# Patient Record
Sex: FEMALE | Race: WHITE | Employment: OTHER | URBAN - METROPOLITAN AREA
[De-identification: names, ages, dates, MRNs, and addresses within clinical notes are randomized per-mention and may not be internally consistent; named-entity substitution may affect disease eponyms.]

---

## 2017-03-07 ENCOUNTER — OFFICE VISIT (OUTPATIENT)
Dept: ENDOCRINOLOGY | Age: 57
End: 2017-03-07

## 2017-03-07 VITALS
HEART RATE: 81 BPM | WEIGHT: 204.3 LBS | DIASTOLIC BLOOD PRESSURE: 77 MMHG | BODY MASS INDEX: 30.26 KG/M2 | SYSTOLIC BLOOD PRESSURE: 143 MMHG | HEIGHT: 69 IN

## 2017-03-07 DIAGNOSIS — I10 ESSENTIAL HYPERTENSION WITH GOAL BLOOD PRESSURE LESS THAN 130/80: ICD-10-CM

## 2017-03-07 DIAGNOSIS — E78.5 HYPERLIPIDEMIA, UNSPECIFIED HYPERLIPIDEMIA TYPE: ICD-10-CM

## 2017-03-07 DIAGNOSIS — E11.49 TYPE 2 DIABETES MELLITUS WITH OTHER DIABETIC NEUROLOGICAL COMPLICATION (HCC): Primary | ICD-10-CM

## 2017-03-07 RX ORDER — ALBUTEROL SULFATE 0.83 MG/ML
SOLUTION RESPIRATORY (INHALATION)
COMMUNITY
Start: 2017-01-27 | End: 2017-08-30

## 2017-03-07 RX ORDER — METHYLPREDNISOLONE 4 MG/1
TABLET ORAL
COMMUNITY
Start: 2017-02-08 | End: 2017-08-30

## 2017-03-07 RX ORDER — FLUCONAZOLE 100 MG/1
TABLET ORAL
COMMUNITY
Start: 2016-12-03 | End: 2017-08-30

## 2017-03-07 RX ORDER — EPINEPHRINE 0.3 MG/.3ML
0.3 INJECTION SUBCUTANEOUS
COMMUNITY

## 2017-03-07 RX ORDER — PREGABALIN 50 MG/1
CAPSULE ORAL
COMMUNITY
End: 2017-09-14 | Stop reason: ALTCHOICE

## 2017-03-07 RX ORDER — LANOLIN ALCOHOL/MO/W.PET/CERES
500 CREAM (GRAM) TOPICAL DAILY
COMMUNITY
End: 2017-09-14 | Stop reason: ALTCHOICE

## 2017-03-07 RX ORDER — DEXTROAMPHETAMINE SACCHARATE, AMPHETAMINE ASPARTATE, DEXTROAMPHETAMINE SULFATE AND AMPHETAMINE SULFATE 5; 5; 5; 5 MG/1; MG/1; MG/1; MG/1
TABLET ORAL
COMMUNITY
Start: 2016-12-03 | End: 2017-09-14 | Stop reason: ALTCHOICE

## 2017-03-07 RX ORDER — INSULIN GLARGINE 300 U/ML
INJECTION, SOLUTION SUBCUTANEOUS
COMMUNITY
Start: 2017-02-21 | End: 2017-09-14 | Stop reason: SDUPTHER

## 2017-03-07 RX ORDER — PREGABALIN 75 MG/1
CAPSULE ORAL
COMMUNITY
End: 2017-09-14 | Stop reason: ALTCHOICE

## 2017-03-07 RX ORDER — HUMAN INSULIN 100 [IU]/ML
INJECTION, SOLUTION SUBCUTANEOUS
COMMUNITY
Start: 2017-02-08 | End: 2017-07-06 | Stop reason: SDUPTHER

## 2017-03-07 NOTE — PROGRESS NOTES
CONSULTATION REQUESTED BY: Adriano Rincon NP     REASON FOR CONSULT:  Uncontrolled type 2 diabetes    CHIEF COMPLAINT: Blood glucose is high    HISTORY OF PRESENT ILLNESS:   Mesha Velez is a 55 y.o. female with a PMHx as noted below who was referred to our endocrinology clinic for evaluation of uncontrolled type 2 diabetes. Diabetes History:  Diabetes was diagnosed in       History of gastric sleeve procedure in   Family History of diabetes is positive in her father. Last A1c prior to initial visit was 8.6% approx 2 weeks ago per pateint  Regimen at time of establishing care includes  - Toujeo 120 units AM       - Novolin R sliding scale startin units + 4 :50 >150, averaging 12 units TID       - Metformin gave her diarrhea, did not try extended release     Review of home glucose:  AM      Lunch  130-200  Dinner   160-380  Bedtime 200-500    Patient notes that she would like to have some help with her blood sugars.      PAST MEDICAL/SURGICAL HISTORY:   Past Medical History:   Diagnosis Date    Asthma     Asthma     COPD     Depression     Diabetes ([facility] [address]     Diabetes mellitus     Essential hypertension     GERD (gastroesophageal reflux disease)     Headache(784.0)     HX OTHER MEDICAL     acoustic neuroma    Hypercholesterolemia     Hypertension     Ill-defined condition     R ACOUSTIC NEUROMA    Psychiatric problem     anxiety depression    Psychotic disorder     Unspecified sleep apnea     NO CPAP-O2 @2L WHEN SLEEPING     Past Surgical History:   Procedure Laterality Date     DELIVERY ONLY      HX GASTRECTOMY  15    lap sleeve gastrectomy by Dr Derick Vasuqez HX GYN      2 c-sections    HX HEENT      sinus    HX HEENT      tracheal resection    HX HEENT      tracheal alleratation x 2    HX HEENT      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    HX HEENT      LASER SX-PENG       ALLERGIES:   Allergies   Allergen Reactions    Latex Hives    Other Food Anaphylaxis     PEPPERONI    Demerol [Meperidine] Anaphylaxis     Difficulty breathing    Iodine Anaphylaxis    Morphine Other (comments)     voilent outbreaks (restraints needed)       MEDICATIONS ON ADMISSION:     Current Outpatient Prescriptions:     TOUJEO SOLOSTAR 300 unit/mL (1.5 mL) inpn, , Disp: , Rfl:     albuterol (PROVENTIL VENTOLIN) 2.5 mg /3 mL (0.083 %) nebulizer solution, , Disp: , Rfl:     Levocetirizine (XYZAL) 5 mg tablet, Take  by mouth nightly., Disp: , Rfl:     Desvenlafaxine SR (PRISTIQ) 50 mg tablet, Take 50 mg by mouth., Disp: , Rfl:     vilazodone (VIIBRYD) 40 mg tab tablet, Take 40 mg by mouth daily. , Disp: , Rfl:     QUEtiapine (SEROQUEL) 25 mg tablet, Take 50 mg by mouth nightly., Disp: , Rfl:     rosuvastatin (CRESTOR) 20 mg tablet, Take 20 mg by mouth nightly.  , Disp: , Rfl:     tiotropium (SPIRIVA WITH HANDIHALER) 18 mcg inhalation capsule, Take 1 Cap by inhalation daily. , Disp: , Rfl:     budesonide-formoterol (SYMBICORT) 160-4.5 mcg/Actuation HFA inhaler, Take 2 Puffs by inhalation two (2) times a day. Indications: COPD ASSOCIATED WITH CHRONIC BRONCHITIS, EXTRINSIC ASTHMA, Disp: , Rfl:     albuterol (PROVENTIL HFA) 90 mcg/Actuation inhaler, Take 1 Puff by inhalation every four (4) hours as needed. , Disp: , Rfl:     Nebivolol (BYSTOLIC) 5 mg tablet, Take 20 mg by mouth daily. , Disp: , Rfl:     montelukast (SINGULAIR) 10 mg tablet, Take 10 mg by mouth daily. , Disp: , Rfl:     omeprazole (PRILOSEC) 20 mg capsule, Take 20 mg by mouth daily. , Disp: , Rfl:     NOVOLIN R 100 unit/mL injection, , Disp: , Rfl:     zinc 15 mg tab, Take  by mouth., Disp: , Rfl:     INSULIN GLARGINE,HUM. REC. ANLOG (LANTUS SC), 20 Units by SubCUTAneous route two (2) times a day., Disp: , Rfl:     insulin lispro protamine/insulin lispro (HUMALOG MIX 50-50) 100 unit/mL (50-50) injection, 50 Units by SubCUTAneous route Before breakfast, lunch, and dinner., Disp: , Rfl:     hyoscyamine SL (LEVSIN/SL) 0.125 mg SL tablet, 1 tablet by SubLINGual route every six (6) hours as needed for Cramping for up to 30 doses. , Disp: 30 tablet, Rfl: 0    hydrocodone-acetaminophen (NORCO) 5-325 mg per tablet, Take 1-2 tablets by mouth every four (4) hours as needed for Pain., Disp: 40 tablet, Rfl: 0    ergocalciferol (ERGOCALCIFEROL) 50,000 unit capsule, Take 1 capsule by mouth every seven (7) days. , Disp: 12 capsule, Rfl: 3    magnesium 250 mg tab, Take  by mouth nightly. 2 TABS AT BEDTIME, Disp: , Rfl:     fexofenadine (ALLEGRA) 180 mg tablet, Take  by mouth daily. , Disp: , Rfl:     loratadine 10 mg cap, Take  by mouth., Disp: , Rfl:     diphenhydrAMINE (BENADRYL) 25 mg capsule, Take 25 mg by mouth every six (6) hours as needed. , Disp: , Rfl:     cholecalciferol, vitamin d3, (VITAMIN D3) 1,000 unit tablet, Take 400 Units by mouth daily. 2 CAPS DAILY, Disp: , Rfl:     SOCIAL HISTORY:   Social History     Social History    Marital status:      Spouse name: N/A    Number of children: N/A    Years of education: N/A     Occupational History    Not on file. Social History Main Topics    Smoking status: Former Smoker     Packs/day: 1.50     Years: 12.00     Quit date: 12/18/2003    Smokeless tobacco: Never Used    Alcohol use Yes      Comment: ocassional    Drug use: No    Sexual activity: Yes     Other Topics Concern    Not on file     Social History Narrative       FAMILY HISTORY:  Family History   Problem Relation Age of Onset    Diabetes Father     Heart Failure Father     Heart Disease Father     Hypertension Father     High Cholesterol Mother     Suicide Brother     Stroke Maternal Grandmother     Cancer Paternal Grandfather     Anesth Problems Neg Hx        REVIEW OF SYSTEMS: Complete ROS assessed and noted for that which is described above, all else are negative.   Eyes: normal  ENT: normal  CVS: normal  Resp: normal  GI: normal  : normal  GYN: normal  Endocrine: normal  Integument: normal  Musculoskeletal: normal  Neuro: normal  Psych: normal      PHYSICAL EXAMINATION:    VITAL SIGNS:  Visit Vitals    /77 (BP 1 Location: Left arm, BP Patient Position: Sitting)    Pulse 81    Ht 5' 8.5\" (1.74 m)    Wt 204 lb 4.8 oz (92.7 kg)    LMP 01/17/2010    BMI 30.61 kg/m2       GENERAL: NCAT, Sitting comfortably, NAD  EYES: EOMI, non-icteric, no proptosis  Ear/Nose/Throat: NCAT, no inflammation, no masses  LYMPH NODES: No LAD  CARDIOVASCULAR: S1 S2, RRR, No murmur, 2+ radial pulses  RESPIRATORY: CTA b/l, no wheeze/rales  GASTROINTESTINAL:  ND  MUSCULOSKELETAL: Normal ROM, no atrophy  SKIN: warm, no edema/rash/ or other skin changes  NEUROLOGIC: 5/5 power all extremities, no tremor, AAOx3  PSYCHIATRIC: Normal affect, Normal insight and judgement         Diabetic foot exam performed by Latrice Dawson MD     Measurement  Response Nurse Comment Physician Comment   Monofilament  R - reduced sensation with micro filament  L - reduced sensation with micro filament     Pulse DP R - 2+ (normal)  L - 2+ (normal)     Vibration R - Normal    L - Normal     Structural deformity R - None  L - None     Skin Integrity / Deformity R - None  L - None        Reviewed by:             REVIEW OF LABORATORY AND RADIOLOGY DATA:   Labs and documentation have been reviewed as described above. ASSESSMENT AND PLAN:   Lynne Cordon is a 55 y.o. female with a PMHx as noted above who was referred to our endocrinology clinic for evaluation of uncontrolled type 2 diabetes. Problems:  Type 2 diabetes with neuropathy, Uncontrolled  Hyperlipidemia  Hypertension    We had the pleasure of reviewing together the basics of diabetes including basic pathophysiology and diabetes care. We further discussed the importance of checking home glucose regularly and taking all of their scheduled medications in order to have the best possible outcome.  I was able to answer any questions they had in clinic today and they are invited to reach me if they have any further questions. Based upon our discussion together today we have decided to make the following changes:    Patients diabetes basal-bolus regimen is currently skewed with significantly more basal insulin than prandial/bolus insulin. This tends to result in uncontrolled post-prandial hyperglycemia while at the same time increasing the risk of fasting hypoglycemia. Today besides establishing care, we will initiate some adjustment / balance in their regimen by shifting some insulin from their basal dose to their bolus dose which will be the first step to better diabetes control. From their we will continue to further tighten the regimen as appropriate to obtain more balanced glycemic control. Discussed the A2e-Ubot curve and risk for sudden cardiovascular events. Discussed her neuropathy, she has good days and bad days, using lyrica for control. Overall not as bad as before. PLAN  Type 2 Diabetes  Medications:  Reduce Toujeo to 80 units   Increase Novolin R to 25 units with each meal  Start correction scale 1:15>150  Advised to check glucose ACHS  Provided with glucose log sheets for later review. Labs: will retreive outside records. Feet: Examination performed today. Encouraged regular \"self-checks\" at home. Eyes: Advised updated eye exam report faxed to our office for review. Kidney: GFR/Urine microalbumin as discussed. HTN: BP stable on current medications, no changes, will monitor  HLD: Fasting lipids to be obtained/reviewed. crestor 20    RTC: I would like to see them back in 3 months  Requested she send me a glucose log in 1 week that I will review and call her back to discuss. >60 minutes spent together with patient today of which >50% of this time was spent in counseling and coordination of care. Kalyn Romero.  [address] [facility]

## 2017-03-07 NOTE — MR AVS SNAPSHOT
Visit Information Date & Time Provider Department Dept. Phone Encounter #  
 3/7/2017  2:50 PM Nilesh Pereira, [address] [facility] [phone] Follow-up Instructions Return in about 3 months (around 6/7/2017). Upcoming Health Maintenance Date Due Hepatitis C Screening 1960 FOOT EXAM Q1 3/23/1971 MICROALBUMIN Q1 3/23/1971 EYE EXAM RETINAL OR DILATED Q1 3/23/1971 Pneumococcal 19-64 Medium Risk (1 of 1 - PPSV23) 3/23/1980 DTaP/Tdap/Td series (1 - Tdap) 3/23/1982 PAP AKA CERVICAL CYTOLOGY 3/23/1982 BREAST CANCER SCRN MAMMOGRAM 3/23/2011 FOBT Q 1 YEAR AGE 50-75 3/23/2011 HEMOGLOBIN A1C Q6M 6/18/2015 LIPID PANEL Q1 9/5/2015 INFLUENZA AGE 9 TO ADULT 8/1/2016 Allergies as of 3/7/2017  Review Complete On: 3/7/2017 By: Nilesh Pereira MD  
  
 Severity Noted Reaction Type Reactions Latex  09/05/2014    Hives Other Food High 09/05/2014    Anaphylaxis PEPPERONI Demerol [Meperidine] High 10/17/2010   Systemic Anaphylaxis Difficulty breathing Iodine High 10/17/2010   Systemic Anaphylaxis Morphine High 10/17/2010   Systemic Other (comments)  
 voilent outbreaks (restraints needed) Current Immunizations  Reviewed on 9/25/2014 No immunizations on file. Not reviewed this visit You Were Diagnosed With   
  
 Codes Comments Type 2 diabetes mellitus with other diabetic neurological complication (HCC)    -  Primary ICD-10-CM: E11.49 
ICD-9-CM: 250.60 Essential hypertension with goal blood pressure less than 130/80     ICD-10-CM: I10 
ICD-9-CM: 401.9 Hyperlipidemia, unspecified hyperlipidemia type     ICD-10-CM: E78.5 ICD-9-CM: 272.4 Vitals BP Pulse Height(growth percentile) Weight(growth percentile) LMP BMI  
 143/77 (BP 1 Location: Left arm, BP Patient Position: Sitting) 81 5' 8.5\" (1.74 m) 204 lb 4.8 oz (92.7 kg) 01/17/2010 30.61 kg/m2 OB Status Smoking Status Postmenopausal Former Smoker BMI and BSA Data Body Mass Index Body Surface Area  
 30.61 kg/m 2 2.12 m 2 Preferred Pharmacy Pharmacy Name Phone [address] [address] [phone] Your Updated Medication List  
  
   
This list is accurate as of: 3/7/17  3:47 PM.  Always use your most recent med list. ALLEGRA 180 mg tablet Generic drug:  fexofenadine Take  by mouth daily. BENADRYL 25 mg capsule Generic drug:  diphenhydrAMINE Take 25 mg by mouth every six (6) hours as needed. BYSTOLIC 5 mg tablet Generic drug:  nebivolol Take 20 mg by mouth daily. cholecalciferol 1,000 unit tablet Commonly known as:  VITAMIN D3 Take 400 Units by mouth daily. 2 CAPS DAILY CRESTOR 20 mg tablet Generic drug:  rosuvastatin Take 20 mg by mouth nightly. Desvenlafaxine SR 50 mg tablet Commonly known as:  PRISTIQ Take 50 mg by mouth. dextroamphetamine-amphetamine 20 mg tablet Commonly known as:  ADDERALL  
  
 EPINEPHrine 0.3 mg/0.3 mL injection Commonly known as:  EPIPEN  
0.3 mg by IntraMUSCular route once as needed. ergocalciferol 50,000 unit capsule Commonly known as:  ERGOCALCIFEROL Take 1 capsule by mouth every seven (7) days. fluconazole 100 mg tablet Commonly known as:  DIFLUCAN HumaLOG Mix 50-50 100 unit/mL (50-50) injection Generic drug:  insulin lispro protamine/insulin lispro 50 Units by SubCUTAneous route Before breakfast, lunch, and dinner. HYDROcodone-acetaminophen 5-325 mg per tablet Commonly known as:  Renetta Grant Take 1-2 tablets by mouth every four (4) hours as needed for Pain.  
  
 hyoscyamine SL 0.125 mg SL tablet Commonly known as:  LEVSIN/SL  
1 tablet by SubLINGual route every six (6) hours as needed for Cramping for up to 30 doses. * LANTUS SC  
20 Units by SubCUTAneous route two (2) times a day. * TOUJEO SOLOSTAR 300 unit/mL (1.5 mL) Inpn Generic drug:  insulin glargine  
  
 levocetirizine 5 mg tablet Commonly known as:  Yamel Denzel Take  by mouth nightly. loratadine 10 mg Cap Take  by mouth. * LYRICA 50 mg capsule Generic drug:  pregabalin Take  by mouth. * LYRICA 75 mg capsule Generic drug:  pregabalin Take  by mouth.  
  
 magnesium 250 mg Tab Take  by mouth nightly. 2 TABS AT BEDTIME  
  
 methylPREDNISolone 4 mg tablet Commonly known as:  Drew Glee NovoLIN R 100 unit/mL injection Generic drug:  insulin regular  
  
 omeprazole 20 mg capsule Commonly known as:  PRILOSEC Take 20 mg by mouth daily. * PROVENTIL HFA 90 mcg/actuation inhaler Generic drug:  albuterol Take 1 Puff by inhalation every four (4) hours as needed. * albuterol 2.5 mg /3 mL (0.083 %) nebulizer solution Commonly known as:  PROVENTIL VENTOLIN  
  
 QUEtiapine 25 mg tablet Commonly known as:  SEROquel Take 50 mg by mouth nightly. SINGULAIR 10 mg tablet Generic drug:  montelukast  
Take 10 mg by mouth daily. SPIRIVA WITH HANDIHALER 18 mcg inhalation capsule Generic drug:  tiotropium Take 1 Cap by inhalation daily. SYMBICORT 160-4.5 mcg/actuation HFA inhaler Generic drug:  budesonide-formoterol Take 2 Puffs by inhalation two (2) times a day. Indications: COPD ASSOCIATED WITH CHRONIC BRONCHITIS, EXTRINSIC ASTHMA VIIBRYD 40 mg Tab tablet Generic drug:  vilazodone Take 40 mg by mouth daily. VITAMIN B-12 500 mcg tablet Generic drug:  cyanocobalamin Take 500 mcg by mouth daily. zinc 15 mg Tab Take  by mouth. * Notice: This list has 6 medication(s) that are the same as other medications prescribed for you. Read the directions carefully, and ask your doctor or other care provider to review them with you. Follow-up Instructions Return in about 3 months (around 6/7/2017). Patient Instructions Cody 80 units Novolin R   25 units with each meal. 
 
Correction Scale  1:15>150 Currently you are taking insulin with your meals. As we have discussed it is important to always check your glucose level just before taking your mealtime insulin dose. Sometimes before eating your meal, your glucose level is already much higher than the goal and so you may require a few units of extra insulin. This is in addition to the scheduled dose that you plan to take for the meal. Using the scale below, add the amount of extra insulin you need to the dose you already plan to take and inject together as one injection. As always continue to monitor your glucose afterward to assure recovery of your glucose levels. IF GLUCOSE IS:                 THEN TAKE: 
    0   Extra Unit 
150-165   1   Extra Unit 
165-180   2   Extra Units 180-195   3   Extra Units 195-210   4   Extra Units 210-225   5   Extra Units 225-240   6   Extra Units 240-255   7   Extra Units 255-270   8   Extra Units 721-630   9   Extra Units 285-300   10 Extra Units Example: My planned insulin dose:    ____ Units    +    ____ Extra Correction Units  =  ____ Units The result is the total dose to take for the meal. 
 
 
---------------------------------------------------------------------------------------------------------------------- Below you will find a glucose log sheet which you can use to record your blood sugars. Without checking and recording what your home glucose levels are, it will be difficult to make any changes to your medication dose, even when significant changes may be needed. Please feel free to use the log below to record your home glucose levels. At the very least, I would like for you to login the entire 2-3 weeks just before your visit so we can make your visit much more productive and beneficial to you. GLUCOSE LOG SHEET: 
 
Date Breakfast Lunch Dinner Bedtime Comments ? GLUCOSE LOG SHEET: 
 
Date Breakfast Lunch Dinner Bedtime Comments ? GLUCOSE LOG SHEET: 
 
Date Breakfast Lunch Dinner Bedtime Comments ? Dr. Rashid Catching Back to basics: 
 
When you have diabetes or pre-diabetes, as you know, your body has difficulty dealing with the sugar it absorbs from your meals. An unrelated but very relevant term you may have heard before, quantitative easing, has a new meaning: By gradually reducing the load of sugars entering the body, you ease the stress on the body and allow it to catch up with the work it must do. This in turn will allow your body to work better. On top of this, remember one point, the more sugar stress your body has, the more you enter a state of glucose toxicity and this is a state where you become even more resistant. Thats right higher sugar = more resistance, not only to your own bodies attempt to fix the problem but also resistance to your medications. This is why medications work best when a proper diet is followed. Tip 1. Dont forget the protein. When you add a portion of meat or other low carb protein food in your meal, it provides healthy calories which contribute to reducing that feeling of hunger that drives you to eat what you dont want. Tip 2.  Portions are a real thing. Before you eat, stop and look at your plate/table. Count how many items have sugars/carbs in them. A sandwich (bread) ? Armando  ? Sweet drink ? Potatoe ? Pasta ? Rice ? You would be surprised when you become aware. Aim for a reasonable portion of carbs, and if you feel you absolutely cannot do this, at least start working toward this. 45-60 grams is usually more than enough in one meal. And YES, than includes the desert! Tip 3. Three meals per day, snack-free in between! Your body needs a break. Eating an adequate meal keeps you from getting hungry and reaching for a snack in between meals. Recall that most of the time, diabetic patients are not treating these snacks. Dont eat dinner late at night, but rather allow more overnight fasting time for your body to recover. If you eat dinner at 8 PM, try 6 PM.  Sporadic eating is the opposite of what you need, and adjusting to a regular eating schedule such as this will not only be a great benefit to your body, but your medications will also tend to work better at keeping your diabetes under control. Tip 4. There is no best diet when it comes to weight loss. When comparing diets and outcomes, the main ingredient when searching for weight loss was calories ! Lower calories = better weight loss. Pick a healthy diet thats right for you, i.e. diabetes friendly, and evaluate your daily calorie intake. And of course this would be of no use without exercise. Calories in need calories out. Introducing [facility]! Vesna Chang introduces [employer] patient portal. Now you can access parts of your medical record, email your doctor's office, and request medication refills online. 1. In your internet browser, go to [email or web address]"t 2. Click on the First Time User? Click Here link in the Sign In box. You will see the New Member Sign Up page. 3. Enter your [employer] Access Code exactly as it appears below. You will not need to use this code after youve completed the sign-up process. If you do not sign up before the expiration date, you must request a new code. · [employer] Access Code: [email or web address] Expires: 6/5/2017  3:12 PM 
 
4. Enter the last four digits of your Social Security Number (xxxx) and Date of Birth (mm/dd/yyyy) as indicated and click Submit. You will be taken to the next sign-up page. 5. Create a [employer] ID. This will be your [employer] login ID and cannot be changed, so think of one that is secure and easy to remember. 6. Create a [employer] password. You can change your password at any time. 7. Enter your Password Reset Question and Answer. This can be used at a later time if you forget your password. 8. Enter your e-mail address. You will receive e-mail notification when new information is available in [address] 9. Click Sign Up. You can now view and download portions of your medical record. 10. Click the Download Summary menu link to download a portable copy of your medical information. If you have questions, please visit the Frequently Asked Questions section of the [employer] website. Remember, [employer] is NOT to be used for urgent needs. For medical emergencies, dial 911. Now available from your iPhone and Android! Please provide this summary of care documentation to your next provider. Your primary care clinician is listed as Tung Odell. If you have any questions after today's visit, please call [phone].

## 2017-03-07 NOTE — PATIENT INSTRUCTIONS
Toujeo 80 units  Novolin R   25 units with each meal.    Correction Scale  1:15>150  Currently you are taking insulin with your meals. As we have discussed it is important to always check your glucose level just before taking your mealtime insulin dose. Sometimes before eating your meal, your glucose level is already much higher than the goal and so you may require a few units of extra insulin. This is in addition to the scheduled dose that you plan to take for the meal. Using the scale below, add the amount of extra insulin you need to the dose you already plan to take and inject together as one injection. As always continue to monitor your glucose afterward to assure recovery of your glucose levels. IF GLUCOSE IS:                 THEN TAKE:      0   Extra Unit  150-165   1   Extra Unit  165-180   2   Extra Units  180-195   3   Extra Units  195-210   4   Extra Units  210-225   5   Extra Units  225-240   6   Extra Units  240-255   7   Extra Units  255-270   8   Extra Units  270-285   9   Extra Units  285-300   10 Extra Units    Example: My planned insulin dose:    ____ Units    +    ____ Extra Correction Units  =  ____ Units  The result is the total dose to take for the meal.      ----------------------------------------------------------------------------------------------------------------------    Below you will find a glucose log sheet which you can use to record your blood sugars. Without checking and recording what your home glucose levels are, it will be difficult to make any changes to your medication dose, even when significant changes may be needed. Please feel free to use the log below to record your home glucose levels. At the very least, I would like for you to login the entire 2-3 weeks just before your visit so we can make your visit much more productive and beneficial to you. GLUCOSE LOG SHEET:    Date Breakfast Lunch Dinner Bedtime Comments ? GLUCOSE LOG SHEET:    Date Breakfast Lunch Dinner Bedtime Comments ? GLUCOSE LOG SHEET:    Date Breakfast Lunch Dinner Bedtime Comments ? Dr. Cj Piedra to basics:    When you have diabetes or pre-diabetes, as you know, your body has difficulty dealing with the sugar it absorbs from your meals. An unrelated but very relevant term you may have heard before, quantitative easing, has a new meaning: By gradually reducing the load of sugars entering the body, you ease the stress on the body and allow it to catch up with the work it must do. This in turn will allow your body to work better. On top of this, remember one point, the more sugar stress your body has, the more you enter a state of glucose toxicity and this is a state where you become even more resistant. Thats right higher sugar = more resistance, not only to your own bodies attempt to fix the problem but also resistance to your medications. This is why medications work best when a proper diet is followed. Tip 1. Dont forget the protein. When you add a portion of meat or other low carb protein food in your meal, it provides healthy calories which contribute to reducing that feeling of hunger that drives you to eat what you dont want. Tip 2.   Portions are a real thing. Before you eat, stop and look at your plate/table. Count how many items have sugars/carbs in them. A sandwich (bread) ? Theone Ream ? Sweet drink ? Potatoe ? Pasta ? Rice ? You would be surprised when you become aware. Aim for a reasonable portion of carbs, and if you feel you absolutely cannot do this, at least start working toward this. 45-60 grams is usually more than enough in one meal. And YES, than includes the desert! Tip 3. Three meals per day, snack-free in between! Your body needs a break. Eating an adequate meal keeps you from getting hungry and reaching for a snack in between meals. Recall that most of the time, diabetic patients are not treating these snacks. Dont eat dinner late at night, but rather allow more overnight fasting time for your body to recover. If you eat dinner at 8 PM, try 6 PM.  Sporadic eating is the opposite of what you need, and adjusting to a regular eating schedule such as this will not only be a great benefit to your body, but your medications will also tend to work better at keeping your diabetes under control. Tip 4. There is no best diet when it comes to weight loss. When comparing diets and outcomes, the main ingredient when searching for weight loss was calories ! Lower calories = better weight loss. Pick a healthy diet thats right for you, i.e. diabetes friendly, and evaluate your daily calorie intake. And of course this would be of no use without exercise. Calories in need calories out.

## 2017-04-26 LAB
CREATININE, EXTERNAL: NORMAL
HBA1C MFR BLD HPLC: NORMAL %
LDL-C, EXTERNAL: 152

## 2017-06-14 ENCOUNTER — OFFICE VISIT (OUTPATIENT)
Dept: ENDOCRINOLOGY | Age: 57
End: 2017-06-14

## 2017-06-14 VITALS
SYSTOLIC BLOOD PRESSURE: 119 MMHG | DIASTOLIC BLOOD PRESSURE: 75 MMHG | WEIGHT: 211.4 LBS | BODY MASS INDEX: 31.31 KG/M2 | HEIGHT: 69 IN | HEART RATE: 80 BPM

## 2017-06-14 DIAGNOSIS — I10 ESSENTIAL HYPERTENSION WITH GOAL BLOOD PRESSURE LESS THAN 130/80: ICD-10-CM

## 2017-06-14 DIAGNOSIS — E78.5 HYPERLIPIDEMIA, UNSPECIFIED HYPERLIPIDEMIA TYPE: ICD-10-CM

## 2017-06-14 DIAGNOSIS — E11.49 TYPE 2 DIABETES MELLITUS WITH OTHER DIABETIC NEUROLOGICAL COMPLICATION (HCC): Primary | ICD-10-CM

## 2017-06-14 RX ORDER — PANTOPRAZOLE SODIUM 40 MG/1
40 TABLET, DELAYED RELEASE ORAL DAILY
COMMUNITY
Start: 2017-05-26 | End: 2018-02-02 | Stop reason: SDUPTHER

## 2017-06-14 RX ORDER — ROSUVASTATIN CALCIUM 40 MG/1
40 TABLET, COATED ORAL
Qty: 90 TAB | Refills: 3 | Status: SHIPPED | OUTPATIENT
Start: 2017-06-14 | End: 2018-07-11 | Stop reason: SDUPTHER

## 2017-06-14 NOTE — MR AVS SNAPSHOT
Visit Information Date & Time Provider Department Dept. Phone Encounter #  
 6/14/2017  2:50 PM Gilson Neville, [address] [facility] [phone] Follow-up Instructions Return in about 3 months (around 9/14/2017). Your Appointments 7/3/2017  9:30 AM  
New Patient with Jasmine Aleman MD  
[facility]) Appt Note: NP, est PCP  
 [address] [facility] [address] [address]  
[address] [facility] [address] [place] [address] Upcoming Health Maintenance Date Due Hepatitis C Screening 1960 FOOT EXAM Q1 3/23/1971 MICROALBUMIN Q1 3/23/1971 Pneumococcal 19-64 Medium Risk (1 of 1 - PPSV23) 3/23/1980 DTaP/Tdap/Td series (1 - Tdap) 3/23/1982 PAP AKA CERVICAL CYTOLOGY 3/23/1982 BREAST CANCER SCRN MAMMOGRAM 3/23/2011 FOBT Q 1 YEAR AGE 50-75 3/23/2011 HEMOGLOBIN A1C Q6M 6/18/2015 LIPID PANEL Q1 9/5/2015 INFLUENZA AGE 9 TO ADULT 8/1/2017 EYE EXAM RETINAL OR DILATED Q1 3/27/2018 Allergies as of 6/14/2017  Review Complete On: 6/14/2017 By: Gilson Neville MD  
  
 Severity Noted Reaction Type Reactions Latex  09/05/2014    Hives Other Food High 09/05/2014    Anaphylaxis PEPPERONI Demerol [Meperidine] High 10/17/2010   Systemic Anaphylaxis Difficulty breathing Iodine High 10/17/2010   Systemic Anaphylaxis Morphine High 10/17/2010   Systemic Other (comments)  
 voilent outbreaks (restraints needed) Current Immunizations  Reviewed on 9/25/2014 No immunizations on file. Not reviewed this visit You Were Diagnosed With   
  
 Codes Comments Type 2 diabetes mellitus with other diabetic neurological complication (HCC)    -  Primary ICD-10-CM: E11.49 
ICD-9-CM: 250.60 Essential hypertension with goal blood pressure less than 130/80     ICD-10-CM: I10 
ICD-9-CM: 401.9 Hyperlipidemia, unspecified hyperlipidemia type     ICD-10-CM: E78.5 ICD-9-CM: 272.4 Vitals BP Pulse Height(growth percentile) Weight(growth percentile) LMP BMI  
 119/75 (BP 1 Location: Left arm, BP Patient Position: Sitting) 80 5' 8.5\" (1.74 m) 211 lb 6.4 oz (95.9 kg) 01/17/2010 31.68 kg/m2 OB Status Smoking Status Postmenopausal Former Smoker BMI and BSA Data Body Mass Index Body Surface Area  
 31.68 kg/m 2 2.15 m 2 Preferred Pharmacy Pharmacy Name Phone [address] [address] [address] [phone] Your Updated Medication List  
  
   
This list is accurate as of: 6/14/17  3:36 PM.  Always use your most recent med list. ALLEGRA 180 mg tablet Generic drug:  fexofenadine Take  by mouth daily. BENADRYL 25 mg capsule Generic drug:  diphenhydrAMINE Take 25 mg by mouth every six (6) hours as needed. BYSTOLIC 5 mg tablet Generic drug:  nebivolol Take 20 mg by mouth daily. cholecalciferol 1,000 unit tablet Commonly known as:  VITAMIN D3 Take 400 Units by mouth daily. 2 CAPS DAILY Desvenlafaxine SR 50 mg tablet Commonly known as:  PRISTIQ Take 50 mg by mouth. dextroamphetamine-amphetamine 20 mg tablet Commonly known as:  ADDERALL  
  
 EPINEPHrine 0.3 mg/0.3 mL injection Commonly known as:  EPIPEN  
0.3 mg by IntraMUSCular route once as needed. ergocalciferol 50,000 unit capsule Commonly known as:  ERGOCALCIFEROL Take 1 capsule by mouth every seven (7) days. fluconazole 100 mg tablet Commonly known as:  DIFLUCAN  
  
 levocetirizine 5 mg tablet Commonly known as:  Larey Mehta Take  by mouth nightly. loratadine 10 mg Cap Take  by mouth. * LYRICA 50 mg capsule Generic drug:  pregabalin Take  by mouth. * LYRICA 75 mg capsule Generic drug:  pregabalin Take  by mouth.  
  
 methylPREDNISolone 4 mg tablet Commonly known as:  Brendan Baptiste NovoLIN R 100 unit/mL injection Generic drug:  insulin regular  
  
 omeprazole 20 mg capsule Commonly known as:  PRILOSEC Take 20 mg by mouth daily. pantoprazole 40 mg tablet Commonly known as:  PROTONIX  
  
 * PROVENTIL HFA 90 mcg/actuation inhaler Generic drug:  albuterol Take 1 Puff by inhalation every four (4) hours as needed. * albuterol 2.5 mg /3 mL (0.083 %) nebulizer solution Commonly known as:  PROVENTIL VENTOLIN  
  
 QUEtiapine 25 mg tablet Commonly known as:  SEROquel Take 50 mg by mouth nightly. rosuvastatin 40 mg tablet Commonly known as:  CRESTOR Take 1 Tab by mouth nightly. SINGULAIR 10 mg tablet Generic drug:  montelukast  
Take 10 mg by mouth daily. SPIRIVA WITH HANDIHALER 18 mcg inhalation capsule Generic drug:  tiotropium Take 1 Cap by inhalation daily. SYMBICORT 160-4.5 mcg/actuation HFA inhaler Generic drug:  budesonide-formoterol Take 2 Puffs by inhalation two (2) times a day. Indications: COPD ASSOCIATED WITH CHRONIC BRONCHITIS, EXTRINSIC ASTHMA TOUJEO SOLOSTAR 300 unit/mL (1.5 mL) Inpn Generic drug:  insulin glargine VIIBRYD 40 mg Tab tablet Generic drug:  vilazodone Take 40 mg by mouth daily. VITAMIN B-12 500 mcg tablet Generic drug:  cyanocobalamin Take 500 mcg by mouth daily. * Notice: This list has 4 medication(s) that are the same as other medications prescribed for you. Read the directions carefully, and ask your doctor or other care provider to review them with you. Prescriptions Sent to Pharmacy Refills  
 rosuvastatin (CRESTOR) 40 mg tablet 3 Sig: Take 1 Tab by mouth nightly. Class: Normal  
 Pharmacy: [address] [place] [address] #: [phone] Route: Oral  
  
Follow-up Instructions Return in about 3 months (around 9/14/2017). Patient Instructions Decrease Toujeo to 60 units Increase Novlin R to 35 units with each meal 
 
Correction Scale  1:15>150 Currently you are taking insulin with your meals. As we have discussed it is important to always check your glucose level just before taking your mealtime insulin dose. Sometimes before eating your meal, your glucose level is already much higher than the goal and so you may require a few units of extra insulin. This is in addition to the scheduled dose that you plan to take for the meal. Using the scale below, add the amount of extra insulin you need to the dose you already plan to take and inject together as one injection. As always continue to monitor your glucose afterward to assure recovery of your glucose levels. IF GLUCOSE IS:                 THEN TAKE: 
    0   Extra Unit 
150-165   1   Extra Unit 
165-180   2   Extra Units 180-195   3   Extra Units 195-210   4   Extra Units 210-225   5   Extra Units 225-240   6   Extra Units 240-255   7   Extra Units 255-270   8   Extra Units 720-010   9   Extra Units 285-300   10 Extra Units 
 
 
---------------------------------------------------------------------------------------------------------------------- Below you will find a glucose log sheet which you can use to record your blood sugars. Without checking and recording what your home glucose levels are, it will be difficult to make any changes to your medication dose, even when significant changes may be needed. Please feel free to use the log below to record your home glucose levels. At the very least, I would like for you to login the entire 2-3 weeks just before your visit so we can make your visit much more productive and beneficial to you. GLUCOSE LOG SHEET: 
 
Date Breakfast Lunch Dinner Bedtime Comments ? GLUCOSE LOG SHEET: 
 
Date Breakfast Lunch Dinner Bedtime Comments ? GLUCOSE LOG SHEET: 
 
Date Breakfast Lunch Dinner Bedtime Comments ? Introducing [facility]! Dear Jose Philippe: 
Thank you for requesting a [employer] account. Our records indicate that you already have an active [employer] account. You can access your account anytime at [email or web address] Did you know that you can access your hospital and ER discharge instructions at any time in [employer]? You can also review all of your test results from your hospital stay or ER visit. Additional Information If you have questions, please visit the Frequently Asked Questions section of the [employer] website at [email or web address] Remember, [employer] is NOT to be used for urgent needs. For medical emergencies, dial 911. Now available from your iPhone and Android! Please provide this summary of care documentation to your next provider. Your primary care clinician is listed as Cyndi Zamudio. If you have any questions after today's visit, please call [phone].

## 2017-06-14 NOTE — PROGRESS NOTES
CHIEF COMPLAINT: f/u evaluation for uncontrolled type 2 diabetes    HISTORY OF PRESENT ILLNESS:   Cheo Rosa is a 65 y.o. female with a PMHx as noted below who presents to the endocrinology clinic for f/u evaluation of uncontrolled type 2 diabetes. Outside labs riewed: 17   on crestor  GFR >60    Currently taking the following meds:   Toujeo 70 once daily  Novolin R 30 TID   Correction 1:15 >150    Review of home blood glucose:  AM , had a 60, and a 56  Lunch 180 - 300, occasional 115  Dinner 140-200  Bedtime 100-200      Review of most recent diabetes-related labs:  Lab Results   Component Value Date    HBA1C 8.3 (H) 2014    HBA1C 10.1 (H) 10/18/2010    HBA1C 8.6 (H) 2009    CHOL 195 2014    GFRAA 97 2015    GFRNA 84 2015    TSH 2.23 2014    VITD3 41.5 2015         PAST MEDICAL/SURGICAL HISTORY:   Past Medical History:   Diagnosis Date    Asthma     Asthma     COPD     Depression     Diabetes ([address]     Diabetes mellitus     Essential hypertension     GERD (gastroesophageal reflux disease)     Headache     HX OTHER MEDICAL     acoustic neuroma    Hypercholesterolemia     Hypertension     Ill-defined condition     R ACOUSTIC NEUROMA    Psychiatric problem     anxiety depression    Psychotic disorder     Unspecified sleep apnea     NO CPAP-O2 @2L WHEN SLEEPING     Past Surgical History:   Procedure Laterality Date     DELIVERY ONLY      HX GASTRECTOMY  14    lap sleeve gastrectomy by Dr Lester Oshea HX GYN      2 c-sections    HX HEENT      sinus    HX HEENT      tracheal resection    HX HEENT      tracheal alleratation x 2    HX HEENT      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    HX HEENT      LASER SX-PENG       ALLERGIES:   Allergies   Allergen Reactions    Latex Hives    Other Food Anaphylaxis     PEPPERONI    Demerol [Meperidine] Anaphylaxis     Difficulty breathing    Iodine Anaphylaxis  Morphine Other (comments)     voilent outbreaks (restraints needed)       MEDICATIONS ON ADMISSION:     Current Outpatient Prescriptions:     pantoprazole (PROTONIX) 40 mg tablet, , Disp: , Rfl:     TOUJEO SOLOSTAR 300 unit/mL (1.5 mL) inpn, , Disp: , Rfl:     NOVOLIN R 100 unit/mL injection, , Disp: , Rfl:     albuterol (PROVENTIL VENTOLIN) 2.5 mg /3 mL (0.083 %) nebulizer solution, , Disp: , Rfl:     pregabalin (LYRICA) 50 mg capsule, Take  by mouth., Disp: , Rfl:     EPINEPHrine (EPIPEN) 0.3 mg/0.3 mL injection, 0.3 mg by IntraMUSCular route once as needed. , Disp: , Rfl:     ergocalciferol (ERGOCALCIFEROL) 50,000 unit capsule, Take 1 capsule by mouth every seven (7) days. , Disp: 12 capsule, Rfl: 3    Levocetirizine (XYZAL) 5 mg tablet, Take  by mouth nightly., Disp: , Rfl:     Desvenlafaxine SR (PRISTIQ) 50 mg tablet, Take 50 mg by mouth., Disp: , Rfl:     vilazodone (VIIBRYD) 40 mg tab tablet, Take 40 mg by mouth daily. , Disp: , Rfl:     QUEtiapine (SEROQUEL) 25 mg tablet, Take 50 mg by mouth nightly., Disp: , Rfl:     rosuvastatin (CRESTOR) 20 mg tablet, Take 20 mg by mouth nightly.  , Disp: , Rfl:     tiotropium (SPIRIVA WITH HANDIHALER) 18 mcg inhalation capsule, Take 1 Cap by inhalation daily. , Disp: , Rfl:     budesonide-formoterol (SYMBICORT) 160-4.5 mcg/Actuation HFA inhaler, Take 2 Puffs by inhalation two (2) times a day. Indications: COPD ASSOCIATED WITH CHRONIC BRONCHITIS, EXTRINSIC ASTHMA, Disp: , Rfl:     albuterol (PROVENTIL HFA) 90 mcg/Actuation inhaler, Take 1 Puff by inhalation every four (4) hours as needed. , Disp: , Rfl:     Nebivolol (BYSTOLIC) 5 mg tablet, Take 20 mg by mouth daily. , Disp: , Rfl:     montelukast (SINGULAIR) 10 mg tablet, Take 10 mg by mouth daily. , Disp: , Rfl:     cholecalciferol, vitamin d3, (VITAMIN D3) 1,000 unit tablet, Take 400 Units by mouth daily.  2 CAPS DAILY, Disp: , Rfl:     cyanocobalamin (VITAMIN B-12) 500 mcg tablet, Take 500 mcg by mouth daily., Disp: , Rfl:     pregabalin (LYRICA) 75 mg capsule, Take  by mouth., Disp: , Rfl:     dextroamphetamine-amphetamine (ADDERALL) 20 mg tablet, , Disp: , Rfl:     fluconazole (DIFLUCAN) 100 mg tablet, , Disp: , Rfl:     methylPREDNISolone (MEDROL DOSEPACK) 4 mg tablet, , Disp: , Rfl:     fexofenadine (ALLEGRA) 180 mg tablet, Take  by mouth daily. , Disp: , Rfl:     loratadine 10 mg cap, Take  by mouth., Disp: , Rfl:     diphenhydrAMINE (BENADRYL) 25 mg capsule, Take 25 mg by mouth every six (6) hours as needed. , Disp: , Rfl:     omeprazole (PRILOSEC) 20 mg capsule, Take 20 mg by mouth daily. , Disp: , Rfl:     SOCIAL HISTORY:   Social History     Social History    Marital status:      Spouse name: N/A    Number of children: N/A    Years of education: N/A     Occupational History    Not on file. Social History Main Topics    Smoking status: Former Smoker     Packs/day: 1.50     Years: 12.00     Quit date: 12/18/2003    Smokeless tobacco: Never Used    Alcohol use Yes      Comment: ocassional    Drug use: No    Sexual activity: Yes     Other Topics Concern    Not on file     Social History Narrative       FAMILY HISTORY:  Family History   Problem Relation Age of Onset    Diabetes Father     Heart Failure Father     Heart Disease Father     Hypertension Father     High Cholesterol Mother     Suicide Brother     Stroke Maternal Grandmother     Cancer Paternal Grandfather     Anesth Problems Neg Hx        REVIEW OF SYSTEMS: Complete ROS assessed and noted for that which is described above, all else are negative.   Eyes: normal  ENT: normal  CVS: normal  Resp: normal  GI: normal  : normal  GYN: normal  Endocrine: normal  Integument: normal  Musculoskeletal: normal  Neuro: normal  Psych: normal      PHYSICAL EXAMINATION:    VITAL SIGNS:  Visit Vitals    /75 (BP 1 Location: Left arm, BP Patient Position: Sitting)    Pulse 80    Ht 5' 8.5\" (1.74 m)    Wt 211 lb 6.4 oz (95.9 kg)    LMP 01/17/2010    BMI 31.68 kg/m2       GENERAL: NCAT, Sitting comfortably, NAD  EYES: EOMI, non-icteric, no proptosis  Ear/Nose/Throat: NCAT, no inflammation, no masses  LYMPH NODES: No LAD  CARDIOVASCULAR: S1 S2, RRR, No murmur, 2+ radial pulses  RESPIRATORY: CTA b/l, no wheeze/rales  GASTROINTESTINAL: ND  MUSCULOSKELETAL: Normal ROM, no atrophy  SKIN: warm, no edema/rash/ or other skin changes  NEUROLOGIC: 5/5 power all extremities, no tremors, AAOx3  PSYCHIATRIC: Normal affect, Normal insight and judgement         REVIEW OF LABORATORY AND RADIOLOGY DATA:   Labs and documentation have been reviewed as described above. ASSESSMENT AND PLAN:   Raza Dang is a 65 y.o. female with a PMHx as noted above who presents to the endocrinology clinic for evaluation of uncontrolled type 2 diabetes. DM2 uncontrolled  HTN  HLD    Notably based on her blood sugars she needs slightly less insulin with her basal insulin with a shift over to her bolus since she is still having post prandial hyperglycemia. Some lows are occurring and this is due to less carbs. We discussed when and how to reduce insulin dose during those times. DM2:  Decrease Toujeo to 60 units  Increase Novlin R to 35 units with each meal  Continue correction of 1:15>150  Continue to check glucose 4x/day  Provided with new log sheets    Neuropathy: On lyrica 100mg AM, 50mg PM, notes it gave her suicidal thoughts and depression previously. Trial of cymbalta 60mg but on pristique and needs evaluation of current antidepressants first.  She is advised to f/u with psych first.    BP: Bystolic 82FO daily  HLD: increase crestor to 40mg each evening, continue with fish oil,     Labs: urine microalblumin today    3 month f/u visit. Jah Burnett.  [address] [facility]

## 2017-06-14 NOTE — PATIENT INSTRUCTIONS
Decrease Toujeo to 60 units  Increase Novlin R to 35 units with each meal    Correction Scale  1:15>150    Currently you are taking insulin with your meals. As we have discussed it is important to always check your glucose level just before taking your mealtime insulin dose. Sometimes before eating your meal, your glucose level is already much higher than the goal and so you may require a few units of extra insulin. This is in addition to the scheduled dose that you plan to take for the meal. Using the scale below, add the amount of extra insulin you need to the dose you already plan to take and inject together as one injection. As always continue to monitor your glucose afterward to assure recovery of your glucose levels. IF GLUCOSE IS:                 THEN TAKE:      0   Extra Unit  150-165   1   Extra Unit  165-180   2   Extra Units  180-195   3   Extra Units  195-210   4   Extra Units  210-225   5   Extra Units  225-240   6   Extra Units  240-255   7   Extra Units  255-270   8   Extra Units  270-285   9   Extra Units  285-300   10 Extra Units      ----------------------------------------------------------------------------------------------------------------------    Below you will find a glucose log sheet which you can use to record your blood sugars. Without checking and recording what your home glucose levels are, it will be difficult to make any changes to your medication dose, even when significant changes may be needed. Please feel free to use the log below to record your home glucose levels. At the very least, I would like for you to login the entire 2-3 weeks just before your visit so we can make your visit much more productive and beneficial to you. GLUCOSE LOG SHEET:    Date Breakfast Lunch Dinner Bedtime Comments ? GLUCOSE LOG SHEET:    Date Breakfast Lunch Dinner Bedtime Comments ? GLUCOSE LOG SHEET:    Date Breakfast Lunch Dinner Bedtime Comments ?

## 2017-07-06 RX ORDER — HUMAN INSULIN 100 [IU]/ML
INJECTION, SOLUTION SUBCUTANEOUS
Qty: 2 VIAL | Refills: 3 | Status: SHIPPED | OUTPATIENT
Start: 2017-07-06 | End: 2017-09-14 | Stop reason: SDUPTHER

## 2017-07-31 ENCOUNTER — TELEPHONE (OUTPATIENT)
Dept: FAMILY MEDICINE CLINIC | Age: 57
End: 2017-07-31

## 2017-07-31 NOTE — TELEPHONE ENCOUNTER
Patient phoned per Dr. Aguilar Backbone office no answer voicemail message left upcoming appointment cancelled due to provider schedule change message left to call office.

## 2017-08-30 ENCOUNTER — HOSPITAL ENCOUNTER (EMERGENCY)
Age: 57
Discharge: HOME | End: 2017-08-30
Attending: EMERGENCY MEDICINE
Payer: MEDICARE

## 2017-08-30 VITALS
OXYGEN SATURATION: 98 % | WEIGHT: 217.81 LBS | HEIGHT: 68 IN | RESPIRATION RATE: 18 BRPM | SYSTOLIC BLOOD PRESSURE: 170 MMHG | TEMPERATURE: 98.3 F | DIASTOLIC BLOOD PRESSURE: 78 MMHG | BODY MASS INDEX: 33.01 KG/M2 | HEART RATE: 94 BPM

## 2017-08-30 DIAGNOSIS — Z76.0 MEDICATION REFILL: Primary | ICD-10-CM

## 2017-08-30 PROCEDURE — 99282 EMERGENCY DEPT VISIT SF MDM: CPT

## 2017-08-30 RX ORDER — ALBUTEROL SULFATE 90 UG/1
1 AEROSOL, METERED RESPIRATORY (INHALATION)
Qty: 1 INHALER | Refills: 0 | Status: SHIPPED | OUTPATIENT
Start: 2017-08-30 | End: 2018-01-15 | Stop reason: SDUPTHER

## 2017-08-30 RX ORDER — MONTELUKAST SODIUM 10 MG/1
10 TABLET ORAL DAILY
Qty: 10 TAB | Refills: 0 | Status: SHIPPED | OUTPATIENT
Start: 2017-08-30 | End: 2018-01-25 | Stop reason: SDUPTHER

## 2017-08-30 RX ORDER — LEVOCETIRIZINE DIHYDROCHLORIDE 5 MG/1
5 TABLET, FILM COATED ORAL
Qty: 10 TAB | Refills: 0 | Status: SHIPPED | OUTPATIENT
Start: 2017-08-30 | End: 2018-02-07 | Stop reason: SDUPTHER

## 2017-08-30 RX ORDER — VILAZODONE HYDROCHLORIDE 40 MG/1
40 TABLET ORAL DAILY
Qty: 10 TAB | Refills: 0 | Status: SHIPPED | OUTPATIENT
Start: 2017-08-30 | End: 2017-12-04 | Stop reason: SDUPTHER

## 2017-08-30 RX ORDER — BUDESONIDE AND FORMOTEROL FUMARATE DIHYDRATE 160; 4.5 UG/1; UG/1
2 AEROSOL RESPIRATORY (INHALATION) 2 TIMES DAILY
Qty: 1 INHALER | Refills: 0 | Status: SHIPPED | OUTPATIENT
Start: 2017-08-30 | End: 2018-01-25 | Stop reason: SDUPTHER

## 2017-08-30 RX ORDER — NEBIVOLOL 5 MG/1
20 TABLET ORAL DAILY
Qty: 30 TAB | Refills: 0 | Status: SHIPPED | OUTPATIENT
Start: 2017-08-30 | End: 2017-12-14 | Stop reason: SDUPTHER

## 2017-08-30 NOTE — DISCHARGE INSTRUCTIONS
COPD and Asthma: Care Instructions  Your Care Instructions  Some people who have chronic obstructive pulmonary disease (COPD) also have asthma. Both of these problems can damage your lungs. This makes it very important to control them. Asthma causes the airways that lead to the lungs to swell and become narrow. This makes it hard to breathe. You may wheeze or cough. If you have a bad attack, you may need emergency care. There are two parts to treating asthma. · Controlling asthma over the long term. · Treating attacks when they occur. You and your doctor can make an asthma treatment plan that will help. This plan tells you the medicines you take every day to reduce the swelling in your airways and prevent attacks. It also tells you what to do if you have an asthma attack. Follow-up care is a key part of your treatment and safety. Be sure to make and go to all appointments, and call your doctor if you are having problems. It's also a good idea to know your test results and keep a list of the medicines you take. How can you care for yourself at home? To control asthma over the long term  Medicines  Controller medicines reduce swelling in your lungs. They also prevent asthma attacks. Take your controller medicine exactly as prescribed. Talk to your doctor if you have any problems with your medicine. · Inhaled corticosteroid is a common and effective controller medicine. Using it the right way can prevent or reduce most side effects. · Take your controller medicine every day, not just when you have symptoms. This helps prevent problems before they occur. · Always bring your asthma medicine with you when you travel. · Your doctor may prescribe long-acting medicine that combines a corticosteroid with a beta2-agonist. Follow your doctor's instructions exactly about how to take a long-acting medicine. Examples include:  ¨ Fluticasone and salmeterol (Advair). ¨ Budesonide and formoterol (Symbicort).   · Do not depend on your controller medicines to stop an asthma attack that has already started. They do not work fast enough to help. · Your doctor may also prescribe anticholinergic inhalers. These include ipratropium (Atrovent) and tiotropium (Spiriva). Education  · Learn what sets off an asthma attack. Avoid these triggers when you can. Common triggers include smoke, air pollution, pollen, animal dander, colds, stress, and cold air. · Do not smoke. Smoking can make COPD and asthma worse. If you need help quitting, talk to your doctor about stop-smoking programs and medicines. These can increase your chances of quitting for good. · Check yourself for symptoms to know which step to follow in your action plan. Watch for things like being short of breath, having chest tightness, coughing, and wheezing. Also notice if symptoms wake you up at night or if you get tired quickly when you exercise. · You may want to learn how to use a peak flow meter. This measures how open your airways are. It may help you know when you will have an asthma attack. To treat attacks when they occur  Use your asthma action plan when you have an attack. Your quick-relief medicine, such as albuterol, will stop an asthma attack. It relaxes the muscles that get tight around the airways. · Take your quick-relief medicine exactly as prescribed. Talk with your doctor if you have any problems with your medicine. · Keep this medicine with you at all times. · You may need to use this medicine before you exercise. If your doctor prescribed corticosteroid pills to use during an attack, take them as directed. They may take hours to work, but they may shorten the attack and help you breathe better. When should you call for help? Call 911 anytime you think you may need emergency care. For example, call if:  · You have severe trouble breathing.   Call your doctor now or seek immediate medical care if:  · You have new or worse shortness of breath. · You are coughing more deeply or more often, especially if you notice more mucus or a change in the color of your mucus. · You cough up blood. · You have new or increased swelling in your legs or belly. · You have a fever. · You have used your quick-relief medicine but you are still short of breath. Watch closely for changes in your health, and be sure to contact your doctor if you have any problems. Where can you learn more? Go to http://nolan-alondra.info/. Enter A350 in the search box to learn more about \"COPD and Asthma: Care Instructions. \"  Current as of: March 25, 2017  Content Version: 11.3  © 7248-2431 [employer]. Care instructions adapted under license by [employer] (which disclaims liability or warranty for this information). If you have questions about a medical condition or this instruction, always ask your healthcare professional. [place] [address] any warranty or liability for your use of this information. Depression and Chronic Disease: Care Instructions  Your Care Instructions  A chronic disease is one that you have for a long time. Some chronic diseases can be controlled, but they usually cannot be cured. Depression is common in people with chronic diseases, but it often goes unnoticed. Many people have concerns about seeking treatment for a mental health problem. You may think it's a sign of weakness, or you don't want people to know about it. It's important to overcome these reasons for not seeking treatment. Treating depression or anxiety is good for your health. Follow-up care is a key part of your treatment and safety. Be sure to make and go to all appointments, and call your doctor if you are having problems. It's also a good idea to know your test results and keep a list of the medicines you take. How can you care for yourself at home?   Watch for symptoms of depression  The symptoms of depression are often subtle at first. You may think they are caused by your disease rather than depression. Or you may think it is normal to be depressed when you have a chronic disease. If you are depressed you may:  · Feel sad or hopeless. · Feel guilty or worthless. · Not enjoy the things you used to enjoy. · Feel hopeless, as though life is not worth living. · Have trouble thinking or remembering. · Have low energy, and you may not eat or sleep well. · Pull away from others. · Think often about death or killing yourself. (Keep the numbers for these national suicide hotlines: 8-058-744-TALK [1-557.728.4745] and 0-436-AEPRWDL [1-230.905.6936]. )  Get treatment  By treating your depression, you can feel more hopeful and have more energy. If you feel better, you may take better care of yourself, so your health may improve. · Talk to your doctor if you have any changes in mood during treatment for your disease. · Ask your doctor for help. Counseling, antidepressant medicine, or a combination of the two can help most people with depression. Often a combination works best. Counseling can also help you cope with having a chronic disease. When should you call for help? Call 911 anytime you think you may need emergency care. For example, call if:  · You feel like hurting yourself or someone else. · Someone you know has depression and is about to attempt or is attempting suicide. Call your doctor now or seek immediate medical care if:  · You hear voices. · Someone you know has depression and:  ¨ Starts to give away his or her possessions. ¨ Uses illegal drugs or drinks alcohol heavily. ¨ Talks or writes about death, including writing suicide notes or talking about guns, knives, or pills. ¨ Starts to spend a lot of time alone. ¨ Acts very aggressively or suddenly appears calm. Watch closely for changes in your health, and be sure to contact your doctor if:  · You do not get better as expected. Where can you learn more?   Go to http://nolan-alondra.info/. Enter V667 in the search box to learn more about \"Depression and Chronic Disease: Care Instructions. \"  Current as of: July 26, 2016  Content Version: 11.3  © 0186-1927 [employer]. Care instructions adapted under license by [employer] (which disclaims liability or warranty for this information). If you have questions about a medical condition or this instruction, always ask your healthcare professional. [address] any warranty or liability for your use of this information.

## 2017-08-30 NOTE — ED NOTES
Patient discharge instructions reviewed with patient by MISA Anna. Patient verbalized understanding. Patient ambulated off unit without assistance.

## 2017-08-30 NOTE — ED NOTES
Patient presents to the ED with complaint that her \"primary care provider dumped me. \" Patient stated that she just needed her medication refilled. Patient denied SOB, and chest pain. Patient stated that she hasn't had her medications since the 25th of this month. Patient stated that her only complaint of pain was diabetic nerve pain but that she \"didnt want anything for that\" Patient became tearful when speaking with MISA Anna.

## 2017-08-30 NOTE — ED NOTES
Patient stated that she is depressed and has suicidal thoughts at times. Patient denied being actively suicidal. Lexie Harms having a plan to harm herself or future intent. Per patient she has had suicidal ideation \"off and on all my life. \" Patient on q30 min checks while in ED for SADD score.

## 2017-08-30 NOTE — ED PROVIDER NOTES
HPI Comments: Pebbles Huntley, 65 y.o. female with significant PMHx for HTN, asthma , DM, COPD, GERD, anxiety, depression, presents ambualtory to [facility] ED for medication refill. Pt states her PCP will not refill her prescriptions. Pt has since left that practice and has an appointment on 17 with a new PCP. Pt has severe COPD and depression and needed Symbicort, ventolin HFA, sprivia, Montelukast, levocetizine, bystolic, and viibryd. Pt notes she has been very overwhelmed the past week after her cat had to be put down and ran out of medication on 2017. Patient specifically denies chest pain, nausea, vomiting, diarrhea, abdominal pain, SOB, fever, chills, or headache. PCP: Yarelis Mack NP    Social history significant for: - Tobacco, + EtOH, - Illicit Drug Use    There are no other complaints, changes, or physical findings at this time. The history is provided by the patient. No  was used.         Past Medical History:   Diagnosis Date    Asthma     Asthma     COPD     Depression     Diabetes ([facility] [address]     Diabetes mellitus     Essential hypertension     GERD (gastroesophageal reflux disease)     Headache     HX OTHER MEDICAL     acoustic neuroma    Hypercholesterolemia     Hypertension     Ill-defined condition     R ACOUSTIC NEUROMA    Psychiatric problem     anxiety depression    Psychotic disorder     Unspecified sleep apnea     NO CPAP-O2 @2L WHEN SLEEPING       Past Surgical History:   Procedure Laterality Date     DELIVERY ONLY      HX GASTRECTOMY  15    lap sleeve gastrectomy by Dr Portillo Pay      2 c-sections    HX HEENT      sinus    HX HEENT      tracheal resection    HX HEENT      tracheal alleratation x 2    HX HEENT      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    HX HEENT      LASER SX-PENG         Family History:   Problem Relation Age of Onset    Diabetes Father     Heart Failure Father     Heart Disease Father     Hypertension Father     High Cholesterol Mother     Suicide Brother     Stroke Maternal Grandmother     Cancer Paternal Grandfather     Anesth Problems Neg Hx        Social History     Social History    Marital status:      Spouse name: N/A    Number of children: N/A    Years of education: N/A     Occupational History    Not on file. Social History Main Topics    Smoking status: Former Smoker     Packs/day: 1.50     Years: 12.00     Quit date: 12/18/2003    Smokeless tobacco: Never Used    Alcohol use Yes      Comment: ocassional    Drug use: No    Sexual activity: Yes     Other Topics Concern    Not on file     Social History Narrative         ALLERGIES: Latex; Other food; Demerol [meperidine]; Iodine; and Morphine    Review of Systems   Constitutional: Negative. Negative for chills and fever. HENT: Negative. Negative for rhinorrhea and sore throat. Eyes: Negative. Negative for pain and visual disturbance. Respiratory: Negative. Negative for chest tightness, shortness of breath and wheezing. Cardiovascular: Negative. Negative for chest pain and palpitations. Gastrointestinal: Negative. Negative for abdominal pain, constipation, diarrhea, nausea and vomiting. Genitourinary: Negative. Negative for dysuria and hematuria. Musculoskeletal: Negative. Negative for arthralgias and myalgias. Skin: Negative. Negative for rash. Allergic/Immunologic: Positive for food allergies. Neurological: Negative. Negative for dizziness, light-headedness, numbness and headaches. Psychiatric/Behavioral: Negative. Negative for behavioral problems and suicidal ideas.        Vitals:    08/30/17 1121 08/30/17 1124 08/30/17 1126 08/30/17 1137   BP:  170/78 170/78    Pulse:   94    Resp:   18    Temp:   98.3 °F (36.8 °C)    SpO2:   98% 98%   Weight: 98.8 kg (217 lb 13 oz)      Height: 5' 8\" (1.727 m)               Physical Exam   Constitutional: She is oriented to person, place, and time. She appears well-developed and well-nourished. No distress. Patient is a  F, awake and alert in NAD. Elevated BMI   HENT:   Head: Normocephalic and atraumatic. Right Ear: External ear normal.   Left Ear: External ear normal.   Nose: Nose normal.   Mouth/Throat: No oropharyngeal exudate. Harsh voice sounds  Dry mucous membranes    Eyes: Conjunctivae and EOM are normal. Pupils are equal, round, and reactive to light. Right eye exhibits no discharge. Left eye exhibits no discharge. Neck: Normal range of motion. Neck supple. No tracheal deviation present. Cardiovascular: Normal rate, regular rhythm and normal heart sounds. No murmur heard. Pulmonary/Chest: Effort normal and breath sounds normal. No respiratory distress. She has no decreased breath sounds. She has no wheezes. She has no rales. Mildly tight lung fields  No wheezing   Abdominal: Soft. Bowel sounds are normal. She exhibits no distension. There is no tenderness. There is no guarding. No CVA TTP b/l. Musculoskeletal: Normal range of motion. She exhibits no edema or tenderness. Neurological: She is alert and oriented to person, place, and time. Skin: Skin is warm and dry. No rash noted. She is not diaphoretic. Psychiatric: Her behavior is normal. Judgment normal.   Emotional when speaking about previous PCP and cat   Nursing note and vitals reviewed. MDM  Number of Diagnoses or Management Options  Medication refill:   Diagnosis management comments:   Medication refill. History of asthma. History of depression        Amount and/or Complexity of Data Reviewed  Review and summarize past medical records: yes    Patient Progress  Patient progress: stable    ED Course       Procedures    IMPRESSION:  1. Medication refill        PLAN:  1. Current Discharge Medication List      CONTINUE these medications which have CHANGED    Details   vilazodone (VIIBRYD) 40 mg tab tablet Take 1 Tab by mouth daily.   Qty: 10 Tab, Refills: 0      albuterol (PROVENTIL HFA) 90 mcg/actuation inhaler Take 1 Puff by inhalation every four (4) hours as needed. Qty: 1 Inhaler, Refills: 0      nebivolol (BYSTOLIC) 5 mg tablet Take 4 Tabs by mouth daily. Qty: 30 Tab, Refills: 0      budesonide-formoterol (SYMBICORT) 160-4.5 mcg/actuation HFA inhaler Take 2 Puffs by inhalation two (2) times a day. Indications: COPD ASSOCIATED WITH CHRONIC BRONCHITIS, EXTRINSIC ASTHMA  Qty: 1 Inhaler, Refills: 0      montelukast (SINGULAIR) 10 mg tablet Take 1 Tab by mouth daily. Qty: 10 Tab, Refills: 0      levocetirizine (XYZAL) 5 mg tablet Take 1 Tab by mouth nightly. Qty: 10 Tab, Refills: 0      tiotropium (SPIRIVA WITH HANDIHALER) 18 mcg inhalation capsule Take 1 Cap by inhalation daily. Qty: 10 Cap, Refills: 0         STOP taking these medications       albuterol (PROVENTIL VENTOLIN) 2.5 mg /3 mL (0.083 %) nebulizer solution Comments:   Reason for Stopping:         fluconazole (DIFLUCAN) 100 mg tablet Comments:   Reason for Stopping:         methylPREDNISolone (MEDROL DOSEPACK) 4 mg tablet Comments:   Reason for Stopping:         fexofenadine (ALLEGRA) 180 mg tablet Comments:   Reason for Stopping:         loratadine 10 mg cap Comments:   Reason for Stoppin.   Follow-up Information     Follow up With Details Comments Contact Info    Estuardo Melvin NP Go to scheduled appt [address] [place] [address]  [phone]          Return to ED if worse     Discharge Note:  11:42 AM   The pt is ready for discharge. The pt's signs, symptoms, diagnosis, and discharge instructions have been discussed and pt has conveyed their understanding. The pt is to follow up as recommended or return to ER should their symptoms worsen. Plan has been discussed and pt is in agreement. This note is prepared by Seferino Harris, acting as a Scribe for Oscar Buchanan PA-C.     Oscar Buchanan PA-C: The scribe's documentation has been prepared under my direction and personally reviewed by me in its entirety. I confirm that the notes above accurately reflects all work, treatment, procedures, and medical decision making performed by me. This note will not be viewable in [address]

## 2017-09-14 ENCOUNTER — TELEPHONE (OUTPATIENT)
Dept: ENDOCRINOLOGY | Age: 57
End: 2017-09-14

## 2017-09-14 ENCOUNTER — OFFICE VISIT (OUTPATIENT)
Dept: ENDOCRINOLOGY | Age: 57
End: 2017-09-14

## 2017-09-14 VITALS
HEIGHT: 68 IN | SYSTOLIC BLOOD PRESSURE: 121 MMHG | HEART RATE: 77 BPM | DIASTOLIC BLOOD PRESSURE: 76 MMHG | WEIGHT: 218.8 LBS | BODY MASS INDEX: 33.16 KG/M2

## 2017-09-14 DIAGNOSIS — E78.5 HYPERLIPIDEMIA, UNSPECIFIED HYPERLIPIDEMIA TYPE: ICD-10-CM

## 2017-09-14 DIAGNOSIS — E11.49 TYPE 2 DIABETES MELLITUS WITH OTHER DIABETIC NEUROLOGICAL COMPLICATION (HCC): Primary | ICD-10-CM

## 2017-09-14 DIAGNOSIS — I10 ESSENTIAL HYPERTENSION WITH GOAL BLOOD PRESSURE LESS THAN 130/80: ICD-10-CM

## 2017-09-14 LAB — HBA1C MFR BLD HPLC: 8.2 %

## 2017-09-14 RX ORDER — HUMAN INSULIN 100 [IU]/ML
INJECTION, SOLUTION SUBCUTANEOUS
Qty: 14 VIAL | Refills: 3 | Status: SHIPPED | OUTPATIENT
Start: 2017-09-14 | End: 2018-01-23

## 2017-09-14 RX ORDER — INSULIN GLARGINE 300 U/ML
65 INJECTION, SOLUTION SUBCUTANEOUS DAILY
Qty: 15 PEN | Refills: 3 | Status: SHIPPED | OUTPATIENT
Start: 2017-09-14 | End: 2018-10-10 | Stop reason: ALTCHOICE

## 2017-09-14 NOTE — MR AVS SNAPSHOT
Visit Information Date & Time Provider Department Dept. Phone Encounter #  
 9/14/2017 12:10 PM Damion Toro, [address] [facility] [phone] [id number] Your Appointments 10/19/2017  9:00 AM  
PHYSICAL PRE OP with Lamberto Walters MD  
[facility] ([address]) Appt Note: np est pcp [id number]  tmdc 08/09/17; r/s np est pcp lw 8/15/17  
 [address] [facility] [place] [id number] [phone] [place] [phone] Upcoming Health Maintenance Date Due Hepatitis C Screening 1960 FOOT EXAM Q1 3/23/1971 MICROALBUMIN Q1 3/23/1971 Pneumococcal 19-64 Medium Risk (1 of 1 - PPSV23) 3/23/1980 DTaP/Tdap/Td series (1 - Tdap) 3/23/1982 PAP AKA CERVICAL CYTOLOGY 3/23/1982 BREAST CANCER SCRN MAMMOGRAM 3/23/2011 FOBT Q 1 YEAR AGE 50-75 3/23/2011 INFLUENZA AGE 9 TO ADULT 8/1/2017 HEMOGLOBIN A1C Q6M 10/27/2017 EYE EXAM RETINAL OR DILATED Q1 3/27/2018 LIPID PANEL Q1 4/27/2018 Allergies as of 9/14/2017  Review Complete On: 9/14/2017 By: Damion Toro MD  
  
 Severity Noted Reaction Type Reactions Latex  09/05/2014    Hives Other Food High 09/05/2014    Anaphylaxis PEPPERONI, sloppy joes Demerol [Meperidine] High 10/17/2010   Systemic Anaphylaxis Difficulty breathing Iodine High 10/17/2010   Systemic Anaphylaxis Morphine High 10/17/2010   Systemic Other (comments)  
 voilent outbreaks (restraints needed) Current Immunizations  Reviewed on 9/25/2014 No immunizations on file. Not reviewed this visit You Were Diagnosed With   
  
 Codes Comments Type 2 diabetes mellitus with other diabetic neurological complication (HCC)    -  Primary ICD-10-CM: E11.49 
ICD-9-CM: 250.60 Essential hypertension with goal blood pressure less than 130/80     ICD-10-CM: I10 
ICD-9-CM: 401.9 Hyperlipidemia, unspecified hyperlipidemia type     ICD-10-CM: E78.5 ICD-9-CM: 272.4 Vitals BP Pulse Height(growth percentile) Weight(growth percentile) LMP BMI  
 121/76 (BP 1 Location: Left arm, BP Patient Position: Sitting) 77 5' 8\" (1.727 m) 218 lb 12.8 oz (99.2 kg) 01/17/2010 33.27 kg/m2 OB Status Smoking Status Postmenopausal Current Every Day Smoker BMI and BSA Data Body Mass Index Body Surface Area  
 33.27 kg/m 2 2.18 m 2 Preferred Pharmacy Pharmacy Name Phone [address] [address] [phone] Your Updated Medication List  
  
   
This list is accurate as of: 9/14/17 12:36 PM.  Always use your most recent med list.  
  
  
  
  
 albuterol 90 mcg/actuation inhaler Commonly known as:  PROVENTIL HFA Take 1 Puff by inhalation every four (4) hours as needed. BENADRYL 25 mg capsule Generic drug:  diphenhydrAMINE Take 25 mg by mouth every six (6) hours as needed. budesonide-formoterol 160-4.5 mcg/actuation HFA inhaler Commonly known as:  SYMBICORT Take 2 Puffs by inhalation two (2) times a day. Indications: COPD ASSOCIATED WITH CHRONIC BRONCHITIS, EXTRINSIC ASTHMA  
  
 cholecalciferol 1,000 unit tablet Commonly known as:  VITAMIN D3 Take 400 Units by mouth daily. 2 CAPS DAILY EPINEPHrine 0.3 mg/0.3 mL injection Commonly known as:  EPIPEN  
0.3 mg by IntraMUSCular route once as needed. ergocalciferol 50,000 unit capsule Commonly known as:  ERGOCALCIFEROL Take 1 capsule by mouth every seven (7) days. glucose blood VI test strips strip Commonly known as:  EasyMax N  
Use to test blood sugar 4 times a day  
  
 levocetirizine 5 mg tablet Commonly known as:  Nick Akira Take 1 Tab by mouth nightly. montelukast 10 mg tablet Commonly known as:  SINGULAIR Take 1 Tab by mouth daily. nebivolol 5 mg tablet Commonly known as:  BYSTOLIC  
 Take 4 Tabs by mouth daily. NovoLIN R 100 unit/mL injection Generic drug:  insulin regular Inject 35 units 3 times daily with meals  
  
 pantoprazole 40 mg tablet Commonly known as:  PROTONIX QUEtiapine 25 mg tablet Commonly known as:  SEROquel  
nightly. Takes one 50mg and one 25mg at bedtime  
  
 rosuvastatin 40 mg tablet Commonly known as:  CRESTOR Take 1 Tab by mouth nightly. tiotropium 18 mcg inhalation capsule Commonly known as:  [address] [place] [address] [address] Take 1 Cap by inhalation daily. TOUJEO SOLOSTAR 300 unit/mL (1.5 mL) Inpn Generic drug:  insulin glargine  
  
 vilazodone 40 mg Tab tablet Commonly known as:  VIIBRYD Take 1 Tab by mouth daily. Patient Instructions Toujeo 65 each morning Novlin R: 
Breakfast: 42 units Lunch: If glucose >180 take 38 units If glucose <180 take 35 units Dinner:  If glucose >180 take 40 units If glucose < 180 take 35 units Correction Scale  1:25>150 Currently you are taking insulin with your meals. As we have discussed it is important to always check your glucose level just before taking your mealtime insulin dose. Sometimes before eating your meal, your glucose level is already much higher than the goal and so you may require a few units of extra insulin. This is in addition to the scheduled dose that you plan to take for the meal. Using the scale below, add the amount of extra insulin you need to the dose you already plan to take and inject together as one injection. As always continue to monitor your glucose afterward to assure recovery of your glucose levels. IF GLUCOSE IS:                 THEN TAKE: 
    0   Extra Unit 151-175   1   Extra Unit 
176-200   2   Extra Units 094-477   3   Extra Units 226-250   4   Extra Units 251-275   5   Extra Units 276-300   6   Extra Units 474-173   7   Extra Units 
 
--------------------------------------------------------------------------- ------------------------------------------- Below you will find a glucose log sheet which you can use to record your blood sugars. Without checking and recording what your home glucose levels are, it will be difficult to make any changes to your medication dose, even when significant changes may be needed. Please feel free to use the log below to record your home glucose levels. At the very least, I would like for you to login the entire 2-3 weeks just before your visit so we can make your visit much more productive and beneficial to you. GLUCOSE LOG SHEET: 
 
Date Breakfast Lunch Dinner Bedtime Comments ? GLUCOSE LOG SHEET: 
 
Date Breakfast Lunch Dinner Bedtime Comments ? GLUCOSE LOG SHEET: 
 
Date Breakfast Lunch Dinner Bedtime Comments ? Introducing [facility] SERVICES! Dear Tiff Humphreys: 
Thank you for requesting a [employer] account. Our records indicate that you already have an active [employer] account. You can access your account anytime at [email or web address]. [email or web address] Did you know that you can access your hospital and ER discharge instructions at any time in [employer]? You can also review all of your test results from your hospital stay or ER visit. Additional Information If you have questions, please visit the Frequently Asked Questions section of the [employer] website at [email or web address] Remember, [employer] is NOT to be used for urgent needs. For medical emergencies, dial 911. Now available from your iPhone and Android! Please provide this summary of care documentation to your next provider. Your primary care clinician is listed as Angel Luis Philip. If you have any questions after today's visit, please call [phone].

## 2017-09-14 NOTE — TELEPHONE ENCOUNTER
Manisha Locke, with [facility], called to verify directions on electronic prescriptions received for Toujeo and Novolin. Manisha Males can be reached at:   [phone].

## 2017-09-14 NOTE — PATIENT INSTRUCTIONS
Toujeo 65 each morning    [facility] R:  Breakfast: 42 units  Lunch: If glucose >180 take 38 units     If glucose <180 take 35 units  Dinner:  If glucose >180 take 40 units    If glucose < 180 take 35 units    Correction Scale  1:25>150    Currently you are taking insulin with your meals. As we have discussed it is important to always check your glucose level just before taking your mealtime insulin dose. Sometimes before eating your meal, your glucose level is already much higher than the goal and so you may require a few units of extra insulin. This is in addition to the scheduled dose that you plan to take for the meal. Using the scale below, add the amount of extra insulin you need to the dose you already plan to take and inject together as one injection. As always continue to monitor your glucose afterward to assure recovery of your glucose levels. IF GLUCOSE IS:                 THEN TAKE:      0   Extra Unit  151-175   1   Extra Unit  176-200   2   Extra Units  201-225   3   Extra Units  226-250   4   Extra Units  251-275   5   Extra Units  276-300   6   Extra Units  301-325   7   Extra Units    ----------------------------------------------------------------------------------------------------------------------    Below you will find a glucose log sheet which you can use to record your blood sugars. Without checking and recording what your home glucose levels are, it will be difficult to make any changes to your medication dose, even when significant changes may be needed. Please feel free to use the log below to record your home glucose levels. At the very least, I would like for you to login the entire 2-3 weeks just before your visit so we can make your visit much more productive and beneficial to you. GLUCOSE LOG SHEET:    Date Breakfast Lunch Dinner Bedtime Comments ? GLUCOSE LOG SHEET:    Date Breakfast Lunch Dinner Bedtime Comments ? GLUCOSE LOG SHEET:    Date Breakfast Lunch Dinner Bedtime Comments ?

## 2017-09-14 NOTE — PROGRESS NOTES
CHIEF COMPLAINT: f/u evaluation for uncontrolled type 2 diabetes    HISTORY OF PRESENT ILLNESS:   Pat Mcdaniels is a 65 y.o. female with a PMHx as noted below who presents to the endocrinology clinic for f/u evaluation of uncontrolled type 2 diabetes. A1c did rise from 7.5 to 8.2%  Has gained 12 pounds since March of this year    Currently taking the following meds:   Toujeo to 60 units, she increased it to 65 on her own,  Novlin R 35 units with each meal,  Correction of 1:15>150    Review of home blood glucose:  AM  highly variable  Lunch 150-300 mostly in the 200's  Dinner , highly variable, appears to drop when glucose 160's before the injection  Bedtime     Review of most recent diabetes-related labs:  Lab Results   Component Value Date    HBA1C 8.3 (H) 2014    HBA1C 10.1 (H) 10/18/2010    HBA1C 8.6 (H) 2009    CHOL 195 2014    GFRAA 97 2015    GFRNA 84 2015    TSH 2.23 2014    VITD3 41.5 2015    [id number] 7.5% 2017         PAST MEDICAL/SURGICAL HISTORY:   Past Medical History:   Diagnosis Date    Asthma     Asthma     COPD     Depression     Diabetes ([facility] [address]     Diabetes mellitus     Essential hypertension     GERD (gastroesophageal reflux disease)     Headache     HX OTHER MEDICAL     acoustic neuroma    Hypercholesterolemia     Hypertension     Ill-defined condition     R ACOUSTIC NEUROMA    Psychiatric problem     anxiety depression    Psychotic disorder     Unspecified sleep apnea     NO CPAP-O2 @2L WHEN SLEEPING     Past Surgical History:   Procedure Laterality Date     DELIVERY ONLY      HX GASTRECTOMY  14    lap sleeve gastrectomy by Dr Nathan Luevano HX GYN      2 c-sections    HX HEENT      sinus    HX HEENT      tracheal resection    HX HEENT      tracheal alleratation x 2    HX HEENT      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 , Disp: 10 Tab, Rfl: 0    albuterol (PROVENTIL HFA) 90 mcg/actuation inhaler, Take 1 Puff by inhalation every four (4) hours as needed. , Disp: 1 Inhaler, Rfl: 0    nebivolol (BYSTOLIC) 5 mg tablet, Take 4 Tabs by mouth daily. , Disp: 30 Tab, Rfl: 0    budesonide-formoterol (SYMBICORT) 160-4.5 mcg/actuation HFA inhaler, Take 2 Puffs by inhalation two (2) times a day. Indications: COPD ASSOCIATED WITH CHRONIC BRONCHITIS, EXTRINSIC ASTHMA, Disp: 1 Inhaler, Rfl: 0    montelukast (SINGULAIR) 10 mg tablet, Take 1 Tab by mouth daily. , Disp: 10 Tab, Rfl: 0    levocetirizine (XYZAL) 5 mg tablet, Take 1 Tab by mouth nightly., Disp: 10 Tab, Rfl: 0    tiotropium (SPIRIVA WITH HANDIHALER) 18 mcg inhalation capsule, Take 1 Cap by inhalation daily. , Disp: 10 Cap, Rfl: 0    NOVOLIN R 100 unit/mL injection, Inject 35 units 3 times daily with meals, Disp: 2 Vial, Rfl: 3    glucose blood VI test strips (EASYMAX N) strip, Use to test blood sugar 4 times a day, Disp: 200 Strip, Rfl: 3    pantoprazole (PROTONIX) 40 mg tablet, , Disp: , Rfl:     rosuvastatin (CRESTOR) 40 mg tablet, Take 1 Tab by mouth nightly., Disp: 90 Tab, Rfl: 3    TOUJEO SOLOSTAR 300 unit/mL (1.5 mL) inpn, , Disp: , Rfl:     ergocalciferol (ERGOCALCIFEROL) 50,000 unit capsule, Take 1 capsule by mouth every seven (7) days. , Disp: 12 capsule, Rfl: 3    QUEtiapine (SEROQUEL) 25 mg tablet, nightly. Takes one 50mg and one 25mg at bedtime, Disp: , Rfl:     cholecalciferol, vitamin d3, (VITAMIN D3) 1,000 unit tablet, Take 400 Units by mouth daily.  2 CAPS DAILY, Disp: , Rfl:    cyanocobalamin (VITAMIN B-12) 500 mcg tablet, Take 500 mcg by mouth daily. , Disp: , Rfl:     pregabalin (LYRICA) 50 mg capsule, Take  by mouth., Disp: , Rfl:     pregabalin (LYRICA) 75 mg capsule, Take  by mouth., Disp: , Rfl:     dextroamphetamine-amphetamine (ADDERALL) 20 mg tablet, , Disp: , Rfl:     EPINEPHrine (EPIPEN) 0.3 mg/0.3 mL injection, 0.3 mg by IntraMUSCular route once as needed. , Disp: , Rfl:     diphenhydrAMINE (BENADRYL) 25 mg capsule, Take 25 mg by mouth every six (6) hours as needed. , Disp: , Rfl:     Desvenlafaxine SR (PRISTIQ) 50 mg tablet, Take 50 mg by mouth., Disp: , Rfl:     omeprazole (PRILOSEC) 20 mg capsule, Take 20 mg by mouth daily. , Disp: , Rfl:     SOCIAL HISTORY:   Social History     Social History    Marital status:      Spouse name: N/A    Number of children: N/A    Years of education: N/A     Occupational History    Not on file. Social History Main Topics    Smoking status: Current Every Day Smoker     Packs/day: 1.50     Years: 12.00     Last attempt to quit: 12/18/2003    Smokeless tobacco: Never Used    Alcohol use Yes      Comment: ocassional    Drug use: No    Sexual activity: Yes     Other Topics Concern    Not on file     Social History Narrative       FAMILY HISTORY:  Family History   Problem Relation Age of Onset    Diabetes Father     Heart Failure Father     Heart Disease Father     Hypertension Father     High Cholesterol Mother     Suicide Brother     Stroke Maternal Grandmother     Cancer Paternal Grandfather     Anesth Problems Neg Hx        REVIEW OF SYSTEMS: Complete ROS assessed and noted for that which is described above, all else are negative.   Eyes: normal  ENT: normal  CVS: normal  Resp: normal  GI: normal  : normal  GYN: normal  Endocrine: normal  Integument: normal  Musculoskeletal: normal  Neuro: normal  Psych: normal      PHYSICAL EXAMINATION:    VITAL SIGNS:  Visit Vitals    /76 (BP 1 Location: Left arm, BP Patient Position: Sitting)    Pulse 77    Ht 5' 8\" (1.727 m)    Wt 218 lb 12.8 oz (99.2 kg)    LMP 01/17/2010    BMI 33.27 kg/m2       GENERAL: NCAT, Sitting comfortably, NAD  EYES: EOMI, non-icteric, no proptosis  Ear/Nose/Throat: NCAT, no inflammation, no masses  LYMPH NODES: No LAD  CARDIOVASCULAR: S1 S2, RRR, No murmur, 2+ radial pulses  RESPIRATORY: CTA b/l, no wheeze/rales  GASTROINTESTINAL: ND  MUSCULOSKELETAL: Normal ROM, no atrophy  SKIN: warm, no edema/rash/ or other skin changes  NEUROLOGIC: 5/5 power all extremities, no tremors, AAOx3  PSYCHIATRIC: Normal affect, Normal insight and judgement         REVIEW OF LABORATORY AND RADIOLOGY DATA:   Labs and documentation have been reviewed as described above. ASSESSMENT AND PLAN:   Brandt Arias is a 65 y.o. female with a PMHx as noted above who presents to the endocrinology clinic for evaluation of uncontrolled type 2 diabetes. DM2 uncontrolled  HTN  HLD    Patient has a lot of variability and reviewing her log she certainly response differently when glucose is above or below the 160-180 range. This is not unusual as glucotoxicity can result in increased insulin resistance. Because of her wide fluctuations we will need to make an effort to modify the regimen to reduce the lows that she has been getting. DM2:  Toujeo 65 each morning  Novlin R:  Breakfast: 42 units  Lunch: If glucose >180 take 38 units     If glucose <180 take 35 units  Dinner:  If glucose >180 take 40 units    If glucose < 180 take 35 units  Correction Scale  1:25>150  Continue to check glucose 4x/day  Provided with new log sheets    Neuropathy: Off lyrica, not on cymbalta, symptoms are painful,   BP: Bystolic 16PS daily  HLD: crestor prev increased to 40mg each evening, continue with fish oil    Labs: urine microalblumin on follow up,    3 month f/u visit. Mattie Matute.  [address] [facility]

## 2017-10-03 ENCOUNTER — APPOINTMENT (OUTPATIENT)
Dept: GENERAL RADIOLOGY | Age: 57
End: 2017-10-03
Attending: EMERGENCY MEDICINE
Payer: MEDICARE

## 2017-10-03 ENCOUNTER — HOSPITAL ENCOUNTER (EMERGENCY)
Age: 57
Discharge: HOME | End: 2017-10-03
Attending: EMERGENCY MEDICINE
Payer: MEDICARE

## 2017-10-03 VITALS
WEIGHT: 222.44 LBS | OXYGEN SATURATION: 95 % | RESPIRATION RATE: 17 BRPM | SYSTOLIC BLOOD PRESSURE: 129 MMHG | HEIGHT: 68 IN | HEART RATE: 65 BPM | BODY MASS INDEX: 33.71 KG/M2 | DIASTOLIC BLOOD PRESSURE: 59 MMHG | TEMPERATURE: 98.3 F

## 2017-10-03 DIAGNOSIS — R07.89 ATYPICAL CHEST PAIN: Primary | ICD-10-CM

## 2017-10-03 LAB
ALBUMIN SERPL-MCNC: 3.3 G/DL (ref 3.5–5)
ALBUMIN/GLOB SERPL: 0.9 {RATIO} (ref 1.1–2.2)
ALP SERPL-CCNC: 82 U/L (ref 45–117)
ALT SERPL-CCNC: 31 U/L (ref 12–78)
ANION GAP SERPL CALC-SCNC: 7 MMOL/L (ref 5–15)
AST SERPL-CCNC: 14 U/L (ref 15–37)
BASOPHILS # BLD: 0 K/UL (ref 0–0.1)
BASOPHILS NFR BLD: 0 % (ref 0–1)
BILIRUB SERPL-MCNC: 0.3 MG/DL (ref 0.2–1)
BUN SERPL-MCNC: 16 MG/DL (ref 6–20)
BUN/CREAT SERPL: 22 (ref 12–20)
CALCIUM SERPL-MCNC: 8.7 MG/DL (ref 8.5–10.1)
CHLORIDE SERPL-SCNC: 107 MMOL/L (ref 97–108)
CK SERPL-CCNC: 78 U/L (ref 26–192)
CO2 SERPL-SCNC: 29 MMOL/L (ref 21–32)
CREAT SERPL-MCNC: 0.74 MG/DL (ref 0.55–1.02)
EOSINOPHIL # BLD: 0.2 K/UL (ref 0–0.4)
EOSINOPHIL NFR BLD: 2 % (ref 0–7)
ERYTHROCYTE [DISTWIDTH] IN BLOOD BY AUTOMATED COUNT: 12.8 % (ref 11.5–14.5)
GLOBULIN SER CALC-MCNC: 3.5 G/DL (ref 2–4)
GLUCOSE BLD STRIP.AUTO-MCNC: 102 MG/DL (ref 65–100)
GLUCOSE BLD STRIP.AUTO-MCNC: 58 MG/DL (ref 65–100)
GLUCOSE SERPL-MCNC: 78 MG/DL (ref 65–100)
HCT VFR BLD AUTO: 39.4 % (ref 35–47)
HGB BLD-MCNC: 13.6 G/DL (ref 11.5–16)
LYMPHOCYTES # BLD: 2.3 K/UL (ref 0.8–3.5)
LYMPHOCYTES NFR BLD: 28 % (ref 12–49)
MCH RBC QN AUTO: 30.6 PG (ref 26–34)
MCHC RBC AUTO-ENTMCNC: 34.5 G/DL (ref 30–36.5)
MCV RBC AUTO: 88.7 FL (ref 80–99)
MONOCYTES # BLD: 0.5 K/UL (ref 0–1)
MONOCYTES NFR BLD: 7 % (ref 5–13)
NEUTS SEG # BLD: 5.1 K/UL (ref 1.8–8)
NEUTS SEG NFR BLD: 63 % (ref 32–75)
PLATELET # BLD AUTO: 190 K/UL (ref 150–400)
POTASSIUM SERPL-SCNC: 4 MMOL/L (ref 3.5–5.1)
PROT SERPL-MCNC: 6.8 G/DL (ref 6.4–8.2)
RBC # BLD AUTO: 4.44 M/UL (ref 3.8–5.2)
SERVICE CMNT-IMP: ABNORMAL
SERVICE CMNT-IMP: ABNORMAL
SODIUM SERPL-SCNC: 143 MMOL/L (ref 136–145)
TROPONIN I BLD-MCNC: <0.04 NG/ML (ref 0–0.08)
TROPONIN I SERPL-MCNC: <0.04 NG/ML
WBC # BLD AUTO: 8.1 K/UL (ref 3.6–11)

## 2017-10-03 PROCEDURE — 74011250637 HC RX REV CODE- 250/637: Performed by: EMERGENCY MEDICINE

## 2017-10-03 PROCEDURE — 36415 COLL VENOUS BLD VENIPUNCTURE: CPT | Performed by: EMERGENCY MEDICINE

## 2017-10-03 PROCEDURE — 84484 ASSAY OF TROPONIN QUANT: CPT | Performed by: EMERGENCY MEDICINE

## 2017-10-03 PROCEDURE — 82962 GLUCOSE BLOOD TEST: CPT

## 2017-10-03 PROCEDURE — 85025 COMPLETE CBC W/AUTO DIFF WBC: CPT | Performed by: EMERGENCY MEDICINE

## 2017-10-03 PROCEDURE — 93005 ELECTROCARDIOGRAM TRACING: CPT

## 2017-10-03 PROCEDURE — 74011000250 HC RX REV CODE- 250: Performed by: EMERGENCY MEDICINE

## 2017-10-03 PROCEDURE — 71020 XR CHEST PA LAT: CPT

## 2017-10-03 PROCEDURE — 80053 COMPREHEN METABOLIC PANEL: CPT | Performed by: EMERGENCY MEDICINE

## 2017-10-03 PROCEDURE — 82550 ASSAY OF CK (CPK): CPT | Performed by: EMERGENCY MEDICINE

## 2017-10-03 PROCEDURE — 99285 EMERGENCY DEPT VISIT HI MDM: CPT

## 2017-10-03 RX ORDER — GUAIFENESIN 100 MG/5ML
325 LIQUID (ML) ORAL
Status: COMPLETED | OUTPATIENT
Start: 2017-10-03 | End: 2017-10-03

## 2017-10-03 RX ADMIN — LIDOCAINE HYDROCHLORIDE 40 ML: 20 SOLUTION ORAL; TOPICAL at 20:21

## 2017-10-03 RX ADMIN — ASPIRIN 81 MG 324 MG: 81 TABLET ORAL at 18:30

## 2017-10-03 NOTE — ED PROVIDER NOTES
[facility].  EMERGENCY DEPARTMENT HISTORY AND PHYSICAL EXAM       Date of Service: 10/3/2017   Patient Name: Pierre Mccracken   YOB: 1960  Medical Record Number: [id number]    History of Presenting Illness     Chief Complaint   Patient presents with    Chest Pain     pt arrives thru triage; pt complains of intermittent left sided (under breast) chest pain; onset last night while watching TV; pt denies SOB; hx acid reflux \"but this feels different like a stab\"; pt denies n/v, diaphoretic episode        History Provided By:  patient    Additional History:   Pierre Mccracken is a 65 y.o. female who presents ambulatory to the ED with cc of intermittent episode of left sternal \"ice pick\" like stabbing CP that radiates to under her left breast that onset last night noting that her symptoms last 2-3 minutes at a time and then spontaneously resolve. She reports having 6-7 episode of CP today. Pt reports associated left ear pain and left jaw pain. She states that she was sitting on the couch watching TV when her symptoms onset. Pt reports taking 40 mg of Protonix today and yesterday as well as Zantac 150 mg with no relief of her symptoms. Pt denies taking any ASA to modify her symptoms. Pt notes that her symptoms woke her out of her sleep twice last night. She states that her last cardiac stress test was a long time ago. She denies hx of cardiac catheterization. Pt notes that she has had increased productive cough with yellow to white sputum over the last few weeks. Pt denies any SOB, nausea, vomiting, diaphoresis, fevers, or chills. Social history significant for: + Tobacco (7.5DHK), + EtOH, - Illicit drug use  Past medical history significant for: HTN, Asthma, DM, COPD, GERD, depression, HA      There are no other complaints, changes or physical findings at this time.       Primary Care Provider: Red Plaza MD     Past History     Past Medical History:   Past Medical History:   Diagnosis Date    Asthma     Asthma     COPD     Depression     Diabetes ([facility] [address]     Diabetes mellitus     Essential hypertension     GERD (gastroesophageal reflux disease)     Headache(784.0)     HX OTHER MEDICAL     acoustic neuroma    Hypercholesterolemia     Hypertension     Ill-defined condition     R ACOUSTIC NEUROMA    Psychiatric problem     anxiety depression    Psychotic disorder     Unspecified sleep apnea     NO CPAP-O2 @2L WHEN SLEEPING        Past Surgical History:   Past Surgical History:   Procedure Laterality Date     DELIVERY ONLY      HX GASTRECTOMY  14    lap sleeve gastrectomy by Dr J Luis Lamas      2 c-sections    HX HEENT      sinus    HX HEENT      tracheal resection    HX HEENT      tracheal alleratation x 2    HX HEENT      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    HX HEENT      LASER SX-PENG        Family History:   Family History   Problem Relation Age of Onset    Diabetes Father     Heart Failure Father     Heart Disease Father     Hypertension Father     High Cholesterol Mother     Suicide Brother     Stroke Maternal Grandmother     Cancer Paternal Grandfather     Anesth Problems Neg Hx         Social History:   Social History   Substance Use Topics    Smoking status: Current Every Day Smoker     Packs/day: 1.50     Years: 12.00    Smokeless tobacco: Never Used    Alcohol use Yes      Comment: ocassional        Allergies: Allergies   Allergen Reactions    Latex Hives    Other Food Anaphylaxis     PEPPERONI, sloppy joes    Demerol [Meperidine] Anaphylaxis     Difficulty breathing    Iodine Anaphylaxis    Morphine Other (comments)     voilent outbreaks (restraints needed)         Review of Systems   Review of Systems   Constitutional: Negative for chills, diaphoresis, fever and unexpected weight change. HENT: Positive for ear pain (left).  Negative for rhinorrhea and sore throat.         + left jaw pain   Eyes: Negative for pain. Respiratory: Negative for shortness of breath, wheezing and stridor. Cardiovascular: Positive for chest pain (left sternal). Negative for leg swelling. Gastrointestinal: Negative for abdominal pain, blood in stool, diarrhea, nausea and vomiting. Genitourinary: Negative for difficulty urinating, dysuria and flank pain. Musculoskeletal: Negative for back pain and neck stiffness. Skin: Negative for rash. Neurological: Negative for seizures, syncope, weakness, light-headedness and headaches. Psychiatric/Behavioral: Negative for confusion. Physical Exam    Physical Exam   Constitutional: She is oriented to person, place, and time. She appears well-developed. No distress. Elevated BMI   HENT:   Nose: Nose normal.   Mouth/Throat: Oropharynx is clear and moist. No oropharyngeal exudate. Eyes: Conjunctivae and EOM are normal. Pupils are equal, round, and reactive to light. Right eye exhibits no discharge. Left eye exhibits no discharge. No scleral icterus. Neck: Normal range of motion. Neck supple. No JVD present. Cardiovascular: Normal rate, regular rhythm, normal heart sounds and intact distal pulses. No murmur heard. No bilateral lower extremity edema   Pulmonary/Chest: Effort normal and breath sounds normal. No stridor. No respiratory distress. She has no wheezes. She has no rales. Abdominal: Soft. Bowel sounds are normal. She exhibits no distension. There is no tenderness. There is no rebound and no guarding. Musculoskeletal: She exhibits no edema or tenderness. Neurological: She is alert and oriented to person, place, and time. Skin: Skin is warm and dry. No rash noted. She is not diaphoretic. Psychiatric: She has a normal mood and affect. Nursing note and vitals reviewed. Medical Decision Making   I am the first provider for this patient.      I reviewed the vital signs, available nursing notes, past medical history, past surgical history, family history and social history. Old Medical Records: Old medical records. Previous electrocardiograms. Nursing notes. MDM: Atypical CP. Resolved in the ED, troponin negative x 2. EKG NSR. Pt has no Wells criteria. CXR clear. Pt advised to increased Protonix to BID x 1 week. Follow up with cardiology as outpatient stable for discharge. Provider Notes:     ED Course:  5:15 PM   Initial assessment performed. The patients presenting problems have been discussed, and they are in agreement with the care plan formulated and outlined with them. I have encouraged them to ask questions as they arise throughout their visit. Progress Notes:     PROGRESS NOTE:  8:50 PM  Pt has been re-evaluated. Pt reports that she is feeling better. No further episodes of chest pain while in the ED. Written by Daniel Rodriguez ED scribe, as dictated by Keiko Santos MD    Diagnostic Study Results     Labs -      Recent Results (from the past 12 hour(s))   EKG, 12 LEAD, INITIAL    Collection Time: 10/03/17  5:06 PM   Result Value Ref Range    Ventricular Rate 76 BPM    Atrial Rate 76 BPM    P-R Interval 154 ms    QRS Duration 80 ms    Q-T Interval 398 ms    QTC Calculation (Bezet) 447 ms    Calculated P Axis 17 degrees    Calculated R Axis 20 degrees    Calculated T Axis 41 degrees    Diagnosis       Normal sinus rhythm  Normal ECG  When compared with ECG of 05-SEP-2014 09:59,  No significant change was found     CBC WITH AUTOMATED DIFF    Collection Time: 10/03/17  5:31 PM   Result Value Ref Range    WBC 8.1 3.6 - 11.0 K/uL    RBC 4.44 3.80 - 5.20 M/uL    HGB 13.6 11.5 - 16.0 g/dL    HCT 39.4 35.0 - 47.0 %    MCV 88.7 80.0 - 99.0 FL    MCH 30.6 26.0 - 34.0 PG    MCHC 34.5 30.0 - 36.5 g/dL    RDW 12.8 11.5 - 14.5 %    PLATELET 216 184 - 572 K/uL    NEUTROPHILS 63 32 - 75 %    LYMPHOCYTES 28 12 - 49 %    MONOCYTES 7 5 - 13 %    EOSINOPHILS 2 0 - 7 %    BASOPHILS 0 0 - 1 %    ABS.  NEUTROPHILS 5.1 1.8 - 8.0 K/UL ABS. LYMPHOCYTES 2.3 0.8 - 3.5 K/UL    ABS. MONOCYTES 0.5 0.0 - 1.0 K/UL    ABS. EOSINOPHILS 0.2 0.0 - 0.4 K/UL    ABS. BASOPHILS 0.0 0.0 - 0.1 K/UL   METABOLIC PANEL, COMPREHENSIVE    Collection Time: 10/03/17  5:31 PM   Result Value Ref Range    Sodium 143 136 - 145 mmol/L    Potassium 4.0 3.5 - 5.1 mmol/L    Chloride 107 97 - 108 mmol/L    CO2 29 21 - 32 mmol/L    Anion gap 7 5 - 15 mmol/L    Glucose 78 65 - 100 mg/dL    BUN 16 6 - 20 MG/DL    Creatinine 0.74 0.55 - 1.02 MG/DL    BUN/Creatinine ratio 22 (H) 12 - 20      GFR est AA >60 >60 ml/min/1.73m2    GFR est non-AA >60 >60 ml/min/1.73m2    Calcium 8.7 8.5 - 10.1 MG/DL    Bilirubin, total 0.3 0.2 - 1.0 MG/DL    ALT (SGPT) 31 12 - 78 U/L    AST (SGOT) 14 (L) 15 - 37 U/L    Alk.  phosphatase 82 45 - 117 U/L    Protein, total 6.8 6.4 - 8.2 g/dL    Albumin 3.3 (L) 3.5 - 5.0 g/dL    Globulin 3.5 2.0 - 4.0 g/dL    A-G Ratio 0.9 (L) 1.1 - 2.2     TROPONIN I    Collection Time: 10/03/17  5:31 PM   Result Value Ref Range    Troponin-I, Qt. <0.04 <0.05 ng/mL   CK W/ REFLX CKMB    Collection Time: 10/03/17  5:31 PM   Result Value Ref Range    CK 78 26 - 192 U/L   GLUCOSE, POC    Collection Time: 10/03/17  7:27 PM   Result Value Ref Range    Glucose (POC) 58 (L) 65 - 100 mg/dL    Performed by Abiodun Han (PCT)    GLUCOSE, POC    Collection Time: 10/03/17  7:49 PM   Result Value Ref Range    Glucose (POC) 102 (H) 65 - 100 mg/dL    Performed by Elias Wild (PCT)    POC TROPONIN-I    Collection Time: 10/03/17  8:54 PM   Result Value Ref Range    Troponin-I (POC) <0.04 0.00 - 0.08 ng/mL       Radiologic Studies -  The following have been ordered and reviewed:    CXR Results  (Last 48 hours)               10/03/17 1737  XR CHEST PA LAT Final result    Impression:  IMPRESSION: No acute process           Narrative:  INDICATION:  chest pain        COMPARISON: 7/14/2014       FINDINGS: PA and lateral views of the chest demonstrate a stable   cardiomediastinal silhouette and clear lungs bilaterally. The visualized osseous   structures are unremarkable. Vital Signs-Reviewed the patient's vital signs. Patient Vitals for the past 12 hrs:   Temp Pulse Resp BP SpO2   10/03/17 2100 - 65 12 132/65 98 %   10/03/17 2045 - 66 13 - 99 %   10/03/17 2030 - 67 12 132/62 100 %   10/03/17 2015 - 71 17 - 98 %   10/03/17 2000 - 65 17 121/60 99 %   10/03/17 1945 - 68 16 - 99 %   10/03/17 1930 - 66 14 142/66 96 %   10/03/17 1915 - 66 13 - 96 %   10/03/17 1900 - 74 19 132/63 97 %   10/03/17 1845 - 70 14 - 97 %   10/03/17 1815 - 70 12 - 96 %   10/03/17 1800 - 70 18 - 95 %   10/03/17 1745 - 71 13 - 95 %   10/03/17 1730 - 71 12 135/74 96 %   10/03/17 1715 - 78 16 171/83 97 %   10/03/17 1713 98.3 °F (36.8 °C) 77 14 160/87 97 %   10/03/17 1712 - - - 160/87 -       Medications Given in the ED:  Medications   aspirin chewable tablet 324 mg (324 mg Oral Given 10/3/17 1830)   mylanta/viscous lidocaine (TONO)(GI COCKTAIL) (40 mL Oral Given 10/3/17 2021)       Pulse Oximetry Analysis - Normal 97 % on Room air     Cardiac Monitor:   Rate: 78  Rhythm: Normal Sinus Rhythm      EKG    EKG interpretation: (Preliminary) 1706  Rhythm: normal sinus rhythm; and regular . Rate (approx.): 76; Axis: normal; P wave: normal; QRS interval: normal ; ST/T wave: normal;   Written by Joselin London ED scribe, as dictated by Jovanny Barth MD  Diagnosis   Clinical Impression:   1.  Atypical chest pain         Disposition Note:    1:   Follow-up Information     Follow up With Details Comments Contact Vani Dutton MD Call in 2 days  [facility]      Akira Bowie MD Call in 1 day Cardiology [address] [place] [address]  [address] [address]  [phone]      [facility] EMERGENCY DEPT  As needed, If symptoms worsen [address] [place] [address]  [phone]        2:   Current Discharge Medication List        Return to ED if Worse    DISCHARGE NOTE  9:31 PM  The patient has been re-evaluated and is ready for discharge. Reviewed available results with patient. Counseled patient on diagnosis and care plan. Patient has expressed understanding, and all questions have been answered. Patient agrees with plan and agrees to follow up as recommended, or return to the ED if their symptoms worsen. Discharge instructions have been provided and explained to the patient, along with reasons to return to the ED. This note is prepared by Terri Fernandez, acting as Scribe for Wayne Boyer MD.    Wayne Boyer MD: The scribe's documentation has been prepared under my direction and personally reviewed by me in its entirety. I confirm that the note above accurately reflects all work, treatment, procedures, and medical decision making performed by me.         f

## 2017-10-04 LAB
ATRIAL RATE: 76 BPM
CALCULATED P AXIS, ECG09: 17 DEGREES
CALCULATED R AXIS, ECG10: 20 DEGREES
CALCULATED T AXIS, ECG11: 41 DEGREES
DIAGNOSIS, 93000: NORMAL
P-R INTERVAL, ECG05: 154 MS
Q-T INTERVAL, ECG07: 398 MS
QRS DURATION, ECG06: 80 MS
QTC CALCULATION (BEZET), ECG08: 447 MS
VENTRICULAR RATE, ECG03: 76 BPM

## 2017-10-04 NOTE — ED NOTES
Discharge instructions reviewed with pt. Discharge instructions given to pt per Dr. De Luna. Pt able to return/verbalize discharge instructions. Copy of discharge instructions given. Pt condition stable, respiratory status within normal limits, neuro status intact. Pt ambulatory out of ER, accompanied by daughter.

## 2017-10-04 NOTE — DISCHARGE INSTRUCTIONS
Chest Pain: Care Instructions  Your Care Instructions  There are many things that can cause chest pain. Some are not serious and will get better on their own in a few days. But some kinds of chest pain need more testing and treatment. Your doctor may have recommended a follow-up visit in the next 8 to 12 hours. If you are not getting better, you may need more tests or treatment. Even though your doctor has released you, you still need to watch for any problems. The doctor carefully checked you, but sometimes problems can develop later. If you have new symptoms or if your symptoms do not get better, get medical care right away. If you have worse or different chest pain or pressure that lasts more than 5 minutes or you passed out (lost consciousness), call 911 or seek other emergency help right away. A medical visit is only one step in your treatment. Even if you feel better, you still need to do what your doctor recommends, such as going to all suggested follow-up appointments and taking medicines exactly as directed. This will help you recover and help prevent future problems. How can you care for yourself at home? · Rest until you feel better. · Take your medicine exactly as prescribed. Call your doctor if you think you are having a problem with your medicine. · Do not drive after taking a prescription pain medicine. When should you call for help? Call 911 if:  · You passed out (lost consciousness). · You have severe difficulty breathing. · You have symptoms of a heart attack. These may include:  ¨ Chest pain or pressure, or a strange feeling in your chest.  ¨ Sweating. ¨ Shortness of breath. ¨ Nausea or vomiting. ¨ Pain, pressure, or a strange feeling in your back, neck, jaw, or upper belly or in one or both shoulders or arms. ¨ Lightheadedness or sudden weakness. ¨ A fast or irregular heartbeat.   After you call 911, the  may tell you to chew 1 adult-strength or 2 to 4 low-dose aspirin. Wait for an ambulance. Do not try to drive yourself. Call your doctor today if:  · You have any trouble breathing. · Your chest pain gets worse. · You are dizzy or lightheaded, or you feel like you may faint. · You are not getting better as expected. · You are having new or different chest pain. Where can you learn more? Go to http://nolan-alondra.info/. Enter A120 in the search box to learn more about \"Chest Pain: Care Instructions. \"  Current as of: March 20, 2017  Content Version: 11.3  © 3314-8866 [employer]. Care instructions adapted under license by Company (which disclaims liability or warranty for this information). If you have questions about a medical condition or this instruction, always ask your healthcare professional. [address] any warranty or liability for your use of this information.

## 2017-10-19 ENCOUNTER — OFFICE VISIT (OUTPATIENT)
Dept: FAMILY MEDICINE CLINIC | Age: 57
End: 2017-10-19

## 2017-10-19 VITALS
TEMPERATURE: 97.7 F | DIASTOLIC BLOOD PRESSURE: 70 MMHG | BODY MASS INDEX: 34.28 KG/M2 | WEIGHT: 226.2 LBS | OXYGEN SATURATION: 94 % | HEART RATE: 80 BPM | HEIGHT: 68 IN | SYSTOLIC BLOOD PRESSURE: 134 MMHG | RESPIRATION RATE: 14 BRPM

## 2017-10-19 DIAGNOSIS — E78.00 HYPERCHOLESTEREMIA: ICD-10-CM

## 2017-10-19 DIAGNOSIS — I10 ESSENTIAL HYPERTENSION: Primary | ICD-10-CM

## 2017-10-19 DIAGNOSIS — Z23 ENCOUNTER FOR IMMUNIZATION: ICD-10-CM

## 2017-10-19 DIAGNOSIS — E11.9 DIABETES MELLITUS WITHOUT COMPLICATION (HCC): ICD-10-CM

## 2017-10-19 PROBLEM — F17.200 TOBACCO USE DISORDER: Status: ACTIVE | Noted: 2017-10-19

## 2017-10-19 RX ORDER — QUETIAPINE FUMARATE 50 MG/1
75 TABLET, FILM COATED ORAL
COMMUNITY
Start: 2017-10-16 | End: 2018-01-25 | Stop reason: ALTCHOICE

## 2017-10-19 RX ORDER — SYRINGE AND NEEDLE,INSULIN,1ML 30 GX5/16"
SYRINGE, EMPTY DISPOSABLE MISCELLANEOUS
COMMUNITY
Start: 2017-09-23 | End: 2017-11-02 | Stop reason: SDUPTHER

## 2017-10-19 RX ORDER — GLUCOSAM/CHONDRO/HERB 149/HYAL 750-100 MG
TABLET ORAL
COMMUNITY
End: 2018-10-10 | Stop reason: ALTCHOICE

## 2017-10-19 RX ORDER — ALBUTEROL SULFATE 0.83 MG/ML
SOLUTION RESPIRATORY (INHALATION)
COMMUNITY
End: 2018-01-25 | Stop reason: SDUPTHER

## 2017-10-19 RX ORDER — BISMUTH SUBSALICYLATE 262 MG
1 TABLET,CHEWABLE ORAL DAILY
COMMUNITY

## 2017-10-19 RX ORDER — NEBIVOLOL HYDROCHLORIDE 10 MG/1
TABLET ORAL
COMMUNITY
Start: 2017-10-16 | End: 2018-02-02 | Stop reason: SDUPTHER

## 2017-10-19 NOTE — PROGRESS NOTES
HISTORY OF PRESENT ILLNESS  Karson Pugh is a 65 y.o. female. HPI   DMtype II    Compliant w/ meds, having +diabetic diet, and not doing much of daily exercise, obtains home glucose monitoring averaging  140's, ++ Rf needed for today. Denies any tingling sensation, polyurea and polydipsia, last a1c was not at target 7.5%%    . Last podiatry visit 2015   And last eye exam was 2015  Last urine microalbumin 2015 and was normal  . Feeling better since the last visit. States she has been givne Seroquel for insomnia for many yrs ago, was given trazodone did not help, also diabetic for many yrs, also on Viibryd 40mg daily, not working on disability, for paralyzed cord, secondary to the flu infection in 2003 was intubated was complicated since then, also has schwannoma hx of justice knife radiation, to the brain having f/u with the oncologist,   No hx bipolar disorder, no hx of antipsychotic behavior, no hx of schizophrenia,         Current Outpatient Prescriptions   Medication Sig Dispense Refill    BYSTOLIC 10 mg tablet Take 2 tablets PO every AM      QUEtiapine (SEROQUEL) 50 mg tablet Take 50 mg by mouth nightly.  SURE COMFORT INSULIN SYRINGE 1 mL 30 gauge x 5/16 syrg       albuterol (PROVENTIL VENTOLIN) 2.5 mg /3 mL (0.083 %) nebulizer solution by Nebulization route every four (4) hours as needed for Wheezing.  Oxygen O2 at 2 Liters NC while asleep      multivitamin (ONE A DAY) tablet Take 1 Tab by mouth daily.  Omega-3-DHA-EPA-Fish Oil 1,000 mg (120 mg-180 mg) cap Take  by mouth. Take 2 po daily      TOUJEO SOLOSTAR 300 unit/mL (1.5 mL) inpn 65 Units by SubCUTAneous route daily. 15 Pen 3    NOVOLIN R 100 unit/mL injection 35-40 units as instructed (Patient taking differently: Sliding scale with meals units as instructed) 14 Vial 3    vilazodone (VIIBRYD) 40 mg tab tablet Take 1 Tab by mouth daily.  10 Tab 0    albuterol (PROVENTIL HFA) 90 mcg/actuation inhaler Take 1 Puff by inhalation every four (4) hours as needed. 1 Inhaler 0    budesonide-formoterol (SYMBICORT) 160-4.5 mcg/actuation HFA inhaler Take 2 Puffs by inhalation two (2) times a day. Indications: COPD ASSOCIATED WITH CHRONIC BRONCHITIS, EXTRINSIC ASTHMA 1 Inhaler 0    montelukast (SINGULAIR) 10 mg tablet Take 1 Tab by mouth daily. 10 Tab 0    levocetirizine (XYZAL) 5 mg tablet Take 1 Tab by mouth nightly. 10 Tab 0    tiotropium (SPIRIVA WITH HANDIHALER) 18 mcg inhalation capsule Take 1 Cap by inhalation daily. 10 Cap 0    pantoprazole (PROTONIX) 40 mg tablet Take 40 mg by mouth daily.  rosuvastatin (CRESTOR) 40 mg tablet Take 1 Tab by mouth nightly. 90 Tab 3    ergocalciferol (ERGOCALCIFEROL) 50,000 unit capsule Take 1 capsule by mouth every seven (7) days. 12 capsule 3    cholecalciferol, vitamin d3, (VITAMIN D3) 1,000 unit tablet Take 400 Units by mouth daily. 2 CAPS DAILY      nebivolol (BYSTOLIC) 5 mg tablet Take 4 Tabs by mouth daily. 30 Tab 0    glucose blood VI test strips (EASYMAX N) strip Use to test blood sugar 4 times a day 200 Strip 3    EPINEPHrine (EPIPEN) 0.3 mg/0.3 mL injection 0.3 mg by IntraMUSCular route once as needed.  diphenhydrAMINE (BENADRYL) 25 mg capsule Take 25 mg by mouth every six (6) hours as needed.  QUEtiapine (SEROQUEL) 25 mg tablet nightly.  Takes one 50mg and one 25mg at bedtime       Allergies   Allergen Reactions    Latex Hives    Other Food Anaphylaxis     PEPPERONI, sloppy joes    Demerol [Meperidine] Anaphylaxis     Difficulty breathing    Iodine Anaphylaxis    Morphine Other (comments)     voilent outbreaks (restraints needed)    Nsaids (Non-Steroidal Anti-Inflammatory Drug) Hives     Past Medical History:   Diagnosis Date    Asthma     Asthma     COPD     Depression     Diabetes ([facility] [address]     Diabetes mellitus     Essential hypertension     GERD (gastroesophageal reflux disease)     Headache(784.0)     HX OTHER MEDICAL     acoustic neuroma    Hypercholesterolemia  Hypertension     Ill-defined condition     R ACOUSTIC NEUROMA    Psychiatric problem     anxiety depression    Psychotic disorder     Unspecified sleep apnea     NO CPAP-O2 @2L WHEN SLEEPING     Past Surgical History:   Procedure Laterality Date     DELIVERY ONLY      HX GASTRECTOMY  14    lap sleeve gastrectomy by Dr Malik Gonzalez HX GYN      2 c-sections    HX HEENT      sinus    HX HEENT      tracheal resection    HX HEENT      tracheal alleratation x 2    HX HEENT      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    HX HEENT      LASER SX-PENG     Family History   Problem Relation Age of Onset    Diabetes Father     Heart Failure Father     Heart Disease Father     Hypertension Father     High Cholesterol Mother     Suicide Brother     Stroke Maternal Grandmother     Cancer Paternal Grandfather     Anesth Problems Neg Hx       Lab Results  Component Value Date/Time   WBC 8.1 10/03/2017 05:31 PM   HGB 13.6 10/03/2017 05:31 PM   HCT 39.4 10/03/2017 05:31 PM   PLATELET 635  05:31 PM   MCV 88.7 10/03/2017 05:31 PM     Lab Results  Component Value Date/Time   Hemoglobin A1c 8.3 2014 12:29 PM   Hemoglobin A1c 10.1 10/18/2010 04:15 AM   Hemoglobin A1c 8.6 2009 10:33 AM   Hemoglobin A1c, External 7.5% 2017   Glucose 78 10/03/2017 05:31 PM   Glucose (POC) 102 10/03/2017 07:49 PM   Creatinine 0.74 10/03/2017 05:31 PM      Lab Results  Component Value Date/Time   Cholesterol, total 195 2014 09:23 AM   LDL-C, External 152 2017   Lab Results  Component Value Date/Time   ALT (SGPT) 31 10/03/2017 05:31 PM   AST (SGOT) 14 10/03/2017 05:31 PM   Alk.  phosphatase 82 10/03/2017 05:31 PM   Bilirubin, total 0.3 10/03/2017 05:31 PM   Albumin 3.3 10/03/2017 05:31 PM   Protein, total 6.8 10/03/2017 05:31 PM   PLATELET 927  05:31 PM       Lab Results  Component Value Date/Time   GFR est non-AA >60 10/03/2017 05:31 PM   GFR est AA >60 10/03/2017 05:31 PM   Creatinine 0.74 10/03/2017 05:31 PM   BUN 16 10/03/2017 05:31 PM   Sodium 143 10/03/2017 05:31 PM   Potassium 4.0 10/03/2017 05:31 PM   Chloride 107 10/03/2017 05:31 PM   CO2 29 10/03/2017 05:31 PM   Magnesium 1.6 09/05/2014 09:23 AM   Phosphorus 4.0 10/19/2010 04:30 AM          Review of Systems   Constitutional: Negative for chills and fever. HENT: Negative for ear pain and nosebleeds. Eyes: Negative for blurred vision, pain and discharge. Respiratory: Negative for shortness of breath. Cardiovascular: Negative for chest pain and leg swelling. Gastrointestinal: Negative for constipation, diarrhea, nausea and vomiting. Genitourinary: Negative for frequency. Musculoskeletal: Negative for joint pain. Skin: Negative for itching and rash. Neurological: Negative for headaches. Psychiatric/Behavioral: Negative for depression. The patient is not nervous/anxious. Physical Exam   Constitutional: She is oriented to person, place, and time. She appears well-developed and well-nourished. HENT:   Head: Normocephalic and atraumatic. Eyes: Conjunctivae and EOM are normal.   Neck: Normal range of motion. Neck supple. Cardiovascular: Normal rate, regular rhythm and normal heart sounds. No murmur heard. Pulmonary/Chest: Effort normal and breath sounds normal.   Abdominal: Soft. Bowel sounds are normal. She exhibits no distension. Musculoskeletal: Normal range of motion. She exhibits no edema. Neurological: She is alert and oriented to person, place, and time. Skin: No erythema. Psychiatric: Her behavior is normal.   Nursing note and vitals reviewed. ASSESSMENT and PLAN  Diagnoses and all orders for this visit:    1.  Essential hypertension  -     CBC W/O DIFF  -     METABOLIC PANEL, COMPREHENSIVE  -     URINALYSIS W/ RFLX MICROSCOPIC  -     HEMOGLOBIN A1C WITH EAG  -     Influenza virus vaccine (QUADRIVALENT PRES FREE SYRINGE) IM ([id number])  -     TN IMMUNIZ ADMIN,1 SINGLE/COMB VAC/TOXOID  -     TSH 3RD GENERATION  -     REFERRAL TO SLEEP STUDIES  -     CHOLESTEROL, TOTAL  -     HDL CHOLESTEROL  -     MICROALBUMIN, UR, RAND W/ MICROALBUMIN/CREA RATIO  -     CA MAMMO BI SCREENING INCL CAD; Future    2. Diabetes mellitus without complication (HCC)  -     CBC W/O DIFF  -     METABOLIC PANEL, COMPREHENSIVE  -     URINALYSIS W/ RFLX MICROSCOPIC  -     HEMOGLOBIN A1C WITH EAG  -     Influenza virus vaccine (QUADRIVALENT PRES FREE SYRINGE) IM ([id number])  -     MA IMMUNIZ ADMIN,1 SINGLE/COMB VAC/TOXOID  -     TSH 3RD GENERATION  -     REFERRAL TO SLEEP STUDIES  -     CHOLESTEROL, TOTAL  -     HDL CHOLESTEROL  -     MICROALBUMIN, UR, RAND W/ MICROALBUMIN/CREA RATIO  -     CA MAMMO BI SCREENING INCL CAD; Future    3. Encounter for immunization  -     CBC W/O DIFF  -     METABOLIC PANEL, COMPREHENSIVE  -     URINALYSIS W/ RFLX MICROSCOPIC  -     HEMOGLOBIN A1C WITH EAG  -     Influenza virus vaccine (QUADRIVALENT PRES FREE SYRINGE) IM ([id number])  -     MA IMMUNIZ ADMIN,1 SINGLE/COMB VAC/TOXOID  -     TSH 3RD GENERATION  -     REFERRAL TO SLEEP STUDIES  -     CHOLESTEROL, TOTAL  -     HDL CHOLESTEROL  -     MICROALBUMIN, UR, RAND W/ MICROALBUMIN/CREA RATIO  -     CA MAMMO BI SCREENING INCL CAD; Future    4. Hypercholesteremia  -     CBC W/O DIFF  -     METABOLIC PANEL, COMPREHENSIVE  -     URINALYSIS W/ RFLX MICROSCOPIC  -     HEMOGLOBIN A1C WITH EAG  -     Influenza virus vaccine (QUADRIVALENT PRES FREE SYRINGE) IM ([id number])  -     MA IMMUNIZ ADMIN,1 SINGLE/COMB VAC/TOXOID  -     TSH 3RD GENERATION  -     REFERRAL TO SLEEP STUDIES  -     CHOLESTEROL, TOTAL  -     HDL CHOLESTEROL  -     MICROALBUMIN, UR, RAND W/ MICROALBUMIN/CREA RATIO  -     CA MAMMO BI SCREENING INCL CAD;  Future      At this time patient was told to lose weight, so that her body mass index would get into a normal level between 20-25,  increase physical activity, limit alcohol consumption, stop secondhand tobacco exposure    In addition the patient was told to start an active life style modifications, for which includes creating a an interesting delightful to do list,  such as start of a light physical activity with a brisk daily walking 30 minutes most days of the week, most likely to total of 150 minutes per week, then the patient was told to try to avoid fatty fast foods, have a low-fat low-cholesterol diet, include seafood such as adding fatty fish such as Stephen Mellow, Mac[place], [place] to the diet, increase vegetables and fruits, nuts 3-4 times per week and finally have a low-salt and K rich food intake for a good 4-6 months possibly for ever for the best outcome,   All mentioned recommendations, have to be done at least most days of the weeks for the best result,  Routine labs ordered, and the needed abnormal labs will be discussed soon and they can be repeated in 3-6 months. In addition relevant handouts were given to the patient for a better understanding,    patient was told to call if any problems.   Patient acknowledged understanding and     Patient agreed with today's recommendation

## 2017-10-19 NOTE — MR AVS SNAPSHOT
Visit Information Date & Time Provider Department Dept. Phone Encounter #  
 10/19/2017  9:00 AM Tomas Blanton MD Jaime Strauss OFF[facility] [phone] [id number] Follow-up Instructions Return in about 6 weeks (around 11/30/2017), or if symptoms worsen or fail to improve. Your Appointments 11/9/2017  1:30 PM  
New Patient with [facility]) Appt Note: Dr. Zoila Blair was in ER was suggested to see a cardiologist ,[facility]  
[phone] [address] [place] [address] [facility]  
  
    
 12/14/2017 12:10 PM  
Follow Up with Celio Grant MD  
[facility]) Appt Note: 3 month f/u  
 [address] [facility] [address] [id number] [address] [place] [address] Upcoming Health Maintenance Date Due Hepatitis C Screening 1960 FOOT EXAM Q1 3/23/1971 MICROALBUMIN Q1 3/23/1971 Pneumococcal 19-64 Medium Risk (1 of 1 - PPSV23) 3/23/1980 DTaP/Tdap/Td series (1 - Tdap) 3/23/1982 PAP AKA CERVICAL CYTOLOGY 3/23/1982 BREAST CANCER SCRN MAMMOGRAM 3/23/2011 FOBT Q 1 YEAR AGE 50-75 3/23/2011 INFLUENZA AGE 9 TO ADULT 8/1/2017 HEMOGLOBIN A1C Q6M 3/14/2018 EYE EXAM RETINAL OR DILATED Q1 3/27/2018 LIPID PANEL Q1 4/27/2018 Allergies as of 10/19/2017  Review Complete On: 10/3/2017 By: Fransisco Barlow RN Severity Noted Reaction Type Reactions Latex  09/05/2014    Hives Other Food High 09/05/2014    Anaphylaxis PEPPERONI, sloppy joes Demerol [Meperidine] High 10/17/2010   Systemic Anaphylaxis Difficulty breathing Iodine High 10/17/2010   Systemic Anaphylaxis Morphine High 10/17/2010   Systemic Other (comments)  
 voilent outbreaks (restraints needed) Nsaids (Non-steroidal Anti-inflammatory Drug)  10/19/2017    Hives Current Immunizations  Reviewed on 10/19/2017 Name Date Influenza Vaccine (Quad) PF  Incomplete Pneumococcal Polysaccharide (PPSV-23) 10/19/2016 Reviewed by Wilber Tracy MD on 10/19/2017 at 10:06 AM  
 Reviewed by Wilber Tracy MD on 10/19/2017 at 10:07 AM  
You Were Diagnosed With   
  
 Codes Comments Essential hypertension    -  Primary ICD-10-CM: I10 
ICD-9-CM: 401.9 Diabetes mellitus without complication ([facility]     ZTL-19-TQ: E11.9 ICD-9-CM: 250.00 Encounter for immunization     ICD-10-CM: Z50 ICD-9-CM: V03.89 Hypercholesteremia     ICD-10-CM: E78.00 ICD-9-CM: 272.0 Vitals BP Pulse Temp Resp Height(growth percentile) Weight(growth percentile) 134/70 (BP 1 Location: Left arm, BP Patient Position: At rest) 80 97.7 °F (36.5 °C) (Oral) 14 5' 8\" (1.727 m) 226 lb 3.2 oz (102.6 kg) LMP SpO2 BMI OB Status Smoking Status 01/17/2010 94% 34.39 kg/m2 Postmenopausal Current Every Day Smoker Vitals History BMI and BSA Data Body Mass Index Body Surface Area  
 34.39 kg/m 2 2.22 m 2 Preferred Pharmacy Pharmacy Name Phone [address] [phone] Your Updated Medication List  
  
   
This list is accurate as of: 10/19/17 10:18 AM.  Always use your most recent med list.  
  
  
  
  
 * albuterol 2.5 mg /3 mL (0.083 %) nebulizer solution Commonly known as:  PROVENTIL VENTOLIN  
by Nebulization route every four (4) hours as needed for Wheezing. * albuterol 90 mcg/actuation inhaler Commonly known as:  PROVENTIL HFA Take 1 Puff by inhalation every four (4) hours as needed. BENADRYL 25 mg capsule Generic drug:  diphenhydrAMINE Take 25 mg by mouth every six (6) hours as needed. budesonide-formoterol 160-4.5 mcg/actuation Hfaa Commonly known as:  SYMBICORT Take 2 Puffs by inhalation two (2) times a day. Indications: COPD ASSOCIATED WITH CHRONIC BRONCHITIS, EXTRINSIC ASTHMA  
  
 cholecalciferol 1,000 unit tablet Commonly known as:  VITAMIN D3 Take 400 Units by mouth daily. 2 CAPS DAILY EPINEPHrine 0.3 mg/0.3 mL injection Commonly known as:  EPIPEN  
0.3 mg by IntraMUSCular route once as needed. ergocalciferol 50,000 unit capsule Commonly known as:  ERGOCALCIFEROL Take 1 capsule by mouth every seven (7) days. glucose blood VI test strips strip Commonly known as:  EasyMax N  
Use to test blood sugar 4 times a day  
  
 levocetirizine 5 mg tablet Commonly known as:  Hernandez Bolivar Take 1 Tab by mouth nightly. montelukast 10 mg tablet Commonly known as:  SINGULAIR Take 1 Tab by mouth daily. multivitamin tablet Commonly known as:  ONE A DAY Take 1 Tab by mouth daily. * nebivolol 5 mg tablet Commonly known as:  BYSTOLIC Take 4 Tabs by mouth daily. * BYSTOLIC 10 mg tablet Generic drug:  nebivolol Take 2 tablets PO every AM  
  
 NovoLIN R 100 unit/mL injection Generic drug:  insulin regular 35-40 units as instructed Omega-3-DHA-EPA-Fish Oil 1,000 mg (120 mg-180 mg) Cap Take  by mouth. Take 2 po daily Oxygen O2 at 2 Liters NC while asleep  
  
 pantoprazole 40 mg tablet Commonly known as:  PROTONIX Take 40 mg by mouth daily. * QUEtiapine 25 mg tablet Commonly known as:  SEROquel  
nightly. Takes one 50mg and one 25mg at bedtime * QUEtiapine 50 mg tablet Commonly known as:  SEROquel Take 50 mg by mouth nightly. rosuvastatin 40 mg tablet Commonly known as:  CRESTOR Take 1 Tab by mouth nightly. SURE COMFORT INSULIN SYRINGE 1 mL 30 gauge x 5/16 Syrg Generic drug:  Insulin Syringe-Needle U-100  
  
 tiotropium 18 mcg inhalation capsule Commonly known as:  [facility] Connie Dorman Take 1 Cap by inhalation daily. TOUJEO SOLOSTAR 300 unit/mL (1.5 mL) Inpn Generic drug:  insulin glargine 65 Units by SubCUTAneous route daily. vilazodone 40 mg Tab tablet Commonly known as:  VIIBRYD Take 1 Tab by mouth daily. * Notice: This list has 6 medication(s) that are the same as other medications prescribed for you. Read the directions carefully, and ask your doctor or other care provider to review them with you. We Performed the Following CBC W/O DIFF [59649 CPT(R)] CHOLESTEROL, TOTAL [48684 CPT(R)] HDL CHOLESTEROL [77797 CPT(R)] HEMOGLOBIN A1C WITH EAG [94813 CPT(R)] INFLUENZA VIRUS VAC QUAD,SPLIT,PRESV FREE SYRINGE IM [id number] CPT(R)] METABOLIC PANEL, COMPREHENSIVE [02842 CPT(R)] MICROALBUMIN, UR, RAND W/ MICROALBUMIN/CREA RATIO [id number] CPT(R)] MA IMMUNIZ ADMIN,1 SINGLE/COMB VAC/TOXOID [id number] CPT(R)] REFERRAL TO SLEEP STUDIES [REF99 Custom] TSH 3RD GENERATION [71715 CPT(R)] URINALYSIS W/ RFLX MICROSCOPIC [09667 CPT(R)] Follow-up Instructions Return in about 6 weeks (around 11/30/2017), or if symptoms worsen or fail to improve. To-Do List   
 10/19/2017 Imaging:  [facility]MO BI SCREENING INCL CAD Referral Information Referral ID Referred By Referred To  
  
 [id number] Shannan Armando Not Available Visits Status Start Date End Date 1 New Request 10/19/17 10/19/18 If your referral has a status of pending review or denied, additional information will be sent to support the outcome of this decision. Patient Instructions Vaccine Information Statement Influenza (Flu) Vaccine (Inactivated or Recombinant): What you need to know Many Vaccine Information Statements are available in Estonian and other languages. See www.immunize.org/vis Hojas de Información Sobre Vacunas están disponibles en Español y en verenice bernstein. Visite www.immunize.org/vis 1. Why get vaccinated? Influenza (flu) is a contagious disease that spreads around the [place] every year, usually between October and May. Flu is caused by influenza viruses, and is spread mainly by coughing, sneezing, and close contact. Anyone can get flu. Flu strikes suddenly and can last several days. Symptoms vary by age, but can include: 
 fever/chills  sore throat  muscle aches  fatigue  cough  headache  runny or stuffy nose Flu can also lead to pneumonia and blood infections, and cause diarrhea and seizures in children. If you have a medical condition, such as heart or lung disease, flu can make it worse. Flu is more dangerous for some people. Infants and young children, people 72years of age and older, pregnant women, and people with certain health conditions or a weakened immune system are at greatest risk. Each year thousands of people in the [facility] die from flu, and many more are hospitalized. Flu vaccine can: 
 keep you from getting flu, 
 make flu less severe if you do get it, and 
 keep you from spreading flu to your family and other people. 2. Inactivated and recombinant flu vaccines A dose of flu vaccine is recommended every flu season. Children 6 months through 6years of age may need two doses during the same flu season. Everyone else needs only one dose each flu season. Some inactivated flu vaccines contain a very small amount of a mercury-based preservative called thimerosal. Studies have not shown thimerosal in vaccines to be harmful, but flu vaccines that do not contain thimerosal are available. There is no live flu virus in flu shots. They cannot cause the flu. There are many flu viruses, and they are always changing.  Each year a new flu vaccine is made to protect against three or four viruses that are likely to cause disease in the upcoming flu season. But even when the vaccine doesnt exactly match these viruses, it may still provide some protection Flu vaccine cannot prevent: 
 flu that is caused by a virus not covered by the vaccine, or 
 illnesses that look like flu but are not. It takes about 2 weeks for protection to develop after vaccination, and protection lasts through the flu season. 3. Some people should not get this vaccine Tell the person who is giving you the vaccine:  If you have any severe, life-threatening allergies. If you ever had a life-threatening allergic reaction after a dose of flu vaccine, or have a severe allergy to any part of this vaccine, you may be advised not to get vaccinated. Most, but not all, types of flu vaccine contain a small amount of egg protein.  If you ever had Guillain-Barré Syndrome (also called GBS). Some people with a history of GBS should not get this vaccine. This should be discussed with your doctor.  If you are not feeling well. It is usually okay to get flu vaccine when you have a mild illness, but you might be asked to come back when you feel better. 4. Risks of a vaccine reaction With any medicine, including vaccines, there is a chance of reactions. These are usually mild and go away on their own, but serious reactions are also possible. Most people who get a flu shot do not have any problems with it. Minor problems following a flu shot include:  
 soreness, redness, or swelling where the shot was given  hoarseness  sore, red or itchy eyes  cough  fever  aches  headache  itching  fatigue If these problems occur, they usually begin soon after the shot and last 1 or 2 days. More serious problems following a flu shot can include the following:  There may be a small increased risk of Guillain-Barré Syndrome (GBS) after inactivated flu vaccine.   This risk has been estimated at 1 or 2 additional cases per million people vaccinated. This is much lower than the risk of severe complications from flu, which can be prevented by flu vaccine.  Young children who get the flu shot along with pneumococcal vaccine (PCV13) and/or DTaP vaccine at the same time might be slightly more likely to have a seizure caused by fever. Ask your doctor for more information. Tell your doctor if a child who is getting flu vaccine has ever had a seizure. Problems that could happen after any injected vaccine:  People sometimes faint after a medical procedure, including vaccination. Sitting or lying down for about 15 minutes can help prevent fainting, and injuries caused by a fall. Tell your doctor if you feel dizzy, or have vision changes or ringing in the ears.  Some people get severe pain in the shoulder and have difficulty moving the arm where a shot was given. This happens very rarely.  Any medication can cause a severe allergic reaction. Such reactions from a vaccine are very rare, estimated at about 1 in a million doses, and would happen within a few minutes to a few hours after the vaccination. As with any medicine, there is a very remote chance of a vaccine causing a serious injury or death. The safety of vaccines is always being monitored. For more information, visit: www.cdc.gov/vaccinesafety/ 
 
5. What if there is a serious reaction? What should I look for?  Look for anything that concerns you, such as signs of a severe allergic reaction, very high fever, or unusual behavior. Signs of a severe allergic reaction can include hives, swelling of the face and throat, difficulty breathing, a fast heartbeat, dizziness, and weakness  usually within a few minutes to a few hours after the vaccination. What should I do?  
 
 If you think it is a severe allergic reaction or other emergency that cant wait, call 9-1-1 and get the person to the nearest hospital. Otherwise, call your doctor.  Reactions should be reported to the Vaccine Adverse Event Reporting System (VAERS). Your doctor should file this report, or you can do it yourself through  the VAERS web site at www.vaers. [facility].gov, or by calling 8-199.500.6405. VAERS does not give medical advice. 6. The National Vaccine Injury Compensation Program 
 
The [facility] Vaccine Injury Compensation Program (VICP) is a federal program that was created to compensate people who may have been injured by certain vaccines. Persons who believe they may have been injured by a vaccine can learn about the program and about filing a claim by calling 2-899.210.5853 or visiting the [employer] website at www.[facility].gov/vaccinecompensation. There is a time limit to file a claim for compensation. 7. How can I learn more?  Ask your healthcare provider. He or she can give you the vaccine package insert or suggest other sources of information.  Call your local or state health department.  Contact the Centers for Disease Control and Prevention (CDC): 
- Call 7-967.438.8710 (1-800-CDC-INFO) or 
- Visit CDCs website at www.cdc.gov/flu Vaccine Information Statement Inactivated Influenza Vaccine 8/7/2015 
42 ROSA Dominguez [id number]-93 [facility]! Centers for Disease Control and Prevention Office Use Only [facility] HE[facility] SERVICES! Dear Cornelius Aaron: 
Thank you for requesting a [employer] account. Our records indicate that you already have an active [employer] account. You can access your account anytime at [email or web address] Did you know that you can access your hospital and ER discharge instructions at any time in [employer]? You can also review all of your test results from your hospital stay or ER visit. Additional Information If you have questions, please visit the Frequently Asked Questions section of the [employer] website at [email or web address] Remember, MyChart is NOT to be used for urgent needs. For medical emergencies, dial 911. Now available from your iPhone and Android! Please provide this summary of care documentation to your next provider. Your primary care clinician is listed as Gage Martinez. If you have any questions after today's visit, please call [phone].

## 2017-10-19 NOTE — PATIENT INSTRUCTIONS
Vaccine Information Statement    Influenza (Flu) Vaccine (Inactivated or Recombinant): What you need to know    Many Vaccine Information Statements are available in Thai and other languages. See www.immunize.org/vis  Hojas de Información Sobre Vacunas están disponibles en Español y en muchos otros idiomas. Visite www.immunize.org/vis    1. Why get vaccinated? Influenza (flu) is a contagious disease that spreads around the [place] every year, usually between October and May. Flu is caused by influenza viruses, and is spread mainly by coughing, sneezing, and close contact. Anyone can get flu. Flu strikes suddenly and can last several days. Symptoms vary by age, but can include:   fever/chills   sore throat   muscle aches   fatigue   cough   headache    runny or stuffy nose    Flu can also lead to pneumonia and blood infections, and cause diarrhea and seizures in children. If you have a medical condition, such as heart or lung disease, flu can make it worse. Flu is more dangerous for some people. Infants and young children, people 72years of age and older, pregnant women, and people with certain health conditions or a weakened immune system are at greatest risk. Each year thousands of people in the [facility] die from flu, and many more are hospitalized. Flu vaccine can:   keep you from getting flu,   make flu less severe if you do get it, and   keep you from spreading flu to your family and other people. 2. Inactivated and recombinant flu vaccines    A dose of flu vaccine is recommended every flu season. Children 6 months through 6years of age may need two doses during the same flu season. Everyone else needs only one dose each flu season.        Some inactivated flu vaccines contain a very small amount of a mercury-based preservative called thimerosal. Studies have not shown thimerosal in vaccines to be harmful, but flu vaccines that do not contain thimerosal are available. There is no live flu virus in flu shots. They cannot cause the flu. There are many flu viruses, and they are always changing. Each year a new flu vaccine is made to protect against three or four viruses that are likely to cause disease in the upcoming flu season. But even when the vaccine doesnt exactly match these viruses, it may still provide some protection    Flu vaccine cannot prevent:   flu that is caused by a virus not covered by the vaccine, or   illnesses that look like flu but are not. It takes about 2 weeks for protection to develop after vaccination, and protection lasts through the flu season. 3. Some people should not get this vaccine    Tell the person who is giving you the vaccine:     If you have any severe, life-threatening allergies. If you ever had a life-threatening allergic reaction after a dose of flu vaccine, or have a severe allergy to any part of this vaccine, you may be advised not to get vaccinated. Most, but not all, types of flu vaccine contain a small amount of egg protein.  If you ever had Guillain-Barré Syndrome (also called GBS). Some people with a history of GBS should not get this vaccine. This should be discussed with your doctor.  If you are not feeling well. It is usually okay to get flu vaccine when you have a mild illness, but you might be asked to come back when you feel better. 4. Risks of a vaccine reaction    With any medicine, including vaccines, there is a chance of reactions. These are usually mild and go away on their own, but serious reactions are also possible. Most people who get a flu shot do not have any problems with it.      Minor problems following a flu shot include:    soreness, redness, or swelling where the shot was given     hoarseness   sore, red or itchy eyes   cough   fever   aches   headache   itching   fatigue  If these problems occur, they usually begin soon after the shot and last 1 or 2 days. More serious problems following a flu shot can include the following:     There may be a small increased risk of Guillain-Barré Syndrome (GBS) after inactivated flu vaccine. This risk has been estimated at 1 or 2 additional cases per million people vaccinated. This is much lower than the risk of severe complications from flu, which can be prevented by flu vaccine.  Young children who get the flu shot along with pneumococcal vaccine (PCV13) and/or DTaP vaccine at the same time might be slightly more likely to have a seizure caused by fever. Ask your doctor for more information. Tell your doctor if a child who is getting flu vaccine has ever had a seizure. Problems that could happen after any injected vaccine:      People sometimes faint after a medical procedure, including vaccination. Sitting or lying down for about 15 minutes can help prevent fainting, and injuries caused by a fall. Tell your doctor if you feel dizzy, or have vision changes or ringing in the ears.  Some people get severe pain in the shoulder and have difficulty moving the arm where a shot was given. This happens very rarely.  Any medication can cause a severe allergic reaction. Such reactions from a vaccine are very rare, estimated at about 1 in a million doses, and would happen within a few minutes to a few hours after the vaccination. As with any medicine, there is a very remote chance of a vaccine causing a serious injury or death. The safety of vaccines is always being monitored. For more information, visit: www.cdc.gov/vaccinesafety/    5. What if there is a serious reaction? What should I look for?  Look for anything that concerns you, such as signs of a severe allergic reaction, very high fever, or unusual behavior.     Signs of a severe allergic reaction can include hives, swelling of the face and throat, difficulty breathing, a fast heartbeat, dizziness, and weakness  usually within a few minutes to a few hours after the vaccination. What should I do?  If you think it is a severe allergic reaction or other emergency that cant wait, call 9-1-1 and get the person to the nearest hospital. Otherwise, call your doctor.  Reactions should be reported to the Vaccine Adverse Event Reporting System (VAERS). Your doctor should file this report, or you can do it yourself through  the VAERS web site at www.vaers. [facility].gov, or by calling 2-425.435.7790. VAERS does not give medical advice. 6. The National Vaccine Injury Compensation Program    The [facility] Vaccine Injury Compensation Program (VICP) is a federal program that was created to compensate people who may have been injured by certain vaccines. Persons who believe they may have been injured by a vaccine can learn about the program and about filing a claim by calling 8-956.190.6033 or visiting the [employer] website at www.[facility] [[facility]com].gov/vaccinecompensation. There is a time limit to file a claim for compensation. 7. How can I learn more?  Ask your healthcare provider. He or she can give you the vaccine package insert or suggest other sources of information.  Call your local or state health department.  Contact the Centers for Disease Control and Prevention (CDC):  - Call 7-179.978.2181 (1-800-CDC-INFO) or  - Visit CDCs website at www.cdc.gov/flu    Vaccine Information Statement   Inactivated Influenza Vaccine   8/7/2015  42 U. Kassi Sample [id number]    Department of Health and Human Services  Centers for Disease Control and Prevention    Office Use Only

## 2017-10-19 NOTE — PROGRESS NOTES
Colette Franciskodano        Name and  verified      Chief Complaint   Patient presents with   [facility] Patient

## 2017-10-20 LAB
ALBUMIN SERPL-MCNC: 4.3 G/DL (ref 3.5–5.5)
ALBUMIN/CREAT UR: 30.9 MG/G CREAT (ref 0–30)
ALBUMIN/GLOB SERPL: 1.9 {RATIO} (ref 1.2–2.2)
ALP SERPL-CCNC: 78 IU/L (ref 39–117)
ALT SERPL-CCNC: 30 IU/L (ref 0–32)
APPEARANCE UR: CLEAR
AST SERPL-CCNC: 21 IU/L (ref 0–40)
BILIRUB SERPL-MCNC: 0.4 MG/DL (ref 0–1.2)
BILIRUB UR QL STRIP: NEGATIVE
BUN SERPL-MCNC: 18 MG/DL (ref 6–24)
BUN/CREAT SERPL: 20 (ref 9–23)
CALCIUM SERPL-MCNC: 10 MG/DL (ref 8.7–10.2)
CHLORIDE SERPL-SCNC: 102 MMOL/L (ref 96–106)
CHOLEST SERPL-MCNC: 138 MG/DL (ref 100–199)
CO2 SERPL-SCNC: 30 MMOL/L (ref 18–29)
COLOR UR: YELLOW
CREAT SERPL-MCNC: 0.9 MG/DL (ref 0.57–1)
CREAT UR-MCNC: 72.4 MG/DL
ERYTHROCYTE [DISTWIDTH] IN BLOOD BY AUTOMATED COUNT: 13.4 % (ref 12.3–15.4)
EST. AVERAGE GLUCOSE BLD GHB EST-MCNC: 200 MG/DL
GFR SERPLBLD CREATININE-BSD FMLA CKD-EPI: 72 ML/MIN/1.73
GFR SERPLBLD CREATININE-BSD FMLA CKD-EPI: 83 ML/MIN/1.73
GLOBULIN SER CALC-MCNC: 2.3 G/DL (ref 1.5–4.5)
GLUCOSE SERPL-MCNC: 133 MG/DL (ref 65–99)
GLUCOSE UR QL: NEGATIVE
HBA1C MFR BLD: 8.6 % (ref 4.8–5.6)
HCT VFR BLD AUTO: 39.8 % (ref 34–46.6)
HDLC SERPL-MCNC: 51 MG/DL
HGB BLD-MCNC: 13.1 G/DL (ref 11.1–15.9)
HGB UR QL STRIP: NEGATIVE
KETONES UR QL STRIP: NEGATIVE
LEUKOCYTE ESTERASE UR QL STRIP: NEGATIVE
MCH RBC QN AUTO: 29.7 PG (ref 26.6–33)
MCHC RBC AUTO-ENTMCNC: 32.9 G/DL (ref 31.5–35.7)
MCV RBC AUTO: 90 FL (ref 79–97)
MICRO URNS: NORMAL
MICROALBUMIN UR-MCNC: 22.4 UG/ML
NITRITE UR QL STRIP: NEGATIVE
PH UR STRIP: 6 [PH] (ref 5–7.5)
PLATELET # BLD AUTO: 210 X10E3/UL (ref 150–379)
POTASSIUM SERPL-SCNC: 4.3 MMOL/L (ref 3.5–5.2)
PROT SERPL-MCNC: 6.6 G/DL (ref 6–8.5)
PROT UR QL STRIP: NEGATIVE
RBC # BLD AUTO: 4.41 X10E6/UL (ref 3.77–5.28)
SODIUM SERPL-SCNC: 145 MMOL/L (ref 134–144)
SP GR UR: 1.03 (ref 1–1.03)
TSH SERPL DL<=0.005 MIU/L-ACNC: 1.32 UIU/ML (ref 0.45–4.5)
UROBILINOGEN UR STRIP-MCNC: 0.2 MG/DL (ref 0.2–1)
WBC # BLD AUTO: 8.5 X10E3/UL (ref 3.4–10.8)

## 2017-11-02 DIAGNOSIS — Z23 ENCOUNTER FOR IMMUNIZATION: ICD-10-CM

## 2017-11-02 DIAGNOSIS — E78.00 HYPERCHOLESTEREMIA: ICD-10-CM

## 2017-11-02 DIAGNOSIS — E11.9 DIABETES MELLITUS WITHOUT COMPLICATION (HCC): ICD-10-CM

## 2017-11-02 DIAGNOSIS — I10 ESSENTIAL HYPERTENSION: ICD-10-CM

## 2017-11-02 RX ORDER — SYRINGE AND NEEDLE,INSULIN,1ML 30 GX5/16"
SYRINGE, EMPTY DISPOSABLE MISCELLANEOUS
Qty: 120 SYRINGE | Refills: 11 | Status: SHIPPED | OUTPATIENT
Start: 2017-11-02 | End: 2018-01-23

## 2017-11-09 ENCOUNTER — OFFICE VISIT (OUTPATIENT)
Dept: CARDIOLOGY CLINIC | Age: 57
End: 2017-11-09

## 2017-11-09 VITALS
OXYGEN SATURATION: 96 % | WEIGHT: 224.7 LBS | HEIGHT: 68 IN | SYSTOLIC BLOOD PRESSURE: 128 MMHG | BODY MASS INDEX: 34.05 KG/M2 | DIASTOLIC BLOOD PRESSURE: 80 MMHG | HEART RATE: 80 BPM

## 2017-11-09 DIAGNOSIS — I20.9 ANGINA, CLASS III (HCC): ICD-10-CM

## 2017-11-09 DIAGNOSIS — I34.1 MITRAL VALVE PROLAPSE: ICD-10-CM

## 2017-11-09 DIAGNOSIS — I10 ESSENTIAL HYPERTENSION: ICD-10-CM

## 2017-11-09 DIAGNOSIS — E78.00 HYPERCHOLESTEREMIA: Primary | ICD-10-CM

## 2017-11-09 NOTE — PROGRESS NOTES
Subjective/HPI:     Lablandry Pulse is a 65 y.o. female is here for new patient consultation. The patient reports left sided chest pain for the past 2 weeks. No radiation. diffarrent from her GERD. Was seen in the ER last week. Has significant risk factors for CAD. No edema, PND, orthopnea, palpitations. Unable to walk due to previous viral infection. Was on ventilator and had vcal chord damage. No TIA, CVA. Smokes and planning on quitting.     Patient Active Problem List    Diagnosis Date Noted    Angina, class III ([address] 2017    Mitral valve prolapse 2017    Tobacco use disorder 10/19/2017    Morbid obesity ([address] 2012    Chronic hoarseness 2012    Asthma 2012    GERD (gastroesophageal reflux disease) 2012    Hypercholesteremia 2012    Gallstone 10/04/2011    Rib pain 10/04/2011    Unspecified asthma, with exacerbation 10/18/2010    Type II or unspecified type diabetes mellitus without mention of complication, uncontrolled 10/18/2010    HTN (hypertension) 10/18/2010    Upper respiratory infection 10/18/2010      Tomas Blanton MD  Past Medical History:   Diagnosis Date    Asthma     Asthma     COPD     Depression     Diabetes ([address]     Diabetes mellitus     Essential hypertension     GERD (gastroesophageal reflux disease)     Headache(784.0)     HX OTHER MEDICAL     acoustic neuroma    Hypercholesterolemia     Hypertension     Ill-defined condition     R ACOUSTIC NEUROMA    Psychiatric problem     anxiety depression    Psychotic disorder     Tobacco use disorder 10/19/2017    Unspecified sleep apnea     NO CPAP-O2 @2L WHEN SLEEPING      Past Surgical History:   Procedure Laterality Date     DELIVERY ONLY      HX GASTRECTOMY  14    lap sleeve gastrectomy by Dr Jael Montenegro HX GYN      2 c-sections    HX HEENT      sinus    HX HEENT      tracheal resection    HX HEENT      tracheal alleratation x 2    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    HX HEENT      LASER SX-PENG     Allergies   Allergen Reactions    Latex Hives    Other Food Anaphylaxis     PEPPERONI, sloppy joes    Demerol [Meperidine] Anaphylaxis     Difficulty breathing    Iodine Anaphylaxis    Morphine Other (comments)     voilent outbreaks (restraints needed)    Nsaids (Non-Steroidal Anti-Inflammatory Drug) Hives      Family History   Problem Relation Age of Onset    Diabetes Father     Heart Failure Father     Heart Disease Father     Hypertension Father     High Cholesterol Mother     Suicide Brother     Stroke Maternal Grandmother     Cancer Paternal Grandfather     Anesth Problems Neg Hx       Current Outpatient Prescriptions   Medication Sig    SURE COMFORT INSULIN SYRINGE 1 mL 30 gauge x 5/16 syrg Use as directed 4 times daily    BYSTOLIC 10 mg tablet Take 2 tablets PO every AM    QUEtiapine (SEROQUEL) 50 mg tablet Take 25 mg by mouth nightly.  albuterol (PROVENTIL VENTOLIN) 2.5 mg /3 mL (0.083 %) nebulizer solution by Nebulization route every four (4) hours as needed for Wheezing.  Oxygen O2 at 2 Liters NC while asleep    multivitamin (ONE A DAY) tablet Take 1 Tab by mouth daily.  Omega-3-DHA-EPA-Fish Oil 1,000 mg (120 mg-180 mg) cap Take  by mouth. Take 2 po daily    TOUJEO SOLOSTAR 300 unit/mL (1.5 mL) inpn 65 Units by SubCUTAneous route daily.  NOVOLIN R 100 unit/mL injection 35-40 units as instructed (Patient taking differently: Sliding scale with meals units as instructed)    vilazodone (VIIBRYD) 40 mg tab tablet Take 1 Tab by mouth daily.  albuterol (PROVENTIL HFA) 90 mcg/actuation inhaler Take 1 Puff by inhalation every four (4) hours as needed.  nebivolol (BYSTOLIC) 5 mg tablet Take 4 Tabs by mouth daily.  budesonide-formoterol (SYMBICORT) 160-4.5 mcg/actuation HFA inhaler Take 2 Puffs by inhalation two (2) times a day.  Indications: COPD ASSOCIATED WITH CHRONIC BRONCHITIS, EXTRINSIC ASTHMA    montelukast (SINGULAIR) 10 mg tablet Take 1 Tab by mouth daily.  levocetirizine (XYZAL) 5 mg tablet Take 1 Tab by mouth nightly.  tiotropium (SPIRIVA WITH HANDIHALER) 18 mcg inhalation capsule Take 1 Cap by inhalation daily.  glucose blood VI test strips (EASYMAX N) strip Use to test blood sugar 4 times a day    pantoprazole (PROTONIX) 40 mg tablet Take 40 mg by mouth daily.  rosuvastatin (CRESTOR) 40 mg tablet Take 1 Tab by mouth nightly.  EPINEPHrine (EPIPEN) 0.3 mg/0.3 mL injection 0.3 mg by IntraMUSCular route once as needed.  diphenhydrAMINE (BENADRYL) 25 mg capsule Take 25 mg by mouth every six (6) hours as needed.  cholecalciferol, vitamin d3, (VITAMIN D3) 1,000 unit tablet Take 400 Units by mouth daily. 2 CAPS DAILY    ergocalciferol (ERGOCALCIFEROL) 50,000 unit capsule Take 1 capsule by mouth every seven (7) days. No current facility-administered medications for this visit. Vitals:    11/09/17 1321 11/09/17 1335   BP: 122/78 128/80   Pulse: 80    SpO2: 96%    Weight: 224 lb 11.2 oz (101.9 kg)    Height: 5' 8\" (1.727 m)        I have reviewed the nurses notes, vitals, problem list, allergy list, medical history, family, social history and medications. Review of Symptoms:    General: Pt denies excessive weight gain or loss. Pt is able to conduct ADL's  HEENT: Denies blurred vision, headaches, epistaxis and difficulty swallowing. Respiratory: Denies shortness of breath, GAUTAM, wheezing or stridor. Cardiovascular: Denies precordial pain, palpitations, edema or PND  Gastrointestinal: Denies poor appetite, indigestion, abdominal pain or blood in stool  Musculoskeletal: Denies pain or swelling from muscles or joints  Neurologic: Denies tremor, paresthesias, or sensory motor disturbance  Skin: Denies rash, itching or texture change. Physical Exam:      General: Well developed, cooperative, alert in no acute distress, appears states age.   HEENT: Supple, No carotid bruits, no JVD, trach is midline. PERRL, EOM intact  Heart:  Normal S1/S2 negative S3 or S4. Regular, no murmur, gallop or rub.   Respiratory: Clear bilaterally x 4, no wheezing or rales  Abdomen:   Soft, non-tender, no masses, bowel sounds are active.   Extremities:  No edema, normal cap refill, no cyanosis, atraumatic. Neuro: A&Ox3, speech clear, gait stable. Skin: Skin color is normal. No rashes or lesions. Non diaphoretic  Vascular: 2+ pulses symmetric in all extremities    Cardiographics    ECG: sinus ryythm       Assessment:     Assessment:        ICD-10-CM ICD-9-CM    1. Hypercholesteremia E78.00 272.0 AMB POC EKG ROUTINE W/ 12 LEADS, INTER & REP   2. Essential hypertension I10 401.9    3. Uncontrolled type 2 diabetes mellitus with diabetic nephropathy, with long-term current use of insulin ([facility]) E11.21 250.42     E11.65 583.81     Z79.4 V58.67    4. Angina, class III ([facility]) I20.9 413.9 STRESS TEST LEXISCAN/CARDIOLITE   5. Mitral valve prolapse I34.1 424.0 2D ECHO COMPLETE ADULT (TTE) W OR WO CONTR     Orders Placed This Encounter    AMB POC EKG ROUTINE W/ 12 LEADS, INTER & REP     Order Specific Question:   Reason for Exam:     Answer:   routine    LEXISCAN/CARDIOLITE, Clinic Performed     Standing Status:   Future     Standing Expiration Date:   5/9/2018     Order Specific Question:   Reason for Exam:     Answer:   chest pain    2D ECHO COMPLETE ADULT (TTE) W OR WO CONTR     Standing Status:   Future     Standing Expiration Date:   5/9/2018     Order Specific Question:   Reason for Exam:     Answer:   chest pain     Order Specific Question:   Contrast Enhancement (Bubble Study, Definity, Optison) may be used if criteria listed in established evidence-based protocol has been identified. Answer:   Yes       PLAN:    Patient presents with atypical chest pain unlike her GERD symptoms. Has DM, HTN, hyperlipidemia. Father MI in 52's. Will evaluate with pharmacologic stress test as she is unable to walk. Echo to evaluate mitral valve. F/u after all tests.     Jojo Irizarry MD

## 2017-11-09 NOTE — MR AVS SNAPSHOT
Visit Information Date & Time Provider Department Dept. Phone Encounter #  
 11/9/2017  1:30 PM [address], [address] [facility] [phone] [id number] Your Appointments 12/4/2017  4:15 PM  
ROUTINE CARE with Claudis Phalen, MD  
[facility] OFFICE-ANNEX ([facility]) Appt Note: 6 week f/u  
 [address] [facility] [address] [id number] [phone] [address] [address]  
  
    
 12/14/2017 12:10 PM  
Follow Up with Tavo Neumann MD  
[facility]) Appt Note: 3 month f/u  
 [address] [facility] [phone] [address] [facility] Upcoming Health Maintenance Date Due Hepatitis C Screening 1960 FOOT EXAM Q1 3/23/1971 DTaP/Tdap/Td series (1 - Tdap) 3/23/1982 PAP AKA CERVICAL CYTOLOGY 3/23/1982 BREAST CANCER SCRN MAMMOGRAM 3/23/2011 FOBT Q 1 YEAR AGE 50-75 3/23/2011 EYE EXAM RETINAL OR DILATED Q1 3/27/2018 HEMOGLOBIN A1C Q6M 4/19/2018 MICROALBUMIN Q1 10/19/2018 LIPID PANEL Q1 10/19/2018 Allergies as of 11/9/2017  Review Complete On: 10/3/2017 By: Luana Harris RN Severity Noted Reaction Type Reactions Latex  09/05/2014    Hives Other Food High 09/05/2014    Anaphylaxis PEPPERONI, sloppy joes Demerol [Meperidine] High 10/17/2010   Systemic Anaphylaxis Difficulty breathing Iodine High 10/17/2010   Systemic Anaphylaxis Morphine High 10/17/2010   Systemic Other (comments)  
 voilent outbreaks (restraints needed) Nsaids (Non-steroidal Anti-inflammatory Drug)  10/19/2017    Hives Current Immunizations  Reviewed on 10/19/2017 Name Date Influenza Vaccine (Quad) PF 10/19/2017 Pneumococcal Polysaccharide (PPSV-23) 10/19/2016 Not reviewed this visit You Were Diagnosed With   
  
 Codes Comments Hypercholesteremia    -  Primary ICD-10-CM: E78.00 ICD-9-CM: 272.0 Essential hypertension     ICD-10-CM: I10 
ICD-9-CM: 401.9 Uncontrolled type 2 diabetes mellitus with diabetic nephropathy, with long-term current use of insulin (HCC)     ICD-10-CM: E11.21, E11.65, Z79.4 ICD-9-CM: 250.42, 583.81, V58.67 Angina, class III ([facility]     ICD-10-CM: I20.9 ICD-9-CM: 413.9 Vitals BP Pulse Height(growth percentile) Weight(growth percentile) LMP SpO2  
 128/80 80 5' 8\" (1.727 m) 224 lb 11.2 oz (101.9 kg) 01/17/2010 96% BMI OB Status Smoking Status 34.17 kg/m2 Postmenopausal Current Every Day Smoker Vitals History BMI and BSA Data Body Mass Index Body Surface Area  
 34.17 kg/m 2 2.21 m 2 Preferred Pharmacy Pharmacy Name Phone [phone] Your Updated Medication List  
  
   
This list is accurate as of: 11/9/17  2:07 PM.  Always use your most recent med list.  
  
  
  
  
 * albuterol 2.5 mg /3 mL (0.083 %) nebulizer solution Commonly known as:  PROVENTIL VENTOLIN  
by Nebulization route every four (4) hours as needed for Wheezing. * albuterol 90 mcg/actuation inhaler Commonly known as:  PROVENTIL HFA Take 1 Puff by inhalation every four (4) hours as needed. BENADRYL 25 mg capsule Generic drug:  diphenhydrAMINE Take 25 mg by mouth every six (6) hours as needed. budesonide-formoterol 160-4.5 mcg/actuation Hfaa Commonly known as:  SYMBICORT Take 2 Puffs by inhalation two (2) times a day. Indications: COPD ASSOCIATED WITH CHRONIC BRONCHITIS, EXTRINSIC ASTHMA  
  
 cholecalciferol 1,000 unit tablet Commonly known as:  VITAMIN D3 Take 400 Units by mouth daily. 2 CAPS DAILY EPINEPHrine 0.3 mg/0.3 mL injection Commonly known as:  EPIPEN  
0.3 mg by IntraMUSCular route once as needed. ergocalciferol 50,000 unit capsule Commonly known as:  ERGOCALCIFEROL Take 1 capsule by mouth every seven (7) days. glucose blood VI test strips strip Commonly known as:  EasyMax N  
Use to test blood sugar 4 times a day  
  
 levocetirizine 5 mg tablet Commonly known as:  Gatha Levers Take 1 Tab by mouth nightly. montelukast 10 mg tablet Commonly known as:  SINGULAIR Take 1 Tab by mouth daily. multivitamin tablet Commonly known as:  ONE A DAY Take 1 Tab by mouth daily. * nebivolol 5 mg tablet Commonly known as:  BYSTOLIC Take 4 Tabs by mouth daily. * BYSTOLIC 10 mg tablet Generic drug:  nebivolol Take 2 tablets PO every AM  
  
 NovoLIN R 100 unit/mL injection Generic drug:  insulin regular 35-40 units as instructed Omega-3-DHA-EPA-Fish Oil 1,000 mg (120 mg-180 mg) Cap Take  by mouth. Take 2 po daily Oxygen O2 at 2 Liters NC while asleep  
  
 pantoprazole 40 mg tablet Commonly known as:  PROTONIX Take 40 mg by mouth daily. QUEtiapine 50 mg tablet Commonly known as:  SEROquel Take 25 mg by mouth nightly. rosuvastatin 40 mg tablet Commonly known as:  CRESTOR Take 1 Tab by mouth nightly. SURE COMFORT INSULIN SYRINGE 1 mL 30 gauge x 5/16 Syrg Generic drug:  Insulin Syringe-Needle U-100 Use as directed 4 times daily  
  
 tiotropium 18 mcg inhalation capsule Commonly known as:  [address] Take 1 Cap by inhalation daily. TOUJEO SOLOSTAR 300 unit/mL (1.5 mL) Inpn Generic drug:  insulin glargine 65 Units by SubCUTAneous route daily. vilazodone 40 mg Tab tablet Commonly known as:  VIIBRYD Take 1 Tab by mouth daily. * Notice: This list has 4 medication(s) that are the same as other medications prescribed for you. Read the directions carefully, and ask your doctor or other care provider to review them with you. We Performed the Following AMB POC EKG ROUTINE W/ 12 LEADS, INTER & REP [86976 CPT(R)] To-Do List   
 12/08/2017 10:10 AM  
  Appointment with [address] CA 1 at [facility] ([phone]) Shower or bathe using soap and water. Do not use deodorant, powder, perfumes, or lotion the day of your exam.  If your prior mammograms were not performed at [facility] 6 please bring films with you or forward prior images 2 days before your procedure. Check in at registration 15min before your appointment time unless you were instructed to do otherwise. A script is not necessary, but if you have one, please bring it on the day of the mammogram or have it faxed to the department. [facility] [phone] [facility]  [phone] [facility] [address] [place]  [phone] [phone] [address] [phone] [facility]! Dear Gerald Huerfano: 
Thank you for requesting a [facility] account. Our records indicate that you already have an active [facility] account. You can access your account anytime at [email or web address] Did you know that you can access your hospital and ER discharge instructions at any time in [facility]? You can also review all of your test results from your hospital stay or ER visit. Additional Information If you have questions, please visit the Frequently Asked Questions section of the [facility] website at [email or web address] Remember, [facility] is NOT to be used for urgent needs. For medical emergencies, dial 911. Now available from your iPhone and Android! Please provide this summary of care documentation to your next provider. Your primary care clinician is listed as Aidan Room. If you have any questions after today's visit, please call [phone].

## 2017-11-09 NOTE — PROGRESS NOTES
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

## 2017-12-04 ENCOUNTER — OFFICE VISIT (OUTPATIENT)
Dept: FAMILY MEDICINE CLINIC | Age: 57
End: 2017-12-04

## 2017-12-04 VITALS
SYSTOLIC BLOOD PRESSURE: 112 MMHG | RESPIRATION RATE: 16 BRPM | HEIGHT: 68 IN | WEIGHT: 220.8 LBS | OXYGEN SATURATION: 94 % | TEMPERATURE: 97.7 F | DIASTOLIC BLOOD PRESSURE: 54 MMHG | HEART RATE: 84 BPM | BODY MASS INDEX: 33.46 KG/M2

## 2017-12-04 DIAGNOSIS — F17.200 TOBACCO USE DISORDER: Primary | ICD-10-CM

## 2017-12-04 DIAGNOSIS — Z02.89 ENCOUNTER FOR REVIEW OF FORM WITH PATIENT: ICD-10-CM

## 2017-12-04 DIAGNOSIS — F51.01 PRIMARY INSOMNIA: ICD-10-CM

## 2017-12-04 DIAGNOSIS — F32.9 MAJOR DEPRESSION, CHRONIC: ICD-10-CM

## 2017-12-04 PROBLEM — G47.00 INSOMNIA DISORDER: Status: ACTIVE | Noted: 2017-12-04

## 2017-12-04 RX ORDER — VILAZODONE HYDROCHLORIDE 40 MG/1
20 TABLET ORAL DAILY
Qty: 30 TAB | Refills: 3 | Status: SHIPPED | OUTPATIENT
Start: 2017-12-04 | End: 2017-12-28 | Stop reason: SDUPTHER

## 2017-12-04 RX ORDER — PAROXETINE HYDROCHLORIDE 40 MG/1
40 TABLET, FILM COATED ORAL DAILY
Qty: 30 TAB | Refills: 2 | Status: SHIPPED | OUTPATIENT
Start: 2017-12-04 | End: 2017-12-28 | Stop reason: SDUPTHER

## 2017-12-04 RX ORDER — QUETIAPINE FUMARATE 25 MG/1
25 TABLET, FILM COATED ORAL
COMMUNITY
End: 2017-12-28 | Stop reason: SDUPTHER

## 2017-12-04 NOTE — MR AVS SNAPSHOT
Visit Information Date & Time Provider Department Dept. Phone Encounter #  
 12/4/2017  4:15 PM Alexander Pitts MD [address] [place] [address] OFFICE-ANNEX [phone] [id number] Your Appointments 12/13/2017 12:30 PM  
ECHO CARDIOGRAMS 2D with ECHO, Fam  [address] [facility]) Appt Note: Dr. Kasey Lima [[id number]] (Order [id number])55'7\" 244pds,2D ECHO COMPLETE ADULT (TTE) W OR WO CONTR [[id number]] (Order [id number]) [address] [place] [address]  
[phone] [address] [facility]  
  
    
 12/13/2017  1:30 PM  
STRESS TEST with NUCLEAR, [facility] [address]) Appt Note: Dr. Kasey Lima [[id number]] (Order [id number])21'4\" 244pds,2D ECHO COMPLETE ADULT (TTE) W OR WO CONTR [[id number]] (Order [id number]) [address] [place] [address]  
[phone] [address] [facility]  
  
    
 12/14/2017 12:10 PM  
Follow Up with Pinky Lucio MD  
[facility] [address] [place] [address]) Appt Note: 3 month f/u  
 [address] [phone] [address] [facility] Upcoming Health Maintenance Date Due Hepatitis C Screening 1960 FOOT EXAM Q1 3/23/1971 DTaP/Tdap/Td series (1 - Tdap) 3/23/1982 PAP AKA CERVICAL CYTOLOGY 3/23/1982 BREAST CANCER SCRN MAMMOGRAM 3/23/2011 FOBT Q 1 YEAR AGE 50-75 3/23/2011 EYE EXAM RETINAL OR DILATED Q1 3/27/2018 HEMOGLOBIN A1C Q6M 4/19/2018 MICROALBUMIN Q1 10/19/2018 LIPID PANEL Q1 10/19/2018 Allergies as of 12/4/2017  Review Complete On: 12/4/2017 By: Harpreet Raymundo LPN Severity Noted Reaction Type Reactions Latex  09/05/2014    Hives Other Food High 09/05/2014    Anaphylaxis mihai BENNETT Demerol [Meperidine] High 10/17/2010   Systemic Anaphylaxis Difficulty breathing Iodine High 10/17/2010   Systemic Anaphylaxis Morphine High 10/17/2010   Systemic Other (comments)  
 voilent outbreaks (restraints needed) Nsaids (Non-steroidal Anti-inflammatory Drug)  10/19/2017    Hives Current Immunizations  Reviewed on 10/19/2017 Name Date Influenza Vaccine (Quad) PF 10/19/2017 Pneumococcal Polysaccharide (PPSV-23) 10/19/2016 Not reviewed this visit You Were Diagnosed With   
  
 Codes Comments Tobacco use disorder    -  Primary ICD-10-CM: Y60.412 ICD-9-CM: 305.1 Primary insomnia     ICD-10-CM: F51.01 
ICD-9-CM: 307.42 Major depression, chronic     ICD-10-CM: F32.9 ICD-9-CM: 296.20 Encounter for review of form with patient     ICD-10-CM: Z02.89 ICD-9-CM: V68.89 Vitals BP Pulse Temp Resp Height(growth percentile) Weight(growth percentile) 112/54 (BP 1 Location: Left arm, BP Patient Position: Sitting) 84 97.7 °F (36.5 °C) (Oral) 16 5' 8\" (1.727 m) 220 lb 12.8 oz (100.2 kg) LMP SpO2 BMI OB Status Smoking Status 01/17/2010 94% 33.57 kg/m2 Postmenopausal Current Every Day Smoker Vitals History BMI and BSA Data Body Mass Index Body Surface Area  
 33.57 kg/m 2 2.19 m 2 Preferred Pharmacy Pharmacy Name Phone [phone] Your Updated Medication List  
  
   
This list is accurate as of: 12/4/17  5:15 PM.  Always use your most recent med list.  
  
  
  
  
 * albuterol 2.5 mg /3 mL (0.083 %) nebulizer solution Commonly known as:  PROVENTIL VENTOLIN  
by Nebulization route every four (4) hours as needed for Wheezing. * albuterol 90 mcg/actuation inhaler Commonly known as:  PROVENTIL HFA Take 1 Puff by inhalation every four (4) hours as needed. BENADRYL 25 mg capsule Generic drug:  diphenhydrAMINE Take 25 mg by mouth every six (6) hours as needed. budesonide-formoterol 160-4.5 mcg/actuation Hfaa Commonly known as:  SYMBICORT Take 2 Puffs by inhalation two (2) times a day. Indications: COPD ASSOCIATED WITH CHRONIC BRONCHITIS, EXTRINSIC ASTHMA  
  
 cholecalciferol 1,000 unit tablet Commonly known as:  VITAMIN D3 Take 400 Units by mouth daily. 2 CAPS DAILY EPINEPHrine 0.3 mg/0.3 mL injection Commonly known as:  EPIPEN  
0.3 mg by IntraMUSCular route once as needed. ergocalciferol 50,000 unit capsule Commonly known as:  ERGOCALCIFEROL Take 1 capsule by mouth every seven (7) days. glucose blood VI test strips strip Commonly known as:  EasyMax N  
Use to test blood sugar 4 times a day  
  
 levocetirizine 5 mg tablet Commonly known as:  Mabel Gull Take 1 Tab by mouth nightly. montelukast 10 mg tablet Commonly known as:  SINGULAIR Take 1 Tab by mouth daily. multivitamin tablet Commonly known as:  ONE A DAY Take 1 Tab by mouth daily. * nebivolol 5 mg tablet Commonly known as:  BYSTOLIC Take 4 Tabs by mouth daily. * BYSTOLIC 10 mg tablet Generic drug:  nebivolol Take 2 tablets PO every AM  
  
 NovoLIN R 100 unit/mL injection Generic drug:  insulin regular 35-40 units as instructed Omega-3-DHA-EPA-Fish Oil 1,000 mg (120 mg-180 mg) Cap Take  by mouth. Take 2 po daily Oxygen O2 at 2 Liters NC while asleep  
  
 pantoprazole 40 mg tablet Commonly known as:  PROTONIX Take 40 mg by mouth daily. PARoxetine 40 mg tablet Commonly known as:  PAXIL Take 1 Tab by mouth daily. * SEROquel 25 mg tablet Generic drug:  QUEtiapine Take 25 mg by mouth every three (3) days. * QUEtiapine 50 mg tablet Commonly known as:  SEROquel Take 25 mg by mouth nightly. rosuvastatin 40 mg tablet Commonly known as:  CRESTOR Take 1 Tab by mouth nightly. SURE COMFORT INSULIN SYRINGE 1 mL 30 gauge x 5/16 Syrg Generic drug:  Insulin Syringe-Needle U-100 Use as directed 4 times daily  
  
 tiotropium 18 mcg inhalation capsule Commonly known as:  [address] [facility] Connie Dorman Take 1 Cap by inhalation daily. TOUJEO SOLOSTAR 300 unit/mL (1.5 mL) Inpn Generic drug:  insulin glargine 65 Units by SubCUTAneous route daily. vilazodone 40 mg Tab tablet Commonly known as:  VIIBRYD Take 1 Tab by mouth daily. * Notice: This list has 6 medication(s) that are the same as other medications prescribed for you. Read the directions carefully, and ask your doctor or other care provider to review them with you. Prescriptions Sent to Pharmacy Refills PARoxetine (PAXIL) 40 mg tablet 2 Sig: Take 1 Tab by mouth daily. Class: Normal  
 Pharmacy: [address] [place] [address] Ph #: [phone] Route: Oral  
 vilazodone (VIIBRYD) 40 mg tab tablet 3 Sig: Take 1 Tab by mouth daily. Class: Normal  
 Pharmacy: [address] [place] [address] Ph #: [phone] Route: Oral  
  
We Performed the Following REFERRAL TO PSYCHIATRY [REF91 Custom] To-Do List   
 12/08/2017 10:10 AM  
  Appointment with [facility] CA 1 at [facility] ([phone]) Shower or bathe using soap and water. Do not use deodorant, powder, perfumes, or lotion the day of your exam.  If your prior mammograms were not performed at [place] [address] please bring films with you or forward prior images 2 days before your procedure. Check in at registration 15min before your appointment time unless you were instructed to do otherwise. A script is not necessary, but if you have one, please bring it on the day of the mammogram or have it faxed to the department.   [facility] [phone] [facility]  [phone] [facility] [phone] [place]  [phone] [facility] [phone] [facility] [phone] [facility] Referral Information Referral ID Referred By Referred To  
  
 [id number] Horace Gitelman Not Available Visits Status Start Date End Date 1 New Request 12/4/17 12/4/18 If your referral has a status of pending review or denied, additional information will be sent to support the outcome of this decision. Introducing [facility]! Dear Leda Mohamud: 
Thank you for requesting a [employer] account. Our records indicate that you already have an active [employer] account. You can access your account anytime at [email or web address] Did you know that you can access your hospital and ER discharge instructions at any time in [employer]? You can also review all of your test results from your hospital stay or ER visit. Additional Information If you have questions, please visit the Frequently Asked Questions section of the [employer] website at [email or web address] Remember, [employer] is NOT to be used for urgent needs. For medical emergencies, dial 911. Now available from your iPhone and Android! Please provide this summary of care documentation to your next provider. Your primary care clinician is listed as Wilber Tracy. If you have any questions after today's visit, please call [phone].

## 2017-12-04 NOTE — PROGRESS NOTES
Chief Complaint   Patient presents with    Depression     6 week follow up     Not any better ,  Still cries everyday  Requesting DMV form completed    1. Have you been to the ER, urgent care clinic since your last visit? Hospitalized since your last visit? No    2. Have you seen or consulted any other health care providers outside of the [address] [place] [address] since your last visit? Include any pap smears or colon screening.  No

## 2017-12-04 NOTE — PROGRESS NOTES
HISTORY OF PRESENT ILLNESS  Brenda Welsh is a 65 y.o. female. HPI   meds adjustement   Was put on Seroquel for insomnia and viibrid, as well, currently on lower dose so that she wants to comes off, has been on these for many yrs, tried Ambien did not help, trazodone did not help, she takes viibrid daily, no fhx of Bipolar, no schizophrenia, gets irritated, has had 2 psychiatrist told her not be bipolar state, never tested for sleep apnea, not was taken secondary to her meds,       DMV form   Dyspnea on exertion, and Neuropathy cannot walk >1block secondary to the pain      Current Outpatient Prescriptions   Medication Sig Dispense Refill    QUEtiapine (SEROQUEL) 25 mg tablet Take 25 mg by mouth every three (3) days.  SURE COMFORT INSULIN SYRINGE 1 mL 30 gauge x 5/16 syrg Use as directed 4 times daily 120 Syringe 11    BYSTOLIC 10 mg tablet Take 2 tablets PO every AM      albuterol (PROVENTIL VENTOLIN) 2.5 mg /3 mL (0.083 %) nebulizer solution by Nebulization route every four (4) hours as needed for Wheezing.  Oxygen O2 at 2 Liters NC while asleep      multivitamin (ONE A DAY) tablet Take 1 Tab by mouth daily.  Omega-3-DHA-EPA-Fish Oil 1,000 mg (120 mg-180 mg) cap Take  by mouth. Take 2 po daily      TOUJEO SOLOSTAR 300 unit/mL (1.5 mL) inpn 65 Units by SubCUTAneous route daily. 15 Pen 3    NOVOLIN R 100 unit/mL injection 35-40 units as instructed (Patient taking differently: Sliding scale with meals units as instructed) 14 Vial 3    vilazodone (VIIBRYD) 40 mg tab tablet Take 1 Tab by mouth daily. 10 Tab 0    albuterol (PROVENTIL HFA) 90 mcg/actuation inhaler Take 1 Puff by inhalation every four (4) hours as needed. 1 Inhaler 0    budesonide-formoterol (SYMBICORT) 160-4.5 mcg/actuation HFA inhaler Take 2 Puffs by inhalation two (2) times a day.  Indications: COPD ASSOCIATED WITH CHRONIC BRONCHITIS, EXTRINSIC ASTHMA 1 Inhaler 0    montelukast (SINGULAIR) 10 mg tablet Take 1 Tab by mouth daily. 10 Tab 0    levocetirizine (XYZAL) 5 mg tablet Take 1 Tab by mouth nightly. 10 Tab 0    tiotropium (SPIRIVA WITH HANDIHALER) 18 mcg inhalation capsule Take 1 Cap by inhalation daily. 10 Cap 0    glucose blood VI test strips (EASYMAX N) strip Use to test blood sugar 4 times a day 200 Strip 3    pantoprazole (PROTONIX) 40 mg tablet Take 40 mg by mouth daily.  rosuvastatin (CRESTOR) 40 mg tablet Take 1 Tab by mouth nightly. 90 Tab 3    ergocalciferol (ERGOCALCIFEROL) 50,000 unit capsule Take 1 capsule by mouth every seven (7) days. 12 capsule 3    diphenhydrAMINE (BENADRYL) 25 mg capsule Take 25 mg by mouth every six (6) hours as needed.  QUEtiapine (SEROQUEL) 50 mg tablet Take 25 mg by mouth nightly.  nebivolol (BYSTOLIC) 5 mg tablet Take 4 Tabs by mouth daily. (Patient not taking: Reported on 12/4/2017) 30 Tab 0    EPINEPHrine (EPIPEN) 0.3 mg/0.3 mL injection 0.3 mg by IntraMUSCular route once as needed.  cholecalciferol, vitamin d3, (VITAMIN D3) 1,000 unit tablet Take 400 Units by mouth daily.  2 CAPS DAILY       Allergies   Allergen Reactions    Latex Hives    Other Food Anaphylaxis     PEPPERONI, sloppy joes    Demerol [Meperidine] Anaphylaxis     Difficulty breathing    Iodine Anaphylaxis    Morphine Other (comments)     voilent outbreaks (restraints needed)    Nsaids (Non-Steroidal Anti-Inflammatory Drug) Hives     Past Medical History:   Diagnosis Date    Asthma     Asthma     COPD     Depression     Diabetes ([facility] [address]     Diabetes mellitus     Essential hypertension     GERD (gastroesophageal reflux disease)     Headache(784.0)     HX OTHER MEDICAL     acoustic neuroma    Hypercholesterolemia     Hypertension     Ill-defined condition     R ACOUSTIC NEUROMA    Psychiatric problem     anxiety depression    Psychotic disorder     Tobacco use disorder 10/19/2017    Unspecified sleep apnea     NO CPAP-O2 @2L WHEN SLEEPING     Past Surgical History:   Procedure Laterality Date     DELIVERY ONLY      HX GASTRECTOMY  14    lap sleeve gastrectomy by Dr Noe Yusuf HX GYN      2 c-sections    HX HEENT      sinus    HX HEENT      tracheal resection    HX HEENT      tracheal alleratation x 2    HX HEENT      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    HX HEENT      LASER SX-PENG     Family History   Problem Relation Age of Onset    Diabetes Father     Heart Failure Father     Heart Disease Father     Hypertension Father     High Cholesterol Mother     Suicide Brother     Stroke Maternal Grandmother     Cancer Paternal Grandfather     Anesth Problems Neg Hx      Social History   Substance Use Topics    Smoking status: Current Every Day Smoker     Packs/day: 1.50     Years: 12.00    Smokeless tobacco: Never Used    Alcohol use Yes      Comment: ocassional      Lab Results  Component Value Date/Time   WBC 8.5 10/19/2017 10:29 AM   HGB 13.1 10/19/2017 10:29 AM   HCT 39.8 10/19/2017 10:29 AM   PLATELET 322  10:29 AM   MCV 90 10/19/2017 10:29 AM     Lab Results  Component Value Date/Time   TSH 1.320 10/19/2017 10:29 AM   T4, Free 0.9 2014 09:23 AM         Review of Systems   Constitutional: Negative for chills and fever. HENT: Negative for ear pain and nosebleeds. Eyes: Negative for blurred vision, pain and discharge. Respiratory: Negative for shortness of breath. Cardiovascular: Negative for chest pain and leg swelling. Gastrointestinal: Negative for constipation, diarrhea, nausea and vomiting. Genitourinary: Negative for frequency. Musculoskeletal: Negative for joint pain. Skin: Negative for itching and rash. Neurological: Negative for headaches. Psychiatric/Behavioral: Negative for depression. The patient is not nervous/anxious. Physical Exam   Constitutional: She is oriented to person, place, and time. She appears well-developed and well-nourished.    HENT:   Head: Normocephalic and atraumatic. Eyes: Conjunctivae and EOM are normal.   Neck: Normal range of motion. Neck supple. Cardiovascular: Normal rate, regular rhythm and normal heart sounds. No murmur heard. Pulmonary/Chest: Effort normal and breath sounds normal.   Abdominal: Soft. Bowel sounds are normal. She exhibits no distension. Musculoskeletal: Normal range of motion. She exhibits no edema. Neurological: She is alert and oriented to person, place, and time. Skin: No erythema. Psychiatric: Her behavior is normal.   Nursing note and vitals reviewed. ASSESSMENT and PLAN  Diagnoses and all orders for this visit:    1.  Tobacco use disorder  -     REFERRAL TO PSYCHIATRY    2. Primary insomnia  -     REFERRAL TO PSYCHIATRY    3. Major depression, chronic  -     REFERRAL TO PSYCHIATRY    4. Encounter for review of form with patient  -     REFERRAL TO PSYCHIATRY    Depression with anxiety in a stable condition, not suicidal,  in addition Counseling , social support, spiritual belonging, inc physical activity, all offered,  compliance with meds advised, was told to call for any concern

## 2017-12-08 ENCOUNTER — HOSPITAL ENCOUNTER (OUTPATIENT)
Dept: MAMMOGRAPHY | Age: 57
Discharge: HOME | End: 2017-12-08
Attending: FAMILY MEDICINE
Payer: MEDICARE

## 2017-12-08 DIAGNOSIS — Z12.39 SCREENING FOR BREAST CANCER: ICD-10-CM

## 2017-12-08 PROCEDURE — 77067 SCR MAMMO BI INCL CAD: CPT

## 2017-12-13 ENCOUNTER — CLINICAL SUPPORT (OUTPATIENT)
Dept: CARDIOLOGY CLINIC | Age: 57
End: 2017-12-13

## 2017-12-13 DIAGNOSIS — I20.9 ANGINA, CLASS III (HCC): Primary | ICD-10-CM

## 2017-12-13 DIAGNOSIS — I34.1 MITRAL VALVE PROLAPSE: ICD-10-CM

## 2017-12-14 ENCOUNTER — OFFICE VISIT (OUTPATIENT)
Dept: ENDOCRINOLOGY | Age: 57
End: 2017-12-14

## 2017-12-14 VITALS
SYSTOLIC BLOOD PRESSURE: 131 MMHG | HEART RATE: 79 BPM | DIASTOLIC BLOOD PRESSURE: 81 MMHG | WEIGHT: 220.9 LBS | BODY MASS INDEX: 33.48 KG/M2 | HEIGHT: 68 IN

## 2017-12-14 DIAGNOSIS — Z79.4 TYPE 2 DIABETES MELLITUS WITH OTHER NEUROLOGIC COMPLICATION, WITH LONG-TERM CURRENT USE OF INSULIN (HCC): Primary | ICD-10-CM

## 2017-12-14 DIAGNOSIS — E11.49 TYPE 2 DIABETES MELLITUS WITH OTHER NEUROLOGIC COMPLICATION, WITH LONG-TERM CURRENT USE OF INSULIN (HCC): Primary | ICD-10-CM

## 2017-12-14 DIAGNOSIS — I10 ESSENTIAL HYPERTENSION: ICD-10-CM

## 2017-12-14 DIAGNOSIS — E78.5 HYPERLIPIDEMIA, UNSPECIFIED HYPERLIPIDEMIA TYPE: ICD-10-CM

## 2017-12-14 RX ORDER — METFORMIN HYDROCHLORIDE 500 MG/1
500 TABLET, EXTENDED RELEASE ORAL
Qty: 180 TAB | Refills: 3 | Status: SHIPPED | OUTPATIENT
Start: 2017-12-14 | End: 2018-08-29 | Stop reason: SDUPTHER

## 2017-12-14 NOTE — PATIENT INSTRUCTIONS
Start Metformin  BID    Start Victoza  Start by taking 0.6mg once daily for 1 week,   Then 1.2mg daily for 1 week,   Then 1.8mg daily thereafter. Toujeo 65 each morning    Novolin R: 35 units with each meal,    You may need to reduce by 3-5 units every few days after you start metformin and victoza, to make sure your blood sugars do not drop significantly. How quickly your blood sugar starts to come down may determine how quickly you need to reduce your Novolin R.     Plan for a 4 week follow up visit,    Mihaela Cortez  [address] [place] [address] Endocrinology  [address] [place] [address]

## 2017-12-14 NOTE — PROGRESS NOTES
CHIEF COMPLAINT: f/u evaluation for uncontrolled type 2 diabetes    HISTORY OF PRESENT ILLNESS:   Laila Yap is a 65 y.o. female with a PMHx as noted below who presents to the endocrinology clinic for f/u evaluation of uncontrolled type 2 diabetes. Patient notes her sugars are still all over the place. Currently taking the following meds: Toujeo 65 each morning  Novlin R:  Breakfast: 42 units  Lunch: If glucose >180 take 38 units     If glucose <180 take 35 units  Dinner:  If glucose >180 take 40 units    If glucose < 180 take 35 units  Correction Scale  1:25>150    Review of home blood glucose: All blood sugars are highly variable between , occasional 51.      Review of most recent diabetes-related labs:  Lab Results   Component Value Date    HBA1C 8.6 (H) 10/19/2017    HBA1C 8.3 (H) 2014    HBA1C 10.1 (H) 10/18/2010    CHOL 138 10/19/2017    GFRAA 83 10/19/2017    GFRNA 72 10/19/2017    MCACR 30.9 (H) 10/19/2017    TSH 1.320 10/19/2017    VITD3 41.5 2015    [id number] 7.5% 2017         PAST MEDICAL/SURGICAL HISTORY:   Past Medical History:   Diagnosis Date    Asthma     Asthma     COPD     Depression     Diabetes ([address]     Diabetes mellitus     Encounter for review of form with patient 2017    Essential hypertension     GERD (gastroesophageal reflux disease)     Headache(784.0)     HX OTHER MEDICAL     acoustic neuroma    Hypercholesterolemia     Hypertension     Ill-defined condition     R ACOUSTIC NEUROMA    Insomnia disorder 2017    Major depression, chronic 2017    Psychiatric problem     anxiety depression    Psychotic disorder     Tobacco use disorder 10/19/2017    Unspecified sleep apnea     NO CPAP-O2 @2L WHEN SLEEPING     Past Surgical History:   Procedure Laterality Date     DELIVERY ONLY      HX GASTRECTOMY  14    lap sleeve gastrectomy by Dr Ortega Bones HX GYN      2 c-sections    HX HEENT      sinus    HX HEENT      tracheal resection    HX HEENT      tracheal alleratation x 2    HX HEENT      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 , Disp: 30 Tab, Rfl: 2    vilazodone (VIIBRYD) 40 mg tab tablet, Take 1 Tab by mouth daily. , Disp: 30 Tab, Rfl: 3    SURE COMFORT INSULIN SYRINGE 1 mL 30 gauge x 5/16 syrg, Use as directed 4 times daily, Disp: 120 Syringe, Rfl: 11    BYSTOLIC 10 mg tablet, Take 2 tablets PO every AM, Disp: , Rfl:     albuterol (PROVENTIL VENTOLIN) 2.5 mg /3 mL (0.083 %) nebulizer solution, by Nebulization route every four (4) hours as needed for Wheezing., Disp: , Rfl:     Oxygen, O2 at 2 Liters NC while asleep, Disp: , Rfl:     multivitamin (ONE A DAY) tablet, Take 1 Tab by mouth daily. , Disp: , Rfl:     Omega-3-DHA-EPA-Fish Oil 1,000 mg (120 mg-180 mg) cap, Take  by mouth. Take 2 po daily, Disp: , Rfl:     TOUJEO SOLOSTAR 300 unit/mL (1.5 mL) inpn, 65 Units by SubCUTAneous route daily. , Disp: 15 Pen, Rfl: 3    NOVOLIN R 100 unit/mL injection, 35-40 units as instructed (Patient taking differently: Sliding scale with meals units as instructed), Disp: 14 Vial, Rfl: 3    albuterol (PROVENTIL HFA) 90 mcg/actuation inhaler, Take 1 Puff by inhalation every four (4) hours as needed. , Disp: 1 Inhaler, Rfl: 0    budesonide-formoterol (SYMBICORT) 160-4.5 mcg/actuation HFA inhaler, Take 2 Puffs by inhalation two (2) times a day.  Indications: COPD ASSOCIATED WITH CHRONIC BRONCHITIS, EXTRINSIC ASTHMA, Disp: 1 Inhaler, Rfl: 0    montelukast (SINGULAIR) 10 mg tablet, Take 1 Tab by mouth daily. , Disp: 10 Tab, Rfl: 0    levocetirizine (XYZAL) 5 mg tablet, Take 1 Tab by mouth nightly., Disp: 10 Tab, Rfl: 0    tiotropium (SPIRIVA WITH HANDIHALER) 18 mcg inhalation capsule, Take 1 Cap by inhalation daily. , Disp: 10 Cap, Rfl: 0    glucose blood VI test strips (EASYMAX N) strip, Use to test blood sugar 4 times a day, Disp: 200 Strip, Rfl: 3    pantoprazole (PROTONIX) 40 mg tablet, Take 40 mg by mouth daily. , Disp: , Rfl:     rosuvastatin (CRESTOR) 40 mg tablet, Take 1 Tab by mouth nightly., Disp: 90 Tab, Rfl: 3    EPINEPHrine (EPIPEN) 0.3 mg/0.3 mL injection, 0.3 mg by IntraMUSCular route once as needed. , Disp: , Rfl:     ergocalciferol (ERGOCALCIFEROL) 50,000 unit capsule, Take 1 capsule by mouth every seven (7) days. , Disp: 12 capsule, Rfl: 3    diphenhydrAMINE (BENADRYL) 25 mg capsule, Take 25 mg by mouth every six (6) hours as needed. , Disp: , Rfl:     cholecalciferol, vitamin d3, (VITAMIN D3) 1,000 unit tablet, Take 400 Units by mouth daily. 2 CAPS DAILY, Disp: , Rfl:     QUEtiapine (SEROQUEL) 25 mg tablet, Take 25 mg by mouth every three (3) days. , Disp: , Rfl:     QUEtiapine (SEROQUEL) 50 mg tablet, Take 25 mg by mouth nightly., Disp: , Rfl:     SOCIAL HISTORY:   Social History     Social History    Marital status:      Spouse name: N/A    Number of children: N/A    Years of education: N/A     Occupational History    Not on file.      Social History Main Topics    Smoking status: Current Every Day Smoker     Packs/day: 1.50     Years: 12.00    Smokeless tobacco: Never Used    Alcohol use Yes      Comment: ocassional    Drug use: No    Sexual activity: No     Other Topics Concern    Not on file     Social History Narrative       FAMILY HISTORY:  Family History   Problem Relation Age of Onset    Diabetes Father     Heart Failure Father     Heart Disease Father     Hypertension Father    [facility] High Cholesterol Mother     Suicide Brother     Stroke Maternal Grandmother     Cancer Paternal Grandfather     Anesth Problems Neg Hx        REVIEW OF SYSTEMS: Complete ROS assessed and noted for that which is described above, all else are negative. Eyes: normal  ENT: normal  CVS: normal  Resp: normal  GI: normal  : normal  GYN: normal  Endocrine: normal  Integument: normal  Musculoskeletal: normal  Neuro: normal  Psych: normal      PHYSICAL EXAMINATION:    VITAL SIGNS:  Visit Vitals    /81 (BP 1 Location: Left arm, BP Patient Position: Sitting)    Pulse 79    Ht 5' 8\" (1.727 m)    Wt 220 lb 14.4 oz (100.2 kg)    LMP 01/17/2010    BMI 33.59 kg/m2       GENERAL: NCAT, Sitting comfortably, NAD  EYES: EOMI, non-icteric, no proptosis  Ear/Nose/Throat: NCAT, no inflammation, no masses  LYMPH NODES: No LAD  CARDIOVASCULAR: S1 S2, RRR, No murmur, 2+ radial pulses  RESPIRATORY: CTA b/l, no wheeze/rales  GASTROINTESTINAL: ND  MUSCULOSKELETAL: Normal ROM, no atrophy  SKIN: warm, no edema/rash/ or other skin changes  NEUROLOGIC: 5/5 power all extremities, no tremors, AAOx3  PSYCHIATRIC: Normal affect, Normal insight and judgement         REVIEW OF LABORATORY AND RADIOLOGY DATA:   Labs and documentation have been reviewed as described above. ASSESSMENT AND PLAN:   Stephany Shah is a 65 y.o. female with a PMHx as noted above who presents to the endocrinology clinic for evaluation of uncontrolled type 2 diabetes. DM2 uncontrolled  HTN  HLD    Patients blood sugars are all over the place, without any reassuring or recognizable patterns, which include lows in the 50's, and numbers as high as 400-500's. Partly pain and lack of sleep are contributing to insulin resistance which is aggravating her diabetes. At this point, I think its time to start considering a different regimen for her. The variability is mostly due to the Novolin R.  She did admit to using victoza n the past and not tolerating metformin, but I think a retrial on victoza and trial of metformin half dose extended release may be warranted before excluding in her management. If at all possible to get her off prandial insulin, then we can aim at weight loss and further reduction in insulin resistance, as a bigger picture of diabetes management. We can also consider adding on other oral agents that dont result in hypoglycemia as needed, if it may help us rid ourselves of dependency on prandial insulin. She is advised to get better sleep. DM2:  Start Metformin  BID  Start Victoza, titrate to 1.8mg daily  Toujeo 65 each morning  Novolin R: 35 units with each meal,    Reduce by 3-5 units every few days after you start metformin and victoza, to make sure your blood sugars do not drop significantly. Discussed that she should just stop it completely if having regular lows. Continue to check glucose 4x/day  Provided with new log sheets    Neuropathy: pain in feet are not as bad recently  BP: Bystolic 31RW daily  HLD: crestor prev increased to 40mg each evening, continue with fish oil    Labs: urine microalblumin on follow up,    3 month f/u visit. Flori Dubois.  [id number] [facility]

## 2017-12-14 NOTE — MR AVS SNAPSHOT
Visit Information Date & Time Provider Department Dept. Phone Encounter #  
 12/14/2017 12:10 PM Victoria López, [address] [place] [address] Diabetes and Endocrinology [phone] [id number] Follow-up Instructions Return in about 4 weeks (around 1/11/2018). Your Appointments 12/15/2017  2:00 PM  
PROCEDURE with NUCLEAR, [facility]) Appt Note: resting [address]  
[phone] [address]  
  
    
 1/15/2018 12:00 PM  
ROUTINE CARE with Shannon Teixeira MD  
[facility] OFFICE-[facility] ([facility]) Appt Note: [phone]  
[phone] [phone] Upcoming Health Maintenance Date Due Hepatitis C Screening 1960 FOOT EXAM Q1 3/23/1971 DTaP/Tdap/Td series (1 - Tdap) 3/23/1982 PAP AKA CERVICAL CYTOLOGY 3/23/1982 FOBT Q 1 YEAR AGE 50-75 3/23/2011 EYE EXAM RETINAL OR DILATED Q1 3/27/2018 HEMOGLOBIN A1C Q6M 4/19/2018 MICROALBUMIN Q1 10/19/2018 LIPID PANEL Q1 10/19/2018 Allergies as of 12/14/2017  Review Complete On: 12/14/2017 By: Victoria López MD  
  
 Severity Noted Reaction Type Reactions Latex  09/05/2014    Hives Other Food High 09/05/2014    Anaphylaxis PEPPERONI, sloppy joes Demerol [Meperidine] High 10/17/2010   Systemic Anaphylaxis Difficulty breathing Iodine High 10/17/2010   Systemic Anaphylaxis Morphine High 10/17/2010   Systemic Other (comments)  
 voilent outbreaks (restraints needed) Nsaids (Non-steroidal Anti-inflammatory Drug)  10/19/2017    Hives Current Immunizations  Reviewed on 10/19/2017 Name Date Influenza Vaccine (Quad) PF 10/19/2017 Pneumococcal Polysaccharide (PPSV-23) 10/19/2016 Not reviewed this visit You Were Diagnosed With   
  
 Codes Comments Type 2 diabetes mellitus with other neurologic complication, with long-term current use of insulin (HCC)    -  Primary ICD-10-CM: E11.49, Z79.4 ICD-9-CM: 250.60, V58.67 Essential hypertension     ICD-10-CM: I10 
ICD-9-CM: 401.9 Hyperlipidemia, unspecified hyperlipidemia type     ICD-10-CM: E78.5 ICD-9-CM: 272.4 Vitals BP Pulse Height(growth percentile) Weight(growth percentile) LMP BMI  
 131/81 (BP 1 Location: Left arm, BP Patient Position: Sitting) 79 5' 8\" (1.727 m) 220 lb 14.4 oz (100.2 kg) 01/17/2010 33.59 kg/m2 OB Status Smoking Status Postmenopausal Current Every Day Smoker BMI and BSA Data Body Mass Index Body Surface Area  
 33.59 kg/m 2 2.19 m 2 Preferred Pharmacy Pharmacy Name Phone [address] [address] [address] [phone] Your Updated Medication List  
  
   
This list is accurate as of: 12/14/17 12:50 PM.  Always use your most recent med list.  
  
  
  
  
 * albuterol 2.5 mg /3 mL (0.083 %) nebulizer solution Commonly known as:  PROVENTIL VENTOLIN  
by Nebulization route every four (4) hours as needed for Wheezing. * albuterol 90 mcg/actuation inhaler Commonly known as:  PROVENTIL HFA Take 1 Puff by inhalation every four (4) hours as needed. BENADRYL 25 mg capsule Generic drug:  diphenhydrAMINE Take 25 mg by mouth every six (6) hours as needed. budesonide-formoterol 160-4.5 mcg/actuation Hfaa Commonly known as:  SYMBICORT Take 2 Puffs by inhalation two (2) times a day. Indications: COPD ASSOCIATED WITH CHRONIC BRONCHITIS, EXTRINSIC ASTHMA BYSTOLIC 10 mg tablet Generic drug:  nebivolol Take 2 tablets PO every AM  
  
 cholecalciferol 1,000 unit tablet Commonly known as:  VITAMIN D3 Take 400 Units by mouth daily. 2 CAPS DAILY EPINEPHrine 0.3 mg/0.3 mL injection Commonly known as:  Valeriy Che  
 0.3 mg by IntraMUSCular route once as needed. ergocalciferol 50,000 unit capsule Commonly known as:  ERGOCALCIFEROL Take 1 capsule by mouth every seven (7) days. glucose blood VI test strips strip Commonly known as:  EasyMax N  
Use to test blood sugar 4 times a day  
  
 levocetirizine 5 mg tablet Commonly known as:  Magaly Karla Take 1 Tab by mouth nightly. Liraglutide 0.6 mg/0.1 mL (18 mg/3 mL) Pnij Commonly known as:  VICTOZA 3-ESTEPHANIE  
1.8 mg by SubCUTAneous route daily. Titrate up to 1.8mg daily as direcected  
  
 metFORMIN  mg tablet Commonly known as:  GLUCOPHAGE XR Take 1 Tab by mouth Before breakfast and dinner. montelukast 10 mg tablet Commonly known as:  SINGULAIR Take 1 Tab by mouth daily. multivitamin tablet Commonly known as:  ONE A DAY Take 1 Tab by mouth daily. NovoLIN R 100 unit/mL injection Generic drug:  insulin regular 35-40 units as instructed Omega-3-DHA-EPA-Fish Oil 1,000 mg (120 mg-180 mg) Cap Take  by mouth. Take 2 po daily Oxygen O2 at 2 Liters NC while asleep  
  
 pantoprazole 40 mg tablet Commonly known as:  PROTONIX Take 40 mg by mouth daily. PARoxetine 40 mg tablet Commonly known as:  PAXIL Take 1 Tab by mouth daily. * SEROquel 25 mg tablet Generic drug:  QUEtiapine Take 25 mg by mouth every three (3) days. * QUEtiapine 50 mg tablet Commonly known as:  SEROquel Take 25 mg by mouth nightly. rosuvastatin 40 mg tablet Commonly known as:  CRESTOR Take 1 Tab by mouth nightly. SURE COMFORT INSULIN SYRINGE 1 mL 30 gauge x 5/16 Syrg Generic drug:  Insulin Syringe-Needle U-100 Use as directed 4 times daily  
  
 tiotropium 18 mcg inhalation capsule Commonly known as:  [address] Take 1 Cap by inhalation daily. TOUJEO SOLOSTAR 300 unit/mL (1.5 mL) Inpn Generic drug:  insulin glargine 65 Units by SubCUTAneous route daily. vilazodone 40 mg Tab tablet Commonly known as:  VIIBRYD Take 1 Tab by mouth daily. * Notice: This list has 4 medication(s) that are the same as other medications prescribed for you. Read the directions carefully, and ask your doctor or other care provider to review them with you. Prescriptions Sent to Pharmacy Refills Liraglutide (VICTOZA 3-ESTEPHANIE) 0.6 mg/0.1 mL (18 mg/3 mL) pnij 7 Si.8 mg by SubCUTAneous route daily. Titrate up to 1.8mg daily as direcected Class: Normal  
 Pharmacy:  [facility] [place] [address] Ph #: [phone] Route: SubCUTAneous  
 metFORMIN ER (GLUCOPHAGE XR) 500 mg tablet 3 Sig: Take 1 Tab by mouth Before breakfast and dinner. Class: Normal  
 Pharmacy:  [facility] [place] [address] Ph #: [phone] Route: Oral  
  
Follow-up Instructions Return in about 4 weeks (around 2018). Patient Instructions Start Metformin  BID Start Victoza Start by taking 0.6mg once daily for 1 week, Then 1.2mg daily for 1 week, Then 1.8mg daily thereafter. [address] 65 each morning Novolin R: 35 units with each meal, You may need to reduce by 3-5 units every few days after you start metformin and victoza, to make sure your blood sugars do not drop significantly. How quickly your blood sugar starts to come down may determine how quickly you need to reduce your Novolin R.  
 
Plan for a 4 week follow up visit, Bruce Cormier. [address] [place] [address] Diabetes & Endocrinology [address] [place] [address] & HEALTH SERVICES! Dear Jovon Tavares: 
Thank you for requesting a [employer] account. Our records indicate that you already have an active [employer] account. You can access your account anytime at [email or web address] Did you know that you can access your hospital and ER discharge instructions at any time in Klout? You can also review all of your test results from your hospital stay or ER visit. Additional Information If you have questions, please visit the Frequently Asked Questions section of the Klout website at [email or web address] Remember, Klout is NOT to be used for urgent needs. For medical emergencies, dial 911. Now available from your iPhone and Android! Please provide this summary of care documentation to your next provider. Your primary care clinician is listed as Tera Chavis. If you have any questions after today's visit, please call [phone].

## 2017-12-15 ENCOUNTER — CLINICAL SUPPORT (OUTPATIENT)
Dept: CARDIOLOGY CLINIC | Age: 57
End: 2017-12-15

## 2017-12-15 DIAGNOSIS — I20.9 ANGINA, CLASS III (HCC): ICD-10-CM

## 2017-12-28 ENCOUNTER — OFFICE VISIT (OUTPATIENT)
Dept: BEHAVIORAL/MENTAL HEALTH CLINIC | Age: 57
End: 2017-12-28

## 2017-12-28 VITALS
HEART RATE: 92 BPM | WEIGHT: 217 LBS | BODY MASS INDEX: 32.89 KG/M2 | HEIGHT: 68 IN | DIASTOLIC BLOOD PRESSURE: 73 MMHG | SYSTOLIC BLOOD PRESSURE: 142 MMHG

## 2017-12-28 DIAGNOSIS — F31.81 BIPOLAR 2 DISORDER, MAJOR DEPRESSIVE EPISODE (HCC): Primary | ICD-10-CM

## 2017-12-28 DIAGNOSIS — N94.3 PMS (PREMENSTRUAL SYNDROME): ICD-10-CM

## 2017-12-28 DIAGNOSIS — F41.1 ANXIETY, GENERALIZED: ICD-10-CM

## 2017-12-28 DIAGNOSIS — F40.10 SOCIAL ANXIETY DISORDER: ICD-10-CM

## 2017-12-28 PROBLEM — E11.21 TYPE 2 DIABETES MELLITUS WITH NEPHROPATHY (HCC): Status: ACTIVE | Noted: 2017-12-28

## 2017-12-28 PROBLEM — F33.9 RECURRENT DEPRESSION (HCC): Status: ACTIVE | Noted: 2017-12-28

## 2017-12-28 RX ORDER — VILAZODONE HYDROCHLORIDE 20 MG/1
20 TABLET ORAL DAILY
Qty: 25 TAB | Refills: 0 | Status: SHIPPED | OUTPATIENT
Start: 2017-12-28 | End: 2018-02-01

## 2017-12-28 RX ORDER — ARIPIPRAZOLE 2 MG/1
2 TABLET ORAL DAILY
Qty: 30 TAB | Refills: 2 | Status: SHIPPED | OUTPATIENT
Start: 2017-12-28 | End: 2018-03-05 | Stop reason: SDUPTHER

## 2017-12-28 RX ORDER — PAROXETINE HYDROCHLORIDE 20 MG/1
20 TABLET, FILM COATED ORAL DAILY
Qty: 15 TAB | Refills: 0 | Status: SHIPPED | OUTPATIENT
Start: 2017-12-28 | End: 2018-01-23

## 2017-12-28 NOTE — MR AVS SNAPSHOT
Visit Information Date & Time Provider Department Dept. Phone Encounter #  
 12/28/2017  9:00 AM En Ross MD [address] [facility] [phone] [id number] Your Appointments 1/10/2018  3:10 PM  
Follow Up with Hugo Bradley MD  
[facility] [address]) [phone] [address] [place] [address] Road 1/15/2018 12:00 PM  
ROUTINE CARE with Hiral Moreland MD  
[facility] OFFICE-[facility] ([address]) Appt Note: [phone] [phone] Upcoming Health Maintenance Date Due Hepatitis C Screening 1960 FOOT EXAM Q1 3/23/1971 DTaP/Tdap/Td series (1 - Tdap) 3/23/1982 PAP AKA CERVICAL CYTOLOGY 3/23/1982 FOBT Q 1 YEAR AGE 50-75 3/23/2011 EYE EXAM RETINAL OR DILATED Q1 3/27/2018 HEMOGLOBIN A1C Q6M 4/19/2018 MICROALBUMIN Q1 10/19/2018 LIPID PANEL Q1 10/19/2018 Allergies as of 12/28/2017  Review Complete On: 12/28/2017 By: Angela Christensen Severity Noted Reaction Type Reactions Latex  09/05/2014    Hives Other Food High 09/05/2014    Anaphylaxis PEPPERONI, sloppy joes Demerol [Meperidine] High 10/17/2010   Systemic Anaphylaxis Difficulty breathing Iodine High 10/17/2010   Systemic Anaphylaxis Morphine High 10/17/2010   Systemic Other (comments)  
 voilent outbreaks (restraints needed) Nsaids (Non-steroidal Anti-inflammatory Drug)  10/19/2017    Hives Current Immunizations  Reviewed on 10/19/2017 Name Date Influenza Vaccine (Quad) PF 10/19/2017 Pneumococcal Polysaccharide (PPSV-23) 10/19/2016 Not reviewed this visit Vitals BP Pulse Height(growth percentile) Weight(growth percentile) LMP BMI 142/73 92 5' 8\" (1.727 m) 217 lb (98.4 kg) 01/17/2010 32.99 kg/m2 OB Status Smoking Status Postmenopausal Current Every Day Smoker BMI and BSA Data Body Mass Index Body Surface Area  
 32.99 kg/m 2 2.17 m 2 Preferred Pharmacy Pharmacy Name Phone [address] [address] [address] [phone] Your Updated Medication List  
  
   
This list is accurate as of: 12/28/17 10:29 AM.  Always use your most recent med list.  
  
  
  
  
 * albuterol 2.5 mg /3 mL (0.083 %) nebulizer solution Commonly known as:  PROVENTIL VENTOLIN  
by Nebulization route every four (4) hours as needed for Wheezing. * albuterol 90 mcg/actuation inhaler Commonly known as:  PROVENTIL HFA Take 1 Puff by inhalation every four (4) hours as needed. ARIPiprazole 2 mg tablet Commonly known as:  ABILIFY Take 1 Tab by mouth daily. Indications: DEPRESSION TREATMENT ADJUNCT  
  
 BENADRYL 25 mg capsule Generic drug:  diphenhydrAMINE Take 25 mg by mouth every six (6) hours as needed. budesonide-formoterol 160-4.5 mcg/actuation Hfaa Commonly known as:  SYMBICORT Take 2 Puffs by inhalation two (2) times a day. Indications: COPD ASSOCIATED WITH CHRONIC BRONCHITIS, EXTRINSIC ASTHMA BYSTOLIC 10 mg tablet Generic drug:  nebivolol Take 2 tablets PO every AM  
  
 cholecalciferol 1,000 unit tablet Commonly known as:  VITAMIN D3 Take 400 Units by mouth daily. 2 CAPS DAILY EPINEPHrine 0.3 mg/0.3 mL injection Commonly known as:  EPIPEN  
0.3 mg by IntraMUSCular route once as needed. ergocalciferol 50,000 unit capsule Commonly known as:  ERGOCALCIFEROL Take 1 capsule by mouth every seven (7) days. glucose blood VI test strips strip Commonly known as:  EasyMax N  
Use to test blood sugar 4 times a day  
  
 levocetirizine 5 mg tablet Commonly known as:  Christophe Vernon Take 1 Tab by mouth nightly. Liraglutide 0.6 mg/0.1 mL (18 mg/3 mL) Pnij Commonly known as:  VICTOZA 3-ESTEPHANIE  
1.8 mg by SubCUTAneous route daily. Titrate up to 1.8mg daily as direcected  
  
 metFORMIN  mg tablet Commonly known as:  GLUCOPHAGE XR Take 1 Tab by mouth Before breakfast and dinner. montelukast 10 mg tablet Commonly known as:  SINGULAIR Take 1 Tab by mouth daily. multivitamin tablet Commonly known as:  ONE A DAY Take 1 Tab by mouth daily. NovoLIN R 100 unit/mL injection Generic drug:  insulin regular 35-40 units as instructed Omega-3-DHA-EPA-Fish Oil 1,000 mg (120 mg-180 mg) Cap Take  by mouth. Take 2 po daily Oxygen O2 at 2 Liters NC while asleep  
  
 pantoprazole 40 mg tablet Commonly known as:  PROTONIX Take 40 mg by mouth daily. PARoxetine 20 mg tablet Commonly known as:  PAXIL Take 1 Tab by mouth daily. Indications: DEPRESSION ASSOCIATED WITH BIPOLAR DISORDER, Generalized Anxiety Disorder QUEtiapine 50 mg tablet Commonly known as:  SEROquel Take 25 mg by mouth nightly. rosuvastatin 40 mg tablet Commonly known as:  CRESTOR Take 1 Tab by mouth nightly. SURE COMFORT INSULIN SYRINGE 1 mL 30 gauge x 5/16 Syrg Generic drug:  Insulin Syringe-Needle U-100 Use as directed 4 times daily  
  
 tiotropium 18 mcg inhalation capsule Commonly known as:  [address] Take 1 Cap by inhalation daily. TOUJEO SOLOSTAR 300 unit/mL (1.5 mL) Inpn Generic drug:  insulin glargine 65 Units by SubCUTAneous route daily. vilazodone 20 mg Tab tablet Commonly known as:  VIIBRYD Take 1 Tab by mouth daily. Indications: major depressive disorder * Notice: This list has 2 medication(s) that are the same as other medications prescribed for you. Read the directions carefully, and ask your doctor or other care provider to review them with you. Prescriptions Sent to Pharmacy Refills PARoxetine (PAXIL) 20 mg tablet 0 Sig: Take 1 Tab by mouth daily. Indications: DEPRESSION ASSOCIATED WITH BIPOLAR DISORDER, Generalized Anxiety Disorder Class: Normal  
 Pharmacy: [facility] Ph #: [phone] Route: Oral  
 vilazodone (VIIBRYD) 20 mg tab tablet 0 Sig: Take 1 Tab by mouth daily. Indications: major depressive disorder Class: Normal  
 Pharmacy: [facility] Ph #: [phone] Route: Oral  
 ARIPiprazole (ABILIFY) 2 mg tablet 2 Sig: Take 1 Tab by mouth daily. Indications: DEPRESSION TREATMENT ADJUNCT Class: Normal  
 Pharmacy: [facility] Ph #: [phone] Route: Oral  
  
Introducing [facility]! Dear Sade Miranda: 
Thank you for requesting a [employer] account. Our records indicate that you already have an active [employer] account. You can access your account anytime at [email or web address] Did you know that you can access your hospital and ER discharge instructions at any time in Recommendo? You can also review all of your test results from your hospital stay or ER visit. Additional Information If you have questions, please visit the Frequently Asked Questions section of the [employer] website at [email or web address] Remember, Recommendo is NOT to be used for urgent needs. For medical emergencies, dial 911. Now available from your iPhone and Android! Please provide this summary of care documentation to your next provider. Your primary care clinician is listed as Gabriela Goodman. If you have any questions after today's visit, please call [phone].
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
## 2018-01-09 DIAGNOSIS — I10 ESSENTIAL HYPERTENSION: ICD-10-CM

## 2018-01-09 DIAGNOSIS — E78.00 HYPERCHOLESTEREMIA: ICD-10-CM

## 2018-01-09 DIAGNOSIS — Z23 ENCOUNTER FOR IMMUNIZATION: ICD-10-CM

## 2018-01-09 DIAGNOSIS — E11.9 DIABETES MELLITUS WITHOUT COMPLICATION (HCC): ICD-10-CM

## 2018-01-10 ENCOUNTER — OFFICE VISIT (OUTPATIENT)
Dept: ENDOCRINOLOGY | Age: 58
End: 2018-01-10

## 2018-01-10 VITALS
HEART RATE: 92 BPM | WEIGHT: 211.7 LBS | DIASTOLIC BLOOD PRESSURE: 109 MMHG | BODY MASS INDEX: 32.09 KG/M2 | SYSTOLIC BLOOD PRESSURE: 156 MMHG | HEIGHT: 68 IN

## 2018-01-10 DIAGNOSIS — Z79.4 TYPE 2 DIABETES MELLITUS WITH OTHER NEUROLOGIC COMPLICATION, WITH LONG-TERM CURRENT USE OF INSULIN (HCC): Primary | ICD-10-CM

## 2018-01-10 DIAGNOSIS — I10 ESSENTIAL HYPERTENSION: ICD-10-CM

## 2018-01-10 DIAGNOSIS — E11.49 TYPE 2 DIABETES MELLITUS WITH OTHER NEUROLOGIC COMPLICATION, WITH LONG-TERM CURRENT USE OF INSULIN (HCC): Primary | ICD-10-CM

## 2018-01-10 DIAGNOSIS — E78.5 HYPERLIPIDEMIA, UNSPECIFIED HYPERLIPIDEMIA TYPE: ICD-10-CM

## 2018-01-10 LAB — HBA1C MFR BLD HPLC: 7.5 %

## 2018-01-10 NOTE — PATIENT INSTRUCTIONS
Restart Metformin ER 250mg BID  Continue Victoza, titrate to 1.8mg daily  Toujeo 65 each morning  STOP Novolin R    ----------------------------------------------------------------------------------------------------------------------    Below you will find a glucose log sheet which you can use to record your blood sugars. Without checking and recording what your home glucose levels are, it will be difficult to make any changes to your medication dose, even when significant changes may be needed. Please feel free to use the log below to record your home glucose levels. At the very least, I would like for you to login the entire 2-3 weeks just before your visit so we can make your visit much more productive and beneficial to you. GLUCOSE LOG SHEET:    Date Breakfast Lunch Dinner Bedtime Comments ? GLUCOSE LOG SHEET:    Date Breakfast Lunch Dinner Bedtime Comments ? GLUCOSE LOG SHEET:    Date Breakfast Lunch Dinner Bedtime Comments ? Dr. Pk Noonan to basics:    When you have diabetes or pre-diabetes, as you know, your body has difficulty dealing with the sugar it absorbs from your meals. An unrelated but very relevant term you may have heard before, quantitative easing, has a new meaning:  By gradually reducing the load of sugars entering the body, you ease the stress on the body and allow it to catch up with the work it must do. This in turn will allow your body to work better. On top of this, remember one point, the more sugar stress your body has, the more you enter a state of glucose toxicity and this is a state where you become even more resistant. Thats right higher sugar = more resistance, not only to your own bodies attempt to fix the problem but also resistance to your medications. This is why medications work best when a proper diet is followed. Tip 1. Dont forget the protein. When you add a portion of meat or other low carb protein food in your meal, it provides healthy calories which contribute to reducing that feeling of hunger that drives you to eat what you dont want. Tip 2. Portions are a real thing. Before you eat, stop and look at your plate/table. Count how many items have sugars/carbs in them. A sandwich (bread) ? Sissy Sybil ? Sweet drink ? Potatoe ? Pasta ? Rice ? You would be surprised when you become aware. Aim for a reasonable portion of carbs, and if you feel you absolutely cannot do this, at least start working toward this. 45-60 grams is usually more than enough in one meal. And YES, than includes the desert! Tip 3. Three meals per day, snack-free in between! Your body needs a break. Eating an adequate meal keeps you from getting hungry and reaching for a snack in between meals. Recall that most of the time, diabetic patients are not treating these snacks. Dont eat dinner late at night, but rather allow more overnight fasting time for your body to recover. If you eat dinner at 8 PM, try 6 PM.  Sporadic eating is the opposite of what you need, and adjusting to a regular eating schedule such as this will not only be a great benefit to your body, but your medications will also tend to work better at keeping your diabetes under control. Tip 4.  There is no best diet when it comes to weight loss. When comparing diets and outcomes, the main ingredient when searching for weight loss was calories ! Lower calories = better weight loss. Pick a healthy diet thats right for you, i.e. diabetes friendly, and evaluate your daily calorie intake. And of course this would be of no use without exercise. Calories in need calories out.

## 2018-01-10 NOTE — PROGRESS NOTES
CHIEF COMPLAINT: f/u evaluation for uncontrolled type 2 diabetes    HISTORY OF PRESENT ILLNESS:   Jonn Rodríguez is a 65 y.o. female with a PMHx as noted below who presents to the endocrinology clinic for f/u evaluation of uncontrolled type 2 diabetes. A1c today is 7.5%, down from 8.6%,    Currently taking the following meds:  Metformin  BID, has some gi discomfort with this  Victoza 1.8mg daily  Toujeo 65 each morning  Novolin R:  Mostly has not been taking after starting victoza    Review of home blood glucose:   AM   Lunch 130-150  Dinner , 63 x 1  Bedtime     Review of most recent diabetes-related labs:  Lab Results   Component Value Date    HBA1C 8.6 (H) 10/19/2017    HBA1C 8.3 (H) 2014    HBA1C 10.1 (H) 10/18/2010    CHOL 138 10/19/2017    GFRAA 83 10/19/2017    GFRNA 72 10/19/2017    MCACR 30.9 (H) 10/19/2017    TSH 1.320 10/19/2017    VITD3 41.5 2015    HCZ8OTIT 7.5% 2017       PAST MEDICAL/SURGICAL HISTORY:   Past Medical History:   Diagnosis Date    Asthma     Asthma     COPD     Depression     Diabetes ([facility] [address]     Diabetes mellitus     Encounter for review of form with patient 2017    Essential hypertension     GERD (gastroesophageal reflux disease)     Headache(784.0)     HX OTHER MEDICAL     acoustic neuroma    Hypercholesterolemia     Hypertension     Ill-defined condition     R ACOUSTIC NEUROMA    Insomnia disorder 2017    Major depression, chronic 2017    Psychiatric problem     anxiety depression    Psychotic disorder     Tobacco use disorder 10/19/2017    Unspecified sleep apnea     NO CPAP-O2 @2L WHEN SLEEPING     Past Surgical History:   Procedure Laterality Date     DELIVERY ONLY      HX GASTRECTOMY  14    lap sleeve gastrectomy by Dr Lopez More HX GYN      2 c-sections    HX HEENT      sinus    HX HEENT      tracheal resection    HX HEENT      tracheal alleratation x 2    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 Indications: DEPRESSION TREATMENT ADJUNCT, Disp: 30 Tab, Rfl: 2    Liraglutide (VICTOZA 3-ESTEPHANIE) 0.6 mg/0.1 mL (18 mg/3 mL) pnij, 1.8 mg by SubCUTAneous route daily. Titrate up to 1.8mg daily as direcected, Disp: 3 Pen, Rfl: 7    SURE COMFORT INSULIN SYRINGE 1 mL 30 gauge x 5/16 syrg, Use as directed 4 times daily, Disp: 120 Syringe, Rfl: 11    BYSTOLIC 10 mg tablet, Take 2 tablets PO every AM, Disp: , Rfl:     QUEtiapine (SEROQUEL) 50 mg tablet, Take 50 mg by mouth nightly., Disp: , Rfl:     albuterol (PROVENTIL VENTOLIN) 2.5 mg /3 mL (0.083 %) nebulizer solution, by Nebulization route every four (4) hours as needed for Wheezing., Disp: , Rfl:     Oxygen, O2 at 2 Liters NC while asleep, Disp: , Rfl:     multivitamin (ONE A DAY) tablet, Take 1 Tab by mouth daily. , Disp: , Rfl:     Omega-3-DHA-EPA-Fish Oil 1,000 mg (120 mg-180 mg) cap, Take  by mouth. Take 2 po daily, Disp: , Rfl:     TOUJEO SOLOSTAR 300 unit/mL (1.5 mL) inpn, 65 Units by SubCUTAneous route daily. , Disp: 15 Pen, Rfl: 3    albuterol (PROVENTIL HFA) 90 mcg/actuation inhaler, Take 1 Puff by inhalation every four (4) hours as needed. , Disp: 1 Inhaler, Rfl: 0    budesonide-formoterol (SYMBICORT) 160-4.5 mcg/actuation HFA inhaler, Take 2 Puffs by inhalation two (2) times a day.  Indications: COPD ASSOCIATED WITH CHRONIC BRONCHITIS, EXTRINSIC ASTHMA, Disp: 1 Inhaler, Rfl: 0    montelukast (SINGULAIR) 10 mg tablet, Take 1 Tab by mouth daily. , Disp: 10 Tab, Rfl: 0    levocetirizine (XYZAL) 5 mg tablet, Take 1 Tab by mouth nightly., Disp: 10 Tab, Rfl: 0    tiotropium (SPIRIVA WITH HANDIHALER) 18 mcg inhalation capsule, Take 1 Cap by inhalation daily. , Disp: 10 Cap, Rfl: 0    glucose blood VI test strips (EASYMAX N) strip, Use to test blood sugar 4 times a day, Disp: 200 Strip, Rfl: 3    pantoprazole (PROTONIX) 40 mg tablet, Take 40 mg by mouth daily. , Disp: , Rfl:     rosuvastatin (CRESTOR) 40 mg tablet, Take 1 Tab by mouth nightly., Disp: 90 Tab, Rfl: 3    ergocalciferol (ERGOCALCIFEROL) 50,000 unit capsule, Take 1 capsule by mouth every seven (7) days. , Disp: 12 capsule, Rfl: 3    cholecalciferol, vitamin d3, (VITAMIN D3) 1,000 unit tablet, Take 400 Units by mouth daily. 2 CAPS DAILY, Disp: , Rfl:     PARoxetine (PAXIL) 20 mg tablet, Take 1 Tab by mouth daily. Indications: DEPRESSION ASSOCIATED WITH BIPOLAR DISORDER, Generalized Anxiety Disorder, Disp: 15 Tab, Rfl: 0    vilazodone (VIIBRYD) 20 mg tab tablet, Take 1 Tab by mouth daily. Indications: major depressive disorder, Disp: 25 Tab, Rfl: 0    metFORMIN ER (GLUCOPHAGE XR) 500 mg tablet, Take 1 Tab by mouth Before breakfast and dinner., Disp: 180 Tab, Rfl: 3    NOVOLIN R 100 unit/mL injection, 35-40 units as instructed (Patient taking differently: Sliding scale with meals units as instructed), Disp: 14 Vial, Rfl: 3    EPINEPHrine (EPIPEN) 0.3 mg/0.3 mL injection, 0.3 mg by IntraMUSCular route once as needed. , Disp: , Rfl:     diphenhydrAMINE (BENADRYL) 25 mg capsule, Take 25 mg by mouth every six (6) hours as needed. , Disp: , Rfl:     SOCIAL HISTORY:   Social History     Social History    Marital status:      Spouse name: N/A    Number of children: N/A    Years of education: N/A     Occupational History    Not on file.      Social History Main Topics    Smoking status: Current Every Day Smoker     Packs/day: 1.50     Years: 12.00    Smokeless tobacco: Never Used    Alcohol use Yes      Comment: ocassional    Drug use: No    Sexual activity: No     Other Topics Concern    Not on file     Social History Narrative       FAMILY HISTORY:  Family History   Problem Relation Age of Onset    Diabetes Father     Heart Failure Father     Heart Disease Father     Hypertension Father     High Cholesterol Mother     Suicide Brother     Stroke Maternal Grandmother     Cancer Paternal Grandfather     Anesth Problems Neg Hx        REVIEW OF SYSTEMS: Complete ROS assessed and noted for that which is described above, all else are negative.   Eyes: normal  ENT: normal  CVS: normal  Resp: normal  GI: normal  : normal  GYN: normal  Endocrine: normal  Integument: normal  Musculoskeletal: normal  Neuro: normal  Psych: normal      PHYSICAL EXAMINATION:    VITAL SIGNS:  Visit Vitals    BP (!) 156/109 (BP 1 Location: Right arm, BP Patient Position: Sitting)    Pulse 92    Ht 5' 8\" (1.727 m)    Wt 211 lb 11.2 oz (96 kg)    LMP 01/17/2010    BMI 32.19 kg/m2       GENERAL: NCAT, Sitting comfortably, NAD  EYES: EOMI, non-icteric, no proptosis  Ear/Nose/Throat: NCAT, no inflammation, no masses  LYMPH NODES: No LAD  CARDIOVASCULAR: S1 S2, RRR, No murmur, 2+ radial pulses  RESPIRATORY: CTA b/l, no wheeze/rales  GASTROINTESTINAL: ND  MUSCULOSKELETAL: Normal ROM, no atrophy  SKIN: warm, no edema/rash/ or other skin changes  NEUROLOGIC: 5/5 power all extremities, no tremors, AAOx3  PSYCHIATRIC: Normal affect, Normal insight and judgement            Diabetic foot exam performed by Noah Brennan MD     Measurement  Response Nurse Comment Physician Comment   Monofilament  R - normal sensation with micro filament  L - normal sensation with micro filament     Pulse DP R - 2+ (normal)  L - 2+ (normal)     Vibration R - Normal    L - Normal     Structural deformity R - None  L - None     Skin Integrity / Deformity R - Mild - callus  L - Mild - callus Reviewed by:          REVIEW OF LABORATORY AND RADIOLOGY DATA:   Labs and documentation have been reviewed as described above. ASSESSMENT AND PLAN:   Fior Almonte is a 65 y.o. female with a PMHx as noted above who presents to the endocrinology clinic for evaluation of uncontrolled type 2 diabetes. DM2 uncontrolled  HTN  HLD    Patient has been able to come down on her insulin by just over 100 units, after having started victoza. This is very promising, and also having lost 15 pounds since then. DM2:  Restart Metformin ER 250mg BID  Continue Victoza, titrate to 1.8mg daily  Toujeo 65 each morning  STOP Novolin R  Continue to check glucose 4x/day  Provided with new log sheets    Neuropathy: stable  BP: Bystolic 68NA daily  HLD: crestor  40mg each evening, fish oil    Labs: urine microalblumin on follow up,    3 month f/u visit. Deepa Pompa.  [address] [facility]

## 2018-01-10 NOTE — MR AVS SNAPSHOT
Visit Information Date & Time Provider Department Dept. Phone Encounter #  
 1/10/2018  3:10 PM Coretta Henriquez, [address] [facility] [phone] [id number] Follow-up Instructions Return in about 3 months (around 4/10/2018). Your Appointments 1/15/2018 12:00 PM  
ROUTINE CARE with Sosa Whatley MD  
[facility] Appt Note: [phone]  
[phone] [phone]  
  
    
 2/1/2018 10:30 AM  
ESTABLISHED PATIENT with Nuno Larson MD  
[address] [facility]) Appt Note: 5 week f/u  
 [address] [id number]  
[address] [place] [address] [address] [facility] Upcoming Health Maintenance Date Due Hepatitis C Screening 1960 FOOT EXAM Q1 3/23/1971 DTaP/Tdap/Td series (1 - Tdap) 3/23/1982 PAP AKA CERVICAL CYTOLOGY 3/23/1982 FOBT Q 1 YEAR AGE 50-75 3/23/2011 EYE EXAM RETINAL OR DILATED Q1 3/27/2018 HEMOGLOBIN A1C Q6M 4/19/2018 MICROALBUMIN Q1 10/19/2018 LIPID PANEL Q1 10/19/2018 BREAST CANCER SCRN MAMMOGRAM 12/8/2019 Allergies as of 1/10/2018  Review Complete On: 1/10/2018 By: Coretta Henriquez MD  
  
 Severity Noted Reaction Type Reactions Latex  09/05/2014    Hives Other Food High 09/05/2014    Anaphylaxis PEPPERONI, sloppy joes Demerol [Meperidine] High 10/17/2010   Systemic Anaphylaxis Difficulty breathing Iodine High 10/17/2010   Systemic Anaphylaxis Morphine High 10/17/2010   Systemic Other (comments)  
 voilent outbreaks (restraints needed) Nsaids (Non-steroidal Anti-inflammatory Drug)  10/19/2017    Hives Current Immunizations  Reviewed on 10/19/2017 Name Date Influenza Vaccine (Quad) PF 10/19/2017 Pneumococcal Polysaccharide (PPSV-23) 10/19/2016 Not reviewed this visit You Were Diagnosed With   
  
 Codes Comments Type 2 diabetes mellitus with other neurologic complication, with long-term current use of insulin (HCC)    -  Primary ICD-10-CM: E11.49, Z79.4 ICD-9-CM: 250.60, V58.67 Essential hypertension     ICD-10-CM: I10 
ICD-9-CM: 401.9 Hyperlipidemia, unspecified hyperlipidemia type     ICD-10-CM: E78.5 ICD-9-CM: 272.4 Vitals BP Pulse Height(growth percentile) Weight(growth percentile) LMP BMI  
 (!) 156/109 (BP 1 Location: Right arm, BP Patient Position: Sitting) 92 5' 8\" (1.727 m) 211 lb 11.2 oz (96 kg) 01/17/2010 32.19 kg/m2 OB Status Smoking Status Postmenopausal Current Every Day Smoker Vitals History BMI and BSA Data Body Mass Index Body Surface Area  
 32.19 kg/m 2 2.15 m 2 Preferred Pharmacy Pharmacy Name Phone [phone] Your Updated Medication List  
  
   
This list is accurate as of: 1/10/18  3:57 PM.  Always use your most recent med list.  
  
  
  
  
 * albuterol 2.5 mg /3 mL (0.083 %) nebulizer solution Commonly known as:  PROVENTIL VENTOLIN  
by Nebulization route every four (4) hours as needed for Wheezing. * albuterol 90 mcg/actuation inhaler Commonly known as:  PROVENTIL HFA Take 1 Puff by inhalation every four (4) hours as needed. ARIPiprazole 2 mg tablet Commonly known as:  ABILIFY Take 1 Tab by mouth daily. Indications: DEPRESSION TREATMENT ADJUNCT  
  
 BENADRYL 25 mg capsule Generic drug:  diphenhydrAMINE Take 25 mg by mouth every six (6) hours as needed. budesonide-formoterol 160-4.5 mcg/actuation Hfaa Commonly known as:  SYMBICORT Take 2 Puffs by inhalation two (2) times a day. Indications: COPD ASSOCIATED WITH CHRONIC BRONCHITIS, EXTRINSIC ASTHMA BYSTOLIC 10 mg tablet Generic drug:  nebivolol Take 2 tablets PO every AM  
  
 cholecalciferol 1,000 unit tablet Commonly known as:  VITAMIN D3 Take 400 Units by mouth daily. 2 CAPS DAILY EPINEPHrine 0.3 mg/0.3 mL injection Commonly known as:  EPIPEN  
0.3 mg by IntraMUSCular route once as needed. ergocalciferol 50,000 unit capsule Commonly known as:  ERGOCALCIFEROL Take 1 capsule by mouth every seven (7) days. glucose blood VI test strips strip Commonly known as:  EasyMax N  
Use to test blood sugar 4 times a day  
  
 levocetirizine 5 mg tablet Commonly known as:  Yenni Umesh Take 1 Tab by mouth nightly. Liraglutide 0.6 mg/0.1 mL (18 mg/3 mL) Pnij Commonly known as:  VICTOZA 3-ESTEPHANIE  
1.8 mg by SubCUTAneous route daily. Titrate up to 1.8mg daily as direcected  
  
 metFORMIN  mg tablet Commonly known as:  GLUCOPHAGE XR Take 1 Tab by mouth Before breakfast and dinner. montelukast 10 mg tablet Commonly known as:  SINGULAIR Take 1 Tab by mouth daily. multivitamin tablet Commonly known as:  ONE A DAY Take 1 Tab by mouth daily. NovoLIN R 100 unit/mL injection Generic drug:  insulin regular 35-40 units as instructed Omega-3-DHA-EPA-Fish Oil 1,000 mg (120 mg-180 mg) Cap Take  by mouth. Take 2 po daily Oxygen O2 at 2 Liters NC while asleep  
  
 pantoprazole 40 mg tablet Commonly known as:  PROTONIX Take 40 mg by mouth daily. PARoxetine 20 mg tablet Commonly known as:  PAXIL Take 1 Tab by mouth daily. Indications: DEPRESSION ASSOCIATED WITH BIPOLAR DISORDER, Generalized Anxiety Disorder QUEtiapine 50 mg tablet Commonly known as:  SEROquel Take 50 mg by mouth nightly. rosuvastatin 40 mg tablet Commonly known as:  CRESTOR Take 1 Tab by mouth nightly. SURE COMFORT INSULIN SYRINGE 1 mL 30 gauge x 5/16 Syrg Generic drug:  Insulin Syringe-Needle U-100 Use as directed 4 times daily tiotropium 18 mcg inhalation capsule Commonly known as:  [address] Take 1 Cap by inhalation daily. TOUJEO SOLOSTAR 300 unit/mL (1.5 mL) Inpn Generic drug:  insulin glargine 65 Units by SubCUTAneous route daily. vilazodone 20 mg Tab tablet Commonly known as:  VIIBRYD Take 1 Tab by mouth daily. Indications: major depressive disorder * Notice: This list has 2 medication(s) that are the same as other medications prescribed for you. Read the directions carefully, and ask your doctor or other care provider to review them with you. We Performed the Following AMB POC HEMOGLOBIN A1C [31595 CPT(R)]  DIABETES FOOT EXAM [7 Custom] Follow-up Instructions Return in about 3 months (around 4/10/2018). Patient Instructions Restart Metformin ER 250mg BID Continue Victoza, titrate to 1.8mg daily Toujeo 65 each morning STOP Novolin R 
 
---------------------------------------------------------------------------------------------------------------------- Below you will find a glucose log sheet which you can use to record your blood sugars. Without checking and recording what your home glucose levels are, it will be difficult to make any changes to your medication dose, even when significant changes may be needed. Please feel free to use the log below to record your home glucose levels. At the very least, I would like for you to login the entire 2-3 weeks just before your visit so we can make your visit much more productive and beneficial to you. GLUCOSE LOG SHEET: 
 
Date Breakfast Lunch Dinner Bedtime Comments ? GLUCOSE LOG SHEET: 
 
Date Breakfast Lunch Dinner Bedtime Comments ? GLUCOSE LOG SHEET: 
 
Date Breakfast Lunch Dinner Bedtime Comments ? Dr. Erwin London Back to basics: 
 
When you have diabetes or pre-diabetes, as you know, your body has difficulty dealing with the sugar it absorbs from your meals. An unrelated but very relevant term you may have heard before, quantitative easing, has a new meaning: By gradually reducing the load of sugars entering the body, you ease the stress on the body and allow it to catch up with the work it must do. This in turn will allow your body to work better. On top of this, remember one point, the more sugar stress your body has, the more you enter a state of glucose toxicity and this is a state where you become even more resistant. Thats right higher sugar = more resistance, not only to your own bodies attempt to fix the problem but also resistance to your medications. This is why medications work best when a proper diet is followed. Tip 1. Dont forget the protein. When you add a portion of meat or other low carb protein food in your meal, it provides healthy calories which contribute to reducing that feeling of hunger that drives you to eat what you dont want. Tip 2. Portions are a real thing. Before you eat, stop and look at your plate/table. Count how many items have sugars/carbs in them. A sandwich (bread) ? Lamond Sanchez ? Sweet drink ? Potatoe ? Pasta ? Rice ? You would be surprised when you become aware.  Aim for a reasonable portion of carbs, and if you feel you absolutely cannot do this, at least start working toward this. 45-60 grams is usually more than enough in one meal. And YES, than includes the desert! Tip 3. Three meals per day, snack-free in between! Your body needs a break. Eating an adequate meal keeps you from getting hungry and reaching for a snack in between meals. Recall that most of the time, diabetic patients are not treating these snacks. Dont eat dinner late at night, but rather allow more overnight fasting time for your body to recover. If you eat dinner at 8 PM, try 6 PM.  Sporadic eating is the opposite of what you need, and adjusting to a regular eating schedule such as this will not only be a great benefit to your body, but your medications will also tend to work better at keeping your diabetes under control. Tip 4. There is no best diet when it comes to weight loss. When comparing diets and outcomes, the main ingredient when searching for weight loss was calories ! Lower calories = better weight loss. Pick a healthy diet thats right for you, i.e. diabetes friendly, and evaluate your daily calorie intake. And of course this would be of no use without exercise. Calories in need calories out. Introducing [facility]! Dear Shantal Stock: 
Thank you for requesting a [employer] account. Our records indicate that you already have an active [employer] account. You can access your account anytime at [email or web address] Did you know that you can access your hospital and ER discharge instructions at any time in [employer]? You can also review all of your test results from your hospital stay or ER visit. Additional Information If you have questions, please visit the Frequently Asked Questions section of the [employer] website at [email or web address] Remember, [employer] is NOT to be used for urgent needs. For medical emergencies, dial 911. Now available from your iPhone and Android! Please provide this summary of care documentation to your next provider. Your primary care clinician is listed as Arsenio Heller. If you have any questions after today's visit, please call [phone].

## 2018-01-15 ENCOUNTER — OFFICE VISIT (OUTPATIENT)
Dept: FAMILY MEDICINE CLINIC | Age: 58
End: 2018-01-15

## 2018-01-15 VITALS
TEMPERATURE: 96.7 F | OXYGEN SATURATION: 95 % | RESPIRATION RATE: 14 BRPM | SYSTOLIC BLOOD PRESSURE: 131 MMHG | WEIGHT: 211.2 LBS | HEART RATE: 90 BPM | DIASTOLIC BLOOD PRESSURE: 73 MMHG | BODY MASS INDEX: 32.01 KG/M2 | HEIGHT: 68 IN

## 2018-01-15 DIAGNOSIS — I10 ESSENTIAL HYPERTENSION: Primary | ICD-10-CM

## 2018-01-15 DIAGNOSIS — F33.9 RECURRENT DEPRESSION (HCC): ICD-10-CM

## 2018-01-15 DIAGNOSIS — Z12.11 SCREEN FOR COLON CANCER: ICD-10-CM

## 2018-01-15 DIAGNOSIS — F17.200 TOBACCO USE DISORDER: ICD-10-CM

## 2018-01-15 DIAGNOSIS — Z11.59 NEED FOR HEPATITIS C SCREENING TEST: ICD-10-CM

## 2018-01-15 RX ORDER — ALBUTEROL SULFATE 90 UG/1
1 AEROSOL, METERED RESPIRATORY (INHALATION)
Qty: 1 INHALER | Refills: 11 | Status: SHIPPED | OUTPATIENT
Start: 2018-01-15 | End: 2018-01-23

## 2018-01-15 NOTE — MR AVS SNAPSHOT
[address] [facility] [address] [address] 
[phone] Patient: Lisa Vela MRN:  [id number] Visit Information Date & Time Provider Department Dept. Phone Encounter #  
 1/15/2018 12:00 PM Nikki Munoz MD [facility] OFFICE-ANNEX [phone] [id number] Follow-up Instructions Return in about 6 months (around 7/15/2018), or if symptoms worsen or fail to improve. Your Appointments 2/1/2018 10:30 AM  
ESTABLISHED PATIENT with Delano Mcneil MD  
[facility] Appt Note: 5 week f/u  
 [place] [address] [address]  
[address] [facility] [address] [facility] [address]  
  
    
 4/11/2018  2:50 PM  
Follow Up with Margaree Hatchet, MD  
[facility]) Appt Note: 3 month follow up  
 [address] [facility] [id number] [address] [place] [address] Upcoming Health Maintenance Date Due Hepatitis C Screening 1960 DTaP/Tdap/Td series (1 - Tdap) 3/23/1982 PAP AKA CERVICAL CYTOLOGY 3/23/1982 FOBT Q 1 YEAR AGE 50-75 3/23/2011 EYE EXAM RETINAL OR DILATED Q1 3/27/2018 HEMOGLOBIN A1C Q6M 7/10/2018 MICROALBUMIN Q1 10/19/2018 LIPID PANEL Q1 10/19/2018 FOOT EXAM Q1 1/10/2019 BREAST CANCER SCRN MAMMOGRAM 12/8/2019 Allergies as of 1/15/2018  Review Complete On: 1/15/2018 By: Aileen Patten LPN Severity Noted Reaction Type Reactions Latex  09/05/2014    Hives Other Food High 09/05/2014    Anaphylaxis PEPPERONI, sloppy joes Demerol [Meperidine] High 10/17/2010   Systemic Anaphylaxis Difficulty breathing Iodine High 10/17/2010   Systemic Anaphylaxis Morphine High 10/17/2010   Systemic Other (comments) voilent outbreaks (restraints needed) Nsaids (Non-steroidal Anti-inflammatory Drug)  10/19/2017    Hives Current Immunizations  Reviewed on 1/15/2018 Name Date Influenza Vaccine (Quad) PF 10/19/2017 Pneumococcal Polysaccharide (PPSV-23) 10/19/2016 Tdap 3/31/2016 Reviewed by Sosa Whatley MD on 1/15/2018 at  1:16 PM  
 Reviewed by Sosa Whatley MD on 1/15/2018 at  1:16 PM  
 Reviewed by Sosa Whatley MD on 1/15/2018 at  1:17 PM  
 Reviewed by Sosa Whatley MD on 1/15/2018 at  1:18 PM  
You Were Diagnosed With   
  
 Codes Comments Essential hypertension    -  Primary ICD-10-CM: I10 
ICD-9-CM: 401.9 Need for hepatitis C screening test     ICD-10-CM: Z11.59 
ICD-9-CM: V73.89 Screen for colon cancer     ICD-10-CM: Z12.11 ICD-9-CM: V76.51 Tobacco use disorder     ICD-10-CM: F17.200 ICD-9-CM: 305.1 Vitals BP Pulse Temp Resp Height(growth percentile) Weight(growth percentile) 131/73 (BP 1 Location: Left arm, BP Patient Position: At rest) 90 96.7 °F (35.9 °C) (Oral) 14 5' 8\" (1.727 m) 211 lb 3.2 oz (95.8 kg) LMP SpO2 BMI OB Status Smoking Status 01/17/2010 95% 32.11 kg/m2 Postmenopausal Current Every Day Smoker Vitals History BMI and BSA Data Body Mass Index Body Surface Area  
 32.11 kg/m 2 2.14 m 2 Preferred Pharmacy Pharmacy Name Phone [phone] Your Updated Medication List  
  
   
This list is accurate as of: 1/15/18  1:19 PM.  Always use your most recent med list.  
  
  
  
  
 * albuterol 2.5 mg /3 mL (0.083 %) nebulizer solution Commonly known as:  PROVENTIL VENTOLIN  
by Nebulization route every four (4) hours as needed for Wheezing. * albuterol 90 mcg/actuation inhaler Commonly known as:  PROVENTIL HFA Take 1 Puff by inhalation every four (4) hours as needed. ARIPiprazole 2 mg tablet Commonly known as:  ABILIFY Take 1 Tab by mouth daily. Indications: DEPRESSION TREATMENT ADJUNCT  
  
 BENADRYL 25 mg capsule Generic drug:  diphenhydrAMINE Take 25 mg by mouth every six (6) hours as needed. budesonide-formoterol 160-4.5 mcg/actuation Hfaa Commonly known as:  SYMBICORT Take 2 Puffs by inhalation two (2) times a day. Indications: COPD ASSOCIATED WITH CHRONIC BRONCHITIS, EXTRINSIC ASTHMA BYSTOLIC 10 mg tablet Generic drug:  nebivolol Take 2 tablets PO every AM  
  
 cholecalciferol 1,000 unit tablet Commonly known as:  VITAMIN D3 Take 400 Units by mouth daily. 2 CAPS DAILY EPINEPHrine 0.3 mg/0.3 mL injection Commonly known as:  EPIPEN  
0.3 mg by IntraMUSCular route once as needed. ergocalciferol 50,000 unit capsule Commonly known as:  ERGOCALCIFEROL Take 1 capsule by mouth every seven (7) days. glucose blood VI test strips strip Commonly known as:  EasyMax N  
Use to test blood sugar 4 times a day  
  
 levocetirizine 5 mg tablet Commonly known as:  Cindia Proud Take 1 Tab by mouth nightly. Liraglutide 0.6 mg/0.1 mL (18 mg/3 mL) Pnij Commonly known as:  VICTOZA 3-ESTEPHANIE  
1.8 mg by SubCUTAneous route daily. Titrate up to 1.8mg daily as direcected  
  
 metFORMIN  mg tablet Commonly known as:  GLUCOPHAGE XR Take 1 Tab by mouth Before breakfast and dinner. montelukast 10 mg tablet Commonly known as:  SINGULAIR Take 1 Tab by mouth daily. multivitamin tablet Commonly known as:  ONE A DAY Take 1 Tab by mouth daily. NovoLIN R 100 unit/mL injection Generic drug:  insulin regular 35-40 units as instructed Omega-3-DHA-EPA-Fish Oil 1,000 mg (120 mg-180 mg) Cap Take  by mouth. Take 2 po daily Oxygen O2 at 2 Liters NC while asleep  
  
 pantoprazole 40 mg tablet Commonly known as:  PROTONIX Take 40 mg by mouth daily. PARoxetine 20 mg tablet Commonly known as:  PAXIL Take 1 Tab by mouth daily. Indications: DEPRESSION ASSOCIATED WITH BIPOLAR DISORDER, Generalized Anxiety Disorder QUEtiapine 50 mg tablet Commonly known as:  SEROquel Take 50 mg by mouth nightly. rosuvastatin 40 mg tablet Commonly known as:  CRESTOR Take 1 Tab by mouth nightly. SURE COMFORT INSULIN SYRINGE 1 mL 30 gauge x 5/16 Syrg Generic drug:  Insulin Syringe-Needle U-100 Use as directed 4 times daily  
  
 tiotropium 18 mcg inhalation capsule Commonly known as:  [address] [place] [address] Dandy Take 1 Cap by inhalation daily. TOUJEO SOLOSTAR 300 unit/mL (1.5 mL) Inpn Generic drug:  insulin glargine 65 Units by SubCUTAneous route daily. vilazodone 20 mg Tab tablet Commonly known as:  VIIBRYD Take 1 Tab by mouth daily. Indications: major depressive disorder * Notice: This list has 2 medication(s) that are the same as other medications prescribed for you. Read the directions carefully, and ask your doctor or other care provider to review them with you. Prescriptions Sent to Pharmacy Refills  
 albuterol (PROVENTIL HFA) 90 mcg/actuation inhaler 11 Sig: Take 1 Puff by inhalation every four (4) hours as needed. Class: Normal  
 Pharmacy: [address] [place] [address] #: [phone] Route: Inhalation We Performed the Following HEPATITIS C AB [88067 CPT(R)] OCCULT BLOOD, IMMUNOASSAY (FIT) [id number] CPT(R)] Follow-up Instructions Return in about 6 months (around 7/15/2018), or if symptoms worsen or fail to improve. Patient Instructions Learning About Benefits From Quitting Smoking How does quitting smoking make you healthier? If you're thinking about quitting smoking, you may have a few reasons to be smoke-free. Your health may be one of them.  
· When you quit smoking, you lower your risks for cancer, lung disease, heart attack, stroke, blood vessel disease, and blindness from macular degeneration. · When you're smoke-free, you get sick less often, and you heal faster. You are less likely to get colds, flu, bronchitis, and pneumonia. · As a nonsmoker, you may find that your mood is better and you are less stressed. When and how will you feel healthier? Quitting has real health benefits that start from day 1 of being smoke-free. And the longer you stay smoke-free, the healthier you get and the better you feel. The first hours · After just 20 minutes, your blood pressure and heart rate go down. That means there's less stress on your heart and blood vessels. · Within 12 hours, the level of carbon monoxide in your blood drops back to normal. That makes room for more oxygen. With more oxygen in your body, you may notice that you have more energy than when you smoked. After 2 weeks · Your lungs start to work better. · Your risk of heart attack starts to drop. After 1 month · When your lungs are clear, you cough less and breathe deeper, so it's easier to be active. · Your sense of taste and smell return. That means you can enjoy food more than you have since you started smoking. Over the years · After 1 year, your risk of heart disease is half what it would be if you kept smoking. · After 5 years, your risk of stroke starts to shrink. Within a few years after that, it's about the same as if you'd never smoked. · After 10 years, your risk of dying from lung cancer is cut by about half. And your risk for many other types of cancer is lower too. How would quitting help others in your life? When you quit smoking, you improve the health of everyone who now breathes in your smoke. · Their heart, lung, and cancer risks drop, much like yours. · They are sick less. For babies and small children, living smoke-free means they're less likely to have ear infections, pneumonia, and bronchitis. · If you're a woman who is or will be pregnant someday, quitting smoking means a healthier . · Children who are close to you are less likely to become adult smokers. Where can you learn more? Go to http://nolan-alondra.info/. Enter [id number] [id number] 11 in the search box to learn more about \"Learning About Benefits From Quitting Smoking. \" Current as of: 2017 Content Version: 11.4 © 3169-9582 [employer]. Care instructions adapted under license by [employer] (which disclaims liability or warranty for this information). If you have questions about a medical condition or this instruction, always ask your healthcare professional. [place] [address] any warranty or liability for your use of this information. Introducing [facility]! Dear Tani Landaverde: 
Thank you for requesting a [employer] account. Our records indicate that you already have an active [employer] account. You can access your account anytime at [email or web address]. [employer] Did you know that you can access your hospital and ER discharge instructions at any time in [employer]? You can also review all of your test results from your hospital stay or ER visit. Additional Information If you have questions, please visit the Frequently Asked Questions section of the [employer] website at [email or web address] Remember, [employer] is NOT to be used for urgent needs. For medical emergencies, dial 911. Now available from your iPhone and Android! Please provide this summary of care documentation to your next provider. Your primary care clinician is listed as Ole Chow. If you have any questions after today's visit, please call [phone].

## 2018-01-15 NOTE — PROGRESS NOTES
HISTORY OF PRESENT ILLNESS  Tawny Najjar is a 65 y.o. female. HPI       HTN, w/ chronic diabetic state, last a1c 7.5  Today pt present for Bp check and the patient stating that so far there has been a Compliancy w/ the bp meds,  She is having had the low salt diet  the patient has not been active,    today the pt denies Chest Pain, has no legs swelling no lightheadedness, patient denies any suicidal ideation not homicidal no illusion no hallucination normal concentration normal sleep stating that she currently does not need any medication  the pat has not been feeling anxious, and  Has not been feeling stressed out,   otherwise feeling better since the last visit    Current Outpatient Prescriptions   Medication Sig Dispense Refill    ARIPiprazole (ABILIFY) 2 mg tablet Take 1 Tab by mouth daily. Indications: DEPRESSION TREATMENT ADJUNCT 30 Tab 2    Liraglutide (VICTOZA 3-ESTEPHANIE) 0.6 mg/0.1 mL (18 mg/3 mL) pnij 1.8 mg by SubCUTAneous route daily. Titrate up to 1.8mg daily as direcected 3 Pen 7    metFORMIN ER (GLUCOPHAGE XR) 500 mg tablet Take 1 Tab by mouth Before breakfast and dinner. 180 Tab 3    SURE COMFORT INSULIN SYRINGE 1 mL 30 gauge x 5/16 syrg Use as directed 4 times daily 120 Syringe 11    BYSTOLIC 10 mg tablet Take 2 tablets PO every AM      QUEtiapine (SEROQUEL) 50 mg tablet Take 50 mg by mouth nightly.  albuterol (PROVENTIL VENTOLIN) 2.5 mg /3 mL (0.083 %) nebulizer solution by Nebulization route every four (4) hours as needed for Wheezing.  Oxygen O2 at 2 Liters NC while asleep      multivitamin (ONE A DAY) tablet Take 1 Tab by mouth daily.  Omega-3-DHA-EPA-Fish Oil 1,000 mg (120 mg-180 mg) cap Take  by mouth. Take 2 po daily      TOUJEO SOLOSTAR 300 unit/mL (1.5 mL) inpn 65 Units by SubCUTAneous route daily. 15 Pen 3    albuterol (PROVENTIL HFA) 90 mcg/actuation inhaler Take 1 Puff by inhalation every four (4) hours as needed.  1 Inhaler 0    budesonide-formoterol (SYMBICORT) 160-4.5 mcg/actuation HFA inhaler Take 2 Puffs by inhalation two (2) times a day. Indications: COPD ASSOCIATED WITH CHRONIC BRONCHITIS, EXTRINSIC ASTHMA 1 Inhaler 0    montelukast (SINGULAIR) 10 mg tablet Take 1 Tab by mouth daily. 10 Tab 0    levocetirizine (XYZAL) 5 mg tablet Take 1 Tab by mouth nightly. 10 Tab 0    tiotropium (SPIRIVA WITH HANDIHALER) 18 mcg inhalation capsule Take 1 Cap by inhalation daily. 10 Cap 0    glucose blood VI test strips (EASYMAX N) strip Use to test blood sugar 4 times a day 200 Strip 3    pantoprazole (PROTONIX) 40 mg tablet Take 40 mg by mouth daily.  rosuvastatin (CRESTOR) 40 mg tablet Take 1 Tab by mouth nightly. 90 Tab 3    EPINEPHrine (EPIPEN) 0.3 mg/0.3 mL injection 0.3 mg by IntraMUSCular route once as needed.  ergocalciferol (ERGOCALCIFEROL) 50,000 unit capsule Take 1 capsule by mouth every seven (7) days. 12 capsule 3    diphenhydrAMINE (BENADRYL) 25 mg capsule Take 25 mg by mouth every six (6) hours as needed.  cholecalciferol, vitamin d3, (VITAMIN D3) 1,000 unit tablet Take 400 Units by mouth daily. 2 CAPS DAILY      PARoxetine (PAXIL) 20 mg tablet Take 1 Tab by mouth daily. Indications: DEPRESSION ASSOCIATED WITH BIPOLAR DISORDER, Generalized Anxiety Disorder (Patient not taking: Reported on 1/15/2018) 15 Tab 0    vilazodone (VIIBRYD) 20 mg tab tablet Take 1 Tab by mouth daily.  Indications: major depressive disorder (Patient not taking: Reported on 1/15/2018) 25 Tab 0    NOVOLIN R 100 unit/mL injection 35-40 units as instructed (Patient not taking: Reported on 1/15/2018) 14 Vial 3     Allergies   Allergen Reactions    Latex Hives    Other Food Anaphylaxis     PEPPERONI, sloppy joes    Demerol [Meperidine] Anaphylaxis     Difficulty breathing    Iodine Anaphylaxis    Morphine Other (comments)     voilent outbreaks (restraints needed)    Nsaids (Non-Steroidal Anti-Inflammatory Drug) Hives     Past Medical History:   Diagnosis Date    Asthma  Asthma     COPD     Depression     Diabetes ([facility] [address]     Diabetes mellitus     Encounter for review of form with patient 2017    Essential hypertension     GERD (gastroesophageal reflux disease)     Headache(784.0)     HX OTHER MEDICAL     acoustic neuroma    Hypercholesterolemia     Hypertension     Ill-defined condition     R ACOUSTIC NEUROMA    Insomnia disorder 2017    Major depression, chronic 2017    Psychiatric problem     anxiety depression    Psychotic disorder     Tobacco use disorder 10/19/2017    Unspecified sleep apnea     NO CPAP-O2 @2L WHEN SLEEPING     Past Surgical History:   Procedure Laterality Date     DELIVERY ONLY      HX GASTRECTOMY  14    lap sleeve gastrectomy by Dr Hernandez Dy HX GYN      2 c-sections    HX HEENT      sinus    HX HEENT      tracheal resection    HX HEENT      tracheal alleratation x 2    HX HEENT      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    HX HEENT      LASER SX-PENG     Family History   Problem Relation Age of Onset    Diabetes Father     Heart Failure Father     Heart Disease Father     Hypertension Father     High Cholesterol Mother     Suicide Brother     Stroke Maternal Grandmother     Cancer Paternal Grandfather     Anesth Problems Neg Hx      Social History   Substance Use Topics    Smoking status: Current Every Day Smoker     Packs/day: 1.50     Years: 12.00    Smokeless tobacco: Never Used    Alcohol use Yes      Comment: ocassional      Lab Results  Component Value Date/Time   WBC 8.5 10/19/2017 10:29 AM   HGB 13.1 10/19/2017 10:29 AM   HCT 39.8 10/19/2017 10:29 AM   PLATELET 194  10:29 AM   MCV 90 10/19/2017 10:29 AM     Lab Results  Component Value Date/Time   TSH 1.320 10/19/2017 10:29 AM   T4, Free 0.9 2014 09:23 AM         Review of Systems   Constitutional: Negative for chills and fever. HENT: Negative for ear pain and nosebleeds.     Eyes: Negative for blurred vision, pain and discharge. Respiratory: Negative for shortness of breath. Cardiovascular: Negative for chest pain and leg swelling. Gastrointestinal: Negative for constipation, diarrhea, nausea and vomiting. Genitourinary: Negative for frequency. Musculoskeletal: Negative for joint pain. Skin: Negative for itching and rash. Neurological: Negative for headaches. Psychiatric/Behavioral: Negative for depression. The patient is not nervous/anxious. Physical Exam   Constitutional: She is oriented to person, place, and time. She appears well-developed and well-nourished. HENT:   Head: Normocephalic and atraumatic. Eyes: Conjunctivae and EOM are normal.   Neck: Normal range of motion. Neck supple. Cardiovascular: Normal rate, regular rhythm and normal heart sounds. No murmur heard. Pulmonary/Chest: Effort normal and breath sounds normal.   Abdominal: Soft. Bowel sounds are normal. She exhibits no distension. Musculoskeletal: Normal range of motion. She exhibits no edema. Neurological: She is alert and oriented to person, place, and time. Skin: No erythema. Psychiatric: Her behavior is normal.   Nursing note and vitals reviewed. ASSESSMENT and PLAN  Diagnoses and all orders for this visit:    1. Essential hypertension    2. Need for hepatitis C screening test  -     HEPATITIS C AB    3. Screen for colon cancer  -     OCCULT BLOOD, IMMUNOASSAY (FIT)    4. Tobacco use disorder    5.  Recurrent depression (HCC)      HTN better controlled, no change of bp meds at this time,  Discussed sodium restriction, high k rich diet,  maintaining ideal body weight and regular exercise program such as daily walking 30 min perday 4-5 times per week,  medication compliance advised, was told to call back for rfs or of any concern

## 2018-01-15 NOTE — PROGRESS NOTES
Name and  verified        Chief Complaint   Patient presents with    Medication Refill       Health Maintenance reviewed-discussed with patient. Patient stated last PAP exam over 5 years. .1. Have you been to the ER, urgent care clinic since your last visit? Hospitalized since your last visit? No    2. Have you seen or consulted any other health care providers outside of the [address] [place] [address] since your last visit? Include any pap smears or colon screening.  No

## 2018-01-15 NOTE — LETTER
1/30/2018 2:42 PM 
 
Ms. Delmer Infante [address] [address] Dear Delmer Infante: 
 
Please find your most recent results below. Recent Results (from the past 1344 hour(s)) AMB POC HEMOGLOBIN A1C Collection Time: 01/10/18  3:15 PM  
Result Value Ref Range Hemoglobin A1c (POC) 7.5 % HEPATITIS C AB Collection Time: 01/15/18  1:23 PM  
Result Value Ref Range Hep C Virus Ab <0.1 0.0 - 0.9 s/co ratio INFLUENZA A & B AG (RAPID TEST) Collection Time: 01/23/18 12:50 AM  
Result Value Ref Range Influenza A Antigen NEGATIVE  NEG Influenza B Antigen NEGATIVE  NEG    
METABOLIC PANEL, COMPREHENSIVE Collection Time: 01/23/18 12:50 AM  
Result Value Ref Range Sodium 139 136 - 145 mmol/L Potassium 3.7 3.5 - 5.1 mmol/L Chloride 104 97 - 108 mmol/L  
 CO2 28 21 - 32 mmol/L Anion gap 7 5 - 15 mmol/L Glucose 248 (H) 65 - 100 mg/dL BUN 14 6 - 20 MG/DL Creatinine 0.82 0.55 - 1.02 MG/DL  
 BUN/Creatinine ratio 17 12 - 20 GFR est AA >60 >60 ml/min/1.73m2 GFR est non-AA >60 >60 ml/min/1.73m2 Calcium 9.3 8.5 - 10.1 MG/DL Bilirubin, total 0.5 0.2 - 1.0 MG/DL  
 ALT (SGPT) 25 12 - 78 U/L  
 AST (SGOT) 12 (L) 15 - 37 U/L Alk. phosphatase 73 45 - 117 U/L Protein, total 6.7 6.4 - 8.2 g/dL Albumin 3.6 3.5 - 5.0 g/dL Globulin 3.1 2.0 - 4.0 g/dL A-G Ratio 1.2 1.1 - 2.2    
CBC WITH AUTOMATED DIFF Collection Time: 01/23/18 12:50 AM  
Result Value Ref Range WBC 7.0 3.6 - 11.0 K/uL  
 RBC 4.32 3.80 - 5.20 M/uL  
 HGB 13.2 11.5 - 16.0 g/dL HCT 37.7 35.0 - 47.0 % MCV 87.3 80.0 - 99.0 FL  
 MCH 30.6 26.0 - 34.0 PG  
 MCHC 35.0 30.0 - 36.5 g/dL  
 RDW 12.9 11.5 - 14.5 % PLATELET 261 108 - 152 K/uL NEUTROPHILS 80 (H) 32 - 75 % LYMPHOCYTES 11 (L) 12 - 49 % MONOCYTES 7 5 - 13 % EOSINOPHILS 2 0 - 7 % BASOPHILS 0 0 - 1 %  
 ABS. NEUTROPHILS 5.6 1.8 - 8.0 K/UL  
 ABS. LYMPHOCYTES 0.8 0.8 - 3.5 K/UL ABS. MONOCYTES 0.5 0.0 - 1.0 K/UL  
 ABS. EOSINOPHILS 0.1 0.0 - 0.4 K/UL  
 ABS. BASOPHILS 0.0 0.0 - 0.1 K/UL  
 DF SMEAR SCANNED    
 RBC COMMENTS NORMOCYTIC, NORMOCHROMIC    
LACTIC ACID Collection Time: 01/23/18 12:51 AM  
Result Value Ref Range Lactic acid 1.6 0.4 - 2.0 MMOL/L  
 
 
 
RECOMMENDATIONS: 
 
1. Normal test results except for sugar level into the UNcontrolled diabetic state, you may benefit from small amount of diabetic medication please call make an appointment so the lab results could be discussed in more detail which you and if willing a diabetic medication can be started on you,  
 in addition please be compliant with the following steps for improving diabetic care and outcome for further care to prevent further devastating complications of a uncontrolled diabetic state: increase Diabetic Education by more readings, have a great diabetic diet , low cholesterol diet, weight control and daily exercise 20- 30 min most days of the week, home glucose monitoring if possible, and daily foot care and yearly foot  Doctor  and  annual eye examinations at Ophthalmologist,  will cont with your average sugar reading check every 3months. Keep up the good work! Work on diet and exercise. Continue with current  diet and medications. Please call me if you have any questions: [phone] Sincerely, Doron Smith MD

## 2018-01-15 NOTE — PATIENT INSTRUCTIONS

## 2018-01-16 LAB — HCV AB S/CO SERPL IA: <0.1 S/CO RATIO (ref 0–0.9)

## 2018-01-16 RX ORDER — ALBUTEROL SULFATE 90 UG/1
1 AEROSOL, METERED RESPIRATORY (INHALATION)
Qty: 1 INHALER | Refills: 1 | Status: SHIPPED | OUTPATIENT
Start: 2018-01-16 | End: 2018-02-22 | Stop reason: ALTCHOICE

## 2018-01-22 PROCEDURE — 94640 AIRWAY INHALATION TREATMENT: CPT

## 2018-01-22 PROCEDURE — 99284 EMERGENCY DEPT VISIT MOD MDM: CPT

## 2018-01-23 ENCOUNTER — HOSPITAL ENCOUNTER (EMERGENCY)
Age: 58
Discharge: HOME | End: 2018-01-23
Attending: EMERGENCY MEDICINE
Payer: MEDICARE

## 2018-01-23 ENCOUNTER — APPOINTMENT (OUTPATIENT)
Dept: GENERAL RADIOLOGY | Age: 58
End: 2018-01-23
Attending: EMERGENCY MEDICINE
Payer: MEDICARE

## 2018-01-23 VITALS
HEART RATE: 113 BPM | TEMPERATURE: 98.9 F | BODY MASS INDEX: 30.86 KG/M2 | RESPIRATION RATE: 20 BRPM | HEIGHT: 69 IN | OXYGEN SATURATION: 93 % | WEIGHT: 208.34 LBS | DIASTOLIC BLOOD PRESSURE: 66 MMHG | SYSTOLIC BLOOD PRESSURE: 138 MMHG

## 2018-01-23 DIAGNOSIS — R68.89 FLU-LIKE SYMPTOMS: Primary | ICD-10-CM

## 2018-01-23 LAB
ALBUMIN SERPL-MCNC: 3.6 G/DL (ref 3.5–5)
ALBUMIN/GLOB SERPL: 1.2 {RATIO} (ref 1.1–2.2)
ALP SERPL-CCNC: 73 U/L (ref 45–117)
ALT SERPL-CCNC: 25 U/L (ref 12–78)
ANION GAP SERPL CALC-SCNC: 7 MMOL/L (ref 5–15)
AST SERPL-CCNC: 12 U/L (ref 15–37)
BASOPHILS # BLD: 0 K/UL (ref 0–0.1)
BASOPHILS NFR BLD: 0 % (ref 0–1)
BILIRUB SERPL-MCNC: 0.5 MG/DL (ref 0.2–1)
BUN SERPL-MCNC: 14 MG/DL (ref 6–20)
BUN/CREAT SERPL: 17 (ref 12–20)
CALCIUM SERPL-MCNC: 9.3 MG/DL (ref 8.5–10.1)
CHLORIDE SERPL-SCNC: 104 MMOL/L (ref 97–108)
CO2 SERPL-SCNC: 28 MMOL/L (ref 21–32)
CREAT SERPL-MCNC: 0.82 MG/DL (ref 0.55–1.02)
DIFFERENTIAL METHOD BLD: ABNORMAL
EOSINOPHIL # BLD: 0.1 K/UL (ref 0–0.4)
EOSINOPHIL NFR BLD: 2 % (ref 0–7)
ERYTHROCYTE [DISTWIDTH] IN BLOOD BY AUTOMATED COUNT: 12.9 % (ref 11.5–14.5)
FLUAV AG NPH QL IA: NEGATIVE
FLUBV AG NOSE QL IA: NEGATIVE
GLOBULIN SER CALC-MCNC: 3.1 G/DL (ref 2–4)
GLUCOSE SERPL-MCNC: 248 MG/DL (ref 65–100)
HCT VFR BLD AUTO: 37.7 % (ref 35–47)
HGB BLD-MCNC: 13.2 G/DL (ref 11.5–16)
LACTATE SERPL-SCNC: 1.6 MMOL/L (ref 0.4–2)
LYMPHOCYTES # BLD: 0.8 K/UL (ref 0.8–3.5)
LYMPHOCYTES NFR BLD: 11 % (ref 12–49)
MCH RBC QN AUTO: 30.6 PG (ref 26–34)
MCHC RBC AUTO-ENTMCNC: 35 G/DL (ref 30–36.5)
MCV RBC AUTO: 87.3 FL (ref 80–99)
MONOCYTES # BLD: 0.5 K/UL (ref 0–1)
MONOCYTES NFR BLD: 7 % (ref 5–13)
NEUTS SEG # BLD: 5.6 K/UL (ref 1.8–8)
NEUTS SEG NFR BLD: 80 % (ref 32–75)
PLATELET # BLD AUTO: 182 K/UL (ref 150–400)
POTASSIUM SERPL-SCNC: 3.7 MMOL/L (ref 3.5–5.1)
PROT SERPL-MCNC: 6.7 G/DL (ref 6.4–8.2)
RBC # BLD AUTO: 4.32 M/UL (ref 3.8–5.2)
RBC MORPH BLD: ABNORMAL
SODIUM SERPL-SCNC: 139 MMOL/L (ref 136–145)
WBC # BLD AUTO: 7 K/UL (ref 3.6–11)

## 2018-01-23 PROCEDURE — 85025 COMPLETE CBC W/AUTO DIFF WBC: CPT | Performed by: EMERGENCY MEDICINE

## 2018-01-23 PROCEDURE — 83605 ASSAY OF LACTIC ACID: CPT | Performed by: EMERGENCY MEDICINE

## 2018-01-23 PROCEDURE — 71046 X-RAY EXAM CHEST 2 VIEWS: CPT

## 2018-01-23 PROCEDURE — 36415 COLL VENOUS BLD VENIPUNCTURE: CPT | Performed by: EMERGENCY MEDICINE

## 2018-01-23 PROCEDURE — 94640 AIRWAY INHALATION TREATMENT: CPT

## 2018-01-23 PROCEDURE — 87804 INFLUENZA ASSAY W/OPTIC: CPT | Performed by: EMERGENCY MEDICINE

## 2018-01-23 PROCEDURE — 80053 COMPREHEN METABOLIC PANEL: CPT | Performed by: EMERGENCY MEDICINE

## 2018-01-23 PROCEDURE — 74011000250 HC RX REV CODE- 250: Performed by: EMERGENCY MEDICINE

## 2018-01-23 PROCEDURE — 77030029684 HC NEB SM VOL KT MONA -A

## 2018-01-23 RX ORDER — IPRATROPIUM BROMIDE AND ALBUTEROL SULFATE 2.5; .5 MG/3ML; MG/3ML
3 SOLUTION RESPIRATORY (INHALATION) ONCE
Status: COMPLETED | OUTPATIENT
Start: 2018-01-23 | End: 2018-01-23

## 2018-01-23 RX ORDER — ONDANSETRON 4 MG/1
4 TABLET, ORALLY DISINTEGRATING ORAL
Qty: 10 TAB | Refills: 0 | Status: SHIPPED | OUTPATIENT
Start: 2018-01-23 | End: 2018-10-10 | Stop reason: ALTCHOICE

## 2018-01-23 RX ORDER — PREDNISONE 10 MG/1
TABLET ORAL
Qty: 48 TAB | Refills: 0 | Status: SHIPPED | OUTPATIENT
Start: 2018-01-23 | End: 2018-02-22 | Stop reason: ALTCHOICE

## 2018-01-23 RX ORDER — OSELTAMIVIR PHOSPHATE 75 MG/1
75 CAPSULE ORAL 2 TIMES DAILY
Qty: 10 CAP | Refills: 0 | Status: SHIPPED | OUTPATIENT
Start: 2018-01-23 | End: 2018-01-28

## 2018-01-23 RX ADMIN — IPRATROPIUM BROMIDE AND ALBUTEROL SULFATE 3 ML: .5; 3 SOLUTION RESPIRATORY (INHALATION) at 02:04

## 2018-01-23 NOTE — ED NOTES
Assumed care of patient via triage. Patient reports recent flu contact (son) in home and feels like she may have contracted it as well. Patient reports cough, n/v/d since Saturday. Symptoms have reportedly worsened today. Denies shortness of breath or chest pain. Smokes 1PPD cigarettes.

## 2018-01-23 NOTE — ED PROVIDER NOTES
EMERGENCY DEPARTMENT HISTORY AND PHYSICAL EXAM      Date: 2018  Patient Name: Leon Dennis    History of Presenting Illness     Chief Complaint   Patient presents with    Nausea     reports son with flu, began feeling bad Friday. now has nausea and dry heaves. History Provided By: Patient    HPI: Leon Dennis, 65 y.o. female with PMHx significant for HTN, asthma , DM, COPD, GERD, anxiety, depression, presents ambulatory to the ED with cc of persistent nausea,vomiting and diarrhea x 3 days. Pt reports an associated cough with secondary bilateral rib pain. Pt notes her son was diagnosed with the Flu about 3 days ago and she is concerned stating she has a hx of a tracheal resection from the flu of . Pt reports taking a breathing treatment at 2200 last night with minimal relief. Pt specifically denies any fever, chills, congestion, shortness of breath, chest pain, abdominal pain, dysuria, or urinary frequency. PMHx: Significant for HTN, asthma , DM, COPD, GERD, anxiety, depression  PSHx: Significant for  Section, tracheal resection  Social Hx: +tobacco (1PPD), -EtOH, -Illicit Drugs      PCP: Dontrell Blount MD    There are no other complaints, changes, or physical findings at this time. Current Outpatient Prescriptions   Medication Sig Dispense Refill    oseltamivir (TAMIFLU) 75 mg capsule Take 1 Cap by mouth two (2) times a day for 5 days. 10 Cap 0    ondansetron (ZOFRAN ODT) 4 mg disintegrating tablet Take 1 Tab by mouth every eight (8) hours as needed for Nausea. 10 Tab 0    predniSONE (STERAPRED DS) 10 mg dose pack As directed 48 Tab 0    albuterol (PROVENTIL HFA) 90 mcg/actuation inhaler Take 1 Puff by inhalation every four (4) hours as needed. 1 Inhaler 1    vilazodone (VIIBRYD) 20 mg tab tablet Take 1 Tab by mouth daily. Indications: major depressive disorder 25 Tab 0    ARIPiprazole (ABILIFY) 2 mg tablet Take 1 Tab by mouth daily.  Indications: DEPRESSION TREATMENT ADJUNCT 30 Tab 2    Liraglutide (VICTOZA 3-ESTEPHANIE) 0.6 mg/0.1 mL (18 mg/3 mL) pnij 1.8 mg by SubCUTAneous route daily. Titrate up to 1.8mg daily as direcected 3 Pen 7    metFORMIN ER (GLUCOPHAGE XR) 500 mg tablet Take 1 Tab by mouth Before breakfast and dinner. 533 Tab 3    BYSTOLIC 10 mg tablet Take 2 tablets PO every AM      QUEtiapine (SEROQUEL) 50 mg tablet Take 75 mg by mouth nightly.  albuterol (PROVENTIL VENTOLIN) 2.5 mg /3 mL (0.083 %) nebulizer solution by Nebulization route every four (4) hours as needed for Wheezing.  Oxygen O2 at 2 Liters NC while asleep      multivitamin (ONE A DAY) tablet Take 1 Tab by mouth daily.  Omega-3-DHA-EPA-Fish Oil 1,000 mg (120 mg-180 mg) cap Take  by mouth. Take 2 po daily      TOUJEO SOLOSTAR 300 unit/mL (1.5 mL) inpn 65 Units by SubCUTAneous route daily. 15 Pen 3    budesonide-formoterol (SYMBICORT) 160-4.5 mcg/actuation HFA inhaler Take 2 Puffs by inhalation two (2) times a day. Indications: COPD ASSOCIATED WITH CHRONIC BRONCHITIS, EXTRINSIC ASTHMA 1 Inhaler 0    montelukast (SINGULAIR) 10 mg tablet Take 1 Tab by mouth daily. 10 Tab 0    levocetirizine (XYZAL) 5 mg tablet Take 1 Tab by mouth nightly. 10 Tab 0    tiotropium (SPIRIVA WITH HANDIHALER) 18 mcg inhalation capsule Take 1 Cap by inhalation daily. 10 Cap 0    glucose blood VI test strips (EASYMAX N) strip Use to test blood sugar 4 times a day 200 Strip 3    pantoprazole (PROTONIX) 40 mg tablet Take 40 mg by mouth daily.  rosuvastatin (CRESTOR) 40 mg tablet Take 1 Tab by mouth nightly. 90 Tab 3    ergocalciferol (ERGOCALCIFEROL) 50,000 unit capsule Take 1 capsule by mouth every seven (7) days. 12 capsule 3    diphenhydrAMINE (BENADRYL) 25 mg capsule Take 25 mg by mouth every six (6) hours as needed.  cholecalciferol, vitamin d3, (VITAMIN D3) 1,000 unit tablet Take 400 Units by mouth daily.  2 CAPS DAILY      EPINEPHrine (EPIPEN) 0.3 mg/0.3 mL injection 0.3 mg by IntraMUSCular route once as needed. Past History     Past Medical History:  Past Medical History:   Diagnosis Date    Asthma     Asthma     COPD     Depression     Diabetes ([facility] [address]     Diabetes mellitus     Encounter for review of form with patient 2017    Essential hypertension     GERD (gastroesophageal reflux disease)     Headache(784.0)     HX OTHER MEDICAL     acoustic neuroma    Hypercholesterolemia     Hypertension     Ill-defined condition     R ACOUSTIC NEUROMA    Insomnia disorder 2017    Major depression, chronic 2017    Psychiatric problem     anxiety depression    Psychotic disorder     Tobacco use disorder 10/19/2017    Unspecified sleep apnea     NO CPAP-O2 @2L WHEN SLEEPING       Past Surgical History:  Past Surgical History:   Procedure Laterality Date     DELIVERY ONLY      HX GASTRECTOMY  14    lap sleeve gastrectomy by Dr Dolores Sheriff      2 c-sections    HX HEENT      sinus    HX HEENT      tracheal resection    HX HEENT      tracheal alleratation x 2    HX HEENT      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    HX HEENT      LASER SX-PENG       Family History:  Family History   Problem Relation Age of Onset    Diabetes Father     Heart Failure Father     Heart Disease Father     Hypertension Father     High Cholesterol Mother     Suicide Brother     Stroke Maternal Grandmother     Cancer Paternal Grandfather     Anesth Problems Neg Hx        Social History:  Social History   Substance Use Topics    Smoking status: Current Every Day Smoker     Packs/day: 1.00     Years: 12.00    Smokeless tobacco: Never Used    Alcohol use Yes      Comment: ocassional       Allergies:   Allergies   Allergen Reactions    Latex Hives    Other Food Anaphylaxis     PEPPERONI, sloppy joes    Demerol [Meperidine] Anaphylaxis     Difficulty breathing    Iodine Anaphylaxis    Morphine Other (comments)     voilent outbreaks (restraints needed)    Nsaids (Non-Steroidal Anti-Inflammatory Drug) Hives         Review of Systems   Review of Systems   Constitutional: Negative. Negative for chills, fever and unexpected weight change. HENT: Negative. Negative for congestion and trouble swallowing. Eyes: Negative for discharge. Respiratory: Positive for cough. Negative for chest tightness and shortness of breath. Cardiovascular: Negative. Negative for chest pain. Gastrointestinal: Positive for diarrhea, nausea and vomiting. Negative for abdominal distention, abdominal pain and constipation. Endocrine: Negative. Genitourinary: Negative. Negative for difficulty urinating, dysuria, frequency and urgency. Musculoskeletal: Positive for myalgias. Negative for arthralgias. Skin: Negative. Negative for color change. Allergic/Immunologic: Negative. Neurological: Negative. Negative for dizziness, speech difficulty and headaches. Hematological: Negative. Psychiatric/Behavioral: Negative. Negative for agitation and confusion. All other systems reviewed and are negative. Physical Exam   Physical Exam   Constitutional: She is oriented to person, place, and time. She appears well-developed and well-nourished. HENT:   Head: Normocephalic and atraumatic. Eyes: Conjunctivae and EOM are normal.   Neck: Neck supple. Cardiovascular: Normal rate, regular rhythm and intact distal pulses. Pulmonary/Chest: Effort normal. No respiratory distress. She has wheezes (Trace BL). Abdominal: Soft. There is no tenderness. Musculoskeletal: Normal range of motion. She exhibits no deformity. Neurological: She is alert and oriented to person, place, and time. Skin: Skin is warm and dry. Psychiatric: She has a normal mood and affect. Her behavior is normal. Thought content normal.   Vitals reviewed.         Diagnostic Study Results     Labs -     Recent Results (from the past 12 hour(s))   INFLUENZA A & B AG (RAPID TEST) Collection Time: 01/23/18 12:50 AM   Result Value Ref Range    Influenza A Antigen NEGATIVE  NEG      Influenza B Antigen NEGATIVE  NEG     METABOLIC PANEL, COMPREHENSIVE    Collection Time: 01/23/18 12:50 AM   Result Value Ref Range    Sodium 139 136 - 145 mmol/L    Potassium 3.7 3.5 - 5.1 mmol/L    Chloride 104 97 - 108 mmol/L    CO2 28 21 - 32 mmol/L    Anion gap 7 5 - 15 mmol/L    Glucose 248 (H) 65 - 100 mg/dL    BUN 14 6 - 20 MG/DL    Creatinine 0.82 0.55 - 1.02 MG/DL    BUN/Creatinine ratio 17 12 - 20      GFR est AA >60 >60 ml/min/1.73m2    GFR est non-AA >60 >60 ml/min/1.73m2    Calcium 9.3 8.5 - 10.1 MG/DL    Bilirubin, total 0.5 0.2 - 1.0 MG/DL    ALT (SGPT) 25 12 - 78 U/L    AST (SGOT) 12 (L) 15 - 37 U/L    Alk. phosphatase 73 45 - 117 U/L    Protein, total 6.7 6.4 - 8.2 g/dL    Albumin 3.6 3.5 - 5.0 g/dL    Globulin 3.1 2.0 - 4.0 g/dL    A-G Ratio 1.2 1.1 - 2.2     CBC WITH AUTOMATED DIFF    Collection Time: 01/23/18 12:50 AM   Result Value Ref Range    WBC 7.0 3.6 - 11.0 K/uL    RBC 4.32 3.80 - 5.20 M/uL    HGB 13.2 11.5 - 16.0 g/dL    HCT 37.7 35.0 - 47.0 %    MCV 87.3 80.0 - 99.0 FL    MCH 30.6 26.0 - 34.0 PG    MCHC 35.0 30.0 - 36.5 g/dL    RDW 12.9 11.5 - 14.5 %    PLATELET 656 804 - 979 K/uL    NEUTROPHILS 80 (H) 32 - 75 %    LYMPHOCYTES 11 (L) 12 - 49 %    MONOCYTES 7 5 - 13 %    EOSINOPHILS 2 0 - 7 %    BASOPHILS 0 0 - 1 %    ABS. NEUTROPHILS 5.6 1.8 - 8.0 K/UL    ABS. LYMPHOCYTES 0.8 0.8 - 3.5 K/UL    ABS. MONOCYTES 0.5 0.0 - 1.0 K/UL    ABS. EOSINOPHILS 0.1 0.0 - 0.4 K/UL    ABS. BASOPHILS 0.0 0.0 - 0.1 K/UL    DF SMEAR SCANNED      RBC COMMENTS NORMOCYTIC, NORMOCHROMIC     LACTIC ACID    Collection Time: 01/23/18 12:51 AM   Result Value Ref Range    Lactic acid 1.6 0.4 - 2.0 MMOL/L       Radiologic Studies -     CXR Results  (Last 48 hours)               01/23/18 0220  XR CHEST PA LAT Final result    Impression:  Impression:   No acute cardiopulmonary process.        Narrative:  Chest PA and lateral       History: Cough       Comparison: 10/3/2017       Findings: The lungs are well expanded. No focal consolidation, pleural   effusion, or pneumothorax. The cardiomediastinal silhouette is unremarkable. The visualized osseous structures are unremarkable. Medical Decision Making   I am the first provider for this patient. I reviewed the vital signs, available nursing notes, past medical history, past surgical history, family history and social history. Vital Signs-Reviewed the patient's vital signs. Patient Vitals for the past 12 hrs:   Temp Pulse Resp BP SpO2   01/23/18 0302 - - - 138/66 93 %   01/23/18 0200 - - - (!) 124/97 94 %   01/23/18 0130 - - - 139/57 93 %   01/23/18 0115 - - - 144/68 92 %   01/23/18 0100 - - - 130/61 94 %   01/23/18 0045 - - - 150/76 95 %   01/23/18 0030 - - - 142/89 -   01/23/18 0001 98.9 °F (37.2 °C) (!) 113 20 184/84 96 %       Pulse Oximetry Analysis - 96% on RA    Cardiac Monitor:   Rate: 113 bpm  Rhythm: Normal Sinus Rhythm        Records Reviewed: Nursing Notes and Old Medical Records    Provider Notes (Medical Decision Making):   DDx: Flu, PNA, Asthma exacerbation, Viral syndrome, URI, Bronchitis      ED Course:   Initial assessment performed. The patients presenting problems have been discussed, and they are in agreement with the care plan formulated and outlined with them. I have encouraged them to ask questions as they arise throughout their visit. TOBACCO COUNSELING:  Upon evaluation, pt expressed that they are a current tobacco user. Pt has been counseled on the dangers of smoking and was encouraged to quit as soon as possible in order to decrease further risks to their health. Pt has conveyed their understanding of the risks involved should they continue to use tobacco products. Progress note:  3:09 AM  Pt noted to be feeling better, ready for discharge. Updated pt and/or family on all final lab and imaging findings.   Will follow up as instructed. All questions have been answered, pt voiced understanding and agreement with plan. Specific return precautions provided as well as instructions to return to the ED should sx worsen at any time. Vital signs stable for discharge. Written by Beatris Dodson ED Scribe, as dictated by Dmitriy Tyler MD      Disposition:  Discharge Note:  3:09 AM  The pt is ready for discharge. The pt's signs, symptoms, diagnosis, and discharge instructions have been discussed and pt has conveyed their understanding. The pt is to follow up as recommended or return to ER should their symptoms worsen. Plan has been discussed and pt is in agreement. PLAN:  1. Discharge Medication List as of 1/23/2018  3:06 AM      START taking these medications    Details   oseltamivir (TAMIFLU) 75 mg capsule Take 1 Cap by mouth two (2) times a day for 5 days. , Print, Disp-10 Cap, R-0      ondansetron (ZOFRAN ODT) 4 mg disintegrating tablet Take 1 Tab by mouth every eight (8) hours as needed for Nausea. , Print, Disp-10 Tab, R-0         CONTINUE these medications which have NOT CHANGED    Details   albuterol (PROVENTIL HFA) 90 mcg/actuation inhaler Take 1 Puff by inhalation every four (4) hours as needed., Normal, Disp-1 Inhaler, R-1      vilazodone (VIIBRYD) 20 mg tab tablet Take 1 Tab by mouth daily. Indications: major depressive disorder, Normal, Disp-25 Tab, R-0      ARIPiprazole (ABILIFY) 2 mg tablet Take 1 Tab by mouth daily. Indications: DEPRESSION TREATMENT ADJUNCT, Normal, Disp-30 Tab, R-2      Liraglutide (VICTOZA 3-ESTEPHANIE) 0.6 mg/0.1 mL (18 mg/3 mL) pnij 1.8 mg by SubCUTAneous route daily. Titrate up to 1.8mg daily as direcected, Normal, Disp-3 Pen, R-7      metFORMIN ER (GLUCOPHAGE XR) 500 mg tablet Take 1 Tab by mouth Before breakfast and dinner., Normal, Disp-180 Tab, R-3      BYSTOLIC 10 mg tablet Take 2 tablets PO every AM, Historical Med, STEPHAN      QUEtiapine (SEROQUEL) 50 mg tablet Take 75 mg by mouth nightly. , Historical Med      albuterol (PROVENTIL VENTOLIN) 2.5 mg /3 mL (0.083 %) nebulizer solution by Nebulization route every four (4) hours as needed for Wheezing., Historical Med      Oxygen O2 at 2 Liters NC while asleep, Historical Med      multivitamin (ONE A DAY) tablet Take 1 Tab by mouth daily. , Historical Med      Omega-3-DHA-EPA-Fish Oil 1,000 mg (120 mg-180 mg) cap Take  by mouth. Take 2 po daily, Historical Med      TOUJEO SOLOSTAR 300 unit/mL (1.5 mL) inpn 65 Units by SubCUTAneous route daily. , Normal, Disp-15 Pen, R-3, STEPHAN      budesonide-formoterol (SYMBICORT) 160-4.5 mcg/actuation HFA inhaler Take 2 Puffs by inhalation two (2) times a day. Indications: COPD ASSOCIATED WITH CHRONIC BRONCHITIS, EXTRINSIC ASTHMA, Normal, Disp-1 Inhaler, R-0      montelukast (SINGULAIR) 10 mg tablet Take 1 Tab by mouth daily. , Normal, Disp-10 Tab, R-0      levocetirizine (XYZAL) 5 mg tablet Take 1 Tab by mouth nightly., Normal, Disp-10 Tab, R-0      tiotropium (SPIRIVA WITH HANDIHALER) 18 mcg inhalation capsule Take 1 Cap by inhalation daily. , Normal, Disp-10 Cap, R-0      glucose blood VI test strips (EASYMAX N) strip Use to test blood sugar 4 times a day, Normal, Disp-200 Strip, R-3      pantoprazole (PROTONIX) 40 mg tablet Take 40 mg by mouth daily. , Historical Med      rosuvastatin (CRESTOR) 40 mg tablet Take 1 Tab by mouth nightly., Normal, Disp-90 Tab, R-3      ergocalciferol (ERGOCALCIFEROL) 50,000 unit capsule Take 1 capsule by mouth every seven (7) days. , Normal, Disp-12 capsule, R-3      diphenhydrAMINE (BENADRYL) 25 mg capsule Take 25 mg by mouth every six (6) hours as needed., Historical Med      cholecalciferol, vitamin d3, (VITAMIN D3) 1,000 unit tablet Take 400 Units by mouth daily. 2 CAPS DAILY, Historical Med      EPINEPHrine (EPIPEN) 0.3 mg/0.3 mL injection 0.3 mg by IntraMUSCular route once as needed., Historical Med           2.    Follow-up Information     Follow up With Details Comments Contact Info Benigno Moise MD Schedule an appointment as soon as possible for a visit  [address] [address]  [phone]          Return to ED if worse     Diagnosis     Clinical Impression:   1. Flu-like symptoms        Attestations: This note is prepared by Sofia Pham, acting as Scribe for Rena Ga MD      The scribe's documentation has been prepared under my direction and personally reviewed by me in its entirety. I confirm that the note above accurately reflects all work, treatment, procedures, and medical decision making performed by me. Rena Ga MD        This note will not be viewable in [address]

## 2018-01-23 NOTE — ED NOTES
Dr. Lizbeth Truong reviewed discharge instructions with the patient. The patient verbalized understanding. Patient ambulatory out of ED with paperwork in hand.

## 2018-01-25 ENCOUNTER — OFFICE VISIT (OUTPATIENT)
Dept: FAMILY MEDICINE CLINIC | Age: 58
End: 2018-01-25

## 2018-01-25 VITALS
OXYGEN SATURATION: 93 % | BODY MASS INDEX: 30.39 KG/M2 | WEIGHT: 205.2 LBS | HEIGHT: 69 IN | HEART RATE: 89 BPM | SYSTOLIC BLOOD PRESSURE: 140 MMHG | DIASTOLIC BLOOD PRESSURE: 74 MMHG | RESPIRATION RATE: 14 BRPM | TEMPERATURE: 96.9 F

## 2018-01-25 DIAGNOSIS — J45.41 MODERATE PERSISTENT ASTHMA WITH ACUTE EXACERBATION: Primary | ICD-10-CM

## 2018-01-25 DIAGNOSIS — I10 ESSENTIAL HYPERTENSION: ICD-10-CM

## 2018-01-25 DIAGNOSIS — F17.200 TOBACCO USE DISORDER: ICD-10-CM

## 2018-01-25 DIAGNOSIS — E78.00 HYPERCHOLESTEREMIA: ICD-10-CM

## 2018-01-25 DIAGNOSIS — Z23 ENCOUNTER FOR IMMUNIZATION: ICD-10-CM

## 2018-01-25 DIAGNOSIS — E11.9 DIABETES MELLITUS WITHOUT COMPLICATION (HCC): ICD-10-CM

## 2018-01-25 RX ORDER — LEVOFLOXACIN 500 MG/1
500 TABLET, FILM COATED ORAL DAILY
Qty: 10 TAB | Refills: 0 | Status: SHIPPED | OUTPATIENT
Start: 2018-01-25 | End: 2018-02-04

## 2018-01-25 RX ORDER — MONTELUKAST SODIUM 10 MG/1
10 TABLET ORAL DAILY
Qty: 30 TAB | Refills: 6
Start: 2018-01-25 | End: 2018-09-06 | Stop reason: SDUPTHER

## 2018-01-25 RX ORDER — SYRINGE AND NEEDLE,INSULIN,1ML 30 GX5/16"
SYRINGE, EMPTY DISPOSABLE MISCELLANEOUS
COMMUNITY
Start: 2017-12-14 | End: 2018-10-10 | Stop reason: ALTCHOICE

## 2018-01-25 RX ORDER — QUETIAPINE FUMARATE 25 MG/1
TABLET, FILM COATED ORAL DAILY
COMMUNITY
Start: 2017-11-17 | End: 2018-01-25 | Stop reason: ALTCHOICE

## 2018-01-25 RX ORDER — BUDESONIDE AND FORMOTEROL FUMARATE DIHYDRATE 160; 4.5 UG/1; UG/1
2 AEROSOL RESPIRATORY (INHALATION) 2 TIMES DAILY
Qty: 1 INHALER | Refills: 6 | Status: SHIPPED | OUTPATIENT
Start: 2018-01-25 | End: 2018-02-22 | Stop reason: SDUPTHER

## 2018-01-25 RX ORDER — FLUTICASONE PROPIONATE 50 MCG
SPRAY, SUSPENSION (ML) NASAL
Qty: 1 BOTTLE | Refills: 6 | Status: SHIPPED | OUTPATIENT
Start: 2018-01-25 | End: 2018-10-10 | Stop reason: ALTCHOICE

## 2018-01-25 RX ORDER — GUAIFENESIN 600 MG/1
1200 TABLET, EXTENDED RELEASE ORAL 2 TIMES DAILY
Qty: 60 TAB | Refills: 0 | Status: SHIPPED | OUTPATIENT
Start: 2018-01-25 | End: 2018-02-22 | Stop reason: ALTCHOICE

## 2018-01-25 RX ORDER — IPRATROPIUM BROMIDE 0.5 MG/2.5ML
0.5 SOLUTION RESPIRATORY (INHALATION)
Qty: 70 VIAL | Refills: 7 | Status: SHIPPED | OUTPATIENT
Start: 2018-01-25 | End: 2018-02-22 | Stop reason: SDUPTHER

## 2018-01-25 RX ORDER — PAROXETINE HYDROCHLORIDE 40 MG/1
TABLET, FILM COATED ORAL
COMMUNITY
Start: 2017-12-27 | End: 2018-02-01

## 2018-01-25 RX ORDER — ALBUTEROL SULFATE 0.83 MG/ML
2.5 SOLUTION RESPIRATORY (INHALATION)
Qty: 24 EACH | Refills: 3
Start: 2018-01-25 | End: 2018-03-14 | Stop reason: SDUPTHER

## 2018-01-25 RX ORDER — VILAZODONE HYDROCHLORIDE 40 MG/1
TABLET ORAL
COMMUNITY
Start: 2017-12-26 | End: 2018-02-01

## 2018-01-25 RX ORDER — PREDNISOLONE ACETATE 10 MG/ML
SUSPENSION/ DROPS OPHTHALMIC
COMMUNITY
Start: 2017-11-01 | End: 2018-10-10 | Stop reason: ALTCHOICE

## 2018-01-25 RX ORDER — PAROXETINE HYDROCHLORIDE 20 MG/1
TABLET, FILM COATED ORAL
COMMUNITY
Start: 2017-12-28 | End: 2018-02-01

## 2018-01-25 RX ORDER — CHLORPHENIRAMINE MALEATE 4 MG
8 TABLET ORAL
Qty: 30 TAB | Refills: 1 | Status: SHIPPED | OUTPATIENT
Start: 2018-01-25 | End: 2018-02-22 | Stop reason: ALTCHOICE

## 2018-01-25 NOTE — PROGRESS NOTES
HISTORY OF PRESENT ILLNESS  Shantell Radford is a 65 y.o. female. HPI     Patient presents with current medical history of asthmatic state currently on Symbicort short acting bronchodilator recently had family member son had a questionable influenza infection she developed shortness of breath she went to emergency room patient was tested for influenza type a and influenza type Michael Sharp was positive regardless she was given prednisone pack and Tamiflu to be taken as prophylactic medication patient still having problems coughing is not getting better unfortunately patient has had significant comorbid medical history at this age such as diabetic state history of tobacco abuse for which she is in the in action phase at this time she is not pre-contemplating and she is not in a complaint, templating stage she really quit 5 days ago today she was readvised regarding its adverse significance on her health and was encouraged to continue without cigarettes unfortunately she is still coughing in the office they are dry nonproductive and pulse ox is on the low side 93 percentile shortness of breath on exertion as well   fortunatelyHer diabetic state and consequently her hemoglobin A1c was at 8.6 percentile and today is at 7.4 percentile patient has been responding nicely to the change of medication and as per the patient can she also change some lifestyle modification, overall she is happy with the progress so far unfortunately she got sick and she is afraid getting panic attack since she has been hearing bad news on the medial regarding influenza and the death rate    Current Outpatient Prescriptions   Medication Sig Dispense Refill    SURE COMFORT INSULIN SYRINGE 1 mL 30 gauge x 5/16 syrg       oseltamivir (TAMIFLU) 75 mg capsule Take 1 Cap by mouth two (2) times a day for 5 days. 10 Cap 0    ondansetron (ZOFRAN ODT) 4 mg disintegrating tablet Take 1 Tab by mouth every eight (8) hours as needed for Nausea.  10 Tab 0    predniSONE (STERAPRED DS) 10 mg dose pack As directed 48 Tab 0    albuterol (PROVENTIL HFA) 90 mcg/actuation inhaler Take 1 Puff by inhalation every four (4) hours as needed. 1 Inhaler 1    ARIPiprazole (ABILIFY) 2 mg tablet Take 1 Tab by mouth daily. Indications: DEPRESSION TREATMENT ADJUNCT 30 Tab 2    Liraglutide (VICTOZA 3-ESTEPHANIE) 0.6 mg/0.1 mL (18 mg/3 mL) pnij 1.8 mg by SubCUTAneous route daily. Titrate up to 1.8mg daily as direcected 3 Pen 7    metFORMIN ER (GLUCOPHAGE XR) 500 mg tablet Take 1 Tab by mouth Before breakfast and dinner. 762 Tab 3    BYSTOLIC 10 mg tablet Take 2 tablets PO every AM      QUEtiapine (SEROQUEL) 50 mg tablet Take 75 mg by mouth nightly.  albuterol (PROVENTIL VENTOLIN) 2.5 mg /3 mL (0.083 %) nebulizer solution by Nebulization route every four (4) hours as needed for Wheezing.  Oxygen O2 at 2 Liters NC while asleep      multivitamin (ONE A DAY) tablet Take 1 Tab by mouth daily.  Omega-3-DHA-EPA-Fish Oil 1,000 mg (120 mg-180 mg) cap Take  by mouth. Take 2 po daily      TOUJEO SOLOSTAR 300 unit/mL (1.5 mL) inpn 65 Units by SubCUTAneous route daily. 15 Pen 3    budesonide-formoterol (SYMBICORT) 160-4.5 mcg/actuation HFA inhaler Take 2 Puffs by inhalation two (2) times a day. Indications: COPD ASSOCIATED WITH CHRONIC BRONCHITIS, EXTRINSIC ASTHMA 1 Inhaler 0    montelukast (SINGULAIR) 10 mg tablet Take 1 Tab by mouth daily. 10 Tab 0    levocetirizine (XYZAL) 5 mg tablet Take 1 Tab by mouth nightly. 10 Tab 0    tiotropium (SPIRIVA WITH HANDIHALER) 18 mcg inhalation capsule Take 1 Cap by inhalation daily. 10 Cap 0    glucose blood VI test strips (EASYMAX N) strip Use to test blood sugar 4 times a day 200 Strip 3    pantoprazole (PROTONIX) 40 mg tablet Take 40 mg by mouth daily.  rosuvastatin (CRESTOR) 40 mg tablet Take 1 Tab by mouth nightly. 90 Tab 3    EPINEPHrine (EPIPEN) 0.3 mg/0.3 mL injection 0.3 mg by IntraMUSCular route once as needed.       ergocalciferol (ERGOCALCIFEROL) 50,000 unit capsule Take 1 capsule by mouth every seven (7) days. 12 capsule 3    diphenhydrAMINE (BENADRYL) 25 mg capsule Take 25 mg by mouth every six (6) hours as needed.  PARoxetine (PAXIL) 20 mg tablet       PARoxetine (PAXIL) 40 mg tablet       prednisoLONE acetate (PRED FORTE) 1 % ophthalmic suspension       QUEtiapine (SEROQUEL) 25 mg tablet Take  by mouth daily.  VIIBRYD 40 mg tab tablet       vilazodone (VIIBRYD) 20 mg tab tablet Take 1 Tab by mouth daily. Indications: major depressive disorder (Patient not taking: Reported on 2018) 25 Tab 0    cholecalciferol, vitamin d3, (VITAMIN D3) 1,000 unit tablet Take 400 Units by mouth daily.  2 CAPS DAILY       Allergies   Allergen Reactions    Latex Hives    Other Food Anaphylaxis     PEPPERONI, sloppy joes    Demerol [Meperidine] Anaphylaxis     Difficulty breathing    Iodine Anaphylaxis    Morphine Other (comments)     voilent outbreaks (restraints needed)    Nsaids (Non-Steroidal Anti-Inflammatory Drug) Hives     Past Medical History:   Diagnosis Date    Asthma     Asthma     COPD     Depression     Diabetes ([facility] [address]     Diabetes mellitus     Encounter for review of form with patient 2017    Essential hypertension     GERD (gastroesophageal reflux disease)     Headache(784.0)     HX OTHER MEDICAL     acoustic neuroma    Hypercholesterolemia     Hypertension     Ill-defined condition     R ACOUSTIC NEUROMA    Insomnia disorder 2017    Major depression, chronic 2017    Psychiatric problem     anxiety depression    Psychotic disorder     Tobacco use disorder 10/19/2017    Unspecified sleep apnea     NO CPAP-O2 @2L WHEN SLEEPING     Past Surgical History:   Procedure Laterality Date     DELIVERY ONLY      HX GASTRECTOMY  14    lap sleeve gastrectomy by Dr Karl Pond HX GYN      2 c-sections    HX HEENT      sinus    HX HEENT      tracheal resection  HX HEENT      tracheal alleratation x 2    HX HEENT      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    HX HEENT      LASER SX-PENG     Family History   Problem Relation Age of Onset    Diabetes Father     Heart Failure Father     Heart Disease Father     Hypertension Father     High Cholesterol Mother     Suicide Brother     Stroke Maternal Grandmother     Cancer Paternal Grandfather     Anesth Problems Neg Hx      Social History   Substance Use Topics    Smoking status: Current Every Day Smoker     Packs/day: 1.00     Years: 12.00    Smokeless tobacco: Never Used    Alcohol use Yes      Comment: ocassional      Lab Results  Component Value Date/Time   WBC 7.0 01/23/2018 12:50 AM   HGB 13.2 01/23/2018 12:50 AM   HCT 37.7 01/23/2018 12:50 AM   PLATELET 144 96/17/0005 12:50 AM   MCV 87.3 01/23/2018 12:50 AM     Lab Results  Component Value Date/Time   TSH 1.320 10/19/2017 10:29 AM   T4, Free 0.9 09/05/2014 09:23 AM         Review of Systems   Constitutional: Negative for chills and fever. HENT: Negative for ear pain and nosebleeds. Eyes: Negative for blurred vision, pain and discharge. Respiratory: Negative for shortness of breath. Cardiovascular: Negative for chest pain and leg swelling. Gastrointestinal: Negative for constipation, diarrhea, nausea and vomiting. Genitourinary: Negative for frequency. Musculoskeletal: Negative for joint pain. Skin: Negative for itching and rash. Neurological: Negative for headaches. Psychiatric/Behavioral: Negative for depression. The patient is not nervous/anxious. Physical Exam   Constitutional: She is oriented to person, place, and time. She appears well-developed and well-nourished. HENT:   Head: Normocephalic and atraumatic. Eyes: Conjunctivae and EOM are normal.   Neck: Normal range of motion. Neck supple. Cardiovascular: Normal rate, regular rhythm and normal heart sounds. No murmur heard.   Pulmonary/Chest: Effort normal and breath sounds normal.   Abdominal: Soft. Bowel sounds are normal. She exhibits no distension. Musculoskeletal: Normal range of motion. She exhibits no edema. Neurological: She is alert and oriented to person, place, and time. Skin: No erythema. Psychiatric: Her behavior is normal.   Nursing note and vitals reviewed. ASSESSMENT and PLAN  Diagnoses and all orders for this visit:    1. Moderate persistent asthma with acute exacerbation    2. Tobacco use disorder    3. Essential hypertension  -     albuterol (PROVENTIL VENTOLIN) 2.5 mg /3 mL (0.083 %) nebulizer solution; 3 mL by Nebulization route every four (4) hours as needed for Wheezing or Shortness of Breath. 4. Diabetes mellitus without complication (HCC)  -     albuterol (PROVENTIL VENTOLIN) 2.5 mg /3 mL (0.083 %) nebulizer solution; 3 mL by Nebulization route every four (4) hours as needed for Wheezing or Shortness of Breath. 5. Encounter for immunization  -     albuterol (PROVENTIL VENTOLIN) 2.5 mg /3 mL (0.083 %) nebulizer solution; 3 mL by Nebulization route every four (4) hours as needed for Wheezing or Shortness of Breath. 6. Hypercholesteremia  -     albuterol (PROVENTIL VENTOLIN) 2.5 mg /3 mL (0.083 %) nebulizer solution; 3 mL by Nebulization route every four (4) hours as needed for Wheezing or Shortness of Breath. Other orders  -     budesonide-formoterol (SYMBICORT) 160-4.5 mcg/actuation HFAA; Take 2 Puffs by inhalation two (2) times a day. Indications: COPD ASSOCIATED WITH CHRONIC BRONCHITIS, EXTRINSIC ASTHMA  -     montelukast (SINGULAIR) 10 mg tablet; Take 1 Tab by mouth daily. -     chlorpheniramine (CHLOR-TRIMETRON) 4 mg tablet; Take 2 Tabs by mouth nightly as needed for Allergies or Rhinitis. Indications: Cough  -     guaiFENesin ER (MUCINEX) 600 mg ER tablet; Take 2 Tabs by mouth two (2) times a day.  Indications: COLD SYMPTOMS, Cough  -     fluticasone (FLONASE) 50 mcg/actuation nasal spray; 2 sprays daily on each nostril for the next 8 weeks then 2 sprays on each nostril every other day for the next 4 weeks and thereafter 2 sprays on each nostril as needed  -     levoFLOXacin (LEVAQUIN) 500 mg tablet; Take 1 Tab by mouth daily for 10 days. -     ipratropium (ATROVENT) 0.02 % soln; 2.5 mL by Nebulization route every four (4) hours as needed.     She was told to continue with the current diabetic medication she was emphasized that during this sickness she needs to get her sugar nicely under control with the current medication sugar elevation not help him out patient agreed with today's recommendation at this time no change of medication or diabetic state  Salt gurgle, incr po fluid intake, avoid cigs and 2nd hand exposure, rts if worsens, tylenol otc for pain, advised on meds side effects and compliance, hand washing and hygiene advised

## 2018-01-25 NOTE — PROGRESS NOTES
Name and  verified        Chief Complaint   Patient presents with    Follow-up     ED Visit on 2018 for Flu like symptoms           Health Maintenance reviewed-discussed with patient. 1. Have you been to the ER, urgent care clinic since your last visit? Hospitalized since your last visit? YES, SEE ABOVE NOTES. 2. Have you seen or consulted any other health care providers outside of the [address] [place] [address] since your last visit? Include any pap smears or colon screening.  No

## 2018-01-25 NOTE — MR AVS SNAPSHOT
[address] [facility] [address] [id number] 
[phone] Patient: Hawa Sands MRN:  [id number] Visit Information Date & Time Provider Department Dept. Phone Encounter #  
 1/25/2018  8:15 AM Doron Smith MD [facility] OFFICE-ANNEX [phone] [id number] Your Appointments 2/1/2018 10:30 AM  
ESTABLISHED PATIENT with Popeye Rice MD  
[address] [facility] [address]) Appt Note: 5 week f/u  
 [facility] [address] [address]  
[address] [facility] [address] [address]  
  
    
 4/11/2018  2:50 PM  
Follow Up with Chen Thacker MD  
[facility] [address]) Appt Note: 3 month follow up  
 [address] [facility] [address] [address] [address] 7/16/2018  9:45 AM  
ROUTINE CARE with Doron Smith MD  
[facility] OFFICE-ANNEX ([address]) Appt Note: 6 mo f/u  
 [address] [facility] [id number] [phone] [facility] [id number] Upcoming Health Maintenance Date Due  
 PAP AKA CERVICAL CYTOLOGY 3/23/1982 FOBT Q 1 YEAR AGE 50-75 3/23/2011 EYE EXAM RETINAL OR DILATED Q1 3/27/2018 HEMOGLOBIN A1C Q6M 7/10/2018 MICROALBUMIN Q1 10/19/2018 LIPID PANEL Q1 10/19/2018 FOOT EXAM Q1 1/10/2019 BREAST CANCER SCRN MAMMOGRAM 12/8/2019 DTaP/Tdap/Td series (2 - Td) 3/31/2026 Allergies as of 1/25/2018  Review Complete On: 1/25/2018 By: Doron Smith MD  
  
 Severity Noted Reaction Type Reactions Latex  09/05/2014    Hives Other Food High 09/05/2014    Anaphylaxis PEPPERONI, sloppy joes Demerol [Meperidine] High 10/17/2010   Systemic Anaphylaxis Difficulty breathing Iodine High 10/17/2010   Systemic Anaphylaxis Morphine High 10/17/2010   Systemic Other (comments)  
 voilent outbreaks (restraints needed) Nsaids (Non-steroidal Anti-inflammatory Drug)  10/19/2017    Hives Current Immunizations  Reviewed on 1/25/2018 Name Date Influenza Vaccine (Quad) PF 10/19/2017 Pneumococcal Polysaccharide (PPSV-23) 10/19/2016 Tdap 3/31/2016 Reviewed by Garry Faria MD on 1/25/2018 at  8:41 AM  
 Reviewed by Garry Faria MD on 1/25/2018 at  8:41 AM  
You Were Diagnosed With   
  
 Codes Comments Moderate persistent asthma with acute exacerbation    -  Primary ICD-10-CM: J45.41 
ICD-9-CM: 493.92 Tobacco use disorder     ICD-10-CM: F17.200 ICD-9-CM: 305.1 Essential hypertension     ICD-10-CM: I10 
ICD-9-CM: 401.9 Diabetes mellitus without complication ([facility]     XEQ-94-SZ: E11.9 ICD-9-CM: 250.00 Encounter for immunization     ICD-10-CM: E81 ICD-9-CM: V03.89 Hypercholesteremia     ICD-10-CM: E78.00 ICD-9-CM: 272.0 Vitals BP Pulse Temp Resp Height(growth percentile) Weight(growth percentile) 140/74 (BP 1 Location: Left arm, BP Patient Position: At rest) 89 96.9 °F (36.1 °C) (Oral) 14 5' 8.5\" (1.74 m) 205 lb 3.2 oz (93.1 kg) LMP SpO2 BMI OB Status Smoking Status 01/17/2010 93% 30.75 kg/m2 Postmenopausal Current Every Day Smoker BMI and BSA Data Body Mass Index Body Surface Area 30.75 kg/m 2 2.12 m 2 Preferred Pharmacy Pharmacy Name Phone [address] [address] [phone] Your Updated Medication List  
  
   
This list is accurate as of: 1/25/18  8:51 AM.  Always use your most recent med list.  
  
  
  
  
 * albuterol 90 mcg/actuation inhaler Commonly known as:  PROVENTIL HFA Take 1 Puff by inhalation every four (4) hours as needed. * albuterol 2.5 mg /3 mL (0.083 %) nebulizer solution Commonly known as:  PROVENTIL VENTOLIN  
3 mL by Nebulization route every four (4) hours as needed for Wheezing or Shortness of Breath. ARIPiprazole 2 mg tablet Commonly known as:  ABILIFY Take 1 Tab by mouth daily. Indications: DEPRESSION TREATMENT ADJUNCT  
  
 BENADRYL 25 mg capsule Generic drug:  diphenhydrAMINE Take 25 mg by mouth every six (6) hours as needed. budesonide-formoterol 160-4.5 mcg/actuation Hfaa Commonly known as:  SYMBICORT Take 2 Puffs by inhalation two (2) times a day. Indications: COPD ASSOCIATED WITH CHRONIC BRONCHITIS, EXTRINSIC ASTHMA BYSTOLIC 10 mg tablet Generic drug:  nebivolol Take 2 tablets PO every AM  
  
 chlorpheniramine 4 mg tablet Commonly known as:  CHLOR-TRIMETRON Take 2 Tabs by mouth nightly as needed for Allergies or Rhinitis. Indications: Cough  
  
 cholecalciferol 1,000 unit tablet Commonly known as:  VITAMIN D3 Take 400 Units by mouth daily. 2 CAPS DAILY EPINEPHrine 0.3 mg/0.3 mL injection Commonly known as:  EPIPEN  
0.3 mg by IntraMUSCular route once as needed. ergocalciferol 50,000 unit capsule Commonly known as:  ERGOCALCIFEROL Take 1 capsule by mouth every seven (7) days. fluticasone 50 mcg/actuation nasal spray Commonly known as:  FLONASE  
2 sprays daily on each nostril for the next 8 weeks then 2 sprays on each nostril every other day for the next 4 weeks and thereafter 2 sprays on each nostril as needed  
  
 glucose blood VI test strips strip Commonly known as:  EasyMax N  
Use to test blood sugar 4 times a day  
  
 guaiFENesin  mg ER tablet Commonly known as:  Michael & Michael Take 2 Tabs by mouth two (2) times a day. Indications: COLD SYMPTOMS, Cough  
  
 ipratropium 0.02 % Soln Commonly known as:  ATROVENT  
2.5 mL by Nebulization route every four (4) hours as needed. levocetirizine 5 mg tablet Commonly known as:  Cindia Proud Take 1 Tab by mouth nightly. levoFLOXacin 500 mg tablet Commonly known as:  Naaman Delude Take 1 Tab by mouth daily for 10 days. Liraglutide 0.6 mg/0.1 mL (18 mg/3 mL) Pnij Commonly known as:  VICTOZA 3-ESTEPHANIE  
1.8 mg by SubCUTAneous route daily. Titrate up to 1.8mg daily as direcected  
  
 metFORMIN  mg tablet Commonly known as:  GLUCOPHAGE XR Take 1 Tab by mouth Before breakfast and dinner. montelukast 10 mg tablet Commonly known as:  SINGULAIR Take 1 Tab by mouth daily. multivitamin tablet Commonly known as:  ONE A DAY Take 1 Tab by mouth daily. Omega-3-DHA-EPA-Fish Oil 1,000 mg (120 mg-180 mg) Cap Take  by mouth. Take 2 po daily  
  
 ondansetron 4 mg disintegrating tablet Commonly known as:  ZOFRAN ODT Take 1 Tab by mouth every eight (8) hours as needed for Nausea. oseltamivir 75 mg capsule Commonly known as:  TAMIFLU Take 1 Cap by mouth two (2) times a day for 5 days. Oxygen O2 at 2 Liters NC while asleep  
  
 pantoprazole 40 mg tablet Commonly known as:  PROTONIX Take 40 mg by mouth daily. * PARoxetine 40 mg tablet Commonly known as:  PAXIL  
  
 * PARoxetine 20 mg tablet Commonly known as:  PAXIL  
  
 prednisoLONE acetate 1 % ophthalmic suspension Commonly known as:  PRED FORTE  
  
 predniSONE 10 mg dose pack Commonly known as:  STERAPRED DS As directed  
  
 rosuvastatin 40 mg tablet Commonly known as:  CRESTOR Take 1 Tab by mouth nightly. SURE COMFORT INSULIN SYRINGE 1 mL 30 gauge x 5/16 Syrg Generic drug:  Insulin Syringe-Needle U-100  
  
 tiotropium 18 mcg inhalation capsule Commonly known as:  [address] [facility] [address] Take 1 Cap by inhalation daily. TOUJEO SOLOSTAR 300 unit/mL (1.5 mL) Inpn Generic drug:  insulin glargine 65 Units by SubCUTAneous route daily. * VIIBRYD 40 mg Tab tablet Generic drug:  vilazodone * vilazodone 20 mg Tab tablet Commonly known as:  VIIBRYD Take 1 Tab by mouth daily. Indications: major depressive disorder * Notice: This list has 6 medication(s) that are the same as other medications prescribed for you. Read the directions carefully, and ask your doctor or other care provider to review them with you. Prescriptions Printed Refills  
 fluticasone (FLONASE) 50 mcg/actuation nasal spray 6 Si sprays daily on each nostril for the next 8 weeks then 2 sprays on each nostril every other day for the next 4 weeks and thereafter 2 sprays on each nostril as needed Class: Print Prescriptions Sent to Pharmacy Refills  
 budesonide-formoterol (SYMBICORT) 160-4.5 mcg/actuation HFAA 6 Sig: Take 2 Puffs by inhalation two (2) times a day. Indications: COPD ASSOCIATED WITH CHRONIC BRONCHITIS, EXTRINSIC ASTHMA Class: Normal  
 Pharmacy: [address] [place] [address] Ph #: [phone] Route: Inhalation  
 chlorpheniramine (CHLOR-TRIMETRON) 4 mg tablet 1 Sig: Take 2 Tabs by mouth nightly as needed for Allergies or Rhinitis. Indications: Cough Class: Normal  
 Pharmacy: [address] [place] [address] Ph #: [phone] Route: Oral  
 guaiFENesin ER (MUCINEX) 600 mg ER tablet 0 Sig: Take 2 Tabs by mouth two (2) times a day. Indications: COLD SYMPTOMS, Cough Class: Normal  
 Pharmacy: [address] [place] [address] Ph #: [phone] Route: Oral  
 levoFLOXacin (LEVAQUIN) 500 mg tablet 0 Sig: Take 1 Tab by mouth daily for 10 days. Class: Normal  
 Pharmacy: [address] [place] [address] Ph #: [phone] Route: Oral  
 ipratropium (ATROVENT) 0.02 % soln 7 Si.5 mL by Nebulization route every four (4) hours as needed. Class: Normal  
 Pharmacy: [address] [place] [address] Ph #: [phone] Route: Nebulization Introducing [facility]! Dear Freya Dutta: 
Thank you for requesting a [employer] account. Our records indicate that you already have an active [employer] account. You can access your account anytime at [email or web address] Did you know that you can access your hospital and ER discharge instructions at any time in [employer]? You can also review all of your test results from your hospital stay or ER visit. Additional Information If you have questions, please visit the Frequently Asked Questions section of the [employer] website at [email or web address] Remember, [employer] is NOT to be used for urgent needs. For medical emergencies, dial 911. Now available from your iPhone and Android! Please provide this summary of care documentation to your next provider. Your primary care clinician is listed as Clarita Hogue. If you have any questions after today's visit, please call [phone].

## 2018-01-29 ENCOUNTER — TELEPHONE (OUTPATIENT)
Dept: FAMILY MEDICINE CLINIC | Age: 58
End: 2018-01-29

## 2018-01-29 NOTE — TELEPHONE ENCOUNTER
Refill phoned In to [facility]) for Singulair 10 mg take 1 tablet by mouth once a day re back.

## 2018-02-01 ENCOUNTER — OFFICE VISIT (OUTPATIENT)
Dept: BEHAVIORAL/MENTAL HEALTH CLINIC | Age: 58
End: 2018-02-01

## 2018-02-01 VITALS
HEIGHT: 68 IN | HEART RATE: 99 BPM | DIASTOLIC BLOOD PRESSURE: 74 MMHG | WEIGHT: 202.6 LBS | BODY MASS INDEX: 30.71 KG/M2 | SYSTOLIC BLOOD PRESSURE: 116 MMHG

## 2018-02-01 DIAGNOSIS — F41.9 ANXIETY: ICD-10-CM

## 2018-02-01 DIAGNOSIS — G47.00 INSOMNIA, UNSPECIFIED TYPE: ICD-10-CM

## 2018-02-01 DIAGNOSIS — F33.1 MODERATE EPISODE OF RECURRENT MAJOR DEPRESSIVE DISORDER (HCC): Primary | ICD-10-CM

## 2018-02-01 NOTE — PROGRESS NOTES
Mor Landa  1960  65 y.o.  female  [facility]    Chief Complaint   Patient presents with    Insomnia    Depression    Anxiety       [place]:   This is a 65years old divorce  female with past psychiatric history and treatment of manic depression who was seen for the first time on December 28, 2017 referred by her PCP to manage her complicated psychiatric medications. She decided to gradually taper and discontinue Viibryd and high dose Paxil and try Abilify 1-2 mg daily and take Seroquel  mg at nighttime to help with sleep. She presented on time, was alert awake oriented to time person and place and was anxious but cooperative, polite, and pleasant during the interview.  She showed me a piece of her bed which she broke off during a night terror where she was confined in a box and was trying desperately to come out and thought that she was opening it by handle. Patient informed that her sleep is a still not good though little bit better but she does not sleep through the night and informed me that she is taking both Abilify and Seroquel at nighttime and apologized for not taking as advise Abilify in the daytime. She informed that she have more energy in daytime and actually she clean out her closet and report 35% benefit with her mood but is still she cried a lot and is not able to understand why? She was happy to come off of both medication and is satisfied with her current treatment plan. Patient denies any manic or hypomanic episode ever, denies any hallucination ever, denies PTSD symptoms and OCD symptoms and report clear history of PMDD with severe depression and anxiety starting a week prior to menses lasting into half of the period.   She was provided with support and psychoeducation, and after discussing risk/benefits/expectations with treatment alternatives available, patient decided to continue current treatment plan and will take Abilify low-dose and daytime and Seroquel  mg at bedtime as this is the only medication which helped her with her complicated sleep disorder.  Further she will ask her pulmonologist to have a full sleep study done to tease out any possible medical reasons of her chronic insomnia. Morenita Maravilla was provided with a lot of support and psychoeducation and she will benefit from therapy.         SUBSTANCE USE HISTORY:   TOBACCO: Smoked 1 pack per day for past 35 years and expressed desire to get help to quit his smoking but first would like to stabilize on her medications. ALCOHOL: Patient denies abuse. CANNABIS: Tried few times but never abused it. COCAINE, OPIOIDS, and OTHERS: Denies abuse. MEDICAL HISTORY:    has a past medical history of Asthma; Asthma; COPD; Depression; Diabetes ([facility] [address] Diabetes mellitus; Encounter for review of form with patient (12/4/2017); Essential hypertension; GERD (gastroesophageal reflux disease); Headache(784.0); OTHER MEDICAL; Hypercholesterolemia; Hypertension; Ill-defined condition; Insomnia disorder (12/4/2017); Major depression, chronic (12/4/2017); Psychiatric problem; Psychotic disorder; Tobacco use disorder (10/19/2017); and Unspecified sleep apnea.     ALLERGIES:   Allergies   Allergen Reactions    Latex Hives    Other Food Anaphylaxis     PEPPERONI, sloppy joes    Demerol [Meperidine] Anaphylaxis     Difficulty breathing    Iodine Anaphylaxis    Morphine Other (comments)     voilent outbreaks (restraints needed)    Nsaids (Non-Steroidal Anti-Inflammatory Drug) Hives       VITAL SIGNS:  /74  Pulse 99  Ht 5' 8\" (1.727 m)  Wt 91.9 kg (202 lb 9.6 oz)  LMP 01/17/2010  BMI 30.81 kg/m2    Current Outpatient Prescriptions   Medication Sig Dispense Refill    prednisoLONE acetate (PRED FORTE) 1 % ophthalmic suspension       SURE COMFORT INSULIN SYRINGE 1 mL 30 gauge x 5/16 syrg       albuterol (PROVENTIL VENTOLIN) 2.5 mg /3 mL (0.083 %) nebulizer solution 3 mL by Nebulization route every four (4) hours as needed for Wheezing or Shortness of Breath. 24 Each 3    budesonide-formoterol (SYMBICORT) 160-4.5 mcg/actuation HFAA Take 2 Puffs by inhalation two (2) times a day. Indications: COPD ASSOCIATED WITH CHRONIC BRONCHITIS, EXTRINSIC ASTHMA 1 Inhaler 6    chlorpheniramine (CHLOR-TRIMETRON) 4 mg tablet Take 2 Tabs by mouth nightly as needed for Allergies or Rhinitis. Indications: Cough 30 Tab 1    guaiFENesin ER (MUCINEX) 600 mg ER tablet Take 2 Tabs by mouth two (2) times a day. Indications: COLD SYMPTOMS, Cough 60 Tab 0    fluticasone (FLONASE) 50 mcg/actuation nasal spray 2 sprays daily on each nostril for the next 8 weeks then 2 sprays on each nostril every other day for the next 4 weeks and thereafter 2 sprays on each nostril as needed 1 Bottle 6    levoFLOXacin (LEVAQUIN) 500 mg tablet Take 1 Tab by mouth daily for 10 days. 10 Tab 0    ipratropium (ATROVENT) 0.02 % soln 2.5 mL by Nebulization route every four (4) hours as needed. 70 Vial 7    ondansetron (ZOFRAN ODT) 4 mg disintegrating tablet Take 1 Tab by mouth every eight (8) hours as needed for Nausea. 10 Tab 0    predniSONE (STERAPRED DS) 10 mg dose pack As directed 48 Tab 0    albuterol (PROVENTIL HFA) 90 mcg/actuation inhaler Take 1 Puff by inhalation every four (4) hours as needed. 1 Inhaler 1    ARIPiprazole (ABILIFY) 2 mg tablet Take 1 Tab by mouth daily. Indications: DEPRESSION TREATMENT ADJUNCT 30 Tab 2    Liraglutide (VICTOZA 3-ESTEPHANIE) 0.6 mg/0.1 mL (18 mg/3 mL) pnij 1.8 mg by SubCUTAneous route daily. Titrate up to 1.8mg daily as direcected 3 Pen 7    BYSTOLIC 10 mg tablet Take 2 tablets PO every AM      Oxygen O2 at 2 Liters NC while asleep      multivitamin (ONE A DAY) tablet Take 1 Tab by mouth daily.  Omega-3-DHA-EPA-Fish Oil 1,000 mg (120 mg-180 mg) cap Take  by mouth. Take 2 po daily      TOUJEO SOLOSTAR 300 unit/mL (1.5 mL) inpn 65 Units by SubCUTAneous route daily.  15 Pen 3    levocetirizine (XYZAL) 5 mg tablet Take 1 Tab by mouth nightly. 10 Tab 0    tiotropium (SPIRIVA WITH HANDIHALER) 18 mcg inhalation capsule Take 1 Cap by inhalation daily. 10 Cap 0    glucose blood VI test strips (EASYMAX N) strip Use to test blood sugar 4 times a day 200 Strip 3    pantoprazole (PROTONIX) 40 mg tablet Take 40 mg by mouth daily.  rosuvastatin (CRESTOR) 40 mg tablet Take 1 Tab by mouth nightly. 90 Tab 3    EPINEPHrine (EPIPEN) 0.3 mg/0.3 mL injection 0.3 mg by IntraMUSCular route once as needed.  ergocalciferol (ERGOCALCIFEROL) 50,000 unit capsule Take 1 capsule by mouth every seven (7) days. 12 capsule 3    diphenhydrAMINE (BENADRYL) 25 mg capsule Take 25 mg by mouth every six (6) hours as needed.  cholecalciferol, vitamin d3, (VITAMIN D3) 1,000 unit tablet Take 400 Units by mouth daily. 2 CAPS DAILY      montelukast (SINGULAIR) 10 mg tablet Take 1 Tab by mouth daily. 30 Tab 6    metFORMIN ER (GLUCOPHAGE XR) 500 mg tablet Take 1 Tab by mouth Before breakfast and dinner. 180 Tab 3         MENTAL STATUS EXAMINATION:   Patient is a 65 y.o. female [facility] who looks older than her stated age. She had a hoarse voice and talks with excitement. Patient appearance is appropriately dressed with good hygiene. Speech is regular rate and rhythm, fluent language, and thought process is linear, logical, and goal directed. Reported mood is better with mood congruent brighter affect. Patient denies any suicidal ideation, homicidal ideation, and auditory visual hallucination. Not observed to be delusional or paranoid. Insight, judgement, reliability and impulse control is intact. Her cognition is within normal limit and she is observed to be reliable. DIAGNOSIS:  Encounter Diagnoses   Name Primary?  Moderate episode of recurrent major depressive disorder (HCC) Yes    Anxiety     Insomnia, unspecified type        PLAN:  1. MEDICATION: Abilify 2 mg in morning and Seroquel  mg at bedtime.  Patient was provided with psycho education, discussed risk/benefits, and expectations from medication changes. Patient agrees with plan. 2. PSYCHOTHERAPY: Patient was provided with supportive therapy, strongly encourage to seek psychotherapy. 3. MEDICAL CARE: Patient was strongly encourage to take their medical medications and follow up with their PCP on regular basis. 4. SUBSTANCE ABUSE CARE: Patient denies any active substance abuse at this time. 5. FOLLOW UP:   Follow-up Disposition:  Return in about 4 weeks (around 3/1/2018) for Medication management.

## 2018-02-01 NOTE — MR AVS SNAPSHOT
[address] [facility] 
[phone] Patient: Robert Cuadra MRN:  [id number] Visit Information Date & Time Provider Department Dept. Phone Encounter #  
 2/1/2018 10:30 AM Renu Jaquez MD [address] [facility] [phone] [id number] Follow-up Instructions Return in about 4 weeks (around 3/1/2018) for Medication management. Follow-up and Disposition History Your Appointments 2/22/2018 12:30 PM  
ACUTE CARE with Curtis Manuel MD  
[facility] OFFICE-ANNEX ([address]) Appt Note: 4 week f/u  
 [address] [phone]  
[phone] [phone]  
  
    
 3/5/2018  9:00 AM  
ESTABLISHED PATIENT with Renu Jaquez MD  
[address] [place] [address]) Appt Note: 4 WEEK F/U  
 [address] [phone]  
[facility] [address] [phone]  
  
    
 4/11/2018  2:50 PM  
Follow Up with Miriam Nichols MD  
[facility] [address] [place] [address]) Appt Note: 3 month follow up  
 [phone] [address] 7/16/2018  9:45 AM  
ROUTINE CARE with Curtis Manuel MD  
[address] [place] [address] OFFICE-ANNEX ([address]) Appt Note: 6 mo f/u  
 [address] [phone]  
[phone] [phone] Upcoming Health Maintenance Date Due  
 PAP AKA CERVICAL CYTOLOGY 3/23/1982 FOBT Q 1 YEAR AGE 50-75 3/23/2011 EYE EXAM RETINAL OR DILATED Q1 3/27/2018 HEMOGLOBIN A1C Q6M 7/10/2018 MICROALBUMIN Q1 10/19/2018 LIPID PANEL Q1 10/19/2018 FOOT EXAM Q1 1/10/2019 BREAST CANCER SCRN MAMMOGRAM 12/8/2019 DTaP/Tdap/Td series (2 - Td) 3/31/2026 Allergies as of 2/1/2018  Review Complete On: 2/1/2018 By: Jenny Moore MD  
  
 Severity Noted Reaction Type Reactions Latex  09/05/2014    Hives Other Food High 09/05/2014    Anaphylaxis PEPPERONI, sloppy joes Demerol [Meperidine] High 10/17/2010   Systemic Anaphylaxis Difficulty breathing Iodine High 10/17/2010   Systemic Anaphylaxis Morphine High 10/17/2010   Systemic Other (comments)  
 voilent outbreaks (restraints needed) Nsaids (Non-steroidal Anti-inflammatory Drug)  10/19/2017    Hives Current Immunizations  Reviewed on 1/25/2018 Name Date Influenza Vaccine (Quad) PF 10/19/2017 Pneumococcal Polysaccharide (PPSV-23) 10/19/2016 Tdap 3/31/2016 Not reviewed this visit You Were Diagnosed With   
  
 Codes Comments Moderate episode of recurrent major depressive disorder ([facility]    -  Primary ICD-10-CM: F33.1 ICD-9-CM: 296.32 Anxiety     ICD-10-CM: F41.9 ICD-9-CM: 300.00 Insomnia, unspecified type     ICD-10-CM: G47.00 ICD-9-CM: 780.52 Vitals BP Pulse Height(growth percentile) Weight(growth percentile) LMP BMI  
 116/74 99 5' 8\" (1.727 m) 202 lb 9.6 oz (91.9 kg) 01/17/2010 30.81 kg/m2 OB Status Smoking Status Postmenopausal Current Every Day Smoker BMI and BSA Data Body Mass Index Body Surface Area  
 30.81 kg/m 2 2.1 m 2 Preferred Pharmacy Pharmacy Name Phone [address] [address] [address] [phone] Your Updated Medication List  
  
   
This list is accurate as of: 2/1/18 11:09 AM.  Always use your most recent med list.  
  
  
  
  
 * albuterol 90 mcg/actuation inhaler Commonly known as:  PROVENTIL HFA Take 1 Puff by inhalation every four (4) hours as needed. * albuterol 2.5 mg /3 mL (0.083 %) nebulizer solution Commonly known as:  PROVENTIL VENTOLIN  
3 mL by Nebulization route every four (4) hours as needed for Wheezing or Shortness of Breath. ARIPiprazole 2 mg tablet Commonly known as:  ABILIFY Take 1 Tab by mouth daily. Indications: DEPRESSION TREATMENT ADJUNCT  
  
 BENADRYL 25 mg capsule Generic drug:  diphenhydrAMINE Take 25 mg by mouth every six (6) hours as needed. budesonide-formoterol 160-4.5 mcg/actuation Hfaa Commonly known as:  SYMBICORT Take 2 Puffs by inhalation two (2) times a day. Indications: COPD ASSOCIATED WITH CHRONIC BRONCHITIS, EXTRINSIC ASTHMA BYSTOLIC 10 mg tablet Generic drug:  nebivolol Take 2 tablets PO every AM  
  
 chlorpheniramine 4 mg tablet Commonly known as:  CHLOR-TRIMETRON Take 2 Tabs by mouth nightly as needed for Allergies or Rhinitis. Indications: Cough  
  
 cholecalciferol 1,000 unit tablet Commonly known as:  VITAMIN D3 Take 400 Units by mouth daily. 2 CAPS DAILY EPINEPHrine 0.3 mg/0.3 mL injection Commonly known as:  EPIPEN  
0.3 mg by IntraMUSCular route once as needed. ergocalciferol 50,000 unit capsule Commonly known as:  ERGOCALCIFEROL Take 1 capsule by mouth every seven (7) days. fluticasone 50 mcg/actuation nasal spray Commonly known as:  FLONASE  
2 sprays daily on each nostril for the next 8 weeks then 2 sprays on each nostril every other day for the next 4 weeks and thereafter 2 sprays on each nostril as needed  
  
 glucose blood VI test strips strip Commonly known as:  EasyMax N  
Use to test blood sugar 4 times a day  
  
 guaiFENesin  mg ER tablet Commonly known as:  Michael & Michael Take 2 Tabs by mouth two (2) times a day. Indications: COLD SYMPTOMS, Cough  
  
 ipratropium 0.02 % Soln Commonly known as:  ATROVENT  
2.5 mL by Nebulization route every four (4) hours as needed. levocetirizine 5 mg tablet Commonly known as:  Ashley Estrada Take 1 Tab by mouth nightly. levoFLOXacin 500 mg tablet Commonly known as:  Jimmy Ricks Take 1 Tab by mouth daily for 10 days. Liraglutide 0.6 mg/0.1 mL (18 mg/3 mL) Pnij Commonly known as:  VICTOZA 3-ESTEPHANIE  
1.8 mg by SubCUTAneous route daily. Titrate up to 1.8mg daily as direcected  
  
 metFORMIN  mg tablet Commonly known as:  GLUCOPHAGE XR Take 1 Tab by mouth Before breakfast and dinner. montelukast 10 mg tablet Commonly known as:  SINGULAIR Take 1 Tab by mouth daily. multivitamin tablet Commonly known as:  ONE A DAY Take 1 Tab by mouth daily. Omega-3-DHA-EPA-Fish Oil 1,000 mg (120 mg-180 mg) Cap Take  by mouth. Take 2 po daily  
  
 ondansetron 4 mg disintegrating tablet Commonly known as:  ZOFRAN ODT Take 1 Tab by mouth every eight (8) hours as needed for Nausea. Oxygen O2 at 2 Liters NC while asleep  
  
 pantoprazole 40 mg tablet Commonly known as:  PROTONIX Take 40 mg by mouth daily. prednisoLONE acetate 1 % ophthalmic suspension Commonly known as:  PRED FORTE  
  
 predniSONE 10 mg dose pack Commonly known as:  STERAPRED DS As directed  
  
 rosuvastatin 40 mg tablet Commonly known as:  CRESTOR Take 1 Tab by mouth nightly. SURE COMFORT INSULIN SYRINGE 1 mL 30 gauge x 5/16 Syrg Generic drug:  Insulin Syringe-Needle U-100  
  
 tiotropium 18 mcg inhalation capsule Commonly known as:  [address] [facility] [address] Take 1 Cap by inhalation daily. TOUJEO SOLOSTAR 300 unit/mL (1.5 mL) Inpn Generic drug:  insulin glargine 65 Units by SubCUTAneous route daily. * Notice: This list has 2 medication(s) that are the same as other medications prescribed for you. Read the directions carefully, and ask your doctor or other care provider to review them with you. Follow-up Instructions Return in about 4 weeks (around 3/1/2018) for Medication management. Introducing [facility]! Dear Dutch Chavez: Thank you for requesting a [employer] account. Our records indicate that you already have an active [employer] account. You can access your account anytime at [email or web address] Did you know that you can access your hospital and ER discharge instructions at any time in [employer]? You can also review all of your test results from your hospital stay or ER visit. Additional Information If you have questions, please visit the Frequently Asked Questions section of the [employer] website at [email or web address]/. Remember, [employer] is NOT to be used for urgent needs. For medical emergencies, dial 911. Now available from your iPhone and Android! Please provide this summary of care documentation to your next provider. Your primary care clinician is listed as Ledora Brittle. If you have any questions after today's visit, please call [phone].

## 2018-02-02 ENCOUNTER — HOSPITAL ENCOUNTER (OUTPATIENT)
Dept: LAB | Age: 58
Discharge: HOME | End: 2018-02-02

## 2018-02-02 DIAGNOSIS — I10 ESSENTIAL HYPERTENSION: ICD-10-CM

## 2018-02-02 DIAGNOSIS — E11.9 DIABETES MELLITUS WITHOUT COMPLICATION (HCC): ICD-10-CM

## 2018-02-02 DIAGNOSIS — Z23 ENCOUNTER FOR IMMUNIZATION: ICD-10-CM

## 2018-02-02 DIAGNOSIS — E78.00 HYPERCHOLESTEREMIA: ICD-10-CM

## 2018-02-05 RX ORDER — NEBIVOLOL HYDROCHLORIDE 10 MG/1
TABLET ORAL
Qty: 60 TAB | Refills: 3 | Status: SHIPPED | OUTPATIENT
Start: 2018-02-05 | End: 2018-02-07 | Stop reason: SDUPTHER

## 2018-02-05 RX ORDER — PANTOPRAZOLE SODIUM 40 MG/1
TABLET, DELAYED RELEASE ORAL
Qty: 30 TAB | Refills: 1 | Status: SHIPPED | OUTPATIENT
Start: 2018-02-05 | End: 2018-02-22 | Stop reason: SDUPTHER

## 2018-02-07 DIAGNOSIS — I10 ESSENTIAL HYPERTENSION: ICD-10-CM

## 2018-02-07 DIAGNOSIS — E78.00 HYPERCHOLESTEREMIA: ICD-10-CM

## 2018-02-07 DIAGNOSIS — E11.9 DIABETES MELLITUS WITHOUT COMPLICATION (HCC): ICD-10-CM

## 2018-02-07 DIAGNOSIS — Z23 ENCOUNTER FOR IMMUNIZATION: ICD-10-CM

## 2018-02-08 RX ORDER — NEBIVOLOL HYDROCHLORIDE 10 MG/1
TABLET ORAL
Qty: 60 TAB | Refills: 3 | Status: SHIPPED | OUTPATIENT
Start: 2018-02-08 | End: 2018-02-22 | Stop reason: SDUPTHER

## 2018-02-08 RX ORDER — LEVOCETIRIZINE DIHYDROCHLORIDE 5 MG/1
5 TABLET, FILM COATED ORAL
Qty: 30 TAB | Refills: 1 | Status: SHIPPED | OUTPATIENT
Start: 2018-02-08 | End: 2018-02-22 | Stop reason: ALTCHOICE

## 2018-02-22 ENCOUNTER — OFFICE VISIT (OUTPATIENT)
Dept: FAMILY MEDICINE CLINIC | Age: 58
End: 2018-02-22

## 2018-02-22 VITALS
HEART RATE: 79 BPM | SYSTOLIC BLOOD PRESSURE: 109 MMHG | OXYGEN SATURATION: 94 % | RESPIRATION RATE: 14 BRPM | HEIGHT: 68 IN | TEMPERATURE: 97.8 F | DIASTOLIC BLOOD PRESSURE: 65 MMHG | WEIGHT: 201.2 LBS | BODY MASS INDEX: 30.49 KG/M2

## 2018-02-22 DIAGNOSIS — Z23 ENCOUNTER FOR IMMUNIZATION: ICD-10-CM

## 2018-02-22 DIAGNOSIS — F51.01 PRIMARY INSOMNIA: Primary | ICD-10-CM

## 2018-02-22 DIAGNOSIS — E78.00 HYPERCHOLESTEREMIA: ICD-10-CM

## 2018-02-22 DIAGNOSIS — I10 ESSENTIAL HYPERTENSION: ICD-10-CM

## 2018-02-22 DIAGNOSIS — E11.9 DIABETES MELLITUS WITHOUT COMPLICATION (HCC): ICD-10-CM

## 2018-02-22 RX ORDER — IPRATROPIUM BROMIDE 0.5 MG/2.5ML
0.5 SOLUTION RESPIRATORY (INHALATION)
Qty: 70 VIAL | Refills: 7
Start: 2018-02-22 | End: 2019-01-23

## 2018-02-22 RX ORDER — PANTOPRAZOLE SODIUM 40 MG/1
TABLET, DELAYED RELEASE ORAL
Qty: 30 TAB | Refills: 1 | Status: SHIPPED | OUTPATIENT
Start: 2018-02-22 | End: 2018-04-06 | Stop reason: SDUPTHER

## 2018-02-22 RX ORDER — BUDESONIDE AND FORMOTEROL FUMARATE DIHYDRATE 160; 4.5 UG/1; UG/1
2 AEROSOL RESPIRATORY (INHALATION) 2 TIMES DAILY
Qty: 1 INHALER | Refills: 6
Start: 2018-02-22 | End: 2018-10-16 | Stop reason: SDUPTHER

## 2018-02-22 RX ORDER — HUMAN INSULIN 100 [IU]/ML
INJECTION, SOLUTION SUBCUTANEOUS
COMMUNITY
Start: 2018-01-26 | End: 2018-10-10 | Stop reason: ALTCHOICE

## 2018-02-22 RX ORDER — CEPHALEXIN 500 MG/1
CAPSULE ORAL
COMMUNITY
Start: 2018-02-09 | End: 2018-02-22 | Stop reason: ALTCHOICE

## 2018-02-22 RX ORDER — QUETIAPINE FUMARATE 50 MG/1
TABLET, FILM COATED ORAL DAILY
COMMUNITY
Start: 2018-01-26 | End: 2018-03-05 | Stop reason: SDUPTHER

## 2018-02-22 RX ORDER — NEBIVOLOL HYDROCHLORIDE 10 MG/1
TABLET ORAL
Qty: 60 TAB | Refills: 3 | Status: SHIPPED | OUTPATIENT
Start: 2018-02-22 | End: 2019-03-12 | Stop reason: SDUPTHER

## 2018-02-22 RX ORDER — QUETIAPINE FUMARATE 25 MG/1
TABLET, FILM COATED ORAL
COMMUNITY
Start: 2018-01-26 | End: 2018-03-05 | Stop reason: ALTCHOICE

## 2018-02-22 RX ORDER — VILAZODONE HYDROCHLORIDE 20 MG/1
TABLET ORAL DAILY
COMMUNITY
Start: 2017-12-28 | End: 2018-02-22 | Stop reason: ALTCHOICE

## 2018-02-22 NOTE — PROGRESS NOTES
José Fritz    Chief Complaint   Patient presents with    Medication Evaluation     Cough Cough       Health Maintenance reviewed-discussed with patient. 1. Have you been to the ER, urgent care clinic since your last visit? Hospitalized since your last visit? No    2. Have you seen or consulted any other health care providers outside of the [address] [place] [address] since your last visit? Include any pap smears or colon screening. No      Patient stated last PAP exam 2009.

## 2018-02-22 NOTE — MR AVS SNAPSHOT
[facility] [phone] 
[phone] Patient: Lino Bravo MRN:  [id number] Visit Information Date & Time Provider Department Dept. Phone Encounter #  
 2018 12:30 PM MD Jaime Byrd OFFICE-ANNEX [phone] [id number] Follow-up Instructions Return in about 3 months (around 2018), or if symptoms worsen or fail to improve. Your Appointments 3/5/2018  9:00 AM  
ESTABLISHED PATIENT with Mely Carballo MD  
[facility] Appt Note: 4 WEEK F/U  
 [address] [phone]  
[facility] [id number] [place] [address] Drive [phone]  
  
    
 2018  2:50 PM  
Follow Up with Massiel Arias MD  
[facility]) Appt Note: 3 month follow up  
 [address] [phone] [address] 2018  9:45 AM  
ROUTINE CARE with Peter Fernandez MD  
[address] OFFICE-[facility] ([facility]) Appt Note: 6 mo f/u  
 [address] [facility] 7 [phone]  
[phone] [facility] [phone] Upcoming Health Maintenance Date Due  
 PAP AKA CERVICAL CYTOLOGY 3/23/1982 FOBT Q 1 YEAR AGE 50-75 3/23/2011 EYE EXAM RETINAL OR DILATED Q1 3/27/2018 HEMOGLOBIN A1C Q6M 7/10/2018 MICROALBUMIN Q1 10/19/2018 LIPID PANEL Q1 10/19/2018 FOOT EXAM Q1 1/10/2019 BREAST CANCER SCRN MAMMOGRAM 2019 DTaP/Tdap/Td series (2 - Td) 3/31/2026 Allergies as of 2018  Review Complete On: 2018 By: Peter Fernandez MD  
  
 Severity Noted Reaction Type Reactions Latex  2014    Hives Other Food High 09/05/2014    Anaphylaxis PEPPERONI, sloppy joes Demerol [Meperidine] High 10/17/2010   Systemic Anaphylaxis Difficulty breathing Iodine High 10/17/2010   Systemic Anaphylaxis Morphine High 10/17/2010   Systemic Other (comments)  
 voilent outbreaks (restraints needed) Nsaids (Non-steroidal Anti-inflammatory Drug)  10/19/2017    Hives Current Immunizations  Reviewed on 1/25/2018 Name Date Influenza Vaccine (Quad) PF 10/19/2017 Pneumococcal Polysaccharide (PPSV-23) 10/19/2016 Tdap 3/31/2016 Not reviewed this visit You Were Diagnosed With   
  
 Codes Comments Primary insomnia    -  Primary ICD-10-CM: F51.01 
ICD-9-CM: 307.42 Essential hypertension     ICD-10-CM: I10 
ICD-9-CM: 401.9 Diabetes mellitus without complication ([facility]     BQF-56-BR: E11.9 ICD-9-CM: 250.00 Encounter for immunization     ICD-10-CM: P50 ICD-9-CM: V03.89 Hypercholesteremia     ICD-10-CM: E78.00 ICD-9-CM: 272.0 Vitals BP Pulse Temp Resp Height(growth percentile) Weight(growth percentile) 109/65 (BP 1 Location: Left arm, BP Patient Position: At rest) 79 97.8 °F (36.6 °C) (Oral) 14 5' 8\" (1.727 m) 201 lb 3.2 oz (91.3 kg) LMP SpO2 BMI OB Status Smoking Status 01/17/2010 94% 30.59 kg/m2 Postmenopausal Current Every Day Smoker Vitals History BMI and BSA Data Body Mass Index Body Surface Area 30.59 kg/m 2 2.09 m 2 Preferred Pharmacy Pharmacy Name Phone [address] [address] [phone] Your Updated Medication List  
  
   
This list is accurate as of 2/22/18  1:31 PM.  Always use your most recent med list.  
  
  
  
  
 albuterol 2.5 mg /3 mL (0.083 %) nebulizer solution Commonly known as:  PROVENTIL VENTOLIN  
3 mL by Nebulization route every four (4) hours as needed for Wheezing or Shortness of Breath. ARIPiprazole 2 mg tablet Commonly known as:  ABILIFY Take 1 Tab by mouth daily. Indications: DEPRESSION TREATMENT ADJUNCT  
  
 BENADRYL 25 mg capsule Generic drug:  diphenhydrAMINE Take 25 mg by mouth every six (6) hours as needed. budesonide-formoterol 160-4.5 mcg/actuation Hfaa Commonly known as:  SYMBICORT Take 2 Puffs by inhalation two (2) times a day. Indications: COPD ASSOCIATED WITH CHRONIC BRONCHITIS, EXTRINSIC ASTHMA BYSTOLIC 10 mg tablet Generic drug:  nebivolol Take 2 tablets PO every AM  
  
 cholecalciferol 1,000 unit tablet Commonly known as:  VITAMIN D3 Take 400 Units by mouth daily. 2 CAPS DAILY EPINEPHrine 0.3 mg/0.3 mL injection Commonly known as:  EPIPEN  
0.3 mg by IntraMUSCular route once as needed. ergocalciferol 50,000 unit capsule Commonly known as:  ERGOCALCIFEROL Take 1 capsule by mouth every seven (7) days. fluticasone 50 mcg/actuation nasal spray Commonly known as:  FLONASE  
2 sprays daily on each nostril for the next 8 weeks then 2 sprays on each nostril every other day for the next 4 weeks and thereafter 2 sprays on each nostril as needed  
  
 glucose blood VI test strips strip Commonly known as:  EasyMax N  
Use to test blood sugar 4 times a day  
  
 ipratropium 0.02 % Soln Commonly known as:  ATROVENT  
2.5 mL by Nebulization route every four (4) hours as needed. Liraglutide 0.6 mg/0.1 mL (18 mg/3 mL) Pnij Commonly known as:  VICTOZA 3-ESTEPHANIE  
1.8 mg by SubCUTAneous route daily. Titrate up to 1.8mg daily as direcected  
  
 metFORMIN  mg tablet Commonly known as:  GLUCOPHAGE XR Take 1 Tab by mouth Before breakfast and dinner. montelukast 10 mg tablet Commonly known as:  SINGULAIR Take 1 Tab by mouth daily. multivitamin tablet Commonly known as:  ONE A DAY Take 1 Tab by mouth daily. NovoLIN R Regular U-100 Insuln 100 unit/mL injection Generic drug:  insulin regular Omega-3-DHA-EPA-Fish Oil 1,000 mg (120 mg-180 mg) Cap Take  by mouth. Take 2 po daily  
  
 ondansetron 4 mg disintegrating tablet Commonly known as:  ZOFRAN ODT Take 1 Tab by mouth every eight (8) hours as needed for Nausea. Oxygen O2 at 2 Liters NC while asleep  
  
 pantoprazole 40 mg tablet Commonly known as:  PROTONIX Take (1) tablet by mouth once a day. prednisoLONE acetate 1 % ophthalmic suspension Commonly known as:  PRED FORTE  
  
 * QUEtiapine 25 mg tablet Commonly known as:  SEROquel * QUEtiapine 50 mg tablet Commonly known as:  SEROquel Take  by mouth daily. rosuvastatin 40 mg tablet Commonly known as:  CRESTOR Take 1 Tab by mouth nightly. SURE COMFORT INSULIN SYRINGE 1 mL 30 gauge x 5/16 Syrg Generic drug:  Insulin Syringe-Needle U-100  
  
 tiotropium 18 mcg inhalation capsule Commonly known as:  [address] Take 1 Cap by inhalation daily. TOUJEO SOLOSTAR U-300 INSULIN 300 unit/mL (1.5 mL) Inpn Generic drug:  insulin glargine 65 Units by SubCUTAneous route daily. * Notice: This list has 2 medication(s) that are the same as other medications prescribed for you. Read the directions carefully, and ask your doctor or other care provider to review them with you. Prescriptions Sent to Pharmacy Refills BYSTOLIC 10 mg tablet 3 Sig: Take 2 tablets PO every AM  
 Class: Normal  
 Pharmacy: [address] [place] [address] Ph #: [phone]  
 pantoprazole (PROTONIX) 40 mg tablet 1 Sig: Take (1) tablet by mouth once a day. Class: Normal  
 Pharmacy: [address] [place] [address] Ph #: [phone] We Performed the Following REFERRAL TO SLEEP STUDIES [REF99 Custom] Comments:  
 Please evaluate patient for r/o SANJUANA Follow-up Instructions Return in about 3 months (around 5/22/2018), or if symptoms worsen or fail to improve. Referral Information Referral ID Referred By Referred To  
  
 [id number] Leroy Deidre Not Available Visits Status Start Date End Date 1 New Request 2/22/18 2/22/19 If your referral has a status of pending review or denied, additional information will be sent to support the outcome of this decision. Introducing [facility]! Dear Tia Cardoza: 
Thank you for requesting a Kitchon account. Our records indicate that you already have an active Kitchon account. You can access your account anytime at [email or web address] Did you know that you can access your hospital and ER discharge instructions at any time in Kitchon? You can also review all of your test results from your hospital stay or ER visit. Additional Information If you have questions, please visit the Frequently Asked Questions section of the Kitchon website at [email or web address] Remember, Kitchon is NOT to be used for urgent needs. For medical emergencies, dial 911. Now available from your iPhone and Android! Please provide this summary of care documentation to your next provider. Your primary care clinician is listed as Ana Parekh. If you have any questions after today's visit, please call [phone].

## 2018-02-22 NOTE — PROGRESS NOTES
HISTORY OF PRESENT ILLNESS  Erich Smith is a 65 y.o. female. HPI    had uri and nausea wit body ache went to ER was told to have no flu at that time, today stating that went back on smoking but also stating that she stopped 2 weeks ago, no etoh, but also stating that she is feeling much better, but c/o lack os sleep and congestions, has seen the psych but also stating that she does snore nightly and unable to get a good night of sleep, last month pt has received couple dosages of ABX, steroids,         Current Outpatient Prescriptions   Medication Sig Dispense Refill    QUEtiapine (SEROQUEL) 50 mg tablet Take  by mouth daily.  VIIBRYD 20 mg tab tablet Take  by mouth daily.  BYSTOLIC 10 mg tablet Take 2 tablets PO every AM 60 Tab 3    levocetirizine (XYZAL) 5 mg tablet Take 1 Tab by mouth nightly. 30 Tab 1    pantoprazole (PROTONIX) 40 mg tablet Take (1) tablet by mouth once a day. 30 Tab 1    SURE COMFORT INSULIN SYRINGE 1 mL 30 gauge x 5/16 syrg       albuterol (PROVENTIL VENTOLIN) 2.5 mg /3 mL (0.083 %) nebulizer solution 3 mL by Nebulization route every four (4) hours as needed for Wheezing or Shortness of Breath. 24 Each 3    budesonide-formoterol (SYMBICORT) 160-4.5 mcg/actuation HFAA Take 2 Puffs by inhalation two (2) times a day. Indications: COPD ASSOCIATED WITH CHRONIC BRONCHITIS, EXTRINSIC ASTHMA 1 Inhaler 6    montelukast (SINGULAIR) 10 mg tablet Take 1 Tab by mouth daily. 30 Tab 6    fluticasone (FLONASE) 50 mcg/actuation nasal spray 2 sprays daily on each nostril for the next 8 weeks then 2 sprays on each nostril every other day for the next 4 weeks and thereafter 2 sprays on each nostril as needed 1 Bottle 6    ipratropium (ATROVENT) 0.02 % soln 2.5 mL by Nebulization route every four (4) hours as needed. 70 Vial 7    albuterol (PROVENTIL HFA) 90 mcg/actuation inhaler Take 1 Puff by inhalation every four (4) hours as needed.  1 Inhaler 1    ARIPiprazole (ABILIFY) 2 mg tablet Take 1 Tab by mouth daily. Indications: DEPRESSION TREATMENT ADJUNCT 30 Tab 2    Liraglutide (VICTOZA 3-ESTEPHANIE) 0.6 mg/0.1 mL (18 mg/3 mL) pnij 1.8 mg by SubCUTAneous route daily. Titrate up to 1.8mg daily as direcected 3 Pen 7    metFORMIN ER (GLUCOPHAGE XR) 500 mg tablet Take 1 Tab by mouth Before breakfast and dinner. 180 Tab 3    Oxygen O2 at 2 Liters NC while asleep      multivitamin (ONE A DAY) tablet Take 1 Tab by mouth daily.  Omega-3-DHA-EPA-Fish Oil 1,000 mg (120 mg-180 mg) cap Take  by mouth. Take 2 po daily      TOUJEO SOLOSTAR 300 unit/mL (1.5 mL) inpn 65 Units by SubCUTAneous route daily. 15 Pen 3    tiotropium (SPIRIVA WITH HANDIHALER) 18 mcg inhalation capsule Take 1 Cap by inhalation daily. 10 Cap 0    glucose blood VI test strips (EASYMAX N) strip Use to test blood sugar 4 times a day 200 Strip 3    rosuvastatin (CRESTOR) 40 mg tablet Take 1 Tab by mouth nightly. 90 Tab 3    EPINEPHrine (EPIPEN) 0.3 mg/0.3 mL injection 0.3 mg by IntraMUSCular route once as needed.  ergocalciferol (ERGOCALCIFEROL) 50,000 unit capsule Take 1 capsule by mouth every seven (7) days. 12 capsule 3    diphenhydrAMINE (BENADRYL) 25 mg capsule Take 25 mg by mouth every six (6) hours as needed.  cholecalciferol, vitamin d3, (VITAMIN D3) 1,000 unit tablet Take 400 Units by mouth daily. 2 CAPS DAILY      NOVOLIN R REGULAR U-100 INSULN 100 unit/mL injection       QUEtiapine (SEROQUEL) 25 mg tablet       prednisoLONE acetate (PRED FORTE) 1 % ophthalmic suspension       chlorpheniramine (CHLOR-TRIMETRON) 4 mg tablet Take 2 Tabs by mouth nightly as needed for Allergies or Rhinitis. Indications: Cough (Patient not taking: Reported on 2/22/2018) 30 Tab 1    guaiFENesin ER (MUCINEX) 600 mg ER tablet Take 2 Tabs by mouth two (2) times a day.  Indications: COLD SYMPTOMS, Cough (Patient not taking: Reported on 2/22/2018) 60 Tab 0    ondansetron (ZOFRAN ODT) 4 mg disintegrating tablet Take 1 Tab by mouth every eight (8) hours as needed for Nausea.  (Patient not taking: Reported on 2018) 10 Tab 0     Allergies   Allergen Reactions    Latex Hives    Other Food Anaphylaxis     PEPPERFLORI mihai beckeres    Demerol [Meperidine] Anaphylaxis     Difficulty breathing    Iodine Anaphylaxis    Morphine Other (comments)     voilent outbreaks (restraints needed)    Nsaids (Non-Steroidal Anti-Inflammatory Drug) Hives     Past Medical History:   Diagnosis Date    Asthma     Asthma     COPD     Depression     Diabetes ([facility] [address] [facility]     Diabetes mellitus     Encounter for review of form with patient 2017    Essential hypertension     GERD (gastroesophageal reflux disease)     Headache(784.0)     HX OTHER MEDICAL     acoustic neuroma    Hypercholesterolemia     Hypertension     Ill-defined condition     R ACOUSTIC NEUROMA    Insomnia disorder 2017    Major depression, chronic 2017    Psychiatric problem     anxiety depression    Psychotic disorder     Tobacco use disorder 10/19/2017    Unspecified sleep apnea     NO CPAP-O2 @2L WHEN SLEEPING     Past Surgical History:   Procedure Laterality Date     DELIVERY ONLY      HX GASTRECTOMY  14    lap sleeve gastrectomy by Dr Patrick Ivy      2 c-sections    HX HEENT      sinus    HX HEENT      tracheal resection    HX HEENT      tracheal alleratation x 2    HX HEENT      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    HX HEENT      LASER SX-PENG     Family History   Problem Relation Age of Onset    Diabetes Father     Heart Failure Father     Heart Disease Father     Hypertension Father     High Cholesterol Mother     Suicide Brother     Stroke Maternal Grandmother     Cancer Paternal Grandfather     Anesth Problems Neg Hx      Social History   Substance Use Topics    Smoking status: Current Every Day Smoker     Packs/day: 1.00     Years: 12.00    Smokeless tobacco: Never Used    Alcohol use Yes      Comment: [facility]      Lab Results  Component Value Date/Time   WBC 7.0 01/23/2018 12:50 AM   HGB 13.2 01/23/2018 12:50 AM   HCT 37.7 01/23/2018 12:50 AM   PLATELET 751 51/44/0972 12:50 AM   MCV 87.3 01/23/2018 12:50 AM     Lab Results  Component Value Date/Time   Hemoglobin A1c 8.6 (H) 10/19/2017 10:29 AM   Hemoglobin A1c 8.3 (H) 12/18/2014 12:29 PM   Hemoglobin A1c 10.1 (H) 10/18/2010 04:15 AM   Hemoglobin A1c, External 7.5% 04/26/2017   Glucose 248 (H) 01/23/2018 12:50 AM   Glucose (POC) 102 (H) 10/03/2017 07:49 PM   Microalb/Creat ratio (ug/mg creat.) 30.9 (H) 10/19/2017 10:29 AM   Creatinine 0.82 01/23/2018 12:50 AM      Lab Results  Component Value Date/Time   Cholesterol, total 138 10/19/2017 10:29 AM   HDL Cholesterol 51 10/19/2017 10:29 AM   LDL-C, External 152 04/26/2017     Lab Results  Component Value Date/Time   TSH 1.320 10/19/2017 10:29 AM   T4, Free 0.9 09/05/2014 09:23 AM         Review of Systems   Constitutional: Negative for chills, fever and malaise/fatigue. HENT: Negative for congestion and nosebleeds. Eyes: Negative for blurred vision and pain. Respiratory: Negative for shortness of breath and wheezing. Cardiovascular: Negative for chest pain, palpitations and leg swelling. Gastrointestinal: Negative for constipation, diarrhea, nausea and vomiting. Genitourinary: Negative for dysuria and frequency. Musculoskeletal: Negative for joint pain and myalgias. Skin: Negative for itching and rash. Neurological: Negative for dizziness, loss of consciousness and headaches. Endo/Heme/Allergies: Does not bruise/bleed easily. Psychiatric/Behavioral: Negative for depression. The patient is not nervous/anxious and does not have insomnia. All other systems reviewed and are negative. Physical Exam   Constitutional: She is oriented to person, place, and time. She appears well-developed and well-nourished. HENT:   Head: Normocephalic and atraumatic.    Eyes: Conjunctivae and EOM are normal. Pupils are equal, round, and reactive to light. Neck: No JVD present. No thyromegaly present. Cardiovascular: Normal rate, regular rhythm, normal heart sounds and intact distal pulses. Exam reveals no gallop and no friction rub. No murmur heard. Pulmonary/Chest: No stridor. No respiratory distress. She has no wheezes. She has no rales. Abdominal: Soft. Bowel sounds are normal. She exhibits no distension and no mass. There is no tenderness. Musculoskeletal: Normal range of motion. She exhibits no edema or tenderness. Lymphadenopathy:     She has no cervical adenopathy. Neurological: She is alert and oriented to person, place, and time. She has normal reflexes. No cranial nerve deficit. Skin: No rash noted. No erythema. Psychiatric: She has a normal mood and affect. Her behavior is normal.   Nursing note and vitals reviewed. ASSESSMENT and PLAN  Diagnoses and all orders for this visit:    1. Primary insomnia  -     REFERRAL TO SLEEP STUDIES    2. Essential hypertension  -     BYSTOLIC 10 mg tablet; Take 2 tablets PO every AM  -     REFERRAL TO SLEEP STUDIES    3. Diabetes mellitus without complication (HCC)  -     BYSTOLIC 10 mg tablet; Take 2 tablets PO every AM  -     REFERRAL TO SLEEP STUDIES    4. Encounter for immunization  -     BYSTOLIC 10 mg tablet; Take 2 tablets PO every AM  -     REFERRAL TO SLEEP STUDIES    5. Hypercholesteremia  -     BYSTOLIC 10 mg tablet; Take 2 tablets PO every AM  -     REFERRAL TO SLEEP STUDIES    Other orders  -     budesonide-formoterol (SYMBICORT) 160-4.5 mcg/actuation HFAA; Take 2 Puffs by inhalation two (2) times a day. Indications: COPD ASSOCIATED WITH CHRONIC BRONCHITIS, EXTRINSIC ASTHMA  -     ipratropium (ATROVENT) 0.02 % soln; 2.5 mL by Nebulization route every four (4) hours as needed. -     pantoprazole (PROTONIX) 40 mg tablet; Take (1) tablet by mouth once a day.       At this time patient was told to lose weight, so that the current body mass index would get into a close to 25 or between 20-25, patient was told that the patient can achieve this by starting an active life style modifications, working on the diet, increase physical activity, limit alcohol consumption, stop secondhand tobacco exposure,    for which includes creating a an interesting delightful to do list,  such as start of a light physical activity with a brisk daily walking 30 minutes most days of the week, most likely to total of 150 minutes per week, then the patient was told to try to avoid fatty fast foods, have a low-fat low-cholesterol diet, include seafood such as adding fatty fish such as G[place]a Huddle, Mackerel, [place] to the diet, increase vegetables and fruits, nuts 3-4 times per week and finally have a low-salt and K rich food intake for a good 4-6 months possibly for ever for the best outcome, patient was told that either a DASH diet or Mediterranean diet but satisfies the need     Routine labs ordered, and the needed abnormal labs will be discussed soon and they can be repeated in 3-6 months. In addition relevant handouts were given to the patient for a better understanding,    patient was told to call if any problems. Patient acknowledged understanding and  agreed with today's recommendations.

## 2018-03-05 ENCOUNTER — OFFICE VISIT (OUTPATIENT)
Dept: BEHAVIORAL/MENTAL HEALTH CLINIC | Age: 58
End: 2018-03-05

## 2018-03-05 VITALS
DIASTOLIC BLOOD PRESSURE: 85 MMHG | BODY MASS INDEX: 30.01 KG/M2 | HEIGHT: 68 IN | SYSTOLIC BLOOD PRESSURE: 133 MMHG | WEIGHT: 198 LBS | HEART RATE: 97 BPM

## 2018-03-05 DIAGNOSIS — G47.00 INSOMNIA, UNSPECIFIED TYPE: ICD-10-CM

## 2018-03-05 DIAGNOSIS — F33.1 MODERATE EPISODE OF RECURRENT MAJOR DEPRESSIVE DISORDER (HCC): Primary | ICD-10-CM

## 2018-03-05 DIAGNOSIS — F41.9 ANXIETY: ICD-10-CM

## 2018-03-05 RX ORDER — QUETIAPINE FUMARATE 25 MG/1
25 TABLET, FILM COATED ORAL
Qty: 30 TAB | Refills: 2 | Status: SHIPPED | OUTPATIENT
Start: 2018-03-05 | End: 2018-06-11 | Stop reason: SDUPTHER

## 2018-03-05 RX ORDER — QUETIAPINE FUMARATE 50 MG/1
50 TABLET, FILM COATED ORAL DAILY
Qty: 30 TAB | Refills: 2 | Status: SHIPPED | OUTPATIENT
Start: 2018-03-05 | End: 2018-06-11 | Stop reason: SDUPTHER

## 2018-03-05 RX ORDER — ARIPIPRAZOLE 5 MG/1
5 TABLET ORAL DAILY
Qty: 30 TAB | Refills: 2 | Status: SHIPPED | OUTPATIENT
Start: 2018-03-05 | End: 2018-04-04 | Stop reason: SDUPTHER

## 2018-03-05 NOTE — MR AVS SNAPSHOT
[address] [facility]. 
[phone] Patient: Bisi Wise MRN:  [id number] Visit Information Date & Time Provider Department Dept. Phone Encounter #  
 3/5/2018  9:00 AM Sharla Scott MD [facility] [facility] [phone] [id number] Your Appointments 4/4/2018  8:30 AM  
ESTABLISHED PATIENT with Sharla Scott MD  
[address]) Appt Note: 4 week f/u  
 [address] [facility] [address] [phone]  
[address] [place] [address] [address] [phone]  
  
    
 4/11/2018  2:50 PM  
Follow Up with Rozella Harada, MD  
[facility] [address] [place] [address]) Appt Note: 3 month follow up  
 [address] [facility] [address] [phone] [address] [place] [address]  
  
    
 5/22/2018 10:45 AM  
ROUTINE CARE with Chanel Jimenez MD  
[address] [facility] ([address]) Appt Note: 3 mnth fu  
 [address] "[employer] 7 [phone] [phone] [address] [phone]  
  
    
 5/30/2018  2:40 PM  
New Patient with Ada Shaw MD  
[address] [place] [address] ([address] [place], [place] [address]) Appt Note: NP, refd by Dr. Pretty Santos hypertension, Primary insomnia [address] [facility] [address] [phone] [address] [facility] [address] [address] 7/16/2018  9:45 AM  
ROUTINE CARE with Chanel Jimenez MD  
[address] [facility] ([address]) Appt Note: 6 mo f/u  
 [address] [phone] [phone] Upcoming Health Maintenance Date Due  
 PAP AKA CERVICAL CYTOLOGY 3/23/1982 FOBT Q 1 YEAR AGE 50-75 3/23/2011 EYE EXAM RETINAL OR DILATED Q1 3/27/2018 HEMOGLOBIN A1C Q6M 7/10/2018 MICROALBUMIN Q1 10/19/2018 LIPID PANEL Q1 10/19/2018 FOOT EXAM Q1 1/10/2019 BREAST CANCER SCRN MAMMOGRAM 12/8/2019 DTaP/Tdap/Td series (2 - Td) 3/31/2026 Allergies as of 3/5/2018  Review Complete On: 3/5/2018 By: [place] MD  
  
 Severity Noted Reaction Type Reactions Latex  09/05/2014    Hives Other Food High 09/05/2014    Anaphylaxis PEPPERONI, sloppy joes Demerol [Meperidine] High 10/17/2010   Systemic Anaphylaxis Difficulty breathing Iodine High 10/17/2010   Systemic Anaphylaxis Morphine High 10/17/2010   Systemic Other (comments)  
 voilent outbreaks (restraints needed) Nsaids (Non-steroidal Anti-inflammatory Drug)  10/19/2017    Hives Current Immunizations  Reviewed on 1/25/2018 Name Date Influenza Vaccine (Quad) PF 10/19/2017 Pneumococcal Polysaccharide (PPSV-23) 10/19/2016 Tdap 3/31/2016 Not reviewed this visit Vitals BP Pulse Height(growth percentile) Weight(growth percentile) LMP BMI  
 133/85 97 5' 8\" (1.727 m) 198 lb (89.8 kg) 01/17/2010 30.11 kg/m2 OB Status Smoking Status Postmenopausal Current Every Day Smoker BMI and BSA Data Body Mass Index Body Surface Area  
 30.11 kg/m 2 2.08 m 2 Preferred Pharmacy Pharmacy Name Phone [address] [address] [phone] Your Updated Medication List  
  
   
This list is accurate as of 3/5/18  9:16 AM.  Always use your most recent med list.  
  
  
  
  
 albuterol 2.5 mg /3 mL (0.083 %) nebulizer solution Commonly known as:  PROVENTIL VENTOLIN  
3 mL by Nebulization route every four (4) hours as needed for Wheezing or Shortness of Breath. ARIPiprazole 5 mg tablet Commonly known as:  ABILIFY Take 1 Tab by mouth daily.  Indications: DEPRESSION TREATMENT ADJUNCT  
  
 BENADRYL 25 mg capsule Generic drug:  diphenhydrAMINE Take 25 mg by mouth every six (6) hours as needed. budesonide-formoterol 160-4.5 mcg/actuation Hfaa Commonly known as:  SYMBICORT Take 2 Puffs by inhalation two (2) times a day. Indications: COPD ASSOCIATED WITH CHRONIC BRONCHITIS, EXTRINSIC ASTHMA BYSTOLIC 10 mg tablet Generic drug:  nebivolol Take 2 tablets PO every AM  
  
 cholecalciferol 1,000 unit tablet Commonly known as:  VITAMIN D3 Take 400 Units by mouth daily. 2 CAPS DAILY EPINEPHrine 0.3 mg/0.3 mL injection Commonly known as:  EPIPEN  
0.3 mg by IntraMUSCular route once as needed. ergocalciferol 50,000 unit capsule Commonly known as:  ERGOCALCIFEROL Take 1 capsule by mouth every seven (7) days. fluticasone 50 mcg/actuation nasal spray Commonly known as:  FLONASE  
2 sprays daily on each nostril for the next 8 weeks then 2 sprays on each nostril every other day for the next 4 weeks and thereafter 2 sprays on each nostril as needed  
  
 glucose blood VI test strips strip Commonly known as:  EasyMax N  
Use to test blood sugar 4 times a day  
  
 ipratropium 0.02 % Soln Commonly known as:  ATROVENT  
2.5 mL by Nebulization route every four (4) hours as needed. Liraglutide 0.6 mg/0.1 mL (18 mg/3 mL) Pnij Commonly known as:  VICTOZA 3-ESTEPHANIE  
1.8 mg by SubCUTAneous route daily. Titrate up to 1.8mg daily as direcected  
  
 metFORMIN  mg tablet Commonly known as:  GLUCOPHAGE XR Take 1 Tab by mouth Before breakfast and dinner. montelukast 10 mg tablet Commonly known as:  SINGULAIR Take 1 Tab by mouth daily. multivitamin tablet Commonly known as:  ONE A DAY Take 1 Tab by mouth daily. NovoLIN R Regular U-100 Insuln 100 unit/mL injection Generic drug:  insulin regular Omega-3-DHA-EPA-Fish Oil 1,000 mg (120 mg-180 mg) Cap Take  by mouth. Take 2 po daily  
  
 ondansetron 4 mg disintegrating tablet Commonly known as:  ZOFRAN ODT Take 1 Tab by mouth every eight (8) hours as needed for Nausea. Oxygen O2 at 2 Liters NC while asleep  
  
 pantoprazole 40 mg tablet Commonly known as:  PROTONIX Take (1) tablet by mouth once a day. prednisoLONE acetate 1 % ophthalmic suspension Commonly known as:  PRED FORTE  
  
 * QUEtiapine 50 mg tablet Commonly known as:  SEROquel Take 1 Tab by mouth daily. Indications: DEPRESSION TREATMENT ADJUNCT  
  
 * QUEtiapine 25 mg tablet Commonly known as:  SEROquel Take 1 Tab by mouth nightly. Indications: DEPRESSION TREATMENT ADJUNCT  
  
 rosuvastatin 40 mg tablet Commonly known as:  CRESTOR Take 1 Tab by mouth nightly. SURE COMFORT INSULIN SYRINGE 1 mL 30 gauge x 5/16 Syrg Generic drug:  Insulin Syringe-Needle U-100  
  
 tiotropium 18 mcg inhalation capsule Commonly known as:  [address] [place] [address] Drive Take 1 Cap by inhalation daily. TOUJEO SOLOSTAR U-300 INSULIN 300 unit/mL (1.5 mL) Inpn Generic drug:  insulin glargine 65 Units by SubCUTAneous route daily. * Notice: This list has 2 medication(s) that are the same as other medications prescribed for you. Read the directions carefully, and ask your doctor or other care provider to review them with you. Prescriptions Sent to Pharmacy Refills ARIPiprazole (ABILIFY) 5 mg tablet 2 Sig: Take 1 Tab by mouth daily. Indications: DEPRESSION TREATMENT ADJUNCT Class: Normal  
 Pharmacy: [address] [place] [address] Ph #: [phone] Route: Oral  
 QUEtiapine (SEROQUEL) 50 mg tablet 2 Sig: Take 1 Tab by mouth daily. Indications: DEPRESSION TREATMENT ADJUNCT Class: Normal  
 Pharmacy: [address] [place] [address] Ph #: [phone] Route: Oral  
 QUEtiapine (SEROQUEL) 25 mg tablet 2 Sig: Take 1 Tab by mouth nightly. Indications: DEPRESSION TREATMENT ADJUNCT  Class: Normal  
 Pharmacy: [address]  #: [phone] Route: Oral  
  
Introducing [facility]! Dear Larissa Garcia: 
Thank you for requesting a [employer] account. Our records indicate that you already have an active [employer] account. You can access your account anytime at [email or web address] Did you know that you can access your hospital and ER discharge instructions at any time in [employer]? You can also review all of your test results from your hospital stay or ER visit. Additional Information If you have questions, please visit the Frequently Asked Questions section of the [employer] website at [email or web address] Remember, [employer] is NOT to be used for urgent needs. For medical emergencies, dial 911. Now available from your iPhone and Android! Please provide this summary of care documentation to your next provider. Your primary care clinician is listed as Oswaldo Hodges. If you have any questions after today's visit, please call [phone].

## 2018-03-05 NOTE — PROGRESS NOTES
Delmer Arelis  1960  65 y.o.  female  [facility]    Chief Complaint   Patient presents with    Insomnia    Depression    Anxiety    Nicotine Dependence       [place]:   This is a 65years old divorce  female with past psychiatric history and treatment of manic depression who was seen for the first time on December 28, 2017 referred by her PCP to manage her complicated psychiatric medications. She decided to gradually taper and discontinue Viibryd and high dose Paxil and try Abilify 1-2 mg daily and take Seroquel  mg at nighttime to help with sleep. She was seen for follow-up on February 1, 2018 when she decided to gradually increase Abilify to 2 mg and Seroquel to 100 mg. She presented on time and was observed to be tired and exhausted and informed me that she is feeling no different than when she come to see me on first time. Patient report compliance with Abilify 2 mg in morning and she is taking Seroquel 50 mg at bedtime as when she took 100 mg she was sleeping 14 hours a day but on 50 mg she sleeps more but not getting enough resting sleep so she has to take 2 naps each 2 hours for the total of 4 hours in daytime. She said that she cannot resist sleeping and have to sleep 4 hours in daytime to make up for the loss of sleep at nighttime. She has an appointment for sleep study on May 30 and agreed to check with them on regular basis to get an earlier appointment as she complained about cannot resist daytime sleep. She also agreed to try Seroquel 75 mg to see that whether that is this is the right dose.     Patient was alert awake oriented to time person and place and was anxious and tired but cooperative, polite, and pleasant during the interview.  She informed last time that she have more energy in daytime and actually she clean out her closet and report 35% benefit with her mood but today she denies any improvement indicating that she would need higher dose of Abilify and agreed to try 2.5 mg for few days and increasing to 5 mg until return to see me in 4 weeks. She was provided with support and psychoeducation. Mary Kohli denies any substance abuse at this time.        SUBSTANCE USE HISTORY:   TOBACCO: Smoked 1 pack per day for past 35 years and expressed desire to get help to quit his smoking but first would like to stabilize on her medications. ALCOHOL: Patient denies abuse. CANNABIS: Tried few times but never abused it. COCAINE, OPIOIDS, and OTHERS: Denies abuse. MEDICAL HISTORY:    has a past medical history of Asthma; Asthma; COPD; Depression; Diabetes ([facility] [address] Diabetes mellitus; Encounter for review of form with patient (12/4/2017); Essential hypertension; GERD (gastroesophageal reflux disease); Headache(784.0); OTHER MEDICAL; Hypercholesterolemia; Hypertension; Ill-defined condition; Insomnia disorder (12/4/2017); Major depression, chronic (12/4/2017); Psychiatric problem; Psychotic disorder; Tobacco use disorder (10/19/2017); and Unspecified sleep apnea. ALLERGIES:   Allergies   Allergen Reactions    Latex Hives    Other Food Anaphylaxis     PEPPERmihai RUBY joes    Demerol [Meperidine] Anaphylaxis     Difficulty breathing    Iodine Anaphylaxis    Morphine Other (comments)     voilent outbreaks (restraints needed)    Nsaids (Non-Steroidal Anti-Inflammatory Drug) Hives       VITAL SIGNS:  /85  Pulse 97  Ht 5' 8\" (1.727 m)  Wt 89.8 kg (198 lb)  LMP 01/17/2010  BMI 30.11 kg/m2    Current Outpatient Prescriptions   Medication Sig Dispense Refill    ARIPiprazole (ABILIFY) 5 mg tablet Take 1 Tab by mouth daily. Indications: DEPRESSION TREATMENT ADJUNCT 30 Tab 2    QUEtiapine (SEROQUEL) 50 mg tablet Take 1 Tab by mouth daily. Indications: DEPRESSION TREATMENT ADJUNCT 30 Tab 2    QUEtiapine (SEROQUEL) 25 mg tablet Take 1 Tab by mouth nightly.  Indications: DEPRESSION TREATMENT ADJUNCT 30 Tab 2    NOVOLIN R REGULAR U-100 INSULN 100 unit/mL injection       BYSTOLIC 10 mg tablet Take 2 tablets PO every AM 60 Tab 3    budesonide-formoterol (SYMBICORT) 160-4.5 mcg/actuation HFAA Take 2 Puffs by inhalation two (2) times a day. Indications: COPD ASSOCIATED WITH CHRONIC BRONCHITIS, EXTRINSIC ASTHMA 1 Inhaler 6    ipratropium (ATROVENT) 0.02 % soln 2.5 mL by Nebulization route every four (4) hours as needed. 70 Vial 7    pantoprazole (PROTONIX) 40 mg tablet Take (1) tablet by mouth once a day. 30 Tab 1    prednisoLONE acetate (PRED FORTE) 1 % ophthalmic suspension       SURE COMFORT INSULIN SYRINGE 1 mL 30 gauge x 5/16 syrg       albuterol (PROVENTIL VENTOLIN) 2.5 mg /3 mL (0.083 %) nebulizer solution 3 mL by Nebulization route every four (4) hours as needed for Wheezing or Shortness of Breath. 24 Each 3    montelukast (SINGULAIR) 10 mg tablet Take 1 Tab by mouth daily. 30 Tab 6    fluticasone (FLONASE) 50 mcg/actuation nasal spray 2 sprays daily on each nostril for the next 8 weeks then 2 sprays on each nostril every other day for the next 4 weeks and thereafter 2 sprays on each nostril as needed 1 Bottle 6    ondansetron (ZOFRAN ODT) 4 mg disintegrating tablet Take 1 Tab by mouth every eight (8) hours as needed for Nausea. 10 Tab 0    Liraglutide (VICTOZA 3-ESTEPHANIE) 0.6 mg/0.1 mL (18 mg/3 mL) pnij 1.8 mg by SubCUTAneous route daily. Titrate up to 1.8mg daily as direcected 3 Pen 7    metFORMIN ER (GLUCOPHAGE XR) 500 mg tablet Take 1 Tab by mouth Before breakfast and dinner. 180 Tab 3    Oxygen O2 at 2 Liters NC while asleep      multivitamin (ONE A DAY) tablet Take 1 Tab by mouth daily.  Omega-3-DHA-EPA-Fish Oil 1,000 mg (120 mg-180 mg) cap Take  by mouth. Take 2 po daily      TOUJEO SOLOSTAR 300 unit/mL (1.5 mL) inpn 65 Units by SubCUTAneous route daily. 15 Pen 3    tiotropium (SPIRIVA WITH HANDIHALER) 18 mcg inhalation capsule Take 1 Cap by inhalation daily.  10 Cap 0    glucose blood VI test strips (EASYMAX N) strip Use to test blood sugar 4 times a day 200 Strip 3    rosuvastatin (CRESTOR) 40 mg tablet Take 1 Tab by mouth nightly. 90 Tab 3    ergocalciferol (ERGOCALCIFEROL) 50,000 unit capsule Take 1 capsule by mouth every seven (7) days. 12 capsule 3    diphenhydrAMINE (BENADRYL) 25 mg capsule Take 25 mg by mouth every six (6) hours as needed.  cholecalciferol, vitamin d3, (VITAMIN D3) 1,000 unit tablet Take 400 Units by mouth daily. 2 CAPS DAILY      EPINEPHrine (EPIPEN) 0.3 mg/0.3 mL injection 0.3 mg by IntraMUSCular route once as needed. ROS:  Constitutional: Positive for fatigue  Eyes: Negative for contacts/glasses  ENT: Positive for hoarse voice. Respiratory: Negative for cough or wheezing  Cardiovascular: Negative for chest pain, palpitations  Gastrointestinal: Negative for reflux symptoms and constipation  Genitourinary: Negative for frequency and urinary incontinence  Musculoskeletal: Negative for myalgias and arthralgias  Neurological: Positive for cognitive problems  Behavioral/psych: Positive for insomnia, anxiety, depression, negative for SI/HI  Endocrine: She have diabetes. MENTAL STATUS EXAMINATION:   Patient is a 65 y.o. female [facility] who looks older than her stated age. She is overweight, looked very tired, and had a hoarse voice. Patient appearance is appropriately dressed with fair hygiene. Speech is regular rate and rhythm, fluent language, and thought process is linear, logical, and goal directed. Reported mood is depressed with mood congruent depressed affect. Patient denies any suicidal ideation, homicidal ideation, and auditory visual hallucination. Not observed to be delusional or paranoid. Insight, judgement, reliability and impulse control is intact. Her cognition was within normal limit and she was observed to be reliable. DIAGNOSIS:  Encounter Diagnoses   Name Primary?  Moderate episode of recurrent major depressive disorder (HCC) Yes    Anxiety     Insomnia, unspecified type        PLAN:  1. MEDICATION: Increase Abilify 5 mg daily. Increase Seroquel 75 mg at bedtime. Patient was provided with psycho education, discussed risk/benefits, and expectations from medication changes. Patient agrees with plan. 2. PSYCHOTHERAPY: Patient was provided with supportive therapy, strongly encourage to seek psychotherapy. 3. MEDICAL CARE: Patient was strongly encourage to take their medical medications and follow up with their PCP on regular basis. Her CBC is significant for neutrophils 80 high and lymphocyte 11 low and CMP significant for glucose 248 high on January 23, 2018. 4. SUBSTANCE ABUSE CARE: Patient is a smoking 1 pack per day and agreed to get some help once stabilized on her psychiatric medication. 5. FOLLOW UP:   Follow-up Disposition:  Return in about 4 weeks (around 4/2/2018) for Medication management. Provided lots of support and psychoeducation.

## 2018-03-06 ENCOUNTER — HOSPITAL ENCOUNTER (OUTPATIENT)
Dept: LAB | Age: 58
Discharge: HOME | End: 2018-03-06

## 2018-03-14 DIAGNOSIS — I10 ESSENTIAL HYPERTENSION: ICD-10-CM

## 2018-03-14 DIAGNOSIS — E78.00 HYPERCHOLESTEREMIA: ICD-10-CM

## 2018-03-14 DIAGNOSIS — E11.9 DIABETES MELLITUS WITHOUT COMPLICATION (HCC): ICD-10-CM

## 2018-03-14 DIAGNOSIS — Z23 ENCOUNTER FOR IMMUNIZATION: ICD-10-CM

## 2018-03-14 RX ORDER — ALBUTEROL SULFATE 0.83 MG/ML
2.5 SOLUTION RESPIRATORY (INHALATION)
Qty: 24 EACH | Refills: 0 | Status: SHIPPED | OUTPATIENT
Start: 2018-03-14 | End: 2019-01-23

## 2018-03-15 NOTE — TELEPHONE ENCOUNTER
Returned call to [facility], spoke with Darylene Arenas,  Requesting refill on spiriva inhaler.   Med pended and sent to Dr Pina Alvarez

## 2018-03-15 NOTE — TELEPHONE ENCOUNTER
----- Message from Bryn Varela sent at 3/14/2018  4:11 PM EDT -----  Regarding: Dr. Augusto Quick requesting call back about RX that was just sent over. Albuterol, there is a question about the quantity. [phone] ask for Licha or anyone.

## 2018-04-04 ENCOUNTER — OFFICE VISIT (OUTPATIENT)
Dept: BEHAVIORAL/MENTAL HEALTH CLINIC | Age: 58
End: 2018-04-04

## 2018-04-04 VITALS
HEART RATE: 94 BPM | DIASTOLIC BLOOD PRESSURE: 64 MMHG | SYSTOLIC BLOOD PRESSURE: 113 MMHG | WEIGHT: 189.2 LBS | HEIGHT: 68 IN | BODY MASS INDEX: 28.67 KG/M2

## 2018-04-04 DIAGNOSIS — G47.00 INSOMNIA, UNSPECIFIED TYPE: ICD-10-CM

## 2018-04-04 DIAGNOSIS — F41.9 ANXIETY: ICD-10-CM

## 2018-04-04 DIAGNOSIS — F33.1 MODERATE EPISODE OF RECURRENT MAJOR DEPRESSIVE DISORDER (HCC): Primary | ICD-10-CM

## 2018-04-04 RX ORDER — ARIPIPRAZOLE 10 MG/1
10 TABLET ORAL DAILY
Qty: 30 TAB | Refills: 2 | Status: SHIPPED | OUTPATIENT
Start: 2018-04-04 | End: 2018-05-04 | Stop reason: SDUPTHER

## 2018-04-04 NOTE — MR AVS SNAPSHOT
[address] [facility] 83. 
[phone] Patient: Pat Mcdaniels MRN:  [id number] Visit Information Date & Time Provider Department Dept. Phone Encounter #  
 4/4/2018  8:30 AM Jaydon Greenberg, 75[address] [facility] [phone] [id number] Follow-up Instructions Return in about 4 weeks (around 5/2/2018) for Medication management. Follow-up and Disposition History Your Appointments 4/11/2018  2:50 PM  
Follow Up with Bolivar Berkowitz MD  
[facility] [address] [place] [address]) Appt Note: 3 month follow up  
 [address] [address] [address] [place] [address] Road  
  
    
 5/4/2018 10:30 AM  
ESTABLISHED PATIENT with Jaydon Greenberg MD  
[address]) Appt Note: 1 month f/u  
 [address] [facility] 83.  
[phone]  
  
   
 [address] [address]  
  
    
 5/22/2018 10:45 AM  
ROUTINE CARE with Carlton Parikh MD  
69 [facility] OFFICE-ANNEX ([address] [place] [address] Road) Appt Note: 3 mnth fu  
 [address] [facility] [phone]  
[phone] [address] [phone]  
  
    
 5/30/2018  2:40 PM  
New Patient with Quentin Whitney MD  
9[address] [facility] ([address] [place] [address]) Appt Note: NP, refd by Dr. Joeline Cabot hypertension, Primary insomnia [address] [address] 9[address] [facility]., [address] [address] 7/16/2018  9:45 AM  
ROUTINE CARE with Carlton Parikh MD  
[facility] ([address] [place] [address] Road) Appt Note: 6 mo f/u  
 [address] 7 [phone]  
[phone] Upcoming Health Maintenance Date Due  
 PAP AKA CERVICAL CYTOLOGY 3/23/1982 FOBT Q 1 YEAR AGE 50-75 3/23/2011 MEDICARE YEARLY EXAM 3/14/2018 EYE EXAM RETINAL OR DILATED Q1 3/27/2018 HEMOGLOBIN A1C Q6M 7/10/2018 MICROALBUMIN Q1 10/19/2018 LIPID PANEL Q1 10/19/2018 FOOT EXAM Q1 1/10/2019 BREAST CANCER SCRN MAMMOGRAM 12/8/2019 DTaP/Tdap/Td series (2 - Td) 3/31/2026 Allergies as of 4/4/2018  Review Complete On: 4/4/2018 By: Nigel Mariscal MD  
  
 Severity Noted Reaction Type Reactions Latex  09/05/2014    Hives Other Food High 09/05/2014    Anaphylaxis PEPPERONI, sloppy joes Demerol [Meperidine] High 10/17/2010   Systemic Anaphylaxis Difficulty breathing Iodine High 10/17/2010   Systemic Anaphylaxis Morphine High 10/17/2010   Systemic Other (comments)  
 voilent outbreaks (restraints needed) Nsaids (Non-steroidal Anti-inflammatory Drug)  10/19/2017    Hives Current Immunizations  Reviewed on 1/25/2018 Name Date Influenza Vaccine (Quad) PF 10/19/2017 Pneumococcal Polysaccharide (PPSV-23) 10/19/2016 Tdap 3/31/2016 Not reviewed this visit You Were Diagnosed With   
  
 Codes Comments Moderate episode of recurrent major depressive disorder ([facility]    -  Primary ICD-10-CM: F33.1 ICD-9-CM: 296.32 Anxiety     ICD-10-CM: F41.9 ICD-9-CM: 300.00 Insomnia, unspecified type     ICD-10-CM: G47.00 ICD-9-CM: 780.52 Vitals BP Pulse Height(growth percentile) Weight(growth percentile) LMP BMI  
 113/64 94 5' 8\" (1.727 m) 189 lb 3.2 oz (85.8 kg) 01/17/2010 28.77 kg/m2 OB Status Smoking Status Postmenopausal Current Every Day Smoker Vitals History BMI and BSA Data Body Mass Index Body Surface Area 28.77 kg/m 2 2.03 m 2 Preferred Pharmacy Pharmacy Name Phone  [address] [phone] Your Updated Medication List  
  
   
This list is accurate as of 4/4/18 10:26 AM.  Always use your most recent med list.  
  
  
  
  
 albuterol 2.5 mg /3 mL (0.083 %) nebulizer solution Commonly known as:  PROVENTIL VENTOLIN  
3 mL by Nebulization route every four (4) hours as needed for Wheezing or Shortness of Breath. ARIPiprazole 10 mg tablet Commonly known as:  ABILIFY Take 1 Tab by mouth daily. Indications: DEPRESSION TREATMENT ADJUNCT  
  
 BENADRYL 25 mg capsule Generic drug:  diphenhydrAMINE Take 25 mg by mouth every six (6) hours as needed. budesonide-formoterol 160-4.5 mcg/actuation Hfaa Commonly known as:  SYMBICORT Take 2 Puffs by inhalation two (2) times a day. Indications: COPD ASSOCIATED WITH CHRONIC BRONCHITIS, EXTRINSIC ASTHMA BYSTOLIC 10 mg tablet Generic drug:  nebivolol Take 2 tablets PO every AM  
  
 cholecalciferol 1,000 unit tablet Commonly known as:  VITAMIN D3 Take 400 Units by mouth daily. 2 CAPS DAILY EPINEPHrine 0.3 mg/0.3 mL injection Commonly known as:  EPIPEN  
0.3 mg by IntraMUSCular route once as needed. ergocalciferol 50,000 unit capsule Commonly known as:  ERGOCALCIFEROL Take 1 capsule by mouth every seven (7) days. fluticasone 50 mcg/actuation nasal spray Commonly known as:  FLONASE  
2 sprays daily on each nostril for the next 8 weeks then 2 sprays on each nostril every other day for the next 4 weeks and thereafter 2 sprays on each nostril as needed  
  
 glucose blood VI test strips strip Commonly known as:  EasyMax N  
Use to test blood sugar 4 times a day  
  
 ipratropium 0.02 % Soln Commonly known as:  ATROVENT  
2.5 mL by Nebulization route every four (4) hours as needed. Liraglutide 0.6 mg/0.1 mL (18 mg/3 mL) Pnij Commonly known as:  VICTOZA 3-ESTEPHANIE  
1.8 mg by SubCUTAneous route daily. Titrate up to 1.8mg daily as direcected  
  
 metFORMIN  mg tablet Commonly known as:  GLUCOPHAGE XR Take 1 Tab by mouth Before breakfast and dinner. montelukast 10 mg tablet Commonly known as:  SINGULAIR Take 1 Tab by mouth daily. multivitamin tablet Commonly known as:  ONE A DAY Take 1 Tab by mouth daily. NovoLIN R Regular U-100 Insuln 100 unit/mL injection Generic drug:  insulin regular  
  
 omega 3-DHA-EPA-fish oil 1,000 mg (120 mg-180 mg) capsule Take  by mouth. Take 2 po daily  
  
 ondansetron 4 mg disintegrating tablet Commonly known as:  ZOFRAN ODT Take 1 Tab by mouth every eight (8) hours as needed for Nausea. Oxygen O2 at 2 Liters NC while asleep  
  
 pantoprazole 40 mg tablet Commonly known as:  PROTONIX Take (1) tablet by mouth once a day. prednisoLONE acetate 1 % ophthalmic suspension Commonly known as:  PRED FORTE  
  
 * QUEtiapine 50 mg tablet Commonly known as:  SEROquel Take 1 Tab by mouth daily. Indications: DEPRESSION TREATMENT ADJUNCT  
  
 * QUEtiapine 25 mg tablet Commonly known as:  SEROquel Take 1 Tab by mouth nightly. Indications: DEPRESSION TREATMENT ADJUNCT  
  
 rosuvastatin 40 mg tablet Commonly known as:  CRESTOR Take 1 Tab by mouth nightly. SURE COMFORT INSULIN SYRINGE 1 mL 30 gauge x 5/16 Syrg Generic drug:  Insulin Syringe-Needle U-100  
  
 tiotropium 18 mcg inhalation capsule Commonly known as:  [address] [place] [address] Drive Take 1 Cap by inhalation daily. TOUJEO SOLOSTAR U-300 INSULIN 300 unit/mL (1.5 mL) Inpn Generic drug:  insulin glargine 65 Units by SubCUTAneous route daily. * Notice: This list has 2 medication(s) that are the same as other medications prescribed for you. Read the directions carefully, and ask your doctor or other care provider to review them with you. Prescriptions Sent to Pharmacy Refills ARIPiprazole (ABILIFY) 10 mg tablet 2 Sig: Take 1 Tab by mouth daily.  Indications: DEPRESSION TREATMENT ADJUNCT  
 Class: Normal  
 Pharmacy: [address] [facility] [place] [facility] #: [phone] Route: Oral  
  
Follow-up Instructions Return in about 4 weeks (around 5/2/2018) for Medication management. Introducing [facility] SERVICES! Dear Rob Aleman: 
Thank you for requesting a Mobile Multimedia account. Our records indicate that you already have an active Mobile Multimedia account. You can access your account anytime at [email or web address] Did you know that you can access your hospital and ER discharge instructions at any time in Mobile Multimedia? You can also review all of your test results from your hospital stay or ER visit. Additional Information If you have questions, please visit the Frequently Asked Questions section of the Mobile Multimedia website at [email or web address] Remember, Mobile Multimedia is NOT to be used for urgent needs. For medical emergencies, dial 911. Now available from your iPhone and Android! Please provide this summary of care documentation to your next provider. Your primary care clinician is listed as Julissa Baptiste. If you have any questions after today's visit, please call [phone].

## 2018-04-09 RX ORDER — PANTOPRAZOLE SODIUM 40 MG/1
TABLET, DELAYED RELEASE ORAL
Qty: 30 TAB | Refills: 1 | Status: SHIPPED | OUTPATIENT
Start: 2018-04-09 | End: 2018-08-09 | Stop reason: SDUPTHER

## 2018-04-11 ENCOUNTER — OFFICE VISIT (OUTPATIENT)
Dept: ENDOCRINOLOGY | Age: 58
End: 2018-04-11

## 2018-04-11 VITALS
HEART RATE: 99 BPM | HEIGHT: 68 IN | BODY MASS INDEX: 28.66 KG/M2 | SYSTOLIC BLOOD PRESSURE: 107 MMHG | WEIGHT: 189.1 LBS | DIASTOLIC BLOOD PRESSURE: 69 MMHG

## 2018-04-11 DIAGNOSIS — Z79.4 TYPE 2 DIABETES MELLITUS WITH OTHER NEUROLOGIC COMPLICATION, WITH LONG-TERM CURRENT USE OF INSULIN (HCC): Primary | ICD-10-CM

## 2018-04-11 DIAGNOSIS — E78.5 HYPERLIPIDEMIA, UNSPECIFIED HYPERLIPIDEMIA TYPE: ICD-10-CM

## 2018-04-11 DIAGNOSIS — I10 ESSENTIAL HYPERTENSION: ICD-10-CM

## 2018-04-11 DIAGNOSIS — E11.49 TYPE 2 DIABETES MELLITUS WITH OTHER NEUROLOGIC COMPLICATION, WITH LONG-TERM CURRENT USE OF INSULIN (HCC): Primary | ICD-10-CM

## 2018-04-11 LAB — HBA1C MFR BLD HPLC: 8.5 %

## 2018-04-11 RX ORDER — PEN NEEDLE, DIABETIC 31 GX3/16"
NEEDLE, DISPOSABLE MISCELLANEOUS
Qty: 200 PEN NEEDLE | Refills: 3 | Status: SHIPPED | OUTPATIENT
Start: 2018-04-11 | End: 2019-05-20 | Stop reason: SDUPTHER

## 2018-04-11 NOTE — MR AVS SNAPSHOT
[address] [id number] [phone] Patient: Lexii Hammond MRN:  [facility]:[id number] Visit Information Date & Time Provider Department Dept. Phone Encounter #  
 4/11/2018  2:50 PM Glenis Leigh, [facility] [id number] Follow-up Instructions Return in about 3 months (around 7/11/2018). Your Appointments 5/4/2018 10:30 AM  
ESTABLISHED PATIENT with Victor Hugo Mojica MD  
30[address] [facility]) Appt Note: 1 month f/u  
 [facility] [address] [place] [address]  
[phone]  
  
   
 [facility] [address] [facility] [address] [phone]  
  
    
 5/22/2018 10:45 AM  
ROUTINE CARE with Alize Alvarado MD  
[address] OFFICE-ANNEX ([facility]) Appt Note: 3 mnth fu  
 [address] [facility] [phone]  
[phone] [address] [place], [place] [phone]  
  
    
 5/30/2018  2:40 PM  
New Patient with Bang Garcia MD  
[address] [place] [address] ([facility]) Appt Note: NP, refd by Dr. Princess Pearson hypertension, Primary insomnia [address] [place] [address], [address] [address] [address] [place] [address], [address] [address] 7/16/2018  9:45 AM  
ROUTINE CARE with Alize Alvarado MD  
[address] OFFICE-ANNEX ([facility]) Appt Note: 6 mo f/u  
 [address] [address]  
[phone] Upcoming Health Maintenance Date Due  
 PAP AKA CERVICAL CYTOLOGY 3/23/1982 FOBT Q 1 YEAR AGE 50-75 3/23/2011 MEDICARE YEARLY EXAM 3/14/2018 EYE EXAM RETINAL OR DILATED Q1 3/27/2018 HEMOGLOBIN A1C Q6M 7/10/2018 MICROALBUMIN Q1 10/19/2018 LIPID PANEL Q1 10/19/2018 FOOT EXAM Q1 1/10/2019 BREAST CANCER SCRN MAMMOGRAM 12/8/2019 DTaP/Tdap/Td series (2 - Td) 3/31/2026 Allergies as of 4/11/2018  Review Complete On: 4/11/2018 By: Urszula Silverio MD  
  
 Severity Noted Reaction Type Reactions Latex  09/05/2014    Hives Other Food High 09/05/2014    Anaphylaxis PEPPERONI, sloppy joes Demerol [Meperidine] High 10/17/2010   Systemic Anaphylaxis Difficulty breathing Iodine High 10/17/2010   Systemic Anaphylaxis Morphine High 10/17/2010   Systemic Other (comments)  
 voilent outbreaks (restraints needed) Nsaids (Non-steroidal Anti-inflammatory Drug)  10/19/2017    Hives Current Immunizations  Reviewed on 1/25/2018 Name Date Influenza Vaccine (Quad) PF 10/19/2017 Pneumococcal Polysaccharide (PPSV-23) 10/19/2016 Tdap 3/31/2016 Not reviewed this visit You Were Diagnosed With   
  
 Codes Comments Type 2 diabetes mellitus with other neurologic complication, with long-term current use of insulin (HCC)    -  Primary ICD-10-CM: E11.49, Z79.4 ICD-9-CM: 250.60, V58.67 Essential hypertension     ICD-10-CM: I10 
ICD-9-CM: 401.9 Hyperlipidemia, unspecified hyperlipidemia type     ICD-10-CM: E78.5 ICD-9-CM: 272.4 Vitals BP Pulse Height(growth percentile) Weight(growth percentile) LMP BMI  
 107/69 (BP 1 Location: Left arm, BP Patient Position: Sitting) 99 5' 8\" (1.727 m) 189 lb 1.6 oz (85.8 kg) 01/17/2010 28.75 kg/m2 OB Status Smoking Status Postmenopausal Current Every Day Smoker BMI and BSA Data Body Mass Index Body Surface Area 28.75 kg/m 2 2.03 m 2 Preferred Pharmacy Pharmacy Name Phone [address] [address] [phone] Your Updated Medication List  
  
   
This list is accurate as of 4/11/18  3:24 PM.  Always use your most recent med list.  
  
  
  
  
 albuterol 2.5 mg /3 mL (0.083 %) nebulizer solution Commonly known as:  PROVENTIL VENTOLIN  
3 mL by Nebulization route every four (4) hours as needed for Wheezing or Shortness of Breath. ARIPiprazole 10 mg tablet Commonly known as:  ABILIFY Take 1 Tab by mouth daily. Indications: DEPRESSION TREATMENT ADJUNCT  
  
 BENADRYL 25 mg capsule Generic drug:  diphenhydrAMINE Take 25 mg by mouth every six (6) hours as needed. budesonide-formoterol 160-4.5 mcg/actuation Hfaa Commonly known as:  SYMBICORT Take 2 Puffs by inhalation two (2) times a day. Indications: COPD ASSOCIATED WITH CHRONIC BRONCHITIS, EXTRINSIC ASTHMA BYSTOLIC 10 mg tablet Generic drug:  nebivolol Take 2 tablets PO every AM  
  
 empagliflozin 25 mg tablet Commonly known as:  Burgos Rook Take 1 Tab by mouth daily. EPINEPHrine 0.3 mg/0.3 mL injection Commonly known as:  EPIPEN  
0.3 mg by IntraMUSCular route once as needed. ergocalciferol 50,000 unit capsule Commonly known as:  ERGOCALCIFEROL Take 1 capsule by mouth every seven (7) days. fluticasone 50 mcg/actuation nasal spray Commonly known as:  FLONASE  
2 sprays daily on each nostril for the next 8 weeks then 2 sprays on each nostril every other day for the next 4 weeks and thereafter 2 sprays on each nostril as needed  
  
 glucose blood VI test strips strip Commonly known as:  EasyMax N  
Use to test blood sugar 4 times a day Insulin Needles (Disposable) 32 gauge x 5/32\" Ndle Commonly known as:  Amparo Pen Needle For use twice per day with insulin/victoza. ipratropium 0.02 % Soln Commonly known as:  ATROVENT  
2.5 mL by Nebulization route every four (4) hours as needed. Liraglutide 0.6 mg/0.1 mL (18 mg/3 mL) Pnij Commonly known as:  VICTOZA 3-ESTEPHANIE  
1.8 mg by SubCUTAneous route daily. Titrate up to 1.8mg daily as direcected  
  
 metFORMIN  mg tablet Commonly known as:  GLUCOPHAGE XR Take 1 Tab by mouth Before breakfast and dinner. montelukast 10 mg tablet Commonly known as:  SINGULAIR Take 1 Tab by mouth daily. multivitamin tablet Commonly known as:  ONE A DAY Take 1 Tab by mouth daily. NovoLIN R Regular U-100 Insuln 100 unit/mL injection Generic drug:  insulin regular  
  
 omega 3-DHA-EPA-fish oil 1,000 mg (120 mg-180 mg) capsule Take  by mouth. Take 2 po daily  
  
 ondansetron 4 mg disintegrating tablet Commonly known as:  ZOFRAN ODT Take 1 Tab by mouth every eight (8) hours as needed for Nausea. Oxygen O2 at 2 Liters NC while asleep  
  
 pantoprazole 40 mg tablet Commonly known as:  PROTONIX Take (1) tablet by mouth once a day. prednisoLONE acetate 1 % ophthalmic suspension Commonly known as:  PRED FORTE  
  
 * QUEtiapine 50 mg tablet Commonly known as:  SEROquel Take 1 Tab by mouth daily. Indications: DEPRESSION TREATMENT ADJUNCT  
  
 * QUEtiapine 25 mg tablet Commonly known as:  SEROquel Take 1 Tab by mouth nightly. Indications: DEPRESSION TREATMENT ADJUNCT  
  
 rosuvastatin 40 mg tablet Commonly known as:  CRESTOR Take 1 Tab by mouth nightly. SURE COMFORT INSULIN SYRINGE 1 mL 30 gauge x 5/16 Syrg Generic drug:  Insulin Syringe-Needle U-100  
  
 tiotropium 18 mcg inhalation capsule Commonly known as:  [address] Take 1 Cap by inhalation daily. TOUJEO SOLOSTAR U-300 INSULIN 300 unit/mL (1.5 mL) Inpn Generic drug:  insulin glargine 65 Units by SubCUTAneous route daily. * Notice: This list has 2 medication(s) that are the same as other medications prescribed for you. Read the directions carefully, and ask your doctor or other care provider to review them with you. Prescriptions Sent to Pharmacy Refills Insulin Needles, Disposable, (URSZULA PEN NEEDLE) 32 gauge x 5/32\" ndle 3 Sig: For use twice per day with insulin/victoza.   
 Class: Normal  
 Pharmacy: [id number] [facility] #: [phone] Follow-up Instructions Return in about 3 months (around 7/11/2018). Patient Instructions Start Jardiance 25 mg daily Metformin  mg BID Continue Victoza, titrate to 1.8mg daily [address] 65 each morning, Please note our new policy, you must arrive to the clinic 15 minutes before your appointment time to allow enough time for proper check-in, adequate time to spend with your doctor, and also to respect the appointment time of the next patient. Not arriving 15 minutes in advance may result in having your appointment rescheduled for the next available day/time. 
---------------------------------------------------------------------------------------------------------------------- Below you will find a glucose log sheet which you can use to record your blood sugars. Without checking and recording what your home glucose levels are, it will be difficult to make any changes to your medication dose, even when significant changes may be needed. Please feel free to use the log below to record your home glucose levels. At the very least, I would like for you to login the entire 2-3 weeks just before your visit so we can make your visit much more productive and beneficial to you. GLUCOSE LOG SHEET: 
 
Date Breakfast Lunch Dinner Bedtime Comments ? GLUCOSE LOG SHEET: 
 
Date Breakfast Lunch Dinner Bedtime Comments ? GLUCOSE LOG SHEET: 
 
Date Breakfast Lunch Dinner Bedtime Comments ? Introducing [facility] SERVICES! Dear Simone Guerrero: 
Thank you for requesting a Domob account. Our records indicate that you already have an active Domob account. You can access your account anytime at [email or web address] Did you know that you can access your hospital and ER discharge instructions at any time in Domob? You can also review all of your test results from your hospital stay or ER visit. Additional Information If you have questions, please visit the Frequently Asked Questions section of the Domob website at [email or web address] Remember, Domob is NOT to be used for urgent needs. For medical emergencies, dial 911. Now available from your iPhone and Android! Please provide this summary of care documentation to your next provider. Your primary care clinician is listed as Grant Kimble. If you have any questions after today's visit, please call [phone].

## 2018-04-11 NOTE — PATIENT INSTRUCTIONS
Start Jardiance 25 mg daily  Metformin  mg BID  Continue Victoza, titrate to 1.8mg daily  [address] 65 each morning,       Please note our new policy, you must arrive to the clinic 15 minutes before your appointment time to allow enough time for proper check-in, adequate time to spend with your doctor, and also to respect the appointment time of the next patient. Not arriving 15 minutes in advance may result in having your appointment rescheduled for the next available day/time.  ----------------------------------------------------------------------------------------------------------------------    Below you will find a glucose log sheet which you can use to record your blood sugars. Without checking and recording what your home glucose levels are, it will be difficult to make any changes to your medication dose, even when significant changes may be needed. Please feel free to use the log below to record your home glucose levels. At the very least, I would like for you to login the entire 2-3 weeks just before your visit so we can make your visit much more productive and beneficial to you. GLUCOSE LOG SHEET:    Date Breakfast Lunch Dinner Bedtime Comments ? GLUCOSE LOG SHEET:    Date Breakfast Lunch Dinner Bedtime Comments ? GLUCOSE LOG SHEET:    Date Breakfast Lunch Dinner Bedtime Comments ?

## 2018-04-11 NOTE — PROGRESS NOTES
CHIEF COMPLAINT: f/u evaluation for uncontrolled type 2 diabetes    HISTORY OF PRESENT ILLNESS:   Taina Fang is a 63 y.o. female with a PMHx as noted below who presents to the endocrinology clinic for f/u evaluation of uncontrolled type 2 diabetes. A1c previously 7.5%, today is 8.5%.      Currently taking the following meds:  Metformin  mg BID  Victoza 1.8mg daily  Toujeo 65 each morning  Novolin R:  OFF    Review of home blood glucose:  AM  122 - 190  Lunch 150-250  Dinner 250-400  Bedtime  214-330    Review of most recent diabetes-related labs:  Lab Results   Component Value Date    HBA1C 8.6 (H) 10/19/2017    HBA1C 8.3 (H) 2014    HBA1C 10.1 (H) 10/18/2010    CHOL 138 10/19/2017    GFRAA >60 2018    GFRNA >60 2018    MCACR 30.9 (H) 10/19/2017    TSH 1.320 10/19/2017    VITD3 41.5 2015    [id number] 7.5% 2017       PAST MEDICAL/SURGICAL HISTORY:   Past Medical History:   Diagnosis Date    Asthma     Asthma     COPD     Depression     Diabetes ([facility] [address]     Diabetes mellitus     Encounter for review of form with patient 2017    Essential hypertension     GERD (gastroesophageal reflux disease)     Headache(784.0)     HX OTHER MEDICAL     acoustic neuroma    Hypercholesterolemia     Hypertension     Ill-defined condition     R ACOUSTIC NEUROMA    Insomnia disorder 2017    Major depression, chronic 2017    Psychiatric problem     anxiety depression    Psychotic disorder     Tobacco use disorder 10/19/2017    Unspecified sleep apnea     NO CPAP-O2 @2L WHEN SLEEPING     Past Surgical History:   Procedure Laterality Date     DELIVERY ONLY      HX GASTRECTOMY  14    lap sleeve gastrectomy by Dr Cha Hawkins HX GYN      2 c-sections    HX HEENT      sinus    HX HEENT      tracheal resection    HX HEENT      tracheal alleratation x 2    HX HEENT      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    HX HEENT      LASER SX-PENG ALLERGIES:   Allergies   Allergen Reactions    Latex Hives    Other Food Anaphylaxis     PEPPERONI, sloppy joes    Demerol [Meperidine] Anaphylaxis     Difficulty breathing    Iodine Anaphylaxis    Morphine Other (comments)     voilent outbreaks (restraints needed)    Nsaids (Non-Steroidal Anti-Inflammatory Drug) Hives       MEDICATIONS ON ADMISSION:     Current Outpatient Prescriptions:     pantoprazole (PROTONIX) 40 mg tablet, Take (1) tablet by mouth once a day., Disp: 30 Tab, Rfl: 1    ARIPiprazole (ABILIFY) 10 mg tablet, Take 1 Tab by mouth daily. Indications: DEPRESSION TREATMENT ADJUNCT, Disp: 30 Tab, Rfl: 2    tiotropium (SPIRIVA WITH HANDIHALER) 18 mcg inhalation capsule, Take 1 Cap by inhalation daily. , Disp: 30 Cap, Rfl: 0    albuterol (PROVENTIL VENTOLIN) 2.5 mg /3 mL (0.083 %) nebulizer solution, 3 mL by Nebulization route every four (4) hours as needed for Wheezing or Shortness of Breath., Disp: 24 Each, Rfl: 0    QUEtiapine (SEROQUEL) 50 mg tablet, Take 1 Tab by mouth daily. Indications: DEPRESSION TREATMENT ADJUNCT, Disp: 30 Tab, Rfl: 2    QUEtiapine (SEROQUEL) 25 mg tablet, Take 1 Tab by mouth nightly. Indications: DEPRESSION TREATMENT ADJUNCT, Disp: 30 Tab, Rfl: 2    BYSTOLIC 10 mg tablet, Take 2 tablets PO every AM, Disp: 60 Tab, Rfl: 3    budesonide-formoterol (SYMBICORT) 160-4.5 mcg/actuation HFAA, Take 2 Puffs by inhalation two (2) times a day. Indications: COPD ASSOCIATED WITH CHRONIC BRONCHITIS, EXTRINSIC ASTHMA, Disp: 1 Inhaler, Rfl: 6    ipratropium (ATROVENT) 0.02 % soln, 2.5 mL by Nebulization route every four (4) hours as needed. , Disp: 70 Vial, Rfl: 7    SURE COMFORT INSULIN SYRINGE 1 mL 30 gauge x 5/16 syrg, , Disp: , Rfl:     montelukast (SINGULAIR) 10 mg tablet, Take 1 Tab by mouth daily. , Disp: 30 Tab, Rfl: 6    Liraglutide (VICTOZA 3-ESTEPHANIE) 0.6 mg/0.1 mL (18 mg/3 mL) pnij, 1.8 mg by SubCUTAneous route daily.  Titrate up to 1.8mg daily as direcected, Disp: 3 Pen, Rfl: 7    metFORMIN ER (GLUCOPHAGE XR) 500 mg tablet, Take 1 Tab by mouth Before breakfast and dinner., Disp: 180 Tab, Rfl: 3    Oxygen, O2 at 2 Liters NC while asleep, Disp: , Rfl:     multivitamin (ONE A DAY) tablet, Take 1 Tab by mouth daily. , Disp: , Rfl:     Omega-3-DHA-EPA-Fish Oil 1,000 mg (120 mg-180 mg) cap, Take  by mouth. Take 2 po daily, Disp: , Rfl:     TOUJEO SOLOSTAR 300 unit/mL (1.5 mL) inpn, 65 Units by SubCUTAneous route daily. , Disp: 15 Pen, Rfl: 3    glucose blood VI test strips (EASYMAX N) strip, Use to test blood sugar 4 times a day, Disp: 200 Strip, Rfl: 3    rosuvastatin (CRESTOR) 40 mg tablet, Take 1 Tab by mouth nightly., Disp: 90 Tab, Rfl: 3    ergocalciferol (ERGOCALCIFEROL) 50,000 unit capsule, Take 1 capsule by mouth every seven (7) days. , Disp: 12 capsule, Rfl: 3    NOVOLIN R REGULAR U-100 INSULN 100 unit/mL injection, , Disp: , Rfl:     prednisoLONE acetate (PRED FORTE) 1 % ophthalmic suspension, , Disp: , Rfl:     fluticasone (FLONASE) 50 mcg/actuation nasal spray, 2 sprays daily on each nostril for the next 8 weeks then 2 sprays on each nostril every other day for the next 4 weeks and thereafter 2 sprays on each nostril as needed, Disp: 1 Bottle, Rfl: 6    ondansetron (ZOFRAN ODT) 4 mg disintegrating tablet, Take 1 Tab by mouth every eight (8) hours as needed for Nausea., Disp: 10 Tab, Rfl: 0    EPINEPHrine (EPIPEN) 0.3 mg/0.3 mL injection, 0.3 mg by IntraMUSCular route once as needed. , Disp: , Rfl:     diphenhydrAMINE (BENADRYL) 25 mg capsule, Take 25 mg by mouth every six (6) hours as needed. , Disp: , Rfl:     SOCIAL HISTORY:   Social History     Social History    Marital status:      Spouse name: N/A    Number of children: N/A    Years of education: N/A     Occupational History    Not on file.      Social History Main Topics    Smoking status: Current Every Day Smoker     Packs/day: 1.00     Years: 12.00    Smokeless tobacco: Never Used    Alcohol use Yes      Comment: ocassional    Drug use: No    Sexual activity: No     Other Topics Concern    Not on file     Social History Narrative       FAMILY HISTORY:  Family History   Problem Relation Age of Onset    Diabetes Father     Heart Failure Father     Heart Disease Father     Hypertension Father     High Cholesterol Mother     Suicide Brother     Stroke Maternal Grandmother     Cancer Paternal Grandfather     Anesth Problems Neg Hx        REVIEW OF SYSTEMS: Complete ROS assessed and noted for that which is described above, all else are negative. Eyes: normal  ENT: normal  CVS: normal  Resp: normal  GI: normal  : normal  GYN: normal  Endocrine: normal  Integument: normal  Musculoskeletal: normal  Neuro: normal  Psych: normal      PHYSICAL EXAMINATION:    VITAL SIGNS:  Visit Vitals    /69 (BP 1 Location: Left arm, BP Patient Position: Sitting)    Pulse 99    Ht 5' 8\" (1.727 m)    Wt 189 lb 1.6 oz (85.8 kg)    LMP 01/17/2010    BMI 28.75 kg/m2       GENERAL: NCAT, Sitting comfortably, NAD  EYES: EOMI, non-icteric, no proptosis  Ear/Nose/Throat: NCAT, no inflammation, no masses  LYMPH NODES: No LAD  CARDIOVASCULAR: S1 S2, RRR, No murmur, 2+ radial pulses  RESPIRATORY: CTA b/l, no wheeze/rales  GASTROINTESTINAL: ND  MUSCULOSKELETAL: Normal ROM, no atrophy  SKIN: warm, no edema/rash/ or other skin changes  NEUROLOGIC: 5/5 power all extremities, no tremors, AAOx3  PSYCHIATRIC: Normal affect, Normal insight and judgement      REVIEW OF LABORATORY AND RADIOLOGY DATA:   Labs and documentation have been reviewed as described above. ASSESSMENT AND PLAN:   Rodriguez Stewart is a 63 y.o. female with a PMHx as noted above who presents to the endocrinology clinic for evaluation of uncontrolled type 2 diabetes. DM2 uncontrolled  HTN  HLD    Patient's A1c did rise since the last visit by a whole percentage point. Review of glucose log suggests ongoing post prandial hyperglycemia.  Today we dicussed the options which includes an SGLT-2 inhibitor such as Jardiance. Sample provided and 3 month supply ordered to pharmacy. Discussed the potential side effect of a yeast infection so she can advise us if experienced in the future. DM2:  Start Jardiance 25 mg daily, provided with 2 week sample, ordered to pharmacy  Metformin  mg BID  Continue Victoza, titrate to 1.8mg daily  Toujeo 65 each morning    Can reduce to checking twice daily, AM and bedtime  Provided with new log sheets    Neuropathy: stable  BP: Bystolic 82QV daily  HLD: crestor  40mg each evening, fish oil    Labs: up to date  Foot exam: Up to date  Eye exam: Had one monday    3 month f/u visit. Richmond Zurdo.  [address] [facility]

## 2018-05-04 ENCOUNTER — OFFICE VISIT (OUTPATIENT)
Dept: BEHAVIORAL/MENTAL HEALTH CLINIC | Age: 58
End: 2018-05-04

## 2018-05-04 VITALS
BODY MASS INDEX: 27.74 KG/M2 | HEART RATE: 86 BPM | SYSTOLIC BLOOD PRESSURE: 122 MMHG | WEIGHT: 183 LBS | HEIGHT: 68 IN | DIASTOLIC BLOOD PRESSURE: 66 MMHG

## 2018-05-04 DIAGNOSIS — G47.00 INSOMNIA, UNSPECIFIED TYPE: ICD-10-CM

## 2018-05-04 DIAGNOSIS — F31.81 BIPOLAR 2 DISORDER, MAJOR DEPRESSIVE EPISODE (HCC): ICD-10-CM

## 2018-05-04 DIAGNOSIS — F41.9 ANXIETY: ICD-10-CM

## 2018-05-04 DIAGNOSIS — F33.1 MODERATE EPISODE OF RECURRENT MAJOR DEPRESSIVE DISORDER (HCC): Primary | ICD-10-CM

## 2018-05-04 RX ORDER — ARIPIPRAZOLE 15 MG/1
15 TABLET ORAL DAILY
Qty: 30 TAB | Refills: 2 | Status: SHIPPED | OUTPATIENT
Start: 2018-05-04 | End: 2018-06-06 | Stop reason: SINTOL

## 2018-05-04 NOTE — MR AVS SNAPSHOT
[address] [facility] 
[phone] Patient: Marilyn Landa MRN:  [id number] Visit Information Date & Time Provider Department Dept. Phone Encounter #  
 5/4/2018 10:30 AM Sonja Licea MD [address] [phone] [id number] Your Appointments 5/22/2018 10:45 AM  
ROUTINE CARE with Felix Barnett MD  
[address] [facility] ([facility]) Appt Note: 3 mnth fu  
 [address] [facility] [phone]  
[phone] [address] [facility] [address]  
  
    
 5/30/2018  2:40 PM  
New Patient with Valeria Portillo MD  
[address] [place] ([facility]) Appt Note: NP, refd by Dr. Ardon Cluster hypertension, Primary insomnia [address] [phone] [address] [place] [address]., [address] [phone] 7/11/2018 11:50 AM  
Follow Up with Linda Coelho MD  
[facility]) Appt Note: 3 month f/u  
 [address] [phone] [address] [place] [address] 7/16/2018  9:45 AM  
ROUTINE CARE with Felix Barnett MD  
[address] [facility] ([facility]) Appt Note: 6 mo f/u  
 [address] [facility] [address] [address]  
[phone] Upcoming Health Maintenance Date Due  
 PAP AKA CERVICAL CYTOLOGY 3/23/1982 FOBT Q 1 YEAR AGE 50-75 3/23/2011 MEDICARE YEARLY EXAM 3/14/2018 EYE EXAM RETINAL OR DILATED Q1 3/27/2018 Influenza Age 5 to Adult 8/1/2018 HEMOGLOBIN A1C Q6M 10/11/2018 MICROALBUMIN Q1 10/19/2018 LIPID PANEL Q1 10/19/2018 FOOT EXAM Q1 1/10/2019 BREAST CANCER SCRN MAMMOGRAM 12/8/2019 DTaP/Tdap/Td series (2 - Td) 3/31/2026 Allergies as of 5/4/2018  Review Complete On: 5/4/2018 By: Charity Lopez Severity Noted Reaction Type Reactions Latex  09/05/2014    Hives Other Food High 09/05/2014    Anaphylaxis PEPPERONI, sloppy joes Demerol [Meperidine] High 10/17/2010   Systemic Anaphylaxis Difficulty breathing Iodine High 10/17/2010   Systemic Anaphylaxis Morphine High 10/17/2010   Systemic Other (comments)  
 voilent outbreaks (restraints needed) Nsaids (Non-steroidal Anti-inflammatory Drug)  10/19/2017    Hives Current Immunizations  Reviewed on 1/25/2018 Name Date Influenza Vaccine (Quad) PF 10/19/2017 Pneumococcal Polysaccharide (PPSV-23) 10/19/2016 Tdap 3/31/2016 Not reviewed this visit Vitals BP Pulse Height(growth percentile) Weight(growth percentile) LMP BMI  
 122/66 86 5' 8\" (1.727 m) 183 lb (83 kg) 01/17/2010 27.83 kg/m2 OB Status Smoking Status Postmenopausal Current Every Day Smoker BMI and BSA Data Body Mass Index Body Surface Area  
 27.83 kg/m 2 2 m 2 Preferred Pharmacy Pharmacy Name Phone [address] [address] [address] [phone] Your Updated Medication List  
  
   
This list is accurate as of 5/4/18 10:33 AM.  Always use your most recent med list.  
  
  
  
  
 albuterol 2.5 mg /3 mL (0.083 %) nebulizer solution Commonly known as:  PROVENTIL VENTOLIN  
3 mL by Nebulization route every four (4) hours as needed for Wheezing or Shortness of Breath. ARIPiprazole 15 mg tablet Commonly known as:  ABILIFY Take 1 Tab by mouth daily. Indications: DEPRESSION TREATMENT ADJUNCT  
  
 BENADRYL 25 mg capsule Generic drug:  diphenhydrAMINE Take 25 mg by mouth every six (6) hours as needed. budesonide-formoterol 160-4.5 mcg/actuation Hfaa Commonly known as:  SYMBICORT  
 Take 2 Puffs by inhalation two (2) times a day. Indications: COPD ASSOCIATED WITH CHRONIC BRONCHITIS, EXTRINSIC ASTHMA BYSTOLIC 10 mg tablet Generic drug:  nebivolol Take 2 tablets PO every AM  
  
 empagliflozin 25 mg tablet Commonly known as:  Orozco Arm Take 1 Tab by mouth daily. EPINEPHrine 0.3 mg/0.3 mL injection Commonly known as:  EPIPEN  
0.3 mg by IntraMUSCular route once as needed. ergocalciferol 50,000 unit capsule Commonly known as:  ERGOCALCIFEROL Take 1 capsule by mouth every seven (7) days. fluticasone 50 mcg/actuation nasal spray Commonly known as:  FLONASE  
2 sprays daily on each nostril for the next 8 weeks then 2 sprays on each nostril every other day for the next 4 weeks and thereafter 2 sprays on each nostril as needed  
  
 glucose blood VI test strips strip Commonly known as:  EasyMax N  
Use to test blood sugar 4 times a day Insulin Needles (Disposable) 32 gauge x 5/32\" Ndle Commonly known as:  Amparo Pen Needle For use twice per day with insulin/victoza. ipratropium 0.02 % Soln Commonly known as:  ATROVENT  
2.5 mL by Nebulization route every four (4) hours as needed. Liraglutide 0.6 mg/0.1 mL (18 mg/3 mL) Pnij Commonly known as:  VICTOZA 3-ESTEPHANIE  
1.8 mg by SubCUTAneous route daily. Titrate up to 1.8mg daily as direcected  
  
 metFORMIN  mg tablet Commonly known as:  GLUCOPHAGE XR Take 1 Tab by mouth Before breakfast and dinner. montelukast 10 mg tablet Commonly known as:  SINGULAIR Take 1 Tab by mouth daily. multivitamin tablet Commonly known as:  ONE A DAY Take 1 Tab by mouth daily. NovoLIN R Regular U-100 Insuln 100 unit/mL injection Generic drug:  insulin regular  
  
 omega 3-DHA-EPA-fish oil 1,000 mg (120 mg-180 mg) capsule Take  by mouth. Take 2 po daily  
  
 ondansetron 4 mg disintegrating tablet Commonly known as:  ZOFRAN ODT  
 Take 1 Tab by mouth every eight (8) hours as needed for Nausea. Oxygen O2 at 2 Liters NC while asleep  
  
 pantoprazole 40 mg tablet Commonly known as:  PROTONIX Take (1) tablet by mouth once a day. prednisoLONE acetate 1 % ophthalmic suspension Commonly known as:  PRED FORTE  
  
 * QUEtiapine 50 mg tablet Commonly known as:  SEROquel Take 1 Tab by mouth daily. Indications: DEPRESSION TREATMENT ADJUNCT  
  
 * QUEtiapine 25 mg tablet Commonly known as:  SEROquel Take 1 Tab by mouth nightly. Indications: DEPRESSION TREATMENT ADJUNCT  
  
 rosuvastatin 40 mg tablet Commonly known as:  CRESTOR Take 1 Tab by mouth nightly. SURE COMFORT INSULIN SYRINGE 1 mL 30 gauge x 5/16 Syrg Generic drug:  Insulin Syringe-Needle U-100  
  
 tiotropium 18 mcg inhalation capsule Commonly known as:  [address] Take 1 Cap by inhalation daily. TOUJEO SOLOSTAR U-300 INSULIN 300 unit/mL (1.5 mL) Inpn Generic drug:  insulin glargine 65 Units by SubCUTAneous route daily. * Notice: This list has 2 medication(s) that are the same as other medications prescribed for you. Read the directions carefully, and ask your doctor or other care provider to review them with you. Prescriptions Sent to Pharmacy Refills ARIPiprazole (ABILIFY) 15 mg tablet 2 Sig: Take 1 Tab by mouth daily. Indications: DEPRESSION TREATMENT ADJUNCT Class: Normal  
 Pharmacy: [address] [place] [address] #: [phone] Route: Oral  
  
Introducing [facility]! Dear Satish Counts: 
Thank you for requesting a [employer] account. Our records indicate that you already have an active [employer] account. You can access your account anytime at [email or web address] Did you know that you can access your hospital and ER discharge instructions at any time in [employer]?   You can also review all of your test results from your hospital stay or ER visit. Additional Information If you have questions, please visit the Frequently Asked Questions section of the [employer] website at [email or web address] Remember, [employer] is NOT to be used for urgent needs. For medical emergencies, dial 911. Now available from your iPhone and Android! Please provide this summary of care documentation to your next provider. Your primary care clinician is listed as Italo De Dios. If you have any questions after today's visit, please call [phone].

## 2018-05-04 NOTE — PROGRESS NOTES
Rodriguez Stewart  1960  63 y.o.  female  [facility]    Chief Complaint   Patient presents with    Depression    Anxiety    Insomnia       [place]:   This is a 63years old divorce  female with past psychiatric history and treatment of manic depression who was seen for the first time on December 28, 2017 referred by her PCP to manage her complicated psychiatric medications.  She decided to gradually taper and discontinue Viibryd and high dose Paxil and try Abilify 1-2 mg daily and take Seroquel  mg at nighttime to help with sleep.  She was seen for follow-up on February 1, 2018, March 5, 2018, and April 4, 2018 when she decided to gradually increase Abilify to 10 mg and Seroquel to 75 mg.       Patient presented on time and was observed to be improved and report improved daytime tiredness and exhaustion and requested to go up on Abilify as since we are going up she has been feeling 50% better for depression, 30% better for anxiety, and 50% better for sleep. She is no longer taking big naps in daytime but is still require some help with understanding sleep hygiene and the proper way to take medication. She denies any substance abuse and denies any major change with her social life although she is dealing with medical problems and is receiving proper care.      Patient was provided with support and psychoeducation and after discussing risk and benefit and understanding with possibility of metabolic side effect in the context of already being diabetic she decided to continue his Seroquel 75 mg at bedtime and gradually increase Abilify to 12.5 mg for 1 week and 50 mg for next 3 weeks and return in reevaluation for 4 weeks. She understand that there is a possibility that Abilify may become sedating and she will move it to bedtime and we will try to decrease Seroquel if possible. She has appointment with sleep specialist on May 30 and is trying to get an earlier appointment.   She denies any change with her psychosocial life and her main concern today were daytime fatigue, poor energy, sleeping and daytime and depression.      SUBSTANCE USE HISTORY:   TOBACCO: Smoked 1 pack per day for past 35 years and expressed desire to get help to quit his smoking but first would like to stabilize on her medications. ALCOHOL: Patient denies abuse. CANNABIS: Tried few times but never abused it. COCAINE, OPIOIDS, and OTHERS: Denies abuse. MEDICAL HISTORY:    has a past medical history of Asthma; Asthma; COPD; Depression; Diabetes ([facility] [address] Diabetes mellitus; Encounter for review of form with patient (12/4/2017); Essential hypertension; GERD (gastroesophageal reflux disease); Headache(784.0); OTHER MEDICAL; Hypercholesterolemia; Hypertension; Ill-defined condition; Insomnia disorder (12/4/2017); Major depression, chronic (12/4/2017); Psychiatric problem; Psychotic disorder; Tobacco use disorder (10/19/2017); and Unspecified sleep apnea. ALLERGIES:   Allergies   Allergen Reactions    Latex Hives    Other Food Anaphylaxis     PEPPERmihai RUBY joes    Demerol [Meperidine] Anaphylaxis     Difficulty breathing    Iodine Anaphylaxis    Morphine Other (comments)     voilent outbreaks (restraints needed)    Nsaids (Non-Steroidal Anti-Inflammatory Drug) Hives       VITAL SIGNS:  /66  Pulse 86  Ht 5' 8\" (1.727 m)  Wt 83 kg (183 lb)  LMP 01/17/2010  BMI 27.83 kg/m2    Current Outpatient Prescriptions   Medication Sig Dispense Refill    ARIPiprazole (ABILIFY) 15 mg tablet Take 1 Tab by mouth daily. Indications: DEPRESSION TREATMENT ADJUNCT 30 Tab 2    tiotropium (SPIRIVA WITH HANDIHALER) 18 mcg inhalation capsule Take 1 Cap by inhalation daily. 30 Cap 4    empagliflozin (JARDIANCE) 25 mg tablet Take 1 Tab by mouth daily. 14 Tab 0    Insulin Needles, Disposable, (URSZULA PEN NEEDLE) 32 gauge x 5/32\" ndle For use twice per day with insulin/victoza.  200 Pen Needle 3    pantoprazole (PROTONIX) 40 mg tablet Take (1) tablet by mouth once a day. 30 Tab 1    albuterol (PROVENTIL VENTOLIN) 2.5 mg /3 mL (0.083 %) nebulizer solution 3 mL by Nebulization route every four (4) hours as needed for Wheezing or Shortness of Breath. 24 Each 0    QUEtiapine (SEROQUEL) 50 mg tablet Take 1 Tab by mouth daily. Indications: DEPRESSION TREATMENT ADJUNCT 30 Tab 2    QUEtiapine (SEROQUEL) 25 mg tablet Take 1 Tab by mouth nightly. Indications: DEPRESSION TREATMENT ADJUNCT 30 Tab 2    NOVOLIN R REGULAR U-100 INSULN 100 unit/mL injection       BYSTOLIC 10 mg tablet Take 2 tablets PO every AM 60 Tab 3    budesonide-formoterol (SYMBICORT) 160-4.5 mcg/actuation HFAA Take 2 Puffs by inhalation two (2) times a day. Indications: COPD ASSOCIATED WITH CHRONIC BRONCHITIS, EXTRINSIC ASTHMA 1 Inhaler 6    ipratropium (ATROVENT) 0.02 % soln 2.5 mL by Nebulization route every four (4) hours as needed. 70 Vial 7    prednisoLONE acetate (PRED FORTE) 1 % ophthalmic suspension       SURE COMFORT INSULIN SYRINGE 1 mL 30 gauge x 5/16 syrg       montelukast (SINGULAIR) 10 mg tablet Take 1 Tab by mouth daily. 30 Tab 6    fluticasone (FLONASE) 50 mcg/actuation nasal spray 2 sprays daily on each nostril for the next 8 weeks then 2 sprays on each nostril every other day for the next 4 weeks and thereafter 2 sprays on each nostril as needed 1 Bottle 6    ondansetron (ZOFRAN ODT) 4 mg disintegrating tablet Take 1 Tab by mouth every eight (8) hours as needed for Nausea. 10 Tab 0    Liraglutide (VICTOZA 3-ESTEPHANIE) 0.6 mg/0.1 mL (18 mg/3 mL) pnij 1.8 mg by SubCUTAneous route daily. Titrate up to 1.8mg daily as direcected 3 Pen 7    metFORMIN ER (GLUCOPHAGE XR) 500 mg tablet Take 1 Tab by mouth Before breakfast and dinner. 180 Tab 3    Oxygen O2 at 2 Liters NC while asleep      multivitamin (ONE A DAY) tablet Take 1 Tab by mouth daily.  Omega-3-DHA-EPA-Fish Oil 1,000 mg (120 mg-180 mg) cap Take  by mouth.  Take 2 po daily      TOUJEO SOLOSTAR 300 unit/mL (1.5 mL) inpn 65 Units by SubCUTAneous route daily. 15 Pen 3    glucose blood VI test strips (EASYMAX N) strip Use to test blood sugar 4 times a day 200 Strip 3    rosuvastatin (CRESTOR) 40 mg tablet Take 1 Tab by mouth nightly. 90 Tab 3    EPINEPHrine (EPIPEN) 0.3 mg/0.3 mL injection 0.3 mg by IntraMUSCular route once as needed.  ergocalciferol (ERGOCALCIFEROL) 50,000 unit capsule Take 1 capsule by mouth every seven (7) days. 12 capsule 3    diphenhydrAMINE (BENADRYL) 25 mg capsule Take 25 mg by mouth every six (6) hours as needed. MENTAL STATUS EXAMINATION:   Patient is a 63 y.o. female [facility] who looks older than her stated age. Both of her eyes were bloody appearance due to retinal edema and injection she received yesterday to relieve the excess fluid. Patient appearance is appropriately dressed with good hygiene. Speech is regular rate and rhythm, fluent language, and thought process is linear, logical, and goal directed. Reported mood is better with mood congruent brighter affect. Patient denies any suicidal ideation, homicidal ideation, and auditory visual hallucination. Not observed to be delusional or paranoid. Insight, judgement, reliability and impulse control is intact. Her cognition is within normal limit and she is observed to be reliable. DIAGNOSIS:  Encounter Diagnoses   Name Primary?  Moderate episode of recurrent major depressive disorder (HCC) Yes    Anxiety     Insomnia, unspecified type     Bipolar 2 disorder, major depressive episode ([address]        PLAN:  1. MEDICATION: Abilify 12.5 mg for 1 week then 15 mg till return in 4 weeks. Seroquel 75 mg at bedtime. She understand metabolic side effect of both medications and the fact that she has diabetes but has been trying to actively reduce her weight. Patient was provided with psycho education, discussed risk/benefits, and expectations from medication changes. Patient agrees with plan.      2. PSYCHOTHERAPY: Patient was provided with supportive therapy, strongly encourage to seek psychotherapy. 3. MEDICAL CARE: Patient was strongly encourage to take their medical medications and follow up with their PCP on regular basis. Hemoglobin A1c done on April 11, 2018 was significant for a score 8.5 high and labs done on January 23, 2018 were significant for neutrophils 80 high and lymphocyte 11 low and CMP significant for glucose 248 high. 4. SUBSTANCE ABUSE CARE: Patient denies any substance abuse at this time. 5. FOLLOW UP:   Follow-up Disposition:  Return in about 4 weeks (around 6/1/2018) for Medication management.

## 2018-05-22 ENCOUNTER — OFFICE VISIT (OUTPATIENT)
Dept: FAMILY MEDICINE CLINIC | Age: 58
End: 2018-05-22

## 2018-05-22 VITALS — WEIGHT: 180 LBS | RESPIRATION RATE: 16 BRPM | HEIGHT: 68 IN | BODY MASS INDEX: 27.28 KG/M2

## 2018-05-22 DIAGNOSIS — Z13.39 SCREENING FOR ALCOHOLISM: ICD-10-CM

## 2018-05-22 DIAGNOSIS — F32.9 MAJOR DEPRESSION, CHRONIC: ICD-10-CM

## 2018-05-22 DIAGNOSIS — F17.200 TOBACCO USE DISORDER: Primary | ICD-10-CM

## 2018-05-22 DIAGNOSIS — Z12.11 SCREEN FOR COLON CANCER: ICD-10-CM

## 2018-05-22 DIAGNOSIS — I10 ESSENTIAL HYPERTENSION: ICD-10-CM

## 2018-05-22 DIAGNOSIS — Z13.31 SCREENING FOR DEPRESSION: ICD-10-CM

## 2018-05-22 DIAGNOSIS — Z12.4 SCREENING FOR CERVICAL CANCER: ICD-10-CM

## 2018-05-22 RX ORDER — ARIPIPRAZOLE 10 MG/1
10 TABLET ORAL DAILY
COMMUNITY
Start: 2018-05-01 | End: 2018-06-06 | Stop reason: SINTOL

## 2018-05-22 RX ORDER — ALBUTEROL SULFATE 90 UG/1
AEROSOL, METERED RESPIRATORY (INHALATION)
COMMUNITY
Start: 2018-04-24 | End: 2018-05-22 | Stop reason: SDUPTHER

## 2018-05-22 NOTE — PROGRESS NOTES
This is an Initial Medicare Annual Wellness Exam (AWV) (Performed 12 months after IPPE or effective date of Medicare Part B enrollment, Once in a lifetime)    I have reviewed the patient's medical history in detail and updated the computerized patient record. History     Present for CPE, last Complete Physical exam was few yrs ago , she is Up todate w/ all vaccination, last tetanus vaccine was in <5yrs ago     . last mammog was nl and In 2017,    last pap smear normal and was in many yrs ago . last colonoscopy was never, refusing to get one done   Uterine fibroid procedures past surgical hx,  last bone dexa scan was never     No family hx of breast cancer      no family hx of colon cancer, father 61yo  of MI, mother still living  healthy, not sexaully active and uses Safe sex, not physically active,  compliant w/ meds, no Rf needed for today for her meds.          Past Medical History:   Diagnosis Date    Asthma     Asthma     COPD     Depression     Diabetes ([facility] [address]     Diabetes mellitus     Encounter for review of form with patient 2017    Essential hypertension     GERD (gastroesophageal reflux disease)     Headache(784.0)     HX OTHER MEDICAL     acoustic neuroma    Hypercholesterolemia     Hypertension     Ill-defined condition     R ACOUSTIC NEUROMA    Insomnia disorder 2017    Major depression, chronic 2017    Psychiatric problem     anxiety depression    Psychotic disorder     Tobacco use disorder 10/19/2017    Unspecified sleep apnea     NO CPAP-O2 @2L WHEN SLEEPING      Past Surgical History:   Procedure Laterality Date     DELIVERY ONLY      HX GASTRECTOMY  15    lap sleeve gastrectomy by Dr Cha Hawkins HX GYN      2 c-sections    HX HEENT      sinus    HX HEENT      tracheal resection    HX HEENT      tracheal alleratation x 2    HX HEENT      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    HX HEENT      LASER SX-PENG     Current Outpatient Prescriptions   Medication Sig Dispense Refill    ARIPiprazole (ABILIFY) 10 mg tablet Take 10 mg by mouth daily.  tiotropium (SPIRIVA WITH HANDIHALER) 18 mcg inhalation capsule Take 1 Cap by inhalation daily. 30 Cap 4    empagliflozin (JARDIANCE) 25 mg tablet Take 1 Tab by mouth daily. 14 Tab 0    Insulin Needles, Disposable, (URSZULA PEN NEEDLE) 32 gauge x 5/32\" ndle For use twice per day with insulin/victoza. 200 Pen Needle 3    pantoprazole (PROTONIX) 40 mg tablet Take (1) tablet by mouth once a day. 30 Tab 1    albuterol (PROVENTIL VENTOLIN) 2.5 mg /3 mL (0.083 %) nebulizer solution 3 mL by Nebulization route every four (4) hours as needed for Wheezing or Shortness of Breath. 24 Each 0    QUEtiapine (SEROQUEL) 50 mg tablet Take 1 Tab by mouth daily. Indications: DEPRESSION TREATMENT ADJUNCT 30 Tab 2    QUEtiapine (SEROQUEL) 25 mg tablet Take 1 Tab by mouth nightly. Indications: DEPRESSION TREATMENT ADJUNCT 30 Tab 2    BYSTOLIC 10 mg tablet Take 2 tablets PO every AM 60 Tab 3    budesonide-formoterol (SYMBICORT) 160-4.5 mcg/actuation HFAA Take 2 Puffs by inhalation two (2) times a day. Indications: COPD ASSOCIATED WITH CHRONIC BRONCHITIS, EXTRINSIC ASTHMA 1 Inhaler 6    ipratropium (ATROVENT) 0.02 % soln 2.5 mL by Nebulization route every four (4) hours as needed. 70 Vial 7    SURE COMFORT INSULIN SYRINGE 1 mL 30 gauge x 5/16 syrg       montelukast (SINGULAIR) 10 mg tablet Take 1 Tab by mouth daily. 30 Tab 6    Liraglutide (VICTOZA 3-ESTEPHANIE) 0.6 mg/0.1 mL (18 mg/3 mL) pnij 1.8 mg by SubCUTAneous route daily. Titrate up to 1.8mg daily as direcected 3 Pen 7    metFORMIN ER (GLUCOPHAGE XR) 500 mg tablet Take 1 Tab by mouth Before breakfast and dinner. 180 Tab 3    Oxygen O2 at 2 Liters NC while asleep      multivitamin (ONE A DAY) tablet Take 1 Tab by mouth daily.  Omega-3-DHA-EPA-Fish Oil 1,000 mg (120 mg-180 mg) cap Take  by mouth.  Take 2 po daily      TOUJEO SOLOSTAR 300 unit/mL (1.5 mL) inpn 65 Units by SubCUTAneous route daily. 15 Pen 3    glucose blood VI test strips (EASYMAX N) strip Use to test blood sugar 4 times a day 200 Strip 3    rosuvastatin (CRESTOR) 40 mg tablet Take 1 Tab by mouth nightly. 90 Tab 3    EPINEPHrine (EPIPEN) 0.3 mg/0.3 mL injection 0.3 mg by IntraMUSCular route once as needed.  ergocalciferol (ERGOCALCIFEROL) 50,000 unit capsule Take 1 capsule by mouth every seven (7) days. 12 capsule 3    diphenhydrAMINE (BENADRYL) 25 mg capsule Take 25 mg by mouth every six (6) hours as needed.  ARIPiprazole (ABILIFY) 15 mg tablet Take 1 Tab by mouth daily. Indications: DEPRESSION TREATMENT ADJUNCT (Patient not taking: Reported on 5/22/2018) 30 Tab 2    NOVOLIN R REGULAR U-100 INSULN 100 unit/mL injection       prednisoLONE acetate (PRED FORTE) 1 % ophthalmic suspension       fluticasone (FLONASE) 50 mcg/actuation nasal spray 2 sprays daily on each nostril for the next 8 weeks then 2 sprays on each nostril every other day for the next 4 weeks and thereafter 2 sprays on each nostril as needed (Patient not taking: Reported on 5/22/2018) 1 Bottle 6    ondansetron (ZOFRAN ODT) 4 mg disintegrating tablet Take 1 Tab by mouth every eight (8) hours as needed for Nausea.  (Patient not taking: Reported on 5/22/2018) 10 Tab 0     Allergies   Allergen Reactions    Latex Hives    Other Food Anaphylaxis     PEPPERONI, sloppy joes    Demerol [Meperidine] Anaphylaxis     Difficulty breathing    Iodine Anaphylaxis    Morphine Other (comments)     voilent outbreaks (restraints needed)    Nsaids (Non-Steroidal Anti-Inflammatory Drug) Hives     Family History   Problem Relation Age of Onset    Diabetes Father     Heart Failure Father     Heart Disease Father     Hypertension Father     High Cholesterol Mother     Suicide Brother     Stroke Maternal Grandmother     Cancer Paternal Grandfather     Anesth Problems Neg Hx      Social History Substance Use Topics    Smoking status: Current Every Day Smoker     Packs/day: 1.00     Years: 12.00    Smokeless tobacco: Never Used    Alcohol use Yes      Comment: ocassional     Patient Active Problem List   Diagnosis Code    Asthma with exacerbation J45. [address]    Type II or unspecified type diabetes mellitus without mention of complication, uncontrolled E11.65    HTN (hypertension) I10    Upper respiratory infection J06.9    Gallstone K80.20    Rib pain R07.81    Morbid obesity (HCC) E66.01    Chronic hoarseness R49.0    Asthma J45.909    GERD (gastroesophageal reflux disease) K21.9    Hypercholesteremia E78.00    Tobacco use disorder F17.200    Angina, class III (HCC) I20.9    Mitral valve prolapse I34.1    Insomnia disorder G47.00    Major depression, chronic F34.1    Encounter for review of form with patient Z02.89    Type 2 diabetes mellitus with nephropathy ([facility] [address] E11.21    Recurrent depression ([facility] F33.9       Depression Risk Factor Screening:     PHQ over the last two weeks 12/28/2017   Little interest or pleasure in doing things Nearly every day   Feeling down, depressed or hopeless Nearly every day   Total Score PHQ 2 6   Trouble falling or staying asleep, or sleeping too much Nearly every day   Feeling tired or having little energy Nearly every day   Poor appetite or overeating Nearly every day   Feeling bad about yourself - or that you are a failure or have let yourself or your family down Nearly every day   Trouble concentrating on things such as school, work, reading or watching TV Nearly every day   Moving or speaking so slowly that other people could have noticed; or the opposite being so fidgety that others notice Not at all   Thoughts of being better off dead, or hurting yourself in some way Not at all   PHQ 9 Score 21   How difficult have these problems made it for you to do your work, take care of your home and get along with others Extremely difficult     Alcohol Risk Factor Screening: You do not drink alcohol or very rarely. Functional Ability and Level of Safety:     Hearing Loss  Hearing is good. Activities of Daily Living  The home contains: handrails, grab bars and rugs  Patient does total self care    Fall Risk  No flowsheet data found. Abuse Screen  Patient is not abused    Cognitive Screening   Evaluation of Cognitive Function:  Has your family/caregiver stated any concerns about your memory: no  Normal    Patient Care Team   Patient Care Team:  Andrews Duran MD as PCP - General (Family Practice)  Xavier Mancilla MD as Surgeon (General Surgery)  Vini Barney NP as Nurse Practitioner (General Surgery)    Assessment/Plan   Education and counseling provided:  Are appropriate based on today's review and evaluation  End-of-Life planning (with patient's consent)  Screening Mammography  Screening Pap and pelvic (covered once every 2 years)  Colorectal cancer screening tests    Diagnoses and all orders for this visit:    1. Tobacco use disorder  -     CBC W/O DIFF  -     METABOLIC PANEL, COMPREHENSIVE  -     TSH 3RD GENERATION  -     CHOLESTEROL, TOTAL  -     HDL CHOLESTEROL    2. Screening for alcoholism  -     Annual  Alcohol Screen 15 min ()    3. Screening for depression  -     Depression Screen Annual    4. Screen for colon cancer    5. Major depression, chronic  -     CBC W/O DIFF  -     METABOLIC PANEL, COMPREHENSIVE  -     TSH 3RD GENERATION  -     CHOLESTEROL, TOTAL  -     HDL CHOLESTEROL    6.  Essential hypertension  -     CBC W/O DIFF  -     METABOLIC PANEL, COMPREHENSIVE  -     TSH 3RD GENERATION  -     CHOLESTEROL, TOTAL  -     HDL CHOLESTEROL    7. Screening for cervical cancer  -     [facility] MCCAULEY/ [facility] Maintenance Due   Topic Date Due    PAP AKA CERVICAL CYTOLOGY  03/23/1982    FOBT Q 1 YEAR AGE 50-75  03/23/2011    MEDICARE YEARLY EXAM  03/14/2018

## 2018-05-22 NOTE — PROGRESS NOTES
Chief Complaint   Patient presents with   [facility] Annual Wellness Visit     1. Have you been to the ER, urgent care clinic since your last visit? Hospitalized since your last visit? No    2. Have you seen or consulted any other health care providers outside of the [facility] since your last visit? Include any pap smears or colon screening.  No     C/o sore throat x 1 week,  Not taking any otc meds at this time      Advance planning booklet given

## 2018-05-22 NOTE — MR AVS SNAPSHOT
[address] [facility] [address] [address] [phone] Patient: Donnell Isabel MRN:  [id number] Visit Information Date & Time Provider Department Dept. Phone Encounter #  
 5/22/2018 10:45 AM Serena Osler, MD [address] [facility] OFFICE-ANNEX [phone] [id number] Follow-up Instructions Return in about 6 months (around 11/22/2018), or if symptoms worsen or fail to improve. Your Appointments 5/30/2018  2:40 PM  
New Patient with Gely Chahal MD  
[address] [facility] ([facility]) Appt Note: NP, refd by Dr. Silvia Wilburn hypertension, Primary insomnia [address] [facility] [address] [address] [facility] [address]  
  
    
 6/6/2018  8:30 AM  
ESTABLISHED PATIENT with Zulema Woodson MD  
[address] [facility] Appt Note: 4 week f/u  
 [address] [facility] [address] [address]  
[address] [facility] [address] [address]  
  
    
 7/11/2018 11:50 AM  
Follow Up with Erica Betancourt MD  
[facility] Appt Note: 3 month f/u  
 [address] [facility] [address] [address] [address] 7/16/2018  9:45 AM  
ROUTINE CARE with Serena Osler, MD  
[address] [facility] OFFICE-ANNEX ([facility]) Appt Note: 6 mo f/u  
 [address] [address] [phone] [address] [address] Upcoming Health Maintenance Date Due  
 PAP AKA CERVICAL CYTOLOGY 3/23/1982 FOBT Q 1 YEAR AGE 50-75 3/23/2011 MEDICARE YEARLY EXAM 3/14/2018 EYE EXAM RETINAL OR DILATED Q1 7/17/2018* Influenza Age 5 to Adult 8/1/2018 HEMOGLOBIN A1C Q6M 10/11/2018 MICROALBUMIN Q1 10/19/2018 LIPID PANEL Q1 10/19/2018 FOOT EXAM Q1 1/10/2019 BREAST CANCER SCRN MAMMOGRAM 12/8/2019 DTaP/Tdap/Td series (2 - Td) 3/31/2026 *Topic was postponed. The date shown is not the original due date. Allergies as of 5/22/2018  Review Complete On: 5/22/2018 By: Brittany Payton MD  
  
 Severity Noted Reaction Type Reactions Latex  09/05/2014    Hives Other Food High 09/05/2014    Anaphylaxis PEPPERONI, sloppy joes Demerol [Meperidine] High 10/17/2010   Systemic Anaphylaxis Difficulty breathing Iodine High 10/17/2010   Systemic Anaphylaxis Morphine High 10/17/2010   Systemic Other (comments)  
 voilent outbreaks (restraints needed) Nsaids (Non-steroidal Anti-inflammatory Drug)  10/19/2017    Hives Current Immunizations  Reviewed on 1/25/2018 Name Date Influenza Vaccine (Quad) PF 10/19/2017 Pneumococcal Polysaccharide (PPSV-23) 10/19/2016 Tdap 3/31/2016 Not reviewed this visit You Were Diagnosed With   
  
 Codes Comments Tobacco use disorder    -  Primary ICD-10-CM: S31.556 ICD-9-CM: 305.1 Screening for alcoholism     ICD-10-CM: Z13.89 ICD-9-CM: V79.1 Screening for depression     ICD-10-CM: Z13.89 ICD-9-CM: V79.0 Screen for colon cancer     ICD-10-CM: Z12.11 ICD-9-CM: V76.51 Major depression, chronic     ICD-10-CM: F34.1 ICD-9-CM: 296.20 Essential hypertension     ICD-10-CM: I10 
ICD-9-CM: 401.9 Screening for cervical cancer     ICD-10-CM: Z12.4 ICD-9-CM: V76.2 Vitals BP Pulse Temp Resp Height(growth percentile) Weight(growth percentile) (P) 107/58 (BP 1 Location: Left arm, BP Patient Position: Sitting) (P) 86 (P) 95.7 °F (35.4 °C) (Oral) 16 5' 8\" (1.727 m) 180 lb (81.6 kg) LMP SpO2 BMI OB Status Smoking Status 01/17/2010 (P) 94% 27.37 kg/m2 Postmenopausal Current Every Day Smoker BMI and BSA Data Body Mass Index Body Surface Area  
 27.37 kg/m 2 1.98 m 2 Preferred Pharmacy Pharmacy Name Phone [address] [address] [phone] Your Updated Medication List  
  
   
This list is accurate as of 5/22/18 11:17 AM.  Always use your most recent med list.  
  
  
  
  
 albuterol 2.5 mg /3 mL (0.083 %) nebulizer solution Commonly known as:  PROVENTIL VENTOLIN  
3 mL by Nebulization route every four (4) hours as needed for Wheezing or Shortness of Breath. * ARIPiprazole 10 mg tablet Commonly known as:  ABILIFY Take 10 mg by mouth daily. * ARIPiprazole 15 mg tablet Commonly known as:  ABILIFY Take 1 Tab by mouth daily. Indications: DEPRESSION TREATMENT ADJUNCT  
  
 BENADRYL 25 mg capsule Generic drug:  diphenhydrAMINE Take 25 mg by mouth every six (6) hours as needed. budesonide-formoterol 160-4.5 mcg/actuation Hfaa Commonly known as:  SYMBICORT Take 2 Puffs by inhalation two (2) times a day. Indications: COPD ASSOCIATED WITH CHRONIC BRONCHITIS, EXTRINSIC ASTHMA BYSTOLIC 10 mg tablet Generic drug:  nebivolol Take 2 tablets PO every AM  
  
 empagliflozin 25 mg tablet Commonly known as:  Laymon Luis Miguelyer Take 1 Tab by mouth daily. EPINEPHrine 0.3 mg/0.3 mL injection Commonly known as:  EPIPEN  
0.3 mg by IntraMUSCular route once as needed. ergocalciferol 50,000 unit capsule Commonly known as:  ERGOCALCIFEROL Take 1 capsule by mouth every seven (7) days. fluticasone 50 mcg/actuation nasal spray Commonly known as:  FLONASE  
2 sprays daily on each nostril for the next 8 weeks then 2 sprays on each nostril every other day for the next 4 weeks and thereafter 2 sprays on each nostril as needed  
  
 glucose blood VI test strips strip Commonly known as:  EasyMax N  
Use to test blood sugar 4 times a day Insulin Needles (Disposable) 32 gauge x 5/32\" Ndle Commonly known as:  Amparo Pen Needle For use twice per day with insulin/victoza. ipratropium 0.02 % Soln Commonly known as:  ATROVENT  
2.5 mL by Nebulization route every four (4) hours as needed. Liraglutide 0.6 mg/0.1 mL (18 mg/3 mL) Pnij Commonly known as:  VICTOZA 3-ESTEPHANIE  
1.8 mg by SubCUTAneous route daily. Titrate up to 1.8mg daily as direcected  
  
 metFORMIN  mg tablet Commonly known as:  GLUCOPHAGE XR Take 1 Tab by mouth Before breakfast and dinner. montelukast 10 mg tablet Commonly known as:  SINGULAIR Take 1 Tab by mouth daily. multivitamin tablet Commonly known as:  ONE A DAY Take 1 Tab by mouth daily. NovoLIN R Regular U-100 Insuln 100 unit/mL injection Generic drug:  insulin regular  
  
 omega 3-DHA-EPA-fish oil 1,000 mg (120 mg-180 mg) capsule Take  by mouth. Take 2 po daily  
  
 ondansetron 4 mg disintegrating tablet Commonly known as:  ZOFRAN ODT Take 1 Tab by mouth every eight (8) hours as needed for Nausea. Oxygen O2 at 2 Liters NC while asleep  
  
 pantoprazole 40 mg tablet Commonly known as:  PROTONIX Take (1) tablet by mouth once a day. prednisoLONE acetate 1 % ophthalmic suspension Commonly known as:  PRED FORTE  
  
 * QUEtiapine 50 mg tablet Commonly known as:  SEROquel Take 1 Tab by mouth daily. Indications: DEPRESSION TREATMENT ADJUNCT  
  
 * QUEtiapine 25 mg tablet Commonly known as:  SEROquel Take 1 Tab by mouth nightly. Indications: DEPRESSION TREATMENT ADJUNCT  
  
 rosuvastatin 40 mg tablet Commonly known as:  CRESTOR Take 1 Tab by mouth nightly. SURE COMFORT INSULIN SYRINGE 1 mL 30 gauge x 5/16 Syrg Generic drug:  Insulin Syringe-Needle U-100  
  
 tiotropium 18 mcg inhalation capsule Commonly known as:  [address] Take 1 Cap by inhalation daily. TOUJEO SOLOSTAR U-300 INSULIN 300 unit/mL (1.5 mL) Inpn Generic drug:  insulin glargine 65 Units by SubCUTAneous route daily. * Notice: This list has 4 medication(s) that are the same as other medications prescribed for you. Read the directions carefully, and ask your doctor or other care provider to review them with you. We Performed the Following CBC W/O DIFF [52994 CPT(R)] CHOLESTEROL, TOTAL [47904 CPT(R)] [address] [[id number] HCPCS] HDL CHOLESTEROL [84167 CPT(R)] METABOLIC PANEL, COMPREHENSIVE [31277 CPT(R)] OCCULT BLOOD, IMMUNOASSAY (FIT) [id number] CPT(R)] AR ANNUAL ALCOHOL SCREEN 15 MIN [id number] [facility] in the [place]] REFERRAL TO OBSTETRICS AND GYNECOLOGY [REF51 Custom] TSH 3RD GENERATION [07801 CPT(R)] Follow-up Instructions Return in about 6 months (around 11/22/2018), or if symptoms worsen or fail to improve. Referral Information Referral ID Referred By Referred To  
  
 [id number] [address] [facility] [address] [facility] Visits Status Start Date End Date 1 New Request 5/22/18 5/22/19 If your referral has a status of pending review or denied, additional information will be sent to support the outcome of this decision. Patient Instructions Well Visit, Women 48 to 72: Care Instructions Your Care Instructions Physical exams can help you stay healthy. Your doctor has checked your overall health and may have suggested ways to take good care of yourself. He or she also may have recommended tests. At home, you can help prevent illness with healthy eating, regular exercise, and other steps. Follow-up care is a key part of your treatment and safety. Be sure to make and go to all appointments, and call your doctor if you are having problems. It's also a good idea to know your test results and keep a list of the medicines you take. How can you care for yourself at home? · Reach and stay at a healthy weight. This will lower your risk for many problems, such as obesity, diabetes, heart disease, and high blood pressure. · Get at least 30 minutes of exercise on most days of the week. Walking is a good choice. You also may want to do other activities, such as running, swimming, cycling, or playing tennis or team sports. · Do not smoke. Smoking can make health problems worse. If you need help quitting, talk to your doctor about stop-smoking programs and medicines. These can increase your chances of quitting for good. · Protect your skin from too much sun. When you're outdoors from 10 a.m. to 4 p.m., stay in the shade or cover up with clothing and a hat with a wide brim. Wear sunglasses that block UV rays. Even when it's cloudy, put broad-spectrum sunscreen (SPF 30 or higher) on any exposed skin. · See a dentist one or two times a year for checkups and to have your teeth cleaned. · Wear a seat belt in the car. · Limit alcohol to 1 drink a day. Too much alcohol can cause health problems. Follow your doctor's advice about when to have certain tests. These tests can spot problems early. · Cholesterol. Your doctor will tell you how often to have this done based on your age, family history, or other things that can increase your risk for heart attack and stroke. · Blood pressure. Have your blood pressure checked during a routine doctor visit. Your doctor will tell you how often to check your blood pressure based on your age, your blood pressure results, and other factors. · Mammogram. Ask your doctor how often you should have a mammogram, which is an X-ray of your breasts. A mammogram can spot breast cancer before it can be felt and when it is easiest to treat. · Pap test and pelvic exam. Ask your doctor how often you should have a Pap test. You may not need to have a Pap test as often as you used to. · Vision.  Have your eyes checked every year or two or as often as your doctor suggests. Some experts recommend that you have yearly exams for glaucoma and other age-related eye problems starting at age 48. · Hearing. Tell your doctor if you notice any change in your hearing. You can have tests to find out how well you hear. · Diabetes. Ask your doctor whether you should have tests for diabetes. · Colon cancer. You should begin tests for colon cancer at age 48. You may have one of several tests. Your doctor will tell you how often to have tests based on your age and risk. Risks include whether you already had a precancerous polyp removed from your colon or whether your parents, sisters and brothers, or children have had colon cancer. · Thyroid disease. Talk to your doctor about whether to have your thyroid checked as part of a regular physical exam. Women have an increased chance of a thyroid problem. · Osteoporosis. You should begin tests for bone density at age 72. If you are younger than 72, ask your doctor whether you have factors that may increase your risk for this disease. You may want to have this test before age 72. · Heart attack and stroke risk. At least every 4 to 6 years, you should have your risk for heart attack and stroke assessed. Your doctor uses factors such as your age, blood pressure, cholesterol, and whether you smoke or have diabetes to show what your risk for a heart attack or stroke is over the next 10 years. When should you call for help? Watch closely for changes in your health, and be sure to contact your doctor if you have any problems or symptoms that concern you. Where can you learn more? Go to http://nolan-alondra.info/. Enter K692 in the search box to learn more about \"Well Visit, Women 50 to 72: Care Instructions. \" Current as of: May 12, 2017 Content Version: 11.4 © 6112-5741 [employer].  Care instructions adapted under license by [employer] (which disclaims liability or warranty for this information). If you have questions about a medical condition or this instruction, always ask your healthcare professional. [place] [address] any warranty or liability for your use of this information. Medicare Wellness Visit, Female The best way to live healthy is to have a lifestyle where you eat a well-balanced diet, exercise regularly, limit alcohol use, and quit all forms of tobacco/nicotine, if applicable. Regular preventive services are another way to keep healthy. Preventive services (vaccines, screening tests, monitoring & exams) can help personalize your care plan, which helps you manage your own care. Screening tests can find health problems at the earliest stages, when they are easiest to treat. [address] [facility] follows the current, evidence-based guidelines published by the [facility] Jairo Joseph ([facility]) when recommending preventive services for our patients. Because we follow these guidelines, sometimes recommendations change over time as research supports it. (For example, mammograms used to be recommended annually. Even though Medicare will still pay for an annual mammogram, the newer guidelines recommend a mammogram every two years for women of average risk.) Of course, you and your provider may decide to screen more often for some diseases, based on your risk and co-morbidities (chronic disease you are already diagnosed with). Preventive services for you include: - Medicare offers their members a free annual wellness visit, which is time for you and your primary care provider to discuss and plan for your preventive service needs. Take advantage of this benefit every year! 
 
-All people over age 72 should receive the recommended pneumonia vaccines. Current USPSTF guidelines recommend a series of two vaccines for the best pneumonia protection. -All adults should have a yearly flu vaccine and a tetanus vaccine every 10 years. All adults age 61 years should receive a shingles vaccine once in their lifetime.   
 
-A bone mass density test is recommended when a woman turns 65 to screen for osteoporosis. This test is only recommended once as a screening. Some providers will use this same test as a disease monitoring tool if you already have osteoporosis. -All adults age 38-68 years who are overweight should have a diabetes screening test once every three years.  
 
-Other screening tests & preventive services for persons with diabetes include: an eye exam to screen for diabetic retinopathy, a kidney function test, a foot exam, and stricter control over your cholesterol.  
 
-Cardiovascular screening for adults with routine risk involves an electrocardiogram (ECG) at intervals determined by the provider.  
 
-Colorectal cancer screenings should be done for adults age 54-65 years with normal risk. There are a number of acceptable methods of screening for this type of cancer. Each test has its own benefits and drawbacks. Discuss with your provider what is most appropriate for you during your annual wellness visit. The different tests include: colonoscopy (considered the best screening method), a fecal occult blood test, a fecal DNA test, and sigmoidoscopy. -Breast cancer screenings are recommended every other year for women of normal risk age 54-69 years.  
 
-Cervical cancer screenings for women over age 72 are only recommended with certain risk factors.  
 
-All adults born between [facility] should be screened once for Hepatitis C. Here is a list of your current Health Maintenance items (your personalized list of preventive services) with a due date: 
Health Maintenance Due Topic Date Due  Cervical Cancer Screening  03/23/1982  Stool testing for trace blood  03/23/2011 Gianluca Graham Annual Well Visit  03/14/2018 Introducing [facility]! Dear Coy Ochoa: 
Thank you for requesting a [employer] account. Our records indicate that you already have an active [employer] account. You can access your account anytime at [email or web address] Did you know that you can access your hospital and ER discharge instructions at any time in [employer]? You can also review all of your test results from your hospital stay or ER visit. Additional Information If you have questions, please visit the Frequently Asked Questions section of the [employer] website at [email or web address] Remember, [employer] is NOT to be used for urgent needs. For medical emergencies, dial 911. Now available from your iPhone and Android! Please provide this summary of care documentation to your next provider. Your primary care clinician is listed as Basil Hernandez. If you have any questions after today's visit, please call [phone].

## 2018-05-22 NOTE — PATIENT INSTRUCTIONS
Well Visit, Women 48 to 72: Care Instructions  Your Care Instructions    Physical exams can help you stay healthy. Your doctor has checked your overall health and may have suggested ways to take good care of yourself. He or she also may have recommended tests. At home, you can help prevent illness with healthy eating, regular exercise, and other steps. Follow-up care is a key part of your treatment and safety. Be sure to make and go to all appointments, and call your doctor if you are having problems. It's also a good idea to know your test results and keep a list of the medicines you take. How can you care for yourself at home? · Reach and stay at a healthy weight. This will lower your risk for many problems, such as obesity, diabetes, heart disease, and high blood pressure. · Get at least 30 minutes of exercise on most days of the week. Walking is a good choice. You also may want to do other activities, such as running, swimming, cycling, or playing tennis or team sports. · Do not smoke. Smoking can make health problems worse. If you need help quitting, talk to your doctor about stop-smoking programs and medicines. These can increase your chances of quitting for good. · Protect your skin from too much sun. When you're outdoors from 10 a.m. to 4 p.m., stay in the shade or cover up with clothing and a hat with a wide brim. Wear sunglasses that block UV rays. Even when it's cloudy, put broad-spectrum sunscreen (SPF 30 or higher) on any exposed skin. · See a dentist one or two times a year for checkups and to have your teeth cleaned. · Wear a seat belt in the car. · Limit alcohol to 1 drink a day. Too much alcohol can cause health problems. Follow your doctor's advice about when to have certain tests. These tests can spot problems early. · Cholesterol.  Your doctor will tell you how often to have this done based on your age, family history, or other things that can increase your risk for heart attack and stroke. · Blood pressure. Have your blood pressure checked during a routine doctor visit. Your doctor will tell you how often to check your blood pressure based on your age, your blood pressure results, and other factors. · Mammogram. Ask your doctor how often you should have a mammogram, which is an X-ray of your breasts. A mammogram can spot breast cancer before it can be felt and when it is easiest to treat. · Pap test and pelvic exam. Ask your doctor how often you should have a Pap test. You may not need to have a Pap test as often as you used to. · Vision. Have your eyes checked every year or two or as often as your doctor suggests. Some experts recommend that you have yearly exams for glaucoma and other age-related eye problems starting at age 48. · Hearing. Tell your doctor if you notice any change in your hearing. You can have tests to find out how well you hear. · Diabetes. Ask your doctor whether you should have tests for diabetes. · Colon cancer. You should begin tests for colon cancer at age 48. You may have one of several tests. Your doctor will tell you how often to have tests based on your age and risk. Risks include whether you already had a precancerous polyp removed from your colon or whether your parents, sisters and brothers, or children have had colon cancer. · Thyroid disease. Talk to your doctor about whether to have your thyroid checked as part of a regular physical exam. Women have an increased chance of a thyroid problem. · Osteoporosis. You should begin tests for bone density at age 72. If you are younger than 72, ask your doctor whether you have factors that may increase your risk for this disease. You may want to have this test before age 72. · Heart attack and stroke risk. At least every 4 to 6 years, you should have your risk for heart attack and stroke assessed.  Your doctor uses factors such as your age, blood pressure, cholesterol, and whether you smoke or have diabetes to show what your risk for a heart attack or stroke is over the next 10 years. When should you call for help? Watch closely for changes in your health, and be sure to contact your doctor if you have any problems or symptoms that concern you. Where can you learn more? Go to http://nolan-alondra.info/. Enter C936 in the search box to learn more about \"Well Visit, Women 50 to 72: Care Instructions. \"  Current as of: May 12, 2017  Content Version: 11.4  © 3905-6497 [employer]. Care instructions adapted under license by [employer] (which disclaims liability or warranty for this information). If you have questions about a medical condition or this instruction, always ask your healthcare professional. [address] any warranty or liability for your use of this information. Medicare Wellness Visit, Female    The best way to live healthy is to have a lifestyle where you eat a well-balanced diet, exercise regularly, limit alcohol use, and quit all forms of tobacco/nicotine, if applicable. Regular preventive services are another way to keep healthy. Preventive services (vaccines, screening tests, monitoring & exams) can help personalize your care plan, which helps you manage your own care. Screening tests can find health problems at the earliest stages, when they are easiest to treat. [facility] follows the current, evidence-based guidelines published by the [facility] Jairo Joseph (USPSTF) when recommending preventive services for our patients. Because we follow these guidelines, sometimes recommendations change over time as research supports it. (For example, mammograms used to be recommended annually.  Even though Medicare will still pay for an annual mammogram, the newer guidelines recommend a mammogram every two years for women of average risk.)    Of course, you and your provider may decide to screen more often for some diseases, based on your risk and co-morbidities (chronic disease you are already diagnosed with). Preventive services for you include:    - Medicare offers their members a free annual wellness visit, which is time for you and your primary care provider to discuss and plan for your preventive service needs. Take advantage of this benefit every year!    -All people over age 72 should receive the recommended pneumonia vaccines. Current USPSTF guidelines recommend a series of two vaccines for the best pneumonia protection.     -All adults should have a yearly flu vaccine and a tetanus vaccine every 10 years. All adults age 61 years should receive a shingles vaccine once in their lifetime.      -A bone mass density test is recommended when a woman turns 65 to screen for osteoporosis. This test is only recommended once as a screening. Some providers will use this same test as a disease monitoring tool if you already have osteoporosis. -All adults age 38-68 years who are overweight should have a diabetes screening test once every three years.     -Other screening tests & preventive services for persons with diabetes include: an eye exam to screen for diabetic retinopathy, a kidney function test, a foot exam, and stricter control over your cholesterol.     -Cardiovascular screening for adults with routine risk involves an electrocardiogram (ECG) at intervals determined by the provider.     -Colorectal cancer screenings should be done for adults age 54-65 years with normal risk. There are a number of acceptable methods of screening for this type of cancer. Each test has its own benefits and drawbacks. Discuss with your provider what is most appropriate for you during your annual wellness visit. The different tests include: colonoscopy (considered the best screening method), a fecal occult blood test, a fecal DNA test, and sigmoidoscopy.     -Breast cancer screenings are recommended every other year for women of normal risk age 54-69 years.     -Cervical cancer screenings for women over age 72 are only recommended with certain risk factors.     -All adults born between [facility] should be screened once for Hepatitis C.      Here is a list of your current Health Maintenance items (your personalized list of preventive services) with a due date:  Health Maintenance Due   Topic Date Due    Cervical Cancer Screening  03/23/1982    Stool testing for trace blood  03/23/2011    Annual Well Visit  03/14/2018

## 2018-05-23 LAB
ALBUMIN SERPL-MCNC: 4 G/DL (ref 3.5–5.5)
ALBUMIN/GLOB SERPL: 1.9 {RATIO} (ref 1.2–2.2)
ALP SERPL-CCNC: 85 IU/L (ref 39–117)
ALT SERPL-CCNC: 41 IU/L (ref 0–32)
AST SERPL-CCNC: 27 IU/L (ref 0–40)
BILIRUB SERPL-MCNC: 0.5 MG/DL (ref 0–1.2)
BUN SERPL-MCNC: 18 MG/DL (ref 6–24)
BUN/CREAT SERPL: 22 (ref 9–23)
CALCIUM SERPL-MCNC: 9.9 MG/DL (ref 8.7–10.2)
CHLORIDE SERPL-SCNC: 104 MMOL/L (ref 96–106)
CHOLEST SERPL-MCNC: 106 MG/DL (ref 100–199)
CO2 SERPL-SCNC: 25 MMOL/L (ref 18–29)
CREAT SERPL-MCNC: 0.82 MG/DL (ref 0.57–1)
ERYTHROCYTE [DISTWIDTH] IN BLOOD BY AUTOMATED COUNT: 13.5 % (ref 12.3–15.4)
GFR SERPLBLD CREATININE-BSD FMLA CKD-EPI: 80 ML/MIN/1.73
GFR SERPLBLD CREATININE-BSD FMLA CKD-EPI: 92 ML/MIN/1.73
GLOBULIN SER CALC-MCNC: 2.1 G/DL (ref 1.5–4.5)
GLUCOSE SERPL-MCNC: 214 MG/DL (ref 65–99)
HCT VFR BLD AUTO: 41.7 % (ref 34–46.6)
HDLC SERPL-MCNC: 40 MG/DL
HGB BLD-MCNC: 14.1 G/DL (ref 11.1–15.9)
MCH RBC QN AUTO: 29.9 PG (ref 26.6–33)
MCHC RBC AUTO-ENTMCNC: 33.8 G/DL (ref 31.5–35.7)
MCV RBC AUTO: 89 FL (ref 79–97)
PLATELET # BLD AUTO: 204 X10E3/UL (ref 150–379)
POTASSIUM SERPL-SCNC: 5.3 MMOL/L (ref 3.5–5.2)
PROT SERPL-MCNC: 6.1 G/DL (ref 6–8.5)
RBC # BLD AUTO: 4.71 X10E6/UL (ref 3.77–5.28)
SODIUM SERPL-SCNC: 143 MMOL/L (ref 134–144)
TSH SERPL DL<=0.005 MIU/L-ACNC: 0.58 UIU/ML (ref 0.45–4.5)
WBC # BLD AUTO: 7.2 X10E3/UL (ref 3.4–10.8)

## 2018-05-30 ENCOUNTER — OFFICE VISIT (OUTPATIENT)
Dept: SLEEP MEDICINE | Age: 58
End: 2018-05-30

## 2018-05-30 VITALS
DIASTOLIC BLOOD PRESSURE: 62 MMHG | OXYGEN SATURATION: 94 % | HEIGHT: 68 IN | HEART RATE: 75 BPM | SYSTOLIC BLOOD PRESSURE: 95 MMHG | WEIGHT: 179 LBS | BODY MASS INDEX: 27.13 KG/M2

## 2018-05-30 DIAGNOSIS — J44.9 CHRONIC OBSTRUCTIVE PULMONARY DISEASE, UNSPECIFIED COPD TYPE (HCC): ICD-10-CM

## 2018-05-30 DIAGNOSIS — J38.01 VOCAL CORD PARALYSIS, UNILATERAL COMPLETE: ICD-10-CM

## 2018-05-30 DIAGNOSIS — I10 ESSENTIAL HYPERTENSION: ICD-10-CM

## 2018-05-30 DIAGNOSIS — F17.200 TOBACCO USE DISORDER: ICD-10-CM

## 2018-05-30 DIAGNOSIS — G47.00 INSOMNIA, UNSPECIFIED TYPE: ICD-10-CM

## 2018-05-30 DIAGNOSIS — G47.33 OSA (OBSTRUCTIVE SLEEP APNEA): Primary | ICD-10-CM

## 2018-05-30 DIAGNOSIS — Z86.59 H/O MAJOR DEPRESSION: ICD-10-CM

## 2018-05-30 NOTE — PATIENT INSTRUCTIONS
[address] [facility] [place]. [place], [address]  Tel.  [phone]  Fax. [facility] [address]  [place], [address] [facility]  Tel.  [phone]  Fax. [phone] [address]  Tel.  [phone]  Fax. [phone]     Sleep Apnea: After Your Visit  Your Care Instructions  Sleep apnea occurs when you frequently stop breathing for 10 seconds or longer during sleep. It can be mild to severe, based on the number of times per hour that you stop breathing or have slowed breathing. Blocked or narrowed airways in your nose, mouth, or throat can cause sleep apnea. Your airway can become blocked when your throat muscles and tongue relax during sleep. Sleep apnea is common, occurring in 1 out of 20 individuals. Individuals having any of the following characteristics should be evaluated and treated right away due to high risk and detrimental consequences from untreated sleep apnea:  1. Obesity  2. Congestive Heart failure  3. Atrial Fibrillation  4. Uncontrolled Hypertension  5. Type II Diabetes  6. Night-time Arrhythmias  7. Stroke  8. Pulmonary Hypertension  9. High-risk Driving Populations (pilots, truck drivers, etc.)  10. Patients Considering Weight-loss Surgery    How do you know you have sleep apnea? You probably have sleep apnea if you answer 'yes' to 3 or more of the following questions:  S - Have you been told that you Snore? T - Are you often Tired during the day? O - Has anyone Observed you stop breathing while sleeping? P- Do you have (or are being treated for) high blood Pressure? B - Are you obese (Body Mass Index > 35)? A - Is your Age 48years old or older? N - Is your Neck size greater than 16 inches? G - Are you male Gender? A sleep physician can prescribe a breathing device that prevents tissues in the throat from blocking your airway.  Or your doctor may recommend using a dental device (oral breathing device) to help keep your airway open. In some cases, surgery may be needed to remove enlarged tissues in the throat. Follow-up care is a key part of your treatment and safety. Be sure to make and go to all appointments, and call your doctor if you are having problems. It's also a good idea to know your test results and keep a list of the medicines you take. How can you care for yourself at home? · Lose weight, if needed. It may reduce the number of times you stop breathing or have slowed breathing. · Go to bed at the same time every night. · Sleep on your side. It may stop mild apnea. If you tend to roll onto your back, sew a pocket in the back of your pajama top. Put a tennis ball into the pocket, and stitch the pocket shut. This will help keep you from sleeping on your back. · Avoid alcohol and medicines such as sleeping pills and sedatives before bed. · Do not smoke. Smoking can make sleep apnea worse. If you need help quitting, talk to your doctor about stop-smoking programs and medicines. These can increase your chances of quitting for good. · Prop up the head of your bed 4 to 6 inches by putting bricks under the legs of the bed. · Treat breathing problems, such as a stuffy nose, caused by a cold or allergies. · Use a continuous positive airway pressure (CPAP) breathing machine if lifestyle changes do not help your apnea and your doctor recommends it. The machine keeps your airway from closing when you sleep. · If CPAP does not help you, ask your doctor whether you should try other breathing machines. A bilevel positive airway pressure machine has two types of air pressureâone for breathing in and one for breathing out. Another device raises or lowers air pressure as needed while you breathe. · If your nose feels dry or bleeds when using one of these machines, talk with your doctor about increasing moisture in the air. A humidifier may help.   · If your nose is runny or stuffy from using a breathing machine, talk with your doctor about using decongestants or a corticosteroid nasal spray. When should you call for help? Watch closely for changes in your health, and be sure to contact your doctor if:  · You still have sleep apnea even though you have made lifestyle changes. · You are thinking of trying a device such as CPAP. · You are having problems using a CPAP or similar machine. Where can you learn more? Go to [employer]. Enter Z432 in the search box to learn more about \"Sleep Apnea: After Your Visit. \"   © 7141-1148 Healthwise, [employer]. Care instructions adapted under license by Gretchen Lucas (which disclaims liability or warranty for this information). This care instruction is for use with your licensed healthcare professional. If you have questions about a medical condition or this instruction, always ask your healthcare professional. Haroldo Elizabeth any warranty or liability for your use of this information. PROPER SLEEP HYGIENE    What to avoid  · Do not have drinks with caffeine, such as coffee or black tea, for 8 hours before bed. · Do not smoke or use other types of tobacco near bedtime. Nicotine is a stimulant and can keep you awake. · Avoid drinking alcohol late in the evening, because it can cause you to wake in the middle of the night. · Do not eat a big meal close to bedtime. If you are hungry, eat a light snack. · Do not drink a lot of water close to bedtime, because the need to urinate may wake you up during the night. · Do not read or watch TV in bed. Use the bed only for sleeping and sexual activity. What to try  · Go to bed at the same time every night, and wake up at the same time every morning. Do not take naps during the day. · Keep your bedroom quiet, dark, and cool. · Get regular exercise, but not within 3 to 4 hours of your bedtime. .  · Sleep on a comfortable pillow and mattress.   · If watching the clock makes you anxious, turn it facing away from you so you cannot see the time. · If you worry when you lie down, start a worry book. Well before bedtime, write down your worries, and then set the book and your concerns aside. · Try meditation or other relaxation techniques before you go to bed. · If you cannot fall asleep, get up and go to another room until you feel sleepy. Do something relaxing. Repeat your bedtime routine before you go to bed again. · Make your house quiet and calm about an hour before bedtime. Turn down the lights, turn off the TV, log off the computer, and turn down the volume on music. This can help you relax after a busy day. Drowsy Driving  The [address] [place] [address] Road Traffic Safety Administration cites drowsiness as a causing factor in more than 149,880 police reported crashes annually, resulting in 76,000 injuries and 1,500 deaths. Other surveys suggest 55% of people polled have driven while drowsy in the past year, 23% had fallen asleep but not crashed, 3% crashed, and 2% had and accident due to drowsy driving. Who is at risk? Young Drivers: One study of drowsy driving accidents states that 55% of the drivers were under 25 years. Of those, 75% were male. Shift Workers and Travelers: People who work overnight or travel across time zones frequently are at higher risk of experiencing Circadian Rhythm Disorders. They are trying to work and function when their body is programed to sleep. Sleep Deprived: Lack of sleep has a serious impact on your ability to pay attention or focus on a task. Consistently getting less than the average of 8 hours your body needs creates partial or cumulative sleep deprivation. Untreated Sleep Disorders: Sleep Apnea, Narcolepsy, R.L.S., and other sleep disorders (untreated) prevent a person from getting enough restful sleep. This leads to excessive daytime sleepiness and increases the risk for drowsy driving accidents by up to 7 times.   Medications / Alcohol: Even over the counter medications can cause drowsiness. Medications that impair a drivers attention should have a warning label. Alcohol naturally makes you sleepy and on its own can cause accidents. Combined with excessive drowsiness its effects are amplified. Signs of Drowsy Driving:   * You don't remember driving the last few miles   * You may drift out of your [place]   * You are unable to focus and your thoughts wander   * You may yawn more often than normal   * You have difficulty keeping your eyes open / nodding off   * Missing traffic signs, speeding, or tailgating  Prevention-   Good sleep hygiene, lifestyle and behavioral choices have the most impact on drowsy driving. There is no substitute for sleep and the average person requires 8 hours nightly. If you find yourself driving drowsy, stop and sleep. Consider the sleep hygiene tips provided during your visit as well. Medication Refill Policy: Refills for all medications require 1 week advance notice. Please have your pharmacy fax a refill request. We are unable to fax, or call in \"controled substance\" medications and you will need to pick these prescriptions up from our office. [email or web address] Activation    Thank you for requesting access to [email or web address]. Please follow the instructions below to securely access and download your online medical record. [email or web address] allows you to send messages to your doctor, view your test results, renew your prescriptions, schedule appointments, and more. How Do I Sign Up? 1. In your internet browser, go to [email or web address]. 2. Click on the First Time User? Click Here link in the Sign In box. You will see the New Member Sign Up page. 3. Enter your [email or web address] Access Code exactly as it appears below. You will not need to use this code after youve completed the sign-up process. If you do not sign up before the expiration date, you must request a new code. [email or web address] Access Code:  Activation code not generated  Current [email or web address] Status: Active (This is the date your [employer] access code will )    4. Enter the last four digits of your Social Security Number (xxxx) and Date of Birth (mm/dd/yyyy) as indicated and click Submit. You will be taken to the next sign-up page. 5. Create a [employer] ID. This will be your [employer] login ID and cannot be changed, so think of one that is secure and easy to remember. 6. Create a [employer] password. You can change your password at any time. 7. Enter your Password Reset Question and Answer. This can be used at a later time if you forget your password. 8. Enter your e-mail address. You will receive e-mail notification when new information is available in [address] 9. Click Sign Up. You can now view and download portions of your medical record. 10. Click the Download Summary menu link to download a portable copy of your medical information. Additional Information    If you have questions, please call [phone]. Remember, [employer] is NOT to be used for urgent needs. For medical emergencies, dial 911.

## 2018-05-30 NOTE — PROGRESS NOTES
[address] [facility]. [place], [address]  Tel.  [phone]  Fax. [address] [facility] [address]  [place], [address] [facility]  Tel.  [phone]  Fax. [phone] [address] [facility] Eugene Franco   Tel.  [phone]  Fax. [phone]         Subjective:      Gustavo Chambers is an 63 y.o. female referred for evaluation for a sleep disorder. She complains of snoring associated with awakening in the middle of the night because of SOB, heartburn, leg cramps, urination and for no specific reason. She has to take Sequel 75 mg to help her sleep. Symptoms began several years ago, unchanged since that time. She usually can fall asleep in 30 minutes after taking her medication. Family or house members note snoring. She denies completely or partially paralyzed while falling asleep or waking up. Gustavo Chambers does wake up frequently at night. She is bothered by waking up too early and left unable to get back to sleep. She actually sleeps about 6 hours at night and wakes up about 3 times during the night. She does not work shifts: [facility] indicates she does get too little sleep at night. Her bedtime is 2200 (Between 9:00pm and 10:00pm.). She awakens at 0600 (Between 2:00am-6:00am). She does take naps. She takes 7 naps a week lasting 3, 2, Hour(s). She has the following observed behaviors: Loud snoring, Light snoring, Sleep talking, Twitching of legs or feet, Grinding teeth; Nightmares. Other remarks: vivid dreams    [place] Sleepiness Score: 14 which reflect moderate daytime drowsiness.     Allergies   Allergen Reactions    Latex Hives    Other Food Anaphylaxis     PEPPERONI, sloppy joes    Demerol [Meperidine] Anaphylaxis     Difficulty breathing    Iodine Anaphylaxis    Morphine Other (comments)     voilent outbreaks (restraints needed)    Nsaids (Non-Steroidal Anti-Inflammatory Drug) Hives         Current Outpatient Prescriptions:     ARIPiprazole (ABILIFY) 10 mg tablet, Take 10 mg by mouth daily. , Disp: , Rfl:     tiotropium (SPIRIVA WITH HANDIHALER) 18 mcg inhalation capsule, Take 1 Cap by inhalation daily. , Disp: 30 Cap, Rfl: 4    empagliflozin (JARDIANCE) 25 mg tablet, Take 1 Tab by mouth daily. , Disp: 14 Tab, Rfl: 0    Insulin Needles, Disposable, (URSZULA PEN NEEDLE) 32 gauge x 5/32\" ndle, For use twice per day with insulin/victoza., Disp: 200 Pen Needle, Rfl: 3    pantoprazole (PROTONIX) 40 mg tablet, Take (1) tablet by mouth once a day., Disp: 30 Tab, Rfl: 1    albuterol (PROVENTIL VENTOLIN) 2.5 mg /3 mL (0.083 %) nebulizer solution, 3 mL by Nebulization route every four (4) hours as needed for Wheezing or Shortness of Breath., Disp: 24 Each, Rfl: 0    QUEtiapine (SEROQUEL) 25 mg tablet, Take 1 Tab by mouth nightly. Indications: DEPRESSION TREATMENT ADJUNCT, Disp: 30 Tab, Rfl: 2    NOVOLIN R REGULAR U-100 INSULN 100 unit/mL injection, , Disp: , Rfl:     BYSTOLIC 10 mg tablet, Take 2 tablets PO every AM, Disp: 60 Tab, Rfl: 3    budesonide-formoterol (SYMBICORT) 160-4.5 mcg/actuation HFAA, Take 2 Puffs by inhalation two (2) times a day. Indications: COPD ASSOCIATED WITH CHRONIC BRONCHITIS, EXTRINSIC ASTHMA, Disp: 1 Inhaler, Rfl: 6    ipratropium (ATROVENT) 0.02 % soln, 2.5 mL by Nebulization route every four (4) hours as needed. , Disp: 70 Vial, Rfl: 7    prednisoLONE acetate (PRED FORTE) 1 % ophthalmic suspension, , Disp: , Rfl:     SURE COMFORT INSULIN SYRINGE 1 mL 30 gauge x 5/16 syrg, , Disp: , Rfl:     montelukast (SINGULAIR) 10 mg tablet, Take 1 Tab by mouth daily. , Disp: 30 Tab, Rfl: 6    Liraglutide (VICTOZA 3-ESTEPHANIE) 0.6 mg/0.1 mL (18 mg/3 mL) pnij, 1.8 mg by SubCUTAneous route daily.  Titrate up to 1.8mg daily as direcected, Disp: 3 Pen, Rfl: 7    metFORMIN ER (GLUCOPHAGE XR) 500 mg tablet, Take 1 Tab by mouth Before breakfast and dinner., Disp: 180 Tab, Rfl: 3    Oxygen, O2 at 2 Liters NC while asleep, Disp: , Rfl:     multivitamin (ONE A DAY) tablet, Take 1 Tab by mouth daily. , Disp: , Rfl:     Omega-3-DHA-EPA-Fish Oil 1,000 mg (120 mg-180 mg) cap, Take  by mouth. Take 2 po daily, Disp: , Rfl:     TOUJEO SOLOSTAR 300 unit/mL (1.5 mL) inpn, 65 Units by SubCUTAneous route daily. , Disp: 15 Pen, Rfl: 3    glucose blood VI test strips (EASYMAX N) strip, Use to test blood sugar 4 times a day, Disp: 200 Strip, Rfl: 3    rosuvastatin (CRESTOR) 40 mg tablet, Take 1 Tab by mouth nightly., Disp: 90 Tab, Rfl: 3    EPINEPHrine (EPIPEN) 0.3 mg/0.3 mL injection, 0.3 mg by IntraMUSCular route once as needed. , Disp: , Rfl:     diphenhydrAMINE (BENADRYL) 25 mg capsule, Take 25 mg by mouth every six (6) hours as needed. , Disp: , Rfl:     ARIPiprazole (ABILIFY) 15 mg tablet, Take 1 Tab by mouth daily. Indications: DEPRESSION TREATMENT ADJUNCT (Patient not taking: Reported on 5/22/2018), Disp: 30 Tab, Rfl: 2    QUEtiapine (SEROQUEL) 50 mg tablet, Take 1 Tab by mouth daily. Indications: DEPRESSION TREATMENT ADJUNCT, Disp: 30 Tab, Rfl: 2    fluticasone (FLONASE) 50 mcg/actuation nasal spray, 2 sprays daily on each nostril for the next 8 weeks then 2 sprays on each nostril every other day for the next 4 weeks and thereafter 2 sprays on each nostril as needed (Patient not taking: Reported on 5/22/2018), Disp: 1 Bottle, Rfl: 6    ondansetron (ZOFRAN ODT) 4 mg disintegrating tablet, Take 1 Tab by mouth every eight (8) hours as needed for Nausea. (Patient not taking: Reported on 5/22/2018), Disp: 10 Tab, Rfl: 0    ergocalciferol (ERGOCALCIFEROL) 50,000 unit capsule, Take 1 capsule by mouth every seven (7) days. , Disp: 12 capsule, Rfl: 3     She  has a past medical history of Asthma; Asthma; COPD; Depression; Diabetes ([facility] [address] Diabetes mellitus; Encounter for review of form with patient (12/4/2017); Essential hypertension; GERD (gastroesophageal reflux disease); Headache(784.0); OTHER MEDICAL; Hypercholesterolemia; Hypertension; Ill-defined condition;  Insomnia disorder (2017); Major depression, chronic (2017); Psychiatric problem; Psychotic disorder; Screening for cervical cancer (2018); Tobacco use disorder (10/19/2017); and Unspecified sleep apnea. She  has a past surgical history that includes hx gyn; pr  delivery only; hx heent; hx heent; hx heent; hx heent; hx heent; and hx gastrectomy (14). She family history includes Cancer in her paternal grandfather; Diabetes in her father; Heart Disease in her father; Heart Failure in her father; High Cholesterol in her mother; Hypertension in her father; Stroke in her maternal grandmother; Suicide in her brother. There is no history of Anesth Problems. She  reports that she has been smoking. She has a 12.00 pack-year smoking history. She has never used smokeless tobacco. She reports that she drinks alcohol. She reports that she does not use illicit drugs. Review of Systems:  Constitutional:  significant weight loss - 40 lbs since 2017. Eyes:  No blurred vision  CVS:  No significant chest pain  Pulm:  No significant shortness of breath  GI:  No significant nausea or vomiting  :  No significant nocturia  Musculoskeletal:  No significant joint pain at night  Skin:  No significant rashes  Neuro:  No significant dizziness   Psych:  No active mood issues    Sleep Review of Systems: notable fordifficulty falling asleep; frequent awakenings at night;  regular dreaming noted; nightmares ; early morning headaches; no memory problems; no concentration issues; no history of any automobile or occupational accidents due to daytime drowsiness.       Objective:     Visit Vitals    BP 95/62    Pulse 75    Ht 5' 8\" (1.727 m)    Wt 179 lb (81.2 kg)    LMP 2010    SpO2 94%    BMI 27.22 kg/m2         General:   Not in acute distress   Eyes:  Anicteric sclerae, no obvious strabismus   Nose:  No obvious nasal septum deviation    Oropharynx:   Class 4 oropharyngeal outlet, thick tongue base, uvula could not be seen due to low-lying soft palate, narrow tonsilo-pharyngeal pilars   Tonsils:   tonsils are not seen due to low-lying soft palate   Neck:   Neck circ. in \"inches\": 15; midline trachea   Chest/Lungs:  Equal lung expansion, clear on auscultation    CVS:  Normal rate, regular rhythm; no JVD   Skin:  Warm to touch; no obvious rashes   Neuro:  No focal deficits ; no obvious tremor    Psych:  Normal affect,  normal countenance;          Assessment:       ICD-10-CM ICD-9-CM    1. SANJUANA (obstructive sleep apnea) G47.33 327.23 POLYSOMNOGRAPHY 1 NIGHT   2. Chronic obstructive pulmonary disease, unspecified [facility] ([facility] J44.9 496    3. Vocal cord paralysis, unilateral complete J38.01 478.32    4. Insomnia, unspecified type G47.00 780.52    5. Essential hypertension I10 401.9    6. H/O major depression Z86.59 V11.8    7. Tobacco use disorder F17.200 305.1    8. BMI 27.0-27.9,adult Z68.27 V85.23          Plan:     * The patient currently has a Moderate Risk for having sleep apnea. STOP-BANG score 4.  * Sleep testing was ordered for initial evaluation. * She was provided information on sleep apnea including coresponding risk factors and the importance of proper treatment. * Treatment options if indicated were reviewed today. Patient agrees to a trial of PAP therapy if indicated. * Counseling was provided regarding proper sleep hygiene (including effect of light on sleep), paradoxical intention, stimulus control, sleep environment safety and safe driving. * Effect of sleep disturbance on weight was reviewed. We have recommended a dedicated weight loss through appropriate diet and an exercise regiment as significant weight reduction has been shown to reduce severity of obstructive sleep apnea.      * Patient agrees to telephone [phone]  follow-up by myself or lead sleep technologist shortly after sleep study to review results and plan final management.     (patient has given permission for a message to be left regarding test results and further management if patient cannot be cannot be reached directly). Thank you for allowing us to participate in your patient's medical care. We'll keep you updated on these investigations. Jed Mcguire MD, [facility]  Electronically signed.  05/30/18

## 2018-06-06 ENCOUNTER — OFFICE VISIT (OUTPATIENT)
Dept: BEHAVIORAL/MENTAL HEALTH CLINIC | Age: 58
End: 2018-06-06

## 2018-06-06 VITALS
BODY MASS INDEX: 27.43 KG/M2 | WEIGHT: 181 LBS | HEIGHT: 68 IN | SYSTOLIC BLOOD PRESSURE: 124 MMHG | HEART RATE: 92 BPM | DIASTOLIC BLOOD PRESSURE: 67 MMHG

## 2018-06-06 DIAGNOSIS — F31.81 BIPOLAR 2 DISORDER, MAJOR DEPRESSIVE EPISODE (HCC): Primary | ICD-10-CM

## 2018-06-06 DIAGNOSIS — F41.9 ANXIETY: ICD-10-CM

## 2018-06-06 DIAGNOSIS — G47.00 INSOMNIA, UNSPECIFIED TYPE: ICD-10-CM

## 2018-06-06 NOTE — PROGRESS NOTES
Hailey Cashton  1960  63 y.o.  female  [facility]    Chief Complaint   Patient presents with    Depression    Anxiety    Medication Problem    Insomnia       [place]:   This is a 59 years old divorce  female with past psychiatric history and treatment of manic depression who was seen for the first time on December 28, 2017 referred by her PCP to manage her complicated psychiatric medications.  She decided to gradually taper and discontinue Viibryd and high dose Paxil and try Abilify 1-2 mg daily and take Seroquel  mg at nighttime to help with sleep.  She was seen for follow-up on February 1, 2018, March 5, 2018, April 4, 2018, and May 4 when she decided to gradually increase Abilify to 15 mg and Seroquel to 75 mg.        Patient presented on time and was observed to be improved and informed that she stopped taking Abilify about a week ago due to side effect of dizziness, lightheadedness, and making her tired and requested something to help with her depression and anxiety. She report 7 out of 10 for depression on the scale of 1-10 where 10 is worst and reported 6 out of 10 for anxiety. She was able to see sleep physician and is a scheduled for sleep study to identify any correctable reason for her insomnia. She is no longer taking big naps in daytime but is still require some help with understanding sleep hygiene and the proper way to take medication. She denies any substance abuse and denies any major change with her social life although she is dealing with medical problems and is receiving proper care.      Patient report episodes of possible hypomania with not requiring sleep for up to 3 days and 3 nights and increasing goal-directed activity with racing thoughts.   She was not able to provide symptoms diagnostic for jodi or hypomania and was provided with support and psychoeducation and after discussing risk and benefit and understanding with possibility of metabolic side effect in the context of already being diabetic she decided to try Latuda 20 mg with dinner and gradually taper and discontinue Seroquel 75 mg at bedtime.      SUBSTANCE USE HISTORY:   TOBACCO: Smoked 1 pack per day for past 35 years and expressed desire to get help to quit his smoking but first would like to stabilize on her medications. ALCOHOL: Patient denies abuse. CANNABIS: Tried few times but never abused it. COCAINE, OPIOIDS, and OTHERS: Denies abuse. MEDICAL HISTORY:    has a past medical history of Asthma; Asthma; COPD; Depression; Diabetes ([address] Diabetes mellitus; Encounter for review of form with patient (12/4/2017); Essential hypertension; GERD (gastroesophageal reflux disease); Headache(784.0); OTHER MEDICAL; Hypercholesterolemia; Hypertension; Ill-defined condition; Insomnia disorder (12/4/2017); Major depression, chronic (12/4/2017); Psychiatric problem; Psychotic disorder; Screening for cervical cancer (5/22/2018); Tobacco use disorder (10/19/2017); and Unspecified sleep apnea. ALLERGIES:   Allergies   Allergen Reactions    Latex Hives    Other Food Anaphylaxis     PEPPERONI, sloppy joes    Demerol [Meperidine] Anaphylaxis     Difficulty breathing    Iodine Anaphylaxis    Morphine Other (comments)     voilent outbreaks (restraints needed)    Nsaids (Non-Steroidal Anti-Inflammatory Drug) Hives       VITAL SIGNS:  /67  Pulse 92  Ht 5' 8\" (1.727 m)  Wt 82.1 kg (181 lb)  LMP 01/17/2010  BMI 27.52 kg/m2    Current Outpatient Prescriptions   Medication Sig Dispense Refill    lurasidone (LATUDA) 20 mg tab tablet Take 1 Tab by mouth daily (with dinner). Indications: DEPRESSION ASSOCIATED WITH BIPOLAR DISORDER 30 Tab 2    tiotropium (SPIRIVA WITH HANDIHALER) 18 mcg inhalation capsule Take 1 Cap by inhalation daily. 30 Cap 4    empagliflozin (JARDIANCE) 25 mg tablet Take 1 Tab by mouth daily.  14 Tab 0    Insulin Needles, Disposable, (URSZULA PEN NEEDLE) 32 gauge x 5/32\" ndle For use twice per day with insulin/victoza. 200 Pen Needle 3    pantoprazole (PROTONIX) 40 mg tablet Take (1) tablet by mouth once a day. 30 Tab 1    albuterol (PROVENTIL VENTOLIN) 2.5 mg /3 mL (0.083 %) nebulizer solution 3 mL by Nebulization route every four (4) hours as needed for Wheezing or Shortness of Breath. 24 Each 0    QUEtiapine (SEROQUEL) 50 mg tablet Take 1 Tab by mouth daily. Indications: DEPRESSION TREATMENT ADJUNCT 30 Tab 2    QUEtiapine (SEROQUEL) 25 mg tablet Take 1 Tab by mouth nightly. Indications: DEPRESSION TREATMENT ADJUNCT 30 Tab 2    NOVOLIN R REGULAR U-100 INSULN 100 unit/mL injection       BYSTOLIC 10 mg tablet Take 2 tablets PO every AM 60 Tab 3    budesonide-formoterol (SYMBICORT) 160-4.5 mcg/actuation HFAA Take 2 Puffs by inhalation two (2) times a day. Indications: COPD ASSOCIATED WITH CHRONIC BRONCHITIS, EXTRINSIC ASTHMA 1 Inhaler 6    ipratropium (ATROVENT) 0.02 % soln 2.5 mL by Nebulization route every four (4) hours as needed. 70 Vial 7    prednisoLONE acetate (PRED FORTE) 1 % ophthalmic suspension       SURE COMFORT INSULIN SYRINGE 1 mL 30 gauge x 5/16 syrg       montelukast (SINGULAIR) 10 mg tablet Take 1 Tab by mouth daily. 30 Tab 6    fluticasone (FLONASE) 50 mcg/actuation nasal spray 2 sprays daily on each nostril for the next 8 weeks then 2 sprays on each nostril every other day for the next 4 weeks and thereafter 2 sprays on each nostril as needed 1 Bottle 6    ondansetron (ZOFRAN ODT) 4 mg disintegrating tablet Take 1 Tab by mouth every eight (8) hours as needed for Nausea. 10 Tab 0    Liraglutide (VICTOZA 3-ESTEPHANIE) 0.6 mg/0.1 mL (18 mg/3 mL) pnij 1.8 mg by SubCUTAneous route daily. Titrate up to 1.8mg daily as direcected 3 Pen 7    metFORMIN ER (GLUCOPHAGE XR) 500 mg tablet Take 1 Tab by mouth Before breakfast and dinner. 180 Tab 3    Oxygen O2 at 2 Liters NC while asleep      multivitamin (ONE A DAY) tablet Take 1 Tab by mouth daily.       Omega-3-DHA-EPA-Fish Oil 1,000 mg (120 mg-180 mg) cap Take  by mouth. Take 2 po daily      TOUJEO SOLOSTAR 300 unit/mL (1.5 mL) inpn 65 Units by SubCUTAneous route daily. 15 Pen 3    glucose blood VI test strips (EASYMAX N) strip Use to test blood sugar 4 times a day 200 Strip 3    rosuvastatin (CRESTOR) 40 mg tablet Take 1 Tab by mouth nightly. 90 Tab 3    EPINEPHrine (EPIPEN) 0.3 mg/0.3 mL injection 0.3 mg by IntraMUSCular route once as needed.  ergocalciferol (ERGOCALCIFEROL) 50,000 unit capsule Take 1 capsule by mouth every seven (7) days. 12 capsule 3    diphenhydrAMINE (BENADRYL) 25 mg capsule Take 25 mg by mouth every six (6) hours as needed. MENTAL STATUS EXAMINATION:   Patient is a 63 y.o. female [facility] who looks her stated age. She has hoarse voice, dressed nicely with good hygiene. Speech is regular rate and rhythm, fluent language, and thought process is linear, logical, and goal directed. Reported mood is depressed and anxious with mood congruent depressed and anxiousaffect. Patient denies any suicidal ideation, homicidal ideation, and auditory visual hallucination. Not observed to be delusional or paranoid. Insight, judgement, reliability and impulse control is intact. Her cognition is within normal limit and she is observed to be reliable. DIAGNOSIS:  Encounter Diagnoses   Name Primary?  Bipolar 2 disorder, major depressive episode (HCC) Yes    Insomnia, unspecified type     Anxiety        PLAN:  1. MEDICATION:   1. Patient to stop taking Abilify due to side effect of dizziness and lightheadedness and making her tired and refused to restart although it help her with her mood. 2.  She is taking Seroquel 75 mg at bedtime with benefit with her sleep but agreed to gradually taper and discontinue if Latuda helps her. She is also scheduled for sleep study which may help. 3.   Latuda 20 mg with dinner for bipolar 2 mostly with depression.   Patient was provided with psycho education, discussed risk/benefits, and expectations from medication changes. Patient agrees with plan. 2. PSYCHOTHERAPY: Patient was provided with supportive therapy, strongly encourage to seek psychotherapy. 3. MEDICAL CARE: Patient was strongly encourage to take their medical medications and follow up with their PCP on regular basis. Labs done on May 22, 2018 were within normal limit for CBC, TSH, lipid profile, significant for blood sugar 214 high, potassium 5.3 high, and ALT 41 high. Hemoglobin A1c done on April 11, 2018 was significant for a score 8.5 high and labs done on January 23, 2018 were significant for neutrophils 80 high and lymphocyte 11 low and CMP significant for glucose 248 high    4. SUBSTANCE ABUSE CARE: Patient smokes 1 pack per day but denies any drinking or abusing any illicit drugs. 5. FOLLOW UP:   Follow-up Disposition:  Return in about 4 weeks (around 7/4/2018) for Medication management.

## 2018-06-06 NOTE — MR AVS SNAPSHOT
[address] [facility] 
[phone] Patient: Clement Osgood MRN:  [id number] Visit Information Date & Time Provider Department Dept. Phone Encounter #  
 6/6/2018  8:30 AM Elsi Arnold [phone] [id number] Follow-up Instructions Return in about 4 weeks (around 7/4/2018) for Medication management. Follow-up and Disposition History Your Appointments 7/2/2018 11:30 AM  
ESTABLISHED PATIENT with MD Elsi Arnold [address] [facility] Appt Note: 1 month f/u  
 [address] [address]  
[address] [place] [address] [address] [phone]  
  
    
 7/11/2018 11:50 AM  
Follow Up with Adela Callahan MD  
[address] [place] [address] Diabetes and Endocrinology [facility]) Appt Note: 3 month f/u  
 [address] [facility] [address] [phone] [address] [place] [address]  
  
    
 11/20/2018  8:15 AM  
ROUTINE CARE with Katelynn Schulz MD  
[address] [place] [address] OFFICE-ANNEX ([facility]) Appt Note: 6 mo f/u  
 [address] [facility] 7 [phone]  
[phone] [facility] [phone] Upcoming Health Maintenance Date Due  
 PAP AKA CERVICAL CYTOLOGY 3/23/1982 FOBT Q 1 YEAR AGE 50-75 3/23/2011 Influenza Age 5 to Adult 8/1/2018 HEMOGLOBIN A1C Q6M 10/11/2018 MICROALBUMIN Q1 10/19/2018 FOOT EXAM Q1 1/10/2019 EYE EXAM RETINAL OR DILATED Q1 5/3/2019 LIPID PANEL Q1 5/22/2019 MEDICARE YEARLY EXAM 5/23/2019 BREAST CANCER SCRN MAMMOGRAM 12/8/2019 DTaP/Tdap/Td series (2 - Td) 3/31/2026 Allergies as of 6/6/2018  Review Complete On: 6/6/2018 By: Elizabeth De La Garza MD  
  
 Severity Noted Reaction Type Reactions Latex  09/05/2014    Hives Other Food High 09/05/2014    Anaphylaxis PEPPERONI, sloppy joes Demerol [Meperidine] High 10/17/2010   Systemic Anaphylaxis Difficulty breathing Iodine High 10/17/2010   Systemic Anaphylaxis Morphine High 10/17/2010   Systemic Other (comments)  
 voilent outbreaks (restraints needed) Nsaids (Non-steroidal Anti-inflammatory Drug)  10/19/2017    Hives Current Immunizations  Reviewed on 1/25/2018 Name Date Influenza Vaccine (Quad) PF 10/19/2017 Pneumococcal Polysaccharide (PPSV-23) 10/19/2016 Tdap 3/31/2016 Not reviewed this visit You Were Diagnosed With   
  
 Codes Comments Bipolar 2 disorder, major depressive episode ([facility]    -  Primary ICD-10-CM: F31.81 
ICD-9-CM: 296.89 Insomnia, unspecified type     ICD-10-CM: G47.00 ICD-9-CM: 780.52 Anxiety     ICD-10-CM: F41.9 ICD-9-CM: 300.00 Vitals BP Pulse Height(growth percentile) Weight(growth percentile) LMP BMI  
 124/67 92 5' 8\" (1.727 m) 181 lb (82.1 kg) 01/17/2010 27.52 kg/m2 OB Status Smoking Status Postmenopausal Current Every Day Smoker Vitals History BMI and BSA Data Body Mass Index Body Surface Area  
 27.52 kg/m 2 1.98 m 2 Preferred Pharmacy Pharmacy Name Phone [phone] [address] [phone] Your Updated Medication List  
  
   
This list is accurate as of 6/6/18 10:51 AM.  Always use your most recent med list.  
  
  
  
  
 albuterol 2.5 mg /3 mL (0.083 %) nebulizer solution Commonly known as:  PROVENTIL VENTOLIN  
3 mL by Nebulization route every four (4) hours as needed for Wheezing or Shortness of Breath. BENADRYL 25 mg capsule Generic drug:  diphenhydrAMINE Take 25 mg by mouth every six (6) hours as needed. budesonide-formoterol 160-4.5 mcg/actuation Hfaa Commonly known as:  SYMBICORT  
 Take 2 Puffs by inhalation two (2) times a day. Indications: COPD ASSOCIATED WITH CHRONIC BRONCHITIS, EXTRINSIC ASTHMA BYSTOLIC 10 mg tablet Generic drug:  nebivolol Take 2 tablets PO every AM  
  
 empagliflozin 25 mg tablet Commonly known as:  Raissas Mutters Take 1 Tab by mouth daily. EPINEPHrine 0.3 mg/0.3 mL injection Commonly known as:  EPIPEN  
0.3 mg by IntraMUSCular route once as needed. ergocalciferol 50,000 unit capsule Commonly known as:  ERGOCALCIFEROL Take 1 capsule by mouth every seven (7) days. fluticasone 50 mcg/actuation nasal spray Commonly known as:  FLONASE  
2 sprays daily on each nostril for the next 8 weeks then 2 sprays on each nostril every other day for the next 4 weeks and thereafter 2 sprays on each nostril as needed  
  
 glucose blood VI test strips strip Commonly known as:  EasyMax N  
Use to test blood sugar 4 times a day Insulin Needles (Disposable) 32 gauge x 5/32\" Ndle Commonly known as:  Amparo Pen Needle For use twice per day with insulin/victoza. ipratropium 0.02 % Soln Commonly known as:  ATROVENT  
2.5 mL by Nebulization route every four (4) hours as needed. Liraglutide 0.6 mg/0.1 mL (18 mg/3 mL) Pnij Commonly known as:  VICTOZA 3-ESTEPHANIE  
1.8 mg by SubCUTAneous route daily. Titrate up to 1.8mg daily as direcected  
  
 lurasidone 20 mg Tab tablet Commonly known as:  Karine Patience Take 1 Tab by mouth daily (with dinner). Indications: DEPRESSION ASSOCIATED WITH BIPOLAR DISORDER  
  
 metFORMIN  mg tablet Commonly known as:  GLUCOPHAGE XR Take 1 Tab by mouth Before breakfast and dinner. montelukast 10 mg tablet Commonly known as:  SINGULAIR Take 1 Tab by mouth daily. multivitamin tablet Commonly known as:  ONE A DAY Take 1 Tab by mouth daily. NovoLIN R Regular U-100 Insuln 100 unit/mL injection Generic drug:  insulin regular omega 3-DHA-EPA-fish oil 1,000 mg (120 mg-180 mg) capsule Take  by mouth. Take 2 po daily  
  
 ondansetron 4 mg disintegrating tablet Commonly known as:  ZOFRAN ODT Take 1 Tab by mouth every eight (8) hours as needed for Nausea. Oxygen O2 at 2 Liters NC while asleep  
  
 pantoprazole 40 mg tablet Commonly known as:  PROTONIX Take (1) tablet by mouth once a day. prednisoLONE acetate 1 % ophthalmic suspension Commonly known as:  PRED FORTE  
  
 * QUEtiapine 50 mg tablet Commonly known as:  SEROquel Take 1 Tab by mouth daily. Indications: DEPRESSION TREATMENT ADJUNCT  
  
 * QUEtiapine 25 mg tablet Commonly known as:  SEROquel Take 1 Tab by mouth nightly. Indications: DEPRESSION TREATMENT ADJUNCT  
  
 rosuvastatin 40 mg tablet Commonly known as:  CRESTOR Take 1 Tab by mouth nightly. SURE COMFORT INSULIN SYRINGE 1 mL 30 gauge x 5/16 Syrg Generic drug:  Insulin Syringe-Needle U-100  
  
 tiotropium 18 mcg inhalation capsule Commonly known as:  [address] [place] [address] Nexio Take 1 Cap by inhalation daily. TOUJEO SOLOSTAR U-300 INSULIN 300 unit/mL (1.5 mL) Inpn Generic drug:  insulin glargine 65 Units by SubCUTAneous route daily. * Notice: This list has 2 medication(s) that are the same as other medications prescribed for you. Read the directions carefully, and ask your doctor or other care provider to review them with you. Prescriptions Sent to Pharmacy Refills  
 lurasidone (LATUDA) 20 mg tab tablet 2 Sig: Take 1 Tab by mouth daily (with dinner). Indications: DEPRESSION ASSOCIATED WITH BIPOLAR DISORDER Class: Normal  
 Pharmacy: [address] [place] [address] #: [phone] Route: Oral  
  
Follow-up Instructions Return in about 4 weeks (around 7/4/2018) for Medication management.   
  
To-Do List   
 08/07/2018 9:30 PM  
 Appointment with [address] [facility] at [address]  SLEEP LAB 1 [facility] ([phone]) 1. Do not take a nap the day of the study  2. No caffeine after 12 noon the day of the study  3. Bring a 2 piece sleeping garment  4. Hair should be clean and dry, no oils, sprays, powders and remove wigs, weaves or other hair accessories  6. Patient should eat dinner prior to arriving for the test and a light breakfast will be provided upon discharge in the morning  7. Patient encouraged to bring activity items such as books, magazines, laptop, IPAD, etc, as well as toiletry items needed for the next morning  8. Bring all medications with you to the center  9. For specific questions please contact the sleep center directly, [phone]  10. Do not arrive more than 15 minutes prior to appt time and use the doorbell to enter the sleep center. Introducing [facility]! Dear Keysha Hill: 
Thank you for requesting a [employer] account. Our records indicate that you already have an active [employer] account. You can access your account anytime at [email or web address] Did you know that you can access your hospital and ER discharge instructions at any time in [employer]? You can also review all of your test results from your hospital stay or ER visit. Additional Information If you have questions, please visit the Frequently Asked Questions section of the [employer] website at [email or web address] Remember, [employer] is NOT to be used for urgent needs. For medical emergencies, dial 911. Now available from your iPhone and Android! Please provide this summary of care documentation to your next provider. Your primary care clinician is listed as Natasha Ricks. If you have any questions after today's visit, please call [phone].

## 2018-06-11 RX ORDER — QUETIAPINE FUMARATE 50 MG/1
50 TABLET, FILM COATED ORAL DAILY
Qty: 30 TAB | Refills: 0 | Status: SHIPPED | OUTPATIENT
Start: 2018-06-11 | End: 2018-07-02 | Stop reason: SDUPTHER

## 2018-06-11 RX ORDER — QUETIAPINE FUMARATE 25 MG/1
25 TABLET, FILM COATED ORAL
Qty: 30 TAB | Refills: 0 | Status: SHIPPED | OUTPATIENT
Start: 2018-06-11 | End: 2018-07-02 | Stop reason: ALTCHOICE

## 2018-07-02 ENCOUNTER — OFFICE VISIT (OUTPATIENT)
Dept: BEHAVIORAL/MENTAL HEALTH CLINIC | Age: 58
End: 2018-07-02

## 2018-07-02 VITALS
SYSTOLIC BLOOD PRESSURE: 107 MMHG | HEART RATE: 91 BPM | HEIGHT: 68 IN | BODY MASS INDEX: 27.25 KG/M2 | WEIGHT: 179.8 LBS | DIASTOLIC BLOOD PRESSURE: 62 MMHG

## 2018-07-02 DIAGNOSIS — F31.81 BIPOLAR 2 DISORDER, MAJOR DEPRESSIVE EPISODE (HCC): Primary | ICD-10-CM

## 2018-07-02 DIAGNOSIS — F41.9 ANXIETY: ICD-10-CM

## 2018-07-02 DIAGNOSIS — G47.00 INSOMNIA, UNSPECIFIED TYPE: ICD-10-CM

## 2018-07-02 RX ORDER — QUETIAPINE FUMARATE 50 MG/1
50 TABLET, FILM COATED ORAL DAILY
Qty: 30 TAB | Refills: 2 | Status: SHIPPED | OUTPATIENT
Start: 2018-07-02 | End: 2018-08-16 | Stop reason: SDUPTHER

## 2018-07-02 NOTE — PROGRESS NOTES
Carmen Mark  1960  63 y.o.  female  [facility]    Chief Complaint   Patient presents with    Depression    Anxiety    Insomnia       [place]:   This is a 59 years old divorce  female with past psychiatric history and treatment of manic depression who was seen for the first time on December 28, 2017 referred by her PCP to manage her complicated psychiatric medications.  She decided to gradually taper and discontinue Viibryd and high dose Paxil and try Abilify 1-2 mg daily and take Seroquel  mg at nighttime to help with sleep.  She was seen for follow-up on February 1, 2018, March 5, 2018, April 4, 2018, and May 4 when she decided to gradually increase Abilify to 15 mg and Seroquel to 75 mg.        Patient was last seen on June 6 when she informed that she stopped taking Abilify about a week ago due to side effect of dizziness, lightheadedness, and making her tired and requested something to help with her depression and anxiety. She agreed to try Latuda 10 mg and 20 mg and come in 4 weeks for reevaluation. She presented on time today, was observed to be alert awake oriented to time person and place, she was calm and cooperative polite and pleasant and observed to be improved. She informed that she did not felt anything for first 3 weeks but is starting last week she report marked benefit with her mood and informed that she is more alert, able to clean the kitchen and house, and is more social and not groggy in daytime. Patient was provided with lots of support and psychoeducation, she discussed risk benefit and expectation from [facility] and agreed to increase dose 30 mg but refused to cut 10 mg pill in half and requested an increase to take 40 mg and return in couple of months for reevaluation.   She reports some issues with sleep and reported increase bruxism but is satisfied with her current treatment plan and she denies any substance abuse at this time she denies any social change except for that she is more social now.    SUBSTANCE USE HISTORY:   TOBACCO: Smoked 1 pack per day for past 35 years and expressed desire to get help to quit his smoking but first would like to stabilize on her medications. ALCOHOL: Patient denies abuse. CANNABIS: Tried few times but never abused it. COCAINE, OPIOIDS, and OTHERS: Denies abuse. MEDICAL HISTORY:    has a past medical history of Asthma; Asthma; COPD; Depression; Diabetes ([facility] Diabetes mellitus; Encounter for review of form with patient (12/4/2017); Essential hypertension; GERD (gastroesophageal reflux disease); Headache(784.0); OTHER MEDICAL; Hypercholesterolemia; Hypertension; Ill-defined condition; Insomnia disorder (12/4/2017); Major depression, chronic (12/4/2017); Psychiatric problem; Psychotic disorder; Screening for cervical cancer (5/22/2018); Tobacco use disorder (10/19/2017); and Unspecified sleep apnea. ALLERGIES:   Allergies   Allergen Reactions    Latex Hives    Other Food Anaphylaxis     PEPPERONI, sloppy joes    Demerol [Meperidine] Anaphylaxis     Difficulty breathing    Iodine Anaphylaxis    Morphine Other (comments)     voilent outbreaks (restraints needed)    Nsaids (Non-Steroidal Anti-Inflammatory Drug) Hives       VITAL SIGNS:  /62  Pulse 91  Ht 5' 8\" (1.727 m)  Wt 81.6 kg (179 lb 12.8 oz)  LMP 01/17/2010  BMI 27.34 kg/m2    Current Outpatient Prescriptions   Medication Sig Dispense Refill    QUEtiapine (SEROQUEL) 50 mg tablet Take 1 Tab by mouth daily. Indications: DEPRESSION TREATMENT ADJUNCT 30 Tab 2    lurasidone (LATUDA) 40 mg tab tablet Take 1 Tab by mouth daily (with dinner). Indications: DEPRESSION ASSOCIATED WITH BIPOLAR DISORDER 30 Tab 2    tiotropium (SPIRIVA WITH HANDIHALER) 18 mcg inhalation capsule Take 1 Cap by inhalation daily. 30 Cap 4    empagliflozin (JARDIANCE) 25 mg tablet Take 1 Tab by mouth daily.  14 Tab 0    Insulin Needles, Disposable, (URSZULA PEN NEEDLE) 32 gauge x 5/32\" ndle For use twice per day with insulin/victoza. 200 Pen Needle 3    albuterol (PROVENTIL VENTOLIN) 2.5 mg /3 mL (0.083 %) nebulizer solution 3 mL by Nebulization route every four (4) hours as needed for Wheezing or Shortness of Breath. 24 Each 0    NOVOLIN R REGULAR U-100 INSULN 100 unit/mL injection       BYSTOLIC 10 mg tablet Take 2 tablets PO every AM 60 Tab 3    budesonide-formoterol (SYMBICORT) 160-4.5 mcg/actuation HFAA Take 2 Puffs by inhalation two (2) times a day. Indications: COPD ASSOCIATED WITH CHRONIC BRONCHITIS, EXTRINSIC ASTHMA 1 Inhaler 6    ipratropium (ATROVENT) 0.02 % soln 2.5 mL by Nebulization route every four (4) hours as needed. 70 Vial 7    prednisoLONE acetate (PRED FORTE) 1 % ophthalmic suspension       SURE COMFORT INSULIN SYRINGE 1 mL 30 gauge x 5/16 syrg       montelukast (SINGULAIR) 10 mg tablet Take 1 Tab by mouth daily. 30 Tab 6    fluticasone (FLONASE) 50 mcg/actuation nasal spray 2 sprays daily on each nostril for the next 8 weeks then 2 sprays on each nostril every other day for the next 4 weeks and thereafter 2 sprays on each nostril as needed 1 Bottle 6    ondansetron (ZOFRAN ODT) 4 mg disintegrating tablet Take 1 Tab by mouth every eight (8) hours as needed for Nausea. 10 Tab 0    Liraglutide (VICTOZA 3-ESTEPHANIE) 0.6 mg/0.1 mL (18 mg/3 mL) pnij 1.8 mg by SubCUTAneous route daily. Titrate up to 1.8mg daily as direcected 3 Pen 7    metFORMIN ER (GLUCOPHAGE XR) 500 mg tablet Take 1 Tab by mouth Before breakfast and dinner. 180 Tab 3    Oxygen O2 at 2 Liters NC while asleep      multivitamin (ONE A DAY) tablet Take 1 Tab by mouth daily.  Omega-3-DHA-EPA-Fish Oil 1,000 mg (120 mg-180 mg) cap Take  by mouth. Take 2 po daily      TOUJEO SOLOSTAR 300 unit/mL (1.5 mL) inpn 65 Units by SubCUTAneous route daily.  15 Pen 3    glucose blood VI test strips (EASYMAX N) strip Use to test blood sugar 4 times a day 200 Strip 3    rosuvastatin (CRESTOR) 40 mg tablet Take 1 Tab by mouth nightly. 90 Tab 3    EPINEPHrine (EPIPEN) 0.3 mg/0.3 mL injection 0.3 mg by IntraMUSCular route once as needed.  ergocalciferol (ERGOCALCIFEROL) 50,000 unit capsule Take 1 capsule by mouth every seven (7) days. 12 capsule 3    diphenhydrAMINE (BENADRYL) 25 mg capsule Take 25 mg by mouth every six (6) hours as needed.  pantoprazole (PROTONIX) 40 mg tablet Take (1) tablet by mouth once a day. 30 Tab 1         MENTAL STATUS EXAMINATION:   Patient is a 63 y.o. female [facility] who looks slightly older than her stated age. Patient appearance is colorfully dressed with good hygiene. Speech is regular rate and rhythm, fluent language, and thought process is linear, logical, and goal directed. Reported mood is better with mood congruent brighter affect. Patient denies any suicidal ideation, homicidal ideation, and auditory visual hallucination. Not observed to be delusional or paranoid. Insight, judgement, reliability and impulse control is intact. DIAGNOSIS:  Encounter Diagnoses   Name Primary?  Bipolar 2 disorder, major depressive episode (HCC) Yes    Insomnia, unspecified type     Anxiety        PLAN:  1. MEDICATION: Patient is taking Seroquel 50 mg at bedtime. She agreed to increase Latuda 30 mg but refused to cut 20 mg in half and it does not comes in 30 mg tablet she requested and agreed to take 40 mg with dinner. Patient was provided with psycho education, discussed risk/benefits, and expectations from medication changes. Patient agrees with plan. 2. PSYCHOTHERAPY: Patient was provided with supportive therapy, strongly encourage to seek psychotherapy. 3. MEDICAL CARE: Patient was strongly encourage to take their medical medications and follow up with their PCP on regular basis. 4. SUBSTANCE ABUSE CARE: Patient quit his smoking 9 days ago and only uses E cigarettes. She denies drinking or abusing any drugs at this time.     5. FOLLOW UP:   Follow-up Disposition:  Return in about 2 months (around 9/2/2018) for Medication management.

## 2018-07-02 NOTE — MR AVS SNAPSHOT
[address] [facility] 
[phone] Patient: Lynne Cordon MRN:  [id number] Visit Information Date & Time Provider Department Dept. Phone Encounter #  
 7/2/2018 11:30 AM Dylon Leblanc MD [address] [facility] [phone] [id number] Your Appointments 7/11/2018 11:50 AM  
Follow Up with MD [facility] Jose Angel Cheney) Appt Note: 3 month f/u  
 [address] [facility] [address] [id number] [address] [place] [address] Road  
  
    
 11/20/2018  8:15 AM  
ROUTINE CARE with Jules Ceron MD  
[address] [place] [address] OFFICE-ANNEX (Jose Angel Herrerai) Appt Note: 6 mo f/u  
 [address] [facility] [id number] [phone] [address] [facility] [address] Upcoming Health Maintenance Date Due  
 PAP AKA CERVICAL CYTOLOGY 3/23/1982 FOBT Q 1 YEAR AGE 50-75 3/23/2011 Influenza Age 5 to Adult 8/1/2018 HEMOGLOBIN A1C Q6M 10/11/2018 MICROALBUMIN Q1 10/19/2018 FOOT EXAM Q1 1/10/2019 EYE EXAM RETINAL OR DILATED Q1 5/3/2019 LIPID PANEL Q1 5/22/2019 MEDICARE YEARLY EXAM 5/23/2019 BREAST CANCER SCRN MAMMOGRAM 12/8/2019 DTaP/Tdap/Td series (2 - Td) 3/31/2026 Allergies as of 7/2/2018  Review Complete On: 7/2/2018 By: Mikey Pate Severity Noted Reaction Type Reactions Latex  09/05/2014    Hives Other Food High 09/05/2014    Anaphylaxis PEPPERONI, sloppy joes Demerol [Meperidine] High 10/17/2010   Systemic Anaphylaxis Difficulty breathing Iodine High 10/17/2010   Systemic Anaphylaxis Morphine High 10/17/2010   Systemic Other (comments)  
 voilent outbreaks (restraints needed) Nsaids (Non-steroidal Anti-inflammatory Drug)  10/19/2017    Hives Current Immunizations  Reviewed on 1/25/2018 Name Date Influenza Vaccine (Quad) PF 10/19/2017 Pneumococcal Polysaccharide (PPSV-23) 10/19/2016 Tdap 3/31/2016 Not reviewed this visit Vitals BP Pulse Height(growth percentile) Weight(growth percentile) LMP BMI  
 107/62 91 5' 8\" (1.727 m) 179 lb 12.8 oz (81.6 kg) 01/17/2010 27.34 kg/m2 OB Status Smoking Status Postmenopausal Current Every Day Smoker BMI and BSA Data Body Mass Index Body Surface Area  
 27.34 kg/m 2 1.98 m 2 Preferred Pharmacy Pharmacy Name Phone [address] [phone] Your Updated Medication List  
  
   
This list is accurate as of 7/2/18 11:38 AM.  Always use your most recent med list.  
  
  
  
  
 albuterol 2.5 mg /3 mL (0.083 %) nebulizer solution Commonly known as:  PROVENTIL VENTOLIN  
3 mL by Nebulization route every four (4) hours as needed for Wheezing or Shortness of Breath. BENADRYL 25 mg capsule Generic drug:  diphenhydrAMINE Take 25 mg by mouth every six (6) hours as needed. budesonide-formoterol 160-4.5 mcg/actuation Hfaa Commonly known as:  SYMBICORT Take 2 Puffs by inhalation two (2) times a day. Indications: COPD ASSOCIATED WITH CHRONIC BRONCHITIS, EXTRINSIC ASTHMA BYSTOLIC 10 mg tablet Generic drug:  nebivolol Take 2 tablets PO every AM  
  
 empagliflozin 25 mg tablet Commonly known as:  Dotty Ashing Take 1 Tab by mouth daily. EPINEPHrine 0.3 mg/0.3 mL injection Commonly known as:  EPIPEN  
0.3 mg by IntraMUSCular route once as needed. ergocalciferol 50,000 unit capsule Commonly known as:  ERGOCALCIFEROL Take 1 capsule by mouth every seven (7) days. fluticasone 50 mcg/actuation nasal spray Commonly known as:  FLONASE  
2 sprays daily on each nostril for the next 8 weeks then 2 sprays on each nostril every other day for the next 4 weeks and thereafter 2 sprays on each nostril as needed  
  
 glucose blood VI test strips strip Commonly known as:  EasyMax N  
Use to test blood sugar 4 times a day Insulin Needles (Disposable) 32 gauge x 5/32\" Ndle Commonly known as:  Amparo Pen Needle For use twice per day with insulin/victoza. ipratropium 0.02 % Soln Commonly known as:  ATROVENT  
2.5 mL by Nebulization route every four (4) hours as needed. Liraglutide 0.6 mg/0.1 mL (18 mg/3 mL) Pnij Commonly known as:  VICTOZA 3-ESTEPHANIE  
1.8 mg by SubCUTAneous route daily. Titrate up to 1.8mg daily as direcected  
  
 lurasidone 40 mg Tab tablet Commonly known as:  Giovani Barthel Take 1 Tab by mouth daily (with dinner). Indications: DEPRESSION ASSOCIATED WITH BIPOLAR DISORDER  
  
 metFORMIN  mg tablet Commonly known as:  GLUCOPHAGE XR Take 1 Tab by mouth Before breakfast and dinner. montelukast 10 mg tablet Commonly known as:  SINGULAIR Take 1 Tab by mouth daily. multivitamin tablet Commonly known as:  ONE A DAY Take 1 Tab by mouth daily. NovoLIN R Regular U-100 Insuln 100 unit/mL injection Generic drug:  insulin regular  
  
 omega 3-DHA-EPA-fish oil 1,000 mg (120 mg-180 mg) capsule Take  by mouth. Take 2 po daily  
  
 ondansetron 4 mg disintegrating tablet Commonly known as:  ZOFRAN ODT Take 1 Tab by mouth every eight (8) hours as needed for Nausea. Oxygen O2 at 2 Liters NC while asleep  
  
 pantoprazole 40 mg tablet Commonly known as:  PROTONIX Take (1) tablet by mouth once a day. prednisoLONE acetate 1 % ophthalmic suspension Commonly known as:  PRED FORTE QUEtiapine 50 mg tablet Commonly known as:  SEROquel Take 1 Tab by mouth daily. Indications: DEPRESSION TREATMENT ADJUNCT  
  
 rosuvastatin 40 mg tablet Commonly known as:  CRESTOR Take 1 Tab by mouth nightly. SURE COMFORT INSULIN SYRINGE 1 mL 30 gauge x 5/16 Syrg Generic drug:  Insulin Syringe-Needle U-100  
  
 tiotropium 18 mcg inhalation capsule Commonly known as:  [address] Take 1 Cap by inhalation daily. TOUJEO SOLOSTAR U-300 INSULIN 300 unit/mL (1.5 mL) Inpn Generic drug:  insulin glargine 65 Units by SubCUTAneous route daily. Prescriptions Sent to Pharmacy Refills QUEtiapine (SEROQUEL) 50 mg tablet 2 Sig: Take 1 Tab by mouth daily. Indications: DEPRESSION TREATMENT ADJUNCT Class: Normal  
 Pharmacy:  [facility] Ph #: [phone] Route: Oral  
 lurasidone (LATUDA) 40 mg tab tablet 2 Sig: Take 1 Tab by mouth daily (with dinner). Indications: DEPRESSION ASSOCIATED WITH BIPOLAR DISORDER Class: Normal  
 Pharmacy:  [facility] Ph #: [phone] Route: Oral  
  
To-Do List   
 08/07/2018 9:30 PM  
  Appointment with [facility] at [address] [place] [address] SLEEP LAB Debbie Shelton ([phone]) 1. Do not take a nap the day of the study  2. No caffeine after 12 noon the day of the study  3. Bring a 2 piece sleeping garment  4. Hair should be clean and dry, no oils, sprays, powders and remove wigs, weaves or other hair accessories  6. Patient should eat dinner prior to arriving for the test and a light breakfast will be provided upon discharge in the morning  7. Patient encouraged to bring activity items such as books, magazines, laptop, IPAD, etc, as well as toiletry items needed for the next morning  8. Bring all medications with you to the center  9. For specific questions please contact the sleep center directly, 830a to 5p  10. Do not arrive more than 15 minutes prior to appt time and use the doorbell to enter the sleep center. Introducing [facility]! Dear Piyush Oneill: 
Thank you for requesting a [employer] account.   Our records indicate that you already have an active [employer] account. You can access your account anytime at [email or web address] Did you know that you can access your hospital and ER discharge instructions at any time in [employer]? You can also review all of your test results from your hospital stay or ER visit. Additional Information If you have questions, please visit the Frequently Asked Questions section of the [employer] website at [email or web address] Remember, [employer] is NOT to be used for urgent needs. For medical emergencies, dial 911. Now available from your iPhone and Android! Please provide this summary of care documentation to your next provider. Your primary care clinician is listed as Akira Smith. If you have any questions after today's visit, please call [phone].

## 2018-07-11 ENCOUNTER — OFFICE VISIT (OUTPATIENT)
Dept: ENDOCRINOLOGY | Age: 58
End: 2018-07-11

## 2018-07-11 VITALS
SYSTOLIC BLOOD PRESSURE: 106 MMHG | HEIGHT: 68 IN | WEIGHT: 182.6 LBS | HEART RATE: 99 BPM | BODY MASS INDEX: 27.68 KG/M2 | DIASTOLIC BLOOD PRESSURE: 66 MMHG

## 2018-07-11 DIAGNOSIS — I10 ESSENTIAL HYPERTENSION: ICD-10-CM

## 2018-07-11 DIAGNOSIS — Z79.4 TYPE 2 DIABETES MELLITUS WITH OTHER NEUROLOGIC COMPLICATION, WITH LONG-TERM CURRENT USE OF INSULIN (HCC): Primary | ICD-10-CM

## 2018-07-11 DIAGNOSIS — E11.49 TYPE 2 DIABETES MELLITUS WITH OTHER NEUROLOGIC COMPLICATION, WITH LONG-TERM CURRENT USE OF INSULIN (HCC): Primary | ICD-10-CM

## 2018-07-11 DIAGNOSIS — E78.5 HYPERLIPIDEMIA, UNSPECIFIED HYPERLIPIDEMIA TYPE: ICD-10-CM

## 2018-07-11 LAB — HBA1C MFR BLD HPLC: 9.2 %

## 2018-07-11 RX ORDER — ROSUVASTATIN CALCIUM 40 MG/1
40 TABLET, COATED ORAL
Qty: 90 TAB | Refills: 3 | Status: SHIPPED | OUTPATIENT
Start: 2018-07-11 | End: 2019-08-15 | Stop reason: SDUPTHER

## 2018-07-11 NOTE — PATIENT INSTRUCTIONS
Start glimepiride 4mg each morning before breakfast    Metformin ER, increase to 1000 mg twice daily    Continue Victoza, 1.8mg daily    Toujeo 65 each morning      Please note our new policy, you must arrive to the clinic 15 minutes before your appointment time to allow enough time for proper check-in, adequate time to spend with your doctor, and also to respect the appointment time of the next patient. Not arriving 15 minutes in advance may result in having your appointment rescheduled for the next available day/time.  ----------------------------------------------------------------------------------------------------------------------    Below you will find a glucose log sheet which you can use to record your blood sugars. Without checking and recording what your home glucose levels are, it will be difficult to make any changes to your medication dose, even when significant changes may be needed. Please feel free to use the log below to record your home glucose levels. At the very least, I would like for you to login the entire 2-3 weeks just before your visit so we can make your visit much more productive and beneficial to you. GLUCOSE LOG SHEET:    Date Breakfast Lunch Dinner Bedtime Comments ? GLUCOSE LOG SHEET:    Date Breakfast Lunch Dinner Bedtime Comments ? GLUCOSE LOG SHEET:    Date Breakfast Lunch Dinner Bedtime Comments ?

## 2018-07-11 NOTE — PROGRESS NOTES
CHIEF COMPLAINT: f/u evaluation for uncontrolled type 2 diabetes    HISTORY OF PRESENT ILLNESS:   Airam Ruiz is a 63 y.o. female with a PMHx as noted below who presents to the endocrinology clinic for f/u evaluation of uncontrolled type 2 diabetes.     A1c prev 8.5%, today is 9.2%:     Currently taking the following meds:  Jardiance 25mg daily, not taking due to yeast infection  Metformin  mg BID  Victoza 1.8mg daily  Toujeo 65 each morning  Novolin R:  OFF    Review of home blood glucose:  AM: 105-170, variable  Lunch  Dinner  1 hour post dinner:  200-500, variable but generally high    Review of most recent diabetes-related labs:  Lab Results   Component Value Date    HBA1C 8.6 (H) 10/19/2017    HBA1C 8.3 (H) 2014    HBA1C 10.1 (H) 10/18/2010    CHOL 106 2018    GFRAA 92 2018    GFRNA 80 2018    MCACR 30.9 (H) 10/19/2017    TSH 0.578 2018    VITD3 41.5 2015    VYM8PGQN 7.5% 2017       PAST MEDICAL/SURGICAL HISTORY:   Past Medical History:   Diagnosis Date    Asthma     Asthma     COPD     Depression     Diabetes ([address]     Diabetes mellitus     Encounter for review of form with patient 2017    Essential hypertension     GERD (gastroesophageal reflux disease)     Headache(784.0)     HX OTHER MEDICAL     acoustic neuroma    Hypercholesterolemia     Hypertension     Ill-defined condition     R ACOUSTIC NEUROMA    Insomnia disorder 2017    Major depression, chronic 2017    Psychiatric problem     anxiety depression    Psychotic disorder     Screening for cervical cancer 2018    Tobacco use disorder 10/19/2017    Unspecified sleep apnea     NO CPAP-O2 @2L WHEN SLEEPING     Past Surgical History:   Procedure Laterality Date     DELIVERY ONLY      HX GASTRECTOMY  14    lap sleeve gastrectomy by Dr Lisa Morse HX GYN      2 c-sections    HX HEENT      sinus    HX HEENT      tracheal resection    HX HEENT tracheal alleratation x 2    HX HEENT      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 Indications: DEPRESSION TREATMENT ADJUNCT, Disp: 30 Tab, Rfl: 2    lurasidone (LATUDA) 40 mg tab tablet, Take 1 Tab by mouth daily (with dinner). Indications: DEPRESSION ASSOCIATED WITH BIPOLAR DISORDER, Disp: 30 Tab, Rfl: 2    tiotropium (SPIRIVA WITH HANDIHALER) 18 mcg inhalation capsule, Take 1 Cap by inhalation daily. , Disp: 30 Cap, Rfl: 4    Insulin Needles, Disposable, (URSZULA PEN NEEDLE) 32 gauge x 5/32\" ndle, For use twice per day with insulin/victoza., Disp: 200 Pen Needle, Rfl: 3    pantoprazole (PROTONIX) 40 mg tablet, Take (1) tablet by mouth once a day., Disp: 30 Tab, Rfl: 1    albuterol (PROVENTIL VENTOLIN) 2.5 mg /3 mL (0.083 %) nebulizer solution, 3 mL by Nebulization route every four (4) hours as needed for Wheezing or Shortness of Breath., Disp: 24 Each, Rfl: 0    BYSTOLIC 10 mg tablet, Take 2 tablets PO every AM (Patient taking differently: Take 1 tablets PO every AM), Disp: 60 Tab, Rfl: 3    budesonide-formoterol (SYMBICORT) 160-4.5 mcg/actuation HFAA, Take 2 Puffs by inhalation two (2) times a day. Indications: COPD ASSOCIATED WITH CHRONIC BRONCHITIS, EXTRINSIC ASTHMA, Disp: 1 Inhaler, Rfl: 6    ipratropium (ATROVENT) 0.02 % soln, 2.5 mL by Nebulization route every four (4) hours as needed. , Disp: 70 Vial, Rfl: 7    SURE COMFORT INSULIN SYRINGE 1 mL 30 gauge x 5/16 syrg, , Disp: , Rfl:     montelukast (SINGULAIR) 10 mg tablet, Take 1 Tab by mouth daily. , Disp: 30 Tab, Rfl: 6    Liraglutide (VICTOZA 3-ESTEPHANIE) 0.6 mg/0.1 mL (18 mg/3 mL) pnij, 1.8 mg by SubCUTAneous route daily. Titrate up to 1.8mg daily as direcected, Disp: 3 Pen, Rfl: 7    metFORMIN ER (GLUCOPHAGE XR) 500 mg tablet, Take 1 Tab by mouth Before breakfast and dinner., Disp: 180 Tab, Rfl: 3    Oxygen, O2 at 2 Liters NC while asleep, Disp: , Rfl:     multivitamin (ONE A DAY) tablet, Take 1 Tab by mouth daily. , Disp: , Rfl:     Omega-3-DHA-EPA-Fish Oil 1,000 mg (120 mg-180 mg) cap, Take  by mouth. Take 2 po daily, Disp: , Rfl:     TOUJEO SOLOSTAR 300 unit/mL (1.5 mL) inpn, 65 Units by SubCUTAneous route daily. , Disp: 15 Pen, Rfl: 3    glucose blood VI test strips (EASYMAX N) strip, Use to test blood sugar 4 times a day, Disp: 200 Strip, Rfl: 3    rosuvastatin (CRESTOR) 40 mg tablet, Take 1 Tab by mouth nightly., Disp: 90 Tab, Rfl: 3    ergocalciferol (ERGOCALCIFEROL) 50,000 unit capsule, Take 1 capsule by mouth every seven (7) days. , Disp: 12 capsule, Rfl: 3    diphenhydrAMINE (BENADRYL) 25 mg capsule, Take 25 mg by mouth every six (6) hours as needed. , Disp: , Rfl:     empagliflozin (JARDIANCE) 25 mg tablet, Take 1 Tab by mouth daily. , Disp: 14 Tab, Rfl: 0    NOVOLIN R REGULAR U-100 INSULN 100 unit/mL injection, , Disp: , Rfl:     prednisoLONE acetate (PRED FORTE) 1 % ophthalmic suspension, , Disp: , Rfl:     fluticasone (FLONASE) 50 mcg/actuation nasal spray, 2 sprays daily on each nostril for the next 8 weeks then 2 sprays on each nostril every other day for the next 4 weeks and thereafter 2 sprays on each nostril as needed, Disp: 1 Bottle, Rfl: 6    ondansetron (ZOFRAN ODT) 4 mg disintegrating tablet, Take 1 Tab by mouth every eight (8) hours as needed for Nausea., Disp: 10 Tab, Rfl: 0    EPINEPHrine (EPIPEN) 0.3 mg/0.3 mL injection, 0.3 mg by IntraMUSCular route once as needed. , Disp: , Rfl:     SOCIAL HISTORY:   Social History Social History    Marital status: UNKNOWN     Spouse name: N/A    Number of children: N/A    Years of education: N/A     Occupational History    Not on file. Social History Main Topics    Smoking status: Former Smoker     Packs/day: 1.00     Years: 12.00    Smokeless tobacco: Current User      Comment: vapes    Alcohol use Yes      Comment: ocassional    Drug use: No    Sexual activity: No     Other Topics Concern    Not on file     Social History Narrative       FAMILY HISTORY:  Family History   Problem Relation Age of Onset    Diabetes Father     Heart Failure Father     Heart Disease Father     Hypertension Father     High Cholesterol Mother     Suicide Brother     Stroke Maternal Grandmother     Cancer Paternal Grandfather     Anesth Problems Neg Hx        REVIEW OF SYSTEMS: Complete ROS assessed and noted for that which is described above, all else are negative. Eyes: normal  ENT: normal  CVS: normal  Resp: normal  GI: normal  : normal  GYN: normal  Endocrine: normal  Integument: normal  Musculoskeletal: normal  Neuro: normal  Psych: normal      PHYSICAL EXAMINATION:    VITAL SIGNS:  Visit Vitals    /66 (BP 1 Location: Left arm, BP Patient Position: Sitting)    Pulse 99    Ht 5' 8\" (1.727 m)    Wt 182 lb 9.6 oz (82.8 kg)    LMP 01/17/2010    BMI 27.76 kg/m2       GENERAL: NCAT, Sitting comfortably, NAD  EYES: EOMI, non-icteric, no proptosis  Ear/Nose/Throat: NCAT, no inflammation, no masses  LYMPH NODES: No LAD  CARDIOVASCULAR: S1 S2, RRR, No murmur, 2+ radial pulses  RESPIRATORY: CTA b/l, no wheeze/rales  GASTROINTESTINAL: ND  MUSCULOSKELETAL: Normal ROM, no atrophy  SKIN: warm, no edema/rash/ or other skin changes  NEUROLOGIC: 5/5 power all extremities, no tremors, AAOx3  PSYCHIATRIC: Normal affect, Normal insight and judgement      REVIEW OF LABORATORY AND RADIOLOGY DATA:   Labs and documentation have been reviewed as described above.      ASSESSMENT AND PLAN: Funmilayo Helton is a 63 y.o. female with a PMHx as noted above who presents to the endocrinology clinic for evaluation of uncontrolled type 2 diabetes. DM2 uncontrolled  HTN  HLD    A1c continues to trend upward despite interventions. Patient with ongoing hyperglycemia following meals, noting she did not continue with jardiance as of 2 weeks ago due to persistent yeast infections and she is not interested to resume this. We discussed other potential options before returning to use the R insulin with meals that she used in the past. This includes titrating up her metformin and starting glimepiride in the AM. We also discussed staying hydrated. Urine microalbumin updated today. Strips and meds refilled today. DM2:  Start glimepiride 4mg each morning before breakfast  Metformin ER, increase to 1000 mg BID  Continue Victoza, 1.8mg daily  Toujeo 65 each morning  Off Jardiance due to frequent yeast infections    BP: Bystolic 12SZ daily  HLD: crestor  40mg each evening, fish oil    3 month f/u visit. Yefri Frausto.  [address] [facility] Diabetes & Endocrinology

## 2018-07-11 NOTE — MR AVS SNAPSHOT
[address] [facility] [address] [address] [phone] Patient: Carmen Flores MRN:  [id number] Visit Information Date & Time Provider Department Dept. Phone Encounter #  
 7/11/2018 11:50 AM Nilesh Pereira, [address] [facility] [phone] [id number] Follow-up Instructions Return in about 3 months (around 10/11/2018). Your Appointments 8/9/2018  3:30 PM  
ESTABLISHED PATIENT with Ely Chacon MD  
[address] [place] [address]) Appt Note: 1 month f/u  
 [address] [facility]  
[phone]  
  
   
 [address] [facility] [address] [address]  
  
    
 11/20/2018  8:15 AM  
ROUTINE CARE with Ayla Sal MD  
[address] [place] [address] ([address]) Appt Note: 6 mo f/u  
 [address] [facility] 7 [address]  
[phone] [phone] Upcoming Health Maintenance Date Due  
 PAP AKA CERVICAL CYTOLOGY 3/23/1982 FOBT Q 1 YEAR AGE 50-75 3/23/2011 Influenza Age 5 to Adult 8/1/2018 HEMOGLOBIN A1C Q6M 10/11/2018 MICROALBUMIN Q1 10/19/2018 FOOT EXAM Q1 1/10/2019 EYE EXAM RETINAL OR DILATED Q1 5/3/2019 LIPID PANEL Q1 5/22/2019 MEDICARE YEARLY EXAM 5/23/2019 BREAST CANCER SCRN MAMMOGRAM 12/8/2019 DTaP/Tdap/Td series (2 - Td) 3/31/2026 Allergies as of 7/11/2018  Review Complete On: 7/11/2018 By: Nilesh Pereira MD  
  
 Severity Noted Reaction Type Reactions Latex  09/05/2014    Hives Other Food High 09/05/2014    Anaphylaxis PEPPERONI, sloppy joes Demerol [Meperidine] High 10/17/2010   Systemic Anaphylaxis Difficulty breathing Iodine High 10/17/2010   Systemic Anaphylaxis Morphine High 10/17/2010   Systemic Other (comments) voilent outbreaks (restraints needed) Nsaids (Non-steroidal Anti-inflammatory Drug)  10/19/2017    Hives Current Immunizations  Reviewed on 1/25/2018 Name Date Influenza Vaccine (Quad) PF 10/19/2017 Pneumococcal Polysaccharide (PPSV-23) 10/19/2016 Tdap 3/31/2016 Not reviewed this visit You Were Diagnosed With   
  
 Codes Comments Type 2 diabetes mellitus with other neurologic complication, with long-term current use of insulin (HCC)    -  Primary ICD-10-CM: E11.49, Z79.4 ICD-9-CM: 250.60, V58.67 Essential hypertension     ICD-10-CM: I10 
ICD-9-CM: 401.9 Hyperlipidemia, unspecified hyperlipidemia type     ICD-10-CM: E78.5 ICD-9-CM: 272.4 Vitals BP Pulse Height(growth percentile) Weight(growth percentile) LMP BMI  
 106/66 (BP 1 Location: Left arm, BP Patient Position: Sitting) 99 5' 8\" (1.727 m) 182 lb 9.6 oz (82.8 kg) 01/17/2010 27.76 kg/m2 OB Status Smoking Status Postmenopausal Former Smoker BMI and BSA Data Body Mass Index Body Surface Area  
 27.76 kg/m 2 1.99 m 2 Preferred Pharmacy Pharmacy Name Phone [address] [address] [phone] Your Updated Medication List  
  
   
This list is accurate as of 7/11/18 12:23 PM.  Always use your most recent med list.  
  
  
  
  
 albuterol 2.5 mg /3 mL (0.083 %) nebulizer solution Commonly known as:  PROVENTIL VENTOLIN  
3 mL by Nebulization route every four (4) hours as needed for Wheezing or Shortness of Breath. BENADRYL 25 mg capsule Generic drug:  diphenhydrAMINE Take 25 mg by mouth every six (6) hours as needed. budesonide-formoterol 160-4.5 mcg/actuation Hfaa Commonly known as:  SYMBICORT Take 2 Puffs by inhalation two (2) times a day. Indications: COPD ASSOCIATED WITH CHRONIC BRONCHITIS, EXTRINSIC ASTHMA BYSTOLIC 10 mg tablet Generic drug:  nebivolol Take 2 tablets PO every AM  
  
 empagliflozin 25 mg tablet Commonly known as:  Rhenda Cools Take 1 Tab by mouth daily. EPINEPHrine 0.3 mg/0.3 mL injection Commonly known as:  EPIPEN  
0.3 mg by IntraMUSCular route once as needed. ergocalciferol 50,000 unit capsule Commonly known as:  ERGOCALCIFEROL Take 1 capsule by mouth every seven (7) days. fluticasone 50 mcg/actuation nasal spray Commonly known as:  FLONASE  
2 sprays daily on each nostril for the next 8 weeks then 2 sprays on each nostril every other day for the next 4 weeks and thereafter 2 sprays on each nostril as needed  
  
 glucose blood VI test strips strip Commonly known as:  EasyMax N  
Use to test blood sugar 2 times a day Insulin Needles (Disposable) 32 gauge x 5/32\" Ndle Commonly known as:  Amparo Pen Needle For use twice per day with insulin/victoza. ipratropium 0.02 % Soln Commonly known as:  ATROVENT  
2.5 mL by Nebulization route every four (4) hours as needed. Liraglutide 0.6 mg/0.1 mL (18 mg/3 mL) Pnij Commonly known as:  VICTOZA 3-ESTEPHANIE  
1.8 mg by SubCUTAneous route daily. Titrate up to 1.8mg daily as direcected  
  
 lurasidone 40 mg Tab tablet Commonly known as:  Roodhouse Memo Take 1 Tab by mouth daily (with dinner). Indications: DEPRESSION ASSOCIATED WITH BIPOLAR DISORDER  
  
 metFORMIN  mg tablet Commonly known as:  GLUCOPHAGE XR Take 1 Tab by mouth Before breakfast and dinner. montelukast 10 mg tablet Commonly known as:  SINGULAIR Take 1 Tab by mouth daily. multivitamin tablet Commonly known as:  ONE A DAY Take 1 Tab by mouth daily. NovoLIN R Regular U-100 Insuln 100 unit/mL injection Generic drug:  insulin regular  
  
 omega 3-DHA-EPA-fish oil 1,000 mg (120 mg-180 mg) capsule Take  by mouth. Take 2 po daily  
  
 ondansetron 4 mg disintegrating tablet Commonly known as:  ZOFRAN ODT Take 1 Tab by mouth every eight (8) hours as needed for Nausea. Oxygen O2 at 2 Liters NC while asleep  
  
 pantoprazole 40 mg tablet Commonly known as:  PROTONIX Take (1) tablet by mouth once a day. prednisoLONE acetate 1 % ophthalmic suspension Commonly known as:  PRED FORTE QUEtiapine 50 mg tablet Commonly known as:  SEROquel Take 1 Tab by mouth daily. Indications: DEPRESSION TREATMENT ADJUNCT  
  
 rosuvastatin 40 mg tablet Commonly known as:  CRESTOR Take 1 Tab by mouth nightly. SURE COMFORT INSULIN SYRINGE 1 mL 30 gauge x 5/16 Syrg Generic drug:  Insulin Syringe-Needle U-100  
  
 tiotropium 18 mcg inhalation capsule Commonly known as:  [address] Take 1 Cap by inhalation daily. TOUJEO SOLOSTAR U-300 INSULIN 300 unit/mL (1.5 mL) Inpn Generic drug:  insulin glargine 65 Units by SubCUTAneous route daily. Prescriptions Sent to Pharmacy Refills  
 glucose blood VI test strips (EASYMAX N) strip 5 Sig: Use to test blood sugar 2 times a day Class: Normal  
 Pharmacy: [facility] Ph #: [phone]  
 rosuvastatin (CRESTOR) 40 mg tablet 3 Sig: Take 1 Tab by mouth nightly. Class: Normal  
 Pharmacy: [address] [place] [address], [place] [address] Ph #: [phone] Route: Oral  
  
Follow-up Instructions Return in about 3 months (around 10/11/2018). To-Do List   
 08/07/2018 9:30 PM  
  Appointment with [facility] [address] at [facility] [facility] [address] [place] [address] ([phone]) 1. Do not take a nap the day of the study  2. No caffeine after 12 noon the day of the study  3. Bring a 2 piece sleeping garment  4. Hair should be clean and dry, no oils, sprays, powders and remove wigs, weaves or other hair accessories  6. Patient should eat dinner prior to arriving for the test and a light breakfast will be provided upon discharge in the morning  7.  Patient encouraged to bring activity items such as books, magazines, laptop, IPAD, etc, as well as toiletry items needed for the next morning  8. Bring all medications with you to the center  9. For specific questions please contact the sleep center directly, [phone] [phone]  10. Do not arrive more than 15 minutes prior to appt time and use the doorbell to enter the sleep center. Patient Instructions Start glimepiride 4mg each morning before breakfast 
 
Metformin ER, increase to 1000 mg twice daily Continue Victoza, 1.8mg daily [address] 65 each morning Please note our new policy, you must arrive to the clinic 15 minutes before your appointment time to allow enough time for proper check-in, adequate time to spend with your doctor, and also to respect the appointment time of the next patient. Not arriving 15 minutes in advance may result in having your appointment rescheduled for the next available day/time. 
---------------------------------------------------------------------------------------------------------------------- Below you will find a glucose log sheet which you can use to record your blood sugars. Without checking and recording what your home glucose levels are, it will be difficult to make any changes to your medication dose, even when significant changes may be needed. Please feel free to use the log below to record your home glucose levels. At the very least, I would like for you to login the entire 2-3 weeks just before your visit so we can make your visit much more productive and beneficial to you. GLUCOSE LOG SHEET: 
 
Date Breakfast Lunch Dinner Bedtime Comments ? GLUCOSE LOG SHEET: 
 
Date Breakfast Lunch Dinner Bedtime Comments ? GLUCOSE LOG SHEET: 
 
Date Breakfast Lunch Dinner Bedtime Comments ? Introducing [facility] & [facility]! Dear Fili Tinajero: 
Thank you for requesting a Intraxio account. Our records indicate that you already have an active Intraxio account. You can access your account anytime at [email or web address] Did you know that you can access your hospital and ER discharge instructions at any time in Intraxio? You can also review all of your test results from your hospital stay or ER visit. Additional Information If you have questions, please visit the Frequently Asked Questions section of the Intraxio website at [email or web address] Remember, Intraxio is NOT to be used for urgent needs. For medical emergencies, dial 911. Now available from your iPhone and Android! Please provide this summary of care documentation to your next provider. Your primary care clinician is listed as Shelly Nicole. If you have any questions after today's visit, please call [phone].

## 2018-07-12 LAB
ALBUMIN/CREAT UR: 11.4 MG/G CREAT (ref 0–30)
CREAT UR-MCNC: 82.7 MG/DL
MICROALBUMIN UR-MCNC: 9.4 UG/ML

## 2018-07-12 RX ORDER — GLIMEPIRIDE 4 MG/1
4 TABLET ORAL
Qty: 90 TAB | Refills: 3 | Status: SHIPPED | OUTPATIENT
Start: 2018-07-12 | End: 2019-08-02 | Stop reason: SDUPTHER

## 2018-07-12 NOTE — PROGRESS NOTES
Urine microalbumin is back to normal compared with 8 months ago, that is a good finding. Reassuring. Message sent to patient as a \"MyChart Result Comment\", which associates with the relevant results. Dee Porras.  [facility] [employer]

## 2018-08-07 ENCOUNTER — HOSPITAL ENCOUNTER (OUTPATIENT)
Dept: SLEEP MEDICINE | Age: 58
Discharge: HOME | End: 2018-08-07
Payer: MEDICARE

## 2018-08-07 VITALS — OXYGEN SATURATION: 97 % | SYSTOLIC BLOOD PRESSURE: 101 MMHG | DIASTOLIC BLOOD PRESSURE: 63 MMHG

## 2018-08-07 DIAGNOSIS — G47.33 OSA (OBSTRUCTIVE SLEEP APNEA): ICD-10-CM

## 2018-08-07 PROCEDURE — 95810 POLYSOM 6/> YRS 4/> PARAM: CPT

## 2018-08-08 ENCOUNTER — TELEPHONE (OUTPATIENT)
Dept: SLEEP MEDICINE | Age: 58
End: 2018-08-08

## 2018-08-08 ENCOUNTER — DOCUMENTATION ONLY (OUTPATIENT)
Dept: SLEEP MEDICINE | Age: 58
End: 2018-08-08

## 2018-08-08 NOTE — TELEPHONE ENCOUNTER
Reviewed sleep study results with patient. She expressed understanding and is willing to repeat testing if symptoms persist or worsen over time.

## 2018-08-08 NOTE — TELEPHONE ENCOUNTER
Francisco Morrow is to be contacted by lead sleep technologist regarding results of Sleep Testing which was indicative of an average AHI of 0 per hour with an SpO2 saud of 92% and SpO2 of < 88% being 0 minutes. Repeat testing is to be considered if symptoms persist or worsen over time.

## 2018-08-09 RX ORDER — PANTOPRAZOLE SODIUM 40 MG/1
TABLET, DELAYED RELEASE ORAL
Qty: 30 TAB | Refills: 6 | Status: SHIPPED | OUTPATIENT
Start: 2018-08-09 | End: 2019-05-09 | Stop reason: SDUPTHER

## 2018-08-16 ENCOUNTER — OFFICE VISIT (OUTPATIENT)
Dept: BEHAVIORAL/MENTAL HEALTH CLINIC | Age: 58
End: 2018-08-16

## 2018-08-16 VITALS
HEIGHT: 68 IN | SYSTOLIC BLOOD PRESSURE: 116 MMHG | HEART RATE: 90 BPM | DIASTOLIC BLOOD PRESSURE: 66 MMHG | WEIGHT: 184 LBS | BODY MASS INDEX: 27.89 KG/M2

## 2018-08-16 DIAGNOSIS — F31.81 BIPOLAR 2 DISORDER, MAJOR DEPRESSIVE EPISODE (HCC): Primary | ICD-10-CM

## 2018-08-16 DIAGNOSIS — G47.00 INSOMNIA, UNSPECIFIED TYPE: ICD-10-CM

## 2018-08-16 DIAGNOSIS — F41.9 ANXIETY: ICD-10-CM

## 2018-08-16 RX ORDER — QUETIAPINE FUMARATE 100 MG/1
100 TABLET, FILM COATED ORAL DAILY
Qty: 30 TAB | Refills: 2 | Status: SHIPPED | OUTPATIENT
Start: 2018-08-16 | End: 2018-11-27 | Stop reason: SDUPTHER

## 2018-08-16 NOTE — MR AVS SNAPSHOT
[address] [place] [address] 
[phone] Patient: Merlene Patrick MRN:  [id number] Visit Information Date & Time Provider Department Dept. Phone Encounter #  
 8/16/2018  1:00 PM Nilson Owens  [place] [phone] [id number] Your Appointments 10/10/2018 11:50 AM  
Follow Up with Imani Richardson MD  
[facility] [address] [place] [address]) Appt Note: f/u                 3  
 [address] [facility] [address] [address] [address] [place] [address]  
  
    
 11/20/2018  8:15 AM  
ROUTINE CARE with Femi Hardy MD  
[address] [place] [address] ([facility]) Appt Note: 6 mo f/u  
 [address] [facility] 7 [address]  
[phone] [facility] [phone] Upcoming Health Maintenance Date Due  
 PAP AKA CERVICAL CYTOLOGY 3/23/1982 FOBT Q 1 YEAR AGE 50-75 3/23/2011 Influenza Age 5 to Adult 8/1/2018 FOOT EXAM Q1 1/10/2019 HEMOGLOBIN A1C Q6M 1/11/2019 EYE EXAM RETINAL OR DILATED Q1 5/3/2019 LIPID PANEL Q1 5/22/2019 MEDICARE YEARLY EXAM 5/23/2019 MICROALBUMIN Q1 7/11/2019 BREAST CANCER SCRN MAMMOGRAM 12/8/2019 DTaP/Tdap/Td series (2 - Td) 3/31/2026 Allergies as of 8/16/2018  Review Complete On: 8/16/2018 By: Stephanie Israel Severity Noted Reaction Type Reactions Latex  09/05/2014    Hives Other Food High 09/05/2014    Anaphylaxis PEPPERONI, sloppy joes Demerol [Meperidine] High 10/17/2010   Systemic Anaphylaxis Difficulty breathing Iodine High 10/17/2010   Systemic Anaphylaxis Morphine High 10/17/2010   Systemic Other (comments)  
 voilent outbreaks (restraints needed) Nsaids (Non-steroidal Anti-inflammatory Drug)  10/19/2017    Hives Current Immunizations  Reviewed on 1/25/2018 Name Date Influenza Vaccine (Quad) PF 10/19/2017 Pneumococcal Polysaccharide (PPSV-23) 10/19/2016 Tdap 3/31/2016 Not reviewed this visit Vitals BP Pulse Height(growth percentile) Weight(growth percentile) LMP BMI  
 116/66 90 5' 8\" (1.727 m) 184 lb (83.5 kg) 01/17/2010 27.98 kg/m2 OB Status Smoking Status Postmenopausal Former Smoker BMI and BSA Data Body Mass Index Body Surface Area  
 27.98 kg/m 2 2 m 2 Preferred Pharmacy Pharmacy Name Phone [address] [phone] Your Updated Medication List  
  
   
This list is accurate as of 8/16/18  1:18 PM.  Always use your most recent med list.  
  
  
  
  
 albuterol 2.5 mg /3 mL (0.083 %) nebulizer solution Commonly known as:  PROVENTIL VENTOLIN  
3 mL by Nebulization route every four (4) hours as needed for Wheezing or Shortness of Breath. BENADRYL 25 mg capsule Generic drug:  diphenhydrAMINE Take 25 mg by mouth every six (6) hours as needed. budesonide-formoterol 160-4.5 mcg/actuation Hfaa Commonly known as:  SYMBICORT Take 2 Puffs by inhalation two (2) times a day. Indications: COPD ASSOCIATED WITH CHRONIC BRONCHITIS, EXTRINSIC ASTHMA BYSTOLIC 10 mg tablet Generic drug:  nebivolol Take 2 tablets PO every AM  
  
 empagliflozin 25 mg tablet Commonly known as:  Victoria Uriel Take 1 Tab by mouth daily. EPINEPHrine 0.3 mg/0.3 mL injection Commonly known as:  EPIPEN  
0.3 mg by IntraMUSCular route once as needed. ergocalciferol 50,000 unit capsule Commonly known as:  ERGOCALCIFEROL Take 1 capsule by mouth every seven (7) days. fluticasone 50 mcg/actuation nasal spray Commonly known as:  FLONASE  
2 sprays daily on each nostril for the next 8 weeks then 2 sprays on each nostril every other day for the next 4 weeks and thereafter 2 sprays on each nostril as needed  
  
 glimepiride 4 mg tablet Commonly known as:  AMARYL Take 1 Tab by mouth every morning. glucose blood VI test strips strip Commonly known as:  EasyMax N  
Use to test blood sugar 2 times a day Insulin Needles (Disposable) 32 gauge x 5/32\" Ndle Commonly known as:  Amparo Pen Needle For use twice per day with insulin/victoza. ipratropium 0.02 % Soln Commonly known as:  ATROVENT  
2.5 mL by Nebulization route every four (4) hours as needed. Liraglutide 0.6 mg/0.1 mL (18 mg/3 mL) Pnij Commonly known as:  VICTOZA 3-ESTEPHANIE  
1.8 mg by SubCUTAneous route daily. Titrate up to 1.8mg daily as direcected  
  
 lurasidone 40 mg Tab tablet Commonly known as:  Arely Rist Take 1 Tab by mouth daily (with dinner). Indications: DEPRESSION ASSOCIATED WITH BIPOLAR DISORDER  
  
 metFORMIN  mg tablet Commonly known as:  GLUCOPHAGE XR Take 1 Tab by mouth Before breakfast and dinner. montelukast 10 mg tablet Commonly known as:  SINGULAIR Take 1 Tab by mouth daily. multivitamin tablet Commonly known as:  ONE A DAY Take 1 Tab by mouth daily. NovoLIN R Regular U-100 Insuln 100 unit/mL injection Generic drug:  insulin regular  
  
 omega 3-DHA-EPA-fish oil 1,000 mg (120 mg-180 mg) capsule Take  by mouth. Take 2 po daily  
  
 ondansetron 4 mg disintegrating tablet Commonly known as:  ZOFRAN ODT Take 1 Tab by mouth every eight (8) hours as needed for Nausea. Oxygen O2 at 2 Liters NC while asleep  
  
 pantoprazole 40 mg tablet Commonly known as:  PROTONIX Take (1) tablet by mouth once a day. prednisoLONE acetate 1 % ophthalmic suspension Commonly known as:  PRED FORTE QUEtiapine 50 mg tablet Commonly known as:  SEROquel Take 1 Tab by mouth daily. Indications: DEPRESSION TREATMENT ADJUNCT  
  
 rosuvastatin 40 mg tablet Commonly known as:  CRESTOR Take 1 Tab by mouth nightly. SURE COMFORT INSULIN SYRINGE 1 mL 30 gauge x 5/16 Syrg Generic drug:  Insulin Syringe-Needle U-100  
  
 tiotropium 18 mcg inhalation capsule Commonly known as:  [address] Take 1 Cap by inhalation daily. TOUJEO SOLOSTAR U-300 INSULIN 300 unit/mL (1.5 mL) Inpn Generic drug:  insulin glargine 65 Units by SubCUTAneous route daily. Introducing \[facility]\"" & HEALTH SERVICES! Dear Miles Mullins: 
Thank you for requesting a Indix account. Our records indicate that you already have an active Indix account. You can access your account anytime at [email or web address] Did you know that you can access your hospital and ER discharge instructions at any time in Indix? You can also review all of your test results from your hospital stay or ER visit. Additional Information If you have questions, please visit the Frequently Asked Questions section of the Indix website at [email or web address] Remember, Indix is NOT to be used for urgent needs. For medical emergencies, dial 911. Now available from your iPhone and Android! Please provide this summary of care documentation to your next provider. Your primary care clinician is listed as Gage Martinez. If you have any questions after today's visit, please call [phone].

## 2018-08-16 NOTE — PROGRESS NOTES
Paris Mckeon  1960  63 y.o.  female  [facility]    Chief Complaint   Patient presents with    Insomnia    Anxiety    Depression       [place]:   This is a 59 years old divorce  female with past psychiatric history and treatment of manic depression who was seen for the first time on December 28, 2017 referred by her PCP to manage her complicated psychiatric medications.  She decided to gradually taper and discontinue Viibryd and high dose Paxil and try Abilify 1-2 mg daily and take Seroquel  mg at nighttime to help with sleep.  She was seen for follow-up on February 1, 2018, March 5, 2018, April 4, 2018, and May 4 when she decided to gradually increase Abilify to 15 mg and Seroquel to 75 mg. She decided to stop Abilify and may due to side effect of dizziness, lightheadedness, and making her tired and agreed to try Latuda 10 mg and 20 mg and 40 mg on July 2 and come in 4 weeks for reevaluation. Patient presented on time today, was observed to be alert awake oriented to time person and place, she was calm and cooperative polite and pleasant and observed to be improved. She informed that she had sleep study done and she was told that she does not have sleep apnea but she did not hear anything about her sleep issues or restless legs at nighttime or bruxism and was not happy with the sleep study and losing her time. She agreed to get a mouth guard and use it at nighttime it may help her with her sleep too. She report that since she is taking the 2 that she is feeling little bit better but sleep is a still not good and she is requiring 75 mg of Seroquel to agreed to take 100 mg of Seroquel and continue Latuda 40 mg and return in 2 months for reevaluation. She was provided with lots of support and psychoeducation.   She denies any substance abuse at this time she denies any social change except for that she is more social now.      SUBSTANCE USE HISTORY:   TOBACCO: Smoked 1 pack per day for past 35 years and expressed desire to get help to quit his smoking but first would like to stabilize on her medications. ALCOHOL: Patient denies abuse. CANNABIS: Tried few times but never abused it. COCAINE, OPIOIDS, and OTHERS: Denies abuse. MEDICAL HISTORY:    has a past medical history of Asthma; Asthma; COPD; Depression; Diabetes ([facility] [address] Diabetes mellitus; Encounter for review of form with patient (12/4/2017); Essential hypertension; GERD (gastroesophageal reflux disease); Headache(784.0); OTHER MEDICAL; Hypercholesterolemia; Hypertension; Ill-defined condition; Insomnia disorder (12/4/2017); Major depression, chronic (12/4/2017); Psychiatric problem; Psychotic disorder; Screening for cervical cancer (5/22/2018); Tobacco use disorder (10/19/2017); and Unspecified sleep apnea. ALLERGIES:   Allergies   Allergen Reactions    Latex Hives    Other Food Anaphylaxis     PEPPERONI, sloppy joes    Demerol [Meperidine] Anaphylaxis     Difficulty breathing    Iodine Anaphylaxis    Morphine Other (comments)     voilent outbreaks (restraints needed)    Nsaids (Non-Steroidal Anti-Inflammatory Drug) Hives       VITAL SIGNS:  /66  Pulse 90  Ht 5' 8\" (1.727 m)  Wt 83.5 kg (184 lb)  LMP 01/17/2010  BMI 27.98 kg/m2    Current Outpatient Prescriptions   Medication Sig Dispense Refill    pantoprazole (PROTONIX) 40 mg tablet Take (1) tablet by mouth once a day. 30 Tab 6    glimepiride (AMARYL) 4 mg tablet Take 1 Tab by mouth every morning. 90 Tab 3    glucose blood VI test strips (EASYMAX N) strip Use to test blood sugar 2 times a day 200 Strip 5    rosuvastatin (CRESTOR) 40 mg tablet Take 1 Tab by mouth nightly. 90 Tab 3    QUEtiapine (SEROQUEL) 50 mg tablet Take 1 Tab by mouth daily. Indications: DEPRESSION TREATMENT ADJUNCT 30 Tab 2    lurasidone (LATUDA) 40 mg tab tablet Take 1 Tab by mouth daily (with dinner).  Indications: DEPRESSION ASSOCIATED WITH BIPOLAR DISORDER 30 Tab 2    tiotropium (SPIRIVA WITH HANDIHALER) 18 mcg inhalation capsule Take 1 Cap by inhalation daily. 30 Cap 4    empagliflozin (JARDIANCE) 25 mg tablet Take 1 Tab by mouth daily. 14 Tab 0    albuterol (PROVENTIL VENTOLIN) 2.5 mg /3 mL (0.083 %) nebulizer solution 3 mL by Nebulization route every four (4) hours as needed for Wheezing or Shortness of Breath. 24 Each 0    NOVOLIN R REGULAR U-100 INSULN 100 unit/mL injection       BYSTOLIC 10 mg tablet Take 2 tablets PO every AM (Patient taking differently: Take 1 tablets PO every AM) 60 Tab 3    budesonide-formoterol (SYMBICORT) 160-4.5 mcg/actuation HFAA Take 2 Puffs by inhalation two (2) times a day. Indications: COPD ASSOCIATED WITH CHRONIC BRONCHITIS, EXTRINSIC ASTHMA 1 Inhaler 6    ipratropium (ATROVENT) 0.02 % soln 2.5 mL by Nebulization route every four (4) hours as needed. 70 Vial 7    prednisoLONE acetate (PRED FORTE) 1 % ophthalmic suspension       SURE COMFORT INSULIN SYRINGE 1 mL 30 gauge x 5/16 syrg       montelukast (SINGULAIR) 10 mg tablet Take 1 Tab by mouth daily. 30 Tab 6    fluticasone (FLONASE) 50 mcg/actuation nasal spray 2 sprays daily on each nostril for the next 8 weeks then 2 sprays on each nostril every other day for the next 4 weeks and thereafter 2 sprays on each nostril as needed 1 Bottle 6    ondansetron (ZOFRAN ODT) 4 mg disintegrating tablet Take 1 Tab by mouth every eight (8) hours as needed for Nausea. 10 Tab 0    Liraglutide (VICTOZA 3-ESTEPHANIE) 0.6 mg/0.1 mL (18 mg/3 mL) pnij 1.8 mg by SubCUTAneous route daily. Titrate up to 1.8mg daily as direcected 3 Pen 7    metFORMIN ER (GLUCOPHAGE XR) 500 mg tablet Take 1 Tab by mouth Before breakfast and dinner. 180 Tab 3    Oxygen O2 at 2 Liters NC while asleep      multivitamin (ONE A DAY) tablet Take 1 Tab by mouth daily.  Omega-3-DHA-EPA-Fish Oil 1,000 mg (120 mg-180 mg) cap Take  by mouth. Take 2 po daily      TOUJEO SOLOSTAR 300 unit/mL (1.5 mL) inpn 65 Units by SubCUTAneous route daily. 15 Pen 3    EPINEPHrine (EPIPEN) 0.3 mg/0.3 mL injection 0.3 mg by IntraMUSCular route once as needed.  ergocalciferol (ERGOCALCIFEROL) 50,000 unit capsule Take 1 capsule by mouth every seven (7) days. 12 capsule 3    diphenhydrAMINE (BENADRYL) 25 mg capsule Take 25 mg by mouth every six (6) hours as needed.  Insulin Needles, Disposable, (URSZULA PEN NEEDLE) 32 gauge x 5/32\" ndle For use twice per day with insulin/victoza. 200 Pen Needle 3       MENTAL STATUS EXAMINATION:   Patient is a 63 y.o. female [facility] who looks her stated age. Patient appearance is nicely dressed with good hygiene and some makeup. Speech is regular rate and rhythm, fluent language, and thought process is linear, logical, and goal directed. Reported mood is better with mood congruent brighter affect. Patient denies any suicidal ideation, homicidal ideation, and auditory visual hallucination. Not observed to be delusional or paranoid. Insight, judgement, reliability and impulse control is intact. Her cognition is within normal limit and she is observed to be reliable. DIAGNOSIS:  Encounter Diagnoses   Name Primary?  Bipolar 2 disorder, major depressive episode (HCC) Yes    Insomnia, unspecified type     Anxiety        PLAN:  1. MEDICATION: Continue Latuda 40 mg with dinner. Increase Seroquel 100 mg at bedtime. Patient was provided with psycho education, discussed risk/benefits, and expectations from medication changes. Patient agrees with plan. 2. PSYCHOTHERAPY: Patient was provided with supportive therapy, strongly encourage to seek psychotherapy. 3. MEDICAL CARE: Patient was strongly encourage to take their medical medications and follow up with their PCP on regular basis. She had a sleep study done which was negative for sleep apnea and she does have a problem of grinding her teeth and agreed to get mouthguard over-the-counter and use it and it may improve her sleep too.      4. SUBSTANCE ABUSE CARE: Patient is using e-cigarette for past 2 months and today for the first time is using nicotine free E cigarettes. She denies any drinking or abusing any illicit drugs. 5. FOLLOW UP:   Follow-up Disposition:  Return in about 2 months (around 10/16/2018) for Medication management.

## 2018-08-29 RX ORDER — METFORMIN HYDROCHLORIDE 500 MG/1
1000 TABLET, EXTENDED RELEASE ORAL
Qty: 360 TAB | Refills: 3 | Status: SHIPPED | OUTPATIENT
Start: 2018-08-29 | End: 2019-10-02 | Stop reason: SDUPTHER

## 2018-09-06 RX ORDER — MONTELUKAST SODIUM 10 MG/1
TABLET ORAL
Qty: 30 TAB | Refills: 0 | Status: SHIPPED | OUTPATIENT
Start: 2018-09-06 | End: 2018-10-16 | Stop reason: SDUPTHER

## 2018-10-08 RX ORDER — TIOTROPIUM BROMIDE 18 UG/1
CAPSULE ORAL; RESPIRATORY (INHALATION)
Qty: 30 CAP | Refills: 0 | Status: SHIPPED | OUTPATIENT
Start: 2018-10-08 | End: 2018-11-09 | Stop reason: SDUPTHER

## 2018-10-10 ENCOUNTER — OFFICE VISIT (OUTPATIENT)
Dept: ENDOCRINOLOGY | Age: 58
End: 2018-10-10

## 2018-10-10 VITALS
DIASTOLIC BLOOD PRESSURE: 68 MMHG | BODY MASS INDEX: 29.37 KG/M2 | WEIGHT: 193.8 LBS | HEIGHT: 68 IN | SYSTOLIC BLOOD PRESSURE: 109 MMHG | HEART RATE: 89 BPM

## 2018-10-10 DIAGNOSIS — E11.49 TYPE 2 DIABETES MELLITUS WITH OTHER NEUROLOGIC COMPLICATION, WITH LONG-TERM CURRENT USE OF INSULIN (HCC): Primary | ICD-10-CM

## 2018-10-10 DIAGNOSIS — Z79.4 TYPE 2 DIABETES MELLITUS WITH OTHER NEUROLOGIC COMPLICATION, WITH LONG-TERM CURRENT USE OF INSULIN (HCC): Primary | ICD-10-CM

## 2018-10-10 DIAGNOSIS — I10 ESSENTIAL HYPERTENSION: ICD-10-CM

## 2018-10-10 DIAGNOSIS — E78.5 HYPERLIPIDEMIA, UNSPECIFIED HYPERLIPIDEMIA TYPE: ICD-10-CM

## 2018-10-10 LAB — HBA1C MFR BLD HPLC: 8.1 %

## 2018-10-10 RX ORDER — SYRINGE AND NEEDLE,INSULIN,1ML 31GX15/64"
SYRINGE, EMPTY DISPOSABLE MISCELLANEOUS
Qty: 200 PEN NEEDLE | Refills: 5 | Status: SHIPPED | OUTPATIENT
Start: 2018-10-10 | End: 2019-11-14 | Stop reason: SDUPTHER

## 2018-10-10 NOTE — PROGRESS NOTES
CHIEF COMPLAINT: f/u evaluation for uncontrolled type 2 diabetes    HISTORY OF PRESENT ILLNESS:   José Miguel Smallwood is a 63 y.o. female with a PMHx as noted below who presents to the endocrinology clinic for f/u evaluation of uncontrolled type 2 diabetes.     A1c prev 9.2%, today is 8.1%    Currently taking the following meds:  Glimepiride 4mg each morning before breakfast  Metformin ER, 1000 mg BID  Victoza, 1.8mg daily  Toujeo 65 units each morning  Off Jardiance due to frequent yeast infections    Review of home blood glucose:  Checked mostly each morning  AM: 62-99  Lunch:   Dinner:   Bedtime    Review of most recent diabetes-related labs:  Lab Results   Component Value Date    HBA1C 8.6 (H) 10/19/2017    HBA1C 8.3 (H) 2014    HBA1C 10.1 (H) 10/18/2010    CHOL 106 2018    GFRAA 92 2018    GFRNA 80 2018    MCACR 11.4 2018    TSH 0.578 2018    VITD3 41.5 2015    ZTP4HCKQ 7.5% 2017       PAST MEDICAL/SURGICAL HISTORY:   Past Medical History:   Diagnosis Date    Asthma     Asthma     COPD     Depression     Diabetes ([facility] [address]     Diabetes mellitus     Encounter for review of form with patient 2017    Essential hypertension     GERD (gastroesophageal reflux disease)     Headache(784.0)     HX OTHER MEDICAL     acoustic neuroma    Hypercholesterolemia     Hypertension     Ill-defined condition     R ACOUSTIC NEUROMA    Insomnia disorder 2017    Major depression, chronic 2017    Psychiatric problem     anxiety depression    Psychotic disorder (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    HX HEENT      LASER SX-PENG       ALLERGIES:   Allergies   Allergen Reactions    Latex Hives    Other Food Anaphylaxis     PEPPERFLORI, sloppy joes    Demerol [Meperidine] Anaphylaxis     Difficulty breathing    Iodine Anaphylaxis    Morphine Other (comments)     voilent outbreaks (restraints needed)    Nsaids (Non-Steroidal Anti-Inflammatory Drug) Hives       MEDICATIONS ON ADMISSION:     Current Outpatient Prescriptions:     insulin syringe-needle U-100 (BD INSULIN SYRINGE ULTRA-FINE) 1/2 mL 31 gauge x 15/64\" syrg, Use for injecting insulin subcutaneously, Disp: 200 Pen Needle, Rfl: 5    insulin NPH/insulin regular (NOVOLIN 70/30, HUMULIN 70/30) 100 unit/mL (70-30) injection, 30 units before breakfast and dinner, Disp# 6 vials, Disp: 6 Vial, Rfl: 3    SPIRIVA WITH HANDIHALER 18 mcg inhalation capsule, INHALE THE CONTENTS OF 1 CAPSULE VIA HANDIHALER ONCE DAILY. RINSE MOUTH AFTER USE, Disp: 30 Cap, Rfl: 0    montelukast (SINGULAIR) 10 mg tablet, TAKE 1 TABLET BY MOUTH ONCE A DAY IN THE EVENING, Disp: 30 Tab, Rfl: 0    metFORMIN ER (GLUCOPHAGE XR) 500 mg tablet, Take 2 Tabs by mouth Before breakfast and dinner., Disp: 360 Tab, Rfl: 3    Liraglutide (VICTOZA 3-ESTEPHANIE) 0.6 mg/0.1 mL (18 mg/3 mL) pnij, 1.8 mg by SubCUTAneous route daily. , Disp: 9 Pen, Rfl: 3    QUEtiapine (SEROQUEL) 100 mg tablet, Take 1 Tab by mouth daily.  Indications: DEPRESSION TREATMENT ADJUNCT, Disp: 30 Tab, Rfl: 2    pantoprazole (PROTONIX) 40 mg tablet, Take (1) tablet by mouth once a day., Disp: 30 Tab, Rfl: 6    glimepiride (AMARYL) 4 mg tablet, Take 1 Tab by mouth every morning., Disp: 90 Tab, Rfl: 3    glucose blood VI test strips (EASYMAX N) strip, Use to test blood sugar 2 times a day, Disp: 200 Strip, Rfl: 5    rosuvastatin (CRESTOR) 40 mg tablet, Take 1 Tab by mouth nightly., Disp: 90 Tab, Rfl: 3    Insulin Needles, Disposable, (URSZULA PEN NEEDLE) 32 gauge x 5/32\" ndle, For use twice per day with insulin/victoza., Disp: 200 Pen Needle, Rfl: 3    BYSTOLIC 10 mg tablet, Take 2 tablets PO every AM (Patient taking differently: Take 1 tablets PO every AM), Disp: 60 Tab, Rfl: 3    budesonide-formoterol (SYMBICORT) 160-4.5 mcg/actuation HFAA, Take 2 Puffs by inhalation two (2) times a day. Indications: COPD ASSOCIATED WITH CHRONIC BRONCHITIS, EXTRINSIC ASTHMA, Disp: 1 Inhaler, Rfl: 6    multivitamin (ONE A DAY) tablet, Take 1 Tab by mouth daily. , Disp: , Rfl:     ergocalciferol (ERGOCALCIFEROL) 50,000 unit capsule, Take 1 capsule by mouth every seven (7) days. , Disp: 12 capsule, Rfl: 3    albuterol (PROVENTIL VENTOLIN) 2.5 mg /3 mL (0.083 %) nebulizer solution, 3 mL by Nebulization route every four (4) hours as needed for Wheezing or Shortness of Breath., Disp: 24 Each, Rfl: 0    ipratropium (ATROVENT) 0.02 % soln, 2.5 mL by Nebulization route every four (4) hours as needed. , Disp: 70 Vial, Rfl: 7    EPINEPHrine (EPIPEN) 0.3 mg/0.3 mL injection, 0.3 mg by IntraMUSCular route once as needed. , Disp: , Rfl:     diphenhydrAMINE (BENADRYL) 25 mg capsule, Take 25 mg by mouth every six (6) hours as needed. , Disp: , Rfl:     SOCIAL HISTORY:   Social History     Social History    Marital status: UNKNOWN     Spouse name: N/A    Number of children: N/A    Years of education: N/A     Occupational History    Not on file.      Social History Main Topics    Smoking status: Former Smoker     Packs/day: 1.00     Years: 12.00     Quit date: 08/2018    Smokeless tobacco: Former User    Alcohol use Yes      Comment: ocassional    Drug use: No    Sexual activity: No     Other Topics Concern    Not on file     Social History Narrative       FAMILY HISTORY:  Family History   Problem Relation Age of Onset    Diabetes Father     Heart Failure Father     Heart Disease Father     Hypertension Father     High Cholesterol Mother     Suicide Brother     Stroke Maternal Grandmother     Cancer Paternal Grandfather     Anesth Problems Neg Hx        REVIEW OF SYSTEMS: Complete ROS assessed and noted for that which is described above, all else are negative. Eyes: normal  ENT: normal  CVS: normal  Resp: normal  GI: normal  : normal  GYN: normal  Endocrine: normal  Integument: normal  Musculoskeletal: normal  Neuro: normal  Psych: normal      PHYSICAL EXAMINATION:    VITAL SIGNS:  Visit Vitals    /68 (BP 1 Location: Left arm, BP Patient Position: Sitting)    Pulse 89    Ht 5' 8\" (1.727 m)    Wt 193 lb 12.8 oz (87.9 kg)    LMP 01/17/2010    BMI 29.47 kg/m2       GENERAL: NCAT, Sitting comfortably, NAD  EYES: EOMI, non-icteric, no proptosis  Ear/Nose/Throat: NCAT, no inflammation, no masses  LYMPH NODES: No LAD  CARDIOVASCULAR: S1 S2, RRR, No murmur, 2+ radial pulses  RESPIRATORY: CTA b/l, no wheeze/rales  GASTROINTESTINAL: ND  MUSCULOSKELETAL: Normal ROM, no atrophy  SKIN: warm, no edema/rash/ or other skin changes  NEUROLOGIC: 5/5 power all extremities, no tremors, AAOx3  PSYCHIATRIC: Normal affect, Normal insight and judgement      REVIEW OF LABORATORY AND RADIOLOGY DATA:   Labs and documentation have been reviewed as described above. ASSESSMENT AND PLAN:   Temtiope Lema is a 63 y.o. female with a PMHx as noted above who presents to the endocrinology clinic for evaluation of uncontrolled type 2 diabetes. DM2 uncontrolled  HTN  HLD    Patients A1c is down to 8.1% however her fasting AM sugars have been mostly on the low side, partly due to compensating for post-prandial hyperglycemia with the Toujeo. She needs better diabetes control during the daytime with meals and less treatment with long acting insulin, however is limited concerning remaining oral medication options with respect to having had frequent yeast infections with SGLT-2 inhibitors. One option is to proceed with a mixed insulin BID in place of the Toujeo.  We discussed this today and patient is agreeable. DM2:  STOP Toujeo 65 each morning  Start Novolin 70/30 insulin: 30 units with breakfast and dinner, to be adjusted  Glimepiride 4mg each morning before breakfast  Metformin ER, 1000 mg BID  Victoza, 1.8mg daily  Off Jardiance due to frequent yeast infections    BP: Bystolic 30PC daily  HLD: crestor 40mg each evening    3 month f/u visit. Yadira Borges.  [id number] [facility]

## 2018-10-10 NOTE — MR AVS SNAPSHOT
[address] [facility] [address] [facility] [address] [address] [phone] Patient: Christina Mayes MRN:  [id number] Visit Information Date & Time Provider Department Dept. Phone Encounter #  
 10/10/2018 11:50 AM David Mukherjee, [address] [place] [address] Diabetes and Endocrinology [phone] [id number] Follow-up Instructions Return in about 3 months (around 1/10/2019). Your Appointments 10/16/2018  1:00 PM  
ESTABLISHED PATIENT with Carmen Kaur MD  
[facility] [address]) Appt Note: 2 month f/u  
 [address] [facility]  
[phone]  
  
   
 [address] [facility] [address] [address]  
  
    
 11/20/2018  8:15 AM  
ROUTINE CARE with Ifeoma Peterson MD  
[address] [place] [address]-[facility] ([address]) Appt Note: 6 mo f/u  
 [address] [facility] [address] [address]  
[phone] [facility] [address] Upcoming Health Maintenance Date Due  
 PAP AKA CERVICAL CYTOLOGY 3/23/1982 Shingrix Vaccine Age 50> (1 of 2) 3/23/2011 FOBT Q 1 YEAR AGE 50-75 3/23/2011 Influenza Age 5 to Adult 8/1/2018 FOOT EXAM Q1 1/10/2019 HEMOGLOBIN A1C Q6M 1/11/2019 EYE EXAM RETINAL OR DILATED Q1 5/3/2019 LIPID PANEL Q1 5/22/2019 MEDICARE YEARLY EXAM 5/23/2019 MICROALBUMIN Q1 7/11/2019 BREAST CANCER SCRN MAMMOGRAM 12/8/2019 DTaP/Tdap/Td series (2 - Td) 3/31/2026 Allergies as of 10/10/2018  Review Complete On: 10/10/2018 By: David Mukherjee MD  
  
 Severity Noted Reaction Type Reactions Latex  09/05/2014    Hives Other Food High 09/05/2014    Anaphylaxis PEPPERONI, sloppy joes Demerol [Meperidine] High 10/17/2010   Systemic Anaphylaxis Difficulty breathing Iodine High 10/17/2010   Systemic Anaphylaxis Morphine High 10/17/2010   Systemic Other (comments)  
 voilent outbreaks (restraints needed) Nsaids (Non-steroidal Anti-inflammatory Drug)  10/19/2017    Hives Current Immunizations  Reviewed on 1/25/2018 Name Date Influenza Vaccine (Quad) PF 10/19/2017 Pneumococcal Polysaccharide (PPSV-23) 10/19/2016 Tdap 3/31/2016 Not reviewed this visit You Were Diagnosed With   
  
 Codes Comments Type 2 diabetes mellitus with other neurologic complication, with long-term current use of insulin (HCC)    -  Primary ICD-10-CM: E11.49, Z79.4 ICD-9-CM: 250.60, V58.67 Essential hypertension     ICD-10-CM: I10 
ICD-9-CM: 401.9 Hyperlipidemia, unspecified hyperlipidemia type     ICD-10-CM: E78.5 ICD-9-CM: 272.4 Vitals BP Pulse Height(growth percentile) Weight(growth percentile) LMP BMI  
 109/68 (BP 1 Location: Left arm, BP Patient Position: Sitting) 89 5' 8\" (1.727 m) 193 lb 12.8 oz (87.9 kg) 01/17/2010 29.47 kg/m2 OB Status Smoking Status Postmenopausal Former Smoker BMI and BSA Data Body Mass Index Body Surface Area  
 29.47 kg/m 2 2.05 m 2 Preferred Pharmacy Pharmacy Name Phone [address] [phone] Your Updated Medication List  
  
   
This list is accurate as of 10/10/18 12:22 PM.  Always use your most recent med list.  
  
  
  
  
 albuterol 2.5 mg /3 mL (0.083 %) nebulizer solution Commonly known as:  PROVENTIL VENTOLIN  
3 mL by Nebulization route every four (4) hours as needed for Wheezing or Shortness of Breath. BENADRYL 25 mg capsule Generic drug:  diphenhydrAMINE Take 25 mg by mouth every six (6) hours as needed. budesonide-formoterol 160-4.5 mcg/actuation Hfaa Commonly known as:  SYMBICORT Take 2 Puffs by inhalation two (2) times a day. Indications: COPD ASSOCIATED WITH CHRONIC BRONCHITIS, EXTRINSIC ASTHMA BYSTOLIC 10 mg tablet Generic drug:  nebivolol Take 2 tablets PO every AM  
  
 EPINEPHrine 0.3 mg/0.3 mL injection Commonly known as:  EPIPEN  
0.3 mg by IntraMUSCular route once as needed. ergocalciferol 50,000 unit capsule Commonly known as:  ERGOCALCIFEROL Take 1 capsule by mouth every seven (7) days. glimepiride 4 mg tablet Commonly known as:  AMARYL Take 1 Tab by mouth every morning. glucose blood VI test strips strip Commonly known as:  EasyMax N  
Use to test blood sugar 2 times a day Insulin Needles (Disposable) 32 gauge x 5/32\" Ndle Commonly known as:  Amparo Pen Needle For use twice per day with insulin/victoza. insulin NPH/insulin regular 100 unit/mL (70-30) injection Commonly known as:  NOVOLIN 70/30, HUMULIN 70/30  
30 units before breakfast and dinner, Disp# 6 vials  
  
 insulin syringe-needle U-100 1/2 mL 31 gauge x 15/64\" Syrg Commonly known as:  BD INSULIN SYRINGE ULTRA-FINE Use for injecting insulin subcutaneously  
  
 ipratropium 0.02 % Soln Commonly known as:  ATROVENT  
2.5 mL by Nebulization route every four (4) hours as needed. Liraglutide 0.6 mg/0.1 mL (18 mg/3 mL) Pnij Commonly known as:  VICTOZA 3-ESTEPHANIE  
1.8 mg by SubCUTAneous route daily. metFORMIN  mg tablet Commonly known as:  GLUCOPHAGE XR Take 2 Tabs by mouth Before breakfast and dinner. montelukast 10 mg tablet Commonly known as:  SINGULAIR  
TAKE 1 TABLET BY MOUTH ONCE A DAY IN THE EVENING  
  
 multivitamin tablet Commonly known as:  ONE A DAY Take 1 Tab by mouth daily. pantoprazole 40 mg tablet Commonly known as:  PROTONIX Take (1) tablet by mouth once a day. QUEtiapine 100 mg tablet Commonly known as:  SEROquel Take 1 Tab by mouth daily. Indications: DEPRESSION TREATMENT ADJUNCT  
  
 rosuvastatin 40 mg tablet Commonly known as:  CRESTOR Take 1 Tab by mouth nightly. SPIRIVA WITH HANDIHALER 18 mcg inhalation capsule Generic drug:  tiotropium INHALE THE CONTENTS OF 1 CAPSULE VIA HANDIHALER ONCE DAILY. RINSE MOUTH AFTER USE Prescriptions Sent to Pharmacy Refills  
 insulin syringe-needle U-100 (BD INSULIN SYRINGE ULTRA-FINE) 1/2 mL 31 gauge x 15/64\" syrg 5 Sig: Use for injecting insulin subcutaneously Class: Normal  
 [facility] [address] Ph #: [phone] insulin NPH/insulin regular (NOVOLIN 70/30, HUMULIN 70/30) 100 unit/mL (70-30) injection 3 Si units before breakfast and dinner, Disp# 6 vials Class: Normal  
 [facility] [address] Ph #: [phone] Follow-up Instructions Return in about 3 months (around 1/10/2019). Patient Instructions STOP Touje[address] 65 each morning Start Novolin 70/30 insulin: 30 units with breakfast and 30 units for dinner, to be adjusted Glimepiride 4mg each morning before breakfast 
 
Metformin ER, 1000 mg BID Victoza, 1.8mg daily Please note our new policy, you must arrive to the clinic 15 minutes before your appointment time to allow enough time for proper check-in, adequate time to spend with your doctor, and also to respect the appointment time of the next patient. Not arriving 15 minutes in advance may result in having your appointment rescheduled for the next available day/time. 
---------------------------------------------------------------------------------------------------------------------- Below you will find a glucose log sheet which you can use to record your blood sugars. Without checking and recording what your home glucose levels are, it will be difficult to make any changes to your medication dose, even when significant changes may be needed. Please feel free to use the log below to record your home glucose levels.  At the very least, I would like for you to login the entire 2-3 weeks just before your visit so we can make your visit much more productive and beneficial to you. GLUCOSE LOG SHEET: 
 
Date Breakfast Lunch Dinner Bedtime Comments ? GLUCOSE LOG SHEET: 
 
Date Breakfast Lunch Dinner Bedtime Comments ? GLUCOSE LOG SHEET: 
 
Date Breakfast Lunch Dinner Bedtime Comments ? Introducing [facility] SERVICES! Dear Lindsay Alaniz: 
Thank you for requesting a [employer] account. Our records indicate that you already have an active [employer] account. You can access your account anytime at [email or web address] Did you know that you can access your hospital and ER discharge instructions at any time in [employer]? You can also review all of your test results from your hospital stay or ER visit. Additional Information If you have questions, please visit the Frequently Asked Questions section of the [employer] website at [email or web address] Remember, [employer] is NOT to be used for urgent needs. For medical emergencies, dial 911. Now available from your iPhone and Android! Please provide this summary of care documentation to your next provider. Your primary care clinician is listed as Temo Lima. If you have any questions after today's visit, please call [phone].

## 2018-10-16 ENCOUNTER — OFFICE VISIT (OUTPATIENT)
Dept: BEHAVIORAL/MENTAL HEALTH CLINIC | Age: 58
End: 2018-10-16

## 2018-10-16 VITALS
HEART RATE: 103 BPM | SYSTOLIC BLOOD PRESSURE: 133 MMHG | BODY MASS INDEX: 29.1 KG/M2 | WEIGHT: 192 LBS | DIASTOLIC BLOOD PRESSURE: 108 MMHG | HEIGHT: 68 IN

## 2018-10-16 DIAGNOSIS — G47.00 INSOMNIA, UNSPECIFIED TYPE: ICD-10-CM

## 2018-10-16 DIAGNOSIS — F41.9 ANXIETY: ICD-10-CM

## 2018-10-16 DIAGNOSIS — F31.81 BIPOLAR 2 DISORDER, MAJOR DEPRESSIVE EPISODE (HCC): Primary | ICD-10-CM

## 2018-10-16 RX ORDER — ZIPRASIDONE HYDROCHLORIDE 20 MG/1
20 CAPSULE ORAL 2 TIMES DAILY WITH MEALS
Qty: 60 CAP | Refills: 2 | Status: SHIPPED | OUTPATIENT
Start: 2018-10-16 | End: 2019-01-08 | Stop reason: SDUPTHER

## 2018-10-16 NOTE — MR AVS SNAPSHOT
[address] [facility] [address] [facility]. 
[phone] Patient: Blanche Coughlin MRN:  [id number] Visit Information Date & Time Provider Department Dept. Phone Encounter #  
 10/16/2018  1:00 PM Elsi Russo [phone] [id number] Follow-up Instructions Return in about 4 weeks (around 11/13/2018) for Medication management. Your Appointments 11/20/2018  8:15 AM  
ROUTINE CARE with Yves Medrano MD  
[address] [place] [address] OFFICE-ANNEX ([facility]) Appt Note: 6 mo f/u  
 [address] [facility] [address] [phone] [address] [phone]  
  
    
 11/27/2018 10:00 AM  
ESTABLISHED PATIENT with MD Mercy Russoistrfarzana [address] [facility]) Appt Note: 42 day follow up  
 [facility] [address] [address]  
[facility] [address] [address]  
  
    
 1/23/2019 11:50 AM  
Follow Up with Edith Garcia MD  
[place] [facility]) Appt Note: 3 month f/u  
 [address] [facility] [address] [phone] [address] Upcoming Health Maintenance Date Due  
 PAP AKA CERVICAL CYTOLOGY 3/23/1982 Shingrix Vaccine Age 50> (1 of 2) 3/23/2011 FOBT Q 1 YEAR AGE 50-75 3/23/2011 Influenza Age 5 to Adult 8/1/2018 FOOT EXAM Q1 1/10/2019 HEMOGLOBIN A1C Q6M 4/10/2019 EYE EXAM RETINAL OR DILATED Q1 5/3/2019 LIPID PANEL Q1 5/22/2019 MEDICARE YEARLY EXAM 5/23/2019 MICROALBUMIN Q1 7/11/2019 BREAST CANCER SCRN MAMMOGRAM 12/8/2019 DTaP/Tdap/Td series (2 - Td) 3/31/2026 Allergies as of 10/16/2018  Review Complete On: 10/16/2018 By: Nakul Choi MD  
  
 Severity Noted Reaction Type Reactions Latex  09/05/2014    Hives Other Food High 09/05/2014    Anaphylaxis PEPPERrashard RUBYy elbert Demerol [Meperidine] High 10/17/2010   Systemic Anaphylaxis Difficulty breathing Iodine High 10/17/2010   Systemic Anaphylaxis Morphine High 10/17/2010   Systemic Other (comments)  
 voilent outbreaks (restraints needed) Nsaids (Non-steroidal Anti-inflammatory Drug)  10/19/2017    Hives Current Immunizations  Reviewed on 1/25/2018 Name Date Influenza Vaccine (Quad) PF 10/19/2017 Pneumococcal Polysaccharide (PPSV-23) 10/19/2016 Tdap 3/31/2016 Not reviewed this visit You Were Diagnosed With   
  
 Codes Comments Bipolar 2 disorder, major depressive episode ([facility]    -  Primary ICD-10-CM: F31.81 
ICD-9-CM: 296.89 Insomnia, unspecified type     ICD-10-CM: G47.00 ICD-9-CM: 780.52 Anxiety     ICD-10-CM: F41.9 ICD-9-CM: 300.00 Vitals BP Pulse Height(growth percentile) Weight(growth percentile) LMP BMI  
 (!) 133/108 (!) 103 5' 8\" (1.727 m) 192 lb (87.1 kg) 01/17/2010 29.19 kg/m2 OB Status Smoking Status Postmenopausal Former Smoker BMI and BSA Data Body Mass Index Body Surface Area  
 29.19 kg/m 2 2.04 m 2 Preferred Pharmacy Pharmacy Name Phone [address] [address] [address] [phone] Your Updated Medication List  
  
   
This list is accurate as of 10/16/18  1:26 PM.  Always use your most recent med list.  
  
  
  
  
 albuterol 2.5 mg /3 mL (0.083 %) nebulizer solution Commonly known as:  PROVENTIL VENTOLIN  
3 mL by Nebulization route every four (4) hours as needed for Wheezing or Shortness of Breath. BENADRYL 25 mg capsule Generic drug:  diphenhydrAMINE Take 25 mg by mouth every six (6) hours as needed. budesonide-formoterol 160-4.5 mcg/actuation Hfaa Commonly known as:  SYMBICORT  
 Take 2 Puffs by inhalation two (2) times a day. Indications: COPD ASSOCIATED WITH CHRONIC BRONCHITIS, EXTRINSIC ASTHMA BYSTOLIC 10 mg tablet Generic drug:  nebivolol Take 2 tablets PO every AM  
  
 EPINEPHrine 0.3 mg/0.3 mL injection Commonly known as:  EPIPEN  
0.3 mg by IntraMUSCular route once as needed. ergocalciferol 50,000 unit capsule Commonly known as:  ERGOCALCIFEROL Take 1 capsule by mouth every seven (7) days. glimepiride 4 mg tablet Commonly known as:  AMARYL Take 1 Tab by mouth every morning. glucose blood VI test strips strip Commonly known as:  EasyMax N  
Use to test blood sugar 2 times a day Insulin Needles (Disposable) 32 gauge x 5/32\" Ndle Commonly known as:  Amparo Pen Needle For use twice per day with insulin/victoza. insulin NPH/insulin regular 100 unit/mL (70-30) injection Commonly known as:  NOVOLIN 70/30, HUMULIN 70/30  
30 units before breakfast and dinner, Disp# 6 vials  
  
 insulin syringe-needle U-100 1/2 mL 31 gauge x 15/64\" Syrg Commonly known as:  BD INSULIN SYRINGE ULTRA-FINE Use for injecting insulin subcutaneously  
  
 ipratropium 0.02 % Soln Commonly known as:  ATROVENT  
2.5 mL by Nebulization route every four (4) hours as needed. Liraglutide 0.6 mg/0.1 mL (18 mg/3 mL) Pnij Commonly known as:  VICTOZA 3-ESTEPHANIE  
1.8 mg by SubCUTAneous route daily. metFORMIN  mg tablet Commonly known as:  GLUCOPHAGE XR Take 2 Tabs by mouth Before breakfast and dinner. montelukast 10 mg tablet Commonly known as:  SINGULAIR  
TAKE 1 TABLET BY MOUTH ONCE A DAY IN THE EVENING  
  
 multivitamin tablet Commonly known as:  ONE A DAY Take 1 Tab by mouth daily. pantoprazole 40 mg tablet Commonly known as:  PROTONIX Take (1) tablet by mouth once a day. QUEtiapine 100 mg tablet Commonly known as:  SEROquel Take 1 Tab by mouth daily.  Indications: DEPRESSION TREATMENT ADJUNCT  
  
 rosuvastatin 40 mg tablet Commonly known as:  CRESTOR Take 1 Tab by mouth nightly. SPIRIVA WITH HANDIHALER 18 mcg inhalation capsule Generic drug:  tiotropium INHALE THE CONTENTS OF 1 CAPSULE VIA HANDIHALER ONCE DAILY. RINSE MOUTH AFTER USE  
  
 ziprasidone 20 mg capsule Commonly known as:  Pepe Lambert Take 1 Cap by mouth two (2) times daily (with meals). Prescriptions Sent to Pharmacy Refills  
 ziprasidone (GEODON) 20 mg capsule 2 Sig: Take 1 Cap by mouth two (2) times daily (with meals). Class: Normal  
 Pharmacy: [address] [place] [address] #: [phone] Route: Oral  
  
Follow-up Instructions Return in about 4 weeks (around 11/13/2018) for Medication management. Introducing [facility]! Dear Constance Peoples: 
Thank you for requesting a [employer] account. Our records indicate that you already have an active [employer] account. You can access your account anytime at [email or web address] Did you know that you can access your hospital and ER discharge instructions at any time in [employer]? You can also review all of your test results from your hospital stay or ER visit. Additional Information If you have questions, please visit the Frequently Asked Questions section of the [employer] website at [email or web address] Remember, [employer] is NOT to be used for urgent needs. For medical emergencies, dial 911. Now available from your iPhone and Android! Please provide this summary of care documentation to your next provider. Your primary care clinician is listed as Alvin Lane. If you have any questions after today's visit, please call [phone].

## 2018-10-16 NOTE — PROGRESS NOTES
Blanche Coughlin  1960  63 y.o.  female  [facility]    Chief Complaint   Patient presents with    Medication Problem     Jaw tightness and pain with Latuda    Insomnia     Seroquel is helping    Depression    Anxiety       [place]:   This is a 59 years old divorce  female with past psychiatric history and treatment of manic depression who was seen for the first time on December 28, 2017 referred by her PCP to manage her complicated psychiatric medications.  She decided to gradually taper and discontinue Viibryd and high dose Paxil and try Abilify 1-2 mg daily and take Seroquel  mg at nighttime to help with sleep.  She was seen for follow-up on February 1, 2018, March 5, 2018, April 4, 2018, and May 4 when she decided to gradually increase Abilify to 15 mg and Seroquel to 75 mg. She decided to stop Abilify and may due to side effect of dizziness, lightheadedness, and making her tired and agreed to try Latuda 10 mg and 20 mg and 40 mg on July 2 and come in 4 weeks for reevaluation.       Patient was last seen on August 16 when she decided to take Seroquel 100 mg and continue Latuda 40 mg and return in 2 months for reevaluation. She presented on time today, was observed to be alert awake oriented to time person and place, she was calm and cooperative polite and pleasant and observed to be improved.  She informed that she had to stop taking Latuda on September 27 because of jaw tightness and significant pain which he started when she started taking Latuda on June 6 and gradually released to the point where she has to discontinue. She reports significant depression since she is not taking Latuda and report that it helped her 75% except for poor energy which was not helped.       Patient was offered Cogentin which may help with side effect of Latuda as it helped her 75% but she decided to try low-dose Geodon with understanding that it can increase QTC and her last EKG showed QTC of 414 and she had exercise tolerance test done which was completely within normal limit last year December. She is taking Seroquel 100 mg at bedtime and informed that she completely quit his smoking and her weight is increase it could be just because of taking Seroquel and not Latuda. She was provided with lots of support and psychoeducation. She denies any substance abuse at this time she denies any social change except for that she is more social now.       SUBSTANCE USE HISTORY:   TOBACCO: Smoked 1 pack per day for past 35 years and quit his smoking in June. ALCOHOL: Patient denies abuse. CANNABIS: Tried few times but never abused it. COCAINE, OPIOIDS, and OTHERS: Denies abuse. MEDICAL HISTORY:    has a past medical history of Asthma; Asthma; COPD; Depression; Diabetes ([facility] Diabetes mellitus; Encounter for review of form with patient (12/4/2017); Essential hypertension; GERD (gastroesophageal reflux disease); Headache(784.0); OTHER MEDICAL; Hypercholesterolemia; Hypertension; Ill-defined condition; Insomnia disorder (12/4/2017); Major depression, chronic (12/4/2017); Psychiatric problem; [facility] Screening for cervical cancer (5/22/2018); Tobacco use disorder (10/19/2017); and Unspecified sleep apnea. ALLERGIES:   Allergies   Allergen Reactions    Latex Hives    Other Food Anaphylaxis     PEPPERONI, sloppy joes    Demerol [Meperidine] Anaphylaxis     Difficulty breathing    Iodine Anaphylaxis    Morphine Other (comments)     voilent outbreaks (restraints needed)    Nsaids (Non-Steroidal Anti-Inflammatory Drug) Hives       VITAL SIGNS:  BP (!) 133/108 Comment: hasn't taken BP meds yet today  Pulse (!) 103  Ht 5' 8\" (1.727 m)  Wt 87.1 kg (192 lb)  LMP 01/17/2010  BMI 29.19 kg/m2    Current Outpatient Prescriptions   Medication Sig Dispense Refill    ziprasidone (GEODON) 20 mg capsule Take 1 Cap by mouth two (2) times daily (with meals).  60 Cap 2    insulin NPH/insulin regular (NOVOLIN 70/30, HUMULIN 70/30) 100 unit/mL (70-30) injection 30 units before breakfast and dinner, Disp# 6 vials 6 Vial 3    SPIRIVA WITH HANDIHALER 18 mcg inhalation capsule INHALE THE CONTENTS OF 1 CAPSULE VIA HANDIHALER ONCE DAILY. RINSE MOUTH AFTER USE 30 Cap 0    montelukast (SINGULAIR) 10 mg tablet TAKE 1 TABLET BY MOUTH ONCE A DAY IN THE EVENING 30 Tab 0    metFORMIN ER (GLUCOPHAGE XR) 500 mg tablet Take 2 Tabs by mouth Before breakfast and dinner. 360 Tab 3    Liraglutide (VICTOZA 3-ESTEPHANIE) 0.6 mg/0.1 mL (18 mg/3 mL) pnij 1.8 mg by SubCUTAneous route daily. 9 Pen 3    QUEtiapine (SEROQUEL) 100 mg tablet Take 1 Tab by mouth daily. Indications: DEPRESSION TREATMENT ADJUNCT 30 Tab 2    pantoprazole (PROTONIX) 40 mg tablet Take (1) tablet by mouth once a day. 30 Tab 6    glimepiride (AMARYL) 4 mg tablet Take 1 Tab by mouth every morning. 90 Tab 3    rosuvastatin (CRESTOR) 40 mg tablet Take 1 Tab by mouth nightly. 90 Tab 3    albuterol (PROVENTIL VENTOLIN) 2.5 mg /3 mL (0.083 %) nebulizer solution 3 mL by Nebulization route every four (4) hours as needed for Wheezing or Shortness of Breath. 24 Each 0    BYSTOLIC 10 mg tablet Take 2 tablets PO every AM (Patient taking differently: Take 1 tablets PO every AM) 60 Tab 3    budesonide-formoterol (SYMBICORT) 160-4.5 mcg/actuation HFAA Take 2 Puffs by inhalation two (2) times a day. Indications: COPD ASSOCIATED WITH CHRONIC BRONCHITIS, EXTRINSIC ASTHMA 1 Inhaler 6    ipratropium (ATROVENT) 0.02 % soln 2.5 mL by Nebulization route every four (4) hours as needed. 70 Vial 7    multivitamin (ONE A DAY) tablet Take 1 Tab by mouth daily.  ergocalciferol (ERGOCALCIFEROL) 50,000 unit capsule Take 1 capsule by mouth every seven (7) days. 12 capsule 3    diphenhydrAMINE (BENADRYL) 25 mg capsule Take 25 mg by mouth every six (6) hours as needed.       insulin syringe-needle U-100 (BD INSULIN SYRINGE ULTRA-FINE) 1/2 mL 31 gauge x 15/64\" syrg Use for injecting insulin subcutaneously 200 Pen Needle 5    glucose blood VI test strips (EASYMAX N) strip Use to test blood sugar 2 times a day 200 Strip 5    Insulin Needles, Disposable, (URSZULA PEN NEEDLE) 32 gauge x 5/32\" ndle For use twice per day with insulin/victoza. 200 Pen Needle 3    EPINEPHrine (EPIPEN) 0.3 mg/0.3 mL injection 0.3 mg by IntraMUSCular route once as needed. MENTAL STATUS EXAMINATION:   Patient is a 63 y.o. female [facility] who looks her stated age. Patient appearance is nicely dressed with good hygiene and some makeup. Speech is regular rate and rhythm, fluent language, and thought process is linear, logical, and goal directed. Reported mood is better with mood congruent brighter affect. Patient denies any suicidal ideation, homicidal ideation, and auditory visual hallucination. Not observed to be delusional or paranoid. Insight, judgement, reliability and impulse control is intact. Her cognition is within normal limit and she is observed to be reliable. DIAGNOSIS:  Encounter Diagnoses   Name Primary?  Bipolar 2 disorder, major depressive episode (HCC) Yes    Insomnia, unspecified type     Anxiety        PLAN:  1. MEDICATION: Patient is stop taking Latuda 40 mg on September 27 due to side effect of jaw tightness and significant pain. She decided to continue Seroquel 100 mg at bedtime and will try Geodon 20 mg with dinner with provision of increasing to 40 mg after 2 weeks if it helps with understanding that she is on some medication and together with Geodon there is a risk that her QTC may be increased which will be monitored. Patient was provided with psycho education, discussed risk/benefits, and expectations from medication changes. Patient agrees with plan. 2. PSYCHOTHERAPY: Patient was provided with supportive therapy, strongly encourage to seek psychotherapy.     3. MEDICAL CARE: Patient was strongly encourage to take their medical medications and follow up with their PCP on regular basis. Her weight today is 192 pound and her blood pressure today is 133/108 but she informed that she has not taking her high blood pressure medication. She has gained 8 pounds since last seen 2 months ago. 4. SUBSTANCE ABUSE CARE: Patient denies smoking, drinking, abusing any illicit drugs. 5. FOLLOW UP:   Follow-up Disposition:  Return in about 4 weeks (around 11/13/2018) for Medication management.

## 2018-10-18 RX ORDER — MONTELUKAST SODIUM 10 MG/1
TABLET ORAL
Qty: 30 TAB | Refills: 0 | Status: SHIPPED | OUTPATIENT
Start: 2018-10-18 | End: 2018-11-16 | Stop reason: SDUPTHER

## 2018-10-18 RX ORDER — BUDESONIDE AND FORMOTEROL FUMARATE DIHYDRATE 160; 4.5 UG/1; UG/1
AEROSOL RESPIRATORY (INHALATION)
Qty: 1 INHALER | Refills: 6 | Status: SHIPPED | OUTPATIENT
Start: 2018-10-18 | End: 2019-08-14 | Stop reason: SDUPTHER

## 2018-11-12 RX ORDER — TIOTROPIUM BROMIDE 18 UG/1
CAPSULE ORAL; RESPIRATORY (INHALATION)
Qty: 30 CAP | Refills: 0 | Status: SHIPPED | OUTPATIENT
Start: 2018-11-12 | End: 2018-12-17 | Stop reason: SDUPTHER

## 2018-11-19 RX ORDER — MONTELUKAST SODIUM 10 MG/1
TABLET ORAL
Qty: 30 TAB | Refills: 0 | Status: SHIPPED | OUTPATIENT
Start: 2018-11-19 | End: 2018-11-20 | Stop reason: SDUPTHER

## 2018-11-20 ENCOUNTER — OFFICE VISIT (OUTPATIENT)
Dept: FAMILY MEDICINE CLINIC | Age: 58
End: 2018-11-20

## 2018-11-20 VITALS
DIASTOLIC BLOOD PRESSURE: 70 MMHG | WEIGHT: 192 LBS | TEMPERATURE: 97.4 F | HEART RATE: 89 BPM | BODY MASS INDEX: 29.1 KG/M2 | RESPIRATION RATE: 18 BRPM | OXYGEN SATURATION: 97 % | HEIGHT: 68 IN | SYSTOLIC BLOOD PRESSURE: 119 MMHG

## 2018-11-20 DIAGNOSIS — J45.20 INTERMITTENT ASTHMA WITHOUT COMPLICATION, UNSPECIFIED ASTHMA SEVERITY: ICD-10-CM

## 2018-11-20 DIAGNOSIS — H60.332 ACUTE SWIMMER'S EAR OF LEFT SIDE: ICD-10-CM

## 2018-11-20 DIAGNOSIS — M54.9 CHRONIC MIDLINE BACK PAIN, UNSPECIFIED BACK LOCATION: ICD-10-CM

## 2018-11-20 DIAGNOSIS — G89.29 CHRONIC MIDLINE BACK PAIN, UNSPECIFIED BACK LOCATION: ICD-10-CM

## 2018-11-20 DIAGNOSIS — E78.00 HYPERCHOLESTEREMIA: Primary | ICD-10-CM

## 2018-11-20 RX ORDER — INSULIN GLARGINE 300 U/ML
INJECTION, SOLUTION SUBCUTANEOUS
COMMUNITY
Start: 2018-08-28 | End: 2018-11-27 | Stop reason: ALTCHOICE

## 2018-11-20 RX ORDER — MONTELUKAST SODIUM 10 MG/1
TABLET ORAL
Qty: 90 TAB | Refills: 3 | Status: SHIPPED | OUTPATIENT
Start: 2018-11-20 | End: 2020-01-09

## 2018-11-20 RX ORDER — ALBUTEROL SULFATE 90 UG/1
2 AEROSOL, METERED RESPIRATORY (INHALATION)
COMMUNITY
Start: 2018-10-16 | End: 2020-07-20 | Stop reason: SDUPTHER

## 2018-11-20 RX ORDER — LURASIDONE HYDROCHLORIDE 40 MG/1
TABLET, FILM COATED ORAL
COMMUNITY
Start: 2018-09-06 | End: 2019-01-08 | Stop reason: SINTOL

## 2018-11-20 RX ORDER — HYDROCODONE BITARTRATE AND ACETAMINOPHEN 5; 325 MG/1; MG/1
TABLET ORAL
COMMUNITY
Start: 2018-11-05 | End: 2018-11-27 | Stop reason: ALTCHOICE

## 2018-11-20 RX ORDER — AMOXICILLIN 500 MG/1
CAPSULE ORAL
COMMUNITY
Start: 2018-11-01 | End: 2018-11-27 | Stop reason: ALTCHOICE

## 2018-11-20 NOTE — PATIENT INSTRUCTIONS
Patellofemoral Pain Syndrome in Children: Care Instructions  Your Care Instructions  Patellofemoral pain syndrome is pain in the front of the knee. It is caused by overuse, weak thigh muscles (quadriceps), or a problem with the way the kneecap moves. Extra weight may also cause this syndrome. The patella is the kneecap, and the femur is the thighbone. In some cases, the kneecap does not move, or track, in a normal way. Your child may have knee pain when he or she runs, walks down hills or steps, or does other activities. Sitting for a long time also can cause knee pain. Your child's knee pain may get better with medicines for pain and swelling. Exercises to make the quadriceps stronger can also help. Losing weight, if your child needs to, may also help with pain. Pain in the front of the knee can also be caused by chondromalacia. In this problem, the underside of the knee cartilage wears down and frays. Cartilage is a rubbery tissue that cushions joints. Follow-up care is a key part of your child's treatment and safety. Be sure to make and go to all appointments, and call your doctor if your child is having problems. It's also a good idea to know your child's test results and keep a list of the medicines your child takes. How can you care for your child at home? · Give your child anti-inflammatory medicines such as ibuprofen (Advil, Motrin) to reduce pain and swelling. Be safe with medicines. Read and follow all instructions on the label. · Have your child rest and protect the knee. It can help to take a break from activities that cause pain. These include long periods of sitting or kneeling. · Put ice or a cold pack on your child's knee for 10 to 20 minutes after activity. Put a thin cloth between the ice and your child's skin. · If your doctor recommends an elastic bandage, sleeve, or other type of support for your child's knee, put it on as directed.   · If your child's knee is not swollen, you can put moist heat or a warm cloth on the knee. After several days of rest, your child can begin gentle exercise of the knee. · Help your child reach and stay at a healthy weight. Being overweight puts stress on the knees. · Have your child wear athletic shoes that offer good support, especially if he or she runs. · Use shoe inserts, or orthotics, if they help reduce knee pain. Many drugstores and shoe stores sell them. · Take your child to a physical therapist to learn more exercises and stretches to make the legs stronger. When should you call for help? Watch closely for changes in your child's health, and be sure to contact your doctor if:    · Your child's knee pain does not get better or it gets worse. Where can you learn more? Go to http://nolan-alondra.info/. Enter H786 in the search box to learn more about \"Patellofemoral Pain Syndrome in Children: Care Instructions. \"  Current as of: November 29, 2017  Content Version: 11.8  © 4546-3027 [employer]. Care instructions adapted under license by [employer] (which disclaims liability or warranty for this information). If you have questions about a medical condition or this instruction, always ask your healthcare professional. [place] [address] any warranty or liability for your use of this information. Back Pain: Care Instructions  Your Care Instructions    Back pain has many possible causes. It is often related to problems with muscles and ligaments of the back. It may also be related to problems with the nerves, discs, or bones of the back. Moving, lifting, standing, sitting, or sleeping in an awkward way can strain the back. Sometimes you don't notice the injury until later. Arthritis is another common cause of back pain. Although it may hurt a lot, back pain usually improves on its own within several weeks. Most people recover in 12 weeks or less.  Using good home treatment and being careful not to stress your back can help you feel better sooner. Follow-up care is a key part of your treatment and safety. Be sure to make and go to all appointments, and call your doctor if you are having problems. It's also a good idea to know your test results and keep a list of the medicines you take. How can you care for yourself at home? · Sit or lie in positions that are most comfortable and reduce your pain. Try one of these positions when you lie down:  ? Lie on your back with your knees bent and supported by large pillows. ? Lie on the floor with your legs on the seat of a sofa or chair. ? Lie on your side with your knees and hips bent and a pillow between your legs. ? Lie on your stomach if it does not make pain worse. · Do not sit up in bed, and avoid soft couches and twisted positions. Bed rest can help relieve pain at first, but it delays healing. Avoid bed rest after the first day of back pain. · Change positions every 30 minutes. If you must sit for long periods of time, take breaks from sitting. Get up and walk around, or lie in a comfortable position. · Try using a heating pad on a low or medium setting for 15 to 20 minutes every 2 or 3 hours. Try a warm shower in place of one session with the heating pad. · You can also try an ice pack for 10 to 15 minutes every 2 to 3 hours. Put a thin cloth between the ice pack and your skin. · Take pain medicines exactly as directed. ? If the doctor gave you a prescription medicine for pain, take it as prescribed. ? If you are not taking a prescription pain medicine, ask your doctor if you can take an over-the-counter medicine. · Take short walks several times a day. You can start with 5 to 10 minutes, 3 or 4 times a day, and work up to longer walks. Walk on level surfaces and avoid hills and stairs until your back is better. · Return to work and other activities as soon as you can.  Continued rest without activity is usually not good for your back.  · To prevent future back pain, do exercises to stretch and strengthen your back and stomach. Learn how to use good posture, safe lifting techniques, and proper body mechanics. When should you call for help? Call your doctor now or seek immediate medical care if:    · You have new or worsening numbness in your legs.     · You have new or worsening weakness in your legs. (This could make it hard to stand up.)     · You lose control of your bladder or bowels.    Watch closely for changes in your health, and be sure to contact your doctor if:    · You have a fever, lose weight, or don't feel well.     · You do not get better as expected. Where can you learn more? Go to http://nolan-alondra.info/. Enter O220 in the search box to learn more about \"Back Pain: Care Instructions. \"  Current as of: November 29, 2017  Content Version: 11.8  © 0781-6748 [employer]. Care instructions adapted under license by [employer] (which disclaims liability or warranty for this information). If you have questions about a medical condition or this instruction, always ask your healthcare professional. [place] [address] any warranty or liability for your use of this information. Learning About Relief for Back Pain  What is back tension and strain? Back strain happens when you overstretch, or pull, a muscle in your back. You may hurt your back in an accident or when you exercise or lift something. Most back pain will get better with rest and time. You can take care of yourself at home to help your back heal.  What can you do first to relieve back pain? When you first feel back pain, try these steps:  · Walk. Take a short walk (10 to 20 minutes) on a level surface (no slopes, hills, or stairs) every 2 to 3 hours. Walk only distances you can manage without pain, especially leg pain. · Relax.  Find a comfortable position for rest. Some people are comfortable on the floor or a medium-firm bed with a small pillow under their head and another under their knees. Some people prefer to lie on their side with a pillow between their knees. Don't stay in one position for too long. · Try heat or ice. Try using a heating pad on a low or medium setting, or take a warm shower, for 15 to 20 minutes every 2 to 3 hours. Or you can buy single-use heat wraps that last up to 8 hours. You can also try an ice pack for 10 to 15 minutes every 2 to 3 hours. You can use an ice pack or a bag of frozen vegetables wrapped in a thin towel. There is not strong evidence that either heat or ice will help, but you can try them to see if they help. You may also want to try switching between heat and cold. · Take pain medicine exactly as directed. ? If the doctor gave you a prescription medicine for pain, take it as prescribed. ? If you are not taking a prescription pain medicine, ask your doctor if you can take an over-the-counter medicine. What else can you do? · Stretch and exercise. Exercises that increase flexibility may relieve your pain and make it easier for your muscles to keep your spine in a good, neutral position. And don't forget to keep walking. · Do self-massage. You can use self-massage to unwind after work or school or to energize yourself in the morning. You can easily massage your feet, hands, or neck. Self-massage works best if you are in comfortable clothes and are sitting or lying in a comfortable position. Use oil or lotion to massage bare skin. · Reduce stress. Back pain can lead to a vicious [place]: Distress about the pain tenses the muscles in your back, which in turn causes more pain. Learn how to relax your mind and your muscles to lower your stress. Where can you learn more? Go to http://nolan-alondra.info/. Enter V643 in the search box to learn more about \"Learning About Relief for Back Pain. \"  Current as of: November 29, 2017  Content Version Acute Low Back Pain: Exercises  Your Care Instructions  Here are some examples of typical rehabilitation exercises for your condition. Start each exercise slowly. Ease off the exercise if you start to have pain. Your doctor or physical therapist will tell you when you can start these exercises and which ones will work best for you. When you are not being active, find a comfortable position for rest. Some people are comfortable on the floor or a medium-firm bed with a small pillow under their head and another under their knees. Some people prefer to lie on their side with a pillow between their knees. Don't stay in one position for too long. Take short walks (10 to 20 minutes) every 2 to 3 hours. Avoid slopes, hills, and stairs until you feel better. Walk only distances you can manage without pain, especially leg pain. How to do the exercises  Back stretches    1. Get down on your hands and knees on the floor. 2. Relax your head and allow it to droop. Round your back up toward the ceiling until you feel a nice stretch in your upper, middle, and lower back. Hold this stretch for as long as it feels comfortable, or about 15 to 30 seconds. 3. Return to the starting position with a flat back while you are on your hands and knees. 4. Let your back sway by pressing your stomach toward the floor. Lift your buttocks toward the ceiling. 5. Hold this position for 15 to 30 seconds. 6. Repeat 2 to 4 times. Follow-up care is a key part of your treatment and safety. Be sure to make and go to all appointments, and call your doctor if you are having problems. It's also a good idea to know your test results and keep a list of the medicines you take. Where can you learn more? Go to http://nolan-alondra.info/. Enter X078 in the search box to learn more about \"Acute Low Back Pain: Exercises. \"  Current as of: November 29, 2017  Content Version: 11.8  © 4170-1542 [employer]wise, [facility].  Care instructions adapted under license by [employer] (which disclaims liability or warranty for this information). If you have questions about a medical condition or this instruction, always ask your healthcare professional. [address] any warranty or liability for your use of this information.    : 11.8  © 5664-3863 [employer]. Care instructions adapted under license by [employer] (which disclaims liability or warranty for this information). If you have questions about a medical condition or this instruction, always ask your healthcare professional. [address] any warranty or liability for your use of this information.

## 2018-11-20 NOTE — PROGRESS NOTES
Name and  verified      Chief Complaint   Patient presents with    Cholesterol Problem     f/u       1. Have you been to the ER, urgent care clinic since your last visit? Hospitalized since your last visit? 2. Have you seen or consulted any other health care providers outside of the [address] [place] [address] since your last visit? Include any pap smears or colon screening. Yes, Endocrinologist and Psychiatry.

## 2018-11-20 NOTE — PROGRESS NOTES
HISTORY OF PRESENT ILLNESS  Gavi Owen is a 63 y.o. female. HPI   Pleasant pt present for ear lt sided pian and has had some problem  With it for few days, +ringing no discharge painful, hoarse since got flu in 2003 was intubated, and got her flu shot at her pharm , back and b/l knees pain, asthmatic states, sees the psych and endo for her diabetic states,  Does not want do any PT doing some at home,     HTN  Today pt present for Bp check and the patient stating that so far there has been a Compliancy w/ the bp meds, she is having had the low salt diet and the patient has not been active, she does not do the daily walking,   today the pt denies Chest Pain, has no legs swelling no lightheadedness,  the pat has not been feeling anxious, and  Has not been feeling stressed out, otherwise feeling better since the last visit    Back pain and knees pain  otc helping no hx of trauma no red flag on urination nor of stooling the pain is 4/10, dealing w/ back and knee pain for few yrs,     Current Outpatient Medications   Medication Sig Dispense Refill    montelukast (SINGULAIR) 10 mg tablet TAKE 1 TABLET BY MOUTH DAILY IN THE EVENING 30 Tab 0    SPIRIVA WITH HANDIHALER 18 mcg inhalation capsule INHALE THE CONTENTS OF 1 CAPSULE VIA HANDIHALER ONCE DAILY. RINSE MOUTH AFTER USE 30 Cap 0    SYMBICORT 160-4.5 mcg/actuation HFAA INHALE 2 PUFFS BY MOUTH TWICE DAILY FOR COPD ASSOCIATED WITH CHRONIC BRONCHITIS, EXTRINSIC ASTHMA. 1 Inhaler 6    ziprasidone (GEODON) 20 mg capsule Take 1 Cap by mouth two (2) times daily (with meals).  60 Cap 2    insulin syringe-needle U-100 (BD INSULIN SYRINGE ULTRA-FINE) 1/2 mL 31 gauge x 15/64\" syrg Use for injecting insulin subcutaneously 200 Pen Needle 5    insulin NPH/insulin regular (NOVOLIN 70/30, HUMULIN 70/30) 100 unit/mL (70-30) injection 30 units before breakfast and dinner, Disp# 6 vials 6 Vial 3    metFORMIN ER (GLUCOPHAGE XR) 500 mg tablet Take 2 Tabs by mouth Before breakfast and dinner. 360 Tab 3    Liraglutide (VICTOZA 3-ESTEPHANIE) 0.6 mg/0.1 mL (18 mg/3 mL) pnij 1.8 mg by SubCUTAneous route daily. 9 Pen 3    QUEtiapine (SEROQUEL) 100 mg tablet Take 1 Tab by mouth daily. Indications: DEPRESSION TREATMENT ADJUNCT 30 Tab 2    pantoprazole (PROTONIX) 40 mg tablet Take (1) tablet by mouth once a day. 30 Tab 6    glimepiride (AMARYL) 4 mg tablet Take 1 Tab by mouth every morning. 90 Tab 3    glucose blood VI test strips (EASYMAX N) strip Use to test blood sugar 2 times a day 200 Strip 5    rosuvastatin (CRESTOR) 40 mg tablet Take 1 Tab by mouth nightly. 90 Tab 3    Insulin Needles, Disposable, (URSZULA PEN NEEDLE) 32 gauge x 5/32\" ndle For use twice per day with insulin/victoza. 200 Pen Needle 3    albuterol (PROVENTIL VENTOLIN) 2.5 mg /3 mL (0.083 %) nebulizer solution 3 mL by Nebulization route every four (4) hours as needed for Wheezing or Shortness of Breath. 24 Each 0    BYSTOLIC 10 mg tablet Take 2 tablets PO every AM (Patient taking differently: Take 1 tablets PO every AM) 60 Tab 3    ipratropium (ATROVENT) 0.02 % soln 2.5 mL by Nebulization route every four (4) hours as needed. 70 Vial 7    multivitamin (ONE A DAY) tablet Take 1 Tab by mouth daily.  EPINEPHrine (EPIPEN) 0.3 mg/0.3 mL injection 0.3 mg by IntraMUSCular route once as needed.  ergocalciferol (ERGOCALCIFEROL) 50,000 unit capsule Take 1 capsule by mouth every seven (7) days. 12 capsule 3    diphenhydrAMINE (BENADRYL) 25 mg capsule Take 25 mg by mouth every six (6) hours as needed.        Allergies   Allergen Reactions    Latex Hives    Other Food Anaphylaxis     PEPPERONI, sloppy joes    Demerol [Meperidine] Anaphylaxis     Difficulty breathing    Iodine Anaphylaxis    Morphine Other (comments)     voilent outbreaks (restraints needed)    Nsaids (Non-Steroidal Anti-Inflammatory Drug) Hives     Past Medical History:   Diagnosis Date    Asthma     Asthma     COPD     Depression     Diabetes ([facility] [address]     Diabetes mellitus     Encounter for review of form with patient 2017    Essential hypertension     GERD (gastroesophageal reflux disease)     Headache(784.0)     HX OTHER MEDICAL     acoustic neuroma    Hypercholesterolemia     Hypertension     Ill-defined condition     R ACOUSTIC NEUROMA    Insomnia disorder 2017    Major depression, chronic 2017    Psychiatric problem     anxiety depression    Psychotic disorder ([facility] [address]     Screening for cervical cancer 2018    Tobacco use disorder 10/19/2017    Unspecified sleep apnea     NO CPAP-O2 @2L WHEN SLEEPING     Past Surgical History:   Procedure Laterality Date     DELIVERY ONLY      HX GASTRECTOMY  14    lap sleeve gastrectomy by Dr Pérez Arm HX GYN      2 c-sections    HX HEENT      sinus    HX HEENT      tracheal resection    HX HEENT      tracheal alleratation x 2    HX HEENT      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    HX HEENT      LASER SX-PENG     Family History   Problem Relation Age of Onset    Diabetes Father     Heart Failure Father     Heart Disease Father     Hypertension Father     High Cholesterol Mother     Suicide Brother     Stroke Maternal Grandmother     Cancer Paternal Grandfather     Anesth Problems Neg Hx      Social History     Tobacco Use    Smoking status: Former Smoker     Packs/day: 1.00     Years: 12.00     Pack years: 12.00     Last attempt to quit: 2018     Years since quittin.3    Smokeless tobacco: Former User   Substance Use Topics    Alcohol use: Yes     Comment: ocassional      Lab Results   Component Value Date/Time    WBC 7.2 2018 11:25 AM    HGB 14.1 2018 11:25 AM    HCT 41.7 2018 11:25 AM    PLATELET 864  11:25 AM    MCV 89 2018 11:25 AM     Lab Results   Component Value Date/Time    GFR est non-AA 80 2018 11:25 AM    GFR est AA 92 2018 11:25 AM    Creatinine 0.82 2018 11:25 AM    BUN 18 05/22/2018 11:25 AM    Sodium 143 05/22/2018 11:25 AM    Potassium 5.3 (H) 05/22/2018 11:25 AM    Chloride 104 05/22/2018 11:25 AM    CO2 25 05/22/2018 11:25 AM    Magnesium 1.6 09/05/2014 09:23 AM    Phosphorus 4.0 10/19/2010 04:30 AM        Review of Systems   Constitutional: Negative for chills and fever. HENT: Negative for ear pain and nosebleeds. Eyes: Negative for blurred vision, pain and discharge. Respiratory: Negative for shortness of breath. Cardiovascular: Negative for chest pain and leg swelling. Gastrointestinal: Negative for constipation, diarrhea, nausea and vomiting. Genitourinary: Negative for frequency. Musculoskeletal: Negative for joint pain. Skin: Negative for itching and rash. Neurological: Negative for headaches. Psychiatric/Behavioral: Negative for depression. The patient is not nervous/anxious. Physical Exam   Constitutional: She is oriented to person, place, and time. She appears well-developed and well-nourished. HENT:   Head: Normocephalic and atraumatic. Eyes: Conjunctivae and EOM are normal.   Neck: Normal range of motion. Neck supple. Cardiovascular: Normal rate, regular rhythm and normal heart sounds. No murmur heard. Pulmonary/Chest: Effort normal and breath sounds normal.   Abdominal: Soft. Bowel sounds are normal. She exhibits no distension. Musculoskeletal: She exhibits tenderness. She exhibits no edema. Lymphadenopathy:     She has no cervical adenopathy. Neurological: She is alert and oriented to person, place, and time. Skin: No erythema. Psychiatric: Her behavior is normal.   Nursing note and vitals reviewed. ASSESSMENT and PLAN  Diagnoses and all orders for this visit:    1. Hypercholesteremia  -     CBC W/O DIFF  -     TSH 3RD GENERATION  -     VITAMIN B12 & FOLATE  -     METABOLIC PANEL, COMPREHENSIVE; Future  -     LIPID PANEL; Future    2.  Intermittent asthma without complication, unspecified asthma severity  -     CBC W/O DIFF  -     TSH 3RD GENERATION  -     VITAMIN B12 & FOLATE  -     METABOLIC PANEL, COMPREHENSIVE; Future  -     LIPID PANEL; Future    3. Chronic midline back pain, unspecified back location    4. Acute swimmer's ear of left side  -     montelukast (SINGULAIR) 10 mg tablet; TAKE 1 TABLET BY MOUTH DAILY IN THE EVENING  -     ciprofloxacin-hydrocortisone (CIPRO HC OTIC) otic suspension; Administer 3 Drops in left ear two (2) times a day for 10 days. pt refused the PT secondary to the cost, wanted to do self tx at home,   Patient was provided evidence based informations with the regard of their expected course,  In addition was told to help with weight reduction, spinal manipulations, massage therapy, exercise therapy:  Patient was also told to remain active but to avoid heavy lifting and pushing at this time for the next 6 weeks which is the time of the recovery for most of the low back pain duration of healing. Advised for self cared options such as: 1. NSAID's and Tylenol for pain, take meds w/ food and water, if develop abdominal upsets, such as heart burn and sour stomach. Please take some OTC antacids or Nexium 30 min before the first meal once daily and watch for discolored stool. Also do the exercise therapy: Ice therapy 2-30min tid daily, daily stretching x2 daily for 5-10 min, rom strengthening with resistance banding 3-4 times per week  Most of the how to do informations printed and handed out to the patient,   and finally the stool softner if opioid base meds given, in addition, pt was told to avoid machinary operation and driving while on any opioid based medications that will cause dizziness, drowsiness, and sleepiness. Dependency and tolerancy were also addressed,  meds side effects and compliancy advised,  Call or rtc if worsens,   Pt agreed with today's recommendations.

## 2018-11-21 LAB
ALBUMIN SERPL-MCNC: 4.2 G/DL (ref 3.5–5.5)
ALBUMIN/GLOB SERPL: 1.8 {RATIO} (ref 1.2–2.2)
ALP SERPL-CCNC: 75 IU/L (ref 39–117)
ALT SERPL-CCNC: 19 IU/L (ref 0–32)
AST SERPL-CCNC: 17 IU/L (ref 0–40)
BILIRUB SERPL-MCNC: 0.4 MG/DL (ref 0–1.2)
BUN SERPL-MCNC: 12 MG/DL (ref 6–24)
BUN/CREAT SERPL: 14 (ref 9–23)
CALCIUM SERPL-MCNC: 9.7 MG/DL (ref 8.7–10.2)
CHLORIDE SERPL-SCNC: 106 MMOL/L (ref 96–106)
CHOLEST SERPL-MCNC: 120 MG/DL (ref 100–199)
CO2 SERPL-SCNC: 26 MMOL/L (ref 20–29)
CREAT SERPL-MCNC: 0.83 MG/DL (ref 0.57–1)
ERYTHROCYTE [DISTWIDTH] IN BLOOD BY AUTOMATED COUNT: 13.6 % (ref 12.3–15.4)
FOLATE SERPL-MCNC: >20 NG/ML
GLOBULIN SER CALC-MCNC: 2.3 G/DL (ref 1.5–4.5)
GLUCOSE SERPL-MCNC: 86 MG/DL (ref 65–99)
HCT VFR BLD AUTO: 38.6 % (ref 34–46.6)
HDLC SERPL-MCNC: 46 MG/DL
HGB BLD-MCNC: 12.6 G/DL (ref 11.1–15.9)
LDLC SERPL CALC-MCNC: 43 MG/DL (ref 0–99)
MCH RBC QN AUTO: 28.7 PG (ref 26.6–33)
MCHC RBC AUTO-ENTMCNC: 32.6 G/DL (ref 31.5–35.7)
MCV RBC AUTO: 88 FL (ref 79–97)
PLATELET # BLD AUTO: 228 X10E3/UL (ref 150–379)
POTASSIUM SERPL-SCNC: 4.6 MMOL/L (ref 3.5–5.2)
PROT SERPL-MCNC: 6.5 G/DL (ref 6–8.5)
RBC # BLD AUTO: 4.39 X10E6/UL (ref 3.77–5.28)
SODIUM SERPL-SCNC: 146 MMOL/L (ref 134–144)
TRIGL SERPL-MCNC: 154 MG/DL (ref 0–149)
TSH SERPL DL<=0.005 MIU/L-ACNC: 1.99 UIU/ML (ref 0.45–4.5)
VIT B12 SERPL-MCNC: 691 PG/ML (ref 232–1245)
VLDLC SERPL CALC-MCNC: 31 MG/DL (ref 5–40)
WBC # BLD AUTO: 6.2 X10E3/UL (ref 3.4–10.8)

## 2018-11-27 ENCOUNTER — OFFICE VISIT (OUTPATIENT)
Dept: BEHAVIORAL/MENTAL HEALTH CLINIC | Age: 58
End: 2018-11-27

## 2018-11-27 VITALS
BODY MASS INDEX: 28.95 KG/M2 | HEIGHT: 68 IN | HEART RATE: 90 BPM | WEIGHT: 191 LBS | SYSTOLIC BLOOD PRESSURE: 111 MMHG | DIASTOLIC BLOOD PRESSURE: 69 MMHG

## 2018-11-27 DIAGNOSIS — G47.00 INSOMNIA, UNSPECIFIED TYPE: ICD-10-CM

## 2018-11-27 DIAGNOSIS — F31.81 BIPOLAR 2 DISORDER, MAJOR DEPRESSIVE EPISODE (HCC): Primary | ICD-10-CM

## 2018-11-27 DIAGNOSIS — F41.1 ANXIETY, GENERALIZED: ICD-10-CM

## 2018-11-27 RX ORDER — QUETIAPINE FUMARATE 25 MG/1
25 TABLET, FILM COATED ORAL 3 TIMES DAILY
Qty: 90 TAB | Refills: 0 | Status: SHIPPED | OUTPATIENT
Start: 2018-11-27 | End: 2019-01-08 | Stop reason: ALTCHOICE

## 2018-11-27 NOTE — PROGRESS NOTES
Kaela Mina  1960  63 y.o.  female  [facility]    Chief Complaint   Patient presents with    Depression    Anxiety    Insomnia    Medication Problem       [place]:   This is a 59 years old divorce  female with past psychiatric history and treatment of manic depression who was seen for the first time on December 28, 2017 referred by her PCP to manage her complicated psychiatric medications.  She decided to gradually taper and discontinue Viibryd and high dose Paxil and try Abilify 1-2 mg daily and take Seroquel  mg at nighttime to help with sleep.  She was seen for follow-up on February 1, 2018, March 5, 2018, April 4, 2018, and May 4 when she decided to gradually increase Abilify to 15 mg and Seroquel to 75 mg. She decided to stop Abilify and may due to side effect of dizziness, lightheadedness, and making her tired and agreed to try Latuda 10 mg and 20 mg and 40 mg on July 2 and come in 4 weeks for reevaluation.       Patient was last seen on October 16 when she informed that she stopped taking Latuda on September 27 because of severe side effects of jaw tightness and significant pain which started when she started to take [place] on June 6 and gradually reached to the point where it is unbearable despite of having about 75% symptoms improvement except for still having fatigue and poor energy. She decided to try low-dose Geodon 20 mg increasing to 40 mg with understanding that it may increase her QTC especially that she is taking with Seroquel. Her last EKG showed QTC of 414 and she also had exercise tolerance test done which was completely within normal limit last year December. Patient presented on time today, was observed to be much improved with brighter affect and denies any depression, anxiety, any sleep problem since she is taking Geodon and agreed to gradually taper and possibly discontinue Seroquel if she is able to sleep and return in 4 weeks for reevaluation.   She was alert awake oriented to time person and place, she was calm and cooperative polite and pleasant and observed to be improved.  She was provided with lots of support and psychoeducation. Makenna Medina denies any substance abuse at this time she denies any social change except for that she is more social now.       SUBSTANCE USE HISTORY:   TOBACCO: Smoked 1 pack per day for past 35 years and quit his smoking in June. ALCOHOL: Patient denies abuse. CANNABIS: Tried few times but never abused it. COCAINE, OPIOIDS, and OTHERS: Denies abuse. MEDICAL HISTORY:    has a past medical history of Asthma, Asthma, COPD, Depression, Diabetes ([facility] [address] Diabetes mellitus, Encounter for review of form with patient, Essential hypertension, GERD (gastroesophageal reflux disease), Headache(784.0), OTHER MEDICAL, Hypercholesterolemia, Hypertension, Ill-defined condition, Insomnia disorder, Major depression, chronic, Psychiatric problem, [facility] disorder ([facility] Screening for cervical cancer, Tobacco use disorder, and Unspecified sleep apnea. ALLERGIES:   Allergies   Allergen Reactions    Latex Hives    Other Food Anaphylaxis     PEPPERONI, sloppy joes    Demerol [Meperidine] Anaphylaxis     Difficulty breathing    Iodine Anaphylaxis    Morphine Other (comments)     voilent outbreaks (restraints needed)    Nsaids (Non-Steroidal Anti-Inflammatory Drug) Hives       VITAL SIGNS:  /69   Pulse 90   Ht 5' 8\" (1.727 m)   Wt 86.6 kg (191 lb)   LMP 01/17/2010   BMI 29.04 kg/m²     Current Outpatient Medications   Medication Sig Dispense Refill    QUEtiapine (SEROQUEL) 25 mg tablet Take 1 Tab by mouth three (3) times daily. 90 Tab 0    montelukast (SINGULAIR) 10 mg tablet TAKE 1 TABLET BY MOUTH DAILY IN THE EVENING 90 Tab 3    VENTOLIN HFA 90 mcg/actuation inhaler 2 Puffs every four (4) hours as needed.  SPIRIVA WITH HANDIHALER 18 mcg inhalation capsule INHALE THE CONTENTS OF 1 CAPSULE VIA HANDIHALER ONCE DAILY.  RINSE MOUTH AFTER USE 30 Cap 0    SYMBICORT 160-4.5 mcg/actuation HFAA INHALE 2 PUFFS BY MOUTH TWICE DAILY FOR COPD ASSOCIATED WITH CHRONIC BRONCHITIS, EXTRINSIC ASTHMA. 1 Inhaler 6    ziprasidone (GEODON) 20 mg capsule Take 1 Cap by mouth two (2) times daily (with meals). (Patient taking differently: Take 20 mg by mouth daily.) 60 Cap 2    insulin NPH/insulin regular (NOVOLIN 70/30, HUMULIN 70/30) 100 unit/mL (70-30) injection 30 units before breakfast and dinner, Disp# 6 vials 6 Vial 3    metFORMIN ER (GLUCOPHAGE XR) 500 mg tablet Take 2 Tabs by mouth Before breakfast and dinner. 360 Tab 3    Liraglutide (VICTOZA 3-ESTEPHANIE) 0.6 mg/0.1 mL (18 mg/3 mL) pnij 1.8 mg by SubCUTAneous route daily. 9 Pen 3    pantoprazole (PROTONIX) 40 mg tablet Take (1) tablet by mouth once a day. 30 Tab 6    glimepiride (AMARYL) 4 mg tablet Take 1 Tab by mouth every morning. 90 Tab 3    rosuvastatin (CRESTOR) 40 mg tablet Take 1 Tab by mouth nightly. 90 Tab 3    BYSTOLIC 10 mg tablet Take 2 tablets PO every AM (Patient taking differently: Take 1 tablets PO every AM) 60 Tab 3    ipratropium (ATROVENT) 0.02 % soln 2.5 mL by Nebulization route every four (4) hours as needed. 70 Vial 7    multivitamin (ONE A DAY) tablet Take 1 Tab by mouth daily.  ergocalciferol (ERGOCALCIFEROL) 50,000 unit capsule Take 1 capsule by mouth every seven (7) days. 12 capsule 3    diphenhydrAMINE (BENADRYL) 25 mg capsule Take 25 mg by mouth every six (6) hours as needed.  LATUDA 40 mg tab tablet       insulin syringe-needle U-100 (BD INSULIN SYRINGE ULTRA-FINE) 1/2 mL 31 gauge x 15/64\" syrg Use for injecting insulin subcutaneously 200 Pen Needle 5    glucose blood VI test strips (EASYMAX N) strip Use to test blood sugar 2 times a day 200 Strip 5    Insulin Needles, Disposable, (URSZULA PEN NEEDLE) 32 gauge x 5/32\" ndle For use twice per day with insulin/victoza.  200 Pen Needle 3    albuterol (PROVENTIL VENTOLIN) 2.5 mg /3 mL (0.083 %) nebulizer solution 3 mL by Nebulization route every four (4) hours as needed for Wheezing or Shortness of Breath. 24 Each 0    EPINEPHrine (EPIPEN) 0.3 mg/0.3 mL injection 0.3 mg by IntraMUSCular route once as needed. ROS:  Constitutional: Negative for fatigue and weight loss  Eyes: Negative for contacts/glasses  ENT: Negative for hearing loss and tinnitus  Respiratory: Negative for cough or wheezing  Cardiovascular: Negative for chest pain, palpitations  Neurological: Negative for memory problems  Behavioral/psych: Positive for insomnia, anxiety, depression, negative for SI/HI  Endocrine: Patient has diabetes. MENTAL STATUS EXAMINATION:   Patient is a 63 y. o. female WHITE OR  who looks her stated age. She was observed to be much improved with brighter affect and no observable anxiety and reported same. Patient appearance is nicely dressed with good hygiene and some makeup. Speech is regular rate and rhythm, fluent language, and thought process is linear, logical, and goal directed. Reported mood is better with mood congruent brighter affect. Patient denies any suicidal ideation, homicidal ideation, and auditory visual hallucination. Not observed to be delusional or paranoid. Insight, judgement, reliability and impulse control is intact.  Her cognition is within normal limit and she is observed to be reliable. DIAGNOSIS:  Encounter Diagnoses   Name Primary?  Bipolar 2 disorder, major depressive episode (HCC) Yes    Insomnia, unspecified type     Anxiety, generalized        PLAN:  1. MEDICATION:   1. Patient is able to tolerate Geodon 40 mg but is taking with the snacks at 7 PM as when she took it with her dinner at 5 PM she would go to sleep very early. Strongly encouraged to split dinner in a way where she can take Geodon with at least 500 nayan as recommended.     2.  Patient will try to gradually decrease Seroquel by taking 75 mg for 1 week, 50 mg for next week, 25 mg for another week, and will try to discontinue if he does not affect her sleep. She understand possibility of side effects of her QTc abnormalities while she is on combination of Geodon and Seroquel. She was provided with psycho education, discussed risk/benefits, and expectations from medication changes. Patient agrees with plan. 2. PSYCHOTHERAPY: Patient was provided with supportive therapy, strongly encourage to seek psychotherapy. 3. MEDICAL CARE: Patient was strongly encourage to take their medical medications and follow up with their PCP on regular basis. Her weight today is 191 pounds and she has lost 1 pound since last seen October 16. Her blood pressure and pulse are within normal limit. 4. SUBSTANCE ABUSE CARE: Patient quit smoking 5 months ago but report continued struggle with cravings especially while if stress. She denies any drinking or abusing any illicit drugs at this time. 5. FOLLOW UP:   Follow-up Disposition:  Return in about 6 weeks (around 1/8/2019) for Medication management.

## 2018-12-06 DIAGNOSIS — E55.9 VITAMIN D DEFICIENCY: ICD-10-CM

## 2018-12-06 RX ORDER — ERGOCALCIFEROL 1.25 MG/1
50000 CAPSULE ORAL
Qty: 12 CAP | Refills: 3 | Status: SHIPPED | OUTPATIENT
Start: 2018-12-06 | End: 2020-01-09

## 2018-12-06 NOTE — TELEPHONE ENCOUNTER
Last Visit: 11/20  Next Appt: 5/20  Previous Refill Encounter: n/a    Requested Prescriptions     Pending Prescriptions Disp Refills    ergocalciferol (ERGOCALCIFEROL) 50,000 unit capsule 12 Cap 3     Sig: Take 1 Cap by mouth every seven (7) days.

## 2018-12-17 NOTE — TELEPHONE ENCOUNTER
Last Visit: 11/20  Next Appt: 5/20  Previous Refill Encounter: 11/12-30+0    Requested Prescriptions     Pending Prescriptions Disp Refills    tiotropium (SPIRIVA WITH HANDIHALER) 18 mcg inhalation capsule 90 Cap 2     Sig: INHALE THE CONTENTS OF 1 CAPSULE VIA HANDIHALER ONCE DAILY.  RINSE MOUTH AFTER USE

## 2019-01-08 ENCOUNTER — OFFICE VISIT (OUTPATIENT)
Dept: BEHAVIORAL/MENTAL HEALTH CLINIC | Age: 59
End: 2019-01-08

## 2019-01-08 VITALS
SYSTOLIC BLOOD PRESSURE: 116 MMHG | BODY MASS INDEX: 28.34 KG/M2 | WEIGHT: 187 LBS | HEIGHT: 68 IN | DIASTOLIC BLOOD PRESSURE: 82 MMHG | HEART RATE: 89 BPM

## 2019-01-08 DIAGNOSIS — F31.81 BIPOLAR 2 DISORDER, MAJOR DEPRESSIVE EPISODE (HCC): Primary | ICD-10-CM

## 2019-01-08 DIAGNOSIS — F41.1 ANXIETY, GENERALIZED: ICD-10-CM

## 2019-01-08 DIAGNOSIS — G47.00 INSOMNIA, UNSPECIFIED TYPE: ICD-10-CM

## 2019-01-08 RX ORDER — BENZTROPINE MESYLATE 0.5 MG/1
0.5 TABLET ORAL
Qty: 30 TAB | Refills: 2 | Status: SHIPPED | OUTPATIENT
Start: 2019-01-08 | End: 2019-01-23

## 2019-01-08 RX ORDER — LIRAGLUTIDE 6 MG/ML
INJECTION SUBCUTANEOUS
Qty: 9 ML | Refills: 0 | Status: SHIPPED | OUTPATIENT
Start: 2019-01-08 | End: 2019-02-07 | Stop reason: SDUPTHER

## 2019-01-08 RX ORDER — ZIPRASIDONE HYDROCHLORIDE 60 MG/1
60 CAPSULE ORAL
Qty: 30 CAP | Refills: 2 | Status: SHIPPED | OUTPATIENT
Start: 2019-01-08 | End: 2019-04-10 | Stop reason: SDUPTHER

## 2019-01-18 ENCOUNTER — HOSPITAL ENCOUNTER (OUTPATIENT)
Dept: GENERAL RADIOLOGY | Age: 59
Discharge: HOME | End: 2019-01-18
Attending: OTOLARYNGOLOGY
Payer: MEDICARE

## 2019-01-18 DIAGNOSIS — R13.10 DYSPHAGIA: ICD-10-CM

## 2019-01-18 PROCEDURE — 74220 X-RAY XM ESOPHAGUS 1CNTRST: CPT

## 2019-01-23 ENCOUNTER — OFFICE VISIT (OUTPATIENT)
Dept: ENDOCRINOLOGY | Age: 59
End: 2019-01-23

## 2019-01-23 VITALS
HEART RATE: 99 BPM | DIASTOLIC BLOOD PRESSURE: 70 MMHG | HEIGHT: 68 IN | SYSTOLIC BLOOD PRESSURE: 119 MMHG | WEIGHT: 187 LBS | BODY MASS INDEX: 28.34 KG/M2

## 2019-01-23 DIAGNOSIS — E11.49 TYPE 2 DIABETES MELLITUS WITH OTHER NEUROLOGIC COMPLICATION, WITH LONG-TERM CURRENT USE OF INSULIN (HCC): Primary | ICD-10-CM

## 2019-01-23 DIAGNOSIS — I10 ESSENTIAL HYPERTENSION: ICD-10-CM

## 2019-01-23 DIAGNOSIS — Z79.4 TYPE 2 DIABETES MELLITUS WITH OTHER NEUROLOGIC COMPLICATION, WITH LONG-TERM CURRENT USE OF INSULIN (HCC): Primary | ICD-10-CM

## 2019-01-23 DIAGNOSIS — E78.5 HYPERLIPIDEMIA, UNSPECIFIED HYPERLIPIDEMIA TYPE: ICD-10-CM

## 2019-01-23 LAB — HBA1C MFR BLD HPLC: 6.5 %

## 2019-01-23 RX ORDER — IBUPROFEN 200 MG
800 TABLET ORAL
COMMUNITY
End: 2019-09-05 | Stop reason: ALTCHOICE

## 2019-01-23 NOTE — PATIENT INSTRUCTIONS
Novolin 70/30 insulin: 20 units with breakfast    Glimepiride 4mg each morning before breakfast    Metformin ER, 1000 mg twice per day    Victoza, 1.8mg each morning before breakfast      Please note our new policy, you must arrive to the clinic 15 minutes before your appointment time to allow enough time for proper check-in, adequate time to spend with your doctor, and also to respect the appointment time of the next patient. Not arriving 15 minutes in advance may result in having your appointment rescheduled for the next available day/time.  ----------------------------------------------------------------------------------------------------------------------    Below you will find a glucose log sheet which you can use to record your blood sugars. Without checking and recording what your home glucose levels are, it will be difficult to make any changes to your medication dose, even when significant changes may be needed. Please feel free to use the log below to record your home glucose levels. At the very least, I would like for you to login the entire 2-3 weeks just before your visit so we can make your visit much more productive and beneficial to you. GLUCOSE LOG SHEET:    Date Breakfast Lunch Dinner Bedtime Comments ? GLUCOSE LOG SHEET:    Date Breakfast Lunch Dinner Bedtime Comments ? GLUCOSE LOG SHEET:    Date Breakfast Lunch Dinner Bedtime Comments ?

## 2019-01-23 NOTE — PROGRESS NOTES
CHIEF COMPLAINT: f/u evaluation for uncontrolled type 2 diabetes    HISTORY OF PRESENT ILLNESS:   Rodriguez Stewart is a 63 y.o. female with a PMHx as noted below who presents to the endocrinology clinic for f/u evaluation of uncontrolled type 2 diabetes. A1c prev 8.1% and today is 6.5%. On the last visit we stopped toujeo and started novolin 70/30 insulin. Currently taking the following meds:  Novolin 70/30 insulin: taking only 20 units with breakfast  Glimepiride 4mg each morning before breakfast  Metformin ER, 1000 mg BID  Victoza, 1.8mg daily    Review of home blood glucose:  Checked mostly each morning  AM: 101-150 mostly  Lunch: 120-200, variable, not checked often  Dinner:   Bedtime: 120-130's mostly  Lows, had 60's-70's at bedtime 2 weeks ago but resolved.     Review of most recent diabetes-related labs:  Lab Results   Component Value Date    HBA1C 8.6 (H) 10/19/2017    HBA1C 8.3 (H) 12/18/2014    HBA1C 10.1 (H) 10/18/2010    CHOL 120 11/20/2018    LDLC 43 11/20/2018    GFRAA 91 11/20/2018    GFRNA 79 11/20/2018    MCACR 11.4 07/11/2018    TSH 1.990 11/20/2018    VITD3 41.5 05/28/2015    PMO9NFLQ 7.5% 04/26/2017       PAST MEDICAL/SURGICAL HISTORY:   Past Medical History:   Diagnosis Date    Asthma     Asthma     COPD     Depression     [facility] [address]     Diabetes mellitus     Encounter for review of form with patient 12/4/2017    Essential hypertension     GERD (gastroesophageal reflux disease)     Headache(784.0)     HX OTHER MEDICAL     acoustic neuroma    Hypercholesterolemia     Hypertension     Ill-defined condition     R ACOUSTIC NEUROMA    Insomnia disorder 12/4/2017    Major depression, chronic 12/4/2017    Psychiatric problem     anxiety depression    Psychotic disorder ([facility] [address]     Screening for cervical cancer 5/22/2018    Tobacco use disorder 10/19/2017    Unspecified sleep apnea     NO CPAP-O2 @2L WHEN SLEEPING     Past Surgical History:   Procedure Laterality Date     DELIVERY ONLY      HX GASTRECTOMY  14    lap sleeve gastrectomy by Dr Orlando Edward HX GYN      2 c-sections    HX HEENT      sinus    HX HEENT      tracheal resection    HX HEENT      tracheal alleratation x 2    HX HEENT      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    HX HEENT      LASER SX-PENG       ALLERGIES:   Allergies   Allergen Reactions    Latex Hives    Other Food Anaphylaxis     PEPPERONI, sloppy joes    Demerol [Meperidine] Anaphylaxis     Difficulty breathing    Iodine Anaphylaxis    Morphine Other (comments)     voilent outbreaks (restraints needed)    Nsaids (Non-Steroidal Anti-Inflammatory Drug) Hives       MEDICATIONS ON ADMISSION:     Current Outpatient Medications:     ibuprofen (MOTRIN) 200 mg tablet, Take 800 mg by mouth., Disp: , Rfl:     ziprasidone (GEODON) 60 mg capsule, Take 1 Cap by mouth daily (with dinner). , Disp: 30 Cap, Rfl: 2    VICTOZA 3-ESTEPHANIE 0.6 mg/0.1 mL (18 mg/3 mL) pnij, INJECT 1.8 MG UNDER THE SKIN DAILY. , Disp: 9 mL, Rfl: 0    tiotropium (SPIRIVA WITH HANDIHALER) 18 mcg inhalation capsule, INHALE THE CONTENTS OF 1 CAPSULE VIA HANDIHALER ONCE DAILY. RINSE MOUTH AFTER USE, Disp: 90 Cap, Rfl: 2    ergocalciferol (ERGOCALCIFEROL) 50,000 unit capsule, Take 1 Cap by mouth every seven (7) days. , Disp: 12 Cap, Rfl: 3    montelukast (SINGULAIR) 10 mg tablet, TAKE 1 TABLET BY MOUTH DAILY IN THE EVENING, Disp: 90 Tab, Rfl: 3    VENTOLIN HFA 90 mcg/actuation inhaler, 2 Puffs every four (4) hours as needed. , Disp: , Rfl:     SYMBICORT 160-4.5 mcg/actuation HFAA, INHALE 2 PUFFS BY MOUTH TWICE DAILY FOR COPD ASSOCIATED WITH CHRONIC BRONCHITIS, EXTRINSIC ASTHMA., Disp: 1 Inhaler, Rfl: 6    insulin syringe-needle U-100 (BD INSULIN SYRINGE ULTRA-FINE) 1/2 mL 31 gauge x 15/64\" syrg, Use for injecting insulin subcutaneously, Disp: 200 Pen Needle, Rfl: 5    insulin NPH/insulin regular (NOVOLIN 70/30, HUMULIN 70/30) 100 unit/mL (70-30) injection, 30 units before breakfast and dinner, Disp# 6 vials (Patient taking differently: 20 Units. 30 units before breakfast and dinner, Disp# 6 vials), Disp: 6 Vial, Rfl: 3    metFORMIN ER (GLUCOPHAGE XR) 500 mg tablet, Take 2 Tabs by mouth Before breakfast and dinner., Disp: 360 Tab, Rfl: 3    pantoprazole (PROTONIX) 40 mg tablet, Take (1) tablet by mouth once a day., Disp: 30 Tab, Rfl: 6    glimepiride (AMARYL) 4 mg tablet, Take 1 Tab by mouth every morning., Disp: 90 Tab, Rfl: 3    glucose blood VI test strips (EASYMAX N) strip, Use to test blood sugar 2 times a day, Disp: 200 Strip, Rfl: 5    rosuvastatin (CRESTOR) 40 mg tablet, Take 1 Tab by mouth nightly., Disp: 90 Tab, Rfl: 3    Insulin Needles, Disposable, (URSZULA PEN NEEDLE) 32 gauge x 5/32\" ndle, For use twice per day with insulin/victoza., Disp: 200 Pen Needle, Rfl: 3    BYSTOLIC 10 mg tablet, Take 2 tablets PO every AM (Patient taking differently: Take 1 tablets PO every AM), Disp: 60 Tab, Rfl: 3    multivitamin (ONE A DAY) tablet, Take 1 Tab by mouth daily. , Disp: , Rfl:     EPINEPHrine (EPIPEN) 0.3 mg/0.3 mL injection, 0.3 mg by IntraMUSCular route once as needed. , Disp: , Rfl:     diphenhydrAMINE (BENADRYL) 25 mg capsule, Take 25 mg by mouth every six (6) hours as needed. , Disp: , Rfl:     SOCIAL HISTORY:   Social History     Socioeconomic History    Marital status: SINGLE     Spouse name: Not on file    Number of children: Not on file    Years of education: Not on file    Highest education level: Not on file   Social Needs    Financial resource strain: Not on file    Food insecurity - worry: Not on file    Food insecurity - inability: Not on file    Transportation needs - medical: Not on file   [employer]era[employer] needs - non-medical: Not on file   Occupational History    Not on file   Tobacco Use    Smoking status: Former Smoker     Packs/day: 1.00     Years: 12.00     Pack years: 12.00     Last attempt to quit: 08/2018     Years since quittin.4    Smokeless tobacco: Former User   Substance and Sexual Activity    Alcohol use: Yes     Comment: ocassional    Drug use: No    Sexual activity: No   Other Topics Concern    Not on file   Social History Narrative    Not on file       FAMILY HISTORY:  Family History   Problem Relation Age of Onset    Diabetes Father     Heart Failure Father     Heart Disease Father     Hypertension Father     High Cholesterol Mother     Suicide Brother     Stroke Maternal Grandmother     Cancer Paternal Grandfather     Anesth Problems Neg Hx        REVIEW OF SYSTEMS: Complete ROS assessed and noted for that which is described above, all else are negative. Eyes: normal  ENT: normal  CVS: normal  Resp: normal  GI: normal  : normal  GYN: normal  Endocrine: normal  Integument: normal  Musculoskeletal: normal  Neuro: normal  Psych: normal      PHYSICAL EXAMINATION:    VITAL SIGNS:  Visit Vitals  /70 (BP 1 Location: Left arm, BP Patient Position: Sitting)   Pulse 99   Ht 5' 8\" (1.727 m)   Wt 187 lb (84.8 kg)   LMP 2010   BMI 28.43 kg/m²       GENERAL: NCAT, Sitting comfortably, NAD  EYES: EOMI, non-icteric, no proptosis  Ear/Nose/Throat: NCAT, no inflammation, no masses  LYMPH NODES: No LAD  CARDIOVASCULAR: S1 S2, RRR, No murmur, 2+ radial pulses  RESPIRATORY: CTA b/l, no wheeze/rales  GASTROINTESTINAL: ND  MUSCULOSKELETAL: Normal ROM, no atrophy  SKIN: warm, no edema/rash/ or other skin changes  NEUROLOGIC: 5/5 power all extremities, no tremors, AAOx3  PSYCHIATRIC: Normal affect, Normal insight and judgement      REVIEW OF LABORATORY AND RADIOLOGY DATA:   Labs and documentation have been reviewed as described above. ASSESSMENT AND PLAN:   Brandt Arias is a 63 y.o. female with a PMHx as noted above who presents to the endocrinology clinic for evaluation of uncontrolled type 2 diabetes. DM2  HTN  HLD    Patient is doing much better though had some hypoglycemia.  She has stopped the dinner dose of insulin, but this seems ok because her fasting sugars remain mostly stable. Her reduced dose of 20 units of the 70/30 at breakfast seems to be keeping her sugars from rising significantly and I believe it would be ok to continue taking this way. She is advised that if she continues to have low blood sugars to update me so I can further adjust her insulin. DM2:  Novolin 70/30 insulin: 20 units with breakfast  Glimepiride 4mg each morning before breakfast  Metformin ER, 1000 mg twice per day  Victoza, 1.8mg each morning before breakfast    BP: Bystolic 41JA daily  HLD: crestor 40mg each evening    3 month f/u visit. Vj Whatley.  [address] [facility]

## 2019-02-20 ENCOUNTER — OFFICE VISIT (OUTPATIENT)
Dept: BEHAVIORAL/MENTAL HEALTH CLINIC | Age: 59
End: 2019-02-20

## 2019-02-20 VITALS
WEIGHT: 184 LBS | HEIGHT: 68 IN | HEART RATE: 91 BPM | BODY MASS INDEX: 27.89 KG/M2 | SYSTOLIC BLOOD PRESSURE: 147 MMHG | DIASTOLIC BLOOD PRESSURE: 77 MMHG

## 2019-02-20 DIAGNOSIS — F31.81 BIPOLAR 2 DISORDER, MAJOR DEPRESSIVE EPISODE (HCC): Primary | ICD-10-CM

## 2019-02-20 DIAGNOSIS — G47.00 INSOMNIA, UNSPECIFIED TYPE: ICD-10-CM

## 2019-02-20 DIAGNOSIS — F41.1 ANXIETY, GENERALIZED: ICD-10-CM

## 2019-02-20 RX ORDER — BUPROPION HYDROCHLORIDE 150 MG/1
150 TABLET ORAL
Qty: 30 TAB | Refills: 2 | Status: SHIPPED | OUTPATIENT
Start: 2019-02-20 | End: 2019-04-10 | Stop reason: SDUPTHER

## 2019-02-20 NOTE — PROGRESS NOTES
Zaida Yosi  1960  63 y.o.  female  [facility]    Chief Complaint   Patient presents with    Depression     Crying for no reason    Anxiety    Behavioral Problem     Requested Adderall    Medication Problem     Take Geodon with dinner and sleep early and wake up early       [place]:   This is a 59 years old divorce  female with past psychiatric history and treatment of manic depression who was seen for the first time on December 28, 2017 referred by her PCP to manage her complicated psychiatric medications. She decided to gradually taper and discontinue Viibryd and high dose Paxil and try Abilify, Seroquel, and finally Latuda. She was not able to tolerate any of them and finally decided to try Geodon.      Patient was last seen on January 8, 2019 when she decided to increase Geodon 60 mg with dinner and try low-dose Cogentin 0.5 mg only as needed for possible EPS manifested by jaw tightness. She presented on time, was observed to be depressed and anxious again and report irritability, crying for no reason, sleeping very early and waking up in the middle of the night and feeling fatigued and tired in daytime. She also discussed in detail about her decreasing vision due to diabetic retinopathy but informed that she is going to see optometrist and her latest A1c is within normal limit.     Patient main concern today is ADHD and she asked to be provided with Adderall so required a lot of support and psychoeducation and agreed to try Wellbutrin  mg daily which is very similar to Adderall and mode of action and will also help her with smoking as she report cravings, weight loss, daytime energy and motivation and overall help with depression. She also agreed to take Geodon at 730-8 with 500 nayan so she can sleep between 10-6 and Wellbutrin will help her in daytime. She has Cogentin available if experience any jaw tightness but denies at this time.  She denies any substance abuse at this time she denies any social change except for that she is more social now.       SUBSTANCE USE HISTORY:   TOBACCO: Smoked 1 pack per day for past 35 years and quit smoking last year June. ALCOHOL: Patient denies abuse. CANNABIS: Tried few times but never abused it. COCAINE, OPIOIDS, and OTHERS: Denies abuse. MEDICAL HISTORY:    has a past medical history of Asthma, Asthma, COPD, Depression, Diabetes ([facility] [address] Diabetes mellitus, Encounter for review of form with patient (12/4/2017), Essential hypertension, GERD (gastroesophageal reflux disease), Headache(784.0), OTHER MEDICAL, Hypercholesterolemia, Hypertension, Ill-defined condition, Insomnia disorder (12/4/2017), Major depression, chronic (12/4/2017), Psychiatric problem, Psychotic disorder ([facility] Screening for cervical cancer (5/22/2018), Tobacco use disorder (10/19/2017), and Unspecified sleep apnea. ALLERGIES:   Allergies   Allergen Reactions    Latex Hives    Other Food Anaphylaxis     PEPPERONI, sloppy joes    Demerol [Meperidine] Anaphylaxis     Difficulty breathing    Iodine Anaphylaxis    Morphine Other (comments)     voilent outbreaks (restraints needed)    Nsaids (Non-Steroidal Anti-Inflammatory Drug) Hives       VITAL SIGNS:  /77   Pulse 91   Ht 5' 8\" (1.727 m)   Wt 83.5 kg (184 lb)   LMP 01/17/2010   BMI 27.98 kg/m²     Current Outpatient Medications   Medication Sig Dispense Refill    buPROPion XL (WELLBUTRIN XL) 150 mg tablet Take 1 Tab by mouth every morning. 30 Tab 2    liraglutide (VICTOZA 3-ESTEPHANIE) 0.6 mg/0.1 mL (18 mg/3 mL) pnij 1.8 mg by SubCUTAneous route Daily (before breakfast). (disp 9 pens) 9 Pen 3    ibuprofen (MOTRIN) 200 mg tablet Take 800 mg by mouth.  ziprasidone (GEODON) 60 mg capsule Take 1 Cap by mouth daily (with dinner). 30 Cap 2    tiotropium (SPIRIVA WITH HANDIHALER) 18 mcg inhalation capsule INHALE THE CONTENTS OF 1 CAPSULE VIA HANDIHALER ONCE DAILY.  RINSE MOUTH AFTER USE 90 Cap 2    ergocalciferol (ERGOCALCIFEROL) 50,000 unit capsule Take 1 Cap by mouth every seven (7) days. 12 Cap 3    montelukast (SINGULAIR) 10 mg tablet TAKE 1 TABLET BY MOUTH DAILY IN THE EVENING 90 Tab 3    VENTOLIN HFA 90 mcg/actuation inhaler 2 Puffs every four (4) hours as needed.  SYMBICORT 160-4.5 mcg/actuation HFAA INHALE 2 PUFFS BY MOUTH TWICE DAILY FOR COPD ASSOCIATED WITH CHRONIC BRONCHITIS, EXTRINSIC ASTHMA. 1 Inhaler 6    insulin syringe-needle U-100 (BD INSULIN SYRINGE ULTRA-FINE) 1/2 mL 31 gauge x 15/64\" syrg Use for injecting insulin subcutaneously 200 Pen Needle 5    insulin NPH/insulin regular (NOVOLIN 70/30, HUMULIN 70/30) 100 unit/mL (70-30) injection 30 units before breakfast and dinner, Disp# 6 vials (Patient taking differently: 20 Units. 30 units before breakfast and dinner, Disp# 6 vials) 6 Vial 3    metFORMIN ER (GLUCOPHAGE XR) 500 mg tablet Take 2 Tabs by mouth Before breakfast and dinner. 360 Tab 3    pantoprazole (PROTONIX) 40 mg tablet Take (1) tablet by mouth once a day. 30 Tab 6    glimepiride (AMARYL) 4 mg tablet Take 1 Tab by mouth every morning. 90 Tab 3    glucose blood VI test strips (EASYMAX N) strip Use to test blood sugar 2 times a day 200 Strip 5    rosuvastatin (CRESTOR) 40 mg tablet Take 1 Tab by mouth nightly. 90 Tab 3    Insulin Needles, Disposable, (URSZULA PEN NEEDLE) 32 gauge x 5/32\" ndle For use twice per day with insulin/victoza. 200 Pen Needle 3    BYSTOLIC 10 mg tablet Take 2 tablets PO every AM (Patient taking differently: Take 1 tablets PO every AM) 60 Tab 3    multivitamin (ONE A DAY) tablet Take 1 Tab by mouth daily.  EPINEPHrine (EPIPEN) 0.3 mg/0.3 mL injection 0.3 mg by IntraMUSCular route once as needed.  diphenhydrAMINE (BENADRYL) 25 mg capsule Take 25 mg by mouth every six (6) hours as needed.          ROS:  Constitutional: Positive for fatigue and weight loss  Eyes: Positive for glasses  ENT: Negative for hearing loss and tinnitus  Respiratory: Negative for cough or wheezing  Cardiovascular: Negative for chest pain, palpitations  Neurological: Negative for memory problems  Behavioral/psych: Positive for insomnia, anxiety, depression, negative for SI/HI  Endocrine: Patient has diabetes.     MENTAL STATUS EXAMINATION:   Patient is a 63 y. o. female [place]ITE [place]  who looks her stated age.  She was observed to be depressed and anxious again and different than last visit when she was observed to be improved with brighter affect.  She is nicely dressed with good hygiene and some makeup. She has hoarse voice at baseline and her speech is regular rate and rhythm, fluent language, and thought process is linear, logical, and goal directed. Reported mood is better with mood congruent brighter affect. Patient denies any suicidal ideation, homicidal ideation, and auditory visual hallucination. Not observed to be delusional or paranoid. Insight, judgement, reliability and impulse control is limited to intact.  Her cognition is within normal limit and she is observed to be reliable. DIAGNOSIS:  Encounter Diagnoses   Name Primary?  Bipolar 2 disorder, major depressive episode (HCC) Yes    Anxiety, generalized     Insomnia, unspecified type        PLAN:  1. MEDICATION: 1. Patient  report compliance with Geodon  60 mg with her dinner early and go to sleep around 630 or 7 PM and wake up in the 3 AM.  She was asked to split dinner or do dinner an hour later but keep her calories same and today she agreed. I will send a message to her PCP so if he need to change the timing of her diabetic medication given she cannot do dinner an hour or 90 minutes later than usual. She understand possibility of side effects of her QTc abnormalities with Geodon.     2. Patient report marked improvement with side effect of jaw tightness and only required Cogentin 0.5 mg once.     3.  Patient report depression, trouble with weight management, strong cravings for cigarette a smoking, and symptoms of ADHD. She had taken Wellbutrin in past and agreed to retry to help with depression and other symptoms mentioned earlier. She was provided with psycho education, discussed risk/benefits, and expectations from medication changes. Patient agrees with plan.      2. PSYCHOTHERAPY: Patient was provided with supportive therapy, strongly encourage to seek psychotherapy. She was provided with contact information for neuropsychologist to have proper neuropsych testing to determine diagnosis of ADHD and need for treatment as 1 of her chief complaint today is ADHD symptoms and she requested to be provided with Adderall.     3. MEDICAL CARE: Patient was strongly encourage to take their medical medications and follow up with their PCP on regular basis. She lost 8 pounds in past 12 weeks and her weight today is 184 pounds. We spent a lot of time talking about the complications of her diabetes especially vision where she said that it is hard for her to do anything due to poor and decreasing vision. 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 4. SUBSTANCE ABUSE CARE: Patient  quit smoking 8 months ago, denies drinking, or abusing any illicit drugs. She report cravings for cigarettes and agreed to try Wellbutrin. 5. FOLLOW UP:   Follow-up Disposition:  Return in about 6 weeks (around 4/3/2019) for Medication management.

## 2019-03-12 DIAGNOSIS — E11.9 DIABETES MELLITUS WITHOUT COMPLICATION (HCC): ICD-10-CM

## 2019-03-12 DIAGNOSIS — E78.00 HYPERCHOLESTEREMIA: ICD-10-CM

## 2019-03-12 DIAGNOSIS — I10 ESSENTIAL HYPERTENSION: ICD-10-CM

## 2019-03-12 DIAGNOSIS — Z23 ENCOUNTER FOR IMMUNIZATION: ICD-10-CM

## 2019-03-12 NOTE — TELEPHONE ENCOUNTER
Last visit: 11/20/18  Next visit: 5/20/19  Previous refill: 2/22/18 ( 30 day supply with 3 refills)      Thank you,   Renate Spicer CPhT

## 2019-03-18 RX ORDER — NEBIVOLOL HYDROCHLORIDE 10 MG/1
TABLET ORAL
Qty: 60 TAB | Refills: 3 | Status: SHIPPED | OUTPATIENT
Start: 2019-03-18 | End: 2019-04-10 | Stop reason: ALTCHOICE

## 2019-04-10 ENCOUNTER — OFFICE VISIT (OUTPATIENT)
Dept: FAMILY MEDICINE CLINIC | Age: 59
End: 2019-04-10

## 2019-04-10 ENCOUNTER — OFFICE VISIT (OUTPATIENT)
Dept: BEHAVIORAL/MENTAL HEALTH CLINIC | Age: 59
End: 2019-04-10

## 2019-04-10 VITALS
HEART RATE: 88 BPM | DIASTOLIC BLOOD PRESSURE: 75 MMHG | HEIGHT: 68 IN | WEIGHT: 172.4 LBS | RESPIRATION RATE: 20 BRPM | BODY MASS INDEX: 26.13 KG/M2 | OXYGEN SATURATION: 96 % | SYSTOLIC BLOOD PRESSURE: 117 MMHG | TEMPERATURE: 98.3 F

## 2019-04-10 VITALS
DIASTOLIC BLOOD PRESSURE: 77 MMHG | BODY MASS INDEX: 26.07 KG/M2 | SYSTOLIC BLOOD PRESSURE: 121 MMHG | HEART RATE: 92 BPM | HEIGHT: 68 IN | WEIGHT: 172 LBS

## 2019-04-10 DIAGNOSIS — E11.9 DIABETES MELLITUS WITHOUT COMPLICATION (HCC): ICD-10-CM

## 2019-04-10 DIAGNOSIS — Z13.31 SCREENING FOR DEPRESSION: ICD-10-CM

## 2019-04-10 DIAGNOSIS — Z12.31 ENCOUNTER FOR SCREENING MAMMOGRAM FOR MALIGNANT NEOPLASM OF BREAST: ICD-10-CM

## 2019-04-10 DIAGNOSIS — Z13.39 SCREENING FOR ALCOHOLISM: ICD-10-CM

## 2019-04-10 DIAGNOSIS — F41.1 ANXIETY, GENERALIZED: ICD-10-CM

## 2019-04-10 DIAGNOSIS — M79.672 LEFT FOOT PAIN: ICD-10-CM

## 2019-04-10 DIAGNOSIS — Z92.29 HISTORY OF LONG-TERM USE OF MULTIPLE PRESCRIPTION DRUGS: ICD-10-CM

## 2019-04-10 DIAGNOSIS — J44.9 CHRONIC OBSTRUCTIVE PULMONARY DISEASE, UNSPECIFIED COPD TYPE (HCC): ICD-10-CM

## 2019-04-10 DIAGNOSIS — F31.81 BIPOLAR 2 DISORDER, MAJOR DEPRESSIVE EPISODE (HCC): Primary | ICD-10-CM

## 2019-04-10 DIAGNOSIS — Z00.00 WELL ADULT EXAM: Primary | ICD-10-CM

## 2019-04-10 DIAGNOSIS — E11.21 TYPE 2 DIABETES MELLITUS WITH NEPHROPATHY (HCC): ICD-10-CM

## 2019-04-10 DIAGNOSIS — I10 ESSENTIAL HYPERTENSION: ICD-10-CM

## 2019-04-10 DIAGNOSIS — G47.00 INSOMNIA, UNSPECIFIED TYPE: ICD-10-CM

## 2019-04-10 DIAGNOSIS — Z12.11 SCREEN FOR COLON CANCER: ICD-10-CM

## 2019-04-10 DIAGNOSIS — E78.00 HYPERCHOLESTEREMIA: ICD-10-CM

## 2019-04-10 DIAGNOSIS — Z23 ENCOUNTER FOR IMMUNIZATION: ICD-10-CM

## 2019-04-10 RX ORDER — BUPROPION HYDROCHLORIDE 300 MG/1
300 TABLET ORAL
Qty: 30 TAB | Refills: 2 | Status: SHIPPED | OUTPATIENT
Start: 2019-04-10 | End: 2019-07-12 | Stop reason: SDUPTHER

## 2019-04-10 RX ORDER — ZIPRASIDONE HYDROCHLORIDE 60 MG/1
60 CAPSULE ORAL
Qty: 30 CAP | Refills: 2 | Status: SHIPPED | OUTPATIENT
Start: 2019-04-10 | End: 2019-05-29 | Stop reason: ALTCHOICE

## 2019-04-10 RX ORDER — NEBIVOLOL 5 MG/1
5 TABLET ORAL DAILY
Qty: 30 TAB | Refills: 6 | Status: SHIPPED | OUTPATIENT
Start: 2019-04-10 | End: 2019-08-14 | Stop reason: ALTCHOICE

## 2019-04-10 NOTE — PROGRESS NOTES
Name and  verified Chief Complaint Patient presents with  Hypertension  
  follow up [facility] Annual Wellness Visit Health Maintenance reviewed-discussed with patient. 1. Have you been to the ER, urgent care clinic since your last visit? Hospitalized since your last visit? no 
 
2. Have you seen or consulted any other health care providers outside of the [address] [place] [address] since your last visit? Include any pap smears or colon screening. Yes, Endocrinology office visit 2019 and Psychiatry office visit 2019.

## 2019-04-10 NOTE — PROGRESS NOTES
Brenda Blaise  1960  63 y.o.  female  [facility]    Chief Complaint   Patient presents with    Depression     3-4 out of 10 but reports she cried for no reason and get emotional easily    Anxiety     2 out of 10    Insomnia    Weight Loss     12 pounds intentional weight loss and 6 weeks       [place]:   This is a 57 years old divorce  female with past psychiatric history and treatment of manic depression who was seen for the first time on December 28, 2017 referred by her PCP to manage her complicated psychiatric medications. She decided to gradually taper and discontinue Viibryd and high dose Paxil and try Abilify, Seroquel, and finally Latuda. She was not able to tolerate any of them and finally decided to try Geodon.      Patient was last seen on  February 20, 2019 when she decided to continue Geodon 60 mg with dinner, Cogentin 0.5 mg only as needed for possible EPS manifested by jaw tightness, and try Wellbutrin XL daily as most of her complaints was about ADHD and also craving for smoking and desire to lose weight.  She presented on time, was observed to be  improved with brighter affect and not depressed and anxious as her last visit.       Patient  report compliance with her medication, is able to tolerate and not requiring Cogentin, and overall report improved depression and anxiety with a score of 3-4 out of 10 on the scale of 1-10 where 10 is worst for depression and 2 out of 10 for anxiety but she still report tearful driscoll labile and cried over no recent and requested to increase Wellbutrin and return in 6 weeks for reevaluation. Patient is going through multiple medical issues especially with her eye and went to see her optometrist next month and is not able to drive because she lost her 's license because of the vision test and has to dependent on her daughter for her doctor's appointment.   She denies any other social change and denies any substance abuse at this time.  She was provided with a lot of support and psychoeducation and she felt better.       SUBSTANCE USE HISTORY:   TOBACCO: Smoked 1 pack per day for past 35 years and quit smoking last year June.     ALCOHOL: Patient denies abuse.     CANNABIS: Tried few times but never abused it.     COCAINE, OPIOIDS, and OTHERS: Denies abuse. MEDICAL HISTORY:    has a past medical history of Asthma, Asthma, COPD, Depression, Diabetes ([facility] [address] Diabetes mellitus, Encounter for review of form with patient (12/4/2017), Essential hypertension, GERD (gastroesophageal reflux disease), Headache(784.0), OTHER MEDICAL, Hypercholesterolemia, Hypertension, Ill-defined condition, Insomnia disorder (12/4/2017), Major depression, chronic (12/4/2017), Psychiatric problem, Psychotic disorder ([facility] Screening for cervical cancer (5/22/2018), Tobacco use disorder (10/19/2017), and Unspecified sleep apnea. ALLERGIES:   Allergies   Allergen Reactions    Latex Hives    Other Food Anaphylaxis     PEPPERONI, sloppy joes    Demerol [Meperidine] Anaphylaxis     Difficulty breathing    Iodine Anaphylaxis    Morphine Other (comments)     voilent outbreaks (restraints needed)    Nsaids (Non-Steroidal Anti-Inflammatory Drug) Hives       VITAL SIGNS:  /77   Pulse 92   Ht 5' 8\" (1.727 m)   Wt 78 kg (172 lb)   LMP 01/17/2010   BMI 26.15 kg/m²     Current Outpatient Medications   Medication Sig Dispense Refill    ziprasidone hcl (GEODON) 60 mg capsule Take 1 Cap by mouth daily (with dinner). 30 Cap 2    buPROPion XL (WELLBUTRIN XL) 300 mg XL tablet Take 1 Tab by mouth every morning. 30 Tab 2    BYSTOLIC 10 mg tablet Take 2 tablets PO every AM 60 Tab 3    liraglutide (VICTOZA 3-ESTEPHANIE) 0.6 mg/0.1 mL (18 mg/3 mL) pnij 1.8 mg by SubCUTAneous route Daily (before breakfast). (disp 9 pens) 9 Pen 3    ibuprofen (MOTRIN) 200 mg tablet Take 800 mg by mouth.       tiotropium (SPIRIVA WITH HANDIHALER) 18 mcg inhalation capsule INHALE THE CONTENTS OF 1 CAPSULE VIA HANDIHALER ONCE DAILY. RINSE MOUTH AFTER USE 90 Cap 2    ergocalciferol (ERGOCALCIFEROL) 50,000 unit capsule Take 1 Cap by mouth every seven (7) days. 12 Cap 3    montelukast (SINGULAIR) 10 mg tablet TAKE 1 TABLET BY MOUTH DAILY IN THE EVENING 90 Tab 3    VENTOLIN HFA 90 mcg/actuation inhaler 2 Puffs every four (4) hours as needed.  SYMBICORT 160-4.5 mcg/actuation HFAA INHALE 2 PUFFS BY MOUTH TWICE DAILY FOR COPD ASSOCIATED WITH CHRONIC BRONCHITIS, EXTRINSIC ASTHMA. 1 Inhaler 6    insulin syringe-needle U-100 (BD INSULIN SYRINGE ULTRA-FINE) 1/2 mL 31 gauge x 15/64\" syrg Use for injecting insulin subcutaneously 200 Pen Needle 5    insulin NPH/insulin regular (NOVOLIN 70/30, HUMULIN 70/30) 100 unit/mL (70-30) injection 30 units before breakfast and dinner, Disp# 6 vials (Patient taking differently: 20 Units. 30 units before breakfast and dinner, Disp# 6 vials) 6 Vial 3    metFORMIN ER (GLUCOPHAGE XR) 500 mg tablet Take 2 Tabs by mouth Before breakfast and dinner. 360 Tab 3    pantoprazole (PROTONIX) 40 mg tablet Take (1) tablet by mouth once a day. 30 Tab 6    glimepiride (AMARYL) 4 mg tablet Take 1 Tab by mouth every morning. 90 Tab 3    glucose blood VI test strips (EASYMAX N) strip Use to test blood sugar 2 times a day 200 Strip 5    rosuvastatin (CRESTOR) 40 mg tablet Take 1 Tab by mouth nightly. 90 Tab 3    Insulin Needles, Disposable, (URSZULA PEN NEEDLE) 32 gauge x 5/32\" ndle For use twice per day with insulin/victoza. 200 Pen Needle 3    multivitamin (ONE A DAY) tablet Take 1 Tab by mouth daily.  EPINEPHrine (EPIPEN) 0.3 mg/0.3 mL injection 0.3 mg by IntraMUSCular route once as needed.  diphenhydrAMINE (BENADRYL) 25 mg capsule Take 25 mg by mouth every six (6) hours as needed.          ROS:  Constitutional: Positive for fatigue and weight loss  Eyes: Positive for glasses  ENT: Negative for hearing loss and tinnitus  Respiratory: Negative for cough or wheezing  Cardiovascular: Negative for chest pain, palpitations  Neurological: Negative for memory problems  Behavioral/psych: Positive for insomnia, anxiety, depression, negative for SI/HI  Endocrine: Patient has diabetes.     MENTAL STATUS EXAMINATION:   Patient is a 63 y. o. female [place]ITE [place]  who looks her stated age. She was observed to be improved with brighter affect and not depressed and anxious as her last visit.  She is nicely dressed with good hygiene and some makeup. She has hoarse voice at baseline and her speech is regular rate and rhythm, fluent language, and thought process is linear, logical, and goal directed. Reported mood is better with mood congruent brighter affect. Patient denies any suicidal ideation, homicidal ideation, and auditory visual hallucination. Not observed to be delusional or paranoid. Insight, judgement, reliability and impulse control is limited to intact.  Her cognition is within normal limit and she is observed to be reliable. DIAGNOSIS:  Encounter Diagnoses   Name Primary?  Bipolar 2 disorder, major depressive episode (HCC) Yes    Anxiety, generalized     Insomnia, unspecified type        PLAN:  1. MEDICATION:   1. Geodon  60 mg with her dinner. She understand possibility of side effects of her QTc abnormalities with Geodon.     2.  She has not required Cogentin 0.5 mg.     3. Increase Wellbutrin  mg daily. She was provided with psycho education, discussed risk/benefits, and expectations from medication changes. Patient agrees with plan.      2. PSYCHOTHERAPY: Patient was provided with supportive therapy, strongly encourage to seek psychotherapy.       3. MEDICAL CARE: Patient was strongly encourage to take their medical medications and follow up with their PCP on regular basis. She lost 20 pounds in past 18 weeks and her weight today is 172 pounds. Her blood pressure is 121/77 and pulse is 92.  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     4. SUBSTANCE ABUSE CARE: Patient quit smoking 10 months ago, denies drinking, or abusing any illicit drugs.  She report cravings for cigarettes and agreed to increase Wellbutrin. 5. FOLLOW UP:   Follow-up and Dispositions    · Return in about 6 weeks (around 5/22/2019) for Medication management.

## 2019-04-10 NOTE — PROGRESS NOTES
Discussed the patient's BMI with her. The BMI follow up plan is as follows:  
 
dietary management education, guidance, and counseling 
encourage exercise 
monitor weight 
prescribed dietary intake An After Visit Summary was printed and given to the patient. This is the Subsequent Medicare Annual Wellness Exam, performed 12 months or more after the Initial AWV or the last Subsequent AWV I have reviewed the patient's medical history in detail and updated the computerized patient record. History Present for CPE, last Complete Physical exam was few yrs ago , she is Up todate w/ all vaccination, last tetanus vaccine was in <5yrs ago,  
  
last mammog was nl and In 2017,  last pap smear normal and was in many yrs ago . last colonoscopy was never, refusing to get one done Uterine fibroid procedures as her past surgical hx, 
last bone dexa scan was never No family hx of breast cancer  
  
no family hx of colon cancer, father 59yo  of MI, mother still living  healthy, not sexaully active and uses Safe sex, not physically active, 
compliant w/ meds, no Rf needed for today for her meds. DMtype II Patient present for diabetic check,  patient has been compliancy w/ meds, patient also states that she is trying to have a diabetic diet, and eat less of carbohydrates, since last visit patient has been more active with daily walking,  This time patient denies  tingling sensation, has no polyurea and polydipsia, last a1c was not at target was almost 12 wks ago as well as 6.5% %     Last urine microalbumin  and was abnormal, patient currently on ACE inhibitor. HTN Today pt present for Bp check and++= Compliancy w/ the bp meds, was taking 20mg recently has changed it to 5mg tab the bp still is low, having had the low salt diet ,  has been active, patien does obtain the bp at home 90/70,, today the pt denies Chest Pain, has no legs swelling no lightheadedness, Mliss Snyder Past Medical History:  
Diagnosis Date  Asthma  Asthma  COPD  Depression  Diabetes ([facility] [address]  Diabetes mellitus  Encounter for review of form with patient 2017  Essential hypertension  GERD (gastroesophageal reflux disease)  Headache(784.0)  HX OTHER MEDICAL   
 acoustic neuroma  Hypercholesterolemia  Hypertension  Ill-defined condition R ACOUSTIC NEUROMA  Insomnia disorder 2017  Major depression, chronic 2017  Psychiatric problem   
 anxiety depression  Psychotic disorder ([facility] [address]  Screening for cervical cancer 2018  Tobacco use disorder 10/19/2017  Unspecified sleep apnea NO CPAP-O2 @2L WHEN SLEEPING Past Surgical History:  
Procedure Laterality Date   DELIVERY ONLY    
 HX GASTRECTOMY  15  
 lap sleeve gastrectomy by Dr Baron Restrepo  HX GYN    
 2 c-sections  HX HEENT    
 sinus  HX HEENT    
 tracheal resection  HX HEENT    
 tracheal alleratation x 2  
 HX HEENT    
 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    
 LASER SX-PENG Current Outpatient Medications Medication Sig Dispense Refill  ziprasidone hcl (GEODON) 60 mg capsule Take 1 Cap by mouth daily (with dinner). 30 Cap 2  
 buPROPion XL (WELLBUTRIN XL) 300 mg XL tablet Take 1 Tab by mouth every morning. 30 Tab 2  
 BYSTOLIC 10 mg tablet Take 2 tablets PO every AM 60 Tab 3  
 liraglutide (VICTOZA 3-ESTEPHANIE) 0.6 mg/0.1 mL (18 mg/3 mL) pnij 1.8 mg by SubCUTAneous route Daily (before breakfast). (disp 9 pens) 9 Pen 3  ibuprofen (MOTRIN) 200 mg tablet Take 800 mg by mouth.  tiotropium (SPIRIVA WITH HANDIHALER) 18 mcg inhalation capsule INHALE THE CONTENTS OF 1 CAPSULE VIA HANDIHALER ONCE DAILY. RINSE MOUTH AFTER USE 90 Cap 2  
 ergocalciferol (ERGOCALCIFEROL) 50,000 unit capsule Take 1 Cap by mouth every seven (7) days.  12 Cap 3  
  montelukast (SINGULAIR) 10 mg tablet TAKE 1 TABLET BY MOUTH DAILY IN THE EVENING 90 Tab 3  
 VENTOLIN HFA 90 mcg/actuation inhaler 2 Puffs every four (4) hours as needed.  SYMBICORT 160-4.5 mcg/actuation HFAA INHALE 2 PUFFS BY MOUTH TWICE DAILY FOR COPD ASSOCIATED WITH CHRONIC BRONCHITIS, EXTRINSIC ASTHMA. 1 Inhaler 6  
 insulin syringe-needle U-100 (BD INSULIN SYRINGE ULTRA-FINE) 1/2 mL 31 gauge x 15/64\" syrg Use for injecting insulin subcutaneously 200 Pen Needle 5  
 insulin NPH/insulin regular (NOVOLIN 70/30, HUMULIN 70/30) 100 unit/mL (70-30) injection 30 units before breakfast and dinner, Disp# 6 vials (Patient taking differently: 20 Units. 30 units before breakfast and dinner, Disp# 6 vials) 6 Vial 3  
 metFORMIN ER (GLUCOPHAGE XR) 500 mg tablet Take 2 Tabs by mouth Before breakfast and dinner. 360 Tab 3  pantoprazole (PROTONIX) 40 mg tablet Take (1) tablet by mouth once a day. 30 Tab 6  
 glimepiride (AMARYL) 4 mg tablet Take 1 Tab by mouth every morning. 90 Tab 3  
 glucose blood VI test strips (EASYMAX N) strip Use to test blood sugar 2 times a day 200 Strip 5  
 rosuvastatin (CRESTOR) 40 mg tablet Take 1 Tab by mouth nightly. 90 Tab 3  
 Insulin Needles, Disposable, (URSZULA PEN NEEDLE) 32 gauge x 5/32\" ndle For use twice per day with insulin/victoza. 200 Pen Needle 3  
 multivitamin (ONE A DAY) tablet Take 1 Tab by mouth daily.  EPINEPHrine (EPIPEN) 0.3 mg/0.3 mL injection 0.3 mg by IntraMUSCular route once as needed.  diphenhydrAMINE (BENADRYL) 25 mg capsule Take 25 mg by mouth every six (6) hours as needed. Allergies Allergen Reactions  Latex Hives  Other Food Anaphylaxis PEPPERONI, sloppy joes  Demerol [Meperidine] Anaphylaxis Difficulty breathing  Iodine Anaphylaxis  Morphine Other (comments)  
  voilent outbreaks (restraints needed)  Nsaids (Non-Steroidal Anti-Inflammatory Drug) Hives Family History Problem Relation Age of Onset  Diabetes Father  Heart Failure Father  Heart Disease Father  Hypertension Father  High Cholesterol Mother  Suicide Brother  Stroke Maternal Grandmother  Cancer Paternal Grandfather  Anesth Problems Neg Hx Social History Tobacco Use  Smoking status: Former Smoker Packs/day: 1.00 Years: 12.00 Pack years: 12.00 Last attempt to quit: 2018 Years since quittin.6  Smokeless tobacco: Former User Substance Use Topics  Alcohol use: Yes Comment: ocassional  
 
Patient Active Problem List  
Diagnosis Code  Asthma with exacerbation J45. [address] 65  Type II or unspecified type diabetes mellitus without mention of complication, uncontrolled E11.65  
 HTN (hypertension) I10  
 Upper respiratory infection J06.9  Gallstone K80.20  Rib pain R07.81  
 Morbid obesity (HCC) E66.01  
 Chronic hoarseness R49.0  Asthma J45.909  GERD (gastroesophageal reflux disease) K21.9  Hypercholesteremia E78.00  Tobacco use disorder F17.200  Angina, class III ([facility]) I20.9  Mitral valve prolapse I34.1  Insomnia disorder G47.00  Major depression, chronic F32.9  
 Encounter for review of form with patient Z36.80  Type 2 diabetes mellitus with nephropathy ([facility]) E11.21  
 Recurrent depression ([facility] [address] F33.9  Screening for cervical cancer Z12.4 Depression Risk Factor Screening:  
 
3 most recent PHQ Screens 2017 Little interest or pleasure in doing things Nearly every day Feeling down, depressed, irritable, or hopeless Nearly every day Total Score PHQ 2 6 Trouble falling or staying asleep, or sleeping too much Nearly every day Feeling tired or having little energy Nearly every day Poor appetite, weight loss, or overeating Nearly every day Feeling bad about yourself - or that you are a failure or have let yourself or your family down Nearly every day Trouble concentrating on things such as school, work, reading, or watching TV Nearly every day Moving or speaking so slowly that other people could have noticed; or the opposite being so fidgety that others notice Not at all Thoughts of being better off dead, or hurting yourself in some way Not at all PHQ 9 Score 21 How difficult have these problems made it for you to do your work, take care of your home and get along with others Extremely difficult Alcohol Risk Factor Screening: You do not drink alcohol or very rarely. Functional Ability and Level of Safety:  
Hearing Loss Hearing is good. Activities of Daily Living The home contains: handrails, grab bars and rugs Patient does total self care Fall Risk No flowsheet data found. Abuse Screen Patient is not abused Cognitive Screening Evaluation of Cognitive Function: 
Has your family/caregiver stated any concerns about your memory: no 
Normal 
 
Patient Care Team  
Patient Care Team: 
Dov Eldridge MD as PCP - General (Family Practice) Richard Barnard MD as Surgeon (General Surgery) Estuardo Seo NP as Nurse Practitioner (General Surgery) Tanja Roper MD as Physician (Sleep Medicine) Assessment/Plan Education and counseling provided: 
Are appropriate based on today's review and evaluation End-of-Life planning (with patient's consent) Influenza Vaccine Diagnoses and all orders for this visit: 
 
1. Well adult exam 
 
2. Essential hypertension 
-     HEMOGLOBIN A1C WITH EAG 
-     CBC W/O DIFF 
-     METABOLIC PANEL, COMPREHENSIVE 
-     VITAMIN B12 & FOLATE 
-     TSH 3RD GENERATION 
-     HDL CHOLESTEROL 
-     CHOLESTEROL, TOTAL 
-     nebivolol (BYSTOLIC) 5 mg tablet; Take 1 Tab by mouth daily. 3. Hypercholesteremia 
-     HEMOGLOBIN A1C WITH EAG 
-     CBC W/O DIFF 
-     METABOLIC PANEL, COMPREHENSIVE 
-     VITAMIN B12 & FOLATE 
-     TSH 3RD GENERATION 
-     HDL CHOLESTEROL 
-     CHOLESTEROL, TOTAL -     nebivolol (BYSTOLIC) 5 mg tablet; Take 1 Tab by mouth daily. 4. Type 2 diabetes mellitus with nephropathy (HCC) 
-     HEMOGLOBIN A1C WITH EAG 
-     CBC W/O DIFF 
-     METABOLIC PANEL, COMPREHENSIVE 
-     VITAMIN B12 & FOLATE 
-     TSH 3RD GENERATION 
-     HDL CHOLESTEROL 
-     CHOLESTEROL, TOTAL 
-     nebivolol (BYSTOLIC) 5 mg tablet; Take 1 Tab by mouth daily. 5. Chronic obstructive pulmonary disease, unspecified COPD type ([facility] 
-     HEMOGLOBIN A1C WITH EAG 
-     CBC W/O DIFF 
-     METABOLIC PANEL, COMPREHENSIVE 
-     VITAMIN B12 & FOLATE 
-     TSH 3RD GENERATION 
-     HDL CHOLESTEROL 
-     CHOLESTEROL, TOTAL 6. Screening for alcoholism -     [place] ANNUAL ALCOHOL SCREEN 15 MIN 
 
7. Screening for depression 
-     [facility]. Screen for [facility] cancer 
-     COLOGUARD TEST (FECAL DNA COLORECTAL CANCER SCREENING) 9. Encounter for screening mammogram for malignant neoplasm of breast 
-     [facility] MAMMO BI SCREENING INCL CAD; Future 10. Diabetes mellitus without complication ([facility] 11. Encounter for immunization 12. Left foot pain -     XR FOOT LT MIN 3 V; Future [address] education and counseling provided: 
Age appropriate evidence-based preventive care recommendations based on today's review and evaluation; including relevant cancer screening guidelines, and vaccination recommendations. An After Visit Summary was printed and given to the patient with information about these guidelines, and a personalized schedule for health maintenance items. When appropriate and with patient agreement, orders noted below were placed to complete missing health maintenance items. Health Maintenance Due Topic Date Due  
 PAP AKA CERVICAL CYTOLOGY  03/23/1982  Shingrix Vaccine Age 50> (1 of 2) 03/23/2011  
 FOBT Q 1 YEAR AGE 50-75  03/23/2011  
 FOOT EXAM Q1  01/10/2019

## 2019-04-10 NOTE — PATIENT INSTRUCTIONS
Body Mass Index: Care Instructions Your Care Instructions Body mass index (BMI) can help you see if your weight is raising your risk for health problems. It uses a formula to compare how much you weigh with how tall you are. · A BMI lower than 18.5 is considered underweight. · A BMI between 18.5 and 24.9 is considered healthy. · A BMI between 25 and 29.9 is considered overweight. A BMI of 30 or higher is considered obese. If your BMI is in the normal range, it means that you have a lower risk for weight-related health problems. If your BMI is in the overweight or obese range, you may be at increased risk for weight-related health problems, such as high blood pressure, heart disease, stroke, arthritis or joint pain, and diabetes. If your BMI is in the underweight range, you may be at increased risk for health problems such as fatigue, lower protection (immunity) against illness, muscle loss, bone loss, hair loss, and hormone problems. BMI is just one measure of your risk for weight-related health problems. You may be at higher risk for health problems if you are not active, you eat an unhealthy diet, or you drink too much alcohol or use tobacco products. Follow-up care is a key part of your treatment and safety. Be sure to make and go to all appointments, and call your doctor if you are having problems. It's also a good idea to know your test results and keep a list of the medicines you take. How can you care for yourself at home? · Practice healthy eating habits. This includes eating plenty of fruits, vegetables, whole grains, lean protein, and low-fat dairy. · If your doctor recommends it, get more exercise. Walking is a good choice. Bit by bit, increase the amount you walk every day. Try for at least 30 minutes on most days of the week. · Do not smoke. Smoking can increase your risk for health problems.  If you need help quitting, talk to your doctor about stop-smoking programs and medicines. These can increase your chances of quitting for good. · Limit alcohol to 2 drinks a day for men and 1 drink a day for women. Too much alcohol can cause health problems. If you have a BMI higher than 25 · Your doctor may do other tests to check your risk for weight-related health problems. This may include measuring the distance around your waist. A waist measurement of more than 40 inches in men or 35 inches in women can increase the risk of weight-related health problems. · Talk with your doctor about steps you can take to stay healthy or improve your health. You may need to make lifestyle changes to lose weight and stay healthy, such as changing your diet and getting regular exercise. If you have a BMI lower than 18.5 · Your doctor may do other tests to check your risk for health problems. · Talk with your doctor about steps you can take to stay healthy or improve your health. You may need to make lifestyle changes to gain or maintain weight and stay healthy, such as getting more healthy foods in your diet and doing exercises to build muscle. Where can you learn more? Go to http://nolan-alondra.info/. Enter S176 in the search box to learn more about \"Body Mass Index: Care Instructions. \" Current as of: October 13, 2016 Content Version: 11.4 © 9288-2889 Healthwise, Incorporated. Care instructions adapted under license by [employer] (which disclaims liability or warranty for this information). If you have questions about a medical condition or this instruction, always ask your healthcare professional. [place] [address] any warranty or liability for your use of this information. Medicare Wellness Visit, Female The best way to live healthy is to have a lifestyle where you eat a well-balanced diet, exercise regularly, limit alcohol use, and quit all forms of tobacco/nicotine, if applicable. Regular preventive services are another way to keep healthy. Preventive services (vaccines, screening tests, monitoring & exams) can help personalize your care plan, which helps you manage your own care. Screening tests can find health problems at the earliest stages, when they are easiest to treat. [facility] follows the current, evidence-based guidelines published by the [facility] [place] Ruiz (USPSTF) when recommending preventive services for our patients. Because we follow these guidelines, sometimes recommendations change over time as research supports it. (For example, mammograms used to be recommended annually. Even though Medicare will still pay for an annual mammogram, the newer guidelines recommend a mammogram every two years for women of average risk.) Of course, you and your doctor may decide to screen more often for some diseases, based on your risk and your health status. Preventive services for you include: - Medicare offers their members a free annual wellness visit, which is time for you and your primary care provider to discuss and plan for your preventive service needs. Take advantage of this benefit every year! 
-All adults over the age of 72 should receive the recommended pneumonia vaccines. Current USPSTF guidelines recommend a series of two vaccines for the best pneumonia protection.  
-All adults should have a flu vaccine yearly and a tetanus vaccine every 10 years. All adults age 61 and older should receive a shingles vaccine once in their lifetime.   
-A bone mass density test is recommended when a woman turns 65 to screen for osteoporosis. This test is only recommended one time, as a screening. Some providers will use this same test as a disease monitoring tool if you already have osteoporosis. -All adults age 38-68 who are overweight should have a diabetes screening test once every three years. -Other screening tests and preventive services for persons with diabetes include: an eye exam to screen for diabetic retinopathy, a kidney function test, a foot exam, and stricter control over your cholesterol.  
-Cardiovascular screening for adults with routine risk involves an electrocardiogram (ECG) at intervals determined by your doctor.  
-Colorectal cancer screenings should be done for adults age 54-65 with no increased risk factors for colorectal cancer. There are a number of acceptable methods of screening for this type of cancer. Each test has its own benefits and drawbacks. Discuss with your doctor what is most appropriate for you during your annual wellness visit. The different tests include: colonoscopy (considered the best screening method), a fecal occult blood test, a fecal DNA test, and sigmoidoscopy. -Breast cancer screenings are recommended every other year for women of normal risk, age 54-69. 
-Cervical cancer screenings for women over age 72 are only recommended with certain risk factors.  
-All adults born between [facility] should be screened once for Hepatitis C. Here is a list of your current Health Maintenance items (your personalized list of preventive services) with a due date: 
Health Maintenance Due Topic Date Due  
 Pap Test  03/23/1982  Shingles Vaccine (1 of 2) 03/23/2011  Stool testing for trace blood  03/23/2011 [address] [place] [address] Diabetic Foot Care  01/10/2019 Learning About Breast Cancer Screening What is breast cancer screening? Breast cancer occurs when cells that are not normal grow in one or both of your breasts. Screening tests can help find breast cancer early. Cancer is easier to treat when it's found early. Having concerns about breast cancer is common. That's why it's important to talk with your doctor about when to start and how often to get screened for breast cancer. How is breast cancer screening done? Several screening tests can be used to check for breast cancer. · Mammograms check for signs of cancer using X-rays. They can show tumors that are too small for you or your doctor to feel. During a mammogram, a machine squeezes your breasts to make them flatter and easier to X-ray. At least two pictures are taken of each breast. One is taken from the top and one from the side. · 3-D mammograms are also called digital breast tomosynthesis. Your breast is positioned on a flat plate. A top plate is pressed against your breast to keep it in position. The X-ray arm then moves in an arc above the breast and takes many pictures. A computer uses these X-rays to create a three-dimensional image. · Clinical breast exams are a doctor's exam. Your doctor carefully feels your breasts and under your arms to check for lumps or other changes. After the screening, your doctor will tell you the results. You will also be told if you need any follow-up tests. When should you get screened? Talk with your doctor about when you should start being tested for breast cancer. How often you get tested and the kind of tests you get will depend on your age and your risk. The guidelines that follow are for women who have an average risk for breast cancer. If you have a higher risk for breast cancer, such as having a family history of breast cancer in multiple relatives or at a young age, your doctor may recommend different screening for you. · Ages 21 to 44: Some experts recommend that women have a clinical breast exam every 3 years, starting at age 21. Ask your doctor how often you should have this test. If you have a high risk for breast cancer, talk with your doctor about when to start yearly mammograms and other screening tests. · Ages 36 and older: Talk with your doctor about how often you should have mammograms and clinical breast exams. What is your risk for breast cancer? If you don't already know your risk of breast cancer, you can ask your doctor about it. You can also look it up at www.cancer.gov/bcrisktool/. If your doctor says that you have a high or very high risk, ask about ways to reduce your risk. These could include getting extra screening, taking medicine, or having surgery. If you have a strong family history of breast cancer, ask your doctor about genetic testing. What steps can you take to stay healthy? Some things that increase your risk of breast cancer, such as your age and being female, cannot be controlled. But you can do some things to stay as healthy as you can. · Learn what your breasts normally look and feel like. If you notice any changes, tell your doctor. · Drink alcohol wisely. Your risk goes up the more you drink. For the best health, women should have no more than 1 drink a day or 7 drinks a week. · If you smoke, quit. When you quit smoking, you lower your chances of getting many types of cancer. You can also do your best to eat well, be active, and stay at a healthy weight. Eating healthy foods and being active every day, as well as staying at a healthy weight, may help prevent cancer. Where can you learn more? Go to http://nolan-alondra.info/. Enter F830 in the search box to learn more about \"Learning About Breast Cancer Screening. \" Current as of: March 28, 2018 Content Version: 11.8 © 0583-4397 [employer]. Care instructions adapted under license by [employer] (which disclaims liability or warranty for this information). If you have questions about a medical condition or this instruction, always ask your healthcare professional. [place] [address] any warranty or liability for your use of this information. Learning About Cervical Cancer Screening What is a cervical cancer screening test? 
 
Cervical cancer screening tests check for cancer of the cervix.  The cervix is the lower part of the uterus that opens into the vagina. The test can help your doctor find early changes in the cells that could lead to cancer. Two tests can be used to screen for cervical cancer. They may be used alone or together. A Pap test.  
This test looks for changes in the cells of the cervix. Abnormal cells may be a sign of cancer. A human papillomavirus (HPV) test.  
This test looks for the HPV virus. Some types of HPV can cause cancer. During either test, the doctor or nurse will insert a tool called a speculum into your vagina. The speculum gently spreads apart the vaginal walls. It allows your doctor to see inside the vagina and the cervix. He or she uses a small swab or brush to collect cell samples from your cervix. Try to schedule the test when you're not having your period. To get ready for the test, avoid douches, tampons, vaginal medicines, sprays, and powders for at least a day before you have the test. 
When should you have a screening test? 
These guidelines apply to women who are at average risk of cervical cancer. If you don't know your risk, talk with your doctor. Women 21 to 34 
· You can start having a Pap test at age 24. It is done every 3 years. · You can start having the primary HPV test at age 22. It is done every 3 years. Women 30 to 59 · If you had both a Pap test and an HPV test last time and both were normal, you can have a Pap plus an HPV test every 5 years. · If you had only a Pap test last time, as long as your results are normal, you can have a Pap test every 3 years. · If you had only an HPV test last time, as long as your results are normal, you can have an HPV test every 3 years. Women 72 and older · If you are age 72 or older, talk with your doctor about what's right for you. Women ages 72 and older may no longer need to be screened for cervical cancer.  When to stop having screening tests depends on your medical history, your overall health, and your risk of cervical cell changes or cervical cancer. What happens after the test? 
The sample of cells taken during your test will be sent to a lab so that an expert can look at the cells. It usually takes a week or two to get the results back. Pap tests · A normal result means that the test didn't find any abnormal cells in the sample. · An abnormal result can mean many things. Most of these aren't cancer. The results of your test may be abnormal because: ? You have an infection of the vagina or cervix, such as a yeast infection. ? You have an IUD (intrauterine device for birth control). ? You have low estrogen levels after menopause that are causing the cells to change. ? You have cell changes that may be a sign of precancer or cancer. The results are ranked based on how serious the changes might be. HPV tests · A normal result means that the test didn't find any high-risk HPV in the sample. · An abnormal HPV test doesn't mean that you have cervical cancer. It may mean that you are infected with one or more high-risk types of HPV. This increases your chance of having cell changes that may be a sign of precancer or cancer. Follow-up care is a key part of your treatment and safety. Be sure to make and go to all appointments, and call your doctor if you are having problems. It's also a good idea to know your test results and keep a list of the medicines you take. Where can you learn more? Go to http://nolan-alondra.info/. Enter P919 in the search box to learn more about \"Learning About Cervical Cancer Screening. \" Current as of: March 27, 2018 Content Version: 11.9 © 6709-1566 [employer]. Care instructions adapted under license by [employer] (which disclaims liability or warranty for this information).  If you have questions about a medical condition or this instruction, always ask your healthcare professional. [place] [address] any warranty or liability for your use of this information. Colon Cancer Screening: Care Instructions Your Care Instructions Colorectal cancer occurs in the colon or rectum. That's the lower part of your digestive system. It is the second-leading cause of cancer deaths in the [place]. It often starts with small growths called polyps in the colon or rectum. Polyps are usually found with screening tests. Depending on the type of test, any polyps found may be removed during the tests. Colorectal cancer usually does not cause symptoms at first. But regular tests can help find it early, before it spreads and becomes harder to treat. Experts advise routine tests for colon cancer for people starting at age 48. And they advise people with a higher risk of colon cancer to get tested sooner. Talk with your doctor about when you should start testing. Discuss which tests you need. Follow-up care is a key part of your treatment and safety. Be sure to make and go to all appointments, and call your doctor if you are having problems. It's also a good idea to know your test results and keep a list of the medicines you take. What are the main screening tests for colon cancer? · Stool tests. These include the fecal immunochemical test (FIT) and the fecal occult blood test (FOBT). These tests check stool samples for signs of cancer. If your test is positive, you will need to have a colonoscopy. · Sigmoidoscopy. This test lets your doctor look at the lining of your rectum and the lowest part of your colon. Your doctor uses a lighted tube called a sigmoidoscope. This test can't find cancers or polyps in the upper part of your colon. In some cases, polyps that are found can be removed. But if your doctor finds polyps, you will need to have a colonoscopy to check the upper part of your colon. · Colonoscopy. This test lets your doctor look at the lining of your rectum and your entire colon. The doctor uses a thin, flexible tool called a colonoscope. It can also be used to remove polyps or get a tissue sample (biopsy). What tests do you need? The following guidelines are for people age 48 and over who are not at high risk for colorectal cancer. You may have at least one of these tests as directed by your doctor. · Fecal immunochemical test (FIT) or fecal occult blood test (FOBT) every year · Sigmoidoscopy every 5 years · Colonoscopy every 10 years If you are age 68 to 80, you can work with your doctor to decide if screening is a good option. If you are age 80 or older, your doctor will likely advise that screening is not helpful. Talk with your doctor about when you need to be tested. And discuss which tests are right for you. Your doctor may recommend earlier or more frequent testing if you: 
· Have had colorectal cancer before. · Have had colon polyps. · Have symptoms of colorectal cancer. These include blood in your stool and changes in your bowel habits. · Have a parent, brother or sister, or child with colon polyps or colorectal cancer. · Have a bowel disease. This includes ulcerative colitis and Crohn's disease. · Have a rare polyp syndrome that runs in families, such as familial adenomatous polyposis (FAP). · Have had radiation treatments to the belly or pelvis. When should you call for help? Watch closely for changes in your health, and be sure to contact your doctor if: 
  · You have any changes in your bowel habits.  
  · You have any problems. Where can you learn more? Go to http://nolan-alondra.info/. Enter M541 in the search box to learn more about \"Colon Cancer Screening: Care Instructions. \" Current as of: March 28, 2018 Content Version: 11.8 © 0359-0231 Healthwise, Incorporated.  Care instructions adapted under license by [address] (which disclaims liability or warranty for this information). If you have questions about a medical condition or this instruction, always ask your healthcare professional. [address] any warranty or liability for your use of this information. Preventing Falls: Care Instructions Your Care Instructions Getting around your home safely can be a challenge if you have injuries or health problems that make it easy for you to fall. Loose rugs and furniture in walkways are among the dangers for many older people who have problems walking or who have poor eyesight. People who have conditions such as arthritis, osteoporosis, or dementia also have to be careful not to fall. You can make your home safer with a few simple measures. Follow-up care is a key part of your treatment and safety. Be sure to make and go to all appointments, and call your doctor if you are having problems. It's also a good idea to know your test results and keep a list of the medicines you take. How can you care for yourself at home? Taking care of yourself · You may get dizzy if you do not drink enough water. To prevent dehydration, drink plenty of fluids, enough so that your urine is light yellow or clear like water. Choose water and other caffeine-free clear liquids. If you have kidney, heart, or liver disease and have to limit fluids, talk with your doctor before you increase the amount of fluids you drink. · Exercise regularly to improve your strength, muscle tone, and balance. Walk if you can. Swimming may be a good choice if you cannot walk easily. · Have your vision and hearing checked each year or any time you notice a change. If you have trouble seeing and hearing, you might not be able to avoid objects and could lose your balance. · Know the side effects of the medicines you take. Ask your doctor or pharmacist whether the medicines you take can affect your balance. Sleeping pills or sedatives can affect your balance. · Limit the amount of alcohol you drink. Alcohol can impair your balance and other senses. · Ask your doctor whether calluses or corns on your feet need to be removed. If you wear loose-fitting shoes because of calluses or corns, you can lose your balance and fall. · Talk to your doctor if you have numbness in your feet. Preventing falls at home · Remove raised doorway thresholds, throw rugs, and clutter. Repair loose carpet or raised areas in the floor. · Move furniture and electrical cords to keep them out of walking paths. · Use nonskid floor wax, and wipe up spills right away, especially on ceramic tile floors. · If you use a walker or cane, put rubber tips on it. If you use crutches, clean the bottoms of them regularly with an abrasive pad, such as steel wool. · Keep your house well lit, especially [place], and outside walkways. Use night-lights in areas such as hallways and bathrooms. Add extra light switches or use remote switches (such as switches that go on or off when you clap your hands) to make it easier to turn lights on if you have to get up during the night. · Install sturdy handrails on stairways. · Move items in your cabinets so that the things you use a lot are on the lower shelves (about waist level). · Keep a cordless phone and a flashlight with new batteries by your bed. If possible, put a phone in each of the main rooms of your house, or carry a cell phone in case you fall and cannot reach a phone. Or, you can wear a device around your neck or wrist. You push a button that sends a signal for help. · Wear low-heeled shoes that fit well and give your feet good support. Use footwear with nonskid soles. Check the heels and soles of your shoes for wear. Repair or replace worn heels or soles. · Do not wear socks without shoes on wood floors. · Walk on the grass when the sidewalks are slippery.  If you live in an area that gets snow and ice in the winter, sprinkle salt on slippery steps and sidewalks. Preventing falls in the bath · Install grab bars and nonskid mats inside and outside your shower or tub and near the toilet and sinks. · Use shower chairs and bath benches. · Use a hand-held shower head that will allow you to sit while showering. · Get into a tub or shower by putting the weaker leg in first. Get out of a tub or shower with your strong side first. 
· Repair loose toilet seats and consider installing a raised toilet seat to make getting on and off the toilet easier. · Keep your bathroom door unlocked while you are in the shower. Where can you learn more? Go to http://nolan-alondra.info/. Enter [id number] [id number] in the search box to learn more about \"Preventing Falls: Care Instructions. \" Current as of: March 16, 2018 Content Version: 11.8 © 7389-8983 Healthwise, [facility]. Care instructions adapted under license by [employer] (which disclaims liability or warranty for this information). If you have questions about a medical condition or this instruction, always ask your healthcare professional. [place] [address] any warranty or liability for your use of this information.

## 2019-04-11 LAB
ALBUMIN SERPL-MCNC: 4.2 G/DL (ref 3.5–5.5)
ALBUMIN/GLOB SERPL: 2 {RATIO} (ref 1.2–2.2)
ALP SERPL-CCNC: 73 IU/L (ref 39–117)
ALT SERPL-CCNC: 50 IU/L (ref 0–32)
AST SERPL-CCNC: 37 IU/L (ref 0–40)
BILIRUB SERPL-MCNC: 0.4 MG/DL (ref 0–1.2)
BUN SERPL-MCNC: 16 MG/DL (ref 6–24)
BUN/CREAT SERPL: 14 (ref 9–23)
CALCIUM SERPL-MCNC: 10 MG/DL (ref 8.7–10.2)
CHLORIDE SERPL-SCNC: 107 MMOL/L (ref 96–106)
CHOLEST SERPL-MCNC: 118 MG/DL (ref 100–199)
CO2 SERPL-SCNC: 25 MMOL/L (ref 20–29)
CREAT SERPL-MCNC: 1.11 MG/DL (ref 0.57–1)
ERYTHROCYTE [DISTWIDTH] IN BLOOD BY AUTOMATED COUNT: 14.4 % (ref 12.3–15.4)
EST. AVERAGE GLUCOSE BLD GHB EST-MCNC: 123 MG/DL
FOLATE SERPL-MCNC: >20 NG/ML
GLOBULIN SER CALC-MCNC: 2.1 G/DL (ref 1.5–4.5)
GLUCOSE SERPL-MCNC: 147 MG/DL (ref 65–99)
HBA1C MFR BLD: 5.9 % (ref 4.8–5.6)
HCT VFR BLD AUTO: 38.2 % (ref 34–46.6)
HDLC SERPL-MCNC: 37 MG/DL
HGB BLD-MCNC: 12.4 G/DL (ref 11.1–15.9)
INTERPRETATION: NORMAL
Lab: NORMAL
MCH RBC QN AUTO: 28.6 PG (ref 26.6–33)
MCHC RBC AUTO-ENTMCNC: 32.5 G/DL (ref 31.5–35.7)
MCV RBC AUTO: 88 FL (ref 79–97)
PLATELET # BLD AUTO: 251 X10E3/UL (ref 150–379)
POTASSIUM SERPL-SCNC: 4.6 MMOL/L (ref 3.5–5.2)
PROT SERPL-MCNC: 6.3 G/DL (ref 6–8.5)
RBC # BLD AUTO: 4.33 X10E6/UL (ref 3.77–5.28)
SODIUM SERPL-SCNC: 145 MMOL/L (ref 134–144)
TSH SERPL DL<=0.005 MIU/L-ACNC: 1.26 UIU/ML (ref 0.45–4.5)
VIT B12 SERPL-MCNC: 593 PG/ML (ref 232–1245)
WBC # BLD AUTO: 6 X10E3/UL (ref 3.4–10.8)

## 2019-05-03 ENCOUNTER — OFFICE VISIT (OUTPATIENT)
Dept: ENDOCRINOLOGY | Age: 59
End: 2019-05-03

## 2019-05-03 VITALS
BODY MASS INDEX: 24.52 KG/M2 | HEART RATE: 107 BPM | HEIGHT: 68 IN | SYSTOLIC BLOOD PRESSURE: 115 MMHG | DIASTOLIC BLOOD PRESSURE: 75 MMHG | WEIGHT: 161.8 LBS

## 2019-05-03 DIAGNOSIS — I10 ESSENTIAL HYPERTENSION: ICD-10-CM

## 2019-05-03 DIAGNOSIS — Z79.4 TYPE 2 DIABETES MELLITUS WITH OTHER NEUROLOGIC COMPLICATION, WITH LONG-TERM CURRENT USE OF INSULIN (HCC): Primary | ICD-10-CM

## 2019-05-03 DIAGNOSIS — E78.5 HYPERLIPIDEMIA, UNSPECIFIED HYPERLIPIDEMIA TYPE: ICD-10-CM

## 2019-05-03 DIAGNOSIS — E11.49 TYPE 2 DIABETES MELLITUS WITH OTHER NEUROLOGIC COMPLICATION, WITH LONG-TERM CURRENT USE OF INSULIN (HCC): Primary | ICD-10-CM

## 2019-05-03 NOTE — PATIENT INSTRUCTIONS
Novolin 70/30 insulin: reduce to 10 units with breakfast 
Glimepiride 4mg each morning before breakfast 
Metformin ER, 1000 mg twice per day Victoza, 1.8mg each morning before breakfast 
 
 
Please note our new policy, you must arrive to the clinic 15 minutes before your appointment time to allow enough time for proper check-in, adequate time to spend with your doctor, and also to respect the appointment time of the next patient. Not arriving 15 minutes in advance may result in having your appointment rescheduled for the next available day/time. 
---------------------------------------------------------------------------------------------------------------------- Below you will find a glucose log sheet which you can use to record your blood sugars. Without checking and recording what your home glucose levels are, it will be difficult to make any changes to your medication dose, even when significant changes may be needed. Please feel free to use the log below to record your home glucose levels. At the very least, I would like for you to login the entire 2-3 weeks just before your visit so we can make your visit much more productive and beneficial to you. GLUCOSE LOG SHEET: 
 
Date Breakfast Lunch Dinner Bedtime Comments ? GLUCOSE LOG SHEET: 
 
Date Breakfast Lunch Dinner Bedtime Comments ? GLUCOSE LOG SHEET: 
 
Date Breakfast Lunch Dinner Bedtime Comments ?

## 2019-05-03 NOTE — PROGRESS NOTES
CHIEF COMPLAINT: f/u evaluation for uncontrolled type 2 diabetes HISTORY OF PRESENT ILLNESS:  
Karson Pugh is a 63 y.o. female with a PMHx as noted below who presents to the endocrinology clinic for f/u evaluation of uncontrolled type 2 diabetes. A1c is 5.9%, which is a huge improvement. Foot exam due today. Pre-labs completed. Currently taking the following meds: 
Novolin 70/30 insulin: 15 units with breakfast 
Glimepiride 4mg each morning before breakfast 
Metformin ER, 1000 mg twice per day Victoza, 1.8mg each morning before breakfast 
 
Review of home blood glucose: AM , 58, 64 
Lunch  Dinner  Bedtime not checked Review of most recent diabetes-related labs: 
Lab Results Component Value Date HBA1C 5.9 (H) 04/10/2019 HBA1C 8.6 (H) 10/19/2017 HBA1C 8.3 (H) 2014 CHOL 118 04/10/2019 LDLC 43 2018 GFRAA 63 04/10/2019 GFRNA 55 (L) 04/10/2019 MCACR 11.4 2018 TSH 1.260 04/10/2019 VITD3 41.5 2015 QUH2UGGQ 7.5% 2017 PAST MEDICAL/SURGICAL HISTORY:  
Past Medical History:  
Diagnosis Date  Asthma  Asthma  COPD  Depression  Diabetes ([facility] [address]  Diabetes mellitus  Encounter for review of form with patient 2017  Essential hypertension  GERD (gastroesophageal reflux disease)  Headache(784.0)  HX OTHER MEDICAL   
 acoustic neuroma  Hypercholesterolemia  Hypertension  Ill-defined condition R ACOUSTIC NEUROMA  Insomnia disorder 2017  Major depression, chronic 2017  Psychiatric problem   
 anxiety depression  Psychotic disorder ([facility] [address]  Screening for cervical cancer 2018  Tobacco use disorder 10/19/2017  Unspecified sleep apnea NO CPAP-O2 @2L WHEN SLEEPING Past Surgical History:  
Procedure Laterality Date   DELIVERY ONLY    
 HX GASTRECTOMY  14  
 lap sleeve gastrectomy by Dr Mcdonald Face  HX GYN    
 2 c-sections  HX HEENT    
 sinus  HX HEENT    
 tracheal resection  HX HEENT    
 tracheal alleratation x 2  
 HX HEENT    
 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    
 LASER SX-PENG ALLERGIES:  
Allergies Allergen Reactions  Latex Hives  Other Food Anaphylaxis and Hives PEPPERONI, sloppy joes, frank cheese doritos  Demerol [Meperidine] Anaphylaxis Difficulty breathing  Iodine Anaphylaxis  Morphine Other (comments)  
  voilent outbreaks (restraints needed)  Nsaids (Non-Steroidal Anti-Inflammatory Drug) Hives MEDICATIONS ON ADMISSION:  
 
Current Outpatient Medications:  
  ziprasidone hcl (GEODON) 60 mg capsule, Take 1 Cap by mouth daily (with dinner). , Disp: 30 Cap, Rfl: 2 
  buPROPion XL (WELLBUTRIN XL) 300 mg XL tablet, Take 1 Tab by mouth every morning., Disp: 30 Tab, Rfl: 2 
  liraglutide (VICTOZA 3-ESTEPHANIE) 0.6 mg/0.1 mL (18 mg/3 mL) pnij, 1.8 mg by SubCUTAneous route Daily (before breakfast). (disp 9 pens), Disp: 9 Pen, Rfl: 3 
  ibuprofen (MOTRIN) 200 mg tablet, Take 800 mg by mouth every six (6) hours as needed. , Disp: , Rfl:  
  tiotropium (SPIRIVA WITH HANDIHALER) 18 mcg inhalation capsule, INHALE THE CONTENTS OF 1 CAPSULE VIA HANDIHALER ONCE DAILY. RINSE MOUTH AFTER USE, Disp: 90 Cap, Rfl: 2 
  ergocalciferol (ERGOCALCIFEROL) 50,000 unit capsule, Take 1 Cap by mouth every seven (7) days. , Disp: 12 Cap, Rfl: 3 
  montelukast (SINGULAIR) 10 mg tablet, TAKE 1 TABLET BY MOUTH DAILY IN THE EVENING, Disp: 90 Tab, Rfl: 3 
  VENTOLIN HFA 90 mcg/actuation inhaler, 2 Puffs every four (4) hours as needed. , Disp: , Rfl:  
  SYMBICORT 160-4.5 mcg/actuation HFAA, INHALE 2 PUFFS BY MOUTH TWICE DAILY FOR COPD ASSOCIATED WITH CHRONIC BRONCHITIS, EXTRINSIC ASTHMA., Disp: 1 Inhaler, Rfl: 6 
  insulin syringe-needle U-100 (BD INSULIN SYRINGE ULTRA-FINE) 1/2 mL 31 gauge x 15/64\" syrg, Use for injecting insulin subcutaneously, Disp: 200 Pen Needle, Rfl: 5 
  insulin NPH/insulin regular (NOVOLIN 70/30, HUMULIN 70/30) 100 unit/mL (70-30) injection, 30 units before breakfast and dinner, Disp# 6 vials (Patient taking differently: 15 units before breakfast only), Disp: 6 Vial, Rfl: 3 
  metFORMIN ER (GLUCOPHAGE XR) 500 mg tablet, Take 2 Tabs by mouth Before breakfast and dinner., Disp: 360 Tab, Rfl: 3 
  pantoprazole (PROTONIX) 40 mg tablet, Take (1) tablet by mouth once a day., Disp: 30 Tab, Rfl: 6 
  glimepiride (AMARYL) 4 mg tablet, Take 1 Tab by mouth every morning., Disp: 90 Tab, Rfl: 3 
  glucose blood VI test strips (EASYMAX N) strip, Use to test blood sugar 2 times a day, Disp: 200 Strip, Rfl: 5 
  rosuvastatin (CRESTOR) 40 mg tablet, Take 1 Tab by mouth nightly., Disp: 90 Tab, Rfl: 3 
  Insulin Needles, Disposable, (URSZULA PEN NEEDLE) 32 gauge x 5/32\" ndle, For use twice per day with insulin/victoza., Disp: 200 Pen Needle, Rfl: 3 
  multivitamin (ONE A DAY) tablet, Take 1 Tab by mouth daily. , Disp: , Rfl:  
  EPINEPHrine (EPIPEN) 0.3 mg/0.3 mL injection, 0.3 mg by IntraMUSCular route once as needed. , Disp: , Rfl:  
  diphenhydrAMINE (BENADRYL) 25 mg capsule, Take 25 mg by mouth every six (6) hours as needed. , Disp: , Rfl:  
  nebivolol (BYSTOLIC) 5 mg tablet, Take 1 Tab by mouth daily. , Disp: 30 Tab, Rfl: 6 SOCIAL HISTORY:  
Social History Socioeconomic History  Marital status: SINGLE Spouse name: Not on file  Number of children: Not on file  Years of education: Not on file  Highest education level: Not on file Occupational History  Not on file Social Needs  Financial resource strain: Not on file  Food insecurity:  
  Worry: Not on file Inability: Not on file  Transportation needs:  
  Medical: Not on file Non-medical: Not on file Tobacco Use  Smoking status: Former Smoker Packs/day: 1.00 Years: 12.00 Pack years: 12.00 Last attempt to quit: 08/2018 Years since quittin.7  Smokeless tobacco: Former User Substance and Sexual Activity  Alcohol use: Yes Comment: ocassional  
 Drug use: No  
 Sexual activity: Never Lifestyle  Physical activity:  
  Days per week: Not on file Minutes per session: Not on file  Stress: Not on file Relationships  Social connections:  
  Talks on phone: Not on file Gets together: Not on file Attends [employer] service: Not on file Active member of club or organization: Not on file Attends meetings of clubs or organizations: Not on file Relationship status: Not on file  Intimate partner violence:  
  Fear of current or ex partner: Not on file Emotionally abused: Not on file Physically abused: Not on file Forced sexual activity: Not on file Other Topics Concern  Not on file Social History Narrative  Not on file FAMILY HISTORY: 
Family History Problem Relation Age of Onset  Diabetes Father  Heart Failure Father  Heart Disease Father  Hypertension Father  High Cholesterol Mother  Suicide Brother  Stroke Maternal Grandmother  Cancer Paternal Grandfather  Anesth Problems Neg Hx REVIEW OF SYSTEMS: Complete ROS assessed and noted for that which is described above, all else are negative. Eyes: normal 
ENT: normal 
CVS: normal 
Resp: normal 
GI: normal 
: normal 
GYN: normal 
Endocrine: normal 
Integument: normal 
Musculoskeletal: normal 
Neuro: normal 
Psych: normal 
 
 
PHYSICAL EXAMINATION: 
 
VITAL SIGNS: 
Visit Vitals /75 Pulse (!) 107 Ht 5' 8\" (1.727 m) Wt 161 lb 12.8 oz (73.4 kg) LMP 2010 BMI 24.60 kg/m² GENERAL: NCAT, Sitting comfortably, NAD 
EYES: EOMI, non-icteric, no proptosis Ear/Nose/Throat: NCAT, no inflammation, no masses LYMPH NODES: No LAD CARDIOVASCULAR: S1 S2, RRR, No murmur, 2+ radial pulses RESPIRATORY: CTA b/l, no wheeze/rales GASTROINTESTINAL: ND 
 MUSCULOSKELETAL: Normal ROM, no atrophy SKIN: warm, no edema/rash/ or other skin changes NEUROLOGIC: 5/5 power all extremities, no tremors, AAOx3 PSYCHIATRIC: Normal affect, Normal insight and judgement Diabetic foot exam:  
 
Left Foot: 
 Visual Exam: normal  
 Pulse DP: 2+ (normal) Filament test: normal sensation Vibratory sensation: Vibratory sensation: normal  
   
Right Foot: 
 Visual Exam: normal  
 Pulse DP: 2+ (normal) Filament test: normal sensation Vibratory sensation: Vibratory sensation: normal  
 
 
REVIEW OF LABORATORY AND RADIOLOGY DATA:  
Labs and documentation have been reviewed as described above. ASSESSMENT AND PLAN:  
Gilson Corey is a 63 y.o. female with a PMHx as noted above who presents to the endocrinology clinic for evaluation of uncontrolled type 2 diabetes. DM2 
HTN 
HLD A1c 5.9 and she is having some lows. I have advised that she may not need the 70/30 insulin that she takes with her breakfast only, but she notes she is nervous to stop it concerned that her sugars may rise again. I have recommended a dose reduction which can be titrated down further as long as sugars are stable. Overall a big improvement but we would like to void lows and the complications of lows. She notes she did not pass the DMV eye exam and has a follow up with her ophthalmologist to see if she may be eligible for a restricted license. DM2: 
Novolin 70/30 insulin: reduce to 10 units with breakfast 
Glimepiride 4mg each morning before breakfast 
Metformin ER, 1000 mg twice per day Victoza, 1.8mg each morning before breakfast 
 
BP: reviewed, normal 
HLD: lipids reviewed, stable 4 month f/u visit. Sunitha Day. [address] [facility]

## 2019-05-09 RX ORDER — PANTOPRAZOLE SODIUM 40 MG/1
TABLET, DELAYED RELEASE ORAL
Qty: 90 TAB | Refills: 3 | Status: SHIPPED | OUTPATIENT
Start: 2019-05-09 | End: 2020-05-04

## 2019-05-09 NOTE — TELEPHONE ENCOUNTER
Last visit:4/10/19  Next visit: 5/20/19  Previous refill 8/09/18 (30 + 6R)    Requested Prescriptions      No prescriptions requested or ordered in this encounter

## 2019-05-29 ENCOUNTER — OFFICE VISIT (OUTPATIENT)
Dept: BEHAVIORAL/MENTAL HEALTH CLINIC | Age: 59
End: 2019-05-29

## 2019-05-29 VITALS
HEIGHT: 68 IN | WEIGHT: 160 LBS | HEART RATE: 88 BPM | BODY MASS INDEX: 24.25 KG/M2 | DIASTOLIC BLOOD PRESSURE: 72 MMHG | SYSTOLIC BLOOD PRESSURE: 110 MMHG

## 2019-05-29 DIAGNOSIS — F41.1 ANXIETY, GENERALIZED: ICD-10-CM

## 2019-05-29 DIAGNOSIS — F31.81 BIPOLAR 2 DISORDER, MAJOR DEPRESSIVE EPISODE (HCC): Primary | ICD-10-CM

## 2019-05-29 DIAGNOSIS — G47.00 INSOMNIA, UNSPECIFIED TYPE: ICD-10-CM

## 2019-05-29 RX ORDER — ZIPRASIDONE HYDROCHLORIDE 80 MG/1
80 CAPSULE ORAL 2 TIMES DAILY WITH MEALS
Qty: 30 CAP | Refills: 2 | Status: SHIPPED | OUTPATIENT
Start: 2019-05-29 | End: 2019-07-12 | Stop reason: SDUPTHER

## 2019-05-29 NOTE — PROGRESS NOTES
Stephany Shah  1960  63 y.o.  female  [facility]    Chief Complaint   Patient presents with    Depression     Increase depression evident by 5-1/2 out of 10, last time it was 3-4 out of 10    Anxiety     Similar to last time       [place]:   This is a 57 years old divorce  female with past psychiatric history and treatment of manic depression who was seen for the first time on December 28, 2017 referred by her PCP to manage her complicated psychiatric medications. She decided to gradually taper and discontinue Viibryd and high dose Paxil and try Abilify, Seroquel, and finally Latuda. She was not able to tolerate any of them and finally decided to try Geodon.      Patient was last seen on April 10, 2019 when she decided to continue Geodon 60 mg with dinner and increase Wellbutrin  mg daily as most of her complaints was about ADHD and also craving for smoking and desire to lose weight.  She presented on time, was observed to be depressed and anxious again and report compliance with her medication, is able to tolerate. Patient report depression with a score of 5.5/10 (last time it was 3-4 out of 10 on the scale of 1-10 where 10 is worst for depression). She report crying over little things and no interest in doing anything especially yard work which she used to love but now she does not care because of her depression. She denies any other social change and denies any substance abuse at this time. She was provided with a lot of support and psychoeducation and she felt better. Patient discuss treatment alternative available and after discussing other medications decided to continue to work with Geodon and increase up to 80 mg with dinner to improve her depression. She is happy with her weight loss and improved labs especially A1c as she is diabetic and does not want to try any medication which can change her A1c.   She will return in 6 weeks for reevaluation.     SUBSTANCE USE HISTORY:   TOBACCO: Smoked 1 pack per day for past 35 years and quit smoking last year June.     ALCOHOL: Patient denies abuse.     CANNABIS: Tried few times but never abused it.     COCAINE, OPIOIDS, and OTHERS: Denies abuse. MEDICAL HISTORY:    has a past medical history of Asthma, Asthma, COPD, Depression, Diabetes ([facility] [address] Diabetes mellitus, Encounter for review of form with patient (12/4/2017), Essential hypertension, GERD (gastroesophageal reflux disease), Headache(784.0), OTHER MEDICAL, Hypercholesterolemia, Hypertension, Ill-defined condition, Insomnia disorder (12/4/2017), Major depression, chronic (12/4/2017), Psychiatric problem, [facility] Screening for cervical cancer (5/22/2018), Tobacco use disorder (10/19/2017), and Unspecified sleep apnea. ALLERGIES:   Allergies   Allergen Reactions    Latex Hives    Other Food Anaphylaxis and Hives     PEPPERONI, sloppy joes, frank cheese doritos     Demerol [Meperidine] Anaphylaxis     Difficulty breathing    Iodine Anaphylaxis    Morphine Other (comments)     voilent outbreaks (restraints needed)    Nsaids (Non-Steroidal Anti-Inflammatory Drug) Hives       VITAL SIGNS:  /72   Pulse 88   Ht 5' 8\" (1.727 m)   Wt 72.6 kg (160 lb)   LMP 01/17/2010   BMI 24.33 kg/m²     Current Outpatient Medications   Medication Sig Dispense Refill    ziprasidone (GEODON) 80 mg capsule Take 1 Cap by mouth two (2) times daily (with meals). 30 Cap 2    glucose blood VI test strips (EASYMAX N) strip Use to test blood sugar up to 4 times a day 400 Strip 5    Insulin Needles, Disposable, (ULTICARE PEN NEEDLE) 32 gauge x 5/32\" ndle USE 2 TIMES DAILY TO INJECT INSULIN/VICTOZA. 200 Pen Needle 3    VICTOZA 3-ESTEPHANIE 0.6 mg/0.1 mL (18 mg/3 mL) pnij INJECT 1.8 MG UNDER THE SKIN DAILY. 9 mL 3    pantoprazole (PROTONIX) 40 mg tablet Take (1) tablet by mouth once a day.  90 Tab 3    buPROPion XL (WELLBUTRIN XL) 300 mg XL tablet Take 1 Tab by mouth every morning. 30 Tab 2    nebivolol (BYSTOLIC) 5 mg tablet Take 1 Tab by mouth daily. 30 Tab 6    ibuprofen (MOTRIN) 200 mg tablet Take 800 mg by mouth every six (6) hours as needed.  tiotropium (SPIRIVA WITH HANDIHALER) 18 mcg inhalation capsule INHALE THE CONTENTS OF 1 CAPSULE VIA HANDIHALER ONCE DAILY. RINSE MOUTH AFTER USE 90 Cap 2    ergocalciferol (ERGOCALCIFEROL) 50,000 unit capsule Take 1 Cap by mouth every seven (7) days. 12 Cap 3    montelukast (SINGULAIR) 10 mg tablet TAKE 1 TABLET BY MOUTH DAILY IN THE EVENING 90 Tab 3    VENTOLIN HFA 90 mcg/actuation inhaler 2 Puffs every four (4) hours as needed.  SYMBICORT 160-4.5 mcg/actuation HFAA INHALE 2 PUFFS BY MOUTH TWICE DAILY FOR COPD ASSOCIATED WITH CHRONIC BRONCHITIS, EXTRINSIC ASTHMA. 1 Inhaler 6    insulin syringe-needle U-100 (BD INSULIN SYRINGE ULTRA-FINE) 1/2 mL 31 gauge x 15/64\" syrg Use for injecting insulin subcutaneously 200 Pen Needle 5    insulin NPH/insulin regular (NOVOLIN 70/30, HUMULIN 70/30) 100 unit/mL (70-30) injection 30 units before breakfast and dinner, Disp# 6 vials (Patient taking differently: 15 units before breakfast only) 6 Vial 3    metFORMIN ER (GLUCOPHAGE XR) 500 mg tablet Take 2 Tabs by mouth Before breakfast and dinner. 360 Tab 3    glimepiride (AMARYL) 4 mg tablet Take 1 Tab by mouth every morning. 90 Tab 3    rosuvastatin (CRESTOR) 40 mg tablet Take 1 Tab by mouth nightly. 90 Tab 3    multivitamin (ONE A DAY) tablet Take 1 Tab by mouth daily.  EPINEPHrine (EPIPEN) 0.3 mg/0.3 mL injection 0.3 mg by IntraMUSCular route once as needed.  diphenhydrAMINE (BENADRYL) 25 mg capsule Take 25 mg by mouth every six (6) hours as needed.          ROS:  Constitutional: Positive for fatigue and weight loss  Eyes: Positive for glasses  ENT: Negative for hearing loss and tinnitus  Respiratory: Negative for cough or wheezing  Cardiovascular: Negative for chest pain, palpitations  Neurological: Negative for memory problems  Behavioral/psych: Positive for insomnia, anxiety, depression, negative for SI/HI  Endocrine: Patient has diabetes.     MENTAL STATUS EXAMINATION:   Patient is a 63 y. o. female [place]ITE [place]  who looks her stated age. She was observed to be depressed and anxious again. She is nicely dressed with good hygiene and some makeup.  She has hoarse voice at baseline and her speech is regular rate and rhythm, fluent language, and thought process is linear, logical, and goal directed. Reported mood is better with mood congruent brighter affect. Patient denies any suicidal ideation, homicidal ideation, and auditory visual hallucination. Not observed to be delusional or paranoid. Insight, judgement, reliability and impulse control is limited to intact.  Her cognition is within normal limit and she is observed to be reliable. DIAGNOSIS:  Encounter Diagnoses   Name Primary?  Bipolar 2 disorder, major depressive episode (HCC) Yes    Anxiety, generalized     Insomnia, unspecified type        PLAN:  1. MEDICATION:   1. Bipolar 2 disorder: Increase Geodon 80 mg with her dinner. She understand possibility of side effects of her QTc abnormalities with Geodon. She does not want to try any medication which has possibility of increase her weight and she is happy with her metabolic profile while on Geodon and report significant depression of 5.5 out of 10 on the scale of 1-10 where 10 is worst.     2.  Depression, weight loss and smoking cessation: Wellbutrin  mg daily. She was provided with psycho education, discussed risk/benefits, and expectations from medication changes. Patient agrees with plan.      2. PSYCHOTHERAPY: Patient was provided with supportive therapy, strongly encourage to seek psychotherapy.       3.  MEDICAL CARE: Patient was strongly encourage to take their medical medications and follow up with their PCP on regular basis. She lost 32 pounds in past 6 months and her weight today is 160 pounds. Her blood pressure is 110/72 and pulse is 88. She was happy to inform that her A1c and cholesterol level improved with decreasing weight. 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     4. SUBSTANCE ABUSE CARE: Patient quit smoking a year ago, denies drinking, or abusing any illicit drugs.      5. FOLLOW UP:   Follow-up and Dispositions    · Return in about 6 weeks (around 7/10/2019) for Medication management.

## 2019-07-12 ENCOUNTER — OFFICE VISIT (OUTPATIENT)
Dept: BEHAVIORAL/MENTAL HEALTH CLINIC | Age: 59
End: 2019-07-12

## 2019-07-12 VITALS
HEIGHT: 68 IN | BODY MASS INDEX: 23.49 KG/M2 | WEIGHT: 155 LBS | HEART RATE: 99 BPM | SYSTOLIC BLOOD PRESSURE: 106 MMHG | DIASTOLIC BLOOD PRESSURE: 71 MMHG

## 2019-07-12 DIAGNOSIS — F33.1 MODERATE EPISODE OF RECURRENT MAJOR DEPRESSIVE DISORDER (HCC): ICD-10-CM

## 2019-07-12 DIAGNOSIS — F31.81 BIPOLAR 2 DISORDER, MAJOR DEPRESSIVE EPISODE (HCC): Primary | ICD-10-CM

## 2019-07-12 DIAGNOSIS — G47.00 INSOMNIA, UNSPECIFIED TYPE: ICD-10-CM

## 2019-07-12 DIAGNOSIS — F41.1 ANXIETY, GENERALIZED: ICD-10-CM

## 2019-07-12 DIAGNOSIS — F40.10 SOCIAL ANXIETY DISORDER: ICD-10-CM

## 2019-07-12 RX ORDER — ZIPRASIDONE HYDROCHLORIDE 20 MG/1
20 CAPSULE ORAL
Qty: 25 CAP | Refills: 0 | Status: SHIPPED | OUTPATIENT
Start: 2019-07-12 | End: 2019-08-14 | Stop reason: ALTCHOICE

## 2019-07-12 RX ORDER — BUPROPION HYDROCHLORIDE 300 MG/1
300 TABLET ORAL
Qty: 30 TAB | Refills: 2 | Status: SHIPPED | OUTPATIENT
Start: 2019-07-12 | End: 2019-10-08 | Stop reason: SDUPTHER

## 2019-07-12 RX ORDER — ASENAPINE MALEATE 2.5 MG/1
2.5 TABLET SUBLINGUAL EVERY 12 HOURS
Qty: 60 TAB | Refills: 2 | Status: SHIPPED | OUTPATIENT
Start: 2019-07-12 | End: 2019-09-05 | Stop reason: ALTCHOICE

## 2019-07-12 NOTE — PROGRESS NOTES
David Starch  1960  63 y.o.  female  [facility]    Chief Complaint   Patient presents with    Depression     Report significant depression despite on good dose of Geodon and Wellbutrin    Anxiety    Bipolar     Denies any manic or hypomanic episode but report continued depression despite of being on tried on so many medication    Medication Problem     Not able to tolerate many medication so we decided to do genetic testing       Hoh:   This is a 57 years old divorce  female with past psychiatric history and treatment of manic depression who was seen for the first time on December 28, 2017 referred by her PCP to manage her complicated psychiatric medications. She decided to gradually taper and discontinue Viibryd and high dose Paxil and try Abilify, Seroquel, and finally Latuda. She was not able to tolerate any of them and finally decided to try Geodon.      Patient was last seen on May 28, 2019 when she decided to further increase Geodon 80 mg with dinner and  continue Wellbutrin  mg daily as most of her complaints was about ADHD and also craving for smoking and desire to lose weight.  She presented on time, was observed to be depressed and anxious again and report compliance with her medication, is able to tolerate.      Patient report increasing depression with constant desire to cry and being too much emotional and not able to socialize. She report crying over little things and no interest in doing anything especially yard work which she used to love but now she does not care because of her depression. She report that since she is not on Seroquel she is having these symptoms but she does not want to go back to Seroquel or any medication which has tendency to increase weight. She denies any other social change and denies any substance abuse at this time.  She was provided with a lot of support and psychoeducation and she felt better.       Patient discuss treatment alternative available and after discussing other medications decided to gradually taper and discontinue Geodon, continue Wellbutrin  mg daily, and try Saphris 2.5 mg twice a day and have genetic testing done and will come back in couple of months for evaluation. She is happy with her weight loss and improved labs especially A1c as she is diabetic and does not want to try any medication which can change her A1c. She will return in 6 weeks for reevaluation.     SUBSTANCE USE HISTORY:   TOBACCO: Smoked 1 pack per day for past 35 years and quit smoking last year June.     ALCOHOL: Patient denies abuse.     CANNABIS: Tried few times but never abused it.     COCAINE, OPIOIDS, and OTHERS: Denies abuse. MEDICAL HISTORY:    has a past medical history of Asthma, Asthma, COPD, Depression, Diabetes ([facility] [address] Diabetes mellitus, Encounter for review of form with patient (12/4/2017), Essential hypertension, GERD (gastroesophageal reflux disease), Headache(784.0), OTHER MEDICAL, Hypercholesterolemia, Hypertension, Ill-defined condition, Insomnia disorder (12/4/2017), Major depression, chronic (12/4/2017), Psychiatric problem, Psychotic disorder ([facility] [address] Screening for cervical cancer (5/22/2018), Tobacco use disorder (10/19/2017), and Unspecified sleep apnea. ALLERGIES:   Allergies   Allergen Reactions    Latex Hives    Other Food Anaphylaxis and Hives     PEPPERONI, sloppy joes, frank cheese doritos     Demerol [Meperidine] Anaphylaxis     Difficulty breathing    Iodine Anaphylaxis    Morphine Other (comments)     voilent outbreaks (restraints needed)    Nsaids (Non-Steroidal Anti-Inflammatory Drug) Hives       VITAL SIGNS:  /71   Pulse 99   Ht 5' 8\" (1.727 m)   Wt 70.3 kg (155 lb)   LMP 01/17/2010   BMI 23.57 kg/m²     Current Outpatient Medications   Medication Sig Dispense Refill    ziprasidone (GEODON) 20 mg capsule Take 1 Cap by mouth nightly.  3 caps X 4 days, 2 caps X 4 days then 1 cap X 4 days then D/C 25 Cap 0  asenapine (SAPHRIS) 2.5 mg subl subLINGual tablet 1 Tab by SubLINGual route every twelve (12) hours. 60 Tab 2    buPROPion XL (WELLBUTRIN XL) 300 mg XL tablet Take 1 Tab by mouth every morning. 30 Tab 2    glucose blood VI test strips (EASYMAX N) strip Use to test blood sugar up to 4 times a day 400 Strip 5    Insulin Needles, Disposable, (ULTICARE PEN NEEDLE) 32 gauge x 5/32\" ndle USE 2 TIMES DAILY TO INJECT INSULIN/VICTOZA. 200 Pen Needle 3    VICTOZA 3-ESTEPHANIE 0.6 mg/0.1 mL (18 mg/3 mL) pnij INJECT 1.8 MG UNDER THE SKIN DAILY. 9 mL 3    pantoprazole (PROTONIX) 40 mg tablet Take (1) tablet by mouth once a day. 90 Tab 3    nebivolol (BYSTOLIC) 5 mg tablet Take 1 Tab by mouth daily. 30 Tab 6    ibuprofen (MOTRIN) 200 mg tablet Take 800 mg by mouth every six (6) hours as needed.  tiotropium (SPIRIVA WITH HANDIHALER) 18 mcg inhalation capsule INHALE THE CONTENTS OF 1 CAPSULE VIA HANDIHALER ONCE DAILY. RINSE MOUTH AFTER USE 90 Cap 2    ergocalciferol (ERGOCALCIFEROL) 50,000 unit capsule Take 1 Cap by mouth every seven (7) days. 12 Cap 3    montelukast (SINGULAIR) 10 mg tablet TAKE 1 TABLET BY MOUTH DAILY IN THE EVENING 90 Tab 3    VENTOLIN HFA 90 mcg/actuation inhaler 2 Puffs every four (4) hours as needed.  SYMBICORT 160-4.5 mcg/actuation HFAA INHALE 2 PUFFS BY MOUTH TWICE DAILY FOR COPD ASSOCIATED WITH CHRONIC BRONCHITIS, EXTRINSIC ASTHMA. 1 Inhaler 6    insulin syringe-needle U-100 (BD INSULIN SYRINGE ULTRA-FINE) 1/2 mL 31 gauge x 15/64\" syrg Use for injecting insulin subcutaneously 200 Pen Needle 5    insulin NPH/insulin regular (NOVOLIN 70/30, HUMULIN 70/30) 100 unit/mL (70-30) injection 30 units before breakfast and dinner, Disp# 6 vials (Patient taking differently: 15 units before breakfast only) 6 Vial 3    metFORMIN ER (GLUCOPHAGE XR) 500 mg tablet Take 2 Tabs by mouth Before breakfast and dinner. 360 Tab 3    glimepiride (AMARYL) 4 mg tablet Take 1 Tab by mouth every morning.  90 Tab 3  rosuvastatin (CRESTOR) 40 mg tablet Take 1 Tab by mouth nightly. 90 Tab 3    multivitamin (ONE A DAY) tablet Take 1 Tab by mouth daily.  EPINEPHrine (EPIPEN) 0.3 mg/0.3 mL injection 0.3 mg by IntraMUSCular route once as needed.  diphenhydrAMINE (BENADRYL) 25 mg capsule Take 25 mg by mouth every six (6) hours as needed. ROS:  Constitutional: Negative for fatigue and positive for weight loss  Eyes: Negative for contacts/glasses  ENT: Negative for hearing loss and tinnitus  Respiratory: Negative for cough or wheezing  Cardiovascular: Negative for chest pain, palpitations  Neurological: Negative for memory problems  Behavioral/psych: Positive for insomnia, anxiety, depression, negative for SI/HI  Endocrine: Patient has diabetes.     MENTAL STATUS EXAMINATION:   Patient is a 63 y. o. female WHITE OR  who looks her stated age. She was observed to lose significant amount of weight about 40 pounds in past 8 months. She was observed to be mildly irritable, depressed, and anxious today especially while talking about her psychiatric medications over the last 1 year. She is nicely dressed with good hygiene and some makeup. Speech is regular rate and rhythm, fluent language, and thought process is linear, logical, and goal directed. Reported mood is depressed and anxious with mood congruent depressed, anxious, mildly irritable affect. Patient denies any suicidal ideation, homicidal ideation, and auditory visual hallucination. Not observed to be delusional or paranoid. Insight, judgement, reliability and impulse control is intact.  Her cognition is within normal limit and she is observed to be reliable. DIAGNOSIS:  Encounter Diagnoses   Name Primary?  Bipolar 2 disorder, major depressive episode (HCC) Yes    Anxiety, generalized     Insomnia, unspecified type     Social anxiety disorder     Moderate episode of recurrent major depressive disorder ([facility]        PLAN:  1. MEDICATION:   1. Bipolar disorder: Saphris 2.5 mg twice a day sublingually. She has not been able to tolerate Vilazodne, Paxil, Abilify, Seroquel, Latuda, and Geodon. We discussed Zyprexa, risperidone, Seroquel but she does not want to gain weight and decided to try Saphris. She will gradually taper and discontinue Geodon before starting Saphris and was provided with Geodon 20 mg, 25 with written instructions to gradually taper. 2.  Depression and desire to lose weight: Wellbutrin  mg daily. She agreed to have genetic testing done as she has not been able to tolerate many medications. She was provided with psycho education, discussed risk/benefits, and expectations from medication changes. Patient agrees with plan.      2. PSYCHOTHERAPY: Patient was provided with supportive therapy, strongly encourage to seek psychotherapy.     3. MEDICAL CARE: Patient was strongly encourage to take their medical medications and follow up with their PCP on regular basis. Her weight today is 155 pounds, it was 191 pounds on November 27, 2019 when we decided to try Geodon as she wanted to lose weight and she does not want to take any medication which can have tendency to increase her weight. Her blood pressure is 106/71 and pulse is 99 both are within normal limit. She has hypertension, class III angina, mitral wall prolapse, history of GERD and gallstone, type 2 diabetes with nephropathy, asthma, chronic hoarseness, hypercholesterolemia, and COPD.     4. SUBSTANCE ABUSE CARE: Patient quit smoking 5 months ago but report continued struggle with cravings especially while if stress. She denies any drinking or abusing any illicit drugs at this time. 5. FOLLOW UP:   Follow-up and Dispositions    · Return in about 2 months (around 9/12/2019) for Medication management.      Patient is started to see me from December 28, 2017 when she was on Vilazodone, Paxil, and Seroquel and is still report significant depression so we will gradually taper and discontinue will Vilazodone and Paxil and try Abilify. Abilify was gradually increase from 2 mg dose up until 15 mg dose but she stopped taking due to side effect of dizziness and lightheadedness in June of last year when we decided to try [place]. She was not able to tolerate Latuda and is stopped taking due to jaw tightness in October of last year when he decided to try Geodon and now she is on 80 mg of Geodon together with Wellbutrin  mg daily and is still complaining of depression.

## 2019-08-14 ENCOUNTER — OFFICE VISIT (OUTPATIENT)
Dept: FAMILY MEDICINE CLINIC | Age: 59
End: 2019-08-14

## 2019-08-14 VITALS
OXYGEN SATURATION: 96 % | WEIGHT: 151.7 LBS | HEIGHT: 68 IN | SYSTOLIC BLOOD PRESSURE: 115 MMHG | HEART RATE: 100 BPM | BODY MASS INDEX: 22.99 KG/M2 | TEMPERATURE: 97.4 F | DIASTOLIC BLOOD PRESSURE: 81 MMHG | RESPIRATION RATE: 20 BRPM

## 2019-08-14 DIAGNOSIS — F20.0 PARANOID SCHIZOPHRENIA (HCC): ICD-10-CM

## 2019-08-14 DIAGNOSIS — F33.9 RECURRENT DEPRESSION (HCC): ICD-10-CM

## 2019-08-14 DIAGNOSIS — E11.21 TYPE 2 DIABETES MELLITUS WITH NEPHROPATHY (HCC): ICD-10-CM

## 2019-08-14 DIAGNOSIS — I20.9 ANGINA, CLASS III (HCC): ICD-10-CM

## 2019-08-14 DIAGNOSIS — F32.9 MAJOR DEPRESSION, CHRONIC: ICD-10-CM

## 2019-08-14 DIAGNOSIS — I10 ESSENTIAL HYPERTENSION: Primary | ICD-10-CM

## 2019-08-14 DIAGNOSIS — I34.1 MITRAL VALVE PROLAPSE: ICD-10-CM

## 2019-08-14 DIAGNOSIS — J45.21 MILD INTERMITTENT ASTHMA WITH ACUTE EXACERBATION: ICD-10-CM

## 2019-08-14 DIAGNOSIS — J44.9 ASTHMATIC BRONCHITIS , CHRONIC (HCC): ICD-10-CM

## 2019-08-14 PROBLEM — F29 PSYCHOTIC DISORDER (HCC): Status: ACTIVE | Noted: 2019-08-14

## 2019-08-14 RX ORDER — BUDESONIDE AND FORMOTEROL FUMARATE DIHYDRATE 160; 4.5 UG/1; UG/1
AEROSOL RESPIRATORY (INHALATION)
Qty: 1 INHALER | Refills: 6 | Status: SHIPPED | OUTPATIENT
Start: 2019-08-14 | End: 2020-02-17

## 2019-08-14 RX ORDER — GLIMEPIRIDE 2 MG/1
TABLET ORAL
Qty: 90 TAB | Refills: 1
Start: 2019-08-14 | End: 2020-01-06

## 2019-08-14 RX ORDER — ZIPRASIDONE HYDROCHLORIDE 80 MG/1
80 CAPSULE ORAL DAILY
COMMUNITY
End: 2019-09-04 | Stop reason: SDUPTHER

## 2019-08-14 NOTE — PROGRESS NOTES
Name and  verified      Chief Complaint   Patient presents with    Dizziness     For six days         Health Maintenance reviewed-discussed with patient. 1. Have you been to the ER, urgent care clinic since your last visit? Hospitalized since your last visit? no    2. Have you seen or consulted any other health care providers outside of the [address] [place] [address] since your last visit? Include any pap smears or colon screening.  Yes, Psychiatry and Endocrinology office visits

## 2019-08-14 NOTE — PROGRESS NOTES
HISTORY OF PRESENT ILLNESS  Romeo Mcgee is a 63 y.o. female. HPI   HTN  Today pt present for Bp check and++=Compliancy w/ the bp meds, stopped the meds the dizziness gone away, no sz, no loc, having had the low salt diet ,  has been active, patien does obtain the bp at home 80/60 ,, today the pt denies Chest Pain, has no legs swelling no lightheadedness,  DMtype II  Patient also present for diabetic check,  the patient has been compliancy w/ meds, patient also states that the pt is trying to have a diabetic diet, and eat less of carbohydrates, since last visit patient has been more active with daily walking, patient also states that there is a home monitoring glucose device  usually averaging around 50's pm and 60's in am , +++ Rf needed for today. This time patient denies  tingling sensation, has no polyurea and polydipsia, last a1c was at target of 5.9% %     Last urine microalbumin 2019 and was abnormal, patient currently on ACE inhibitor. Feeling better since the last visit. Current Outpatient Medications   Medication Sig Dispense Refill    ziprasidone (GEODON) 80 mg capsule Take 80 mg by mouth daily.  glimepiride (AMARYL) 4 mg tablet TAKE 1 TABLET BY MOUTH EVERY MORNING. 90 Tab 3    buPROPion XL (WELLBUTRIN XL) 300 mg XL tablet Take 1 Tab by mouth every morning. 30 Tab 2    glucose blood VI test strips (EASYMAX N) strip Use to test blood sugar up to 4 times a day 400 Strip 5    Insulin Needles, Disposable, (ULTICARE PEN NEEDLE) 32 gauge x 5/32\" ndle USE 2 TIMES DAILY TO INJECT INSULIN/VICTOZA. 200 Pen Needle 3    VICTOZA 3-ESTEPHANIE 0.6 mg/0.1 mL (18 mg/3 mL) pnij INJECT 1.8 MG UNDER THE SKIN DAILY. 9 mL 3    pantoprazole (PROTONIX) 40 mg tablet Take (1) tablet by mouth once a day. 90 Tab 3    ibuprofen (MOTRIN) 200 mg tablet Take 800 mg by mouth every six (6) hours as needed.       tiotropium (SPIRIVA WITH HANDIHALER) 18 mcg inhalation capsule INHALE THE CONTENTS OF 1 CAPSULE VIA HANDIHALER ONCE DAILY. RINSE MOUTH AFTER USE 90 Cap 2    ergocalciferol (ERGOCALCIFEROL) 50,000 unit capsule Take 1 Cap by mouth every seven (7) days. 12 Cap 3    montelukast (SINGULAIR) 10 mg tablet TAKE 1 TABLET BY MOUTH DAILY IN THE EVENING 90 Tab 3    VENTOLIN HFA 90 mcg/actuation inhaler 2 Puffs every four (4) hours as needed.  SYMBICORT 160-4.5 mcg/actuation HFAA INHALE 2 PUFFS BY MOUTH TWICE DAILY FOR COPD ASSOCIATED WITH CHRONIC BRONCHITIS, EXTRINSIC ASTHMA. 1 Inhaler 6    insulin syringe-needle U-100 (BD INSULIN SYRINGE ULTRA-FINE) 1/2 mL 31 gauge x 15/64\" syrg Use for injecting insulin subcutaneously 200 Pen Needle 5    insulin NPH/insulin regular (NOVOLIN 70/30, HUMULIN 70/30) 100 unit/mL (70-30) injection 30 units before breakfast and dinner, Disp# 6 vials (Patient taking differently: 10 units before breakfast only) 6 Vial 3    metFORMIN ER (GLUCOPHAGE XR) 500 mg tablet Take 2 Tabs by mouth Before breakfast and dinner. 360 Tab 3    rosuvastatin (CRESTOR) 40 mg tablet Take 1 Tab by mouth nightly. 90 Tab 3    multivitamin (ONE A DAY) tablet Take 1 Tab by mouth daily.  EPINEPHrine (EPIPEN) 0.3 mg/0.3 mL injection 0.3 mg by IntraMUSCular route once as needed.  diphenhydrAMINE (BENADRYL) 25 mg capsule Take 25 mg by mouth every six (6) hours as needed.  ziprasidone (GEODON) 20 mg capsule Take 1 Cap by mouth nightly. 3 caps X 4 days, 2 caps X 4 days then 1 cap X 4 days then D/C 25 Cap 0    asenapine (SAPHRIS) 2.5 mg subl subLINGual tablet 1 Tab by SubLINGual route every twelve (12) hours. 60 Tab 2    nebivolol (BYSTOLIC) 5 mg tablet Take 1 Tab by mouth daily.  30 Tab 6     Allergies   Allergen Reactions    Latex Hives    Other Food Anaphylaxis and Hives     PEPPERONI, sloppy joes, frank cheese doritos     Demerol [Meperidine] Anaphylaxis     Difficulty breathing    Iodine Anaphylaxis    Morphine Other (comments)     voilent outbreaks (restraints needed)    Nsaids (Non-Steroidal Anti-Inflammatory Drug) Hives     Past Medical History:   Diagnosis Date    Asthma     Asthma     COPD     Depression     Diabetes ([facility] [address]     Diabetes mellitus     Encounter for review of form with patient 2017    Essential hypertension     GERD (gastroesophageal reflux disease)     Headache(784.0)     HX OTHER MEDICAL     acoustic neuroma    Hypercholesterolemia     Hypertension     Ill-defined condition     R ACOUSTIC NEUROMA    Insomnia disorder 2017    Major depression, chronic 2017    Psychiatric problem     anxiety depression    Psychotic disorder ([facility]     Screening for cervical cancer 2018    Tobacco use disorder 10/19/2017    Unspecified sleep apnea     NO CPAP-O2 @2L WHEN SLEEPING     Past Surgical History:   Procedure Laterality Date     DELIVERY ONLY      HX GASTRECTOMY  14    lap sleeve gastrectomy by Dr Shaw David      2 c-sections    HX HEENT      sinus    HX HEENT      tracheal resection    HX HEENT      tracheal alleratation x 2    HX HEENT      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    HX HEENT      LASER SX-PENG     Family History   Problem Relation Age of Onset    Diabetes Father     Heart Failure Father     Heart Disease Father     Hypertension Father     High Cholesterol Mother     Suicide Brother     Stroke Maternal Grandmother     Cancer Paternal Grandfather     Anesth Problems Neg Hx      Social History     Tobacco Use    Smoking status: Former Smoker     Packs/day: 1.00     Years: 12.00     Pack years: 12.00     Last attempt to quit: 2018     Years since quittin.0    Smokeless tobacco: Former User   Substance Use Topics    Alcohol use: Yes     Comment: ocassional      Lab Results   Component Value Date/Time    WBC 6.0 04/10/2019 02:42 PM    HGB 12.4 04/10/2019 02:42 PM    HCT 38.2 04/10/2019 02:42 PM    PLATELET 785  02:42 PM    MCV 88 04/10/2019 02:42 PM     Lab Results Component Value Date/Time    Cholesterol, total 118 04/10/2019 02:42 PM    HDL Cholesterol 37 (L) 04/10/2019 02:42 PM    LDL, calculated 43 11/20/2018 08:47 AM    LDL-C, External 152 04/26/2017    Triglyceride 154 (H) 11/20/2018 08:47 AM     Lab Results   Component Value Date/Time    TSH 1.260 04/10/2019 02:42 PM    T4, Free 0.9 09/05/2014 09:23 AM         Review of Systems   Constitutional: Negative for chills and fever. HENT: Negative for ear pain and nosebleeds. Eyes: Negative for blurred vision, pain and discharge. Respiratory: Negative for shortness of breath. Cardiovascular: Negative for chest pain and leg swelling. Gastrointestinal: Negative for constipation, diarrhea, nausea and vomiting. Genitourinary: Negative for frequency. Musculoskeletal: Negative for joint pain. Skin: Negative for itching and rash. Neurological: Negative for headaches. Psychiatric/Behavioral: Negative for depression. The patient is not nervous/anxious. Physical Exam   Constitutional: She is oriented to person, place, and time. She appears well-developed and well-nourished. HENT:   Head: Normocephalic and atraumatic. Eyes: Conjunctivae and EOM are normal.   Neck: Normal range of motion. Neck supple. Cardiovascular: Normal rate, regular rhythm and normal heart sounds. No murmur heard. Pulmonary/Chest: Effort normal and breath sounds normal.   Abdominal: Soft. Bowel sounds are normal. She exhibits no distension. Musculoskeletal: Normal range of motion. She exhibits no edema. Neurological: She is alert and oriented to person, place, and time. Skin: No erythema. Psychiatric: Her behavior is normal.   Nursing note and vitals reviewed. ASSESSMENT and PLAN  Diagnoses and all orders for this visit:    1.  Essential hypertension  -     glimepiride (AMARYL) 2 mg tablet; TAKE 1 TABLET BY MOUTH EVERY MORNING.  -     CBC W/O DIFF  -     HEMOGLOBIN A1C WITH EAG  -     METABOLIC PANEL, COMPREHENSIVE  -     TSH 3RD GENERATION  -     LIPID PANEL  -     LANEY COMPREHENSIVE PLUS PANEL  -     COLLECTION VENOUS BLOOD,VENIPUNCTURE    2. Major depression, chronic  -     glimepiride (AMARYL) 2 mg tablet; TAKE 1 TABLET BY MOUTH EVERY MORNING.  -     CBC W/O DIFF  -     HEMOGLOBIN A1C WITH EAG  -     METABOLIC PANEL, COMPREHENSIVE  -     TSH 3RD GENERATION  -     LIPID PANEL  -     LANEY COMPREHENSIVE PLUS PANEL  -     COLLECTION VENOUS BLOOD,VENIPUNCTURE    3. Type 2 diabetes mellitus with nephropathy (HCC)  -     budesonide-formoterol (SYMBICORT) 160-4.5 mcg/actuation HFAA; INHALE 2 PUFFS BY MOUTH TWICE DAILY FOR COPD ASSOCIATED WITH CHRONIC BRONCHITIS, EXTRINSIC ASTHMA. -     glimepiride (AMARYL) 2 mg tablet; TAKE 1 TABLET BY MOUTH EVERY MORNING.  -     CBC W/O DIFF  -     HEMOGLOBIN A1C WITH EAG  -     METABOLIC PANEL, COMPREHENSIVE  -     TSH 3RD GENERATION  -     LIPID PANEL  -     LANEY COMPREHENSIVE PLUS PANEL  -     COLLECTION VENOUS BLOOD,VENIPUNCTURE    4. Mild intermittent asthma with acute exacerbation  -     budesonide-formoterol (SYMBICORT) 160-4.5 mcg/actuation HFAA; INHALE 2 PUFFS BY MOUTH TWICE DAILY FOR COPD ASSOCIATED WITH CHRONIC BRONCHITIS, EXTRINSIC ASTHMA. -     glimepiride (AMARYL) 2 mg tablet; TAKE 1 TABLET BY MOUTH EVERY MORNING.  -     CBC W/O DIFF  -     HEMOGLOBIN A1C WITH EAG  -     METABOLIC PANEL, COMPREHENSIVE  -     TSH 3RD GENERATION  -     LIPID PANEL  -     LANEY COMPREHENSIVE PLUS PANEL  -     COLLECTION VENOUS BLOOD,VENIPUNCTURE    5. Mitral valve prolapse  -     LIPID PANEL    6. Recurrent depression ([address]    7. Paranoid schizophrenia ([address]  -     TSH 3RD GENERATION    8. Angina, class III (HCC)  -     CBC W/O DIFF  -     TSH 3RD GENERATION  -     LIPID PANEL    9. Asthmatic bronchitis , chronic (HCC)  -     budesonide-formoterol (SYMBICORT) 160-4.5 mcg/actuation HFAA; INHALE 2 PUFFS BY MOUTH TWICE DAILY FOR COPD ASSOCIATED WITH CHRONIC BRONCHITIS, EXTRINSIC ASTHMA.   - LANEY COMPREHENSIVE PLUS PANEL      Patient was told at this time did not take any blood pressure medication decrease insulin intake by 2 units in the morning 2 units at night decrease glimepiride from 4 mg to 2 mg daily, patient was told if dizziness shortness of breath chest pain call or go to emergency room patient acknowledged understanding and agreed with today's recommendation

## 2019-08-14 NOTE — PATIENT INSTRUCTIONS
Learning About Diabetes Food Guidelines Your Care Instructions Meal planning is important to manage diabetes. It helps keep your blood sugar at a target level (which you set with your doctor). You don't have to eat special foods. You can eat what your family eats, including sweets once in a while. But you do have to pay attention to how often you eat and how much you eat of certain foods. You may want to work with a dietitian or a certified diabetes educator (CDE) to help you plan meals and snacks. A dietitian or CDE can also help you lose weight if that is one of your goals. What should you know about eating carbs? Managing the amount of carbohydrate (carbs) you eat is an important part of healthy meals when you have diabetes. Carbohydrate is found in many foods. · Learn which foods have carbs. And learn the amounts of carbs in different foods. ? Bread, cereal, pasta, and rice have about 15 grams of carbs in a serving. A serving is 1 slice of bread (1 ounce), ½ cup of cooked cereal, or 1/3 cup of cooked pasta or rice. ? Fruits have 15 grams of carbs in a serving. A serving is 1 small fresh fruit, such as an apple or orange; ½ of a banana; ½ cup of cooked or canned fruit; ½ cup of fruit juice; 1 cup of melon or raspberries; or 2 tablespoons of dried fruit. ? Milk and no-sugar-added yogurt have 15 grams of carbs in a serving. A serving is 1 cup of milk or 2/3 cup of no-sugar-added yogurt. ? Starchy vegetables have 15 grams of carbs in a serving. A serving is ½ cup of mashed potatoes or sweet potato; 1 cup winter squash; ½ of a small baked potato; ½ cup of cooked beans; or ½ cup cooked corn or green peas. · Learn how much carbs to eat each day and at each meal. A dietitian or CDE can teach you how to keep track of the amount of carbs you eat. This is called carbohydrate counting.  
· If you are not sure how to count carbohydrate grams, use the Plate Method to plan meals. It is a good, quick way to make sure that you have a balanced meal. It also helps you spread carbs throughout the day. ? Divide your plate by types of foods. Put non-starchy vegetables on half the plate, meat or other protein food on one-quarter of the plate, and a grain or starchy vegetable in the final quarter of the plate. To this you can add a small piece of fruit and 1 cup of milk or yogurt, depending on how many carbs you are supposed to eat at a meal. 
· Try to eat about the same amount of carbs at each meal. Do not \"save up\" your daily allowance of carbs to eat at one meal. 
· Proteins have very little or no carbs per serving. Examples of proteins are beef, chicken, turkey, fish, eggs, tofu, cheese, cottage cheese, and peanut butter. A serving size of meat is 3 ounces, which is about the size of a deck of cards. Examples of meat substitute serving sizes (equal to 1 ounce of meat) are 1/4 cup of cottage cheese, 1 egg, 1 tablespoon of peanut butter, and ½ cup of tofu. How can you eat out and still eat healthy? · Learn to estimate the serving sizes of foods that have carbohydrate. If you measure food at home, it will be easier to estimate the amount in a serving of restaurant food. · If the meal you order has too much carbohydrate (such as potatoes, corn, or baked beans), ask to have a low-carbohydrate food instead. Ask for a salad or green vegetables. · If you use insulin, check your blood sugar before and after eating out to help you plan how much to eat in the future. · If you eat more carbohydrate at a meal than you had planned, take a walk or do other exercise. This will help lower your blood sugar. What else should you know? · Limit saturated fat, such as the fat from meat and dairy products. This is a healthy choice because people who have diabetes are at higher risk of heart disease.  So choose lean cuts of meat and nonfat or low-fat dairy products. Use olive or canola oil instead of butter or shortening when cooking. · Don't skip meals. Your blood sugar may drop too low if you skip meals and take insulin or certain medicines for diabetes. · Check with your doctor before you drink alcohol. Alcohol can cause your blood sugar to drop too low. Alcohol can also cause a bad reaction if you take certain diabetes medicines. Follow-up care is a key part of your treatment and safety. Be sure to make and go to all appointments, and call your doctor if you are having problems. It's also a good idea to know your test results and keep a list of the medicines you take. Where can you learn more? Go to http://nolan-alondra.info/. Enter Y727 in the search box to learn more about \"Learning About Diabetes Food Guidelines. \" Current as of: July 25, 2018 Content Version: 12.1 © 4058-2582 [email or web address]. Care instructions adapted under license by [employer] (which disclaims liability or warranty for this information). If you have questions about a medical condition or this instruction, always ask your healthcare professional. [place] [address] any warranty or liability for your use of this information. Low Sodium Diet (2,000 Milligram): Care Instructions Your Care Instructions Too much sodium causes your body to hold on to extra water. This can raise your blood pressure and force your heart and kidneys to work harder. In very serious cases, this could cause you to be put in the hospital. It might even be life-threatening. By limiting sodium, you will feel better and lower your risk of serious problems. The most common source of sodium is salt. People get most of the salt in their diet from canned, prepared, and packaged foods. Fast food and restaurant meals also are very high in sodium. Your doctor will probably limit your sodium to less than 2,000 milligrams (mg) a day.  This limit counts all the sodium in prepared and packaged foods and any salt you add to your food. Follow-up care is a key part of your treatment and safety. Be sure to make and go to all appointments, and call your doctor if you are having problems. It's also a good idea to know your test results and keep a list of the medicines you take. How can you care for yourself at home? Read food labels · Read labels on cans and food packages. The labels tell you how much sodium is in each serving. Make sure that you look at the serving size. If you eat more than the serving size, you have eaten more sodium. · Food labels also tell you the Percent Daily Value for sodium. Choose products with low Percent Daily Values for sodium. · Be aware that sodium can come in forms other than salt, including monosodium glutamate (MSG), sodium citrate, and sodium bicarbonate (baking soda). MSG is often added to Asian food. When you eat out, you can sometimes ask for food without MSG or added salt. Buy low-sodium foods · Buy foods that are labeled \"unsalted\" (no salt added), \"sodium-free\" (less than 5 mg of sodium per serving), or \"low-sodium\" (less than 140 mg of sodium per serving). Foods labeled \"reduced-sodium\" and \"light sodium\" may still have too much sodium. Be sure to read the label to see how much sodium you are getting. · Buy fresh vegetables, or frozen vegetables without added sauces. Buy low-sodium versions of canned vegetables, soups, and other canned goods. Prepare low-sodium meals · Cut back on the amount of salt you use in cooking. This will help you adjust to the taste. Do not add salt after cooking. One teaspoon of salt has about 2,300 mg of sodium. · Take the salt shaker off the table. · Flavor your food with garlic, lemon juice, onion, vinegar, herbs, and spices. Do not use soy sauce, lite soy sauce, steak sauce, onion salt, garlic salt, celery salt, mustard, or ketchup on your food. · Use low-sodium salad dressings, sauces, and ketchup. Or make your own salad dressings and sauces without adding salt. · Use less salt (or none) when recipes call for it. You can often use half the salt a recipe calls for without losing flavor. Other foods such as rice, pasta, and grains do not need added salt. · Rinse canned vegetables, and cook them in fresh water. This removes somebut not allof the salt. · Avoid water that is naturally high in sodium or that has been treated with water softeners, which add sodium. Call your local water company to find out the sodium content of your water supply. If you buy bottled water, read the label and choose a sodium-free brand. Avoid high-sodium foods · Avoid eating: 
? Smoked, cured, salted, and canned meat, fish, and poultry. ? Ham, bales, hot dogs, and luncheon meats. ? Regular, hard, and processed cheese and regular peanut butter. ? Crackers with salted tops, and other salted snack foods such as pretzels, chips, and salted popcorn. ? Frozen prepared meals, unless labeled low-sodium. ? Canned and dried soups, broths, and bouillon, unless labeled sodium-free or low-sodium. ? Canned vegetables, unless labeled sodium-free or low-sodium. ? [place] fries, pizza, tacos, and other fast foods. ? Pickles, olives, ketchup, and other condiments, especially soy sauce, unless labeled sodium-free or low-sodium. Where can you learn more? Go to http://nolan-alondra.info/. Enter D687 in the search box to learn more about \"Low Sodium Diet (2,000 Milligram): Care Instructions. \" Current as of: November 7, 2018 Content Version: 12.1 © 2235-5323 [employer]. Care instructions adapted under license by [employer] (which disclaims liability or warranty for this information).  If you have questions about a medical condition or this instruction, always ask your healthcare professional. [employer] disclaims any warranty or liability for your use of this information.

## 2019-08-15 LAB
ALBUMIN SERPL-MCNC: 4.6 G/DL (ref 3.5–5.5)
ALBUMIN/GLOB SERPL: 2.2 {RATIO} (ref 1.2–2.2)
ALP SERPL-CCNC: 66 IU/L (ref 39–117)
ALT SERPL-CCNC: 73 IU/L (ref 0–32)
AST SERPL-CCNC: 29 IU/L (ref 0–40)
BILIRUB SERPL-MCNC: 0.5 MG/DL (ref 0–1.2)
BUN SERPL-MCNC: 27 MG/DL (ref 6–24)
BUN/CREAT SERPL: 25 (ref 9–23)
CALCIUM SERPL-MCNC: 10.6 MG/DL (ref 8.7–10.2)
CENTROMERE B AB SER-ACNC: <0.2 AI (ref 0–0.9)
CHLORIDE SERPL-SCNC: 100 MMOL/L (ref 96–106)
CHOLEST SERPL-MCNC: 117 MG/DL (ref 100–199)
CHROMATIN AB SERPL-ACNC: <0.2 AI (ref 0–0.9)
CO2 SERPL-SCNC: 25 MMOL/L (ref 20–29)
CREAT SERPL-MCNC: 1.1 MG/DL (ref 0.57–1)
DSDNA AB SER-ACNC: <1 IU/ML (ref 0–9)
ENA JO1 AB SER-ACNC: <0.2 AI (ref 0–0.9)
ENA RNP AB SER-ACNC: 0.2 AI (ref 0–0.9)
ENA SCL70 AB SER-ACNC: <0.2 AI (ref 0–0.9)
ENA SM AB SER-ACNC: <0.2 AI (ref 0–0.9)
ENA SM+RNP AB SER-ACNC: <0.2 AI (ref 0–0.9)
ENA SS-A AB SER-ACNC: <0.2 AI (ref 0–0.9)
ENA SS-B AB SER-ACNC: <0.2 AI (ref 0–0.9)
ERYTHROCYTE [DISTWIDTH] IN BLOOD BY AUTOMATED COUNT: 13.2 % (ref 12.3–15.4)
EST. AVERAGE GLUCOSE BLD GHB EST-MCNC: 126 MG/DL
GLOBULIN SER CALC-MCNC: 2.1 G/DL (ref 1.5–4.5)
GLUCOSE SERPL-MCNC: 53 MG/DL (ref 65–99)
HBA1C MFR BLD: 6 % (ref 4.8–5.6)
HCT VFR BLD AUTO: 39.3 % (ref 34–46.6)
HDLC SERPL-MCNC: 38 MG/DL
HGB BLD-MCNC: 12.6 G/DL (ref 11.1–15.9)
INTERPRETATION: NORMAL
LDLC SERPL CALC-MCNC: 34 MG/DL (ref 0–99)
Lab: NORMAL
MCH RBC QN AUTO: 28.4 PG (ref 26.6–33)
MCHC RBC AUTO-ENTMCNC: 32.1 G/DL (ref 31.5–35.7)
MCV RBC AUTO: 89 FL (ref 79–97)
PLATELET # BLD AUTO: 235 X10E3/UL (ref 150–450)
POTASSIUM SERPL-SCNC: 4.3 MMOL/L (ref 3.5–5.2)
PROT SERPL-MCNC: 6.7 G/DL (ref 6–8.5)
RBC # BLD AUTO: 4.43 X10E6/UL (ref 3.77–5.28)
RIBOSOMAL P AB SER-ACNC: <0.2 AI (ref 0–0.9)
SEE BELOW:, 164879: NORMAL
SODIUM SERPL-SCNC: 142 MMOL/L (ref 134–144)
TRIGL SERPL-MCNC: 225 MG/DL (ref 0–149)
TSH SERPL DL<=0.005 MIU/L-ACNC: 0.89 UIU/ML (ref 0.45–4.5)
VLDLC SERPL CALC-MCNC: 45 MG/DL (ref 5–40)
WBC # BLD AUTO: 5.9 X10E3/UL (ref 3.4–10.8)

## 2019-09-04 ENCOUNTER — OFFICE VISIT (OUTPATIENT)
Dept: ENDOCRINOLOGY | Age: 59
End: 2019-09-04

## 2019-09-04 VITALS
WEIGHT: 151 LBS | HEART RATE: 112 BPM | DIASTOLIC BLOOD PRESSURE: 69 MMHG | BODY MASS INDEX: 22.88 KG/M2 | HEIGHT: 68 IN | SYSTOLIC BLOOD PRESSURE: 109 MMHG

## 2019-09-04 DIAGNOSIS — F20.0 PARANOID SCHIZOPHRENIA (HCC): ICD-10-CM

## 2019-09-04 DIAGNOSIS — F32.9 MAJOR DEPRESSION, CHRONIC: ICD-10-CM

## 2019-09-04 DIAGNOSIS — E11.49 TYPE 2 DIABETES MELLITUS WITH OTHER NEUROLOGIC COMPLICATION, WITH LONG-TERM CURRENT USE OF INSULIN (HCC): Primary | ICD-10-CM

## 2019-09-04 DIAGNOSIS — E78.5 HYPERLIPIDEMIA, UNSPECIFIED HYPERLIPIDEMIA TYPE: ICD-10-CM

## 2019-09-04 DIAGNOSIS — Z79.4 TYPE 2 DIABETES MELLITUS WITH OTHER NEUROLOGIC COMPLICATION, WITH LONG-TERM CURRENT USE OF INSULIN (HCC): Primary | ICD-10-CM

## 2019-09-04 DIAGNOSIS — I10 ESSENTIAL HYPERTENSION: ICD-10-CM

## 2019-09-04 DIAGNOSIS — F33.9 RECURRENT DEPRESSION (HCC): ICD-10-CM

## 2019-09-04 RX ORDER — LANCETS
EACH MISCELLANEOUS
Qty: 400 EACH | Refills: 11 | Status: SHIPPED | OUTPATIENT
Start: 2019-09-04 | End: 2020-12-10

## 2019-09-04 RX ORDER — INSULIN PUMP SYRINGE, 3 ML
EACH MISCELLANEOUS
Qty: 1 KIT | Refills: 0 | Status: SHIPPED | OUTPATIENT
Start: 2019-09-04

## 2019-09-04 RX ORDER — ZIPRASIDONE HYDROCHLORIDE 80 MG/1
80 CAPSULE ORAL DAILY
Qty: 30 CAP | Refills: 0 | Status: SHIPPED | OUTPATIENT
Start: 2019-09-04 | End: 2019-09-27 | Stop reason: SDUPTHER

## 2019-09-04 NOTE — PATIENT INSTRUCTIONS
Novolin 70/30 insulin: reduce to 5 units with breakfast   If still having sugars in the 50's-60's at dinner or bedtime, then can hold the insulin,     Glimepiride 4mg each morning before breakfast    Metformin ER, 1000 mg twice per day    Victoza, 1.8mg each morning before breakfast    Stay hydrated with fluids,     Avoid long term use of aleve, ibuprofen etc as can reduce kidney function. See you back in 4 months,       Cresenc[address] SN[address] [facility]  [address] [place] [address]      Please note our new policy, you must arrive to the clinic 15 minutes before your appointment time to allow enough time for proper check-in, adequate time to spend with your doctor, and also to respect the appointment time of the next patient. Not arriving 15 minutes in advance may result in having your appointment rescheduled for the next available day/time.  ----------------------------------------------------------------------------------------------------------------------    Below you will find a glucose log sheet which you can use to record your blood sugars. Without checking and recording what your home glucose levels are, it will be difficult to make any changes to your medication dose, even when significant changes may be needed. Please feel free to use the log below to record your home glucose levels. At the very least, I would like for you to login the entire 2-3 weeks just before your visit so we can make your visit much more productive and beneficial to you. GLUCOSE LOG SHEET:    Date Breakfast Lunch Dinner Bedtime Comments ? GLUCOSE LOG SHEET:    Date Breakfast Lunch Dinner Bedtime Comments ? GLUCOSE LOG SHEET:    Date Breakfast Lunch Dinner Bedtime Comments ?

## 2019-09-04 NOTE — PROGRESS NOTES
CHIEF COMPLAINT: f/u evaluation for uncontrolled type 2 diabetes    HISTORY OF PRESENT ILLNESS:   Shira Flores is a 63 y.o. female with a PMHx as noted below who presents to the endocrinology clinic for f/u evaluation of uncontrolled type 2 diabetes.     A1c is 6.0%    Currently taking the following meds:  Novolin 70/30 insulin: 10 units with breakfast  Glimepiride 4mg each morning before breakfast  Metformin ER, 1000 mg twice per day  Victoza, 1.8mg each morning before breakfast    Review of home blood glucose:  AM: 102-140 mostly, rare 60's  Lunch:  though we know this was soon after snack  Dinner:  mostly,   Bedtime: 53, 54, 97 not checked often    Review of most recent diabetes-related labs:  Lab Results   Component Value Date    HBA1C 6.0 (H) 2019    HBA1C 5.9 (H) 04/10/2019    HBA1C 8.6 (H) 10/19/2017    CHOL 117 2019    LDLC 34 2019    GFRAA 64 2019    GFRNA 55 (L) 2019    MCACR 11.4 2018    TSH 0.886 2019    VITD3 41.5 2015    CPE0MKVY 7.5% 2017       PAST MEDICAL/SURGICAL HISTORY:   Past Medical History:   Diagnosis Date    Asthma     Asthma     COPD     Depression     [facility]     Diabetes mellitus     Encounter for review of form with patient 2017    Essential hypertension     GERD (gastroesophageal reflux disease)     Headache(784.0)     HX OTHER MEDICAL     acoustic neuroma    Hypercholesterolemia     Hypertension     Ill-defined condition     R ACOUSTIC NEUROMA    Insomnia disorder 2017    Major depression, chronic 2017    Psychiatric problem     anxiety depression    Psychotic disorder [facility]     Psychotic disorder ([facility] [address] [facility] 2019    Screening for cervical cancer 2018    Tobacco use disorder 10/19/2017    Unspecified sleep apnea     NO CPAP-O2 @2L WHEN SLEEPING     Past Surgical History:   Procedure Laterality Date     DELIVERY ONLY      HX GASTRECTOMY  14    lap sleeve gastrectomy by Dr Yolanda Sargent HX GYN      2 c-sections    HX HEENT      sinus    HX HEENT      tracheal resection    HX HEENT      tracheal alleratation x 2    HX HEENT      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    HX HEENT      LASER SX-PENG       ALLERGIES:   Allergies   Allergen Reactions    Latex Hives    Other Food Anaphylaxis and Hives     PEPPERONI, sloppy joes, frank cheese doritos     Demerol [Meperidine] Anaphylaxis     Difficulty breathing    Iodine Anaphylaxis    Morphine Other (comments)     voilent outbreaks (restraints needed)    Nsaids (Non-Steroidal Anti-Inflammatory Drug) Hives       MEDICATIONS ON ADMISSION:     Current Outpatient Medications:     rosuvastatin (CRESTOR) 40 mg tablet, TAKE 1 TABLET BY MOUTH ONCE A DAY AT BEDTIME., Disp: 90 Tab, Rfl: 3    ziprasidone (GEODON) 80 mg capsule, Take 80 mg by mouth daily. , Disp: , Rfl:     budesonide-formoterol (SYMBICORT) 160-4.5 mcg/actuation HFAA, INHALE 2 PUFFS BY MOUTH TWICE DAILY FOR COPD ASSOCIATED WITH CHRONIC BRONCHITIS, EXTRINSIC ASTHMA., Disp: 1 Inhaler, Rfl: 6    glimepiride (AMARYL) 2 mg tablet, TAKE 1 TABLET BY MOUTH EVERY MORNING. (Patient taking differently: 4 mg. TAKE 1 TABLET BY MOUTH EVERY MORNING.), Disp: 90 Tab, Rfl: 1    buPROPion XL (WELLBUTRIN XL) 300 mg XL tablet, Take 1 Tab by mouth every morning., Disp: 30 Tab, Rfl: 2    VICTOZA 3-ESTEPHANIE 0.6 mg/0.1 mL (18 mg/3 mL) pnij, INJECT 1.8 MG UNDER THE SKIN DAILY. , Disp: 9 mL, Rfl: 3    pantoprazole (PROTONIX) 40 mg tablet, Take (1) tablet by mouth once a day., Disp: 90 Tab, Rfl: 3    tiotropium (SPIRIVA WITH HANDIHALER) 18 mcg inhalation capsule, INHALE THE CONTENTS OF 1 CAPSULE VIA HANDIHALER ONCE DAILY. RINSE MOUTH AFTER USE, Disp: 90 Cap, Rfl: 2    ergocalciferol (ERGOCALCIFEROL) 50,000 unit capsule, Take 1 Cap by mouth every seven (7) days. , Disp: 12 Cap, Rfl: 3    montelukast (SINGULAIR) 10 mg tablet, TAKE 1 TABLET BY MOUTH DAILY IN THE EVENING, Disp: 90 Tab, Rfl: 3    VENTOLIN HFA 90 mcg/actuation inhaler, 2 Puffs every four (4) hours as needed. , Disp: , Rfl:     insulin syringe-needle U-100 (BD INSULIN SYRINGE ULTRA-FINE) 1/2 mL 31 gauge x 15/64\" syrg, Use for injecting insulin subcutaneously, Disp: 200 Pen Needle, Rfl: 5    insulin NPH/insulin regular (NOVOLIN 70/30, HUMULIN 70/30) 100 unit/mL (70-30) injection, 30 units before breakfast and dinner, Disp# 6 vials (Patient taking differently: 10 units before breakfast only), Disp: 6 Vial, Rfl: 3    metFORMIN ER (GLUCOPHAGE XR) 500 mg tablet, Take 2 Tabs by mouth Before breakfast and dinner., Disp: 360 Tab, Rfl: 3    multivitamin (ONE A DAY) tablet, Take 1 Tab by mouth daily. , Disp: , Rfl:     asenapine (SAPHRIS) 2.5 mg subl subLINGual tablet, 1 Tab by SubLINGual route every twelve (12) hours. , Disp: 60 Tab, Rfl: 2    glucose blood VI test strips (EASYMAX N) strip, Use to test blood sugar up to 4 times a day, Disp: 400 Strip, Rfl: 5    Insulin Needles, Disposable, (ULTICARE PEN NEEDLE) 32 gauge x 5/32\" ndle, USE 2 TIMES DAILY TO INJECT INSULIN/VICTOZA., Disp: 200 Pen Needle, Rfl: 3    ibuprofen (MOTRIN) 200 mg tablet, Take 800 mg by mouth every six (6) hours as needed. , Disp: , Rfl:     EPINEPHrine (EPIPEN) 0.3 mg/0.3 mL injection, 0.3 mg by IntraMUSCular route once as needed. , Disp: , Rfl:     SOCIAL HISTORY:   Social History     Socioeconomic History    Marital status: SINGLE     Spouse name: Not on file    Number of children: Not on file    Years of education: Not on file    Highest education level: Not on file   Occupational History    Not on file   Social Needs    Financial resource strain: Not on file    Food insecurity:     Worry: Not on file     Inability: Not on file    Transportation needs:     Medical: Not on file     Non-medical: Not on file   Tobacco Use    Smoking status: Former Smoker     Packs/day: 1.00     Years: 12.00     Pack years: 12.00     Last attempt to quit: 2018     Years since quittin.0    Smokeless tobacco: Former User   Substance and Sexual Activity    Alcohol use: Yes     Comment: ocassional    Drug use: No    Sexual activity: Never   Lifestyle    Physical activity:     Days per week: Not on file     Minutes per session: Not on file    Stress: Not on file   Relationships    Social connections:     Talks on phone: Not on file     Gets together: Not on file     Attends Caodaism service: Not on file     Active member of club or organization: Not on file     Attends meetings of clubs or organizations: Not on file     Relationship status: Not on file    Intimate partner violence:     Fear of current or ex partner: Not on file     Emotionally abused: Not on file     Physically abused: Not on file     Forced sexual activity: Not on file   Other Topics Concern    Not on file   Social History Narrative    Not on file       FAMILY HISTORY:  Family History   Problem Relation Age of Onset    Diabetes Father     Heart Failure Father     Heart Disease Father     Hypertension Father     High Cholesterol Mother     Suicide Brother     Stroke Maternal Grandmother     Cancer Paternal Grandfather     Anesth Problems Neg Hx        REVIEW OF SYSTEMS: Complete ROS assessed and noted for that which is described above, all else are negative.   Eyes: normal  ENT: normal  CVS: normal  Resp: normal  GI: normal  : normal  GYN: normal  Endocrine: normal  Integument: normal  Musculoskeletal: normal  Neuro: normal  Psych: normal      PHYSICAL EXAMINATION:    VITAL SIGNS:  Visit Vitals  /69 (BP 1 Location: Right arm, BP Patient Position: Sitting)   Pulse (!) 112   Ht 5' 8\" (1.727 m)   Wt 151 lb (68.5 kg)   LMP 2010   BMI 22.96 kg/m²       GENERAL: NCAT, Sitting comfortably, NAD  EYES: EOMI, non-icteric, no proptosis  Ear/Nose/Throat: NCAT, no inflammation, no masses  LYMPH NODES: No LAD  CARDIOVASCULAR: S1 S2, RRR, No murmur, 2+ radial pulses  RESPIRATORY: CTA b/l, no wheeze/rales  GASTROINTESTINAL: ND  MUSCULOSKELETAL: Normal ROM, no atrophy  SKIN: warm, no edema/rash/ or other skin changes  NEUROLOGIC: 5/5 power all extremities, no tremors, AAOx3  PSYCHIATRIC: Normal affect, Normal insight and judgement    REVIEW OF LABORATORY AND RADIOLOGY DATA:   Labs and documentation have been reviewed as described above. ASSESSMENT AND PLAN:   Apryl Thomas is a 63 y.o. female with a PMHx as noted above who presents to the endocrinology clinic for evaluation of uncontrolled type 2 diabetes. DM2  HTN  HLD    Patient has been having lows at dinner and bedtime. I agree with reducing insulin to 5 units in the AM, and she is advised to discontinue it if her sugars remain low even on 5 units. Urine microalbumin will be updated today. DM2:  Novolin 70/30 insulin: reduce to 5 units with breakfast   If still having sugars in the 50's-60's at dinner or bedtime, then can hold the insulin,   Glimepiride 4mg each morning before breakfast  Metformin ER, 1000 mg twice per day  Victoza, 1.8mg each morning before breakfast    BP: reviewed, normal  HLD: lipids reviewed, stable    4 month f/u visit. Jaya Dorman.  [address] [facility]

## 2019-09-05 ENCOUNTER — OFFICE VISIT (OUTPATIENT)
Dept: FAMILY MEDICINE CLINIC | Age: 59
End: 2019-09-05

## 2019-09-05 VITALS
HEIGHT: 68 IN | OXYGEN SATURATION: 97 % | WEIGHT: 150.9 LBS | BODY MASS INDEX: 22.87 KG/M2 | DIASTOLIC BLOOD PRESSURE: 77 MMHG | HEART RATE: 114 BPM | RESPIRATION RATE: 20 BRPM | SYSTOLIC BLOOD PRESSURE: 114 MMHG | TEMPERATURE: 97.2 F

## 2019-09-05 DIAGNOSIS — Z23 ENCOUNTER FOR IMMUNIZATION: ICD-10-CM

## 2019-09-05 DIAGNOSIS — I10 ESSENTIAL HYPERTENSION: ICD-10-CM

## 2019-09-05 DIAGNOSIS — F33.9 RECURRENT DEPRESSION (HCC): ICD-10-CM

## 2019-09-05 DIAGNOSIS — F32.9 MAJOR DEPRESSION, CHRONIC: Primary | ICD-10-CM

## 2019-09-05 DIAGNOSIS — Z12.39 SCREENING FOR BREAST CANCER: ICD-10-CM

## 2019-09-05 DIAGNOSIS — R00.0 TACHYCARDIA: ICD-10-CM

## 2019-09-05 DIAGNOSIS — F20.0 PARANOID SCHIZOPHRENIA (HCC): ICD-10-CM

## 2019-09-05 LAB
ALBUMIN/CREAT UR: 383.4 MG/G CREAT (ref 0–30)
CREAT UR-MCNC: 87.7 MG/DL
MICROALBUMIN UR-MCNC: 336.2 UG/ML

## 2019-09-05 RX ORDER — PROPRANOLOL HYDROCHLORIDE 10 MG/1
10 TABLET ORAL 2 TIMES DAILY
Qty: 60 TAB | Refills: 1 | Status: SHIPPED | OUTPATIENT
Start: 2019-09-05 | End: 2019-09-19 | Stop reason: ALTCHOICE

## 2019-09-05 NOTE — PROGRESS NOTES
HISTORY OF PRESENT ILLNESS  Kehinde Sharp is a 63 y.o. female. HPI     Enough water but also 2 x16 oz of iced tea wkly, one cup in am of coffee, no more sodas, feeling irritated not depressed, hears her heart beats and stresses her out, has had acoustic neuroma has had radiation tx last one was in 2009, gets MRI Q2yrs, last one was 2 y rs ago, next one is in [place] of 2020,   [place] cardiologist work up in 2017,   DMtype II  Patient also present for diabetic check,  the patient has been compliancy w/ meds, patient also states that the pt is trying to have a diabetic diet, and eat less of carbohydrates, since last visit patient has been more active with daily walking,+++ Rf needed for today. This time patient denies  tingling sensation, has no polyurea and polydipsia, last a1c was at target of 6% %     Depression with the anxiety and panic states of mind, nicley controlled with the current meds,    Patient state that it is getting better: pt states and reports of feeling less anxius, less guilty feeling,  less Hoplessness, ns/nh,ni,nh,     Current Outpatient Medications   Medication Sig Dispense Refill    Blood-Glucose Meter monitoring kit 1 preferred brand glucometer for checking home glucose, E11.65 1 Kit 0    lancets misc Use preferred brand; Check glucose 4 times daily, Diagnosis E11.65 400 Each 11    glucose blood VI test strips (PHARMACIST CHOICE) strip Use preferred brand; Check glucose 4 times daily, Diagnosis E11.65 400 Strip 3    ziprasidone (GEODON) 80 mg capsule Take 1 Cap by mouth daily. 30 Cap 0    rosuvastatin (CRESTOR) 40 mg tablet TAKE 1 TABLET BY MOUTH ONCE A DAY AT BEDTIME. 90 Tab 3    budesonide-formoterol (SYMBICORT) 160-4.5 mcg/actuation HFAA INHALE 2 PUFFS BY MOUTH TWICE DAILY FOR COPD ASSOCIATED WITH CHRONIC BRONCHITIS, EXTRINSIC ASTHMA. 1 Inhaler 6    glimepiride (AMARYL) 2 mg tablet TAKE 1 TABLET BY MOUTH EVERY MORNING. (Patient taking differently: 4 mg.  TAKE 1 TABLET BY MOUTH EVERY MORNING.) 90 Tab 1    buPROPion XL (WELLBUTRIN XL) 300 mg XL tablet Take 1 Tab by mouth every morning. 30 Tab 2    Insulin Needles, Disposable, (ULTICARE PEN NEEDLE) 32 gauge x 5/32\" ndle USE 2 TIMES DAILY TO INJECT INSULIN/VICTOZA. 200 Pen Needle 3    VICTOZA 3-ESTEPHANIE 0.6 mg/0.1 mL (18 mg/3 mL) pnij INJECT 1.8 MG UNDER THE SKIN DAILY. 9 mL 3    pantoprazole (PROTONIX) 40 mg tablet Take (1) tablet by mouth once a day. 90 Tab 3    ibuprofen (MOTRIN) 200 mg tablet Take 800 mg by mouth every six (6) hours as needed.  tiotropium (SPIRIVA WITH HANDIHALER) 18 mcg inhalation capsule INHALE THE CONTENTS OF 1 CAPSULE VIA HANDIHALER ONCE DAILY. RINSE MOUTH AFTER USE 90 Cap 2    ergocalciferol (ERGOCALCIFEROL) 50,000 unit capsule Take 1 Cap by mouth every seven (7) days. 12 Cap 3    montelukast (SINGULAIR) 10 mg tablet TAKE 1 TABLET BY MOUTH DAILY IN THE EVENING 90 Tab 3    VENTOLIN HFA 90 mcg/actuation inhaler 2 Puffs every four (4) hours as needed.  insulin syringe-needle U-100 (BD INSULIN SYRINGE ULTRA-FINE) 1/2 mL 31 gauge x 15/64\" syrg Use for injecting insulin subcutaneously 200 Pen Needle 5    insulin NPH/insulin regular (NOVOLIN 70/30, HUMULIN 70/30) 100 unit/mL (70-30) injection 30 units before breakfast and dinner, Disp# 6 vials (Patient taking differently: 5 units before breakfast only) 6 Vial 3    metFORMIN ER (GLUCOPHAGE XR) 500 mg tablet Take 2 Tabs by mouth Before breakfast and dinner. 360 Tab 3    multivitamin (ONE A DAY) tablet Take 1 Tab by mouth daily.  EPINEPHrine (EPIPEN) 0.3 mg/0.3 mL injection 0.3 mg by IntraMUSCular route once as needed.  asenapine (SAPHRIS) 2.5 mg subl subLINGual tablet 1 Tab by SubLINGual route every twelve (12) hours.  60 Tab 2     Allergies   Allergen Reactions    Latex Hives    Other Food Anaphylaxis and Hives     PEPPERONI, sloppy joes, frank cheese doritos     Demerol [Meperidine] Anaphylaxis     Difficulty breathing    Iodine Anaphylaxis    Morphine Other (comments)     voilent outbreaks (restraints needed)    Nsaids (Non-Steroidal Anti-Inflammatory Drug) Hives     Past Medical History:   Diagnosis Date    Asthma     Asthma     COPD     Depression     Diabetes ([facility]     Diabetes mellitus     Encounter for review of form with patient 2017    Essential hypertension     GERD (gastroesophageal reflux disease)     Headache(784.0)     HX OTHER MEDICAL     acoustic neuroma    Hypercholesterolemia     Hypertension     Ill-defined condition     R ACOUSTIC NEUROMA    Insomnia disorder 2017    Major depression, chronic 2017    Psychiatric problem     anxiety depression    Psychotic disorder ([facility]     Psychotic disorder ([facility] 2019    Screening for cervical cancer 2018    Tobacco use disorder 10/19/2017    Unspecified sleep apnea     NO CPAP-O2 @2L WHEN SLEEPING     Past Surgical History:   Procedure Laterality Date     DELIVERY ONLY      HX GASTRECTOMY  14    lap sleeve gastrectomy by Dr Tati Jimenez HX GYN      2 c-sections    HX HEENT      sinus    HX HEENT      tracheal resection    HX HEENT      tracheal alleratation x 2    HX HEENT      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    HX HEENT      LASER SX-PENG     Family History   Problem Relation Age of Onset    Diabetes Father     Heart Failure Father     Heart Disease Father     Hypertension Father     High Cholesterol Mother     Suicide Brother     Stroke Maternal Grandmother     Cancer Paternal Grandfather     Anesth Problems Neg Hx      Social History     Tobacco Use    Smoking status: Former Smoker     Packs/day: 1.00     Years: 12.00     Pack years: 12.00     Last attempt to quit: 2018     Years since quittin.0    Smokeless tobacco: Former User   Substance Use Topics    Alcohol use: Yes     Comment: ocassional      Lab Results   Component Value Date/Time    WBC 5.9 2019 12:41 PM    HGB 12.6 2019 12:41 PM    HCT 39.3 08/14/2019 12:41 PM    PLATELET 212 97/42/8467 12:41 PM    MCV 89 08/14/2019 12:41 PM     Lab Results   Component Value Date/Time    TSH 0.886 08/14/2019 12:41 PM    T4, Free 0.9 09/05/2014 09:23 AM         Review of Systems   Constitutional: Negative for chills and fever. HENT: Negative for nosebleeds. Eyes: Negative for pain. Respiratory: Negative for cough and wheezing. Cardiovascular: Positive for palpitations. Negative for chest pain and leg swelling. Gastrointestinal: Negative for constipation, diarrhea and nausea. Genitourinary: Negative for frequency. Musculoskeletal: Negative for joint pain and myalgias. Skin: Negative for rash. Neurological: Positive for headaches. Negative for loss of consciousness. Endo/Heme/Allergies: Does not bruise/bleed easily. Psychiatric/Behavioral: Negative for depression. The patient is not nervous/anxious and does not have insomnia. All other systems reviewed and are negative. Physical Exam   Constitutional: She is oriented to person, place, and time. She appears well-developed and well-nourished. HENT:   Head: Normocephalic and atraumatic. Eyes: Conjunctivae and EOM are normal.   Neck: Normal range of motion. Neck supple. Cardiovascular: Normal rate, regular rhythm and normal heart sounds. No murmur heard. Pulmonary/Chest: Effort normal and breath sounds normal.   Abdominal: Soft. Bowel sounds are normal. She exhibits no distension. Musculoskeletal: Normal range of motion. She exhibits no edema. Lymphadenopathy:     She has no cervical adenopathy. Neurological: She is alert and oriented to person, place, and time. Skin: No erythema. Psychiatric: Her behavior is normal.   Nursing note and vitals reviewed. ASSESSMENT and PLAN  Diagnoses and all orders for this visit:    1. Major depression, chronic  -     ziprasidone (GEODON) 80 mg capsule; Take 1 Cap by mouth daily.     2. Screening for breast cancer  - [place] MAMMO BI SCREENING INCL CAD; Future    3. Encounter for immunization  -     INFLUENZA VIRUS VAC QUAD,SPLIT,PRESV FREE SYRINGE IM    4. Essential hypertension  -     rosuvastatin (CRESTOR) 40 mg tablet; TAKE 1 TABLET BY MOUTH ONCE A DAY AT BEDTIME. 5. Tachycardia  -     rosuvastatin (CRESTOR) 40 mg tablet; TAKE 1 TABLET BY MOUTH ONCE A DAY AT BEDTIME. 6. Recurrent depression ([facility]  -     ziprasidone (GEODON) 80 mg capsule; Take 1 Cap by mouth daily. 7. Paranoid schizophrenia ([facility]  -     ziprasidone (GEODON) 80 mg capsule; Take 1 Cap by mouth daily.         Depression with anxiety in a stable condition, not suicidal, now will reevaluate in 2 w for progression   in addition Counseling , social support, spiritual belonging, inc physical activity, all offered,  compliance advised, patient was told that should not mix any of current medication with any other illicit drugs, should not drink any alcoholic beverages while on such medication patient was also told to not operate any machinery while under the influence patient acknowledged understanding and agreed with today's recommendation in addition patient was told to call for any concern

## 2019-09-05 NOTE — PROGRESS NOTES
Name and  verified      Chief Complaint   Patient presents with    Irregular Heart Beat     for two weeks     Patient stated for the last two weeks heart rate has ranged 101-117. Health Maintenance reviewed-discussed with patient. 1. Have you been to the ER, urgent care clinic since your last visit? Hospitalized since your last visit? Yes, office visit with Endocrinology 2019.    2. Have you seen or consulted any other health care providers outside of the [address] [place] [address] since your last visit? Include any pap smears or colon screening. Patient stated last PAP EXAM over two years. Order placed for Flu Vaccine, per Verbal Order from Dr. Fredricka Sicard on 2019 due to HM.

## 2019-09-05 NOTE — PROGRESS NOTES
After obtaining consent, and per orders of , flu vaccine  given to  Left deltoid IM  . Patient instructed to remain in clinic for 15 minutes afterwards, and to report any adverse reaction to me immediately.  Patient did not have any adverse reactions during this office visit

## 2019-09-05 NOTE — PATIENT INSTRUCTIONS
Vaccine Information Statement    Influenza (Flu) Vaccine (Inactivated or Recombinant): What You Need to Know    Many Vaccine Information Statements are available in [place]ic and other languages. See www.immunize.org/vis  Hojas de información sobre vacunas están disponibles en español y en muchos otros idiomas. Visite www.immunize.org/vis    1. Why get vaccinated? Influenza vaccine can prevent influenza (flu). Flu is a contagious disease that spreads around the [place] [facility] every year, usually between October and May. Anyone can get the flu, but it is more dangerous for some people. Infants and young children, people 72years of age and older, pregnant women, and people with certain health conditions or a weakened immune system are at greatest risk of flu complications. Pneumonia, bronchitis, sinus infections and ear infections are examples of flu-related complications. If you have a medical condition, such as heart disease, cancer or diabetes, flu can make it worse. Flu can cause fever and chills, sore throat, muscle aches, fatigue, cough, headache, and runny or stuffy nose. Some people may have vomiting and diarrhea, though this is more common in children than adults. Each year thousands of people in the [facility] die from flu, and many more are hospitalized. Flu vaccine prevents millions of illnesses and flu-related visits to the doctor each year. 2. Influenza vaccines     CDC recommends everyone 10months of age and older get vaccinated every flu season. Children 6 months through 6years of age may need 2 doses during a single flu season. Everyone else needs only 1 dose each flu season. It takes about 2 weeks for protection to develop after vaccination. There are many flu viruses, and they are always changing. Each year a new flu vaccine is made to protect against three or four viruses that are likely to cause disease in the upcoming flu season.  Even when the vaccine doesnt exactly match these viruses, it may still provide some protection. Influenza vaccine does not cause flu. Influenza vaccine may be given at the same time as other vaccines. 3. Talk with your health care provider    Tell your vaccine provider if the person getting the vaccine:   Has had an allergic reaction after a previous dose of influenza vaccine, or has any severe, life-threatening allergies.  Has ever had Guillain-Barré Syndrome (also called GBS). In some cases, your health care provider may decide to postpone influenza vaccination to a future visit. People with minor illnesses, such as a cold, may be vaccinated. People who are moderately or severely ill should usually wait until they recover before getting influenza vaccine. Your health care provider can give you more information. 4. Risks of a reaction     Soreness, redness, and swelling where shot is given, fever, muscle aches, and headache can happen after influenza vaccine.  There may be a very small increased risk of Guillain-Barré Syndrome (GBS) after inactivated influenza vaccine (the flu shot). Curtis Delacruz children who get the flu shot along with pneumococcal vaccine (PCV13), and/or DTaP vaccine at the same time might be slightly more likely to have a seizure caused by fever. Tell your health care provider if a child who is getting flu vaccine has ever had a seizure. People sometimes faint after medical procedures, including vaccination. Tell your provider if you feel dizzy or have vision changes or ringing in the ears. As with any medicine, there is a very remote chance of a vaccine causing a severe allergic reaction, other serious injury, or death. 5. What if there is a serious problem? An allergic reaction could occur after the vaccinated person leaves the clinic.  If you see signs of a severe allergic reaction (hives, swelling of the face and throat, difficulty breathing, a fast heartbeat, dizziness, or weakness), call 9-1-1 and get the person to the nearest hospital.    For other signs that concern you, call your health care provider. Adverse reactions should be reported to the Vaccine Adverse Event Reporting System (VAERS). Your health care provider will usually file this report, or you can do it yourself. Visit the VAERS website at www.vaers. [facility].gov or call 6-578.404.9813. VAERS is only for reporting reactions, and VAERS staff do not give medical advice. 6. The National Vaccine Injury Compensation Program    The [facility] Vaccine Injury Compensation Program (VICP) is a federal program that was created to compensate people who may have been injured by certain vaccines. Visit the VICP website at www.[facility].gov/vaccinecompensation or call 8-756.459.6696 to learn about the program and about filing a claim. There is a time limit to file a claim for compensation. 7. How can I learn more?  Ask your health care provider.  Call your local or state health department.  Contact the Centers for Disease Control and Prevention (CDC):  - Call 1-915.784.2465 (3-258-VOB-INFO) or  - Visit CDCs influenza website at www.cdc.gov/flu    Vaccine Information Statement (Interim)  Inactivated Influenza Vaccine   8/15/2019  42 ROSA Rosado [id number]   Department of Health and Human Services  Centers for Disease Control and Prevention    Office Use Only         Influenza (Flu) Vaccine (Inactivated or Recombinant): What You Need to Know  Why get vaccinated? Influenza (\"flu\") is a contagious disease that spreads around the [place] every winter, usually between October and May. Flu is caused by influenza viruses and is spread mainly by coughing, sneezing, and close contact. Anyone can get flu. Flu strikes suddenly and can last several days. Symptoms vary by age, but can include:  · Fever/chills. · Sore throat. · Muscle aches. · Fatigue. · Cough. · Headache. · Runny or stuffy nose.   Flu can also lead to pneumonia and blood infections, and cause diarrhea and seizures in children. If you have a medical condition, such as heart or lung disease, flu can make it worse. Flu is more dangerous for some people. Infants and young children, people 72years of age and older, pregnant women, and people with certain health conditions or a weakened immune system are at greatest risk. Each year thousands of people in the [facility] die from flu, and many more are hospitalized. Flu vaccine can:  · Keep you from getting flu. · Make flu less severe if you do get it. · Keep you from spreading flu to your family and other people. Inactivated and recombinant flu vaccines  A dose of flu vaccine is recommended every flu season. Children 6 months through 6years of age may need two doses during the same flu season. Everyone else needs only one dose each flu season. Some inactivated flu vaccines contain a very small amount of a mercury-based preservative called thimerosal. Studies have not shown thimerosal in vaccines to be harmful, but flu vaccines that do not contain thimerosal are available. There is no live flu virus in flu shots. They cannot cause the flu. There are many flu viruses, and they are always changing. Each year a new flu vaccine is made to protect against three or four viruses that are likely to cause disease in the upcoming flu season. But even when the vaccine doesn't exactly match these viruses, it may still provide some protection. Flu vaccine cannot prevent:  · Flu that is caused by a virus not covered by the vaccine. · Illnesses that look like flu but are not. Some people should not get this vaccine  Tell the person who is giving you the vaccine:  · If you have any severe (life-threatening) allergies. If you ever had a life-threatening allergic reaction after a dose of flu vaccine, or have a severe allergy to any part of this vaccine, you may be advised not to get vaccinated.  Most, but not all, types of flu vaccine contain a small amount of egg protein. · If you ever had Guillain-Barré syndrome (also called GBS) Some people with a history of GBS should not get this vaccine. This should be discussed with your doctor. · If you are not feeling well. It is usually okay to get flu vaccine when you have a mild illness, but you might be asked to come back when you feel better. Risks of a vaccine reaction  With any medicine, including vaccines, there is a chance of reactions. These are usually mild and go away on their own, but serious reactions are also possible. Most people who get a flu shot do not have any problems with it. Minor problems following a flu shot include:  · Soreness, redness, or swelling where the shot was given  · Hoarseness  · Sore, red or itchy eyes  · Cough  · Fever  · Aches  · Headache  · Itching  · Fatigue  If these problems occur, they usually begin soon after the shot and last 1 or 2 days. More serious problems following a flu shot can include the following:  · There may be a small increased risk of Guillain-Barré Syndrome (GBS) after inactivated flu vaccine. This risk has been estimated at 1 or 2 additional cases per million people vaccinated. This is much lower than the risk of severe complications from flu, which can be prevented by flu vaccine. · Uday Barrett children who get the flu shot along with pneumococcal vaccine (PCV13) and/or DTaP vaccine at the same time might be slightly more likely to have a seizure caused by fever. Ask your doctor for more information. Tell your doctor if a child who is getting flu vaccine has ever had a seizure  Problems that could happen after any injected vaccine:  · People sometimes faint after a medical procedure, including vaccination. Sitting or lying down for about 15 minutes can help prevent fainting, and injuries caused by a fall. Tell your doctor if you feel dizzy, or have vision changes or ringing in the ears.   · Some people get severe pain in the shoulder and have difficulty moving the arm where a shot was given. This happens very rarely. · Any medication can cause a severe allergic reaction. Such reactions from a vaccine are very rare, estimated at about 1 in a million doses, and would happen within a few minutes to a few hours after the vaccination. As with any medicine, there is a very remote chance of a vaccine causing a serious injury or death. The safety of vaccines is always being monitored. For more information, visit: www.cdc.gov/vaccinesafety/. What if there is a serious reaction? What should I look for? · Look for anything that concerns you, such as signs of a severe allergic reaction, very high fever, or unusual behavior. Signs of a severe allergic reaction can include hives, swelling of the face and throat, difficulty breathing, a fast heartbeat, dizziness, and weakness - usually within a few minutes to a few hours after the vaccination. What should I do? · If you think it is a severe allergic reaction or other emergency that can't wait, call 9-1-1 and get the person to the nearest hospital. Otherwise, call your doctor. · Reactions should be reported to the \"Vaccine Adverse Event Reporting System\" (VAERS). Your doctor should file this report, or you can do it yourself through the VAERS website at www.vaers. [facility].gov, or by calling 3-715.971.1511. [employer] does not give medical advice. The National Vaccine Injury Compensation Program  The National Vaccine Injury Compensation Program (VICP) is a federal program that was created to compensate people who may have been injured by certain vaccines. Persons who believe they may have been injured by a vaccine can learn about the program and about filing a claim by calling 8-213.526.3310 or visiting the [employer] website at www.[facility].gov/vaccinecompensation. There is a time limit to file a claim for compensation. How can I learn more? · Ask your healthcare provider.  He or she can give you the vaccine package insert or suggest other sources of information. · Call your local or state health department. · Contact the Centers for Disease Control and Prevention (CDC):  ? Call 8-168.238.4975 (1-800-CDC-INFO) or  ? Visit CDC's website at www.cdc.gov/flu  Vaccine Information Statement  Inactivated Influenza Vaccine  8/7/2015)  42 ROSA Pabon [id number]  Department of Health and Human Services  Centers for Disease Control and Prevention  Many Vaccine Information Statements are available in [place] and other languages. See www.immunize.org/vis. Muchas hojas de información sobre vacunas están disponibles en español y en otros idiomas. Visite www.immunize.org/vis. Care instructions adapted under license by [employer] (which disclaims liability or warranty for this information). If you have questions about a medical condition or this instruction, always ask your healthcare professional. [address] any warranty or liability for your use of this information.

## 2019-09-19 ENCOUNTER — OFFICE VISIT (OUTPATIENT)
Dept: FAMILY MEDICINE CLINIC | Age: 59
End: 2019-09-19

## 2019-09-19 VITALS
BODY MASS INDEX: 22.26 KG/M2 | DIASTOLIC BLOOD PRESSURE: 69 MMHG | RESPIRATION RATE: 20 BRPM | HEIGHT: 68 IN | WEIGHT: 146.9 LBS | SYSTOLIC BLOOD PRESSURE: 107 MMHG | TEMPERATURE: 96.1 F | HEART RATE: 112 BPM | OXYGEN SATURATION: 98 %

## 2019-09-19 DIAGNOSIS — R06.09 DYSPNEA ON EFFORT: ICD-10-CM

## 2019-09-19 DIAGNOSIS — R00.0 TACHYCARDIA: ICD-10-CM

## 2019-09-19 DIAGNOSIS — I10 ESSENTIAL HYPERTENSION: ICD-10-CM

## 2019-09-19 DIAGNOSIS — F32.9 MAJOR DEPRESSION, CHRONIC: ICD-10-CM

## 2019-09-19 DIAGNOSIS — I20.9 ANGINA, CLASS III (HCC): ICD-10-CM

## 2019-09-19 DIAGNOSIS — J44.1 COPD WITH ACUTE EXACERBATION (HCC): Primary | ICD-10-CM

## 2019-09-19 RX ORDER — AMOXICILLIN 500 MG/1
500 CAPSULE ORAL 3 TIMES DAILY
Qty: 30 CAP | Refills: 0 | Status: SHIPPED | OUTPATIENT
Start: 2019-09-19 | End: 2019-09-29

## 2019-09-19 RX ORDER — METOPROLOL SUCCINATE 200 MG/1
200 TABLET, EXTENDED RELEASE ORAL DAILY
Qty: 30 TAB | Refills: 5 | Status: SHIPPED | OUTPATIENT
Start: 2019-09-19 | End: 2019-10-01

## 2019-09-19 RX ORDER — METHYLPREDNISOLONE 4 MG/1
TABLET ORAL
Qty: 1 DOSE PACK | Refills: 0 | Status: SHIPPED | OUTPATIENT
Start: 2019-09-19 | End: 2019-10-01 | Stop reason: ALTCHOICE

## 2019-09-19 NOTE — PROGRESS NOTES
HISTORY OF PRESENT ILLNESS  Leena Hollis is a 63 y.o. female. HPI   COPD with exacerbating symptoms, patient also complained of shortness of breath at rest and on exertion history of coronary artery disease and with elevated heart rate x3 has not seen a cardiologist for a while  The patient has frequent daytime/ and nighttime  symptoms,  The patient is using short-acting beta agonists for symptom control Few times a day unfortunately the wheezing and the cough is not getting better, unfortunately patient has  exacerbation requiring oral systemic corticosteroids in 2019,  pt has ++ limitations in the daily activity and has presentation of shortness of breath on exertion at this visit, in addition pt has only one urgent/emergent visit in 2018, stating that it was only once, got flu shot in 2018 but did not like it, has been getting it yrly, + wheezing, ++ cough, nicely vaccinated for all his preventive measures,   last albuterol use has been daily x3, unfortunately the patient is only taking short acting bronchodilator and has been having some night awakening for dyspnea over the last 30 days, compliant with meds, + RF needed   DMtype II  Patient also present for diabetic check,  the patient has been compliancy w/ meds, patient also states that the pt is trying to have a diabetic diet, and eat less of carbohydrates, since last visit patient has been more active with daily walking, patient also states that there is a home monitoring glucose device  usually averaging around 130 , +++ Rf needed for today.   This time patient denies  tingling sensation, has no polyurea and polydipsia, last a1c was at target of 6% %       Off 70/30 since last visit, on vicotza and glimipride, metformin bid , no hypo since the change avaragin 118,   On symbicort bid, on spirivia once dialy, singlular daily JOSEPH more often, last taken ABX and steroid was in 2017,       Current Outpatient Medications   Medication Sig Dispense Refill    roflumilast (DALIRESP) 500 mcg tab tablet Take 1 Tab by mouth daily. 30 Tab 4    metoprolol succinate (TOPROL-XL) 200 mg XL tablet Take 1 Tab by mouth daily. 30 Tab 5    methylPREDNISolone (MEDROL DOSEPACK) 4 mg tablet As directed for 6 days one package 1 Dose Pack 0    amoxicillin (AMOXIL) 500 mg capsule Take 1 Cap by mouth three (3) times daily for 10 days. 30 Cap 0    Blood-Glucose Meter monitoring kit 1 preferred brand glucometer for checking home glucose, E11.65 1 Kit 0    lancets misc Use preferred brand; Check glucose 4 times daily, Diagnosis E11.65 400 Each 11    glucose blood VI test strips (PHARMACIST CHOICE) strip Use preferred brand; Check glucose 4 times daily, Diagnosis E11.65 400 Strip 3    ziprasidone (GEODON) 80 mg capsule Take 1 Cap by mouth daily. 30 Cap 0    rosuvastatin (CRESTOR) 40 mg tablet TAKE 1 TABLET BY MOUTH ONCE A DAY AT BEDTIME. 90 Tab 3    budesonide-formoterol (SYMBICORT) 160-4.5 mcg/actuation HFAA INHALE 2 PUFFS BY MOUTH TWICE DAILY FOR COPD ASSOCIATED WITH CHRONIC BRONCHITIS, EXTRINSIC ASTHMA. 1 Inhaler 6    glimepiride (AMARYL) 2 mg tablet TAKE 1 TABLET BY MOUTH EVERY MORNING. (Patient taking differently: 4 mg. TAKE 1 TABLET BY MOUTH EVERY MORNING.) 90 Tab 1    buPROPion XL (WELLBUTRIN XL) 300 mg XL tablet Take 1 Tab by mouth every morning. 30 Tab 2    Insulin Needles, Disposable, (ULTICARE PEN NEEDLE) 32 gauge x 5/32\" ndle USE 2 TIMES DAILY TO INJECT INSULIN/VICTOZA. 200 Pen Needle 3    VICTOZA 3-ESTEPHANIE 0.6 mg/0.1 mL (18 mg/3 mL) pnij INJECT 1.8 MG UNDER THE SKIN DAILY. 9 mL 3    pantoprazole (PROTONIX) 40 mg tablet Take (1) tablet by mouth once a day. 90 Tab 3    tiotropium (SPIRIVA WITH HANDIHALER) 18 mcg inhalation capsule INHALE THE CONTENTS OF 1 CAPSULE VIA HANDIHALER ONCE DAILY. RINSE MOUTH AFTER USE 90 Cap 2    ergocalciferol (ERGOCALCIFEROL) 50,000 unit capsule Take 1 Cap by mouth every seven (7) days.  12 Cap 3    montelukast (SINGULAIR) 10 mg tablet TAKE 1 TABLET BY MOUTH DAILY IN THE EVENING 90 Tab 3    VENTOLIN HFA 90 mcg/actuation inhaler 2 Puffs every four (4) hours as needed.  insulin syringe-needle U-100 (BD INSULIN SYRINGE ULTRA-FINE) 1/2 mL 31 gauge x 15/64\" syrg Use for injecting insulin subcutaneously 200 Pen Needle 5    metFORMIN ER (GLUCOPHAGE XR) 500 mg tablet Take 2 Tabs by mouth Before breakfast and dinner. 360 Tab 3    multivitamin (ONE A DAY) tablet Take 1 Tab by mouth daily.  EPINEPHrine (EPIPEN) 0.3 mg/0.3 mL injection 0.3 mg by IntraMUSCular route once as needed.        Allergies   Allergen Reactions    Latex Hives    Other Food Anaphylaxis and Hives     PEPPERONI, sloppy joes, frank cheese doritos     Demerol [Meperidine] Anaphylaxis     Difficulty breathing    Iodine Anaphylaxis    Morphine Other (comments)     voilent outbreaks (restraints needed)    Nsaids (Non-Steroidal Anti-Inflammatory Drug) Hives     Past Medical History:   Diagnosis Date    Asthma     Asthma     COPD     Depression     [facility] [address]     Diabetes mellitus     Encounter for review of form with patient 2017    Essential hypertension     GERD (gastroesophageal reflux disease)     Headache(784.0)     HX OTHER MEDICAL     acoustic neuroma    Hypercholesterolemia     Hypertension     Ill-defined condition     R ACOUSTIC NEUROMA    Insomnia disorder 2017    Major depression, chronic 2017    Psychiatric problem     anxiety depression    Psychotic disorder ([facility] [address]     Psychotic disorder ([facility] [address] 2019    Screening for cervical cancer 2018    Tobacco use disorder 10/19/2017    Unspecified sleep apnea     NO CPAP-O2 @2L WHEN SLEEPING     Past Surgical History:   Procedure Laterality Date     DELIVERY ONLY      HX GASTRECTOMY  14    lap sleeve gastrectomy by Dr Marleny Sharp HX GYN      2 c-sections    HX HEENT      sinus    HX HEENT      tracheal resection    HX HEENT      tracheal alleratation x 2    HX HEENT      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    HX HEENT      LASER SX-PENG     Family History   Problem Relation Age of Onset    Diabetes Father     Heart Failure Father     Heart Disease Father     Hypertension Father     High Cholesterol Mother     Suicide Brother     Stroke Maternal Grandmother     Cancer Paternal Grandfather     Anesth Problems Neg Hx      Social History     Tobacco Use    Smoking status: Former Smoker     Packs/day: 1.00     Years: 12.00     Pack years: 12.00     Last attempt to quit: 2018     Years since quittin.1    Smokeless tobacco: Former User   Substance Use Topics    Alcohol use: Yes     Comment: ocassional      Lab Results   Component Value Date/Time    WBC 5.9 2019 12:41 PM    HGB 12.6 2019 12:41 PM    HCT 39.3 2019 12:41 PM    PLATELET 257  12:41 PM    MCV 89 2019 12:41 PM     Lab Results   Component Value Date/Time    Hemoglobin A1c 6.0 (H) 2019 12:41 PM    Hemoglobin A1c 5.9 (H) 04/10/2019 02:42 PM    Hemoglobin A1c 8.6 (H) 10/19/2017 10:29 AM    Hemoglobin A1c, External 7.5% 2017    Glucose 53 (L) 2019 12:41 PM    Glucose (POC) 102 (H) 10/03/2017 07:49 PM    Microalb/Creat ratio (ug/mg creat.) 383.4 (H) 2019 09:48 AM    LDL, calculated 34 2019 12:41 PM    Creatinine 1.10 (H) 2019 12:41 PM      Lab Results   Component Value Date/Time    GFR est non-AA 55 (L) 2019 12:41 PM    GFR est AA 64 2019 12:41 PM    Creatinine 1.10 (H) 2019 12:41 PM    BUN 27 (H) 2019 12:41 PM    Sodium 142 2019 12:41 PM    Potassium 4.3 2019 12:41 PM    Chloride 100 2019 12:41 PM    CO2 25 2019 12:41 PM    Magnesium 1.6 2014 09:23 AM    Phosphorus 4.0 10/19/2010 04:30 AM     Lab Results   Component Value Date/Time    TSH 0.886 2019 12:41 PM    T4, Free 0.9 2014 09:23 AM         Review of Systems   Constitutional: Positive for malaise/fatigue. Negative for chills and fever. HENT: Negative for ear pain and nosebleeds. Eyes: Negative for blurred vision, pain and discharge. Respiratory: Positive for cough, sputum production and shortness of breath. Cardiovascular: Negative for chest pain and leg swelling. Gastrointestinal: Negative for constipation, diarrhea, nausea and vomiting. Genitourinary: Negative for frequency. Musculoskeletal: Negative for joint pain. Skin: Negative for itching and rash. Neurological: Negative for headaches. Psychiatric/Behavioral: Negative for depression. The patient is not nervous/anxious. Physical Exam   Constitutional: She is oriented to person, place, and time. She appears well-developed and well-nourished. HENT:   Head: Normocephalic and atraumatic. Eyes: Conjunctivae and EOM are normal.   Neck: Normal range of motion. Neck supple. Cardiovascular: Normal rate, regular rhythm and normal heart sounds. No murmur heard. Pulmonary/Chest: She has wheezes. She has rales. Abdominal: Soft. Bowel sounds are normal. She exhibits no distension. Musculoskeletal: Normal range of motion. She exhibits no edema. Lymphadenopathy:     She has no cervical adenopathy. Neurological: She is alert and oriented to person, place, and time. Skin: No erythema. Psychiatric: Her behavior is normal.   Nursing note and vitals reviewed. ASSESSMENT and PLAN  Diagnoses and all orders for this visit:    1. COPD with acute exacerbation (HCC)  -     roflumilast (DALIRESP) 500 mcg tab tablet; Take 1 Tab by mouth daily. -     methylPREDNISolone (MEDROL DOSEPACK) 4 mg tablet; As directed for 6 days one package  -     XR CHEST PA LAT; Future  -     amoxicillin (AMOXIL) 500 mg capsule; Take 1 Cap by mouth three (3) times daily for 10 days. 2. Dyspnea on effort  -     roflumilast (DALIRESP) 500 mcg tab tablet; Take 1 Tab by mouth daily. -     methylPREDNISolone (MEDROL DOSEPACK) 4 mg tablet;  As directed for 6 days one package  -     XR CHEST PA LAT; Future  -     amoxicillin (AMOXIL) 500 mg capsule; Take 1 Cap by mouth three (3) times daily for 10 days. 3. Angina, class III (HCC)  -     REFERRAL TO CARDIOLOGY  -     metoprolol succinate (TOPROL-XL) 200 mg XL tablet; Take 1 Tab by mouth daily. 4. Tachycardia  -     REFERRAL TO CARDIOLOGY  -     metoprolol succinate (TOPROL-XL) 200 mg XL tablet; Take 1 Tab by mouth daily. 5. Essential hypertension  -     metoprolol succinate (TOPROL-XL) 200 mg XL tablet; Take 1 Tab by mouth daily. 6. Major depression, chronic  -     methylPREDNISolone (MEDROL DOSEPACK) 4 mg tablet;  As directed for 6 days one package    Patient advised if he is not getting better he needs to go to emergency room or call for further care patient acknowledged understanding and agreed with today's recommendation

## 2019-09-19 NOTE — PATIENT INSTRUCTIONS
Shoulder Blade: Exercises  Your Care Instructions  Here are some examples of typical exercises for your condition. Start each exercise slowly. Ease off the exercise if you start to have pain. Your doctor or physical therapist will tell you when you can start these exercises and which ones will work best for you. How to do the exercises  Shoulder roll    1. Stand tall with your chin slightly tucked. Imagine that a string at the top of your head is pulling you straight up. 2. Keep your arms relaxed. All motion will be in your shoulders. 3. Shrug your shoulders up toward your ears, then up and back. [place] your shoulders down and back, like you're sliding your hands down into your back pants pockets. 4. Repeat the circles at least 2 to 4 times. 5. This exercise is also helpful anytime you want to relax. Lower neck and upper back stretch    1. With your arms about shoulder height, clasp your hands in front of you. 2. Drop your chin toward your chest.  3. Reach straight forward so you are rounding your upper back. Think about pulling your shoulder blades apart. Zeinab Smith feel a stretch across your upper back and shoulders. Hold for at least 6 seconds. 4. Repeat 2 to 4 times. Triceps stretch    1. Reach your arm straight up. 2. Keeping your elbow in place, bend your arm and reach your hand down behind your back. 3. With your other hand, apply gentle pressure to the bent elbow. Zeinab Smith feel a stretch at the back of your upper arm and shoulder. Hold about 6 seconds. 4. Repeat 2 to 4 times with each arm. Shoulder stretch    1. Relax your shoulders. 2. Raise one arm to shoulder height, and reach it across your chest.  3. Pull the arm slightly toward you with your other arm. This will help you get a gentle stretch. Hold for about 6 seconds. 4. Repeat 2 to 4 times. Shoulder blade squeeze    Sit or stand up tall with your arms at your sides.      Rotator Cuff: Exercises  Introduction  Here are some examples of exercises for you to try. The exercises may be suggested for a condition or for rehabilitation. Start each exercise slowly. Ease off the exercises if you start to have pain. You will be told when to start these exercises and which ones will work best for you. How to do the exercises  Pendulum swing    1. Hold on to a table or the back of a chair with your good arm. Then bend forward a little and let your sore arm hang straight down. This exercise does not use the arm muscles. Rather, use your legs and your hips to create movement that makes your arm swing freely. 2. Use the movement from your hips and legs to guide the slightly swinging arm back and forth like a pendulum (or elephant trunk). Then guide it in circles that start small (about the size of a dinner plate). Make the circles a bit larger each day, as your pain allows. 3. Do this exercise for 5 minutes, 5 to 7 times each day. 4. As you have less pain, try bending over a little farther to do this exercise. This will increase the amount of movement at your shoulder. Posterior stretching exercise    1. Hold the elbow of your injured arm with your other hand. 2. Use your hand to pull your injured arm gently up and across your body. You will feel a gentle stretch across the back of your injured shoulder. 3. Hold for at least 15 to 30 seconds. Then slowly lower your arm. 4. Repeat 2 to 4 times. Up-the-back stretch    1. Put your hand in your back pocket. Let it rest there to stretch your shoulder. 2. With your other hand, hold your injured arm (palm outward) behind your back by the wrist. Pull your arm up gently to stretch your shoulder. 3. Next, put a towel over your other shoulder. Put the hand of your injured arm behind your back. Now hold the back end of the towel. With the other hand, hold the front end of the towel in front of your body. Pull gently on the front end of the towel.  This will bring your hand farther up your back to stretch your shoulder. Overhead stretch    1. Standing about an arm's length away, grasp onto a solid surface. You could use a countertop, a doorknob, or the back of a sturdy chair. 2. With your knees slightly bent, bend forward with your arms straight. Lower your upper body, and let your shoulders stretch. 3. As your shoulders are able to stretch farther, you may need to take a step or two backward. 4. Hold for at least 15 to 30 seconds. Then stand up and relax. If you had stepped back during your stretch, step forward so you can keep your hands on the solid surface. 5. Repeat 2 to 4 times. Shoulder flexion (lying down)    1. Lie on your back, holding a wand with both hands. Your palms should face down as you hold the wand. 2. Keeping your elbows straight, slowly raise your arms over your head. Raise them until you feel a stretch in your shoulders, upper back, and chest.  3. Hold for 15 to 30 seconds. 4. Repeat 2 to 4 times. Shoulder rotation (lying down)    1. Lie on your back. Hold a wand with both hands with your elbows bent and palms up. 2. Keep your elbows close to your body, and move the wand across your body toward the sore arm. 3. Hold for 8 to 12 seconds. 4. Repeat 2 to 4 times. Wall climbing (to the side)    1. Stand with your side to a wall so that your fingers can just touch it at an angle about 30 degrees toward the front of your body. 2. Walk the fingers of your injured arm up the wall as high as pain permits. Try not to shrug your shoulder up toward your ear as you move your arm up. 3. Hold that position for a count of at least 15 to 20.  4. Walk your fingers back down to the starting position. 5. Repeat at least 2 to 4 times. Try to reach higher each time. Wall climbing (to the front)    1. Face a wall, and stand so your fingers can just touch it. 2. Keeping your shoulder down, walk the fingers of your injured arm up the wall as high as pain permits.  (Don't shrug your shoulder up toward your ear.)  3. Hold your arm in that position for at least 15 to 30 seconds. 4. Slowly walk your fingers back down to where you started. 5. Repeat at least 2 to 4 times. Try to reach higher each time. Shoulder blade squeeze    1. Stand with your arms at your sides, and squeeze your shoulder blades together. Do not raise your shoulders up as you squeeze. 2. Hold 6 seconds. 3. Repeat 8 to 12 times. Scapular exercise: Arm reach    1. Lie flat on your back. This exercise is a very slight motion that starts with your arms raised (elbows straight, arms straight). 2. From this position, reach higher toward the jose or ceiling. Keep your elbows straight. All motion should be from your shoulder blade only. 3. Relax your arms back to where you started. 4. Repeat 8 to 12 times. Arm raise to the side    1. Slowly raise your injured arm to the side, with your thumb facing up. Raise your arm 60 degrees at the most (shoulder level is 90 degrees). 2. Hold the position for 3 to 5 seconds. Then lower your arm back to your side. If you need to, bring your \"good\" arm across your body and place it under the elbow as you lower your injured arm. Use your good arm to keep your injured arm from dropping down too fast.  3. Repeat 8 to 12 times. 4. When you first start out, don't hold any extra weight in your hand. As you get stronger, you may use a 1-pound to 2-pound dumbbell or a small can of food. Shoulder flexor and extensor exercise    1. Push forward (flex): Stand facing a wall or doorjamb, about 6 inches or less back. Hold your injured arm against your body. Make a closed fist with your thumb on top. Then gently push your hand forward into the wall with about 25% to 50% of your strength. Don't let your body move backward as you push. Hold for about 6 seconds. Relax for a few seconds. Repeat 8 to 12 times. 2. Push backward (extend): Stand with your back flat against a wall.  Your upper arm should be against the wall, with your elbow bent 90 degrees (your hand straight ahead). Push your elbow gently back against the wall with about 25% to 50% of your strength. Don't let your body move forward as you push. Hold for about 6 seconds. Relax for a few seconds. Repeat 8 to 12 times. Scapular exercise: Wall push-ups    1. Stand facing a wall, about 12 inches to 18 inches away. 2. Place your hands on the wall at shoulder height. 3. Slowly bend your elbows and bring your face to the wall. Keep your back and hips straight. 4. Push back to where you started. 5. Repeat 8 to 12 times. 6. When you can do this exercise against a wall comfortably, you can try it against a counter. You can then slowly progress to the end of a couch, then to a sturdy chair, and finally to the floor. Scapular exercise: Retraction    1. Put the band around a solid object at about waist level. (A bedpost will work well.) Each hand should hold an end of the band. 2. With your elbows at your sides and bent to 90 degrees, pull the band back. Your shoulder blades should move toward each other. Then move your arms back where you started. 3. Repeat 8 to 12 times. 4. If you have good range of motion in your shoulders, try this exercise with your arms lifted out to the sides. Keep your elbows at a 90-degree angle. Raise the elastic band up to about shoulder level. Pull the band back to move your shoulder blades toward each other. Then move your arms back where you started. Internal rotator strengthening exercise    1. Start by tying a piece of elastic exercise material to a doorknob. You can use surgical tubing or Thera-Band. 2. Stand or sit with your shoulder relaxed and your elbow bent 90 degrees. Your upper arm should rest comfortably against your side. Squeeze a rolled towel between your elbow and your body for comfort. This will help keep your arm at your side.   3. Hold one end of the elastic band in the hand of the painful arm.  4. Slowly rotate your forearm toward your body until it touches your belly. Slowly move it back to where you started. 5. Keep your elbow and upper arm firmly tucked against the towel roll or at your side. 6. Repeat 8 to 12 times. External rotator strengthening exercise    1. Start by tying a piece of elastic exercise material to a doorknob. You can use surgical tubing or Thera-Band. (You may also hold one end of the band in each hand.)  2. Stand or sit with your shoulder relaxed and your elbow bent 90 degrees. Your upper arm should rest comfortably against your side. Squeeze a rolled towel between your elbow and your body for comfort. This will help keep your arm at your side. 3. Hold one end of the elastic band with the hand of the painful arm. 4. Start with your forearm across your belly. Slowly rotate the forearm out away from your body. Keep your elbow and upper arm tucked against the towel roll or the side of your body until you begin to feel tightness in your shoulder. Slowly move your arm back to where you started. 5. Repeat 8 to 12 times. Follow-up care is a key part of your treatment and safety. Be sure to make and go to all appointments, and call your doctor if you are having problems. It's also a good idea to know your test results and keep a list of the medicines you take. Where can you learn more? Go to http://nolan-alondra.info/. Enter Nicol Rich in the search box to learn more about \"Rotator Cuff: Exercises. \"  Current as of: September 20, 2018  Content Version: 12.1  © 1274-4918 [employer]. Care instructions adapted under license by [employer] (which disclaims liability or warranty for this information). If you have questions about a medical condition or this instruction, always ask your healthcare professional. [address] any warranty or liability for your use of this information.     1.   2. Keep your shoulders relaxed and down, not shrugged. 3. Squeeze your shoulder blades together. Hold for 6 seconds, then relax. 4. Repeat 8 to 12 times. Straight-arm shoulder blade squeeze    1. Sit or stand tall. Relax your shoulders. 2. With palms down, hold your elastic tubing or band straight out in front of you. 3. Start with slight tension in the tubing or band, with your hands about shoulder-width apart. 4. Slowly pull straight out to the sides, squeezing your shoulder blades together. Keep your arms straight and at shoulder height. Slowly release. 5. Repeat 8 to 12 times. Rowing    1. [place] your elastic tubing or band at about waist height. Take one end in each hand. 2. Sit or stand with your feet hip-width apart. 3. Hold your arms straight in front of you. Adjust your distance to create slight tension in the tubing or band. 4. Slightly tuck your chin. Relax your shoulders. 5. Without shrugging your shoulders, pull straight back. Your elbows will pass alongside your waist.    Pull-downs    1. [place] your elastic tubing or band in the top of a closed door. Take one end in each hand. 2. Either sit or stand, depending on what is more comfortable. If you feel unsteady, sit on a chair. 3. Start with your arms up and comfortably apart, elbows straight. There should be a slight tension in the tubing or band. 4. Slightly tuck your chin, and look straight ahead. 5. Keeping your back straight, slowly pull down and back, bending your elbows. 6. Stop where your hands are level with your chin, in a \"goalpost\" position. 7. Repeat 8 to 12 times. Chest T stretch    1. Lie on your back. Raise your knees so they are bent. Plant your feet on the floor, hip-width apart. 2. Tuck your chin, and relax your shoulders. 3. Reach your arms straight out to the sides.  If you don't feel a mild stretch in your shoulders and across your chest, use a foam roll or a tightly rolled blanket under your spine, from your tailbone to your head.  4. Relax in this position for at least 15 to 30 seconds while you breathe normally. Repeat 2 to 4 times. 5. As you get used to this stretch, keep adding a little more time until you are able relax in this position for 2 or 3 minutes. When you can relax for at least 2 minutes, you only need to do the exercise 1 time per session. Chest goalpost stretch    1. Lie on your back. Raise your knees so they are bent. Plant your feet on the floor, hip-width apart. 2. Tuck your chin, and relax your shoulders. 3. Reach your arms straight out to the sides. 4. Bend your arms at the elbows, with your hands pointed toward the top of your head. Your arms should make an L on either side of your head. Your palms should be facing up. 5. If you don't feel a mild stretch in your shoulders and across your chest, use a foam roll or tightly rolled blanket under your spine, from your tailbone to your head. 6. Relax in this position for at least 15 to 30 seconds while you breathe normally. Repeat 2 to 4 times. 7. Each day you do this exercise, add a little more time until you can relax in this position for 2 or 3 minutes. When you can relax for at least 2 minutes, you only need to do the exercise 1 time per session. Follow-up care is a key part of your treatment and safety. Be sure to make and go to all appointments, and call your doctor if you are having problems. It's also a good idea to know your test results and keep a list of the medicines you take. Where can you learn more? Go to http://nolan-alondra.info/. Enter (09) 7154 [id number] in the search box to learn more about \"Shoulder Blade: Exercises. \"  Current as of: September 20, 2018  Content Version: 12.1  © 4993-1602 [employer]. Care instructions adapted under license by [employer] (which disclaims liability or warranty for this information).  If you have questions about a medical condition or this instruction, always ask your healthcare professional. [address] any warranty or liability for your use of this information.

## 2019-09-19 NOTE — PROGRESS NOTES
Name and  verified        Chief Complaint   Patient presents with    Depression     2 week follow up    Anxiety     2 week follow up     Patient complaint SOB on excretion, light headiness and dizziness which started with the new medications and she has noticed with the last five days it has gotten worse. Health Maintenance reviewed-discussed with patient. 1. Have you been to the ER, urgent care clinic since your last visit? Hospitalized since your last visit? Yes, Endocrinologist 2019 office visit patient reported. 2. Have you seen or consulted any other health care providers outside of the [address] [place] [address] since your last visit? Include any pap smears or colon screening. No    Patient stated last PAP EXAM over two years.

## 2019-09-24 PROBLEM — Z12.4 SCREENING FOR CERVICAL CANCER: Status: RESOLVED | Noted: 2018-05-22 | Resolved: 2019-09-24

## 2019-09-26 DIAGNOSIS — F33.9 RECURRENT DEPRESSION (HCC): ICD-10-CM

## 2019-09-26 DIAGNOSIS — F32.9 MAJOR DEPRESSION, CHRONIC: ICD-10-CM

## 2019-09-26 DIAGNOSIS — F20.0 PARANOID SCHIZOPHRENIA (HCC): ICD-10-CM

## 2019-09-27 DIAGNOSIS — I10 ESSENTIAL HYPERTENSION: ICD-10-CM

## 2019-09-27 DIAGNOSIS — F32.9 MAJOR DEPRESSION, CHRONIC: ICD-10-CM

## 2019-09-27 DIAGNOSIS — R00.0 TACHYCARDIA: ICD-10-CM

## 2019-09-27 RX ORDER — ROSUVASTATIN CALCIUM 40 MG/1
TABLET, COATED ORAL
Qty: 90 TAB | Refills: 3
Start: 2019-09-27 | End: 2020-07-27

## 2019-09-27 RX ORDER — PROPRANOLOL HYDROCHLORIDE 10 MG/1
TABLET ORAL
Qty: 60 TAB | Refills: 1 | Status: SHIPPED | OUTPATIENT
Start: 2019-09-27 | End: 2019-10-01

## 2019-09-27 RX ORDER — ZIPRASIDONE HYDROCHLORIDE 80 MG/1
80 CAPSULE ORAL DAILY
Qty: 30 CAP | Refills: 0
Start: 2019-09-27 | End: 2019-10-08 | Stop reason: ALTCHOICE

## 2019-10-01 ENCOUNTER — OFFICE VISIT (OUTPATIENT)
Dept: CARDIOLOGY CLINIC | Age: 59
End: 2019-10-01

## 2019-10-01 VITALS
HEIGHT: 68 IN | WEIGHT: 140.2 LBS | OXYGEN SATURATION: 98 % | BODY MASS INDEX: 21.25 KG/M2 | RESPIRATION RATE: 16 BRPM | HEART RATE: 100 BPM | DIASTOLIC BLOOD PRESSURE: 66 MMHG | SYSTOLIC BLOOD PRESSURE: 90 MMHG

## 2019-10-01 DIAGNOSIS — R42 DIZZINESS: Primary | ICD-10-CM

## 2019-10-01 DIAGNOSIS — R00.0 TACHYCARDIA: ICD-10-CM

## 2019-10-01 DIAGNOSIS — I34.1 MITRAL VALVE PROLAPSE: ICD-10-CM

## 2019-10-01 DIAGNOSIS — I10 ESSENTIAL HYPERTENSION: ICD-10-CM

## 2019-10-01 DIAGNOSIS — R06.09 DOE (DYSPNEA ON EXERTION): ICD-10-CM

## 2019-10-01 DIAGNOSIS — R00.2 PALPITATION: ICD-10-CM

## 2019-10-01 RX ORDER — METOPROLOL SUCCINATE 100 MG/1
100 TABLET, EXTENDED RELEASE ORAL DAILY
Qty: 30 TAB | Refills: 3 | Status: SHIPPED | OUTPATIENT
Start: 2019-10-01 | End: 2020-03-16 | Stop reason: SDUPTHER

## 2019-10-01 NOTE — PROGRESS NOTES
[address], [facility]– [facility]  [phone]     Subjective:      Sherren Grade is a 63 y.o. female  presents for long follow up. Last seen by us in 2017 for atypical cp, NST was -ve for ischemia 12/17, Structurally normal heart per echo 12/17. PCP stopped her Bystolic d/t dizziness, noticed having palpitation after. PCP started her on Propranolol, was dc again d/t dizziness and now on Toprol  mg daily. Today she reports dizziness when she stands up or move across the room, daily palpitation even at rest, fatigue, progressive tiwari that is not typical of her copd issue. The patient denies chest pain ,orthopnea, PND, LE edema, syncope, or presyncope.        Patient Active Problem List    Diagnosis Date Noted    Asthmatic bronchitis , chronic ([address] 08/14/2019    Psychotic disorder ([address] 08/14/2019    Type 2 diabetes mellitus with nephropathy ([address] 12/28/2017    Recurrent depression ([address] 12/28/2017    Insomnia disorder 12/04/2017    Major depression, chronic 12/04/2017    Encounter for review of form with patient 12/04/2017    Angina, class III ([address] 11/09/2017    Mitral valve prolapse 11/09/2017    Tobacco use disorder 10/19/2017    Morbid obesity ([address] 08/07/2012    Chronic hoarseness 08/07/2012    Asthma 08/07/2012    GERD (gastroesophageal reflux disease) 08/07/2012    Hypercholesteremia 08/07/2012    Gallstone 10/04/2011    Rib pain 10/04/2011    Asthma with exacerbation 10/18/2010    Type II or unspecified type diabetes mellitus without mention of complication, uncontrolled 10/18/2010    HTN (hypertension) 10/18/2010    Upper respiratory infection 10/18/2010      Meredith Ramirez MD  Past Medical History:   Diagnosis Date    Asthma     Asthma     COPD     Depression     Diabetes ([address]     Diabetes mellitus     Encounter for review of form with patient 12/4/2017    Essential hypertension     GERD (gastroesophageal reflux disease)     Headache(784.0)     HX OTHER MEDICAL     acoustic neuroma    Hypercholesterolemia     Hypertension     Ill-defined condition     R ACOUSTIC NEUROMA    Insomnia disorder 2017    Major depression, chronic 2017    Psychiatric problem     anxiety depression    [facility]     Psychotic disorder ([facility] 2019    Screening for cervical cancer 2018    Tobacco use disorder 10/19/2017    Unspecified sleep apnea     NO CPAP-O2 @2L WHEN SLEEPING      Past Surgical History:   Procedure Laterality Date     DELIVERY ONLY      HX GASTRECTOMY  14    lap sleeve gastrectomy by Dr Jordan Oz HX GYN      2 c-sections    HX HEENT      sinus    HX HEENT      tracheal resection    HX HEENT      tracheal alleratation x 2    HX HEENT      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    HX HEENT      LASER SX-PENG     Allergies   Allergen Reactions    Latex Hives    Other Food Anaphylaxis and Hives     PEPPERONI, sloppy joes, frank cheese doritos     Demerol [Meperidine] Anaphylaxis     Difficulty breathing    Iodine Anaphylaxis    Morphine Other (comments)     voilent outbreaks (restraints needed)    Nsaids (Non-Steroidal Anti-Inflammatory Drug) Hives      Family History   Problem Relation Age of Onset    Diabetes Father     Heart Failure Father     Heart Disease Father     Hypertension Father     High Cholesterol Mother     Suicide Brother     Stroke Maternal Grandmother     Cancer Paternal Grandfather     Anesth Problems Neg Hx       Social History     Socioeconomic History    Marital status: SINGLE     Spouse name: Not on file    Number of children: Not on file    Years of education: Not on file    Highest education level: Not on file   Occupational History    Not on file   Social Needs    Financial resource strain: Not on file    Food insecurity:     Worry: Not on file     Inability: Not on file    Transportation needs:     Medical: Not on file     Non-medical: Not on file   Tobacco Use    Smoking status: Former Smoker     Packs/day: 1.00     Years: 12.00     Pack years: 12.00     Last attempt to quit: 2018     Years since quittin.1    Smokeless tobacco: Former User   Substance and Sexual Activity    Alcohol use: Yes     Comment: ocassional    Drug use: No    Sexual activity: Never   Lifestyle    Physical activity:     Days per week: Not on file     Minutes per session: Not on file    Stress: Not on file   Relationships    Social connections:     Talks on phone: Not on file     Gets together: Not on file     Attends Yazidi service: Not on file     Active member of club or organization: Not on file     Attends meetings of clubs or organizations: Not on file     Relationship status: Not on file    Intimate partner violence:     Fear of current or ex partner: Not on file     Emotionally abused: Not on file     Physically abused: Not on file     Forced sexual activity: Not on file   Other Topics Concern    Not on file   Social History Narrative    Not on file      Current Outpatient Medications   Medication Sig    metoprolol succinate (TOPROL-XL) 100 mg tablet Take 1 Tab by mouth daily.  ziprasidone (GEODON) 80 mg capsule Take 1 Cap by mouth daily. (Patient taking differently: Take 60 mg by mouth daily.)    rosuvastatin (CRESTOR) 40 mg tablet TAKE 1 TABLET BY MOUTH ONCE A DAY AT BEDTIME.  roflumilast (DALIRESP) 500 mcg tab tablet Take 1 Tab by mouth daily.  Blood-Glucose Meter monitoring kit 1 preferred brand glucometer for checking home glucose, E11.65    lancets misc Use preferred brand; Check glucose 4 times daily, Diagnosis E11.65    glucose blood VI test strips (PHARMACIST CHOICE) strip Use preferred brand; Check glucose 4 times daily, Diagnosis E11.65    budesonide-formoterol (SYMBICORT) 160-4.5 mcg/actuation HFAA INHALE 2 PUFFS BY MOUTH TWICE DAILY FOR COPD ASSOCIATED WITH CHRONIC BRONCHITIS, EXTRINSIC ASTHMA.     glimepiride (AMARYL) 2 mg tablet TAKE 1 TABLET BY MOUTH EVERY MORNING. (Patient taking differently: 4 mg. TAKE 1 TABLET BY MOUTH EVERY MORNING.)    buPROPion XL (WELLBUTRIN XL) 300 mg XL tablet Take 1 Tab by mouth every morning.  Insulin Needles, Disposable, (ULTICARE PEN NEEDLE) 32 gauge x 5/32\" ndle USE 2 TIMES DAILY TO INJECT INSULIN/VICTOZA.  VICTOZA 3-ESTEPHANIE 0.6 mg/0.1 mL (18 mg/3 mL) pnij INJECT 1.8 MG UNDER THE SKIN DAILY.  pantoprazole (PROTONIX) 40 mg tablet Take (1) tablet by mouth once a day.  tiotropium (SPIRIVA WITH HANDIHALER) 18 mcg inhalation capsule INHALE THE CONTENTS OF 1 CAPSULE VIA HANDIHALER ONCE DAILY. RINSE MOUTH AFTER USE    ergocalciferol (ERGOCALCIFEROL) 50,000 unit capsule Take 1 Cap by mouth every seven (7) days.  montelukast (SINGULAIR) 10 mg tablet TAKE 1 TABLET BY MOUTH DAILY IN THE EVENING    VENTOLIN HFA 90 mcg/actuation inhaler 2 Puffs every four (4) hours as needed.  insulin syringe-needle U-100 (BD INSULIN SYRINGE ULTRA-FINE) 1/2 mL 31 gauge x 15/64\" syrg Use for injecting insulin subcutaneously    metFORMIN ER (GLUCOPHAGE XR) 500 mg tablet Take 2 Tabs by mouth Before breakfast and dinner.  multivitamin (ONE A DAY) tablet Take 1 Tab by mouth daily.  EPINEPHrine (EPIPEN) 0.3 mg/0.3 mL injection 0.3 mg by IntraMUSCular route once as needed. No current facility-administered medications for this visit. Review of Symptoms:  11 systems reviewed, negative other than as stated in the HPI    Physical ExamPhysical Exam:    Vitals:    10/01/19 1102 10/01/19 1108 10/01/19 1109   BP: 110/70 100/70 90/66   Pulse: 100     Resp: 16     SpO2: 98%     Weight: 140 lb 3.2 oz (63.6 kg)     Height: 5' 8\" (1.727 m)       Body mass index is 21.32 kg/m². General PE  Gen:  NAD  Mental Status - Alert. General Appearance - Not in acute distress.    HEENT:  PERRL, no carotid bruits or JVD  Chest and Lung Exam   Inspection: Accessory muscles - No use of accessory muscles in breathing. Auscultation:   Breath sounds: - Normal.   Cardiovascular   Inspection: Jugular vein - Bilateral - Inspection Normal.   Palpation/Percussion:   Apical Impulse: - Normal.   Auscultation: Rhythm - Regular. Heart Sounds - S1 WNL and S2 WNL. No S3 or S4. Murmurs & Other Heart Sounds: Auscultation of the heart reveals - No Murmurs. Peripheral Vascular   Upper Extremity: Inspection - Bilateral - No Cyanotic nailbeds or Digital clubbing. Lower Extremity:   Palpation: Edema - Bilateral - No edema. Abdomen:   Soft, non-tender, bowel sounds are active. Neuro: A&O times 3, CN and motor grossly WNL    Labs:   Lab Results   Component Value Date/Time    Cholesterol, total 117 08/14/2019 12:41 PM    Cholesterol, total 118 04/10/2019 02:42 PM    Cholesterol, total 120 11/20/2018 08:47 AM    Cholesterol, total 106 05/22/2018 11:25 AM    Cholesterol, total 138 10/19/2017 10:29 AM    HDL Cholesterol 38 (L) 08/14/2019 12:41 PM    HDL Cholesterol 37 (L) 04/10/2019 02:42 PM    HDL Cholesterol 46 11/20/2018 08:47 AM    HDL Cholesterol 40 05/22/2018 11:25 AM    HDL Cholesterol 51 10/19/2017 10:29 AM    LDL, calculated 34 08/14/2019 12:41 PM    LDL, calculated 43 11/20/2018 08:47 AM    Triglyceride 225 (H) 08/14/2019 12:41 PM    Triglyceride 154 (H) 11/20/2018 08:47 AM     No results found for: CPK, CPKX, CPX  Lab Results   Component Value Date/Time    Sodium 142 08/14/2019 12:41 PM    Potassium 4.3 08/14/2019 12:41 PM    Chloride 100 08/14/2019 12:41 PM    CO2 25 08/14/2019 12:41 PM    Anion gap 7 01/23/2018 12:50 AM    Glucose 53 (L) 08/14/2019 12:41 PM    BUN 27 (H) 08/14/2019 12:41 PM    Creatinine 1.10 (H) 08/14/2019 12:41 PM    BUN/Creatinine ratio 25 (H) 08/14/2019 12:41 PM    GFR est AA 64 08/14/2019 12:41 PM    GFR est non-AA 55 (L) 08/14/2019 12:41 PM    Calcium 10.6 (H) 08/14/2019 12:41 PM    Bilirubin, total 0.5 08/14/2019 12:41 PM    AST (SGOT) 29 08/14/2019 12:41 PM    Alk.  phosphatase 66 08/14/2019 12:41 PM    Protein, total 6.7 08/14/2019 12:41 PM    Albumin 4.6 08/14/2019 12:41 PM    Globulin 3.1 01/23/2018 12:50 AM    A-G Ratio 2.2 08/14/2019 12:41 PM    ALT (SGPT) 73 (H) 08/14/2019 12:41 PM       EKG:  NSR      Assessment:     Assessment:      1. Dizziness    2. Essential hypertension    3. Mitral valve prolapse    4. Uncontrolled type 2 diabetes mellitus with diabetic nephropathy, with long-term current use of insulin (HCC)    5. Palpitation    6. GAUTAM (dyspnea on exertion)    7. Tachycardia        Orders Placed This Encounter    AMB POC EKG ROUTINE W/ 12 LEADS, INTER & REP     Order Specific Question:   Reason for Exam:     Answer:   ROUTINE    metoprolol succinate (TOPROL-XL) 100 mg tablet     Sig: Take 1 Tab by mouth daily. Dispense:  30 Tab     Refill:  3        Plan:     Patient presents for long follow up. Last seen by us in 2017 for atypical cp, NST was -ve for ischemia 12/17, Structurally normal heart per echo 12/17. PCP stopped her Bystolic d/t dizziness, noticed having palpitation after. PCP started her on Propranolol, was dc again d/t dizziness and now on Toprol  mg daily. Today she reports dizziness when she stands up or move across the room, daily palpitation even at rest, fatigue, progressive gautam that is not typical of her copd issue. To note, she knows that her Wellbutrin and Geodon can cause dizziness so she has been adjusting her Geodon dose, it has helped her dizziness. I recommended for her to reach out to her psychiatrist.    Dizziness, palpitation  Orthostatic today, c/o lightheadedness with position change  Will decrease toprol xl to 100 mg daily  Consider 24 hr holter at f/u    GAUTAM, fatigue  Cardiac risk factors: HTN HLD DM former smoker family hx CAD (father had first heart attack 45s)  Obtain Jesenia d/t severe fatigue    HTN  Orthostatic, adjusting meds    HLD  8/19 LDL at 34. On statin   Lipids and labs followed by PCP.       DM  On Victoza, oral agents    COPD hx 1 PPD x 40 yrs  On inhalers, Daliresp  Had not seen Dr Landen Weiss, NP       [facility]    10/1/2019         Patient seen, examined by me personally. Plan discussed as detailed. Agree with note as outlined by  NP. I confirm findings in history and physical exam. No additional findings noted. Agree with plan as outlined above. Will decrease toprol to 100 mg daily.     Juana Henry MD

## 2019-10-01 NOTE — PROGRESS NOTES
1. Have you been to the ER, urgent care clinic since your last visit? Hospitalized since your last visit? NO    2. Have you seen or consulted any other health care providers outside of the [address] [place] [address] since your last visit? Include any pap smears or colon screening. NO    C/O DIZZINESS, RAPID HEART BEAT.

## 2019-10-02 RX ORDER — METFORMIN HYDROCHLORIDE 500 MG/1
1000 TABLET, EXTENDED RELEASE ORAL
Qty: 360 TAB | Refills: 3 | Status: SHIPPED | OUTPATIENT
Start: 2019-10-02 | End: 2020-10-03

## 2019-10-02 RX ORDER — ZIPRASIDONE HYDROCHLORIDE 80 MG/1
CAPSULE ORAL
Qty: 30 CAP | Refills: 0 | OUTPATIENT
Start: 2019-10-02

## 2019-10-08 ENCOUNTER — TELEPHONE (OUTPATIENT)
Dept: ENDOCRINOLOGY | Age: 59
End: 2019-10-08

## 2019-10-08 ENCOUNTER — OFFICE VISIT (OUTPATIENT)
Dept: BEHAVIORAL/MENTAL HEALTH CLINIC | Age: 59
End: 2019-10-08

## 2019-10-08 VITALS
HEART RATE: 86 BPM | DIASTOLIC BLOOD PRESSURE: 70 MMHG | SYSTOLIC BLOOD PRESSURE: 109 MMHG | WEIGHT: 138 LBS | BODY MASS INDEX: 20.92 KG/M2 | HEIGHT: 68 IN

## 2019-10-08 DIAGNOSIS — G47.00 INSOMNIA, UNSPECIFIED TYPE: ICD-10-CM

## 2019-10-08 DIAGNOSIS — F31.81 BIPOLAR 2 DISORDER, MAJOR DEPRESSIVE EPISODE (HCC): Primary | ICD-10-CM

## 2019-10-08 DIAGNOSIS — F41.1 ANXIETY, GENERALIZED: ICD-10-CM

## 2019-10-08 RX ORDER — BUPROPION HYDROCHLORIDE 300 MG/1
300 TABLET ORAL
Qty: 90 TAB | Refills: 2 | Status: SHIPPED | OUTPATIENT
Start: 2019-10-08 | End: 2020-07-20 | Stop reason: SDUPTHER

## 2019-10-08 NOTE — TELEPHONE ENCOUNTER
----- Message from Linette Clayton sent at 10/8/2019 12:37 PM EDT -----  Regarding: Non-Urgent Medical Question  Contact: [phone]  Dr. Coleen Ramirezies,    I have for the last month have had nausea,sick stomach,dizziness,lightheadedness ,weakness and very fatigued having to force myself to eat and drink. Sunday I held my 5pm dose of Metformin HCL  ER 500mg  2 tabs bid. I held boh doses yesterday. I can eat and drink now but my blood sugar is over 300 Amd lunch today was 459 so I took 10 units 7030. Please let me know what to do.                                                                                                          Thank you                                                                                                Linette fried  49-

## 2019-10-08 NOTE — PROGRESS NOTES
Samantha Caballero  1960  63 y.o.  female  [facility]    Chief Complaint   Patient presents with    Depression     Report significant depression to the point where she is not eating well for past 1 month and lost significant amount of weight    Anxiety     Reports symptoms of generalized anxiety and some social anxiety    Medication Problem     Geodon does not work, Seroquel increased weight    Stress     Mostly psychosocial       [place]:   This is a 57 years old divorce  female with past psychiatric history and treatment of manic depression who was seen for the first time on December 28, 2017 referred by her PCP to manage her complicated psychiatric medications. She decided to gradually taper and discontinue Viibryd and high dose Paxil and try Abilify, Seroquel, and finally Latuda. She was not able to tolerate any of them and finally decided to try Geodon.      Patient was last seen on July 20, 2019 when she decided to gradually taper and discontinue Geodon, continue Wellbutrin  mg daily as most of her complaints was about ADHD and also craving for smoking and desire to lose weight. She decided to try Saphris 2.5 mg twice a day and have genetic testing done as she is sensitive to many psychiatric medications and not able to tolerate. She receive Saphris but decided not to try as she is worried about side effect of weight gain which has less possibility with Saphris then Seroquel. She is a still taking Geodon 20 mg which is not helpful. Patient presented on time, was observed to be depressed and anxious again and report compliance with her medication, is able to tolerate. She report increasing depression with constant desire to cry and being too much emotional and not able to socialize. She report crying over little things and no interest in doing anything especially yard work which she used to love but now she does not care because of her depression.  She report that since she is not on Seroquel she is having these symptoms but she does not want to go back to Seroquel or any medication which has tendency to increase weight. She denies any other social change and denies any substance abuse at this time. She was provided with a lot of support and psychoeducation and she felt better.       Patient discuss treatment alternative available and after discussing other medications decided to discontinue Geodon, continue Wellbutrin  mg daily, and try Saphris 2.5 mg twice a day and have genetic testing done and will come back in couple of months for evaluation. She is happy with her weight loss and improved labs especially A1c as she is diabetic and does not want to try any medication which can change her A1c.  She will return in 6 weeks for reevaluation.     SUBSTANCE USE HISTORY:   TOBACCO: Smoked 1 pack per day for past 35 years and quit smoking last year June.     ALCOHOL: Patient denies abuse.     CANNABIS: Tried few times but never abused it.     COCAINE, OPIOIDS, and OTHERS: Denies abuse. MEDICAL HISTORY:    has a past medical history of Asthma, Asthma, COPD, Depression, Diabetes ([facility] Diabetes mellitus, Encounter for review of form with patient (12/4/2017), Essential hypertension, GERD (gastroesophageal reflux disease), Headache(784.0), OTHER MEDICAL, Hypercholesterolemia, Hypertension, Ill-defined condition, Insomnia disorder (12/4/2017), Major depression, chronic (12/4/2017), Psychiatric problem, Psychotic disorder ([facility] Psychotic disorder ([facility] (8/14/2019), Screening for cervical cancer (5/22/2018), Tobacco use disorder (10/19/2017), and Unspecified sleep apnea.     ALLERGIES:   Allergies   Allergen Reactions    Latex Hives    Other Food Anaphylaxis and Hives     PEPPERONI, sloppy joes, frank cheese doritos     Demerol [Meperidine] Anaphylaxis     Difficulty breathing    Iodine Anaphylaxis    Morphine Other (comments)     voilent outbreaks (restraints needed)    Nsaids (Non-Steroidal Anti-Inflammatory Drug) Hives       VITAL SIGNS:  /70 (BP 1 Location: Left arm, BP Patient Position: Sitting)   Pulse 86   Ht 5' 8\" (1.727 m)   Wt 62.6 kg (138 lb)   LMP 01/17/2010   BMI 20.98 kg/m²     Current Outpatient Medications   Medication Sig Dispense Refill    buPROPion XL (WELLBUTRIN XL) 300 mg XL tablet Take 1 Tab by mouth every morning. 90 Tab 2    metFORMIN ER (GLUCOPHAGE XR) 500 mg tablet Take 2 Tabs by mouth Before breakfast and dinner. 360 Tab 3    metoprolol succinate (TOPROL-XL) 100 mg tablet Take 1 Tab by mouth daily. 30 Tab 3    rosuvastatin (CRESTOR) 40 mg tablet TAKE 1 TABLET BY MOUTH ONCE A DAY AT BEDTIME. 90 Tab 3    roflumilast (DALIRESP) 500 mcg tab tablet Take 1 Tab by mouth daily. 30 Tab 4    budesonide-formoterol (SYMBICORT) 160-4.5 mcg/actuation HFAA INHALE 2 PUFFS BY MOUTH TWICE DAILY FOR COPD ASSOCIATED WITH CHRONIC BRONCHITIS, EXTRINSIC ASTHMA. 1 Inhaler 6    glimepiride (AMARYL) 2 mg tablet TAKE 1 TABLET BY MOUTH EVERY MORNING. (Patient taking differently: 4 mg. TAKE 1 TABLET BY MOUTH EVERY MORNING.) 90 Tab 1    VICTOZA 3-ESTEPHANIE 0.6 mg/0.1 mL (18 mg/3 mL) pnij INJECT 1.8 MG UNDER THE SKIN DAILY. 9 mL 3    pantoprazole (PROTONIX) 40 mg tablet Take (1) tablet by mouth once a day. 90 Tab 3    tiotropium (SPIRIVA WITH HANDIHALER) 18 mcg inhalation capsule INHALE THE CONTENTS OF 1 CAPSULE VIA HANDIHALER ONCE DAILY. RINSE MOUTH AFTER USE 90 Cap 2    ergocalciferol (ERGOCALCIFEROL) 50,000 unit capsule Take 1 Cap by mouth every seven (7) days. 12 Cap 3    montelukast (SINGULAIR) 10 mg tablet TAKE 1 TABLET BY MOUTH DAILY IN THE EVENING 90 Tab 3    VENTOLIN HFA 90 mcg/actuation inhaler 2 Puffs every four (4) hours as needed.  multivitamin (ONE A DAY) tablet Take 1 Tab by mouth daily.  Blood-Glucose Meter monitoring kit 1 preferred brand glucometer for checking home glucose, E11.65 1 Kit 0    lancets misc Use preferred brand;  Check glucose 4 times daily, Diagnosis E11.65 400 Each 11    glucose blood VI test strips (PHARMACIST CHOICE) strip Use preferred brand; Check glucose 4 times daily, Diagnosis E11.65 400 Strip 3    Insulin Needles, Disposable, (ULTICARE PEN NEEDLE) 32 gauge x 5/32\" ndle USE 2 TIMES DAILY TO INJECT INSULIN/VICTOZA. 200 Pen Needle 3    insulin syringe-needle U-100 (BD INSULIN SYRINGE ULTRA-FINE) 1/2 mL 31 gauge x 15/64\" syrg Use for injecting insulin subcutaneously 200 Pen Needle 5    EPINEPHrine (EPIPEN) 0.3 mg/0.3 mL injection 0.3 mg by IntraMUSCular route once as needed. ROS:  Constitutional: Negative for fatigue and positive for  significant weight loss  Eyes: Negative for contacts/glasses  ENT: Negative for hearing loss and tinnitus  Respiratory: Negative for cough or wheezing  Cardiovascular: Negative for chest pain, palpitations  Neurological: Negative for memory problems  Behavioral/psych: Positive for insomnia, anxiety, depression, negative for SI/HI  Endocrine: Patient has diabetes.     MENTAL STATUS EXAMINATION:   Patient is a 63 y. o. female WHITE [place]  who looks her stated age.  She was observed to lose significant amount of weight about 50 pounds in past 11 months. She was observed to be mildly irritable, depressed, and anxious today especially while talking about her psychiatric medications over the last 1 year. She is nicely dressed with good hygiene and some makeup. Speech is regular rate and rhythm, fluent language, and thought process is linear, logical, and goal directed. Reported mood is depressed and anxious with mood congruent depressed, anxious, mildly irritable affect. Patient denies any suicidal ideation, homicidal ideation, and auditory visual hallucination. Not observed to be delusional or paranoid. Insight, judgement, reliability and impulse control is intact.  Her cognition is within normal limit and she is observed to be reliable. DIAGNOSIS:  Encounter Diagnoses   Name Primary?     Bipolar 2 disorder, major depressive episode (HCC) Yes    Anxiety, generalized     Insomnia, unspecified type        PLAN:  1. MEDICATION:   1. Bipolar disorder: Saphris 2.5 mg twice a day sublingually. She has not been able to tolerate Vilazodne, Paxil, Abilify, Seroquel, Latuda, and Geodon. We discussed Zyprexa, risperidone, Seroquel but she does not want to gain weight and decided to try Saphris. She had gene site testing done last time so we went over the report and provided her copy. Further testing Wellbutrin level may remain high so we decided not to increase Wellbutrin and Saphris is in the green column with less possibility of tolerability issues.      2.  Depression and desire to lose weight: Wellbutrin  mg daily. She was provided with psycho education, discussed risk/benefits, and expectations from medication changes. Patient agrees with plan.      2. PSYCHOTHERAPY: Patient was provided with supportive therapy, strongly encourage to seek psychotherapy.     3. MEDICAL CARE: Patient was strongly encourage to take their medical medications and follow up with their PCP on regular basis. Her weight today is 138 pounds, it was 191 pounds on November 27, 2019 when we decided to try Geodon as she wanted to lose weight and she does not want to take any medication which can have tendency to increase her weight. Her blood pressure is 109/70 and pulse is 86 both are within normal limit. She has hypertension, class III angina, mitral wall prolapse, history of GERD and gallstone, type 2 diabetes with nephropathy, asthma, chronic hoarseness, hypercholesterolemia, and COPD.     4. SUBSTANCE ABUSE CARE: Patient quit smoking 8 months ago but report continued struggle with cravings especially while if stress. She denies any drinking or abusing any illicit drugs at this time. 5. FOLLOW UP:   Follow-up and Dispositions    · Return in about 3 months (around 1/8/2020) for Medication management.

## 2019-10-08 NOTE — TELEPHONE ENCOUNTER
Miss Duncan Lance called to say she stopped taking her Metformin on Sunday as she has been having very bad diarrhea, sick to her stomach and very nauseas. She wondered what to do now as her blood sugars have been running in the 400's. After speaking with Dr. Denise Donohue, I told her to drink a big glass of water now and to take 15 units of her 70/30 insulin now and if her blood sugar was still above 200 at bedtime then to take another 15 units. Going forward she should take 15 units eith breakfast and dinner and can go up by a couple of units as needed to keep her blood sugar in range. She wrote this down and understood this information.   Eric Gordon

## 2019-10-21 ENCOUNTER — TELEPHONE (OUTPATIENT)
Dept: CARDIOLOGY CLINIC | Age: 59
End: 2019-10-21

## 2019-10-21 NOTE — TELEPHONE ENCOUNTER
Called patient to confirm Nuclear stress test for 10/22. Reviewed with patient the need to hold all caffeine containing food, beverages and medicines for 24 hours. Pt verbalized understanding.

## 2019-10-23 DIAGNOSIS — F32.9 MAJOR DEPRESSION, CHRONIC: ICD-10-CM

## 2019-10-23 DIAGNOSIS — F20.0 PARANOID SCHIZOPHRENIA (HCC): ICD-10-CM

## 2019-10-23 DIAGNOSIS — F33.9 RECURRENT DEPRESSION (HCC): ICD-10-CM

## 2019-10-23 RX ORDER — ZIPRASIDONE HYDROCHLORIDE 80 MG/1
CAPSULE ORAL
Qty: 30 CAP | Refills: 0 | OUTPATIENT
Start: 2019-10-23

## 2019-10-24 NOTE — TELEPHONE ENCOUNTER
Requested Prescriptions     Pending Prescriptions Disp Refills    liraglutide (VICTOZA 3-ESTEPHANIE) 0.6 mg/0.1 mL (18 mg/3 mL) pnij 9 mL 3

## 2019-10-28 ENCOUNTER — OFFICE VISIT (OUTPATIENT)
Dept: CARDIOLOGY CLINIC | Age: 59
End: 2019-10-28

## 2019-10-28 VITALS
BODY MASS INDEX: 21.58 KG/M2 | OXYGEN SATURATION: 98 % | DIASTOLIC BLOOD PRESSURE: 70 MMHG | RESPIRATION RATE: 16 BRPM | HEIGHT: 68 IN | SYSTOLIC BLOOD PRESSURE: 122 MMHG | WEIGHT: 142.4 LBS | HEART RATE: 94 BPM

## 2019-10-28 DIAGNOSIS — E78.00 HYPERCHOLESTEREMIA: ICD-10-CM

## 2019-10-28 DIAGNOSIS — I10 ESSENTIAL HYPERTENSION: ICD-10-CM

## 2019-10-28 DIAGNOSIS — E11.21 TYPE 2 DIABETES MELLITUS WITH NEPHROPATHY (HCC): ICD-10-CM

## 2019-10-28 DIAGNOSIS — I34.1 MITRAL VALVE PROLAPSE: Primary | ICD-10-CM

## 2019-10-28 RX ORDER — BENZTROPINE MESYLATE 0.5 MG/1
1 TABLET ORAL AS NEEDED
Refills: 2 | COMMUNITY
Start: 2019-10-24 | End: 2021-04-05

## 2019-10-28 NOTE — PROGRESS NOTES
[address] [address] [address] [facility]  [phone]     Subjective:      Liz Ly is a 63 y.o. female presents for test follow up. On last OV we decreased her BB d/t her c/o fatigue and dizziness. Today she reports that she still has occasional dizziness but BP  Has improved. She also reports intermittent nonexertional chest discomfort, doesn't last, has been unchanged for several years       The patient denies shortness of breath, orthopnea, PND, LE edema, palpitations, syncope, or presyncope.        Patient Active Problem List    Diagnosis Date Noted    Asthmatic bronchitis , chronic ([facility] 08/14/2019    Psychotic disorder ([facility] 08/14/2019    Type 2 diabetes mellitus with nephropathy ([address] 12/28/2017    Recurrent depression ([facility] 12/28/2017    Insomnia disorder 12/04/2017    Major depression, chronic 12/04/2017    Encounter for review of form with patient 12/04/2017    Angina, class III ([address] 11/09/2017    Mitral valve prolapse 11/09/2017    Tobacco use disorder 10/19/2017    Morbid obesity ([address] 08/07/2012    Chronic hoarseness 08/07/2012    Asthma 08/07/2012    GERD (gastroesophageal reflux disease) 08/07/2012    Hypercholesteremia 08/07/2012    Gallstone 10/04/2011    Rib pain 10/04/2011    Asthma with exacerbation 10/18/2010    Type II or unspecified type diabetes mellitus without mention of complication, uncontrolled 10/18/2010    HTN (hypertension) 10/18/2010    Upper respiratory infection 10/18/2010      Lebron Shah MD  Past Medical History:   Diagnosis Date    Asthma     Asthma     COPD     Depression     Diabetes ([facility]     Diabetes mellitus     Encounter for review of form with patient 12/4/2017    Essential hypertension     GERD (gastroesophageal reflux disease)     Headache(784.0)     HX OTHER MEDICAL     acoustic neuroma    Hypercholesterolemia     Hypertension     Ill-defined condition     R ACOUSTIC NEUROMA    Insomnia disorder 2017    Major depression, chronic 2017    Psychiatric problem     anxiety depression    Psychotic disorder ([facility]     Psychotic disorder ([facility] 2019    Screening for cervical cancer 2018    Tobacco use disorder 10/19/2017    Unspecified sleep apnea     NO CPAP-O2 @2L WHEN SLEEPING      Past Surgical History:   Procedure Laterality Date     DELIVERY ONLY      HX GASTRECTOMY  14    lap sleeve gastrectomy by Dr Rosa Elena Gilliam HX GYN      2 c-sections    HX HEENT      sinus    HX HEENT      tracheal resection    HX HEENT      tracheal alleratation x 2    HX HEENT      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    HX HEENT      LASER SX-PENG     Allergies   Allergen Reactions    Latex Hives    Other Food Anaphylaxis and Hives     PEPPERONI, sloppy joes, frank cheese doritos     Demerol [Meperidine] Anaphylaxis     Difficulty breathing    Iodine Anaphylaxis    Morphine Other (comments)     voilent outbreaks (restraints needed)    Nsaids (Non-Steroidal Anti-Inflammatory Drug) Hives      Family History   Problem Relation Age of Onset    Diabetes Father     Heart Failure Father     Heart Disease Father     Hypertension Father     High Cholesterol Mother     Suicide Brother     Stroke Maternal Grandmother     Cancer Paternal Grandfather     Anesth Problems Neg Hx       Social History     Socioeconomic History    Marital status: SINGLE     Spouse name: Not on file    Number of children: Not on file    Years of education: Not on file    Highest education level: Not on file   Occupational History    Not on file   Social Needs    Financial resource strain: Not on file    Food insecurity:     Worry: Not on file     Inability: Not on file    Transportation needs:     Medical: Not on file     Non-medical: Not on file   Tobacco Use    Smoking status: Former Smoker     Packs/day: 1.00     Years: 12.00     Pack years: 12.00     Last attempt to quit: 2018     Years since quittin.2    Smokeless tobacco: Former User   Substance and Sexual Activity    Alcohol use: Yes     Comment: ocassional    Drug use: No    Sexual activity: Never   Lifestyle    Physical activity:     Days per week: Not on file     Minutes per session: Not on file    Stress: Not on file   Relationships    Social connections:     Talks on phone: Not on file     Gets together: Not on file     Attends Mu-ism service: Not on file     Active member of club or organization: Not on file     Attends meetings of clubs or organizations: Not on file     Relationship status: Not on file    Intimate partner violence:     Fear of current or ex partner: Not on file     Emotionally abused: Not on file     Physically abused: Not on file     Forced sexual activity: Not on file   Other Topics Concern    Not on file   Social History Narrative    Not on file      Current Outpatient Medications   Medication Sig    benztropine (COGENTIN) 0.5 mg tablet Take 1 Tab by mouth as needed.  tiotropium (SPIRIVA WITH HANDIHALER) 18 mcg inhalation capsule INHALE THE CONTENTS OF 1 CAPSULE VIA HANDIHALER ONCE DAILY. RINSE MOUTH AFTER USE    liraglutide (VICTOZA 3-ESTEPHANIE) 0.6 mg/0.1 mL (18 mg/3 mL) pnij 1.8 mg by SubCUTAneous route Daily (before breakfast).  buPROPion XL (WELLBUTRIN XL) 300 mg XL tablet Take 1 Tab by mouth every morning.  metFORMIN ER (GLUCOPHAGE XR) 500 mg tablet Take 2 Tabs by mouth Before breakfast and dinner.  metoprolol succinate (TOPROL-XL) 100 mg tablet Take 1 Tab by mouth daily.  rosuvastatin (CRESTOR) 40 mg tablet TAKE 1 TABLET BY MOUTH ONCE A DAY AT BEDTIME.  roflumilast (DALIRESP) 500 mcg tab tablet Take 1 Tab by mouth daily.  Blood-Glucose Meter monitoring kit 1 preferred brand glucometer for checking home glucose, E11.65    lancets misc Use preferred brand;  Check glucose 4 times daily, Diagnosis E11.65    glucose blood VI test strips (PHARMACIST CHOICE) strip Use preferred brand; Check glucose 4 times daily, Diagnosis E11.65    budesonide-formoterol (SYMBICORT) 160-4.5 mcg/actuation HFAA INHALE 2 PUFFS BY MOUTH TWICE DAILY FOR COPD ASSOCIATED WITH CHRONIC BRONCHITIS, EXTRINSIC ASTHMA.  glimepiride (AMARYL) 2 mg tablet TAKE 1 TABLET BY MOUTH EVERY MORNING. (Patient taking differently: 4 mg. TAKE 1 TABLET BY MOUTH EVERY MORNING.)    Insulin Needles, Disposable, (ULTICARE PEN NEEDLE) 32 gauge x 5/32\" ndle USE 2 TIMES DAILY TO INJECT INSULIN/VICTOZA.  pantoprazole (PROTONIX) 40 mg tablet Take (1) tablet by mouth once a day.  ergocalciferol (ERGOCALCIFEROL) 50,000 unit capsule Take 1 Cap by mouth every seven (7) days.  montelukast (SINGULAIR) 10 mg tablet TAKE 1 TABLET BY MOUTH DAILY IN THE EVENING    VENTOLIN HFA 90 mcg/actuation inhaler 2 Puffs every four (4) hours as needed.  insulin syringe-needle U-100 (BD INSULIN SYRINGE ULTRA-FINE) 1/2 mL 31 gauge x 15/64\" syrg Use for injecting insulin subcutaneously    multivitamin (ONE A DAY) tablet Take 1 Tab by mouth daily.  EPINEPHrine (EPIPEN) 0.3 mg/0.3 mL injection 0.3 mg by IntraMUSCular route once as needed. No current facility-administered medications for this visit. Review of Symptoms:  11 systems reviewed, negative other than as stated in the HPI    Physical ExamPhysical Exam:    Vitals:    10/28/19 1544 10/28/19 1550   BP: 120/70 122/70   Pulse: 94    Resp: 16    SpO2: 98%    Weight: 142 lb 6.4 oz (64.6 kg)    Height: 5' 8\" (1.727 m)      Body mass index is 21.65 kg/m². General PE  Gen:  NAD  Mental Status - Alert. General Appearance - Not in acute distress. HEENT:  PERRL, no carotid bruits or JVD  Chest and Lung Exam   Inspection: Accessory muscles - No use of accessory muscles in breathing.    Auscultation:   Breath sounds: - Normal.   Cardiovascular   Inspection: Jugular vein - Bilateral - Inspection Normal.   Palpation/Percussion:   Apical Impulse: - Normal.   Auscultation: Rhythm - Regular. Heart Sounds - S1 WNL and S2 WNL. No S3 or S4. Murmurs & Other Heart Sounds: Auscultation of the heart reveals - No Murmurs. Peripheral Vascular   Upper Extremity: Inspection - Bilateral - No Cyanotic nailbeds or Digital clubbing. Lower Extremity:   Palpation: Edema - Bilateral - No edema. Abdomen:   Soft, non-tender, bowel sounds are active. Neuro: A&O times 3, CN and motor grossly WNL    Labs:   Lab Results   Component Value Date/Time    Cholesterol, total 117 08/14/2019 12:41 PM    Cholesterol, total 118 04/10/2019 02:42 PM    Cholesterol, total 120 11/20/2018 08:47 AM    Cholesterol, total 106 05/22/2018 11:25 AM    Cholesterol, total 138 10/19/2017 10:29 AM    HDL Cholesterol 38 (L) 08/14/2019 12:41 PM    HDL Cholesterol 37 (L) 04/10/2019 02:42 PM    HDL Cholesterol 46 11/20/2018 08:47 AM    HDL Cholesterol 40 05/22/2018 11:25 AM    HDL Cholesterol 51 10/19/2017 10:29 AM    LDL, calculated 34 08/14/2019 12:41 PM    LDL, calculated 43 11/20/2018 08:47 AM    Triglyceride 225 (H) 08/14/2019 12:41 PM    Triglyceride 154 (H) 11/20/2018 08:47 AM     No results found for: CPK, CPKX, CPX  Lab Results   Component Value Date/Time    Sodium 142 08/14/2019 12:41 PM    Potassium 4.3 08/14/2019 12:41 PM    Chloride 100 08/14/2019 12:41 PM    CO2 25 08/14/2019 12:41 PM    Anion gap 7 01/23/2018 12:50 AM    Glucose 53 (L) 08/14/2019 12:41 PM    BUN 27 (H) 08/14/2019 12:41 PM    Creatinine 1.10 (H) 08/14/2019 12:41 PM    BUN/Creatinine ratio 25 (H) 08/14/2019 12:41 PM    GFR est AA 64 08/14/2019 12:41 PM    GFR est non-AA 55 (L) 08/14/2019 12:41 PM    Calcium 10.6 (H) 08/14/2019 12:41 PM    Bilirubin, total 0.5 08/14/2019 12:41 PM    AST (SGOT) 29 08/14/2019 12:41 PM    Alk.  phosphatase 66 08/14/2019 12:41 PM    Protein, total 6.7 08/14/2019 12:41 PM    Albumin 4.6 08/14/2019 12:41 PM    Globulin 3.1 01/23/2018 12:50 AM    A-G Ratio 2.2 08/14/2019 12:41 PM    ALT (SGPT) 73 (H) 08/14/2019 12:41 PM EKG:       Assessment:     Assessment:      1. Mitral valve prolapse    2. Type 2 diabetes mellitus with nephropathy ([address]    3. Essential hypertension    4. Hypercholesteremia        Orders Placed This Encounter    benztropine (COGENTIN) 0.5 mg tablet     Sig: Take 1 Tab by mouth as needed. Refill:  2        Plan:     Patient presents for test follow up. On last OV we decreased her BB d/t her c/o fatigue and dizziness. Today she reports that she still has occasional dizziness but BP  Has improved. She also reports intermittent nonexertional chest discomfort, doesn't last, has been unchanged for several years    NST showed fixed defect 10/19  NST was -ve for ischemia 12/17   Structurally normal heart per echo 12/17.       HTN  Improved with decreased dose of BB--her Bp is now at 122/70  Continue same for now     HLD  8/19 LDL at 34. On statin   Lipids and labs followed by PCP.       DM  On Victoza, oral agents     COPD hx 1 PPD x 40 yrs  On inhalers, Daliresp  Had not seen Dr Edu Osei current care and f/u in 3 mos      Belia Lake MD       [facility]    10/28/2019         Patient seen, examined by me personally. Plan discussed as detailed. Agree with note as outlined by  NP. I confirm findings in history and physical exam. No additional findings noted. Agree with plan as outlined above.      Belia Lake MD

## 2019-10-28 NOTE — PROGRESS NOTES
1. Have you been to the ER, urgent care clinic since your last visit? Hospitalized since your last visit? NO    2. Have you seen or consulted any other health care providers outside of the [address] [place] [address] since your last visit? Include any pap smears or colon screening. NO    RESULTS. NO ADDITIONAL CARDIAC C/O.

## 2019-11-14 RX ORDER — SYRINGE AND NEEDLE,INSULIN,1ML 31GX15/64"
SYRINGE, EMPTY DISPOSABLE MISCELLANEOUS
Qty: 200 PEN NEEDLE | Refills: 5 | Status: SHIPPED | OUTPATIENT
Start: 2019-11-14 | End: 2021-03-12

## 2019-11-14 NOTE — TELEPHONE ENCOUNTER
Requested Prescriptions     Pending Prescriptions Disp Refills    insulin syringe-needle U-100 (BD INSULIN SYRINGE ULTRA-FINE) 1/2 mL 31 gauge x 15/64\" syrg 200 Pen Needle 5     Sig: Use for injecting insulin subcutaneously

## 2019-12-13 DIAGNOSIS — R06.09 DYSPNEA ON EFFORT: ICD-10-CM

## 2019-12-13 DIAGNOSIS — J44.1 COPD WITH ACUTE EXACERBATION (HCC): ICD-10-CM

## 2019-12-13 RX ORDER — ROFLUMILAST 500 UG/1
TABLET ORAL
Qty: 90 TAB | Refills: 1 | Status: SHIPPED | OUTPATIENT
Start: 2019-12-13 | End: 2020-07-09 | Stop reason: SDUPTHER

## 2020-01-06 ENCOUNTER — OFFICE VISIT (OUTPATIENT)
Dept: ENDOCRINOLOGY | Age: 60
End: 2020-01-06

## 2020-01-06 VITALS
DIASTOLIC BLOOD PRESSURE: 64 MMHG | HEIGHT: 68 IN | HEART RATE: 97 BPM | SYSTOLIC BLOOD PRESSURE: 99 MMHG | WEIGHT: 142 LBS | BODY MASS INDEX: 21.52 KG/M2

## 2020-01-06 DIAGNOSIS — E11.49 TYPE 2 DIABETES MELLITUS WITH OTHER NEUROLOGIC COMPLICATION, WITH LONG-TERM CURRENT USE OF INSULIN (HCC): Primary | ICD-10-CM

## 2020-01-06 DIAGNOSIS — Z79.4 TYPE 2 DIABETES MELLITUS WITH OTHER NEUROLOGIC COMPLICATION, WITH LONG-TERM CURRENT USE OF INSULIN (HCC): Primary | ICD-10-CM

## 2020-01-06 LAB — HBA1C MFR BLD HPLC: 6.8 %

## 2020-01-06 RX ORDER — GLIMEPIRIDE 4 MG/1
TABLET ORAL
Refills: 3 | COMMUNITY
Start: 2019-11-08 | End: 2020-07-28

## 2020-01-06 NOTE — PATIENT INSTRUCTIONS
Novolin 70/30 insulin: 15 units with breakfast    Glimepiride 4mg each morning before breakfast    Metformin ER, 1000 mg twice per day    Victoza, 1.8mg each morning before breakfast    [address]      Please note our new policy, you must arrive to the clinic 15 minutes before your appointment time to allow enough time for proper check-in, adequate time to spend with your doctor, and also to respect the appointment time of the next patient. Not arriving 15 minutes in advance may result in having your appointment rescheduled for the next available day/time.  ----------------------------------------------------------------------------------------------------------------------    Below you will find a glucose log sheet which you can use to record your blood sugars. Without checking and recording what your home glucose levels are, it will be difficult to make any changes to your medication dose, even when significant changes may be needed. Please feel free to use the log below to record your home glucose levels. At the very least, I would like for you to login the entire 2-3 weeks just before your visit so we can make your visit much more productive and beneficial to you. GLUCOSE LOG SHEET:    Date Breakfast Lunch Dinner Bedtime Comments ? GLUCOSE LOG SHEET:    Date Breakfast Lunch Dinner Bedtime Comments ? GLUCOSE LOG SHEET:    Date Breakfast Lunch Dinner Bedtime Comments ? Please note our new policy, you must arrive to the clinic 15 minutes before your appointment time to allow enough time for proper check-in, adequate time to spend with your doctor, and also to respect the appointment time of the next patient. Not arriving 15 minutes in advance may result in having your appointment rescheduled for the next available day/time.  ----------------------------------------------------------------------------------------------------------------------    Below you will find a glucose log sheet which you can use to record your blood sugars. Without checking and recording what your home glucose levels are, it will be difficult to make any changes to your medication dose, even when significant changes may be needed. Please feel free to use the log below to record your home glucose levels. At the very least, I would like for you to login the entire 2-3 weeks just before your visit so we can make your visit much more productive and beneficial to you. GLUCOSE LOG SHEET:    Date Breakfast Lunch Dinner Bedtime Comments ? GLUCOSE LOG SHEET:    Date Breakfast Lunch Dinner Bedtime Comments ? GLUCOSE LOG SHEET:    Date Breakfast Lunch Dinner Bedtime Comments ?

## 2020-01-06 NOTE — PROGRESS NOTES
CHIEF COMPLAINT: f/u evaluation for uncontrolled type 2 diabetes    HISTORY OF PRESENT ILLNESS:   Laila Yap is a 63 y.o. female with a PMHx as noted below who presents to the endocrinology clinic for f/u evaluation of uncontrolled type 2 diabetes.     A1c today is 6.8%    Currently taking the following meds:  Novolin 70/30 insulin: 15 units with breakfast  Glimepiride 4mg each morning before breakfast  Metformin ER, 1000 mg twice per day  Victoza, 1.8mg each morning before breakfast    Review of home blood glucose:  AM: 106 - 150 mostly, rare 200  Lunch: 140-200, variable  Dinner:  variable  Bedtime: 593-300, variable,     Review of most recent diabetes-related labs:  Lab Results   Component Value Date    HBA1C 6.0 (H) 2019    HBA1C 5.9 (H) 04/10/2019    HBA1C 8.6 (H) 10/19/2017    CHOL 117 2019    LDLC 34 2019    GFRAA 64 2019    GFRNA 55 (L) 2019    MCACR 383.4 (H) 2019    TSH 0.886 2019    VITD3 41.5 2015    SZX0KVJM 7.5% 2017       PAST MEDICAL/SURGICAL HISTORY:   Past Medical History:   Diagnosis Date    Asthma     Asthma     COPD     Depression     Diabetes ([facility] [address]     Diabetes mellitus     Encounter for review of form with patient 2017    Essential hypertension     GERD (gastroesophageal reflux disease)     Headache(784.0)     HX OTHER MEDICAL     acoustic neuroma    Hypercholesterolemia     Hypertension     Ill-defined condition     R ACOUSTIC NEUROMA    Insomnia disorder 2017    Major depression, chronic 2017    Psychiatric problem     anxiety depression    Psychotic disorder ([facility] [address] [facility]     Psychotic disorder ([facility] [address] 2019    Screening for cervical cancer 2018    Tobacco use disorder 10/19/2017    Unspecified sleep apnea     NO CPAP-O2 @2L WHEN SLEEPING     Past Surgical History:   Procedure Laterality Date     DELIVERY ONLY      HX GASTRECTOMY  14    lap sleeve gastrectomy by Dr Ani Franks Uriel    HX GYN      2 c-sections    HX HEENT      sinus    HX HEENT      tracheal resection    HX HEENT      tracheal alleratation x 2    HX HEENT      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    HX HEENT      LASER SX-PENG       ALLERGIES:   Allergies   Allergen Reactions    Latex Hives    Other Food Anaphylaxis and Hives     PEPPERONI, sloppy joes, frank cheese doritos     Demerol [Meperidine] Anaphylaxis     Difficulty breathing    Iodine Anaphylaxis    Morphine Other (comments)     voilent outbreaks (restraints needed)    Nsaids (Non-Steroidal Anti-Inflammatory Drug) Hives       MEDICATIONS ON ADMISSION:     Current Outpatient Medications:     glimepiride (AMARYL) 4 mg tablet, TAKE 1 TABLET BY MOUTH EVERY MORNING., Disp: , Rfl: 3    insulin NPH/insulin regular (NOVOLIN 70/30 U-100 INSULIN) 100 unit/mL (70-30) injection, 15 Units by SubCUTAneous route Daily (before breakfast). , Disp: , Rfl:     DALIRESP 500 mcg tab tablet, TAKE 1 TABLET BY MOUTH EVERY DAY, Disp: 90 Tab, Rfl: 1    tiotropium (SPIRIVA WITH HANDIHALER) 18 mcg inhalation capsule, INHALE THE CONTENTS OF 1 CAPSULE VIA HANDIHALER ONCE DAILY. RINSE MOUTH AFTER USE, Disp: 90 Cap, Rfl: 2    liraglutide (VICTOZA 3-ESTEPHANIE) 0.6 mg/0.1 mL (18 mg/3 mL) pnij, 1.8 mg by SubCUTAneous route Daily (before breakfast). , Disp: 9 mL, Rfl: 3    buPROPion XL (WELLBUTRIN XL) 300 mg XL tablet, Take 1 Tab by mouth every morning., Disp: 90 Tab, Rfl: 2    metFORMIN ER (GLUCOPHAGE XR) 500 mg tablet, Take 2 Tabs by mouth Before breakfast and dinner., Disp: 360 Tab, Rfl: 3    metoprolol succinate (TOPROL-XL) 100 mg tablet, Take 1 Tab by mouth daily. , Disp: 30 Tab, Rfl: 3    rosuvastatin (CRESTOR) 40 mg tablet, TAKE 1 TABLET BY MOUTH ONCE A DAY AT BEDTIME., Disp: 90 Tab, Rfl: 3    budesonide-formoterol (SYMBICORT) 160-4.5 mcg/actuation HFAA, INHALE 2 PUFFS BY MOUTH TWICE DAILY FOR COPD ASSOCIATED WITH CHRONIC BRONCHITIS, EXTRINSIC ASTHMA., Disp: 1 Inhaler, Rfl: 6    pantoprazole (PROTONIX) 40 mg tablet, Take (1) tablet by mouth once a day., Disp: 90 Tab, Rfl: 3    ergocalciferol (ERGOCALCIFEROL) 50,000 unit capsule, Take 1 Cap by mouth every seven (7) days. , Disp: 12 Cap, Rfl: 3    montelukast (SINGULAIR) 10 mg tablet, TAKE 1 TABLET BY MOUTH DAILY IN THE EVENING, Disp: 90 Tab, Rfl: 3    VENTOLIN HFA 90 mcg/actuation inhaler, 2 Puffs every four (4) hours as needed. , Disp: , Rfl:     multivitamin (ONE A DAY) tablet, Take 1 Tab by mouth daily. , Disp: , Rfl:     insulin syringe-needle U-100 (BD INSULIN SYRINGE ULTRA-FINE) 1/2 mL 31 gauge x 15/64\" syrg, Use for injecting insulin subcutaneously, Disp: 200 Pen Needle, Rfl: 5    benztropine (COGENTIN) 0.5 mg tablet, Take 1 Tab by mouth as needed. , Disp: , Rfl: 2    Blood-Glucose Meter monitoring kit, 1 preferred brand glucometer for checking home glucose, E11.65, Disp: 1 Kit, Rfl: 0    lancets misc, Use preferred brand; Check glucose 4 times daily, Diagnosis E11.65, Disp: 400 Each, Rfl: 11    glucose blood VI test strips (PHARMACIST CHOICE) strip, Use preferred brand; Check glucose 4 times daily, Diagnosis E11.65, Disp: 400 Strip, Rfl: 3    glimepiride (AMARYL) 2 mg tablet, TAKE 1 TABLET BY MOUTH EVERY MORNING. (Patient taking differently: 4 mg. TAKE 1 TABLET BY MOUTH EVERY MORNING.), Disp: 90 Tab, Rfl: 1    Insulin Needles, Disposable, (ULTICARE PEN NEEDLE) 32 gauge x 5/32\" ndle, USE 2 TIMES DAILY TO INJECT INSULIN/VICTOZA., Disp: 200 Pen Needle, Rfl: 3    EPINEPHrine (EPIPEN) 0.3 mg/0.3 mL injection, 0.3 mg by IntraMUSCular route once as needed. , Disp: , Rfl:     SOCIAL HISTORY:   Social History     Socioeconomic History    Marital status: SINGLE     Spouse name: Not on file    Number of children: Not on file    Years of education: Not on file    Highest education level: Not on file   Occupational History    Not on file   Social Needs    Financial resource strain: Not on file    Food insecurity: Worry: Not on file     Inability: Not on file    Transportation needs:     Medical: Not on file     Non-medical: Not on file   Tobacco Use    Smoking status: Former Smoker     Packs/day: 1.00     Years: 12.00     Pack years: 12.00     Last attempt to quit: 2018     Years since quittin.4    Smokeless tobacco: Former User   Substance and Sexual Activity    Alcohol use: Yes     Comment: ocassional    Drug use: No    Sexual activity: Never   Lifestyle    Physical activity:     Days per week: Not on file     Minutes per session: Not on file    Stress: Not on file   Relationships    Social connections:     Talks on phone: Not on file     Gets together: Not on file     Attends Jain service: Not on file     Active member of club or organization: Not on file     Attends meetings of clubs or organizations: Not on file     Relationship status: Not on file    Intimate partner violence:     Fear of current or ex partner: Not on file     Emotionally abused: Not on file     Physically abused: Not on file     Forced sexual activity: Not on file   Other Topics Concern    Not on file   Social History Narrative    Not on file       FAMILY HISTORY:  Family History   Problem Relation Age of Onset    Diabetes Father     Heart Failure Father     Heart Disease Father     Hypertension Father     High Cholesterol Mother     Suicide Brother     Stroke Maternal Grandmother     Cancer Paternal Grandfather     Anesth Problems Neg Hx        REVIEW OF SYSTEMS: Complete ROS assessed and noted for that which is described above, all else are negative.   Eyes: normal  ENT: normal  CVS: normal  Resp: normal  GI: normal  : normal  GYN: normal  Endocrine: normal  Integument: normal  Musculoskeletal: normal  Neuro: normal  Psych: normal    PHYSICAL EXAMINATION:    VITAL SIGNS:  Visit Vitals  BP 99/64 (BP 1 Location: Left arm, BP Patient Position: Sitting)   Pulse 97   Ht 5' 8\" (1.727 m)   Wt 142 lb (64.4 kg)   LMP 01/17/2010   BMI 21.59 kg/m²       GENERAL: NCAT, Sitting comfortably, NAD  EYES: EOMI, non-icteric, no proptosis  Ear/Nose/Throat: NCAT, no inflammation, no masses  LYMPH NODES: No LAD  CARDIOVASCULAR: S1 S2, RRR, No murmur, 2+ radial pulses  RESPIRATORY: CTA b/l, no wheeze/rales  GASTROINTESTINAL: ND  MUSCULOSKELETAL: Normal ROM, no atrophy  SKIN: warm, no edema/rash/ or other skin changes  NEUROLOGIC: 5/5 power all extremities, no tremors, AAOx3  PSYCHIATRIC: Normal affect, Normal insight and judgement    REVIEW OF LABORATORY AND RADIOLOGY DATA:   Labs and documentation have been reviewed as described above. ASSESSMENT AND PLAN:   Laila Yap is a 63 y.o. female with a PMHx as noted above who presents to the endocrinology clinic for evaluation of uncontrolled type 2 diabetes. DM2  HTN  HLD    A1c 6.8%. She is requiring the insulin for now but that is ok as it is helping her sugars stay under control. She has high variability at times but this may be related to timing of fingersticks in the holiday season. We will not increase the insulin noting that it will likely smooth out an many numbers are in the target range. Plan as follows. DM2:  Novolin 70/30 insulin: 15 units with breakfast  Glimepiride 4mg each morning before breakfast  Metformin ER, 1000 mg twice per day  Victoza, 1.8mg each morning before breakfast    BP: reviewed, normal  HLD: lipids reviewed, stable    LABS: 2020 DM prelabs ordered for next vsit    4 month f/u visit. Anthony Young.  [address] [facility]

## 2020-01-09 DIAGNOSIS — H60.332 ACUTE SWIMMER'S EAR OF LEFT SIDE: ICD-10-CM

## 2020-01-09 DIAGNOSIS — E55.9 VITAMIN D DEFICIENCY: ICD-10-CM

## 2020-01-09 RX ORDER — MONTELUKAST SODIUM 10 MG/1
TABLET ORAL
Qty: 90 TAB | Refills: 3 | Status: SHIPPED | OUTPATIENT
Start: 2020-01-09 | End: 2021-03-04

## 2020-01-09 RX ORDER — ERGOCALCIFEROL 1.25 MG/1
CAPSULE ORAL
Qty: 12 CAP | Refills: 3 | Status: SHIPPED | OUTPATIENT
Start: 2020-01-09 | End: 2021-04-05

## 2020-01-30 ENCOUNTER — OFFICE VISIT (OUTPATIENT)
Dept: CARDIOLOGY CLINIC | Age: 60
End: 2020-01-30

## 2020-01-30 VITALS
DIASTOLIC BLOOD PRESSURE: 74 MMHG | HEIGHT: 68 IN | WEIGHT: 144.5 LBS | OXYGEN SATURATION: 94 % | SYSTOLIC BLOOD PRESSURE: 104 MMHG | HEART RATE: 107 BPM | RESPIRATION RATE: 16 BRPM | BODY MASS INDEX: 21.9 KG/M2

## 2020-01-30 DIAGNOSIS — E11.21 TYPE 2 DIABETES MELLITUS WITH NEPHROPATHY (HCC): ICD-10-CM

## 2020-01-30 DIAGNOSIS — I34.1 MITRAL VALVE PROLAPSE: ICD-10-CM

## 2020-01-30 DIAGNOSIS — E78.00 HYPERCHOLESTEREMIA: ICD-10-CM

## 2020-01-30 DIAGNOSIS — I10 ESSENTIAL HYPERTENSION: Primary | ICD-10-CM

## 2020-01-30 NOTE — PROGRESS NOTES
1. Have you been to the ER, urgent care clinic since your last visit? Hospitalized since your last visit? NO    2. Have you seen or consulted any other health care providers outside of the [address] [place] [address] since your last visit? Include any pap smears or colon screening. YES, ENT.    3 MONTH FOLLOW UP.  NO ADDITIONAL CARDIAC C/O

## 2020-02-16 DIAGNOSIS — E11.21 TYPE 2 DIABETES MELLITUS WITH NEPHROPATHY (HCC): ICD-10-CM

## 2020-02-16 DIAGNOSIS — J44.9 ASTHMATIC BRONCHITIS , CHRONIC (HCC): ICD-10-CM

## 2020-02-16 DIAGNOSIS — J45.21 MILD INTERMITTENT ASTHMA WITH ACUTE EXACERBATION: ICD-10-CM

## 2020-02-17 RX ORDER — BUDESONIDE AND FORMOTEROL FUMARATE DIHYDRATE 160; 4.5 UG/1; UG/1
AEROSOL RESPIRATORY (INHALATION)
Qty: 30.6 INHALER | Refills: 2 | Status: SHIPPED | OUTPATIENT
Start: 2020-02-17 | End: 2021-04-06 | Stop reason: SDUPTHER

## 2020-03-11 ENCOUNTER — TELEPHONE (OUTPATIENT)
Dept: ENDOCRINOLOGY | Age: 60
End: 2020-03-11

## 2020-03-11 NOTE — TELEPHONE ENCOUNTER
Pt called she needs a refill on Rx Novolin insulin. She stated per Pharmacy she needs to call us for refills.

## 2020-03-16 DIAGNOSIS — R00.0 TACHYCARDIA: ICD-10-CM

## 2020-03-16 DIAGNOSIS — I10 ESSENTIAL HYPERTENSION: ICD-10-CM

## 2020-03-16 RX ORDER — METOPROLOL SUCCINATE 100 MG/1
100 TABLET, EXTENDED RELEASE ORAL DAILY
Qty: 30 TAB | Refills: 3 | Status: SHIPPED | OUTPATIENT
Start: 2020-03-16 | End: 2020-07-20 | Stop reason: SDUPTHER

## 2020-03-16 NOTE — TELEPHONE ENCOUNTER
Last visit:9/19/19  Next visit:not scheduled  Previous refill 10/01/19(30+5R)    Requested Prescriptions     Pending Prescriptions Disp Refills    metoprolol succinate (TOPROL-XL) 100 mg tablet 30 Tab 3     Sig: Take 1 Tab by mouth daily.

## 2020-05-02 LAB
ALBUMIN SERPL-MCNC: 4.4 G/DL (ref 3.8–4.9)
ALBUMIN/CREAT UR: 129 MG/G CREAT (ref 0–29)
ALBUMIN/GLOB SERPL: 2.1 {RATIO} (ref 1.2–2.2)
ALP SERPL-CCNC: 91 IU/L (ref 39–117)
ALT SERPL-CCNC: 117 IU/L (ref 0–32)
AST SERPL-CCNC: 68 IU/L (ref 0–40)
BILIRUB SERPL-MCNC: 0.4 MG/DL (ref 0–1.2)
BUN SERPL-MCNC: 20 MG/DL (ref 6–24)
BUN/CREAT SERPL: 20 (ref 9–23)
CALCIUM SERPL-MCNC: 10.5 MG/DL (ref 8.7–10.2)
CHLORIDE SERPL-SCNC: 101 MMOL/L (ref 96–106)
CHOLEST SERPL-MCNC: 136 MG/DL (ref 100–199)
CO2 SERPL-SCNC: 24 MMOL/L (ref 20–29)
CREAT SERPL-MCNC: 0.98 MG/DL (ref 0.57–1)
CREAT UR-MCNC: 69.4 MG/DL
EST. AVERAGE GLUCOSE BLD GHB EST-MCNC: 180 MG/DL
GLOBULIN SER CALC-MCNC: 2.1 G/DL (ref 1.5–4.5)
GLUCOSE SERPL-MCNC: 170 MG/DL (ref 65–99)
HBA1C MFR BLD: 7.9 % (ref 4.8–5.6)
HDLC SERPL-MCNC: 51 MG/DL
INTERPRETATION, 910389: NORMAL
LDLC SERPL CALC-MCNC: 54 MG/DL (ref 0–99)
Lab: NORMAL
MICROALBUMIN UR-MCNC: 89.7 UG/ML
POTASSIUM SERPL-SCNC: 4.9 MMOL/L (ref 3.5–5.2)
PROT SERPL-MCNC: 6.5 G/DL (ref 6–8.5)
SODIUM SERPL-SCNC: 142 MMOL/L (ref 134–144)
TRIGL SERPL-MCNC: 155 MG/DL (ref 0–149)
TSH SERPL DL<=0.005 MIU/L-ACNC: 0.85 UIU/ML (ref 0.45–4.5)
VLDLC SERPL CALC-MCNC: 31 MG/DL (ref 5–40)

## 2020-05-04 RX ORDER — PANTOPRAZOLE SODIUM 40 MG/1
TABLET, DELAYED RELEASE ORAL
Qty: 90 TAB | Refills: 3 | Status: SHIPPED | OUTPATIENT
Start: 2020-05-04 | End: 2021-04-27

## 2020-05-07 ENCOUNTER — VIRTUAL VISIT (OUTPATIENT)
Dept: FAMILY MEDICINE CLINIC | Age: 60
End: 2020-05-07

## 2020-05-07 DIAGNOSIS — Z12.4 SCREENING FOR CERVICAL CANCER: ICD-10-CM

## 2020-05-07 DIAGNOSIS — Z12.11 SCREENING FOR MALIGNANT NEOPLASM OF COLON: ICD-10-CM

## 2020-05-07 DIAGNOSIS — Z13.39 SCREENING FOR ALCOHOLISM: ICD-10-CM

## 2020-05-07 DIAGNOSIS — Z12.31 ENCOUNTER FOR SCREENING MAMMOGRAM FOR MALIGNANT NEOPLASM OF BREAST: ICD-10-CM

## 2020-05-07 DIAGNOSIS — Z00.00 MEDICARE ANNUAL WELLNESS VISIT, SUBSEQUENT: Primary | ICD-10-CM

## 2020-05-07 DIAGNOSIS — F51.01 PRIMARY INSOMNIA: ICD-10-CM

## 2020-05-07 RX ORDER — ZOLPIDEM TARTRATE 10 MG/1
10 TABLET ORAL
Qty: 30 TAB | Refills: 0 | Status: SHIPPED | OUTPATIENT
Start: 2020-05-07 | End: 2020-06-02

## 2020-05-07 NOTE — PROGRESS NOTES
Chief Complaint   Patient presents with    Insomnia     4 months     Patient is here with complaints of not being able to sleep x 4 months has taken Melatonin with no relief no cough or fever noted      1. Have you been to the ER, urgent care clinic since your last visit? Hospitalized since your last visit? No    2. Have you seen or consulted any other health care providers outside of the [address] [place] [address] since your last visit? Include any pap smears or colon screening.  No

## 2020-05-07 NOTE — PROGRESS NOTES
This is the Subsequent Medicare Annual Wellness Exam, performed 12 months or more after the Initial AWV or the last Subsequent AWV    Consent: Reggie Carroll, who was seen by synchronous (real-time) audio-video technology, and/or her healthcare decision maker, is aware that this patient-initiated, Telehealth encounter on 2020 is a billable service. While AWVs are fully covered by Medicare, any services rendered on this date that are not included in an AWV are subject to additional billing, with coverage as determined by her insurance carrier. She is aware that she may receive a bill for any such additional services and has provided verbal consent to proceed: Yes. I have reviewed the patient's medical history in detail and updated the computerized patient record. History       Present for CPE, last Complete Physical exam was  Up todate w/ all vaccination, last tetanus vaccine was in ,  last mammog was nl and In 1yrs ago,   last pap smear normal and was in ,      last colonoscopy was ordered  but was not done  ++uterine cauterization as her past surgical hx,  last bone dexa scan was never,   No family hx of breast cancer   Father  from diabetic chf at the age 58, mother 79 yo living with no mhx ,   no family hx of colon cancer, not sexaully active ,  +++ physically active,  compliant w/ meds, +++Rf needed for today for her meds.    Patient has good supportive care at home, and feels safe no physical mental abuse,    Medications causing fall and the risk for future fall screened specially if the continuation of some of the current meds continues is addressed,  the depression at this age addressed pt with improvig interests and enjoy to do things, not anxious not depressed, not wanted to heart self or others  pt at this visit, is physically functional with gait nl and nicely independent and walks w/out mobility device and, in addition mentaly is functional,  very Alert and oriented ,  Required Immunizations,       Insomnia with restlessness  Pt presents stating that patient has trouble with restlessness and sleep problem is not suicidal and not homicidal, patient problem is not falling asleep, the problem is staying asleep,  it gets 1-3 Hours Of sleep, then the pt is up for another 1-2 hours, Then  goes back to sleep,   never abused any meds,  patient has tried some over-the-counter sleeping bendaryl 75mg and melatonin 10mg and No over-the-counter medication helped this patient so for,  No hx of sleep apnea, no daily fatigue no trouble with TSH, not an anxious person,         Constitutional: no chills and fever, obese, nad     HENT: no ear head pain and nosebleeds. No blurred vision, pain and discharge. Respiratory: no shortness of breath, wheezing cough nor sore throat. Cardiovascular: Has no chest pain, leg swelling ,and racing heart . Gastrointestinal: No constipation, diarrhea, nausea and vomiting. Genitourinary: No frequency. Musculoskeletal: Negative for joint pain. Skin: no itching, pimples or acne rash. Neurological: Negative for dizziness, no tremors  Psychiatric/Behavioral: ++sleep problem, patient is not nervous/anxious. General: Patient alert cooperative appears stated for the age  [de-identified]; normocephalic and atraumatic PERRLA extraocular motor intact normal tympanic membrane normal external ear bilaterally normal sinuses with mucosal normal no drainage  Neck: supple no adenopathy no JVD no bruit  Lungs: Clear to auscultation bilaterally no rales rhonchi or wheezes  Heart: Normal regular S1-S2 no gallops rubs or clicks no murmur  Breast exam deferred  Abdomen: Soft nontender normal bowel sounds no organomegaly  Extremities: Normal range of motion no point tenderness normal pulses no edema  Pelvic/: No lesion, no lymphadenopathy  Skin: Normal no lesion no rash      Patient Active Problem List   Diagnosis Code    Asthma with exacerbation J45. 901    Type II or unspecified type diabetes mellitus without mention of complication, uncontrolled E11.65    HTN (hypertension) I10    Upper respiratory infection J06.9    Gallstone K80.20    Rib pain R07.81    Morbid obesity (HCC) E66.01    Chronic hoarseness R49.0    Asthma J45.909    GERD (gastroesophageal reflux disease) K21.9    Hypercholesteremia E78.00    Tobacco use disorder F17.200    Angina, class III ([facility]) I20.9    Mitral valve prolapse I34.1    Insomnia disorder G47.00    Major depression, chronic F32.9    Encounter for review of form with patient Z02.89    Type 2 diabetes mellitus with nephropathy (HCC) E11.21    Recurrent depression (HCC) F33.9    Asthmatic bronchitis , chronic ([facility]) J44.9    Psychotic disorder ([facility]) F29     Past Medical History:   Diagnosis Date    Asthma     Asthma     COPD     Depression     Diabetes ([facility]     Diabetes mellitus     Encounter for review of form with patient 2017    Essential hypertension     GERD (gastroesophageal reflux disease)     Headache(784.0)     HX OTHER MEDICAL     acoustic neuroma    Hypercholesterolemia     Hypertension     Ill-defined condition     R ACOUSTIC NEUROMA    Insomnia disorder 2017    Major depression, chronic 2017    Psychiatric problem     anxiety depression    Psychotic disorder ([facility]     Psychotic disorder ([facility] 2019    Screening for cervical cancer 2018    Tobacco use disorder 10/19/2017    Unspecified sleep apnea     NO CPAP-O2 @2L WHEN SLEEPING      Past Surgical History:   Procedure Laterality Date     DELIVERY ONLY      HX GASTRECTOMY  14    lap sleeve gastrectomy by Dr Quentin Hickman    HX GYN      2 c-sections    HX HEENT      sinus    HX HEENT      tracheal resection    HX HEENT      tracheal alleratation x 2    HX HEENT      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 30 Tab 3    insulin NPH/insulin regular (NovoLIN 70/30 U-100 Insulin) 100 unit/mL (70-30) injection 15 Units by SubCUTAneous route Daily (before breakfast). 20 mL 2    liraglutide (VICTOZA) 0.6 mg/0.1 mL (18 mg/3 mL) pnij INJECT 1.8 MG UNDER SKIN DAILY (BEFORE BREAKFAST). 9 Adjustable Dose Pre-filled Pen Syringe 3    budesonide-formoteroL (SYMBICORT) 160-4.5 mcg/actuation HFAA INHALE 2 PUFFS BY MOUTH TWICE DAILY FOR COPD ASSOCIATED WITH CHRONIC BRONCHITIS, EXTRINSIC ASTHMA. 30.6 Inhaler 2    ergocalciferol (ERGOCALCIFEROL) 50,000 unit capsule TAKE 1 CAPSULE BY MOUTH ONCE A WEEK 12 Cap 3    montelukast (SINGULAIR) 10 mg tablet TAKE 1 TABLET BY MOUTH DAILY IN THE EVENING 90 Tab 3    glimepiride (AMARYL) 4 mg tablet TAKE 1 TABLET BY MOUTH EVERY MORNING. 3    DALIRESP 500 mcg tab tablet TAKE 1 TABLET BY MOUTH EVERY DAY 90 Tab 1    insulin syringe-needle U-100 (BD INSULIN SYRINGE ULTRA-FINE) 1/2 mL 31 gauge x 15/64\" syrg Use for injecting insulin subcutaneously 200 Pen Needle 5    benztropine (COGENTIN) 0.5 mg tablet Take 1 Tab by mouth as needed. 2    tiotropium (SPIRIVA WITH HANDIHALER) 18 mcg inhalation capsule INHALE THE CONTENTS OF 1 CAPSULE VIA HANDIHALER ONCE DAILY. RINSE MOUTH AFTER USE 90 Cap 2    buPROPion XL (WELLBUTRIN XL) 300 mg XL tablet Take 1 Tab by mouth every morning. 90 Tab 2    metFORMIN ER (GLUCOPHAGE XR) 500 mg tablet Take 2 Tabs by mouth Before breakfast and dinner. 360 Tab 3    rosuvastatin (CRESTOR) 40 mg tablet TAKE 1 TABLET BY MOUTH ONCE A DAY AT BEDTIME. 90 Tab 3    Blood-Glucose Meter monitoring kit 1 preferred brand glucometer for checking home glucose, E11.65 1 Kit 0    lancets misc Use preferred brand;  Check glucose 4 times daily, Diagnosis E11.65 400 Each 11    glucose blood VI test strips (PHARMACIST CHOICE) strip Use preferred brand; Check glucose 4 times daily, Diagnosis E11.65 400 Strip 3    Insulin Needles, Disposable, (ULTICARE PEN NEEDLE) 32 gauge x 5/32\" ndle USE 2 TIMES DAILY TO INJECT INSULIN/VICTOZA. 200 Pen Needle 3    VENTOLIN HFA 90 mcg/actuation inhaler 2 Puffs every four (4) hours as needed.  multivitamin (ONE A DAY) tablet Take 1 Tab by mouth daily.  EPINEPHrine (EPIPEN) 0.3 mg/0.3 mL injection 0.3 mg by IntraMUSCular route once as needed.        Allergies   Allergen Reactions    Latex Hives    Other Food Anaphylaxis and Hives     PEPPERONI, sloppy joes, frank cheese doritos     Demerol [Meperidine] Anaphylaxis     Difficulty breathing    Iodine Anaphylaxis    Morphine Other (comments)     voilent outbreaks (restraints needed)    Nsaids (Non-Steroidal Anti-Inflammatory Drug) Hives       Family History   Problem Relation Age of Onset    Diabetes Father     Heart Failure Father     Heart Disease Father     Hypertension Father     High Cholesterol Mother     Suicide Brother     Stroke Maternal Grandmother     Cancer Paternal Grandfather     Anesth Problems Neg Hx      Social History     Tobacco Use    Smoking status: Former Smoker     Packs/day: 1.00     Years: 12.00     Pack years: 12.00     Last attempt to quit: 2018     Years since quittin.7    Smokeless tobacco: Former User   Substance Use Topics    Alcohol use: Yes     Comment: assional       Depression Risk Factor Screening:     3 most recent PHQ Screens 2017   Little interest or pleasure in doing things Nearly every day   Feeling down, depressed, irritable, or hopeless Nearly every day   Total Score PHQ 2 6   Trouble falling or staying asleep, or sleeping too much Nearly every day   Feeling tired or having little energy Nearly every day   Poor appetite, weight loss, or overeating Nearly every day   Feeling bad about yourself - or that you are a failure or have let yourself or your family down Nearly every day   Trouble concentrating on things such as school, work, reading, or watching TV Nearly every day   Moving or speaking so slowly that other people could have noticed; or the opposite being so fidgety that others notice Not at all   Thoughts of being better off dead, or hurting yourself in some way Not at all   PHQ 9 Score 21   How difficult have these problems made it for you to do your work, take care of your home and get along with others Extremely difficult       Alcohol Risk Factor Screening:   Do you average 1 drink per night or more than 7 drinks a week:  No    On any one occasion in the past three months have you have had more than 3 drinks containing alcohol:  No      Functional Ability and Level of Safety:   Hearing: Hearing is good. Activities of Daily Living: The home contains: handrails, grab bars and rugs  Patient does total self care    Ambulation: with no difficulty    Fall Risk:  No flowsheet data found. Abuse Screen:  Patient is not abused    Cognitive Screening   Has your family/caregiver stated any concerns about your memory: no  Cognitive Screening: Normal - MMSE (Mini Mental Status Exam)    Patient Care Team   Patient Care Team:  Fredi Finney MD as PCP - General (Family Practice)  Fredi Finney MD as PCP - [facility]  Humera Romero MD as Surgeon (General Surgery)  Zhen Raya NP as Nurse Practitioner (General Surgery)  Lexa Fuller MD as Physician (Sleep Medicine)  Silverio Griffin MD as Physician (Cardiology)    Assessment/Plan   Education and counseling provided:  Are appropriate based on today's review and evaluation  Screening Mammography  Screening Pap and pelvic (covered once every 2 years)    Diagnoses and all orders for this visit:    1. Medicare annual wellness visit, subsequent    2. Primary insomnia  -     zolpidem (AMBIEN) 10 mg tablet; Take 1 Tab by mouth nightly as needed for Sleep.  Max Daily Amount: 10 mg.    3. Screening for alcoholism  -     [place] ANNUAL ALCOHOL SCREEN 15 MIN    4. Encounter for screening mammogram for malignant neoplasm of breast  -     [place] MAMMO BI SCREENING INCL CAD; Future    5. Screening for cervical cancer  -     REFERRAL TO OBSTETRICS AND GYNECOLOGY    6. Screening for malignant neoplasm of colon  -     COLOGUARD TEST (FECAL DNA COLORECTAL CANCER SCREENING)        [facility] education and counseling provided:  Age appropriate evidence-based preventive care recommendations based on today's review and evaluation; including relevant cancer screening guidelines, and vaccination recommendations. An After Visit Summary was printed and given to the patient with information about these guidelines, and a personalized schedule for health maintenance items. When appropriate and with patient agreement, orders noted below were placed to complete missing health maintenance items. Health Maintenance Due   Topic Date Due    PAP AKA CERVICAL CYTOLOGY  03/23/1982    Shingrix Vaccine Age 50> (1 of 2) 03/23/2011    FOBT Q1Y Age 50-75  03/23/2011    Breast Cancer Screen Mammogram  12/08/2018    Foot Exam Q1  05/03/2020    Medicare Yearly Exam  04/10/2020       Elmer Conrad is a 62 y.o. female who was evaluated by an audio-video encounter for concerns as above. Patient identification was verified prior to start of the visit. A caregiver was present when appropriate. Due to this being a TeleHealth encounter (During WRMNS-10 public health emergency), evaluation of the following organ systems was limited: Vitals/Constitutional/EENT/Resp/CV/GI//MS/Neuro/Skin/Heme-Lymph-Imm.   Pursuant to the emergency declaration under the [facility] waiver authority and the [employer] and [employer] Act, this Virtual Visit was conducted, with patient's (and/or legal guardian's) consent, to reduce the patient's risk of exposure to COVID-19 and provide necessary medical care. Services were provided through a synchronous discussion virtually to substitute for in-person clinic visit. I was at home. The patient was at home.       Nimisha Connolly MD

## 2020-05-07 NOTE — PATIENT INSTRUCTIONS
Medicare Wellness Visit, Female The best way to live healthy is to have a lifestyle where you eat a well-balanced diet, exercise regularly, limit alcohol use, and quit all forms of tobacco/nicotine, if applicable. Regular preventive services are another way to keep healthy. Preventive services (vaccines, screening tests, monitoring & exams) can help personalize your care plan, which helps you manage your own care. Screening tests can find health problems at the earliest stages, when they are easiest to treat. [facility] follows the current, evidence-based guidelines published by the [facility] ([facility]) when recommending preventive services for our patients. Because we follow these guidelines, sometimes recommendations change over time as research supports it. (For example, mammograms used to be recommended annually. Even though Medicare will still pay for an annual mammogram, the newer guidelines recommend a mammogram every two years for women of average risk). Of course, you and your doctor may decide to screen more often for some diseases, based on your risk and your co-morbidities (chronic disease you are already diagnosed with). Preventive services for you include: - Medicare offers their members a free annual wellness visit, which is time for you and your primary care provider to discuss and plan for your preventive service needs. Take advantage of this benefit every year! 
-All adults over the age of 72 should receive the recommended pneumonia vaccines. Current USPSTF guidelines recommend a series of two vaccines for the best pneumonia protection.  
-All adults should have a flu vaccine yearly and a tetanus vaccine every 10 years.  
-All adults age 48 and older should receive the shingles vaccines (series of two vaccines). -All adults age 38-68 who are overweight should have a diabetes screening test once every three years. -All adults born between [birth year] and 1964 should be screened once for Hepatitis C. 
-Other screening tests and preventive services for persons with diabetes include: an eye exam to screen for diabetic retinopathy, a kidney function test, a foot exam, and stricter control over your cholesterol.  
-Cardiovascular screening for adults with routine risk involves an electrocardiogram (ECG) at intervals determined by your doctor.  
-Colorectal cancer screenings should be done for adults age 54-65 with no increased risk factors for colorectal cancer. There are a number of acceptable methods of screening for this type of cancer. Each test has its own benefits and drawbacks. Discuss with your doctor what is most appropriate for you during your annual wellness visit. The different tests include: colonoscopy (considered the best screening method), a fecal occult blood test, a fecal DNA test, and sigmoidoscopy. 
 
-A bone mass density test is recommended when a woman turns 65 to screen for osteoporosis. This test is only recommended one time, as a screening. Some providers will use this same test as a disease monitoring tool if you already have osteoporosis. -Breast cancer screenings are recommended every other year for women of normal risk, age 54-69. 
-Cervical cancer screenings for women over age 72 are only recommended with certain risk factors. Here is a list of your current Health Maintenance items (your personalized list of preventive services) with a due date: 
Health Maintenance Due Topic Date Due  
 Pap Test  03/23/1982  Shingles Vaccine (1 of 2) 03/23/2011  Colon Cancer Stool Test  03/23/2011  Mammogram  12/08/2018 [facility] Diabetic Foot Care  05/03/2020 [facility] Annual Well Visit  04/10/2020 Learning About Breast Cancer Screening What is breast cancer screening?  
 
Breast cancer occurs when cells that are not normal grow in one or both of your breasts. Screening tests can help find breast cancer early. Cancer is easier to treat when it's found early. Having concerns about breast cancer is common. That's why it's important to talk with your doctor about when to start and how often to get screened for breast cancer. How is breast cancer screening done? Several screening tests can be used to check for breast cancer. · Mammograms check for signs of cancer using X-rays. They can show tumors that are too small for you or your doctor to feel. During a mammogram, a machine squeezes your breasts to make them flatter and easier to X-ray. At least two pictures are taken of each breast. One is taken from the top and one from the side. · 3-D mammograms are also called digital breast tomosynthesis. Your breast is positioned on a flat plate. A top plate is pressed against your breast to keep it in position. The X-ray arm then moves in an arc above the breast and takes many pictures. A computer uses these X-rays to create a three-dimensional image. · Clinical breast exams are a doctor's exam. Your doctor carefully feels your breasts and under your arms to check for lumps or other changes. After the screening, your doctor will tell you the results. You will also be told if you need any follow-up tests. When should you get screened? Talk with your doctor about when you should start being tested for breast cancer. How often you get tested and the kind of tests you get will depend on your age and your risk. The guidelines that follow are for women who have an average risk for breast cancer. If you have a higher risk for breast cancer, such as having a family history of breast cancer in multiple relatives or at a young age, your doctor may recommend different screening for you. · Ages 21 to 44: Some experts recommend that women have a clinical breast exam every 3 years, starting at age 21.  Ask your doctor how often you should have this test. If you have a high risk for breast cancer, talk with your doctor about when to start yearly mammograms and other screening tests. · Ages 36 and older: Talk with your doctor about how often you should have mammograms and clinical breast exams. What is your risk for breast cancer? If you don't already know your risk of breast cancer, you can ask your doctor about it. You can also look it up at www.cancer.gov/bcrisktool/. If your doctor says that you have a high or very high risk, ask about ways to reduce your risk. These could include getting extra screening, taking medicine, or having surgery. If you have a strong family history of breast cancer, ask your doctor about genetic testing. What steps can you take to stay healthy? Some things that increase your risk of breast cancer, such as your age and being female, cannot be controlled. But you can do some things to stay as healthy as you can. · Learn what your breasts normally look and feel like. If you notice any changes, tell your doctor. · Drink alcohol wisely. Your risk goes up the more you drink. For the best health, women should have no more than 1 drink a day or 7 drinks a week. · If you smoke, quit. When you quit smoking, you lower your chances of getting many types of cancer. You can also do your best to eat well, be active, and stay at a healthy weight. Eating healthy foods and being active every day, as well as staying at a healthy weight, may help prevent cancer. Where can you learn more? Go to http://nolan-alondra.info/. Enter R956 in the search box to learn more about \"Learning About Breast Cancer Screening. \" Current as of: March 28, 2018 Content Version: 11.8 © 5654-6402 Healthwise, [employer]. Care instructions adapted under license by [employer] (which disclaims liability or warranty for this information).  If you have questions about a medical condition or this instruction, always ask your healthcare professional. [place] [address] any warranty or liability for your use of this information. Learning About Cervical Cancer Screening What is a cervical cancer screening test? 
 
The cervix is the lower part of the uterus that opens into the vagina. Cervical cancer screening tests check the cells on the cervix for changes that could lead to cancer. Two tests can be used to screen for cervical cancer. They may be used alone or together. A Pap test.  
This test looks for changes in the cells of the cervix. Some of these cell changes could lead to cancer. A human papillomavirus (HPV) test.  
This test looks for the HPV virus. Some high-risk types of HPV can cause cell changes that could lead to cervical cancer. During either test, the doctor or nurse will insert a tool called a speculum into your vagina. The speculum gently opens the vaginal walls. It allows your doctor to see inside the vagina and the cervix. He or she uses a small swab or brush to collect cell samples from your cervix. Try to schedule the test when you're not having your period. To get ready for the test, your doctor may ask you to use a condom if you have sex before the test. Your doctor may also say to avoid douches, tampons, vaginal medicines, sprays, and powders for at least a day before you have the test. 
When should you have a screening test? 
Talk with your doctor about cervical cancer screening. If you are a woman with an average risk for cervical cancer, your doctor will likely suggest starting screening at age 24. Women 21 to 34 If you are in this age group, your doctor will likely suggest that you get a Pap test. If your Pap test results are normal, you can wait 3 years to have another test. 
Women 30 to 59 Screening options for women in this age group may include: · A Pap test. If your results are normal, you can wait 3 years to have another test. 
 · An HPV test. If your results are normal, you can wait 5 years to have another test. 
· A Pap test and an HPV test. Having both tests is called co-testing. If your results are normal, you can wait 5 years to be tested again. Women 72 and older · If you are age 72 or older, talk with your doctor about what's right for you. Women ages 72 and older may no longer need to be screened for cervical cancer. When to stop having screening tests depends on your medical history, your overall health, and your risk of cervical cell changes or cervical cancer. What happens after the test? 
The sample of cells taken during your test will be sent to a lab so that an expert can look at the cells. It usually takes a week or two to get the results back. Pap tests · A normal result means that the test didn't find any abnormal cells in the sample. · An abnormal result can mean many things. Most of these aren't cancer. The results of your test may be abnormal because: ? You have an infection of the vagina or cervix, such as a yeast infection. ? You have low estrogen levels after menopause that are causing the cells to change. ? You have cell changes that may be a sign of precancer or cancer. The results are ranked based on how serious the changes might be. HPV tests · A negative result means that the test didn't find any high-risk HPV in the sample. · A positive HPV test means that at least one type of high-risk HPV was found. It doesn't mean that you have cervical cancer, but it increases your chance of having cell changes that could lead to cancer. Follow-up care is a key part of your treatment and safety. Be sure to make and go to all appointments, and call your doctor if you are having problems. It's also a good idea to know your test results and keep a list of the medicines you take. Where can you learn more? Go to http://nolan-alondra.info/ Enter P919 in the search box to learn more about \"Learning About Cervical Cancer Screening. \" Current as of: August 21, 2019Content Version: 12.4 © 7051-1759 Healthwise, [employer]. Care instructions adapted under license by [employer] (which disclaims liability or warranty for this information). If you have questions about a medical condition or this instruction, always ask your healthcare professional. [address] any warranty or liability for your use of this information. Preventing Falls: Care Instructions Your Care Instructions Getting around your home safely can be a challenge if you have injuries or health problems that make it easy for you to fall. Loose rugs and furniture in walkways are among the dangers for many older people who have problems walking or who have poor eyesight. People who have conditions such as arthritis, osteoporosis, or dementia also have to be careful not to fall. You can make your home safer with a few simple measures. Follow-up care is a key part of your treatment and safety. Be sure to make and go to all appointments, and call your doctor if you are having problems. It's also a good idea to know your test results and keep a list of the medicines you take. How can you care for yourself at home? Taking care of yourself · You may get dizzy if you do not drink enough water. To prevent dehydration, drink plenty of fluids, enough so that your urine is light yellow or clear like water. Choose water and other caffeine-free clear liquids. If you have kidney, heart, or liver disease and have to limit fluids, talk with your doctor before you increase the amount of fluids you drink. · Exercise regularly to improve your strength, muscle tone, and balance. Walk if you can. Swimming may be a good choice if you cannot walk easily.  
· Have your vision and hearing checked each year or any time you notice a change. If you have trouble seeing and hearing, you might not be able to avoid objects and could lose your balance. · Know the side effects of the medicines you take. Ask your doctor or pharmacist whether the medicines you take can affect your balance. Sleeping pills or sedatives can affect your balance. · Limit the amount of alcohol you drink. Alcohol can impair your balance and other senses. · Ask your doctor whether calluses or corns on your feet need to be removed. If you wear loose-fitting shoes because of calluses or corns, you can lose your balance and fall. · Talk to your doctor if you have numbness in your feet. Preventing falls at home · Remove raised doorway thresholds, throw rugs, and clutter. Repair loose carpet or raised areas in the floor. · Move furniture and electrical cords to keep them out of walking paths. · Use nonskid floor wax, and wipe up spills right away, especially on ceramic tile floors. · If you use a walker or cane, put rubber tips on it. If you use crutches, clean the bottoms of them regularly with an abrasive pad, such as steel wool. · Keep your house well lit, especially [place], and outside walkways. Use night-lights in areas such as hallways and bathrooms. Add extra light switches or use remote switches (such as switches that go on or off when you clap your hands) to make it easier to turn lights on if you have to get up during the night. · Install sturdy handrails on stairways. · Move items in your cabinets so that the things you use a lot are on the lower shelves (about waist level). · Keep a cordless phone and a flashlight with new batteries by your bed. If possible, put a phone in each of the main rooms of your house, or carry a cell phone in case you fall and cannot reach a phone. Or, you can wear a device around your neck or wrist. You push a button that sends a signal for help. · Wear low-heeled shoes that fit well and give your feet good support. Use footwear with nonskid soles. Check the heels and soles of your shoes for wear. Repair or replace worn heels or soles. · Do not wear socks without shoes on wood floors. · Walk on the grass when the sidewalks are slippery. If you live in an area that gets snow and ice in the winter, sprinkle salt on slippery steps and sidewalks. Preventing falls in the bath · Install grab bars and nonskid mats inside and outside your shower or tub and near the toilet and sinks. · Use shower chairs and bath benches. · Use a hand-held shower head that will allow you to sit while showering. · Get into a tub or shower by putting the weaker leg in first. Get out of a tub or shower with your strong side first. 
· Repair loose toilet seats and consider installing a raised toilet seat to make getting on and off the toilet easier. · Keep your bathroom door unlocked while you are in the shower. Where can you learn more? Go to http://nolan-alondra.info/. Enter 0476 [id number] in the search box to learn more about \"Preventing Falls: Care Instructions. \" Current as of: March 16, 2018 Content Version: 11.8 © 6967-4888 [employer]. Care instructions adapted under license by [employer] (which disclaims liability or warranty for this information). If you have questions about a medical condition or this instruction, always ask your healthcare professional. [place] [address] any warranty or liability for your use of this information.

## 2020-05-26 ENCOUNTER — VIRTUAL VISIT (OUTPATIENT)
Dept: ENDOCRINOLOGY | Age: 60
End: 2020-05-26

## 2020-05-26 DIAGNOSIS — I10 ESSENTIAL HYPERTENSION: ICD-10-CM

## 2020-05-26 DIAGNOSIS — E78.5 HYPERLIPIDEMIA, UNSPECIFIED HYPERLIPIDEMIA TYPE: ICD-10-CM

## 2020-05-26 DIAGNOSIS — Z79.4 TYPE 2 DIABETES MELLITUS WITH OTHER NEUROLOGIC COMPLICATION, WITH LONG-TERM CURRENT USE OF INSULIN (HCC): Primary | ICD-10-CM

## 2020-05-26 DIAGNOSIS — E11.49 TYPE 2 DIABETES MELLITUS WITH OTHER NEUROLOGIC COMPLICATION, WITH LONG-TERM CURRENT USE OF INSULIN (HCC): Primary | ICD-10-CM

## 2020-05-26 RX ORDER — ZINC GLUCONATE 50 MG
TABLET ORAL DAILY
COMMUNITY
End: 2021-04-05

## 2020-05-26 RX ORDER — PERPHENAZINE/AMITRIPTYLINE HCL 4 MG-25 MG
TABLET ORAL DAILY
COMMUNITY

## 2020-06-02 DIAGNOSIS — F51.01 PRIMARY INSOMNIA: ICD-10-CM

## 2020-06-02 RX ORDER — ZOLPIDEM TARTRATE 10 MG/1
10 TABLET ORAL
Qty: 30 TAB | Refills: 0 | Status: SHIPPED | OUTPATIENT
Start: 2020-06-02 | End: 2020-07-30

## 2020-07-09 DIAGNOSIS — R06.09 DYSPNEA ON EFFORT: ICD-10-CM

## 2020-07-09 DIAGNOSIS — J44.1 COPD WITH ACUTE EXACERBATION (HCC): ICD-10-CM

## 2020-07-20 ENCOUNTER — VIRTUAL VISIT (OUTPATIENT)
Dept: FAMILY MEDICINE CLINIC | Age: 60
End: 2020-07-20

## 2020-07-20 DIAGNOSIS — Z71.89 ADVICE GIVEN ABOUT COVID-19 VIRUS INFECTION: ICD-10-CM

## 2020-07-20 DIAGNOSIS — F51.01 PRIMARY INSOMNIA: ICD-10-CM

## 2020-07-20 DIAGNOSIS — F33.9 RECURRENT DEPRESSION (HCC): Primary | ICD-10-CM

## 2020-07-20 DIAGNOSIS — R00.0 TACHYCARDIA: ICD-10-CM

## 2020-07-20 DIAGNOSIS — I10 ESSENTIAL HYPERTENSION: ICD-10-CM

## 2020-07-20 DIAGNOSIS — J44.9 ASTHMATIC BRONCHITIS , CHRONIC (HCC): ICD-10-CM

## 2020-07-20 PROBLEM — F39 MOOD DISORDER (HCC): Status: ACTIVE | Noted: 2020-07-20

## 2020-07-20 RX ORDER — BUPROPION HYDROCHLORIDE 300 MG/1
300 TABLET ORAL
Qty: 90 TAB | Refills: 2 | Status: SHIPPED | OUTPATIENT
Start: 2020-07-20 | End: 2021-04-07

## 2020-07-20 RX ORDER — TRAZODONE HYDROCHLORIDE 100 MG/1
100 TABLET ORAL
Qty: 30 TAB | Refills: 3 | Status: SHIPPED | OUTPATIENT
Start: 2020-07-20 | End: 2020-11-13

## 2020-07-20 RX ORDER — METOPROLOL SUCCINATE 100 MG/1
100 TABLET, EXTENDED RELEASE ORAL DAILY
Qty: 30 TAB | Refills: 3
Start: 2020-07-20 | End: 2020-09-11

## 2020-07-20 RX ORDER — DOCUSATE SODIUM 100 MG/1
100 CAPSULE, LIQUID FILLED ORAL 2 TIMES DAILY
COMMUNITY
End: 2021-04-05

## 2020-07-20 RX ORDER — QUETIAPINE FUMARATE 100 MG/1
200 TABLET, FILM COATED ORAL
Qty: 30 TAB | Refills: 2
Start: 2020-07-20 | End: 2020-07-20 | Stop reason: ALTCHOICE

## 2020-07-20 RX ORDER — ALBUTEROL SULFATE 90 UG/1
2 AEROSOL, METERED RESPIRATORY (INHALATION)
Qty: 3 INHALER | Refills: 3 | Status: SHIPPED | OUTPATIENT
Start: 2020-07-20 | End: 2021-06-09 | Stop reason: SDUPTHER

## 2020-07-20 NOTE — PATIENT INSTRUCTIONS

## 2020-07-20 NOTE — PROGRESS NOTES
HISTORY OF PRESENT ILLNESS  Tiffanie Mora is a 62 y.o. female.   HPI   Today's Pt main concerns were provided on virtual visit and audio telemed format,  pt is w/ comorbid history and unaware if the pt has been exposed to covid-19 individual, Pt Have been staying at home for couple of wks,  pt has no fever no cough no dyspnea, no ha,    Insomnia with restlessness  Pt presents stating that patient has trouble with restlessness and sleep problem is not suicidal and not homicidal, patient problem is not falling asleep, the problem is staying asleep,  it gets 1-3 Hours Of sleep, then the pt is up for another 1-2 hours, Then  goes back to sleep, patient has been given sleeping aids in the past currently Zero pill count , aware of its side effect, state that she is talking and does things she can not recall, ordering things while asleep, pt taking the med in the beds as prescribed,   never abused any meds,  patient has tried some over-the-counter sleeping and No over-the-counter medication helped this patient so for,  No hx of sleep apnea, no daily fatigue no trouble with TSH, not an anxious person,     Depression with the anxiety and panic states of mind    nicley controlled with the current meds,  Patient state that it is getting better: pt states and reports of feeling less anxius, less guilty feeling,  less Hoplessness,ns/nh,ni,nh, less trouble with weight gain or loss, no tendency of etoh or illicit drug use, more ability of sleep, more ablitiy  to concentrate  at home with the current medications,and all together a safe feeling at home     COPD with current tobacco abuse  The patient has no frequent daytime/ nor nighttime  Symptoms,  The patient is using short-acting beta agonists for symptom control ,    pt has no limitations in the daily activity and has no presentation of shortness of breath on exertion,  nicely vaccinated for all  preventive measures,        Current Outpatient Medications   Medication Sig Dispense Refill    docusate sodium (COLACE) 100 mg capsule Take 100 mg by mouth two (2) times a day.  roflumilast (Daliresp) 500 mcg tab tablet Take 1 Tab by mouth daily. 90 Tab 1    zolpidem (AMBIEN) 10 mg tablet TAKE 1 TAB BY MOUTH NIGHTLY AS NEEDED FOR SLEEP. MAX DAILY AMOUNT: 10 MG. 30 Tab 0    MILK THISTLE PO Take  by mouth daily.  Magnesium Oxide 500 mg cap Take  by mouth daily.  lutein 40 mg cap Take  by mouth daily.  pantoprazole (PROTONIX) 40 mg tablet TAKE 1 TABLET BY MOUTH ONCE DAILY 90 Tab 3    metoprolol succinate (TOPROL-XL) 100 mg tablet Take 1 Tab by mouth daily. 30 Tab 3    insulin NPH/insulin regular (NovoLIN 70/30 U-100 Insulin) 100 unit/mL (70-30) injection 15 Units by SubCUTAneous route Daily (before breakfast). (Patient taking differently: 15 Units by SubCUTAneous route Daily (before breakfast). And 7 units before lunch) 20 mL 2    liraglutide (VICTOZA) 0.6 mg/0.1 mL (18 mg/3 mL) pnij INJECT 1.8 MG UNDER SKIN DAILY (BEFORE BREAKFAST). 9 Adjustable Dose Pre-filled Pen Syringe 3    budesonide-formoteroL (SYMBICORT) 160-4.5 mcg/actuation HFAA INHALE 2 PUFFS BY MOUTH TWICE DAILY FOR COPD ASSOCIATED WITH CHRONIC BRONCHITIS, EXTRINSIC ASTHMA. 30.6 Inhaler 2    ergocalciferol (ERGOCALCIFEROL) 50,000 unit capsule TAKE 1 CAPSULE BY MOUTH ONCE A WEEK 12 Cap 3    montelukast (SINGULAIR) 10 mg tablet TAKE 1 TABLET BY MOUTH DAILY IN THE EVENING 90 Tab 3    glimepiride (AMARYL) 4 mg tablet TAKE 1 TABLET BY MOUTH EVERY MORNING. 3    insulin syringe-needle U-100 (BD INSULIN SYRINGE ULTRA-FINE) 1/2 mL 31 gauge x 15/64\" syrg Use for injecting insulin subcutaneously 200 Pen Needle 5    benztropine (COGENTIN) 0.5 mg tablet Take 1 Tab by mouth as needed. 2    tiotropium (SPIRIVA WITH HANDIHALER) 18 mcg inhalation capsule INHALE THE CONTENTS OF 1 CAPSULE VIA HANDIHALER ONCE DAILY.  RINSE MOUTH AFTER USE 90 Cap 2    buPROPion XL (WELLBUTRIN XL) 300 mg XL tablet Take 1 Tab by mouth every morning. 90 Tab 2    metFORMIN ER (GLUCOPHAGE XR) 500 mg tablet Take 2 Tabs by mouth Before breakfast and dinner. 360 Tab 3    rosuvastatin (CRESTOR) 40 mg tablet TAKE 1 TABLET BY MOUTH ONCE A DAY AT BEDTIME. 90 Tab 3    Blood-Glucose Meter monitoring kit 1 preferred brand glucometer for checking home glucose, E11.65 1 Kit 0    glucose blood VI test strips (PHARMACIST CHOICE) strip Use preferred brand; Check glucose 4 times daily, Diagnosis E11.65 400 Strip 3    Insulin Needles, Disposable, (ULTICARE PEN NEEDLE) 32 gauge x 5/32\" ndle USE 2 TIMES DAILY TO INJECT INSULIN/VICTOZA. 200 Pen Needle 3    VENTOLIN HFA 90 mcg/actuation inhaler 2 Puffs every four (4) hours as needed.  multivitamin (ONE A DAY) tablet Take 1 Tab by mouth daily.  EPINEPHrine (EPIPEN) 0.3 mg/0.3 mL injection 0.3 mg by IntraMUSCular route once as needed.  lancets misc Use preferred brand;  Check glucose 4 times daily, Diagnosis E11.65 400 Each 11     Allergies   Allergen Reactions    Latex Hives    Other Food Anaphylaxis and Hives     PEPPERONI, sloppy joes, frank cheese doritos     Demerol [Meperidine] Anaphylaxis     Difficulty breathing    Iodine Anaphylaxis    Morphine Other (comments)     voilent outbreaks (restraints needed)    Nsaids (Non-Steroidal Anti-Inflammatory Drug) Hives     Past Medical History:   Diagnosis Date    Asthma     Asthma     COPD     Depression     Diabetes ([facility]     Diabetes mellitus     Encounter for review of form with patient 12/4/2017    Essential hypertension     GERD (gastroesophageal reflux disease)     Headache(784.0)     HX OTHER MEDICAL     acoustic neuroma    Hypercholesterolemia     Hypertension     Ill-defined condition     R ACOUSTIC NEUROMA    Insomnia disorder 12/4/2017    Major depression, chronic 12/4/2017    Psychiatric problem     anxiety depression    Psychotic disorder ([facility]     Psychotic disorder ([facility] 8/14/2019    Screening for cervical cancer 2018    Tobacco use disorder 10/19/2017    Unspecified sleep apnea     NO CPAP-O2 @2L WHEN SLEEPING     Past Surgical History:   Procedure Laterality Date     DELIVERY ONLY      HX GASTRECTOMY  14    lap sleeve gastrectomy by Dr Dionne Pradhan      2 c-sections    HX HEENT      sinus    HX HEENT      tracheal resection    HX HEENT      tracheal alleratation x 2    HX HEENT      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    HX HEENT      LASER SX-PENG     Family History   Problem Relation Age of Onset    Diabetes Father     Heart Failure Father     Heart Disease Father     Hypertension Father     High Cholesterol Mother     Suicide Brother     Stroke Maternal Grandmother     Cancer Paternal Grandfather     Anesth Problems Neg Hx      Social History     Tobacco Use    Smoking status: Former Smoker     Packs/day: 1.00     Years: 12.00     Pack years: 12.00     Last attempt to quit: 2018     Years since quittin.9    Smokeless tobacco: Former User   Substance Use Topics    Alcohol use: Yes     Comment: ocassional      Lab Results   Component Value Date/Time    WBC 5.9 2019 12:41 PM    HGB 12.6 2019 12:41 PM    HCT 39.3 2019 12:41 PM    PLATELET 796  12:41 PM    MCV 89 2019 12:41 PM     Lab Results   Component Value Date/Time    TSH 0.855 2020 11:06 AM    T4, Free 0.9 2014 09:23 AM         Review of Systems   Constitutional: Positive for malaise/fatigue. Negative for chills and fever. HENT: Negative for ear pain and nosebleeds. Eyes: Negative for blurred vision, pain and discharge. Respiratory: Negative for shortness of breath. Cardiovascular: Negative for chest pain and leg swelling. Gastrointestinal: Negative for constipation, diarrhea, nausea and vomiting. Genitourinary: Negative for frequency. Musculoskeletal: Positive for myalgias. Negative for joint pain. Skin: Negative for itching and rash. Neurological: Negative for headaches. Psychiatric/Behavioral: Positive for depression. Negative for hallucinations, substance abuse and suicidal ideas. The patient is nervous/anxious and has insomnia. Physical Exam  Vitals signs and nursing note reviewed. Constitutional:       Appearance: She is well-developed. She is obese. She is not ill-appearing or toxic-appearing. HENT:      Head: Normocephalic and atraumatic. Mouth/Throat:      Mouth: Mucous membranes are moist.   Neck:      Thyroid: No thyromegaly. Vascular: No JVD. Abdominal:      General: Bowel sounds are normal.      Palpations: Abdomen is soft. Musculoskeletal: Normal range of motion. Lymphadenopathy:      Cervical: No cervical adenopathy. Skin:     Findings: No rash. Neurological:      Mental Status: She is alert and oriented to person, place, and time. Cranial Nerves: Cranial nerve deficit present. Deep Tendon Reflexes: Reflexes are normal and symmetric. Psychiatric:         Attention and Perception: She is inattentive. She does not perceive auditory or visual hallucinations. Mood and Affect: Mood normal. Mood is not anxious, depressed or elated. Affect is flat. Affect is not labile, blunt, angry, tearful or inappropriate. Speech: She is communicative. Speech is rapid and pressured. Speech is not delayed, slurred or tangential.         Behavior: Behavior normal. Behavior is not agitated, aggressive, withdrawn, hyperactive or combative. Thought Content: Thought content normal. Thought content is not paranoid. Thought content does not include homicidal or suicidal ideation. Cognition and Memory: Memory is not impaired. She does not exhibit impaired recent memory or impaired remote memory. Judgment: Judgment normal. Judgment is not inappropriate. ASSESSMENT and PLAN  Diagnoses and all orders for this visit:    1.  Recurrent depression (HCC)  -     buPROPion XL (WELLBUTRIN XL) 300 mg XL tablet; Take 1 Tab by mouth every morning.  -     traZODone (DESYREL) 100 mg tablet; Take 1 Tab by mouth nightly. Indications: insomnia associated with depression    2. Asthmatic bronchitis , chronic (HCC)  -     tiotropium (Spiriva with HandiHaler) 18 mcg inhalation capsule; INHALE THE CONTENTS OF 1 CAPSULE VIA HANDIHALER ONCE DAILY. RINSE MOUTH AFTER USE  -     Ventolin HFA 90 mcg/actuation inhaler; Take 2 Puffs by inhalation every four (4) hours as needed for Wheezing. 3. Primary insomnia  -     metoprolol succinate (TOPROL-XL) 100 mg tablet; Take 1 Tab by mouth daily. -     traZODone (DESYREL) 100 mg tablet; Take 1 Tab by mouth nightly. Indications: insomnia associated with depression    4. Tachycardia  -     metoprolol succinate (TOPROL-XL) 100 mg tablet; Take 1 Tab by mouth daily. 5. Essential hypertension  -     metoprolol succinate (TOPROL-XL) 100 mg tablet; Take 1 Tab by mouth daily.     6. Advice given about COVID-19 virus infection          Depression with anxiety in a stable condition, not suicidal, start antianxiety and calming medication for now will reevaluate in 3 w for progression   in addition Counseling , social support, spiritual belonging, inc physical activity, all offered,  compliance advised, patient was told that should not mix any of current medication with any other illicit drugs, should not drink any alcoholic beverages while on such medication patient was also told to not operate any machinery while under the influence patient acknowledged understanding and agreed with today's recommendation in addition patient was told to call for any concern       Concern abdout COVID-19 addressed and detailed, pt was told that the best way to prevent illness is by protection, to Wear a facemask , having social distance, and to get tested if possible, Pursuant to the emergency declaration under the [address] and [facility] authority and the Coronavirus Preparedness and Response Supplemental Appropriations Act, this Virtual Visit was conducted, with patient's consent, to reduce the patient's risk of exposure to COVID-19 and provide continuity of care for an established patient. Pt was also told if develop dyspnea needs to call 911 or go to er, call for furer advise, pt agreed with todays recommendations, Services were provided through audio synchronous discussion virtually to substitute for in-person appointment.

## 2020-07-26 DIAGNOSIS — I10 ESSENTIAL HYPERTENSION: ICD-10-CM

## 2020-07-26 DIAGNOSIS — R00.0 TACHYCARDIA: ICD-10-CM

## 2020-07-27 RX ORDER — ROSUVASTATIN CALCIUM 40 MG/1
TABLET, COATED ORAL
Qty: 90 TAB | Refills: 3 | Status: SHIPPED | OUTPATIENT
Start: 2020-07-27 | End: 2021-06-15 | Stop reason: SDUPTHER

## 2020-07-28 RX ORDER — GLIMEPIRIDE 4 MG/1
TABLET ORAL
Qty: 90 TAB | Refills: 3 | Status: SHIPPED | OUTPATIENT
Start: 2020-07-28 | End: 2021-06-07

## 2020-07-30 ENCOUNTER — OFFICE VISIT (OUTPATIENT)
Dept: CARDIOLOGY CLINIC | Age: 60
End: 2020-07-30

## 2020-07-30 VITALS
WEIGHT: 164.4 LBS | OXYGEN SATURATION: 98 % | RESPIRATION RATE: 16 BRPM | SYSTOLIC BLOOD PRESSURE: 110 MMHG | HEART RATE: 100 BPM | BODY MASS INDEX: 24.92 KG/M2 | DIASTOLIC BLOOD PRESSURE: 60 MMHG | HEIGHT: 68 IN

## 2020-07-30 DIAGNOSIS — E78.00 HYPERCHOLESTEREMIA: ICD-10-CM

## 2020-07-30 DIAGNOSIS — I34.1 MITRAL VALVE PROLAPSE: Primary | ICD-10-CM

## 2020-07-30 DIAGNOSIS — I10 ESSENTIAL HYPERTENSION: ICD-10-CM

## 2020-07-30 PROBLEM — I20.9 ANGINA, CLASS III (HCC): Status: RESOLVED | Noted: 2017-11-09 | Resolved: 2020-07-30

## 2020-07-30 NOTE — PROGRESS NOTES
Corine Landrum, MEGAN-BC    Subjective/HPI:     Thao Rubalcava is a 62 y.o. female is here for routine f/u. She has a PMHx of mitral valve prolapse now resolved, DM2, HTN, HLD and GERD. She is doing well. She still has bilateral shoulder pain with fatigue which is unchanged from last visit. This happens rarely so she did not discuss these symptoms with PCP at last visit in 5/2019. She otherwise denies lower extremity edema, orthopnea, PND or shortness of breath. She denies palpitation symptoms. PCP Provider  Perla Son MD    Past Medical History:   Diagnosis Date    Asthma     Asthma     COPD     Depression     Diabetes ([facility]     Diabetes mellitus     Encounter for review of form with patient 12/4/2017    Essential hypertension     GERD (gastroesophageal reflux disease)     Headache(784.0)     HX OTHER MEDICAL     acoustic neuroma    Hypercholesterolemia     Hypertension     Ill-defined condition     R ACOUSTIC NEUROMA    Insomnia disorder 12/4/2017    Major depression, chronic 12/4/2017    Mood disorder ([facility] 7/20/2020    Psychiatric problem     anxiety depression    [facility]     Psychotic disorder ([facility] 8/14/2019    Screening for cervical cancer 5/22/2018    Tobacco use disorder 10/19/2017    Unspecified sleep apnea     NO CPAP-O2 @2L WHEN SLEEPING        Allergies   Allergen Reactions    Latex Hives    Other Food Anaphylaxis and Hives     PEPPERONI, sloppy joes, frank cheese doritos ,peach jolly ranchers & green tea    Demerol [Meperidine] Anaphylaxis     Difficulty breathing    Iodine Anaphylaxis    Morphine Other (comments)     voilent outbreaks (restraints needed)    Nsaids (Non-Steroidal Anti-Inflammatory Drug) Hives        Outpatient Encounter Medications as of 7/30/2020   Medication Sig Dispense Refill    glimepiride (AMARYL) 4 mg tablet TAKE 1 TABLET BY MOUTH EVERY MORNING.  90 Tab 3    rosuvastatin (CRESTOR) 40 mg tablet TAKE 1 TABLET BY MOUTH ONCE A DAY AT BEDTIME. 90 Tab 3    docusate sodium (COLACE) 100 mg capsule Take 100 mg by mouth two (2) times a day.  buPROPion XL (WELLBUTRIN XL) 300 mg XL tablet Take 1 Tab by mouth every morning. 90 Tab 2    tiotropium (Spiriva with HandiHaler) 18 mcg inhalation capsule INHALE THE CONTENTS OF 1 CAPSULE VIA HANDIHALER ONCE DAILY. RINSE MOUTH AFTER USE 90 Cap 2    Ventolin HFA 90 mcg/actuation inhaler Take 2 Puffs by inhalation every four (4) hours as needed for Wheezing. 3 Inhaler 3    metoprolol succinate (TOPROL-XL) 100 mg tablet Take 1 Tab by mouth daily. 30 Tab 3    traZODone (DESYREL) 100 mg tablet Take 1 Tab by mouth nightly. Indications: insomnia associated with depression 30 Tab 3    roflumilast (Daliresp) 500 mcg tab tablet Take 1 Tab by mouth daily. 90 Tab 1    MILK THISTLE PO Take  by mouth daily.  Magnesium Oxide 500 mg cap Take  by mouth daily.  lutein 40 mg cap Take  by mouth daily.  pantoprazole (PROTONIX) 40 mg tablet TAKE 1 TABLET BY MOUTH ONCE DAILY 90 Tab 3    insulin NPH/insulin regular (NovoLIN 70/30 U-100 Insulin) 100 unit/mL (70-30) injection 15 Units by SubCUTAneous route Daily (before breakfast). (Patient taking differently: 15 Units by SubCUTAneous route Daily (before breakfast). And 7 units before lunch) 20 mL 2    liraglutide (VICTOZA) 0.6 mg/0.1 mL (18 mg/3 mL) pnij INJECT 1.8 MG UNDER SKIN DAILY (BEFORE BREAKFAST).  9 Adjustable Dose Pre-filled Pen Syringe 3    budesonide-formoteroL (SYMBICORT) 160-4.5 mcg/actuation HFAA INHALE 2 PUFFS BY MOUTH TWICE DAILY FOR COPD ASSOCIATED WITH CHRONIC BRONCHITIS, EXTRINSIC ASTHMA. 30.6 Inhaler 2    ergocalciferol (ERGOCALCIFEROL) 50,000 unit capsule TAKE 1 CAPSULE BY MOUTH ONCE A WEEK 12 Cap 3    montelukast (SINGULAIR) 10 mg tablet TAKE 1 TABLET BY MOUTH DAILY IN THE EVENING 90 Tab 3    insulin syringe-needle U-100 (BD INSULIN SYRINGE ULTRA-FINE) 1/2 mL 31 gauge x 15/64\" syrg Use for injecting insulin subcutaneously 200 Pen Needle 5    benztropine (COGENTIN) 0.5 mg tablet Take 1 Tab by mouth as needed. 2    metFORMIN ER (GLUCOPHAGE XR) 500 mg tablet Take 2 Tabs by mouth Before breakfast and dinner. 360 Tab 3    Blood-Glucose Meter monitoring kit 1 preferred brand glucometer for checking home glucose, E11.65 1 Kit 0    lancets misc Use preferred brand; Check glucose 4 times daily, Diagnosis E11.65 400 Each 11    glucose blood VI test strips (PHARMACIST CHOICE) strip Use preferred brand; Check glucose 4 times daily, Diagnosis E11.65 400 Strip 3    Insulin Needles, Disposable, (ULTICARE PEN NEEDLE) 32 gauge x 5/32\" ndle USE 2 TIMES DAILY TO INJECT INSULIN/VICTOZA. 200 Pen Needle 3    multivitamin (ONE A DAY) tablet Take 1 Tab by mouth daily.  EPINEPHrine (EPIPEN) 0.3 mg/0.3 mL injection 0.3 mg by IntraMUSCular route once as needed.  [DISCONTINUED] zolpidem (AMBIEN) 10 mg tablet TAKE 1 TAB BY MOUTH NIGHTLY AS NEEDED FOR SLEEP. MAX DAILY AMOUNT: 10 MG. (Patient not taking: Reported on 7/30/2020) 30 Tab 0     No facility-administered encounter medications on file as of 7/30/2020. Review of Symptoms:    Review of Systems   Constitutional: Negative for chills, fever and weight loss. HENT: Negative for nosebleeds. Eyes: Negative for blurred vision and double vision. Respiratory: Negative for cough, shortness of breath and wheezing. Cardiovascular: Negative for chest pain, palpitations, orthopnea, leg swelling and PND. Gastrointestinal: Negative for abdominal pain, blood in stool, diarrhea, nausea and vomiting. Musculoskeletal: Negative for joint pain. Skin: Negative for rash. Neurological: Negative for dizziness, tingling and loss of consciousness. Endo/Heme/Allergies: Does not bruise/bleed easily. Physical Exam:      General: Well developed, in no acute distress, cooperative and alert  HEENT: No carotid bruits, no JVD, trach is midline.  Neck Supple, PEERL, EOM intact. Heart:  reg rate and rhythm; normal S1/S2; no murmurs, no gallops or rubs. Respiratory: Clear bilaterally x 4, no wheezing or rales  Abdomen:   Soft, non-tender, no distention, no masses. + BS. Extremities:  Normal cap refill, no cyanosis, atraumatic. No edema. Neuro: A&Ox3, speech clear, gait stable. Skin: Skin color is normal. No rashes or lesions. Non diaphoretic  Vascular: 2+ pulses symmetric in all extremities    Vitals:    07/30/20 1428   BP: 110/60   Pulse: 100   Resp: 16   SpO2: 98%   Weight: 164 lb 6.4 oz (74.6 kg)   Height: 5' 8\" (1.727 m)       Cardiology Labs:    Lab Results   Component Value Date/Time    Cholesterol, total 136 05/01/2020 11:06 AM    HDL Cholesterol 51 05/01/2020 11:06 AM    LDL, calculated 54 05/01/2020 11:06 AM    LDL, calculated 34 08/14/2019 12:41 PM    LDL, calculated 43 11/20/2018 08:47 AM    VLDL, calculated 31 05/01/2020 11:06 AM       Lab Results   Component Value Date/Time    Hemoglobin A1c 7.9 (H) 05/01/2020 11:06 AM    Hemoglobin A1c (POC) 6.8 01/06/2020 09:20 AM    Hemoglobin A1c, External 7.5% 04/26/2017       Lab Results   Component Value Date/Time    Sodium 142 05/01/2020 11:06 AM    Potassium 4.9 05/01/2020 11:06 AM    Chloride 101 05/01/2020 11:06 AM    CO2 24 05/01/2020 11:06 AM    Glucose 170 (H) 05/01/2020 11:06 AM    BUN 20 05/01/2020 11:06 AM    Creatinine 0.98 05/01/2020 11:06 AM    BUN/Creatinine ratio 20 05/01/2020 11:06 AM    GFR est AA 73 05/01/2020 11:06 AM    GFR est non-AA 63 05/01/2020 11:06 AM    Calcium 10.5 (H) 05/01/2020 11:06 AM    Anion gap 7 01/23/2018 12:50 AM    Bilirubin, total 0.4 05/01/2020 11:06 AM    ALT (SGPT) 117 (H) 05/01/2020 11:06 AM    Alk. phosphatase 91 05/01/2020 11:06 AM    Protein, total 6.5 05/01/2020 11:06 AM    Albumin 4.4 05/01/2020 11:06 AM    Globulin 3.1 01/23/2018 12:50 AM    A-G Ratio 2.1 05/01/2020 11:06 AM          Assessment:       ICD-10-CM ICD-9-CM    1.  Mitral valve prolapse  I34.1 424.0 2. Essential hypertension  I10 401.9    3. Hypercholesteremia  E78.00 272.0         Plan:     1. Mitral valve prolapse  Echo in 12/2017 with preserved ejection fraction 60-65%, normal mitral valve structure without evidence of prolapse, no regurgitation    2. Essential hypertension  BP controlled. Continue anti-hypertensive therapy and low sodium diet  Lexiscan stress test done 10/2019 without evidence of ischemia with fixed defect in basal-apical inferior location. 3. Hypercholesteremia  LDL 54 in 5/2020  Continue statin therapy and low fat, low cholesterol diet  Lipids managed by PCP    F/u with Dr. Curtis Silvestre in 1 year    Nestor Acosta NP       [facility]    7/30/2020         Patient seen, examined by me personally. Plan discussed as detailed. Agree with note as outlined by  NP. I confirm findings in history and physical exam. No additional findings noted. Agree with plan as outlined above.      Federica Lopez MD

## 2020-07-30 NOTE — LETTER
7/30/20 Patient: Abe Nance YOB: 1960 Date of Visit: 7/30/2020 Wilder Davis MD 
[address] [place] [id number] VIA In Basket Dear Wilder Davis MD, Thank you for referring Ms. Raffaele Walsh to [facility] for evaluation. My notes for this consultation are attached. If you have questions, please do not hesitate to call me. I look forward to following your patient along with you. Sincerely, Carlyle Spear MD

## 2020-07-30 NOTE — PROGRESS NOTES
Chief Complaint   Patient presents with    Follow-up    Valvular Heart Disease    Hypertension    Cholesterol Problem       1. Have you been to the ER, urgent care clinic since your last visit? Hospitalized since your last visit? No    2. Have you seen or consulted any other health care providers outside of the [address] [place] [address] since your last visit? Include any pap smears or colon screening.   Yes ENT

## 2020-08-11 ENCOUNTER — HOSPITAL ENCOUNTER (OUTPATIENT)
Dept: MAMMOGRAPHY | Age: 60
Discharge: HOME | End: 2020-08-11
Attending: FAMILY MEDICINE
Payer: MEDICARE

## 2020-08-11 ENCOUNTER — TELEPHONE (OUTPATIENT)
Dept: FAMILY MEDICINE CLINIC | Age: 60
End: 2020-08-11

## 2020-08-11 DIAGNOSIS — Z12.31 ENCOUNTER FOR SCREENING MAMMOGRAM FOR MALIGNANT NEOPLASM OF BREAST: ICD-10-CM

## 2020-08-11 PROCEDURE — 77067 SCR MAMMO BI INCL CAD: CPT

## 2020-08-11 NOTE — TELEPHONE ENCOUNTER
----- Message from Linette Choi sent at 8/10/2020  9:50 AM EDT -----  Regarding: Visit Follow-Up Question  Contact: [phone]  Hilario Norris ,you asked me to let you know how the Trazodone 100mg worked, it does  not , so I tried  one and a half being 150 mg. Would you please refill this medication. On my last visit you said you would request an abdominal Ultrasound possible hernia,they have not called to schedule an appointment. Thank You.    Abhishek  1960

## 2020-08-14 DIAGNOSIS — K43.9 ABDOMINAL WALL HERNIA: Primary | ICD-10-CM

## 2020-08-14 RX ORDER — CHLORHEXIDINE GLUCONATE 4 %
1 LIQUID (ML) TOPICAL
Qty: 30 TAB | Refills: 3 | Status: SHIPPED | OUTPATIENT
Start: 2020-08-14 | End: 2021-04-05

## 2020-08-20 NOTE — TELEPHONE ENCOUNTER
Referral placed but did not see communication regarding patient wanting to increase Trazodone.   Will check with MD.

## 2020-08-21 RX ORDER — ACETAMINOPHEN, DIPHENHYDRAMINE HCL, PHENYLEPHRINE HCL 325; 25; 5 MG/1; MG/1; MG/1
1 TABLET ORAL
Qty: 30 TAB | Refills: 3 | Status: SHIPPED | OUTPATIENT
Start: 2020-08-21 | End: 2020-09-20

## 2020-08-31 RX ORDER — BLOOD SUGAR DIAGNOSTIC
STRIP MISCELLANEOUS
Qty: 400 STRIP | Refills: 3 | Status: SHIPPED | OUTPATIENT
Start: 2020-08-31 | End: 2021-06-15 | Stop reason: SDUPTHER

## 2020-09-09 ENCOUNTER — HOSPITAL ENCOUNTER (OUTPATIENT)
Dept: ULTRASOUND IMAGING | Age: 60
Discharge: HOME | End: 2020-09-09
Attending: FAMILY MEDICINE
Payer: MEDICARE

## 2020-09-09 DIAGNOSIS — K43.9 ABDOMINAL WALL HERNIA: ICD-10-CM

## 2020-09-09 PROCEDURE — 76705 ECHO EXAM OF ABDOMEN: CPT

## 2020-09-09 PROCEDURE — 76604 US EXAM CHEST: CPT

## 2020-09-11 DIAGNOSIS — R00.0 TACHYCARDIA: ICD-10-CM

## 2020-09-11 DIAGNOSIS — I10 ESSENTIAL HYPERTENSION: ICD-10-CM

## 2020-09-11 DIAGNOSIS — F51.01 PRIMARY INSOMNIA: ICD-10-CM

## 2020-09-11 RX ORDER — METOPROLOL SUCCINATE 100 MG/1
TABLET, EXTENDED RELEASE ORAL
Qty: 90 TAB | Refills: 1 | Status: SHIPPED | OUTPATIENT
Start: 2020-09-11 | End: 2021-03-18

## 2020-09-29 ENCOUNTER — VIRTUAL VISIT (OUTPATIENT)
Dept: FAMILY MEDICINE CLINIC | Age: 60
End: 2020-09-29
Payer: MEDICARE

## 2020-09-29 DIAGNOSIS — Z12.11 SCREEN FOR COLON CANCER: Primary | ICD-10-CM

## 2020-09-29 DIAGNOSIS — K59.01 SLOW TRANSIT CONSTIPATION: ICD-10-CM

## 2020-09-29 DIAGNOSIS — F20.0 PARANOID SCHIZOPHRENIA (HCC): ICD-10-CM

## 2020-09-29 PROCEDURE — G9899 SCRN MAM PERF RSLTS DOC: HCPCS | Performed by: FAMILY MEDICINE

## 2020-09-29 PROCEDURE — 3017F COLORECTAL CA SCREEN DOC REV: CPT | Performed by: FAMILY MEDICINE

## 2020-09-29 PROCEDURE — G8756 NO BP MEASURE DOC: HCPCS | Performed by: FAMILY MEDICINE

## 2020-09-29 PROCEDURE — 99213 OFFICE O/P EST LOW 20 MIN: CPT | Performed by: FAMILY MEDICINE

## 2020-09-29 PROCEDURE — G8428 CUR MEDS NOT DOCUMENT: HCPCS | Performed by: FAMILY MEDICINE

## 2020-09-29 PROCEDURE — G9717 DOC PT DX DEP/BP F/U NT REQ: HCPCS | Performed by: FAMILY MEDICINE

## 2020-09-29 RX ORDER — AMOXICILLIN 250 MG
2 CAPSULE ORAL DAILY
Qty: 30 TAB | Refills: 2 | Status: SHIPPED | OUTPATIENT
Start: 2020-09-29 | End: 2021-04-05

## 2020-09-29 NOTE — PROGRESS NOTES
HISTORY OF PRESENT ILLNESS  Ronaldo Le is a 62 y.o. female. HPI     Pt's main concerns were provided on virtual visit, a telemed format,  pt is w/ comorbid medical history and unaware of been exposed to covid-19 individual, Pt Have been staying at home for couple of wks,  pt has no fever no cough no dyspnea, no ha, not dizzy, nl smell nl taste, no N/V/D, no body ache. Patient present with a complaint of small lump on the umbilical area state that the swelling fluctuates sometimes is gone sometimes it comes back sometimes is painful other time the patient does not notice it, no pain at this time also patient states that there is constipation episode, does not sex denies any vaginal bleeding, no spotting and no discharge, pt never had screening colonoscopy, was supposed to do Cologuard but unfortunately it has not been done at this time otherwise denies any other complaint,       Current Outpatient Medications   Medication Sig Dispense Refill    metoprolol succinate (TOPROL-XL) 100 mg tablet TAKE 1 TABLET BY MOUTH EVERY DAY 90 Tab 1    glucose blood VI test strips (Accu-Chek Renata Plus test strp) strip CHECK GLUCOSE 4 TIMES DAILY, DIAGNOSIS E11.65 400 Strip 3    melatonin 12 mg tab Take 1 Tab by mouth nightly as needed for Pain. 30 Tab 3    glimepiride (AMARYL) 4 mg tablet TAKE 1 TABLET BY MOUTH EVERY MORNING. 90 Tab 3    rosuvastatin (CRESTOR) 40 mg tablet TAKE 1 TABLET BY MOUTH ONCE A DAY AT BEDTIME. 90 Tab 3    docusate sodium (COLACE) 100 mg capsule Take 100 mg by mouth two (2) times a day.  buPROPion XL (WELLBUTRIN XL) 300 mg XL tablet Take 1 Tab by mouth every morning. 90 Tab 2    tiotropium (Spiriva with HandiHaler) 18 mcg inhalation capsule INHALE THE CONTENTS OF 1 CAPSULE VIA HANDIHALER ONCE DAILY. RINSE MOUTH AFTER USE 90 Cap 2    Ventolin HFA 90 mcg/actuation inhaler Take 2 Puffs by inhalation every four (4) hours as needed for Wheezing.  3 Inhaler 3    traZODone (DESYREL) 100 mg tablet Take 1 Tab by mouth nightly. Indications: insomnia associated with depression 30 Tab 3    roflumilast (Daliresp) 500 mcg tab tablet Take 1 Tab by mouth daily. 90 Tab 1    MILK THISTLE PO Take  by mouth daily.  Magnesium Oxide 500 mg cap Take  by mouth daily.  lutein 40 mg cap Take  by mouth daily.  pantoprazole (PROTONIX) 40 mg tablet TAKE 1 TABLET BY MOUTH ONCE DAILY 90 Tab 3    insulin NPH/insulin regular (NovoLIN 70/30 U-100 Insulin) 100 unit/mL (70-30) injection 15 Units by SubCUTAneous route Daily (before breakfast). (Patient taking differently: 15 Units by SubCUTAneous route Daily (before breakfast). And 7 units before lunch) 20 mL 2    liraglutide (VICTOZA) 0.6 mg/0.1 mL (18 mg/3 mL) pnij INJECT 1.8 MG UNDER SKIN DAILY (BEFORE BREAKFAST). 9 Adjustable Dose Pre-filled Pen Syringe 3    budesonide-formoteroL (SYMBICORT) 160-4.5 mcg/actuation HFAA INHALE 2 PUFFS BY MOUTH TWICE DAILY FOR COPD ASSOCIATED WITH CHRONIC BRONCHITIS, EXTRINSIC ASTHMA. 30.6 Inhaler 2    ergocalciferol (ERGOCALCIFEROL) 50,000 unit capsule TAKE 1 CAPSULE BY MOUTH ONCE A WEEK 12 Cap 3    montelukast (SINGULAIR) 10 mg tablet TAKE 1 TABLET BY MOUTH DAILY IN THE EVENING 90 Tab 3    insulin syringe-needle U-100 (BD INSULIN SYRINGE ULTRA-FINE) 1/2 mL 31 gauge x 15/64\" syrg Use for injecting insulin subcutaneously 200 Pen Needle 5    benztropine (COGENTIN) 0.5 mg tablet Take 1 Tab by mouth as needed. 2    metFORMIN ER (GLUCOPHAGE XR) 500 mg tablet Take 2 Tabs by mouth Before breakfast and dinner. 360 Tab 3    Blood-Glucose Meter monitoring kit 1 preferred brand glucometer for checking home glucose, E11.65 1 Kit 0    lancets misc Use preferred brand; Check glucose 4 times daily, Diagnosis E11.65 400 Each 11    Insulin Needles, Disposable, (ULTICARE PEN NEEDLE) 32 gauge x 5/32\" ndle USE 2 TIMES DAILY TO INJECT INSULIN/VICTOZA. 200 Pen Needle 3    multivitamin (ONE A DAY) tablet Take 1 Tab by mouth daily.       EPINEPHrine (EPIPEN) 0.3 mg/0.3 mL injection 0.3 mg by IntraMUSCular route once as needed.        Allergies   Allergen Reactions    Latex Hives    Other Food Anaphylaxis and Hives     PEPPERONI, sloppy joes, frank cheese doritos ,peach jolly ranchers & green tea    Demerol [Meperidine] Anaphylaxis     Difficulty breathing    Iodine Anaphylaxis    Morphine Other (comments)     voilent outbreaks (restraints needed)    Nsaids (Non-Steroidal Anti-Inflammatory Drug) Hives     Past Medical History:   Diagnosis Date    Asthma     Asthma     COPD     Depression     Diabetes ([facility] [address]     Diabetes mellitus     Encounter for review of form with patient 2017    Essential hypertension     GERD (gastroesophageal reflux disease)     Headache(784.0)     HX OTHER MEDICAL     acoustic neuroma    Hypercholesterolemia     Hypertension     Ill-defined condition     R ACOUSTIC NEUROMA    Insomnia disorder 2017    Major depression, chronic 2017    Mood disorder ([facility] [address] 2020    Psychiatric problem     anxiety depression    Psychotic disorder ([facility]     Psychotic disorder ([facility] 2019    Screening for cervical cancer 2018    Tobacco use disorder 10/19/2017    Unspecified sleep apnea     NO CPAP-O2 @2L WHEN SLEEPING     Past Surgical History:   Procedure Laterality Date     DELIVERY ONLY      HX GASTRECTOMY  14    lap sleeve gastrectomy by Dr Elena Sanchez HX GYN      2 c-sections    HX HEENT      sinus    HX HEENT      tracheal resection    HX HEENT      tracheal alleratation x 2    HX HEENT      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    HX HEENT      LASER SX-PENG     Family History   Problem Relation Age of Onset    Diabetes Father     Heart Failure Father     Heart Disease Father     Hypertension Father     High Cholesterol Mother     Suicide Brother     Stroke Maternal Grandmother     Cancer Paternal Grandfather     Anesth Problems Neg Hx Social History     Tobacco Use    Smoking status: Former Smoker     Packs/day: 1.00     Years: 12.00     Pack years: 12.00     Last attempt to quit: 2018     Years since quittin.1    Smokeless tobacco: Former User   Substance Use Topics    Alcohol use: Yes     Comment: ocassional      Lab Results   Component Value Date/Time    WBC 5.9 2019 12:41 PM    HGB 12.6 2019 12:41 PM    HCT 39.3 2019 12:41 PM    PLATELET 215  12:41 PM    MCV 89 2019 12:41 PM     Lab Results   Component Value Date/Time    TSH 0.855 2020 11:06 AM    T4, Free 0.9 2014 09:23 AM         Review of Systems   Constitutional: Positive for malaise/fatigue. Negative for chills and fever. HENT: Negative for nosebleeds. Eyes: Negative for pain. Respiratory: Negative for cough and wheezing. Cardiovascular: Negative for chest pain and leg swelling. Gastrointestinal: Negative for constipation, diarrhea and nausea. Genitourinary: Negative for frequency. Musculoskeletal: Negative for joint pain and myalgias. Skin: Negative for rash. Neurological: Negative for loss of consciousness. Endo/Heme/Allergies: Does not bruise/bleed easily. Psychiatric/Behavioral: Negative for depression. The patient is not nervous/anxious and does not have insomnia. All other systems reviewed and are negative. Physical Exam  Constitutional:       Appearance: She is not ill-appearing or toxic-appearing. HENT:      Head: Normocephalic and atraumatic. Mouth/Throat:      Mouth: Mucous membranes are moist.   Neurological:      Mental Status: She is alert and oriented to person, place, and time. Psychiatric:         Mood and Affect: Mood normal.         Behavior: Behavior normal.         Thought Content: Thought content normal.         Judgment: Judgment normal.         ASSESSMENT and PLAN  Diagnoses and all orders for this visit:    1.  Screen for colon cancer  -     REFERRAL TO GASTROENTEROLOGY  -     senna-docusate (PERICOLACE) 8.6-50 mg per tablet; Take 2 Tabs by mouth daily. 2. Paranoid schizophrenia ([facility] [address]    3. Slow transit constipation  -     REFERRAL TO GASTROENTEROLOGY  -     senna-docusate (PERICOLACE) 8.6-50 mg per tablet; Take 2 Tabs by mouth daily. Patient advised to have the mask on most of the time, social distance and handwashing avoid crowded area pursuant to the emergency declaration under the [address] and [place] [address] waiver authority and the [employer] Act, this Virtual Visit was conducted, with patient's consent, to reduce the patient's risk of exposure to COVID-19 and provide continuity of care for an established patient  Services were provided through a Video synchronous discussion virtually to substitute for in-person appointment.

## 2020-09-30 ENCOUNTER — VIRTUAL VISIT (OUTPATIENT)
Dept: ENDOCRINOLOGY | Age: 60
End: 2020-09-30
Payer: MEDICARE

## 2020-09-30 DIAGNOSIS — E11.49 TYPE 2 DIABETES MELLITUS WITH OTHER NEUROLOGIC COMPLICATION, WITH LONG-TERM CURRENT USE OF INSULIN (HCC): Primary | ICD-10-CM

## 2020-09-30 DIAGNOSIS — E78.5 HYPERLIPIDEMIA, UNSPECIFIED HYPERLIPIDEMIA TYPE: ICD-10-CM

## 2020-09-30 DIAGNOSIS — I10 ESSENTIAL HYPERTENSION: ICD-10-CM

## 2020-09-30 DIAGNOSIS — Z79.4 TYPE 2 DIABETES MELLITUS WITH OTHER NEUROLOGIC COMPLICATION, WITH LONG-TERM CURRENT USE OF INSULIN (HCC): Primary | ICD-10-CM

## 2020-09-30 PROCEDURE — 3051F HG A1C>EQUAL 7.0%<8.0%: CPT | Performed by: INTERNAL MEDICINE

## 2020-09-30 PROCEDURE — G9899 SCRN MAM PERF RSLTS DOC: HCPCS | Performed by: INTERNAL MEDICINE

## 2020-09-30 PROCEDURE — G8427 DOCREV CUR MEDS BY ELIG CLIN: HCPCS | Performed by: INTERNAL MEDICINE

## 2020-09-30 PROCEDURE — G9717 DOC PT DX DEP/BP F/U NT REQ: HCPCS | Performed by: INTERNAL MEDICINE

## 2020-09-30 PROCEDURE — 3017F COLORECTAL CA SCREEN DOC REV: CPT | Performed by: INTERNAL MEDICINE

## 2020-09-30 PROCEDURE — G8756 NO BP MEASURE DOC: HCPCS | Performed by: INTERNAL MEDICINE

## 2020-09-30 PROCEDURE — 99214 OFFICE O/P EST MOD 30 MIN: CPT | Performed by: INTERNAL MEDICINE

## 2020-09-30 PROCEDURE — 2022F DILAT RTA XM EVC RTNOPTHY: CPT | Performed by: INTERNAL MEDICINE

## 2020-09-30 NOTE — PROGRESS NOTES
**DUE TO PANDEMIC AND HEALTH CONCERNS IN THE COMMUNITY, THIS PATIENT WAS EITHER ILL OR FOUND TO BE HIGH RISK FOR IN-PERSON EVALUATION WITHIN THE CLINIC. THE FOLLOWING IS A VIRTUAL TELEMEDICINE VIDEO ENCOUNTER VIA [employer], TO WHICH THE PATIENT AGREED. THE PURPOSE IS TO LIMIT INTERRUPTIONS IN HEALTHCARE AND TO PROVIDE FOR ONGOING URGENT NEEDS UNDER THE CURRENT CONDITIONS. CHIEF COMPLAINT: f/u evaluation for uncontrolled type 2 diabetes    HISTORY OF PRESENT ILLNESS:   Ellen Ring is a 62 y.o. female with a PMHx as noted below who presents to the endocrinology clinic for f/u evaluation of uncontrolled type 2 diabetes. A1c previously 7.9%. We had noted her diet had changed and she experienced more cravings. We added a dose of insulin with her lunch based on her numbers. See prior note. Sugars were getting low during the day after changing her diet so she stopped the insulin at lunch and is only taking 7 units with breakfast instead.      Currently taking the following meds:  Novolin 70/30 insulin: 15 units with breakfast (using only 7 units)               Start    7 units with lunch (not taking)  Glimepiride 4mg each morning before breakfast  Metformin ER, 1000 mg twice per day  Victoza, 1.8mg each morning before breakfast    Review of home blood glucose:  Report received through [employer] from 09/14-09/27    14th  [phone]   15th [phone]    16th 120 - [phone]-   [phone]   18th -20-62  19th [phone]   20th [phone]   21st [phone]   22nd -51 8pm 69-67   23rd [phone] started 7u am    24th [phone]  4:pm 52    25th -826-00   26th [phone]   27th [phone]    Not taking 7u at lunch  only 7u in am     Review of most recent diabetes-related labs:  Lab Results   Component Value Date    HBA1C 7.9 (H) 05/01/2020    HBA1C 6.0 (H) 08/14/2019    HBA1C 5.9 (H) 04/10/2019    CHOL 136 05/01/2020    LDLC 54 05/01/2020    GFRAA 73 2020    GFRNA 63 2020    MCACR 129 (H) 2020    TSH 0.855 2020    VITD3 41.5 2015    [id number] 7.5% 2017       PAST MEDICAL/SURGICAL HISTORY:   Past Medical History:   Diagnosis Date    Asthma     Asthma     COPD     Depression     Diabetes ([facility]     Diabetes mellitus     Encounter for review of form with patient 2017    Essential hypertension     GERD (gastroesophageal reflux disease)     Headache(784.0)     HX OTHER MEDICAL     acoustic neuroma    Hypercholesterolemia     Hypertension     Ill-defined condition     R ACOUSTIC NEUROMA    Insomnia disorder 2017    Major depression, chronic 2017    Mood disorder ([facility] 2020    Psychiatric problem     anxiety depression    Psychotic disorder ([facility]     Psychotic disorder ([facility] 2019    Screening for cervical cancer 2018    Tobacco use disorder 10/19/2017    Unspecified sleep apnea     NO CPAP-O2 @2L WHEN SLEEPING     Past Surgical History:   Procedure Laterality Date     DELIVERY ONLY      HX GASTRECTOMY  14    lap sleeve gastrectomy by Dr Konrad Espinal HX GYN      2 c-sections    HX HEENT      sinus    HX HEENT      tracheal resection    HX HEENT      tracheal alleratation x 2    HX HEENT      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    HX HEENT      LASER SX-PENG       ALLERGIES:   Allergies   Allergen Reactions    Latex Hives    Other Food Anaphylaxis and Hives     PEPPERONI, sloppy joes, frank cheese doritos ,peach jolly ranchers & green tea    Demerol [Meperidine] Anaphylaxis     Difficulty breathing    Iodine Anaphylaxis    Morphine Other (comments)     voilent outbreaks (restraints needed)    Nsaids (Non-Steroidal Anti-Inflammatory Drug) Hives       MEDICATIONS ON ADMISSION:     Current Outpatient Medications:     cyanocobalamin, vitamin B-12, (VITAMIN B-12 PO), Take 2,500 mcg by mouth daily. , Disp: , Rfl:     metoprolol succinate (TOPROL-XL) 100 mg tablet, TAKE 1 TABLET BY MOUTH EVERY DAY, Disp: 90 Tab, Rfl: 1    glucose blood VI test strips (Accu-Chek Renata Plus test strp) strip, CHECK GLUCOSE 4 TIMES DAILY, DIAGNOSIS E11.65, Disp: 400 Strip, Rfl: 3    melatonin 12 mg tab, Take 1 Tab by mouth nightly as needed for Pain., Disp: 30 Tab, Rfl: 3    glimepiride (AMARYL) 4 mg tablet, TAKE 1 TABLET BY MOUTH EVERY MORNING., Disp: 90 Tab, Rfl: 3    rosuvastatin (CRESTOR) 40 mg tablet, TAKE 1 TABLET BY MOUTH ONCE A DAY AT BEDTIME., Disp: 90 Tab, Rfl: 3    docusate sodium (COLACE) 100 mg capsule, Take 100 mg by mouth two (2) times a day., Disp: , Rfl:     buPROPion XL (WELLBUTRIN XL) 300 mg XL tablet, Take 1 Tab by mouth every morning., Disp: 90 Tab, Rfl: 2    tiotropium (Spiriva with HandiHaler) 18 mcg inhalation capsule, INHALE THE CONTENTS OF 1 CAPSULE VIA HANDIHALER ONCE DAILY. RINSE MOUTH AFTER USE, Disp: 90 Cap, Rfl: 2    Ventolin HFA 90 mcg/actuation inhaler, Take 2 Puffs by inhalation every four (4) hours as needed for Wheezing., Disp: 3 Inhaler, Rfl: 3    traZODone (DESYREL) 100 mg tablet, Take 1 Tab by mouth nightly. Indications: insomnia associated with depression, Disp: 30 Tab, Rfl: 3    roflumilast (Daliresp) 500 mcg tab tablet, Take 1 Tab by mouth daily. , Disp: 90 Tab, Rfl: 1    MILK THISTLE PO, Take  by mouth daily. , Disp: , Rfl:     Magnesium Oxide 500 mg cap, Take  by mouth daily. , Disp: , Rfl:     lutein 40 mg cap, Take  by mouth daily. , Disp: , Rfl:     pantoprazole (PROTONIX) 40 mg tablet, TAKE 1 TABLET BY MOUTH ONCE DAILY, Disp: 90 Tab, Rfl: 3    insulin NPH/insulin regular (NovoLIN 70/30 U-100 Insulin) 100 unit/mL (70-30) injection, 15 Units by SubCUTAneous route Daily (before breakfast). (Patient taking differently: 15 Units by SubCUTAneous route Daily (before breakfast).  And 7 units before lunch), Disp: 20 mL, Rfl: 2    liraglutide (VICTOZA) 0.6 mg/0.1 mL (18 mg/3 mL) pnij, INJECT 1.8 MG UNDER SKIN DAILY (BEFORE BREAKFAST). , Disp: 9 Adjustable Dose Pre-filled Pen Syringe, Rfl: 3    budesonide-formoteroL (SYMBICORT) 160-4.5 mcg/actuation HFAA, INHALE 2 PUFFS BY MOUTH TWICE DAILY FOR COPD ASSOCIATED WITH CHRONIC BRONCHITIS, EXTRINSIC ASTHMA., Disp: 30.6 Inhaler, Rfl: 2    ergocalciferol (ERGOCALCIFEROL) 50,000 unit capsule, TAKE 1 CAPSULE BY MOUTH ONCE A WEEK, Disp: 12 Cap, Rfl: 3    montelukast (SINGULAIR) 10 mg tablet, TAKE 1 TABLET BY MOUTH DAILY IN THE EVENING, Disp: 90 Tab, Rfl: 3    insulin syringe-needle U-100 (BD INSULIN SYRINGE ULTRA-FINE) 1/2 mL 31 gauge x 15/64\" syrg, Use for injecting insulin subcutaneously, Disp: 200 Pen Needle, Rfl: 5    benztropine (COGENTIN) 0.5 mg tablet, Take 1 Tab by mouth as needed. , Disp: , Rfl: 2    metFORMIN ER (GLUCOPHAGE XR) 500 mg tablet, Take 2 Tabs by mouth Before breakfast and dinner., Disp: 360 Tab, Rfl: 3    Blood-Glucose Meter monitoring kit, 1 preferred brand glucometer for checking home glucose, E11.65, Disp: 1 Kit, Rfl: 0    lancets misc, Use preferred brand; Check glucose 4 times daily, Diagnosis E11.65, Disp: 400 Each, Rfl: 11    Insulin Needles, Disposable, (ULTICARE PEN NEEDLE) 32 gauge x 5/32\" ndle, USE 2 TIMES DAILY TO INJECT INSULIN/VICTOZA., Disp: 200 Pen Needle, Rfl: 3    multivitamin (ONE A DAY) tablet, Take 1 Tab by mouth daily. , Disp: , Rfl:     EPINEPHrine (EPIPEN) 0.3 mg/0.3 mL injection, 0.3 mg by IntraMUSCular route once as needed. , Disp: , Rfl:     senna-docusate (PERICOLACE) 8.6-50 mg per tablet, Take 2 Tabs by mouth daily. , Disp: 30 Tab, Rfl: 2    SOCIAL HISTORY:   Social History     Socioeconomic History    Marital status:      Spouse name: Not on file    Number of children: Not on file    Years of education: Not on file    Highest education level: Not on file   Occupational History    Not on file   Social Needs    Financial resource strain: Not on file    Food insecurity     Worry: Not on file     Inability: Not on file   [facility] Transportation needs     Medical: Not on file     Non-medical: Not on file   Tobacco Use    Smoking status: Former Smoker     Packs/day: 1.00     Years: 12.00     Pack years: 12.00     Last attempt to quit: 2018     Years since quittin.1    Smokeless tobacco: Former User   Substance and Sexual Activity    Alcohol use: Yes     Comment: ocassional    Drug use: No    Sexual activity: Never   Lifestyle    Physical activity     Days per week: Not on file     Minutes per session: Not on file    Stress: Not on file   Relationships    Social connections     Talks on phone: Not on file     Gets together: Not on file     Attends [employer]'s witness service: Not on file     Active member of club or organization: Not on file     Attends meetings of clubs or organizations: Not on file     Relationship status: Not on file    Intimate partner violence     Fear of current or ex partner: Not on file     Emotionally abused: Not on file     Physically abused: Not on file     Forced sexual activity: Not on file   Other Topics Concern    Not on file   Social History Narrative    Not on file       FAMILY HISTORY:  Family History   Problem Relation Age of Onset    Diabetes Father     Heart Failure Father     Heart Disease Father     Hypertension Father     High Cholesterol Mother     Suicide Brother     Stroke Maternal Grandmother     Cancer Paternal Grandfather     Anesth Problems Neg Hx        REVIEW OF SYSTEMS: Complete ROS assessed and noted for that which is described above, all else are negative.   Eyes: normal  ENT: normal  CVS: normal  Resp: normal  GI: normal  : normal  GYN: normal  Endocrine: normal  Integument: normal  Musculoskeletal: normal  Neuro: normal  Psych: normal    PHYSICAL EXAMINATION:  Telemedicine Visit    GENERAL: NCAT, Appears well nourished  EYES: EOMI, non-icteric, no proptosis  Ear/Nose/Throat: NCAT, no visible inflammation or masses  CARDIOVASCULAR: no cyanosis, no visible JVD  RESPIRATORY: comfortable respirations observed, no cyanosis  MUSCULOSKELETAL: Normal ROM of upper extremities observed  SKIN: No edema, rash, or other significant changes observed  NEUROLOGIC:  AAOx3  PSYCHIATRIC: Normal affect, Normal insight and judgement    REVIEW OF LABORATORY AND RADIOLOGY DATA:   Labs and documentation have been reviewed as described above. ASSESSMENT AND PLAN:   Toshia Vale is a 62 y.o. female with a PMHx as noted above who presents to the endocrinology clinic for evaluation of uncontrolled type 2 diabetes. DM2  HTN  HLD    Patient had been having low blood sugars. Her low blood sugars was due to her having changed her diet which provided less sugar intake. This resulted in needing less insulin. She has reduced her insulin to avoid low blood sugars. Sugars reviewed as noted above are pretty stable over the past few days. I am recommending the following:     DM2:  Novolin 70/30 insulin: 7 units with breakfast  Glimepiride 4mg each morning before breakfast  Metformin ER, 1000 mg twice per day  Victoza, 1.8mg each morning before breakfast    BP: telemedicine visit  HLD: lipids reviewed, stable on statin,    LABS: 2021 DM prelabs ordered for next visit    RTC: Feb 15 at 4:10 PM    20 minutes spent toward telemedicine visit today of which >50% of this time was spent in counseling and coordination of care. [facility].  [address] [facility]

## 2020-10-03 RX ORDER — METFORMIN HYDROCHLORIDE 500 MG/1
1000 TABLET, EXTENDED RELEASE ORAL
Qty: 360 TAB | Refills: 3 | Status: SHIPPED | OUTPATIENT
Start: 2020-10-03 | End: 2021-06-10 | Stop reason: SDUPTHER

## 2020-11-02 ENCOUNTER — APPOINTMENT (OUTPATIENT)
Dept: CT IMAGING | Age: 60
End: 2020-11-02
Attending: EMERGENCY MEDICINE
Payer: MEDICARE

## 2020-11-02 ENCOUNTER — APPOINTMENT (OUTPATIENT)
Dept: ULTRASOUND IMAGING | Age: 60
End: 2020-11-02
Attending: EMERGENCY MEDICINE
Payer: MEDICARE

## 2020-11-02 ENCOUNTER — HOSPITAL ENCOUNTER (EMERGENCY)
Age: 60
Discharge: HOME | End: 2020-11-02
Attending: EMERGENCY MEDICINE
Payer: MEDICARE

## 2020-11-02 VITALS
BODY MASS INDEX: 22.32 KG/M2 | OXYGEN SATURATION: 100 % | TEMPERATURE: 97.9 F | SYSTOLIC BLOOD PRESSURE: 125 MMHG | HEART RATE: 100 BPM | HEIGHT: 68 IN | RESPIRATION RATE: 16 BRPM | DIASTOLIC BLOOD PRESSURE: 84 MMHG | WEIGHT: 147.27 LBS

## 2020-11-02 DIAGNOSIS — K44.9 HIATAL HERNIA: ICD-10-CM

## 2020-11-02 DIAGNOSIS — K80.50 BILIARY COLIC: Primary | ICD-10-CM

## 2020-11-02 DIAGNOSIS — K08.89 PAIN, DENTAL: ICD-10-CM

## 2020-11-02 DIAGNOSIS — N30.00 ACUTE CYSTITIS WITHOUT HEMATURIA: ICD-10-CM

## 2020-11-02 LAB
ALBUMIN SERPL-MCNC: 3.7 G/DL (ref 3.5–5)
ALBUMIN/GLOB SERPL: 1 {RATIO} (ref 1.1–2.2)
ALP SERPL-CCNC: 74 U/L (ref 45–117)
ALT SERPL-CCNC: 44 U/L (ref 12–78)
ANION GAP SERPL CALC-SCNC: 10 MMOL/L (ref 5–15)
APPEARANCE UR: ABNORMAL
AST SERPL-CCNC: 31 U/L (ref 15–37)
BACTERIA URNS QL MICRO: ABNORMAL /HPF
BASOPHILS # BLD: 0 K/UL (ref 0–0.1)
BASOPHILS NFR BLD: 1 % (ref 0–1)
BILIRUB SERPL-MCNC: 0.3 MG/DL (ref 0.2–1)
BILIRUB UR QL CFM: NEGATIVE
BUN SERPL-MCNC: 14 MG/DL (ref 6–20)
BUN/CREAT SERPL: 17 (ref 12–20)
CALCIUM SERPL-MCNC: 10 MG/DL (ref 8.5–10.1)
CHLORIDE SERPL-SCNC: 103 MMOL/L (ref 97–108)
CO2 SERPL-SCNC: 25 MMOL/L (ref 21–32)
COLOR UR: ABNORMAL
CREAT SERPL-MCNC: 0.84 MG/DL (ref 0.55–1.02)
DIFFERENTIAL METHOD BLD: ABNORMAL
EOSINOPHIL # BLD: 0.2 K/UL (ref 0–0.4)
EOSINOPHIL NFR BLD: 3 % (ref 0–7)
EPITH CASTS URNS QL MICRO: ABNORMAL /LPF
ERYTHROCYTE [DISTWIDTH] IN BLOOD BY AUTOMATED COUNT: 15.9 % (ref 11.5–14.5)
GLOBULIN SER CALC-MCNC: 3.8 G/DL (ref 2–4)
GLUCOSE SERPL-MCNC: 132 MG/DL (ref 65–100)
GLUCOSE UR STRIP.AUTO-MCNC: NEGATIVE MG/DL
HCT VFR BLD AUTO: 33.2 % (ref 35–47)
HGB BLD-MCNC: 9.9 G/DL (ref 11.5–16)
HGB UR QL STRIP: NEGATIVE
IMM GRANULOCYTES # BLD AUTO: 0 K/UL (ref 0–0.04)
IMM GRANULOCYTES NFR BLD AUTO: 0 % (ref 0–0.5)
KETONES UR QL STRIP.AUTO: ABNORMAL MG/DL
LEUKOCYTE ESTERASE UR QL STRIP.AUTO: ABNORMAL
LIPASE SERPL-CCNC: 159 U/L (ref 73–393)
LYMPHOCYTES # BLD: 1.8 K/UL (ref 0.8–3.5)
LYMPHOCYTES NFR BLD: 26 % (ref 12–49)
MCH RBC QN AUTO: 23.9 PG (ref 26–34)
MCHC RBC AUTO-ENTMCNC: 29.8 G/DL (ref 30–36.5)
MCV RBC AUTO: 80.2 FL (ref 80–99)
MONOCYTES # BLD: 0.5 K/UL (ref 0–1)
MONOCYTES NFR BLD: 8 % (ref 5–13)
NEUTS SEG # BLD: 4.3 K/UL (ref 1.8–8)
NEUTS SEG NFR BLD: 62 % (ref 32–75)
NITRITE UR QL STRIP.AUTO: NEGATIVE
NRBC # BLD: 0 K/UL (ref 0–0.01)
NRBC BLD-RTO: 0 PER 100 WBC
PH UR STRIP: 6 [PH] (ref 5–8)
PLATELET # BLD AUTO: 374 K/UL (ref 150–400)
PMV BLD AUTO: 11 FL (ref 8.9–12.9)
POTASSIUM SERPL-SCNC: 4.9 MMOL/L (ref 3.5–5.1)
PROT SERPL-MCNC: 7.5 G/DL (ref 6.4–8.2)
PROT UR STRIP-MCNC: 30 MG/DL
RBC # BLD AUTO: 4.14 M/UL (ref 3.8–5.2)
RBC #/AREA URNS HPF: ABNORMAL /HPF (ref 0–5)
SODIUM SERPL-SCNC: 138 MMOL/L (ref 136–145)
SP GR UR REFRACTOMETRY: 1.02 (ref 1–1.03)
UA: UC IF INDICATED,UAUC: ABNORMAL
UROBILINOGEN UR QL STRIP.AUTO: 0.2 EU/DL (ref 0.2–1)
WBC # BLD AUTO: 6.8 K/UL (ref 3.6–11)
WBC URNS QL MICRO: ABNORMAL /HPF (ref 0–4)

## 2020-11-02 PROCEDURE — 74011250636 HC RX REV CODE- 250/636: Performed by: EMERGENCY MEDICINE

## 2020-11-02 PROCEDURE — 76705 ECHO EXAM OF ABDOMEN: CPT

## 2020-11-02 PROCEDURE — 99284 EMERGENCY DEPT VISIT MOD MDM: CPT

## 2020-11-02 PROCEDURE — 74011250637 HC RX REV CODE- 250/637: Performed by: EMERGENCY MEDICINE

## 2020-11-02 PROCEDURE — 83690 ASSAY OF LIPASE: CPT

## 2020-11-02 PROCEDURE — 85025 COMPLETE CBC W/AUTO DIFF WBC: CPT

## 2020-11-02 PROCEDURE — 36415 COLL VENOUS BLD VENIPUNCTURE: CPT

## 2020-11-02 PROCEDURE — 81001 URINALYSIS AUTO W/SCOPE: CPT

## 2020-11-02 PROCEDURE — 74011000250 HC RX REV CODE- 250: Performed by: EMERGENCY MEDICINE

## 2020-11-02 PROCEDURE — 96374 THER/PROPH/DIAG INJ IV PUSH: CPT

## 2020-11-02 PROCEDURE — 74176 CT ABD & PELVIS W/O CONTRAST: CPT

## 2020-11-02 PROCEDURE — 80053 COMPREHEN METABOLIC PANEL: CPT

## 2020-11-02 RX ORDER — DICYCLOMINE HYDROCHLORIDE 20 MG/1
20 TABLET ORAL
Status: COMPLETED | OUTPATIENT
Start: 2020-11-02 | End: 2020-11-02

## 2020-11-02 RX ORDER — DICYCLOMINE HYDROCHLORIDE 10 MG/1
20 CAPSULE ORAL
Qty: 20 CAP | Refills: 0 | Status: SHIPPED | OUTPATIENT
Start: 2020-11-02 | End: 2021-04-05

## 2020-11-02 RX ORDER — CEPHALEXIN 500 MG/1
500 CAPSULE ORAL 3 TIMES DAILY
Qty: 15 CAP | Refills: 0 | Status: SHIPPED | OUTPATIENT
Start: 2020-11-02 | End: 2021-02-15 | Stop reason: ALTCHOICE

## 2020-11-02 RX ADMIN — DICYCLOMINE HYDROCHLORIDE 20 MG: 20 TABLET ORAL at 13:25

## 2020-11-02 RX ADMIN — FAMOTIDINE 20 MG: 10 INJECTION, SOLUTION INTRAVENOUS at 13:03

## 2020-11-02 RX ADMIN — LIDOCAINE HYDROCHLORIDE: 20 SOLUTION ORAL; TOPICAL at 13:02

## 2020-11-02 NOTE — ED PROVIDER NOTES
EMERGENCY DEPARTMENT HISTORY AND PHYSICAL EXAM      Date: 11/2/2020  Patient Name: Anay Umanzor    History of Presenting Illness     Chief Complaint   Patient presents with    Dental Pain     Oral surgery 10/28, now with pain across upper jaw and nose. Unrelieved with meds at home    Abdominal Pain     Pain at mid abd radiating to right and left side with nausea. Pain started this morning. HR now 120's. History Provided By: Patient    HPI: Anay Umanzor, 62 y.o. female presents to the ED with cc of dental pain and abdominal pain. The patient had oral surgery on her upper gum and teeth on October 28. She states that she has had pain involving that area, her nose and upper jaw since then. She was prescribed Ultram, and subsequently Tylenol 3, with no relief of symptoms. States that her pain is a 10 out of 10. She denies fever, chills, cough, chest pain or shortness of breath. She has had intermittent abdominal pain for months. An ultrasound of the abdomen on September 9, 2020 which revealed an umbilical hernia. Her pain has worsened today and involves the mid abdomen, but radiates to both sides. It is associated with nausea, and the pain is currently a 6 out of 10. She denies back pain, dysuria or change in bowel habits. There are no other complaints, changes, or physical findings at this time. PCP: Heber Atkins MD    No current facility-administered medications on file prior to encounter. Current Outpatient Medications on File Prior to Encounter   Medication Sig Dispense Refill    metFORMIN ER (GLUCOPHAGE XR) 500 mg tablet TAKE 2 TABS BY MOUTH BEFORE BREAKFAST AND DINNER. 360 Tab 3    cyanocobalamin, vitamin B-12, (VITAMIN B-12 PO) Take 2,500 mcg by mouth daily.  senna-docusate (PERICOLACE) 8.6-50 mg per tablet Take 2 Tabs by mouth daily.  30 Tab 2    metoprolol succinate (TOPROL-XL) 100 mg tablet TAKE 1 TABLET BY MOUTH EVERY DAY 90 Tab 1    glucose blood VI test strips (Accu-Chek Renata Plus test strp) strip CHECK GLUCOSE 4 TIMES DAILY, DIAGNOSIS E11.65 400 Strip 3    melatonin 12 mg tab Take 1 Tab by mouth nightly as needed for Pain. 30 Tab 3    glimepiride (AMARYL) 4 mg tablet TAKE 1 TABLET BY MOUTH EVERY MORNING. 90 Tab 3    rosuvastatin (CRESTOR) 40 mg tablet TAKE 1 TABLET BY MOUTH ONCE A DAY AT BEDTIME. 90 Tab 3    docusate sodium (COLACE) 100 mg capsule Take 100 mg by mouth two (2) times a day.  buPROPion XL (WELLBUTRIN XL) 300 mg XL tablet Take 1 Tab by mouth every morning. 90 Tab 2    tiotropium (Spiriva with HandiHaler) 18 mcg inhalation capsule INHALE THE CONTENTS OF 1 CAPSULE VIA HANDIHALER ONCE DAILY. RINSE MOUTH AFTER USE 90 Cap 2    Ventolin HFA 90 mcg/actuation inhaler Take 2 Puffs by inhalation every four (4) hours as needed for Wheezing. 3 Inhaler 3    traZODone (DESYREL) 100 mg tablet Take 1 Tab by mouth nightly. Indications: insomnia associated with depression 30 Tab 3    roflumilast (Daliresp) 500 mcg tab tablet Take 1 Tab by mouth daily. 90 Tab 1    MILK THISTLE PO Take  by mouth daily.  Magnesium Oxide 500 mg cap Take  by mouth daily.  lutein 40 mg cap Take  by mouth daily.  pantoprazole (PROTONIX) 40 mg tablet TAKE 1 TABLET BY MOUTH ONCE DAILY 90 Tab 3    insulin NPH/insulin regular (NovoLIN 70/30 U-100 Insulin) 100 unit/mL (70-30) injection 15 Units by SubCUTAneous route Daily (before breakfast). (Patient taking differently: 15 Units by SubCUTAneous route Daily (before breakfast). And 7 units before lunch) 20 mL 2    liraglutide (VICTOZA) 0.6 mg/0.1 mL (18 mg/3 mL) pnij INJECT 1.8 MG UNDER SKIN DAILY (BEFORE BREAKFAST).  9 Adjustable Dose Pre-filled Pen Syringe 3    budesonide-formoteroL (SYMBICORT) 160-4.5 mcg/actuation HFAA INHALE 2 PUFFS BY MOUTH TWICE DAILY FOR COPD ASSOCIATED WITH CHRONIC BRONCHITIS, EXTRINSIC ASTHMA. 30.6 Inhaler 2    ergocalciferol (ERGOCALCIFEROL) 50,000 unit capsule TAKE 1 CAPSULE BY MOUTH ONCE A WEEK 12 Cap 3    montelukast (SINGULAIR) 10 mg tablet TAKE 1 TABLET BY MOUTH DAILY IN THE EVENING 90 Tab 3    insulin syringe-needle U-100 (BD INSULIN SYRINGE ULTRA-FINE) 1/2 mL 31 gauge x 15/64\" syrg Use for injecting insulin subcutaneously 200 Pen Needle 5    benztropine (COGENTIN) 0.5 mg tablet Take 1 Tab by mouth as needed. 2    Blood-Glucose Meter monitoring kit 1 preferred brand glucometer for checking home glucose, E11.65 1 Kit 0    lancets misc Use preferred brand; Check glucose 4 times daily, Diagnosis E11.65 400 Each 11    Insulin Needles, Disposable, (ULTICARE PEN NEEDLE) 32 gauge x 5/32\" ndle USE 2 TIMES DAILY TO INJECT INSULIN/VICTOZA. 200 Pen Needle 3    multivitamin (ONE A DAY) tablet Take 1 Tab by mouth daily.  EPINEPHrine (EPIPEN) 0.3 mg/0.3 mL injection 0.3 mg by IntraMUSCular route once as needed.          Past History     Past Medical History:  Past Medical History:   Diagnosis Date    Asthma     Asthma     COPD     Depression     Diabetes ([facility]     Diabetes mellitus     Encounter for review of form with patient 2017    Essential hypertension     GERD (gastroesophageal reflux disease)     Headache(784.0)     HX OTHER MEDICAL     acoustic neuroma    Hypercholesterolemia     Hypertension     Ill-defined condition     R ACOUSTIC NEUROMA    Insomnia disorder 2017    Major depression, chronic 2017    Mood disorder ([facility] 2020    Psychiatric problem     anxiety depression    Psychotic disorder ([facility]     Psychotic disorder ([facility] 2019    Screening for cervical cancer 2018    Tobacco use disorder 10/19/2017    Unspecified sleep apnea     NO CPAP-O2 @2L WHEN SLEEPING       Past Surgical History:  Past Surgical History:   Procedure Laterality Date     DELIVERY ONLY      HX GASTRECTOMY  14    lap sleeve gastrectomy by Dr Carola Rivera      2 c-sections    HX HEENT      sinus    HX HEENT      tracheal resection    HX HEENT      tracheal alleratation x 2    HX HEENT      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    HX HEENT      LASER SX-PENG       Family History:  Family History   Problem Relation Age of Onset    Diabetes Father     Heart Failure Father     Heart Disease Father     Hypertension Father     High Cholesterol Mother     Suicide Brother     Stroke Maternal Grandmother     Cancer Paternal Grandfather     Anesth Problems Neg Hx        Social History:  Social History     Tobacco Use    Smoking status: Former Smoker     Packs/day: 1.00     Years: 12.00     Pack years: 12.00     Last attempt to quit: 2018     Years since quittin.2    Smokeless tobacco: Former User   Substance Use Topics    Alcohol use: Yes     Comment: ocassional    Drug use: No       Allergies: Allergies   Allergen Reactions    Latex Hives    Other Food Anaphylaxis and Hives     PEPPERONI, sloppy joes, frank cheese doritos ,peach jolly ranchers & green tea    Demerol [Meperidine] Anaphylaxis     Difficulty breathing    Iodine Anaphylaxis    Morphine Other (comments)     voilent outbreaks (restraints needed)    Nsaids (Non-Steroidal Anti-Inflammatory Drug) Hives         Review of Systems   Review of Systems   Constitutional: Negative for fever. HENT: Positive for dental problem. Negative for congestion. Eyes: Negative. Respiratory: Negative for shortness of breath. Cardiovascular: Negative for chest pain. Gastrointestinal: Positive for abdominal pain and nausea. Endocrine: Negative for heat intolerance. Genitourinary: Negative. Musculoskeletal: Negative for back pain. Skin: Negative for rash. Allergic/Immunologic: Negative for immunocompromised state. Neurological: Negative for dizziness. Hematological: Does not bruise/bleed easily. Psychiatric/Behavioral: Negative. All other systems reviewed and are negative. Physical Exam   Physical Exam  Vitals signs and nursing note reviewed. Constitutional:       General: She is not in acute distress. Appearance: She is well-developed. HENT:      Head: Normocephalic and atraumatic. Mouth/Throat:      Comments: No abscess noted, no erythema in ED oral region. Or facial region  Neck:      Musculoskeletal: Normal range of motion and neck supple. Cardiovascular:      Rate and Rhythm: Regular rhythm. Tachycardia present. Heart sounds: Normal heart sounds. Pulmonary:      Effort: Pulmonary effort is normal.      Breath sounds: Normal breath sounds. Abdominal:      General: Bowel sounds are normal.      Palpations: Abdomen is soft. Tenderness: There is abdominal tenderness in the periumbilical area and suprapubic area. Musculoskeletal: Normal range of motion. Skin:     General: Skin is warm and dry. Neurological:      General: No focal deficit present. Mental Status: She is alert and oriented to person, place, and time. Psychiatric:         Mood and Affect: Mood normal.         Behavior: Behavior normal.         Diagnostic Study Results     Labs -     Recent Results (from the past 12 hour(s))   METABOLIC PANEL, COMPREHENSIVE    Collection Time: 11/02/20 11:49 AM   Result Value Ref Range    Sodium 138 136 - 145 mmol/L    Potassium 4.9 3.5 - 5.1 mmol/L    Chloride 103 97 - 108 mmol/L    CO2 25 21 - 32 mmol/L    Anion gap 10 5 - 15 mmol/L    Glucose 132 (H) 65 - 100 mg/dL    BUN 14 6 - 20 MG/DL    Creatinine 0.84 0.55 - 1.02 MG/DL    BUN/Creatinine ratio 17 12 - 20      GFR est AA >60 >60 ml/min/1.73m2    GFR est non-AA >60 >60 ml/min/1.73m2    Calcium 10.0 8.5 - 10.1 MG/DL    Bilirubin, total 0.3 0.2 - 1.0 MG/DL    ALT (SGPT) 44 12 - 78 U/L    AST (SGOT) 31 15 - 37 U/L    Alk.  phosphatase 74 45 - 117 U/L    Protein, total 7.5 6.4 - 8.2 g/dL    Albumin 3.7 3.5 - 5.0 g/dL    Globulin 3.8 2.0 - 4.0 g/dL    A-G Ratio 1.0 (L) 1.1 - 2.2     CBC WITH AUTOMATED DIFF    Collection Time: 11/02/20 11:49 AM   Result Value Ref Range WBC 6.8 3.6 - 11.0 K/uL    RBC 4.14 3.80 - 5.20 M/uL    HGB 9.9 (L) 11.5 - 16.0 g/dL    HCT 33.2 (L) 35.0 - 47.0 %    MCV 80.2 80.0 - 99.0 FL    MCH 23.9 (L) 26.0 - 34.0 PG    MCHC 29.8 (L) 30.0 - 36.5 g/dL    RDW 15.9 (H) 11.5 - 14.5 %    PLATELET 925 199 - 113 K/uL    MPV 11.0 8.9 - 12.9 FL    NRBC 0.0 0  WBC    ABSOLUTE NRBC 0.00 0.00 - 0.01 K/uL    NEUTROPHILS 62 32 - 75 %    LYMPHOCYTES 26 12 - 49 %    MONOCYTES 8 5 - 13 %    EOSINOPHILS 3 0 - 7 %    BASOPHILS 1 0 - 1 %    IMMATURE GRANULOCYTES 0 0.0 - 0.5 %    ABS. NEUTROPHILS 4.3 1.8 - 8.0 K/UL    ABS. LYMPHOCYTES 1.8 0.8 - 3.5 K/UL    ABS. MONOCYTES 0.5 0.0 - 1.0 K/UL    ABS. EOSINOPHILS 0.2 0.0 - 0.4 K/UL    ABS. BASOPHILS 0.0 0.0 - 0.1 K/UL    ABS. IMM. GRANS. 0.0 0.00 - 0.04 K/UL    DF AUTOMATED     LIPASE    Collection Time: 11/02/20 11:49 AM   Result Value Ref Range    Lipase 159 73 - 393 U/L   URINALYSIS W/ REFLEX CULTURE    Collection Time: 11/02/20 12:12 PM    Specimen: Miscellaneous sample; Urine    Urine specimen   Result Value Ref Range    Color YELLOW/STRAW      Appearance CLOUDY (A) CLEAR      Specific gravity 1.024 1.003 - 1.030      pH (UA) 6.0 5.0 - 8.0      Protein 30 (A) NEG mg/dL    Glucose Negative NEG mg/dL    Ketone TRACE (A) NEG mg/dL    Blood Negative NEG      Urobilinogen 0.2 0.2 - 1.0 EU/dL    Nitrites Negative NEG      Leukocyte Esterase SMALL (A) NEG      WBC 5-10 0 - 4 /hpf    RBC 0-5 0 - 5 /hpf    Epithelial cells MODERATE (A) FEW /lpf    Bacteria 4+ (A) NEG /hpf    UA:UC IF INDICATED CULTURE NOT INDICATED BY UA RESULT CNI     BILIRUBIN, CONFIRM    Collection Time: 11/02/20 12:12 PM   Result Value Ref Range    Bilirubin UA, confirm Negative NEG         Radiologic Studies -   US ABD LTD   Final Result   IMPRESSION: Cholelithiasis. CT ABD PELV WO CONT   Final Result   IMPRESSION:      1. No acute findings. 2. Multiple incidentals as above.         CT Results  (Last 48 hours)    None        CXR Results  (Last 48 hours)    None          Medical Decision Making   I am the first provider for this patient. I reviewed the vital signs, available nursing notes, past medical history, past surgical history, family history and social history. Vital Signs-Reviewed the patient's vital signs. Patient Vitals for the past 12 hrs:   Temp Pulse Resp BP SpO2   11/02/20 1140 97.9 °F (36.6 °C) (!) 124 18 116/78 100 %       Records Reviewed: Nursing Notes, Old Medical Records, Previous Radiology Studies and Previous Laboratory Studies    Provider Notes (Medical Decision Making):   Postprocedural pain, abscess, cellulitis, UTI, pancreatitis, constipation, obstruction, diverticulitis    ED Course:   Initial assessment performed. The patients presenting problems have been discussed, and they are in agreement with the care plan formulated and outlined with them. I have encouraged them to ask questions as they arise throughout their visit. Progress note:    Patient is feeling better. Her results were reviewed. She is advised to follow-up and return to ER if worse      Critical Care Time:   0    Disposition:  home    DISCHARGE PLAN:  1. Discharge Medication List as of 11/2/2020  3:46 PM      START taking these medications    Details   dicyclomine (BentyL) 10 mg capsule Take 2 Caps by mouth four (4) times daily as needed for Abdominal Cramps., Normal, Disp-20 Cap,R-0         CONTINUE these medications which have NOT CHANGED    Details   metFORMIN ER (GLUCOPHAGE XR) 500 mg tablet TAKE 2 TABS BY MOUTH BEFORE BREAKFAST AND DINNER., Normal, Disp-360 Tab,R-3      cyanocobalamin, vitamin B-12, (VITAMIN B-12 PO) Take 2,500 mcg by mouth daily. , Historical Med      senna-docusate (PERICOLACE) 8.6-50 mg per tablet Take 2 Tabs by mouth daily. , Normal, Disp-30 Tab,R-2      metoprolol succinate (TOPROL-XL) 100 mg tablet TAKE 1 TABLET BY MOUTH EVERY DAY, Normal, Disp-90 Tab,R-1      glucose blood VI test strips (Accu-Chek Renata Plus test strp) strip CHECK GLUCOSE 4 TIMES DAILY, DIAGNOSIS E11.65, Normal, Disp-400 Strip,R-3      melatonin 12 mg tab Take 1 Tab by mouth nightly as needed for Pain., Normal, Disp-30 Tab,R-3      glimepiride (AMARYL) 4 mg tablet TAKE 1 TABLET BY MOUTH EVERY MORNING., Normal, Disp-90 Tab,R-3      rosuvastatin (CRESTOR) 40 mg tablet TAKE 1 TABLET BY MOUTH ONCE A DAY AT BEDTIME., Normal, Disp-90 Tab,R-3      docusate sodium (COLACE) 100 mg capsule Take 100 mg by mouth two (2) times a day., Historical Med      buPROPion XL (WELLBUTRIN XL) 300 mg XL tablet Take 1 Tab by mouth every morning., Normal, Disp-90 Tab,R-2      tiotropium (Spiriva with HandiHaler) 18 mcg inhalation capsule INHALE THE CONTENTS OF 1 CAPSULE VIA HANDIHALER ONCE DAILY. RINSE MOUTH AFTER USE, Normal, Disp-90 Cap,R-2      Ventolin HFA 90 mcg/actuation inhaler Take 2 Puffs by inhalation every four (4) hours as needed for Wheezing., Normal, Disp-3 Inhaler,R-3,STEPHAN      traZODone (DESYREL) 100 mg tablet Take 1 Tab by mouth nightly. Indications: insomnia associated with depression, Normal, Disp-30 Tab,R-3      roflumilast (Daliresp) 500 mcg tab tablet Take 1 Tab by mouth daily. , Normal, Disp-90 Tab,R-1      MILK THISTLE PO Take  by mouth daily. , Historical Med      Magnesium Oxide 500 mg cap Take  by mouth daily. , Historical Med      lutein 40 mg cap Take  by mouth daily. , Historical Med      pantoprazole (PROTONIX) 40 mg tablet TAKE 1 TABLET BY MOUTH ONCE DAILY, Normal, Disp-90 Tab, R-3      insulin NPH/insulin regular (NovoLIN 70/30 U-100 Insulin) 100 unit/mL (70-30) injection 15 Units by SubCUTAneous route Daily (before breakfast). , Normal, Disp-20 mL, R-2      liraglutide (VICTOZA) 0.6 mg/0.1 mL (18 mg/3 mL) pnij INJECT 1.8 MG UNDER SKIN DAILY (BEFORE BREAKFAST). , Normal, Disp-9 Adjustable Dose Pre-filled Pen Syringe, R-3      budesonide-formoteroL (SYMBICORT) 160-4.5 mcg/actuation HFAA INHALE 2 PUFFS BY MOUTH TWICE DAILY FOR COPD ASSOCIATED WITH CHRONIC BRONCHITIS, EXTRINSIC ASTHMA., Normal, Disp-30.6 Inhaler, R-2      ergocalciferol (ERGOCALCIFEROL) 50,000 unit capsule TAKE 1 CAPSULE BY MOUTH ONCE A WEEK, Normal, Disp-12 Cap, R-3      montelukast (SINGULAIR) 10 mg tablet TAKE 1 TABLET BY MOUTH DAILY IN THE EVENING, Normal, Disp-90 Tab, R-3      insulin syringe-needle U-100 (BD INSULIN SYRINGE ULTRA-FINE) 1/2 mL 31 gauge x 15/64\" syrg Use for injecting insulin subcutaneously, Normal, Disp-200 Pen Needle, R-5      benztropine (COGENTIN) 0.5 mg tablet Take 1 Tab by mouth as needed., Historical Med, R-2      Blood-Glucose Meter monitoring kit 1 preferred brand glucometer for checking home glucose, E11.65, Normal, Disp-1 Kit, R-0      lancets misc Use preferred brand; Check glucose 4 times daily, Diagnosis E11.65, Normal, Disp-400 Each, R-11      Insulin Needles, Disposable, (ULTICARE PEN NEEDLE) 32 gauge x 5/32\" ndle USE 2 TIMES DAILY TO INJECT INSULIN/VICTOZA., Normal, Disp-200 Pen Needle, R-3      multivitamin (ONE A DAY) tablet Take 1 Tab by mouth daily. , Historical Med      EPINEPHrine (EPIPEN) 0.3 mg/0.3 mL injection 0.3 mg by IntraMUSCular route once as needed., Historical Med           2. Follow-up Information     Follow up With Specialties Details Why Contact Info    Maikel Graves MD General Surgery, Breast Surgery, Endocrinology, Colon and Rectal Surgery Call As needed [address] [place] [address] [address]  [phone]      Your dentist or oral surgeon   As needed     [facility] EMERGENCY DEPT Emergency Medicine  If symptoms worsen [address] [place] [address]  [address] [facility]  [phone]        3. Return to ED if worse     Diagnosis     Clinical Impression:   1. Biliary colic    2. Hiatal hernia    3. Pain, dental    4. Acute cystitis without hematuria        Attestations:    Lyubov Bowman MD    Please note that this dictation was completed with Seelio, the [employer] voice recognition software.   Quite often unanticipated grammatical, syntax, homophones, and other interpretive errors are inadvertently transcribed by the computer software. Please disregard these errors. Please excuse any errors that have escaped final proofreading. Thank you.

## 2020-11-02 NOTE — DISCHARGE INSTRUCTIONS
Patient Education        Biliary Colic: Care Instructions  Your Care Instructions     Biliary (say \"BILL-ee-air-ee\") colic is belly pain caused by gallbladder problems. It is usually caused by a gallstone moving through or blocking the common bile duct or cystic duct. Gallstones are stones that form in the gallbladder. They also can form in the common bile duct or cystic duct. These ducts carry bile from the gallbladder and the liver to the small intestine. Gallstones may be as small as a grain of sand. Or they may be as large as a golf ball. Gallstones that cause severe symptoms usually are treated with surgery to remove the gallbladder. If the first attack of biliary colic is mild, it is often safe to wait until you have had another attack before you think about having surgery. The doctor has checked you carefully. But problems can develop later. If you notice any problems or new symptoms, get medical treatment right away. Follow-up care is a key part of your treatment and safety. Be sure to make and go to all appointments, and call your doctor if you are having problems. It's also a good idea to know your test results and keep a list of the medicines you take. How can you care for yourself at home? · Take pain medicines exactly as directed. ? If the doctor gave you a prescription medicine for pain, take it as prescribed. ? If you are not taking a prescription pain medicine, ask your doctor if you can take an over-the-counter medicine. Read and follow all instructions on the label. · Avoid foods that cause symptoms, especially fatty foods. These can cause biliary colic. · You may need more tests to look at your gallbladder. When should you call for help?    Call your doctor now or seek immediate medical care if:    · You have a fever.     · You have new belly pain, or your pain gets worse.     · There is a new or increasing yellow tint to your skin or the whites of your eyes.     · Your urine is dark yellow-brown, or your stools are light-colored or white.     · You cannot keep down fluids. Watch closely for changes in your health, and be sure to contact your doctor if:    · You do not get better as expected.     · You are not getting better after 1 day (24 hours). Where can you learn more? Go to http://www.gray.com/  Enter [id number] in the search box to learn more about \"Biliary Colic: Care Instructions. \"  Current as of: April 15, 2020               Content Version: 12.6  © 7891-1094 [employer]. Care instructions adapted under license by [employer] (which disclaims liability or warranty for this information). If you have questions about a medical condition or this instruction, always ask your healthcare professional. [address] any warranty or liability for your use of this information. Patient Education        Hiatal Hernia: Care Instructions  Your Care Instructions  A hiatal hernia occurs when part of the stomach bulges into the chest cavity. A hiatal hernia may allow stomach acid and juices to back up into the esophagus (acid reflux). This can cause a feeling of burning, warmth, heat, or pain behind the breastbone. This feeling may often occur after you eat, soon after you lie down, or when you bend forward, and it may come and go. You also may have a sour taste in your mouth. These symptoms are commonly known as heartburn or reflux. But not all hiatal hernias cause symptoms. Follow-up care is a key part of your treatment and safety. Be sure to make and go to all appointments, and call your doctor if you are having problems. It's also a good idea to know your test results and keep a list of the medicines you take. How can you care for yourself at home? · Take your medicines exactly as prescribed. Call your doctor if you think you are having a problem with your medicine.   · Do not take aspirin or other nonsteroidal anti-inflammatory drugs (NSAIDs), such as ibuprofen (Advil, Motrin) or naproxen (Aleve), unless your doctor says it is okay. Ask your doctor what you can take for pain. · Your doctor may recommend over-the-counter medicine. For mild or occasional indigestion, antacids such as Tums, Gaviscon, Maalox, or Mylanta may help. Your doctor also may recommend over-the-counter acid reducers, such as famotidine (Pepcid AC), cimetidine (Tagamet HB), or omeprazole (Prilosec). Read and follow all instructions on the label. If you use these medicines often, talk with your doctor. · Change your eating habits. ? It's best to eat several small meals instead of two or three large meals. ? After you eat, wait 2 to 3 hours before you lie down. Late-night snacks aren't a good idea. ? Chocolate, mint, and alcohol can make heartburn worse. They relax the valve between the esophagus and the stomach. ? Spicy foods, foods that have a lot of acid (like tomatoes and oranges), and coffee can make heartburn symptoms worse in some people. If your symptoms are worse after you eat a certain food, you may want to stop eating that food to see if your symptoms get better. · Do not smoke or chew tobacco.  · If you get heartburn at night, raise the head of your bed 6 to 8 inches by putting the frame on blocks or placing a foam wedge under the head of your mattress. (Adding extra pillows does not work.)  · Do not wear tight clothing around your middle. · Lose weight if you need to. Losing just 5 to 10 pounds can help. When should you call for help? Call your doctor now or seek immediate medical care if:    · You have new or worse belly pain.     · You are vomiting. Watch closely for changes in your health, and be sure to contact your doctor if:    · You have new or worse symptoms of indigestion.     · You have trouble or pain swallowing.     · You are losing weight.     · You do not get better as expected. Where can you learn more?   Go to http://www.gray.com/  Enter R776 in the search box to learn more about \"Hiatal Hernia: Care Instructions. \"  Current as of: April 15, 2020               Content Version: 12.6  © 6256-9902 "[employer]. Care instructions adapted under license by [employer] (which disclaims liability or warranty for this information). If you have questions about a medical condition or this instruction, always ask your healthcare professional. [place] [address] any warranty or liability for your use of this information. Patient Education        Tooth and Gum Pain: Care Instructions  Your Care Instructions    The most common causes of dental pain are tooth decay and gum disease. Pain can also be caused by an infection of the tooth (abscess) or the gums. Or you may have pain from a broken or cracked tooth. Other causes of pain include infection and damage to a tooth from nervous grinding of your teeth. A wisdom tooth can be painful when it is coming in but cannot break through the gum. It can also be painful when the tooth is only partway in and extra gum tissue has formed around it. The tissue can get inflamed (pericoronitis), and sometimes it gets infected. Prompt dental care can help find the cause of your toothache and keep the tooth from dying or gum disease from getting worse. Self-care at home may reduce your pain and discomfort. Follow-up care is a key part of your treatment and safety. Be sure to make and go to all appointments, and call your dentist or doctor if you are having problems. It's also a good idea to know your test results and keep a list of the medicines you take. How can you care for yourself at home? · To reduce pain and facial swelling, put an ice or cold pack on the outside of your cheek for 10 to 20 minutes at a time. Put a thin cloth between the ice and your skin. Do not use heat.   · If your doctor prescribed antibiotics, take them as directed. Do not stop taking them just because you feel better. You need to take the full course of antibiotics. · Ask your doctor if you can take an over-the-counter pain medicine, such as acetaminophen (Tylenol), ibuprofen (Advil, Motrin), or naproxen (Aleve). Be safe with medicines. Read and follow all instructions on the label. · Avoid very hot, cold, or sweet foods and drinks if they increase your pain. · Rinse your mouth with warm salt water every 2 hours to help relieve pain and swelling. Mix 1 teaspoon of salt in 8 ounces of water. · Talk to your dentist about using special toothpaste for sensitive teeth. To reduce pain on contact with heat or cold or when brushing, brush with this toothpaste regularly or rub a small amount of the paste on the sensitive area with a clean finger 2 or 3 times a day. Floss gently between your teeth. · Do not smoke or use spit tobacco. Tobacco use can make gum problems worse, decreases your ability to fight infection in your gums, and delays healing. If you need help quitting, talk to your doctor about stop-smoking programs and medicines. These can increase your chances of quitting for good. When should you call for help? Call 911 anytime you think you may need emergency care. For example, call if:    · You have trouble breathing. Call your dentist or doctor now or seek immediate medical care if:    · You have signs of infection, such as:  ? Increased pain, swelling, warmth, or redness. ? Red streaks leading from the area. ? Pus draining from the area. ? A fever. Watch closely for changes in your health, and be sure to contact your doctor if:    · You do not get better as expected. Where can you learn more? Go to http://www.gray.com/  Enter H417 in the search box to learn more about \"Tooth and Gum Pain: Care Instructions. \"  Current as of: March 25, 2020               Content Version: 12.6  © 2263-1568 [employer]. Care instructions adapted under license by [employer] (which disclaims liability or warranty for this information). If you have questions about a medical condition or this instruction, always ask your healthcare professional. [address] any warranty or liability for your use of this information.

## 2020-11-03 ENCOUNTER — PATIENT OUTREACH (OUTPATIENT)
Dept: CASE MANAGEMENT | Age: 60
End: 2020-11-03

## 2020-11-03 NOTE — PROGRESS NOTES
Patient contacted regarding COVID-19  risk. Discussed COVID-19 related testing which was not done at this time. Test results were not done. Patient informed of results, if available? n/a. Outreach made within 2 business days of discharge: Yes    Care Transition Nurse/ Ambulatory Care Manager/ LPN Care Coordinator contacted the patient by telephone to perform post discharge assessment. Verified name and  with patient as identifiers. Provided introduction to self, and explanation of the CTN/ACM/LPN role, and reason for call due to risk factors for infection and/or exposure to COVID-19. Symptoms reviewed with patient who verbalized the following symptoms: no new symptoms and no worsening symptoms. Due to no new or worsening symptoms encounter was not routed to provider for escalation. Discussed follow-up appointments. If no appointment was previously scheduled, appointment scheduling offered: yes  [facility] follow up appointment(s):   Future Appointments   Date Time Provider Cheryl Arrieta   2/15/2021  4:10 PM Pavithra Bradley MD [facility] [address] BS AMB   2021  1:15 PM Erickson Weathers MD [facility] BS AMB     Non-[facility] follow up appointment(s): pt has been in contact with her primary care today and will schedule a follow up visit. Advance Care Planning:   Does patient have an Advance Directive: currently not on file; patient declined education    Patient has following risk factors of: diabetes and hypertension. CTN/ACM/LPN reviewed discharge instructions, medical action plan and red flags such as increased shortness of breath, increasing fever and signs of decompensation with patient who verbalized understanding. Discussed exposure protocols and quarantine with CDC Guidelines What to do if you are sick with coronavirus disease .  Patient was given an opportunity for questions and concerns.  The patient agrees to contact the [facility] exposure line [phone], local [facility] department [address]  ([phone]) and PCP office for questions related to their healthcare. CTN/ACM provided contact information for future needs. Reviewed and educated patient on any new and changed medications related to discharge diagnosis. Patient/family/caregiver given information for [employer] and agrees to enroll no  Patient's preferred e-mail:  n/a  Patient's preferred phone number: n/a  Based on Loop alert triggers, patient will be contacted by nurse care manager for worsening symptoms. Plan for follow up call in 14 days based on severity of symptoms and risk factors.

## 2020-11-04 ENCOUNTER — VIRTUAL VISIT (OUTPATIENT)
Dept: FAMILY MEDICINE CLINIC | Age: 60
End: 2020-11-04
Payer: MEDICARE

## 2020-11-04 DIAGNOSIS — T85.848A DENTAL IMPLANT PAIN, INITIAL ENCOUNTER: Primary | ICD-10-CM

## 2020-11-04 DIAGNOSIS — Z71.89 ADVICE GIVEN ABOUT COVID-19 VIRUS INFECTION: ICD-10-CM

## 2020-11-04 DIAGNOSIS — F32.9 MAJOR DEPRESSION, CHRONIC: ICD-10-CM

## 2020-11-04 DIAGNOSIS — F39 MOOD DISORDER (HCC): ICD-10-CM

## 2020-11-04 PROCEDURE — G9899 SCRN MAM PERF RSLTS DOC: HCPCS | Performed by: FAMILY MEDICINE

## 2020-11-04 PROCEDURE — G9717 DOC PT DX DEP/BP F/U NT REQ: HCPCS | Performed by: FAMILY MEDICINE

## 2020-11-04 PROCEDURE — 99214 OFFICE O/P EST MOD 30 MIN: CPT | Performed by: FAMILY MEDICINE

## 2020-11-04 PROCEDURE — G8427 DOCREV CUR MEDS BY ELIG CLIN: HCPCS | Performed by: FAMILY MEDICINE

## 2020-11-04 PROCEDURE — 3017F COLORECTAL CA SCREEN DOC REV: CPT | Performed by: FAMILY MEDICINE

## 2020-11-04 PROCEDURE — G8756 NO BP MEASURE DOC: HCPCS | Performed by: FAMILY MEDICINE

## 2020-11-04 RX ORDER — NALOXONE HYDROCHLORIDE 4 MG/.1ML
1 SPRAY NASAL AS NEEDED
Qty: 2 EACH | Refills: 0 | Status: SHIPPED | OUTPATIENT
Start: 2020-11-04 | End: 2021-04-05

## 2020-11-04 RX ORDER — HYDROCODONE BITARTRATE AND ACETAMINOPHEN 5; 325 MG/1; MG/1
1 TABLET ORAL
Qty: 30 TAB | Refills: 0 | Status: SHIPPED | OUTPATIENT
Start: 2020-11-04 | End: 2020-11-14

## 2020-11-04 NOTE — PROGRESS NOTES
HISTORY OF PRESENT ILLNESS  Jonathan Riddle is a 62 y.o. female. Pt's main concerns were provided on virtual visit, a telemed format,  pt is w/ comorbid medical history and unaware of been exposed to covid-19 individual, Pt Have been staying at home for couple of wks,  pt has no fever no cough no dyspnea, no ha, not dizzy, nl smell nl taste, no N/V/D, no body ache. 4 implant and bone graft upper tooth presenting stating that she is in severe pain postoperatively no one has been given enough pain medication patient has tramadol and Tylenol 3 has tried over-the-counter pain medication Tylenol not helping called the surgeon unfortunately the surgeon did not give her anything more for pain medication went to emergency room emergency room also did not give her any pain medication she is very upset concern crying in pain stating that is not fair and this is not right,   New Pt to this clinic present with the c/o left sided jaw pain, requesting treatment patient is stating that had one of tooth with big abscess started last week has not seen the dentist awaiting the appointment the pain most is at left jaw is swelled up painful unable to chew and is not better, the pain is 9/10 throbbing pain, and is not getting better, was givne fentanyl but afraid of taking it,   pt states that she have tylenol#3, tramadol and lortab with percocet had no problem with any of them, just finished the ABX and she just finished it after 14 days, 3hrs duration of the bone grafting,        Current Outpatient Medications   Medication Sig Dispense Refill    insulin NPH/insulin regular (NovoLIN 70/30 U-100 Insulin) 100 unit/mL (70-30) injection Inject up to 15 units with breakfast and 7 units with lunch as needed 3 Vial 3    dicyclomine (BentyL) 10 mg capsule Take 2 Caps by mouth four (4) times daily as needed for Abdominal Cramps. 20 Cap 0    cephALEXin (Keflex) 500 mg capsule Take 1 Cap by mouth three (3) times daily.  15 Cap 0    metFORMIN ER (GLUCOPHAGE XR) 500 mg tablet TAKE 2 TABS BY MOUTH BEFORE BREAKFAST AND DINNER. 360 Tab 3    cyanocobalamin, vitamin B-12, (VITAMIN B-12 PO) Take 2,500 mcg by mouth daily.  senna-docusate (PERICOLACE) 8.6-50 mg per tablet Take 2 Tabs by mouth daily. 30 Tab 2    metoprolol succinate (TOPROL-XL) 100 mg tablet TAKE 1 TABLET BY MOUTH EVERY DAY 90 Tab 1    glucose blood VI test strips (Accu-Chek Renata Plus test strp) strip CHECK GLUCOSE 4 TIMES DAILY, DIAGNOSIS E11.65 400 Strip 3    melatonin 12 mg tab Take 1 Tab by mouth nightly as needed for Pain. 30 Tab 3    glimepiride (AMARYL) 4 mg tablet TAKE 1 TABLET BY MOUTH EVERY MORNING. 90 Tab 3    rosuvastatin (CRESTOR) 40 mg tablet TAKE 1 TABLET BY MOUTH ONCE A DAY AT BEDTIME. 90 Tab 3    docusate sodium (COLACE) 100 mg capsule Take 100 mg by mouth two (2) times a day.  buPROPion XL (WELLBUTRIN XL) 300 mg XL tablet Take 1 Tab by mouth every morning. 90 Tab 2    tiotropium (Spiriva with HandiHaler) 18 mcg inhalation capsule INHALE THE CONTENTS OF 1 CAPSULE VIA HANDIHALER ONCE DAILY. RINSE MOUTH AFTER USE 90 Cap 2    Ventolin HFA 90 mcg/actuation inhaler Take 2 Puffs by inhalation every four (4) hours as needed for Wheezing. 3 Inhaler 3    traZODone (DESYREL) 100 mg tablet Take 1 Tab by mouth nightly. Indications: insomnia associated with depression 30 Tab 3    roflumilast (Daliresp) 500 mcg tab tablet Take 1 Tab by mouth daily. 90 Tab 1    MILK THISTLE PO Take  by mouth daily.  Magnesium Oxide 500 mg cap Take  by mouth daily.  lutein 40 mg cap Take  by mouth daily.  pantoprazole (PROTONIX) 40 mg tablet TAKE 1 TABLET BY MOUTH ONCE DAILY 90 Tab 3    liraglutide (VICTOZA) 0.6 mg/0.1 mL (18 mg/3 mL) pnij INJECT 1.8 MG UNDER SKIN DAILY (BEFORE BREAKFAST).  9 Adjustable Dose Pre-filled Pen Syringe 3    budesonide-formoteroL (SYMBICORT) 160-4.5 mcg/actuation HFAA INHALE 2 PUFFS BY MOUTH TWICE DAILY FOR COPD ASSOCIATED WITH CHRONIC BRONCHITIS, EXTRINSIC ASTHMA. 30.6 Inhaler 2    ergocalciferol (ERGOCALCIFEROL) 50,000 unit capsule TAKE 1 CAPSULE BY MOUTH ONCE A WEEK 12 Cap 3    montelukast (SINGULAIR) 10 mg tablet TAKE 1 TABLET BY MOUTH DAILY IN THE EVENING 90 Tab 3    insulin syringe-needle U-100 (BD INSULIN SYRINGE ULTRA-FINE) 1/2 mL 31 gauge x 15/64\" syrg Use for injecting insulin subcutaneously 200 Pen Needle 5    benztropine (COGENTIN) 0.5 mg tablet Take 1 Tab by mouth as needed. 2    Blood-Glucose Meter monitoring kit 1 preferred brand glucometer for checking home glucose, E11.65 1 Kit 0    lancets misc Use preferred brand; Check glucose 4 times daily, Diagnosis E11.65 400 Each 11    Insulin Needles, Disposable, (ULTICARE PEN NEEDLE) 32 gauge x 5/32\" ndle USE 2 TIMES DAILY TO INJECT INSULIN/VICTOZA. 200 Pen Needle 3    multivitamin (ONE A DAY) tablet Take 1 Tab by mouth daily.  EPINEPHrine (EPIPEN) 0.3 mg/0.3 mL injection 0.3 mg by IntraMUSCular route once as needed.        Allergies   Allergen Reactions    Latex Hives    Other Food Anaphylaxis and Hives     PEPPERONI, sloppy joes, frank cheese doritos ,peach jolly ranchers & green tea    Demerol [Meperidine] Anaphylaxis     Difficulty breathing    Iodine Anaphylaxis    Morphine Other (comments)     voilent outbreaks (restraints needed)    Nsaids (Non-Steroidal Anti-Inflammatory Drug) Hives     Past Medical History:   Diagnosis Date    Asthma     Asthma     COPD     Depression     [facility]     Diabetes mellitus     Encounter for review of form with patient 12/4/2017    Essential hypertension     GERD (gastroesophageal reflux disease)     Headache(784.0)     HX OTHER MEDICAL     acoustic neuroma    Hypercholesterolemia     Hypertension     Ill-defined condition     R ACOUSTIC NEUROMA    Insomnia disorder 12/4/2017    Major depression, chronic 12/4/2017    Mood disorder ([facility] [address] [facility] 7/20/2020    Psychiatric problem     anxiety depression    [facility]     Psychotic disorder ([facility] [address] [facility] 2019    Screening for cervical cancer 2018    Tobacco use disorder 10/19/2017    Unspecified sleep apnea     NO CPAP-O2 @2L WHEN SLEEPING     Past Surgical History:   Procedure Laterality Date     DELIVERY ONLY      HX GASTRECTOMY  14    lap sleeve gastrectomy by Dr Komal Copeland      2 c-sections    HX HEENT      sinus    HX HEENT      tracheal resection    HX HEENT      tracheal alleratation x 2    HX HEENT      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    HX HEENT      LASER SX-PENG     Family History   Problem Relation Age of Onset    Diabetes Father     Heart Failure Father     Heart Disease Father     Hypertension Father     High Cholesterol Mother     Suicide Brother     Stroke Maternal Grandmother     Cancer Paternal Grandfather     Anesth Problems Neg Hx      Social History     Tobacco Use    Smoking status: Former Smoker     Packs/day: 1.00     Years: 12.00     Pack years: 12.00     Last attempt to quit: 2018     Years since quittin.2    Smokeless tobacco: Former User   Substance Use Topics    Alcohol use: Yes     Comment: ocassional      Lab Results   Component Value Date/Time    WBC 6.8 2020 11:49 AM    HGB 9.9 (L) 2020 11:49 AM    HCT 33.2 (L) 2020 11:49 AM    PLATELET 374  11:49 AM    MCV 80.2 2020 11:49 AM     Lab Results   Component Value Date/Time    Glucose 132 (H) 2020 11:49 AM    Glucose (POC) 102 (H) 10/03/2017 07:49 PM         Review of Systems   Constitutional: Negative for chills and fever. HENT: Positive for ear pain and sinus pain. Negative for congestion and nosebleeds. Facial pain post op,    Eyes: Negative for blurred vision and pain. Respiratory: Negative for cough, shortness of breath and wheezing. Cardiovascular: Negative for chest pain and leg swelling.    Gastrointestinal: Negative for constipation, diarrhea, nausea and vomiting. Genitourinary: Negative for dysuria and frequency. Musculoskeletal: Positive for myalgias. Negative for joint pain. Skin: Negative for itching and rash. Neurological: Negative for dizziness, loss of consciousness and headaches. Psychiatric/Behavioral: Negative for depression. The patient is not nervous/anxious and does not have insomnia. Physical Exam  Constitutional:       Appearance: She is not ill-appearing or toxic-appearing. HENT:      Head: Normocephalic and atraumatic. Mouth/Throat:      Mouth: Mucous membranes are moist.   Neurological:      Mental Status: She is alert and oriented to person, place, and time. Psychiatric:         Mood and Affect: Mood normal.         Behavior: Behavior normal.         Thought Content: Thought content normal.         Judgment: Judgment normal.         ASSESSMENT and PLAN  Diagnoses and all orders for this visit:    1. Dental implant pain, initial encounter  -     HYDROcodone-acetaminophen (NORCO) 5-325 mg per tablet; Take 1 Tab by mouth every eight (8) hours as needed for Pain for up to 10 days. Max Daily Amount: 3 Tabs. 2. Advice given about COVID-19 virus infection  -     HYDROcodone-acetaminophen (NORCO) 5-325 mg per tablet; Take 1 Tab by mouth every eight (8) hours as needed for Pain for up to 10 days. Max Daily Amount: 3 Tabs. Other orders  -     naloxone [facility]) 4 mg/actuation nasal spray; 1 [place] by IntraNASal route as needed for Overdose.  Indications: decrease in rate & depth of breathing due to opioid drug, opioid overdose      Patient was told to lessen the amount of pain medication continue with weight reduction do some massage therapy chiropractor exercise therapy such as resistance banding avoid heavy lifting heavy pushing at this time include ibuprofen and Tylenol over-the-counter topical cream , patient was told to take some Tums or over-the-counter PPI if abdominal upset do ice therapy, side effect of current pain medication significantly including its devastating addiction, explained in detail   in addition, pt was told to avoid machinary operation and driving while on any pain based medications that will cause dizziness, drowsiness, and sleepiness.   Dependency and tolerancy were also addressed,  meds side effects and compliancy advised,  Call or rtc if worsens, radiology results and schedule of future radiology studies reviewed with patient, patient was also told to avoid any alcoholic beverages or any new medication which could potentiate its effect possibility of overdose abuse of other illicit drug keeping the medication is safe place, patient acknowledged understanding and agreed with recommendation

## 2020-11-04 NOTE — PROGRESS NOTES
Chief Complaint   Patient presents with    Dental Pain     1. Have you been to the ER, urgent care clinic since your last visit? Hospitalized since your last visit? Yes When: 11/2/20 Where: [facility] ER  Reason for visit: tooth pain, abd pain    2. Have you seen or consulted any other health care providers outside of the [address] [place] [address] since your last visit? Include any pap smears or colon screening. Yes When: 10/29/20 Where: dentist Reason for visit: tooth extraction    Call placed to pt. Verified patient with two type of identifiers. Pt had tooth pulled 10/13 and was given Tramadol by dentist, did not help with pain,  Had another oral surgery 10/29 where she had 5 teeth pulled, 4 titanium posts put in and 4 bone graphs, was given Tylenol 3. Went to ER on 11/02 because of the pain, was offered Fentanyl, declined med due to side effects,  Called dentist who advised her to follow up with pcp as he was not giving her anything stronger than tramadol. . Has a f/up dentist appt to have sutures removed on 11/11  Constantly waking up at night because of the pain.

## 2020-11-13 DIAGNOSIS — F33.9 RECURRENT DEPRESSION (HCC): ICD-10-CM

## 2020-11-13 DIAGNOSIS — F51.01 PRIMARY INSOMNIA: ICD-10-CM

## 2020-11-13 RX ORDER — TRAZODONE HYDROCHLORIDE 100 MG/1
100 TABLET ORAL
Qty: 30 TAB | Refills: 3 | Status: SHIPPED | OUTPATIENT
Start: 2020-11-13 | End: 2021-03-26 | Stop reason: SDUPTHER

## 2020-11-18 ENCOUNTER — PATIENT OUTREACH (OUTPATIENT)
Dept: CASE MANAGEMENT | Age: 60
End: 2020-11-18

## 2020-11-18 NOTE — PROGRESS NOTES
Patient resolved from Transition of Care episode on 11/18/20. Discussed COVID-19 related testing which was not done at this time. Test results were not done. Patient informed of results, if available? n/a     Patient/family has been provided the following resources and education related to COVID-19:                         Signs, symptoms and red flags related to COVID-19            [facility] exposure and quarantine guidelines            [employer] exposure contact - [phone]            Contact for their local Department of Health               Patient currently reports that the following symptoms have improved:  no new symptoms and no worsening symptoms. No further outreach scheduled with this CTN/ACM/LPN/HC/ MA. Episode of Care resolved. Patient has this CTN/ACM/LPN/HC/MA contact information if future needs arise.

## 2020-12-13 ENCOUNTER — HOSPITAL ENCOUNTER (OUTPATIENT)
Dept: PREADMISSION TESTING | Age: 60
Discharge: HOME | End: 2020-12-13
Payer: MEDICARE

## 2020-12-13 PROCEDURE — 87635 SARS-COV-2 COVID-19 AMP PRB: CPT

## 2020-12-14 LAB
HEALTH STATUS, XMCV2T: NORMAL
SARS-COV-2, COV2NT: NOT DETECTED
SOURCE, COVRS: NORMAL
SPECIMEN SOURCE, FCOV2M: NORMAL
SPECIMEN TYPE, XMCV1T: NORMAL

## 2020-12-17 ENCOUNTER — ANESTHESIA EVENT (OUTPATIENT)
Dept: ENDOSCOPY | Age: 60
End: 2020-12-17
Payer: MEDICARE

## 2020-12-17 ENCOUNTER — HOSPITAL ENCOUNTER (OUTPATIENT)
Age: 60
Setting detail: OUTPATIENT SURGERY
Discharge: HOME | End: 2020-12-17
Attending: INTERNAL MEDICINE | Admitting: INTERNAL MEDICINE
Payer: MEDICARE

## 2020-12-17 ENCOUNTER — ANESTHESIA (OUTPATIENT)
Dept: ENDOSCOPY | Age: 60
End: 2020-12-17
Payer: MEDICARE

## 2020-12-17 VITALS
SYSTOLIC BLOOD PRESSURE: 137 MMHG | TEMPERATURE: 97.4 F | OXYGEN SATURATION: 91 % | HEART RATE: 105 BPM | BODY MASS INDEX: 19.55 KG/M2 | DIASTOLIC BLOOD PRESSURE: 71 MMHG | RESPIRATION RATE: 17 BRPM | HEIGHT: 69 IN | WEIGHT: 132 LBS

## 2020-12-17 LAB
GLUCOSE BLD STRIP.AUTO-MCNC: 157 MG/DL (ref 65–100)
SERVICE CMNT-IMP: ABNORMAL

## 2020-12-17 PROCEDURE — 2709999900 HC NON-CHARGEABLE SUPPLY: Performed by: INTERNAL MEDICINE

## 2020-12-17 PROCEDURE — 76060000034 HC ANESTHESIA 1.5 TO 2 HR: Performed by: INTERNAL MEDICINE

## 2020-12-17 PROCEDURE — 82962 GLUCOSE BLOOD TEST: CPT

## 2020-12-17 PROCEDURE — 88305 TISSUE EXAM BY PATHOLOGIST: CPT

## 2020-12-17 PROCEDURE — 74011250636 HC RX REV CODE- 250/636: Performed by: NURSE ANESTHETIST, CERTIFIED REGISTERED

## 2020-12-17 PROCEDURE — 77030013992 HC SNR POLYP ENDOSC BSC -B: Performed by: INTERNAL MEDICINE

## 2020-12-17 PROCEDURE — 77030038665 HC SOL ELEVIEW INJ AGNT ARPH -B: Performed by: INTERNAL MEDICINE

## 2020-12-17 PROCEDURE — 76040000009: Performed by: INTERNAL MEDICINE

## 2020-12-17 PROCEDURE — 74011000250 HC RX REV CODE- 250: Performed by: NURSE ANESTHETIST, CERTIFIED REGISTERED

## 2020-12-17 PROCEDURE — 77030019988 HC FCPS ENDOSC DISP BSC -B: Performed by: INTERNAL MEDICINE

## 2020-12-17 PROCEDURE — 77030003657 HC NDL SCLER BSC -B: Performed by: INTERNAL MEDICINE

## 2020-12-17 PROCEDURE — 74011250636 HC RX REV CODE- 250/636: Performed by: INTERNAL MEDICINE

## 2020-12-17 RX ORDER — SODIUM CHLORIDE 0.9 % (FLUSH) 0.9 %
5-40 SYRINGE (ML) INJECTION AS NEEDED
Status: DISCONTINUED | OUTPATIENT
Start: 2020-12-17 | End: 2020-12-17 | Stop reason: HOSPADM

## 2020-12-17 RX ORDER — FLUMAZENIL 0.1 MG/ML
0.2 INJECTION INTRAVENOUS
Status: DISCONTINUED | OUTPATIENT
Start: 2020-12-17 | End: 2020-12-17 | Stop reason: HOSPADM

## 2020-12-17 RX ORDER — PROPOFOL 10 MG/ML
INJECTION, EMULSION INTRAVENOUS AS NEEDED
Status: DISCONTINUED | OUTPATIENT
Start: 2020-12-17 | End: 2020-12-17 | Stop reason: HOSPADM

## 2020-12-17 RX ORDER — FENTANYL CITRATE 50 UG/ML
25 INJECTION, SOLUTION INTRAMUSCULAR; INTRAVENOUS
Status: DISCONTINUED | OUTPATIENT
Start: 2020-12-17 | End: 2020-12-17 | Stop reason: HOSPADM

## 2020-12-17 RX ORDER — ATROPINE SULFATE 0.1 MG/ML
0.5 INJECTION INTRAVENOUS
Status: DISCONTINUED | OUTPATIENT
Start: 2020-12-17 | End: 2020-12-17 | Stop reason: HOSPADM

## 2020-12-17 RX ORDER — SODIUM CHLORIDE 0.9 % (FLUSH) 0.9 %
5-40 SYRINGE (ML) INJECTION EVERY 8 HOURS
Status: DISCONTINUED | OUTPATIENT
Start: 2020-12-17 | End: 2020-12-17 | Stop reason: HOSPADM

## 2020-12-17 RX ORDER — NALOXONE HYDROCHLORIDE 0.4 MG/ML
0.4 INJECTION, SOLUTION INTRAMUSCULAR; INTRAVENOUS; SUBCUTANEOUS
Status: DISCONTINUED | OUTPATIENT
Start: 2020-12-17 | End: 2020-12-17 | Stop reason: HOSPADM

## 2020-12-17 RX ORDER — EPINEPHRINE 0.1 MG/ML
1 INJECTION INTRACARDIAC; INTRAVENOUS
Status: DISCONTINUED | OUTPATIENT
Start: 2020-12-17 | End: 2020-12-17 | Stop reason: HOSPADM

## 2020-12-17 RX ORDER — DEXTROMETHORPHAN/PSEUDOEPHED 2.5-7.5/.8
1.2 DROPS ORAL
Status: DISCONTINUED | OUTPATIENT
Start: 2020-12-17 | End: 2020-12-17 | Stop reason: HOSPADM

## 2020-12-17 RX ORDER — MIDAZOLAM HYDROCHLORIDE 1 MG/ML
.25-5 INJECTION, SOLUTION INTRAMUSCULAR; INTRAVENOUS
Status: DISCONTINUED | OUTPATIENT
Start: 2020-12-17 | End: 2020-12-17 | Stop reason: HOSPADM

## 2020-12-17 RX ORDER — LIDOCAINE HYDROCHLORIDE 20 MG/ML
INJECTION, SOLUTION EPIDURAL; INFILTRATION; INTRACAUDAL; PERINEURAL AS NEEDED
Status: DISCONTINUED | OUTPATIENT
Start: 2020-12-17 | End: 2020-12-17 | Stop reason: HOSPADM

## 2020-12-17 RX ORDER — SODIUM CHLORIDE 9 MG/ML
75 INJECTION, SOLUTION INTRAVENOUS CONTINUOUS
Status: DISCONTINUED | OUTPATIENT
Start: 2020-12-17 | End: 2020-12-17 | Stop reason: HOSPADM

## 2020-12-17 RX ADMIN — PROPOFOL 25 MG: 10 INJECTION, EMULSION INTRAVENOUS at 12:48

## 2020-12-17 RX ADMIN — PROPOFOL 25 MG: 10 INJECTION, EMULSION INTRAVENOUS at 12:21

## 2020-12-17 RX ADMIN — PROPOFOL 25 MG: 10 INJECTION, EMULSION INTRAVENOUS at 12:26

## 2020-12-17 RX ADMIN — LIDOCAINE HYDROCHLORIDE 50 MG: 20 INJECTION, SOLUTION EPIDURAL; INFILTRATION; INTRACAUDAL; PERINEURAL at 11:58

## 2020-12-17 RX ADMIN — PROPOFOL 25 MG: 10 INJECTION, EMULSION INTRAVENOUS at 12:51

## 2020-12-17 RX ADMIN — PROPOFOL 50 MG: 10 INJECTION, EMULSION INTRAVENOUS at 12:29

## 2020-12-17 RX ADMIN — PROPOFOL 25 MG: 10 INJECTION, EMULSION INTRAVENOUS at 13:01

## 2020-12-17 RX ADMIN — PROPOFOL 25 MG: 10 INJECTION, EMULSION INTRAVENOUS at 13:06

## 2020-12-17 RX ADMIN — PROPOFOL 25 MG: 10 INJECTION, EMULSION INTRAVENOUS at 12:55

## 2020-12-17 RX ADMIN — PROPOFOL 25 MG: 10 INJECTION, EMULSION INTRAVENOUS at 12:35

## 2020-12-17 RX ADMIN — PROPOFOL 25 MG: 10 INJECTION, EMULSION INTRAVENOUS at 12:42

## 2020-12-17 RX ADMIN — PROPOFOL 50 MG: 10 INJECTION, EMULSION INTRAVENOUS at 11:59

## 2020-12-17 RX ADMIN — PROPOFOL 25 MG: 10 INJECTION, EMULSION INTRAVENOUS at 12:19

## 2020-12-17 RX ADMIN — PROPOFOL 25 MG: 10 INJECTION, EMULSION INTRAVENOUS at 12:58

## 2020-12-17 RX ADMIN — PROPOFOL 25 MG: 10 INJECTION, EMULSION INTRAVENOUS at 12:02

## 2020-12-17 RX ADMIN — PROPOFOL 25 MG: 10 INJECTION, EMULSION INTRAVENOUS at 13:13

## 2020-12-17 RX ADMIN — PROPOFOL 50 MG: 10 INJECTION, EMULSION INTRAVENOUS at 12:06

## 2020-12-17 RX ADMIN — PROPOFOL 25 MG: 10 INJECTION, EMULSION INTRAVENOUS at 12:23

## 2020-12-17 RX ADMIN — PROPOFOL 25 MG: 10 INJECTION, EMULSION INTRAVENOUS at 12:16

## 2020-12-17 RX ADMIN — PROPOFOL 25 MG: 10 INJECTION, EMULSION INTRAVENOUS at 12:53

## 2020-12-17 RX ADMIN — PROPOFOL 25 MG: 10 INJECTION, EMULSION INTRAVENOUS at 12:45

## 2020-12-17 RX ADMIN — PROPOFOL 25 MG: 10 INJECTION, EMULSION INTRAVENOUS at 13:03

## 2020-12-17 RX ADMIN — PROPOFOL 50 MG: 10 INJECTION, EMULSION INTRAVENOUS at 11:58

## 2020-12-17 RX ADMIN — PROPOFOL 25 MG: 10 INJECTION, EMULSION INTRAVENOUS at 12:31

## 2020-12-17 RX ADMIN — PROPOFOL 25 MG: 10 INJECTION, EMULSION INTRAVENOUS at 12:39

## 2020-12-17 RX ADMIN — PROPOFOL 25 MG: 10 INJECTION, EMULSION INTRAVENOUS at 12:09

## 2020-12-17 RX ADMIN — SODIUM CHLORIDE 75 ML/HR: 9 INJECTION, SOLUTION INTRAVENOUS at 11:45

## 2020-12-17 RX ADMIN — PROPOFOL 25 MG: 10 INJECTION, EMULSION INTRAVENOUS at 12:12

## 2020-12-17 RX ADMIN — PROPOFOL 25 MG: 10 INJECTION, EMULSION INTRAVENOUS at 13:09

## 2020-12-17 NOTE — PROGRESS NOTES
Endoscope was pre-cleaned at the bedside immediately following procedure by [facility]. For medications administered by anesthesia, see anesthesia chart.

## 2020-12-17 NOTE — ANESTHESIA POSTPROCEDURE EVALUATION
Procedure(s):  COLONOSCOPY  ESOPHAGOGASTRODUODENOSCOPY (EGD)  ESOPHAGOGASTRODUODENAL (EGD) BIOPSY  ENDOSCOPIC POLYPECTOMY  INJECTION. MAC, total IV anesthesia    Anesthesia Post Evaluation        Patient location during evaluation: PACU  Note status: adequate. Level of consciousness: awake  Pain management: satisfactory to patient  Airway patency: patent  Anesthetic complications: no  Cardiovascular status: acceptable  Respiratory status: acceptable  Hydration status: acceptable  Post anesthesia nausea and vomiting:  controlled      INITIAL Post-op Vital signs:   Vitals Value Taken Time   /71 12/17/20 1412   Temp 36.3 °C (97.4 °F) 12/17/20 1339   Pulse 105 12/17/20 1413   Resp 20 12/17/20 1413   SpO2 90 % 12/17/20 1413   Vitals shown include unvalidated device data.

## 2020-12-17 NOTE — ANESTHESIA PREPROCEDURE EVALUATION
Anesthetic History               Review of Systems / Medical History      Pulmonary    COPD: mild        Asthma   Pertinent negatives: No sleep apnea     Neuro/Psych              Cardiovascular    Hypertension                   GI/Hepatic/Renal     GERD          Comments: History of Morbid Obesity S/P sleeve gastrectomy >5 years ago. 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 uneventful intubation since then once. Physical Exam    Airway  Mallampati: II  TM Distance: 4 - 6 cm  Neck ROM: normal range of motion   Mouth opening: Normal    Comments: Patient's voice is weak.  Cardiovascular    Rhythm: regular  Rate: normal         Dental  No notable dental hx       Pulmonary  Breath sounds clear to auscultation               Abdominal  GI exam deferred       Other Findings            Anesthetic Plan    ASA: 2  Anesthesia type: MAC and total IV anesthesia          Induction: Intravenous  Anesthetic plan and risks discussed with: Patient

## 2020-12-17 NOTE — H&P
Gastroenterology Outpatient History and Physical    Patient: Merlene Patrick    Physician: Jass Lozada MD    Chief Complaint: Chronic idiopathic constipation, LLQ abd pain, and dyspepsia  History of Present Illness: 66yo F with Chronic idiopathic constipation, LLQ abd pain, and dyspepsia.     History:  Past Medical History:   Diagnosis Date    Asthma     Asthma     COPD     Depression     Diabetes ([facility]     Diabetes mellitus     Encounter for review of form with patient 2017    Essential hypertension     GERD (gastroesophageal reflux disease)     Headache(784.0)     HX OTHER MEDICAL     acoustic neuroma    Hypercholesterolemia     Hypertension     Ill-defined condition     R ACOUSTIC NEUROMA    Insomnia disorder 2017    Major depression, chronic 2017    Mood disorder ([facility] 2020    Psychiatric problem     anxiety depression    [facility]     Psychotic disorder ([facility] 2019    Screening for cervical cancer 2018    Tobacco use disorder 10/19/2017    Unspecified sleep apnea     NO CPAP-O2 @2L WHEN SLEEPING      Past Surgical History:   Procedure Laterality Date     DELIVERY ONLY      HX GASTRECTOMY  14    lap sleeve gastrectomy by Dr Naomy Whyte HX GYN      2 c-sections    HX HEENT      sinus    HX HEENT      tracheal resection    HX HEENT      tracheal alleratation x 2    HX HEENT      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    HX HEENT      LASER SX-PENG      Social History     Socioeconomic History    Marital status:      Spouse name: Not on file    Number of children: Not on file    Years of education: Not on file    Highest education level: Not on file   Tobacco Use    Smoking status: Former Smoker     Packs/day: 1.00     Years: 12.00     Pack years: 12.00     Quit date: 2018     Years since quittin.3    Smokeless tobacco: Former User   Substance and Sexual Activity    Alcohol use: Not Currently Frequency: Never    Drug use: No    Sexual activity: Never      Family History   Problem Relation Age of Onset    Diabetes Father     Heart Failure Father     Heart Disease Father     Hypertension Father     High Cholesterol Mother     Suicide Brother     Stroke Maternal Grandmother     Cancer Paternal Grandfather     Anesth Problems Neg Hx       Patient Active Problem List   Diagnosis Code    Asthma with exacerbation J45. 0    Type II or unspecified type diabetes mellitus without mention of complication, uncontrolled [id number]    HTN (hypertension) I10    Upper respiratory infection J06.9    Gallstone K80.20    Rib pain R07.81    Chronic hoarseness R49.0    Asthma J45.909    GERD (gastroesophageal reflux disease) K21.9    Hypercholesteremia E78.00    Tobacco use disorder F17.200    Mitral valve prolapse I34.1    Insomnia disorder G47.00    Major depression, chronic F32.9    Encounter for review of form with patient Z02.89    Type 2 diabetes mellitus with nephropathy (HCC) E11.21    Recurrent depression (HCC) F33.9    Asthmatic bronchitis , chronic (HCC) J44.9    Psychotic disorder (HCC) F29    Mood disorder ([facility]) F39       Allergies: Allergies   Allergen Reactions    Latex Hives    Other Food Anaphylaxis and Hives     PEPPERONI, sloppy joes, frank cheese doritos ,peach jolly ranchers & green tea    Demerol [Meperidine] Anaphylaxis     Difficulty breathing, p[t states that she have tylenol#3, tramadol and lortab with percocet had no problem with any of them    Iodine Anaphylaxis    Morphine Other (comments)     voilent outbreaks (restraints needed), Difficulty breathing, p[t states that she have tylenol#3, tramadol and lortab with percocet had no problem with any of them    Nsaids (Non-Steroidal Anti-Inflammatory Drug) Hives     Medications:   Prior to Admission medications    Medication Sig Start Date End Date Taking?  Authorizing Provider   traZODone (DESYREL) 100 mg tablet TAKE 1 TAB BY MOUTH NIGHTLY. INDICATIONS: INSOMNIA ASSOCIATED WITH DEPRESSION 11/13/20  Yes Joseph Bacon MD   insulin NPH/insulin regular (NovoLIN 70/30 U-100 Insulin) 100 unit/mL (70-30) injection Inject up to 15 units with breakfast and 7 units with lunch as needed 11/3/20  Yes Toshia Toro MD   dicyclomine (BentyL) 10 mg capsule Take 2 Caps by mouth four (4) times daily as needed for Abdominal Cramps. 11/2/20  Yes Aishwarya Lafleur MD   metFORMIN ER (GLUCOPHAGE XR) 500 mg tablet TAKE 2 TABS BY MOUTH BEFORE BREAKFAST AND DINNER. 10/3/20  Yes Toshia Toro MD   metoprolol succinate (TOPROL-XL) 100 mg tablet TAKE 1 TABLET BY MOUTH EVERY DAY 9/11/20  Yes Joseph Bacon MD   glimepiride (AMARYL) 4 mg tablet TAKE 1 TABLET BY MOUTH EVERY MORNING. 7/28/20  Yes Toshia Toro MD   rosuvastatin (CRESTOR) 40 mg tablet TAKE 1 TABLET BY MOUTH ONCE A DAY AT BEDTIME. 7/27/20  Yes Toshia Toro MD   buPROPion XL (WELLBUTRIN XL) 300 mg XL tablet Take 1 Tab by mouth every morning. 7/20/20  Yes Joseph Bacon MD   tiotropium (Spiriva with HandiHaler) 18 mcg inhalation capsule INHALE THE CONTENTS OF 1 CAPSULE VIA HANDIHALER ONCE DAILY. RINSE MOUTH AFTER USE 7/20/20  Yes Joseph Bacon MD   Ventolin HFA 90 mcg/actuation inhaler Take 2 Puffs by inhalation every four (4) hours as needed for Wheezing. 7/20/20  Yes Joseph Bacon MD   roflumilast (Daliresp) 500 mcg tab tablet Take 1 Tab by mouth daily. 7/10/20  Yes Gabby [address] [place] [address] MD Dandy   MILK THISTLE PO Take  by mouth daily. Yes Provider, Historical   Magnesium Oxide 500 mg cap Take  by mouth daily. Yes Provider, Historical   lutein 40 mg cap Take  by mouth daily. Yes Provider, Historical   pantoprazole (PROTONIX) 40 mg tablet TAKE 1 TABLET BY MOUTH ONCE DAILY 5/4/20  Yes Joseph Bacon MD   liraglutide (VICTOZA) 0.6 mg/0.1 mL (18 mg/3 mL) pnij INJECT 1.8 MG UNDER SKIN DAILY (BEFORE BREAKFAST).  2/19/20  Yes Toshia Toro MD budesonide-formoteroL (SYMBICORT) 160-4.5 mcg/actuation HFAA INHALE 2 PUFFS BY MOUTH TWICE DAILY FOR COPD ASSOCIATED WITH CHRONIC BRONCHITIS, EXTRINSIC ASTHMA. 2/17/20  Yes Karina Saavedra MD   ergocalciferol (ERGOCALCIFEROL) 50,000 unit capsule TAKE 1 CAPSULE BY MOUTH ONCE A WEEK 1/9/20  Yes Karina Saavedra MD   montelukast (SINGULAIR) 10 mg tablet TAKE 1 TABLET BY MOUTH DAILY IN THE EVENING 1/9/20  Yes Karina Saavedra MD   benztropine (COGENTIN) 0.5 mg tablet Take 1 Tab by mouth as needed. 10/24/19  Yes Provider, Historical   Insulin Needles, Disposable, (ULTICARE PEN NEEDLE) 32 gauge x 5/32\" ndle USE 2 TIMES DAILY TO INJECT INSULIN/VICTOZA. 5/20/19  Yes Jaron Treadwell MD   multivitamin (ONE A DAY) tablet Take 1 Tab by mouth daily. Yes Provider, Historical   lancets (Accu-Chek Softclix Lancets) misc CHECK GLUCOSE 4 TIMES DAILY, DIAGNOSIS E11.65 12/10/20   Jaron Treadwell MD   naloxone [facility]) 4 mg/actuation nasal spray 1 [place] by IntraNASal route as needed for Overdose. Indications: decrease in rate & depth of breathing due to opioid drug, opioid overdose 11/4/20   Karina Saavedra MD   cephALEXin (Keflex) 500 mg capsule Take 1 Cap by mouth three (3) times daily. 11/2/20   Abiodun Mai MD   cyanocobalamin, vitamin B-12, (VITAMIN B-12 PO) Take 2,500 mcg by mouth daily. Provider, Historical   senna-docusate (PERICOLACE) 8.6-50 mg per tablet Take 2 Tabs by mouth daily. 9/29/20   Karina Saavedra MD   glucose blood VI test strips (Accu-Chek Renata Plus test strp) strip CHECK GLUCOSE 4 TIMES DAILY, DIAGNOSIS E11.65 8/31/20   Jaron Treadwell MD   melatonin 12 mg tab Take 1 Tab by mouth nightly as needed for Pain. 8/14/20   Karina Saavedra MD   docusate sodium (COLACE) 100 mg capsule Take 100 mg by mouth two (2) times a day.     Provider, Historical   insulin syringe-needle U-100 (BD INSULIN SYRINGE ULTRA-FINE) 1/2 mL 31 gauge x 15/64\" syrg Use for injecting insulin subcutaneously 11/14/19 Yves Huff MD   Blood-Glucose Meter monitoring kit 1 preferred brand glucometer for checking home glucose, E11.65 9/4/19   Yves Huff MD   EPINEPHrine (EPIPEN) 0.3 mg/0.3 mL injection 0.3 mg by IntraMUSCular route once as needed. Provider, Historical     Physical Exam:   Vital Signs: Blood pressure (!) 140/73, pulse (!) 105, temperature 98.3 °F (36.8 °C), resp. rate 23, height 5' 9\" (1.753 m), weight 59.9 kg (132 lb), last menstrual period 01/17/2010, SpO2 99 %, not currently breastfeeding.   General: well developed, well nourished   HEENT: unremarkable   Heart: regular rhythm no mumur    Lungs: clear   Abdominal:  benign   Neurological: unremarkable   Extremities: no edema     Findings/Diagnosis: Chronic idiopathic constipation, LLQ abd pain, and dyspepsia  Plan of Care/Planned Procedure: EGD/colonosocpy with conscious/deep sedation    Signed:  Norma Guthrie MD 12/17/2020

## 2020-12-17 NOTE — PROCEDURES
NAME:  Timi Song   :   1960   MRN:   [id number]     Date/Time:  2020 1:26 PM    Esophagogastroduodenoscopy (EGD) Procedure Note    Procedure: Esophagogastroduodenoscopy with biopsy    Indication:  Dyspepsia-acid  Pre-operative Diagnosis: see indication above  Post-operative Diagnosis: see findings below  :  Enma Contreras MD  Referring Provider:   Chevy Robledo MD    Exam:  Airway: clear, no airway problems anticipated  Heart: RRR, without gallops or rubs  Lungs: clear bilaterally without wheezes, crackles, or rhonchi  Abdomen: soft, nontender, nondistended, bowel sounds present  Mental Status: awake, alert and oriented to person, place and time     Anethesia/Sedation:  MAC anesthesia Propofol as per colonoscopy  Procedure Details   After informed consent was obtained for the procedure, with all risks and benefits of procedure explained the patient was taken to the endoscopy suite and placed in the left lateral decubitus position. Following sequential administration of sedation as per above, the [id number] gastroscope was inserted into the mouth and advanced under direct vision to second portion of the duodenum. A careful inspection was made as the gastroscope was withdrawn, including a retroflexed view of the proximal stomach; findings and interventions are described below. Findings:    -Normal esophagus; biopsied to exclude inflammation  -Gastric anatomy consistent with gastric sleeve surgery  -Normal gastric mucosa; biopsied to exclude inflammation  -Normal duodenal mucosa    Therapies:  biopsy of esophagus; biopsy of stomach   Specimens: #1 1 gastric; #2 g-e jxn  EBL:  None. Complications:   None; patient tolerated the procedure well.            Impression:    -Normal esophagus; biopsied to exclude inflammation  -Gastric anatomy consistent with gastric sleeve surgery  -Normal gastric mucosa; biopsied to exclude inflammation  -Normal duodenal mucosa    Recommendations:  -Continue acid suppression. , -Await pathology.     Discharge disposition:  Home in the company of  when able to ambulate after colonoscopy completed    Jose Kinney MD

## 2020-12-17 NOTE — DISCHARGE INSTRUCTIONS
Rakan Churchill  [id number]  1960    EGD/COLON DISCHARGE INSTRUCTIONS  Discomfort:  Redness at IV site- apply warm compress to area; if redness or soreness persist- contact your physician  There may be a slight amount of blood passed from the rectum  Gaseous discomfort- walking, belching will help relieve any discomfort  You may not operate a vehicle for 12 hours  You may not engage in an occupation involving machinery or appliances for rest of today  You may not drink alcoholic beverages for at least 12 hours  Avoid making any critical decisions for at least 24 hour  DIET:   High fiber diet. - however -  remember your colon is empty and a heavy meal will produce gas. Avoid these foods:  vegetables, fried / greasy foods, carbonated drinks for today  MEDICATION:         ACTIVITY:  You may not resume your normal daily activities until tomorrow AM; it is recommended that you spend the remainder of the day resting -  avoid any strenuous activity. CALL M.D. ANY SIGN OF:   Increasing pain, nausea, vomiting  Abdominal distension (swelling)  New increased bleeding (oral or rectal)  Fever (chills)  Pain in chest area  Bloody discharge from nose or mouth  Shortness of breath    You may not  take any Advil, Aspirin, Ibuprofen, Motrin, Aleve, or Goodys for 10 days, ONLY  Tylenol as needed for pain.     IMPRESSION:  -Normal esophagus; biopsied to exclude inflammation  -Gastric anatomy consistent with gastric sleeve surgery  -Normal gastric mucosa; biopsied to exclude inflammation  -Normal duodenal mucosa  -The quality of preparation was inadequate with significant time spent for lavage and aspiration of fluid and stool with inability to clear all of the colon mucosa  -Four sessile polyps ranging in size from 3-5mm were identified in the sigmoid colon and removed with hot snare cautery  -Five sessile polyps ranging in size from 3-10 mm were identified in the ascending colon and removed with hot snare cautery  -Three sessile polyps ranging in size from 3-5mm were identified in the cecum and removed with hot snare cautery  -Single pedunculated polyp measuring 10mm was identified in the transverse colon and removed with hot snare cautery  -Three sessile polyps ranging in size from 3-8mm were identified in the descending colon and removed with hot snare cautery  -A large 30mm polyp is identified in the sigmoid colon at 50cm. Due to the inadequate prep, it cannot be well characterized as to whether a broad base is present that might preclude colonoscopic removal.  The site is tattooed with [address] as 3 separate 1 cc aliquots on the wall opposite the colon so that it can be identified for consideration for later removal.  -Significant sigmoid diverticulosis    Follow-up Instructions:  -Call Dr. Dayton Boswell for the results of procedure / biopsy in 7-10 days  -Await pathology. ,   -Repeat colonoscopy in 1-3 months with improved colonic preparation that my office will coordinate.  -Telephone # [phone]    Jass Lozada MD

## 2020-12-17 NOTE — PROGRESS NOTES
1345 Anesthesia at bedside to assess low sats/lungs. Pt took 2 puffs of her albuterol inhaler    1350 Pt remains little \"tight\", orders from anesthesia to have pt take 2 more puffs of albuterol, con't to monitor. and slowly decrease o2

## 2020-12-17 NOTE — PROCEDURES
NAME:  Reva Palacios   :   1960   MRN:   [id number]     Date/Time:  2020 1:30 PM    Colonoscopy Operative Report    Procedure Type:   Colonoscopy with polypectomy (snare cautery), tattooing with [place] [employer]     Indications:     Abdominal pain, LLQ, Constipation  Pre-operative Diagnosis: see indication above  Post-operative Diagnosis:  See findings below  :  Mine Berg MD  Referring Provider: Carlyle Blue MD    Exam:  Airway: clear, no airway problems anticipated  Heart: RRR, without gallops or rubs  Lungs: clear bilaterally without wheezes, crackles, or rhonchi  Abdomen: soft, nontender, nondistended, bowel sounds present  Mental Status: awake, alert and oriented to person, place and time    Sedation:  MAC anesthesia Propofol 750mg IV  Procedure Details:  After informed consent was obtained with all risks and benefits of procedure explained and preoperative exam completed, the patient was taken to the endoscopy suite and placed in the left lateral decubitus position. Upon sequential sedation as per above, a digital rectal exam was performed demonstrating no hemorrhoids. The Olympus videocolonoscope  was inserted in the rectum and carefully advanced to the cecum, which was identified by the ileocecal valve and appendiceal orifice. The quality of preparation was inadequate with significant time spent for lavage and aspiration of fluid and stool with inability to clear all of the colon mucosa. The colonoscope was slowly withdrawn with careful evaluation between folds. Retroflexion in the rectum was deferred as only stool could be identified in the rectum.     Findings:     -The quality of preparation was inadequate with significant time spent for lavage and aspiration of fluid and stool with inability to clear all of the colon mucosa  -Four sessile polyps ranging in size from 3-5mm were identified in the sigmoid colon and removed with hot snare cautery  -Five sessile polyps ranging in size from 3-10 mm were identified in the ascending colon and removed with hot snare cautery  -Three sessile polyps ranging in size from 3-5mm were identified in the cecum and removed with hot snare cautery  -Single pedunculated polyp measuring 10mm was identified in the transverse colon and removed with hot snare cautery  -Three sessile polyps ranging in size from 3-8mm were identified in the descending colon and removed with hot snare cautery  -A large 30mm polyp is identified in the sigmoid colon at 50cm. Due to the inadequate prep, it cannot be well characterized as to whether a broad base is present that might preclude colonoscopic removal.  The site is tattooed with [address] on the wall opposite the colon so that it can be identified for consideration for later removal.  -Significant sigmoid diverticulosis    Specimen Removed:  #3 sigmoid polyps (4); #4 ascending (5); #5 cecal (3); #6 transverse (1); #7 desc (3)   Complications: None. EBL:  None. Impression:    -The quality of preparation was inadequate with significant time spent for lavage and aspiration of fluid and stool with inability to clear all of the colon mucosa  -Four sessile polyps ranging in size from 3-5mm were identified in the sigmoid colon and removed with hot snare cautery  -Five sessile polyps ranging in size from 3-10 mm were identified in the ascending colon and removed with hot snare cautery  -Three sessile polyps ranging in size from 3-5mm were identified in the cecum and removed with hot snare cautery  -Single pedunculated polyp measuring 10mm was identified in the transverse colon and removed with hot snare cautery  -Three sessile polyps ranging in size from 3-8mm were identified in the descending colon and removed with hot snare cautery  -A large 30mm polyp is identified in the sigmoid colon at 50cm.   Due to the inadequate prep, it cannot be well characterized as to whether a broad base is present that might preclude colonoscopic removal.  The site is tattooed with Nicky Arreola as 3 separate 1 cc aliquots on the wall opposite the colon so that it can be identified for consideration for later removal.  -Significant sigmoid diverticulosis    Recommendations: --Await pathology. , -Repeat colonoscopy in 1-3months with improved colonic preparation that my office will coordinate. High fiber diet. Resume normal medication(s). NO ASA/NSAIDs for 10days. You will receive a letter about the biopsy results in about 10 days. You may be asked to call your doctor's office for the results. Discharge Disposition:  Home in the company of a  when able to ambulate.     Nyla Omer MD

## 2020-12-17 NOTE — PROGRESS NOTES
Layo Sohan  1960  [id number]    Situation:  Verbal report received from: Ehsan Marc RN  Procedure: Procedure(s):  COLONOSCOPY  ESOPHAGOGASTRODUODENOSCOPY (EGD)  ESOPHAGOGASTRODUODENAL (EGD) BIOPSY  ENDOSCOPIC POLYPECTOMY  INJECTION    Background:    Preoperative diagnosis: ABDOMINAL PAIN   DYSPEPSIA  Postoperative diagnosis: EGD: Dyspepsia  Colon: Diverticulosis, Colon Polyps    :  Dr. Cathi Malloy  Assistant(s): Endoscopy Technician-1: Ovidio Goodell  Endoscopy RN-1: Johnna Carter    Specimens:   ID Type Source Tests Collected by Time Destination   1 : Gastric Biopsy Preservative Gastric  Juan Mccann MD 12/17/2020 1203 Pathology   2 : [address]  Juan Mccann MD 12/17/2020 1204 Pathology   3 : Sigmoid Colon Polyps Preservative Sigmoid  Juan Mccann MD 12/17/2020 1215 Pathology   4 : Ascending Colon Polyps Preservative Colon, Ascending  Juan Mccann MD 12/17/2020 1224 Pathology   5 : Cecal Polyps Preservative Cecum  Juan Mccann MD 12/17/2020 1232 Pathology   6 : Transverse Colon Polyps Preservative Colon, Shay Lombardi MD 12/17/2020 1302 Pathology   7 : Decending Colon Polyps Preservative Colon, Descending  Juan Mccann MD 12/17/2020 1303 Pathology     H. Pylori  no    Assessment:  Intra-procedure medications     Anesthesia gave intra-procedure sedation and medications, see anesthesia flow sheet yes    Intravenous fluids: NS@ KVO     Vital signs stable yes    Abdominal assessment: round and soft yes    Recommendation:  Discharge patient per MD order yes.   Return to floor no  Family or Cipriano Peacemaker, daughter  Permission to share finding with family or friend yes

## 2020-12-22 ENCOUNTER — TRANSCRIBE ORDER (OUTPATIENT)
Dept: SCHEDULING | Age: 60
End: 2020-12-22

## 2020-12-22 DIAGNOSIS — R10.32 LLQ ABDOMINAL PAIN: Primary | ICD-10-CM

## 2021-01-09 DIAGNOSIS — R06.09 DYSPNEA ON EFFORT: ICD-10-CM

## 2021-01-09 DIAGNOSIS — J44.1 COPD WITH ACUTE EXACERBATION (HCC): ICD-10-CM

## 2021-01-13 RX ORDER — ROFLUMILAST 500 UG/1
TABLET ORAL
Qty: 90 TAB | Refills: 1 | Status: SHIPPED | OUTPATIENT
Start: 2021-01-13 | End: 2021-07-08

## 2021-01-25 NOTE — DISCHARGE INSTRUCTIONS

## 2021-02-10 LAB
ALBUMIN SERPL-MCNC: 3.7 G/DL (ref 3.8–4.9)
ALBUMIN/CREAT UR: 101 MG/G CREAT (ref 0–29)
ALBUMIN/GLOB SERPL: 1.9 {RATIO} (ref 1.2–2.2)
ALP SERPL-CCNC: 77 IU/L (ref 39–117)
ALT SERPL-CCNC: 56 IU/L (ref 0–32)
AST SERPL-CCNC: 34 IU/L (ref 0–40)
BILIRUB SERPL-MCNC: 0.2 MG/DL (ref 0–1.2)
BUN SERPL-MCNC: 21 MG/DL (ref 6–24)
BUN/CREAT SERPL: 30 (ref 9–23)
CALCIUM SERPL-MCNC: 10.3 MG/DL (ref 8.7–10.2)
CHLORIDE SERPL-SCNC: 103 MMOL/L (ref 96–106)
CHOLEST SERPL-MCNC: 111 MG/DL (ref 100–199)
CO2 SERPL-SCNC: 21 MMOL/L (ref 20–29)
CREAT SERPL-MCNC: 0.71 MG/DL (ref 0.57–1)
CREAT UR-MCNC: 181.9 MG/DL
EST. AVERAGE GLUCOSE BLD GHB EST-MCNC: 120 MG/DL
GLOBULIN SER CALC-MCNC: 2 G/DL (ref 1.5–4.5)
GLUCOSE SERPL-MCNC: 58 MG/DL (ref 65–99)
HBA1C MFR BLD: 5.8 % (ref 4.8–5.6)
HDLC SERPL-MCNC: 40 MG/DL
INTERPRETATION, 910389: NORMAL
LDLC SERPL CALC-MCNC: 38 MG/DL (ref 0–99)
Lab: NORMAL
MICROALBUMIN UR-MCNC: 184.1 UG/ML
POTASSIUM SERPL-SCNC: 5.2 MMOL/L (ref 3.5–5.2)
PROT SERPL-MCNC: 5.7 G/DL (ref 6–8.5)
SODIUM SERPL-SCNC: 140 MMOL/L (ref 134–144)
TRIGL SERPL-MCNC: 209 MG/DL (ref 0–149)
TSH SERPL DL<=0.005 MIU/L-ACNC: 0.95 UIU/ML (ref 0.45–4.5)
VLDLC SERPL CALC-MCNC: 33 MG/DL (ref 5–40)

## 2021-02-15 ENCOUNTER — VIRTUAL VISIT (OUTPATIENT)
Dept: ENDOCRINOLOGY | Age: 61
End: 2021-02-15
Payer: MEDICARE

## 2021-02-15 DIAGNOSIS — I10 ESSENTIAL HYPERTENSION: ICD-10-CM

## 2021-02-15 DIAGNOSIS — E11.49 TYPE 2 DIABETES MELLITUS WITH OTHER NEUROLOGIC COMPLICATION, WITH LONG-TERM CURRENT USE OF INSULIN (HCC): Primary | ICD-10-CM

## 2021-02-15 DIAGNOSIS — Z79.4 TYPE 2 DIABETES MELLITUS WITH OTHER NEUROLOGIC COMPLICATION, WITH LONG-TERM CURRENT USE OF INSULIN (HCC): Primary | ICD-10-CM

## 2021-02-15 DIAGNOSIS — E78.5 HYPERLIPIDEMIA, UNSPECIFIED HYPERLIPIDEMIA TYPE: ICD-10-CM

## 2021-02-15 PROCEDURE — G9899 SCRN MAM PERF RSLTS DOC: HCPCS | Performed by: INTERNAL MEDICINE

## 2021-02-15 PROCEDURE — G8756 NO BP MEASURE DOC: HCPCS | Performed by: INTERNAL MEDICINE

## 2021-02-15 PROCEDURE — 99214 OFFICE O/P EST MOD 30 MIN: CPT | Performed by: INTERNAL MEDICINE

## 2021-02-15 PROCEDURE — G9717 DOC PT DX DEP/BP F/U NT REQ: HCPCS | Performed by: INTERNAL MEDICINE

## 2021-02-15 PROCEDURE — 2022F DILAT RTA XM EVC RTNOPTHY: CPT | Performed by: INTERNAL MEDICINE

## 2021-02-15 PROCEDURE — 3017F COLORECTAL CA SCREEN DOC REV: CPT | Performed by: INTERNAL MEDICINE

## 2021-02-15 PROCEDURE — G8427 DOCREV CUR MEDS BY ELIG CLIN: HCPCS | Performed by: INTERNAL MEDICINE

## 2021-02-15 PROCEDURE — 3044F HG A1C LEVEL LT 7.0%: CPT | Performed by: INTERNAL MEDICINE

## 2021-02-15 RX ORDER — LINACLOTIDE 290 UG/1
CAPSULE, GELATIN COATED ORAL
COMMUNITY
Start: 2021-01-25 | End: 2021-06-07

## 2021-02-15 RX ORDER — ACETAMINOPHEN, DIPHENHYDRAMINE HCL, PHENYLEPHRINE HCL 325; 25; 5 MG/1; MG/1; MG/1
1 TABLET ORAL AT BEDTIME
COMMUNITY

## 2021-02-15 NOTE — PROGRESS NOTES
**DUE TO PANDEMIC AND HEALTH CONCERNS IN THE COMMUNITY, THIS PATIENT WAS EITHER ILL OR FOUND TO BE HIGH RISK FOR IN-PERSON EVALUATION WITHIN THE CLINIC. THE FOLLOWING IS A VIRTUAL TELEMEDICINE VIDEO ENCOUNTER VIA [employer], TO WHICH THE PATIENT AGREED. THE PURPOSE IS TO LIMIT INTERRUPTIONS IN HEALTHCARE AND TO PROVIDE FOR ONGOING URGENT NEEDS UNDER THE CURRENT CONDITIONS. CHIEF COMPLAINT: f/u evaluation for uncontrolled type 2 diabetes    HISTORY OF PRESENT ILLNESS:   Kylee Andres is a 62 y.o. female with a PMHx as noted below who presents to the endocrinology clinic for f/u evaluation of uncontrolled type 2 diabetes. A1c 5.8%. On prior visit she changed her diet and reduced her insulin to only in the AM with breakfast. Has gastritis affecting her ability to eat / drink. Planning colonoscopy but having issue with getting the prep needed due to her symptoms. She felt the symptoms are not from metformin as she stopped for 2 weeks and symptoms remained. Reports also moderate cervical spinal stenosis.      Currently taking the following meds:  Novolin 70/30 insulin: 4 units with breakfast  Glimepiride 4mg each morning before breakfast  Metformin ER, 1000 mg twice per day  Victoza, 1.8mg each morning before breakfast    Review of home blood glucose:  (as received from patient through [employer])  01/29  [phone]     01/30   -[phone]-    01/31  [phone]    02/01   -[phone] -   02/02   --    02/03 -- -   02/04    -[phone] ate whole baked potato -  02/05   -592-465- -   02/06 --021-252 -   02/07   -711--045-   02/08   -140-150-------  02/09 --95-52-   02/10    -65-----150-165-   02/11   -550-243-    Review of most recent diabetes-related labs:  Lab Results   Component Value Date    HBA1C 5.8 (H) 02/09/2021    HBA1C 7.9 (H) 05/01/2020    HBA1C 6.0 (H) 08/14/2019    CHOL 111 02/09/2021    LDLC 38 02/09/2021    GFRAA 108 02/09/2021 GFRNA 94 2021    MCACR 101 (H) 2021    TSH 0.953 2021    VITD3 41.5 2015    PPD2XZWS 7.5% 2017       PAST MEDICAL/SURGICAL HISTORY:   Past Medical History:   Diagnosis Date    Asthma     Asthma     COPD     Depression     Diabetes ([facility]     Diabetes mellitus     Encounter for review of form with patient 2017    Essential hypertension     GERD (gastroesophageal reflux disease)     Headache(784.0)     HX OTHER MEDICAL     acoustic neuroma    Hypercholesterolemia     Hypertension     Ill-defined condition     R ACOUSTIC NEUROMA    Insomnia disorder 2017    Major depression, chronic 2017    Mood disorder ([facility] 2020    Psychiatric problem     anxiety depression    Psychotic disorder ([facility]     Psychotic disorder ([facility] 2019    Screening for cervical cancer 2018    Tobacco use disorder 10/19/2017    Unspecified sleep apnea     NO CPAP-O2 @2L WHEN SLEEPING     Past Surgical History:   Procedure Laterality Date    COLONOSCOPY N/A 2020    COLONOSCOPY performed by Livia Dillard [facility] at [facility] [place]note 2[facility]  2020         COLONOSCPY,FLEX,W/ [address] INJECT  2020         HX GASTRECTOMY  14    lap sleeve gastrectomy by Dr Isabel Knight HX GYN      2 c-sections    HX HEENT      sinus    HX HEENT      tracheal resection    HX HEENT      tracheal alleratation x 2    HX HEENT      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    HX HEENT      LASER SX-PENG    MI  DELIVERY ONLY      UPPER GI ENDOSCOPY,BIOPSY  2020            ALLERGIES:   Allergies   Allergen Reactions    Latex Hives    Other Food Anaphylaxis and Hives     PEPPERONI, sloppy joes, frank cheese doritos ,peach jolly ranchers & green tea    Demerol [Meperidine] Anaphylaxis     Difficulty breathing, p[t states that she have tylenol#3, tramadol and lortab with percocet had no problem with any of them    Iodine Anaphylaxis    Morphine Other (comments)     voilent outbreaks (restraints needed), Difficulty breathing, p[t states that she have tylenol#3, tramadol and lortab with percocet had no problem with any of them    Nsaids (Non-Steroidal Anti-Inflammatory Drug) Hives       MEDICATIONS ON ADMISSION:     Current Outpatient Medications:     Victoza 3-Yaron 0.6 mg/0.1 mL (18 mg/3 mL) pnij, INJECT 1.8 MG UNDER SKIN DAILY (BEFORE BREAKFAST). , Disp: 9 Adjustable Dose Pre-filled Pen Syringe, Rfl: 3    Linzess 290 mcg cap capsule, TAKE 1 (ONE) CAPSULE DAILY, TAKEN ON AN EMPTY STOMACH, Disp: , Rfl:     Lactobacillus acidophilus (PROBIOTIC PO), Take  by mouth., Disp: , Rfl:     Daliresp 500 mcg tab tablet, TAKE 1 TABLET BY MOUTH EVERY DAY, Disp: 90 Tab, Rfl: 1    traZODone (DESYREL) 100 mg tablet, TAKE 1 TAB BY MOUTH NIGHTLY. INDICATIONS: INSOMNIA ASSOCIATED WITH DEPRESSION, Disp: 30 Tab, Rfl: 3    insulin NPH/insulin regular (NovoLIN 70/30 U-100 Insulin) 100 unit/mL (70-30) injection, Inject up to 15 units with breakfast and 7 units with lunch as needed (Patient taking differently: Inject 4 units with breakfast), Disp: 3 Vial, Rfl: 3    metFORMIN ER (GLUCOPHAGE XR) 500 mg tablet, TAKE 2 TABS BY MOUTH BEFORE BREAKFAST AND DINNER., Disp: 360 Tab, Rfl: 3    metoprolol succinate (TOPROL-XL) 100 mg tablet, TAKE 1 TABLET BY MOUTH EVERY DAY, Disp: 90 Tab, Rfl: 1    glimepiride (AMARYL) 4 mg tablet, TAKE 1 TABLET BY MOUTH EVERY MORNING., Disp: 90 Tab, Rfl: 3    rosuvastatin (CRESTOR) 40 mg tablet, TAKE 1 TABLET BY MOUTH ONCE A DAY AT BEDTIME., Disp: 90 Tab, Rfl: 3    buPROPion XL (WELLBUTRIN XL) 300 mg XL tablet, Take 1 Tab by mouth every morning., Disp: 90 Tab, Rfl: 2    tiotropium (Spiriva with HandiHaler) 18 mcg inhalation capsule, INHALE THE CONTENTS OF 1 CAPSULE VIA HANDIHALER ONCE DAILY.  RINSE MOUTH AFTER USE, Disp: 90 Cap, Rfl: 2    Ventolin HFA 90 mcg/actuation inhaler, Take 2 Puffs by inhalation every four (4) hours as needed for Wheezing., Disp: 3 Inhaler, Rfl: 3    lutein 40 mg cap, Take  by mouth daily. , Disp: , Rfl:     pantoprazole (PROTONIX) 40 mg tablet, TAKE 1 TABLET BY MOUTH ONCE DAILY, Disp: 90 Tab, Rfl: 3    budesonide-formoteroL (SYMBICORT) 160-4.5 mcg/actuation HFAA, INHALE 2 PUFFS BY MOUTH TWICE DAILY FOR COPD ASSOCIATED WITH CHRONIC BRONCHITIS, EXTRINSIC ASTHMA., Disp: 30.6 Inhaler, Rfl: 2    montelukast (SINGULAIR) 10 mg tablet, TAKE 1 TABLET BY MOUTH DAILY IN THE EVENING, Disp: 90 Tab, Rfl: 3    multivitamin (ONE A DAY) tablet, Take 1 Tab by mouth daily. , Disp: , Rfl:     melatonin 10 mg tab, Take 1 Tab by mouth At bedtime. , Disp: , Rfl:     lancets (Accu-Chek Softclix Lancets) misc, CHECK GLUCOSE 4 TIMES DAILY, DIAGNOSIS E11.65, Disp: 400 Each, Rfl: 3    naloxone (NARCAN) 4 mg/actuation nasal spray, 1 [place] by IntraNASal route as needed for Overdose. Indications: decrease in rate & depth of breathing due to opioid drug, opioid overdose, Disp: 2 Each, Rfl: 0    dicyclomine (BentyL) 10 mg capsule, Take 2 Caps by mouth four (4) times daily as needed for Abdominal Cramps., Disp: 20 Cap, Rfl: 0    cyanocobalamin, vitamin B-12, (VITAMIN B-12 PO), Take 2,500 mcg by mouth daily. , Disp: , Rfl:     senna-docusate (PERICOLACE) 8.6-50 mg per tablet, Take 2 Tabs by mouth daily. , Disp: 30 Tab, Rfl: 2    glucose blood VI test strips (Accu-Chek Renata Plus test strp) strip, CHECK GLUCOSE 4 TIMES DAILY, DIAGNOSIS E11.65, Disp: 400 Strip, Rfl: 3    melatonin 12 mg tab, Take 1 Tab by mouth nightly as needed for Pain., Disp: 30 Tab, Rfl: 3    docusate sodium (COLACE) 100 mg capsule, Take 100 mg by mouth two (2) times a day., Disp: , Rfl:     MILK THISTLE PO, Take  by mouth daily. , Disp: , Rfl:     Magnesium Oxide 500 mg cap, Take  by mouth daily. , Disp: , Rfl:     ergocalciferol (ERGOCALCIFEROL) 50,000 unit capsule, TAKE 1 CAPSULE BY MOUTH ONCE A WEEK, Disp: 12 Cap, Rfl: 3    insulin syringe-needle U-100 (BD INSULIN SYRINGE ULTRA-FINE) 1/2 mL 31 gauge x 15/64\" syrg, Use for injecting insulin subcutaneously, Disp: 200 Pen Needle, Rfl: 5    benztropine (COGENTIN) 0.5 mg tablet, Take 1 Tab by mouth as needed. , Disp: , Rfl: 2    Blood-Glucose Meter monitoring kit, 1 preferred brand glucometer for checking home glucose, E11.65, Disp: 1 Kit, Rfl: 0    Insulin Needles, Disposable, (ULTICARE PEN NEEDLE) 32 gauge x 5/32\" ndle, USE 2 TIMES DAILY TO INJECT INSULIN/VICTOZA., Disp: 200 Pen Needle, Rfl: 3    EPINEPHrine (EPIPEN) 0.3 mg/0.3 mL injection, 0.3 mg by IntraMUSCular route once as needed. , Disp: , Rfl:     SOCIAL HISTORY:   Social History     Socioeconomic History    Marital status:      Spouse name: Not on file    Number of children: Not on file    Years of education: Not on file    Highest education level: Not on file   Occupational History    Not on file   Social Needs    Financial resource strain: Not on file    Food insecurity     Worry: Not on file     Inability: Not on file    Transportation needs     Medical: Not on file     Non-medical: Not on file   Tobacco Use    Smoking status: Former Smoker     Packs/day: 1.00     Years: 12.00     Pack years: 12.00     Quit date: 2018     Years since quittin.5    Smokeless tobacco: Former User   Substance and Sexual Activity    Alcohol use: Not Currently     Frequency: Never    Drug use: No    Sexual activity: Never   Lifestyle    Physical activity     Days per week: Not on file     Minutes per session: Not on file    Stress: Not on file   Relationships    Social connections     Talks on phone: Not on file     Gets together: Not on file     Attends Voodoo service: Not on file     Active member of club or organization: Not on file     Attends meetings of clubs or organizations: Not on file     Relationship status: Not on file    Intimate partner violence     Fear of current or ex partner: Not on file     Emotionally abused: Not on file Physically abused: Not on file     Forced sexual activity: Not on file   Other Topics Concern    Not on file   Social History Narrative    Not on file       FAMILY HISTORY:  Family History   Problem Relation Age of Onset    Diabetes Father     Heart Failure Father     Heart Disease Father     Hypertension Father     High Cholesterol Mother     Suicide Brother     Stroke Maternal Grandmother     Cancer Paternal Grandfather     Anesth Problems Neg Hx        REVIEW OF SYSTEMS: Complete ROS assessed and noted for that which is described above, all else are negative. Eyes: normal  ENT: normal  CVS: normal  Resp: normal  GI: normal  : normal  GYN: normal  Endocrine: normal  Integument: normal  Musculoskeletal: normal  Neuro: normal  Psych: normal    PHYSICAL EXAMINATION:  Telemedicine Visit    GENERAL: NCAT, Appears well nourished  EYES: EOMI, non-icteric, no proptosis  Ear/Nose/Throat: NCAT, no visible inflammation or masses  CARDIOVASCULAR: no cyanosis, no visible JVD  RESPIRATORY: comfortable respirations observed, no cyanosis  MUSCULOSKELETAL: Normal ROM of upper extremities observed  SKIN: No edema, rash, or other significant changes observed  NEUROLOGIC:  AAOx3  PSYCHIATRIC: Normal affect, Normal insight and judgement    REVIEW OF LABORATORY AND RADIOLOGY DATA:   Labs and documentation have been reviewed as described above. ASSESSMENT AND PLAN:   Belen Todd is a 62 y.o. female with a PMHx as noted above who presents to the endocrinology clinic for evaluation of uncontrolled type 2 diabetes. DM2  HTN  HLD    Patient has gastritis and needs evaluation but having issue with the prep due to nausea. I have asked her to stop victoza for about 4 days then send me an update of her sugars and her symptoms, if she feels they are improving. If they are, she can try a lower dose of victoza or just stay off of it and go back to insulin BID.  If however she continues to feel sick/nauseous while off the victoza we can proceed with a nutrition consult, and checking her vitamin levels as she had inquired about. Her diabetes itself is not likely the cause of weakness/fatigue as per the inquiry from neurology clinic, but likely more related to her poor nutrition. Note also her low albumin/protein noting she is not eating well for a while now. DM2:  Novolin 70/30 insulin: 7 units with breakfast  Glimepiride 4mg each morning before breakfast  Metformin ER, 1000 mg twice per day  Victoza, 1.8mg each morning before breakfast: HOLD FOR A FEW DAYS AND UPDATE ME    BP: telemedicine visit  HLD: lipids reviewed, stable on statin,    LABS: 2021 DM prelabs ordered for next visit    RTC: June 11 at 9:30 AM    20 minutes spent toward telemedicine visit today of which >50% of this time was spent in counseling and coordination of care. Jeffrey Kimble.  [address] [facility]

## 2021-03-01 DIAGNOSIS — H60.332 ACUTE SWIMMER'S EAR OF LEFT SIDE: ICD-10-CM

## 2021-03-04 RX ORDER — MONTELUKAST SODIUM 10 MG/1
TABLET ORAL
Qty: 90 TAB | Refills: 3 | Status: SHIPPED | OUTPATIENT
Start: 2021-03-04 | End: 2021-11-26

## 2021-03-16 DIAGNOSIS — R00.0 TACHYCARDIA: ICD-10-CM

## 2021-03-16 DIAGNOSIS — I10 ESSENTIAL HYPERTENSION: ICD-10-CM

## 2021-03-16 DIAGNOSIS — F51.01 PRIMARY INSOMNIA: ICD-10-CM

## 2021-03-18 RX ORDER — METOPROLOL SUCCINATE 100 MG/1
TABLET, EXTENDED RELEASE ORAL
Qty: 90 TAB | Refills: 1 | Status: SHIPPED | OUTPATIENT
Start: 2021-03-18 | End: 2021-09-09

## 2021-03-26 DIAGNOSIS — F33.9 RECURRENT DEPRESSION (HCC): ICD-10-CM

## 2021-03-26 DIAGNOSIS — F51.01 PRIMARY INSOMNIA: ICD-10-CM

## 2021-03-26 RX ORDER — TRAZODONE HYDROCHLORIDE 100 MG/1
100 TABLET ORAL
Qty: 90 TAB | Refills: 1 | Status: SHIPPED | OUTPATIENT
Start: 2021-03-26 | End: 2021-06-09 | Stop reason: SDUPTHER

## 2021-03-26 NOTE — TELEPHONE ENCOUNTER
[facility] sent a 80 day fax request for patient's medication. Trazodone 100mg. Last visit:2/15/21  Next visit: not scheduled  Previous refill 11/13/20(30+3R)    Requested Prescriptions     Pending Prescriptions Disp Refills    traZODone (DESYREL) 100 mg tablet 90 Tab 1     Sig: Take 1 Tab by mouth nightly.  Indications: insomnia associated with depression

## 2021-04-02 ENCOUNTER — HOSPITAL ENCOUNTER (OUTPATIENT)
Dept: PREADMISSION TESTING | Age: 61
Discharge: HOME | End: 2021-04-02
Payer: MEDICARE

## 2021-04-02 LAB — SARS-COV-2, COV2: NORMAL

## 2021-04-02 PROCEDURE — U0003 INFECTIOUS AGENT DETECTION BY NUCLEIC ACID (DNA OR RNA); SEVERE ACUTE RESPIRATORY SYNDROME CORONAVIRUS 2 (SARS-COV-2) (CORONAVIRUS DISEASE [COVID-19]), AMPLIFIED PROBE TECHNIQUE, MAKING USE OF HIGH THROUGHPUT TECHNOLOGIES AS DESCRIBED BY CMS-2020-01-R: HCPCS

## 2021-04-03 LAB — SARS-COV-2, COV2NT: NOT DETECTED

## 2021-04-05 ENCOUNTER — ANESTHESIA EVENT (OUTPATIENT)
Dept: ENDOSCOPY | Age: 61
End: 2021-04-05
Payer: MEDICARE

## 2021-04-05 NOTE — ANESTHESIA PREPROCEDURE EVALUATION
Anesthetic History     Increased risk of difficult airway          Review of Systems / Medical History  Patient summary reviewed, nursing notes reviewed and pertinent labs reviewed    Pulmonary    COPD: mild        Asthma   Pertinent negatives: No sleep apnea  Comments: Former smoker - Quit 2018 - 12 pack years    Chronic hoarseness   Neuro/Psych         Psychiatric history    Comments: Anxiety  Depression  Psychotic disorder  (8/14/19) Cardiovascular    Hypertension          Hyperlipidemia      Comments: Mitral valve prolapse    ECG (1/30/20): Sinus  Rhythm   WITHIN NORMAL LIMITS    Nuclear Stress Test (10/22/19): Abnormal myocardial perfusion imaging. Fixed defect consistent with prior myocardial infarction. Myocardial perfusion imaging supports a high risk stress test.   There is no prior study available for comparison. TTE (12/13/17): Left ventricle: Systolic function was normal. Ejection fraction was  estimated in the range of 60 % to 65 %. There were no regional wall motion  abnormalities. GI/Hepatic/Renal     GERD          Comments: H/O Colon Polyps    History of Morbid Obesity S/P sleeve gastrectomy (2014) Endo/Other    Diabetes: type 2, using insulin    Arthritis    Comments: H/O Acoustic Neuroma s/p resection    Other Findings   Comments: H/O Difficult airway - damage to right vocal cord and trachea during previous intubation requiring tracheal resection. Uneventful subsequent intubation. Physical Exam    Airway  Mallampati: II  TM Distance: 4 - 6 cm  Neck ROM: normal range of motion   Mouth opening: Normal    Comments: Patient's voice is weak.  Cardiovascular    Rhythm: regular  Rate: normal         Dental  No notable dental hx       Pulmonary  Breath sounds clear to auscultation               Abdominal  GI exam deferred       Other Findings            Anesthetic Plan    ASA: 3  Anesthesia type: MAC and total IV anesthesia          Induction: Intravenous  Anesthetic plan and risks discussed with: Patient

## 2021-04-06 ENCOUNTER — HOSPITAL ENCOUNTER (OUTPATIENT)
Age: 61
Setting detail: OUTPATIENT SURGERY
Discharge: HOME | End: 2021-04-06
Attending: INTERNAL MEDICINE | Admitting: INTERNAL MEDICINE
Payer: MEDICARE

## 2021-04-06 ENCOUNTER — ANESTHESIA (OUTPATIENT)
Dept: ENDOSCOPY | Age: 61
End: 2021-04-06
Payer: MEDICARE

## 2021-04-06 VITALS
DIASTOLIC BLOOD PRESSURE: 82 MMHG | OXYGEN SATURATION: 99 % | RESPIRATION RATE: 13 BRPM | SYSTOLIC BLOOD PRESSURE: 143 MMHG | WEIGHT: 127 LBS | TEMPERATURE: 97.5 F | HEART RATE: 103 BPM | BODY MASS INDEX: 18.81 KG/M2 | HEIGHT: 69 IN

## 2021-04-06 DIAGNOSIS — J45.21 MILD INTERMITTENT ASTHMA WITH ACUTE EXACERBATION: ICD-10-CM

## 2021-04-06 DIAGNOSIS — J44.9 ASTHMATIC BRONCHITIS , CHRONIC (HCC): ICD-10-CM

## 2021-04-06 DIAGNOSIS — E11.21 TYPE 2 DIABETES MELLITUS WITH NEPHROPATHY (HCC): ICD-10-CM

## 2021-04-06 LAB
GLUCOSE BLD STRIP.AUTO-MCNC: 192 MG/DL (ref 65–100)
SERVICE CMNT-IMP: ABNORMAL

## 2021-04-06 PROCEDURE — 77030010936 HC CLP LIG BSC -C: Performed by: INTERNAL MEDICINE

## 2021-04-06 PROCEDURE — 77030029384 HC SNR POLYP CAPTVR II BSC -B: Performed by: INTERNAL MEDICINE

## 2021-04-06 PROCEDURE — 77030013992 HC SNR POLYP ENDOSC BSC -B: Performed by: INTERNAL MEDICINE

## 2021-04-06 PROCEDURE — 77030038665 HC SOL ELEVIEW INJ AGNT ARPH -B: Performed by: INTERNAL MEDICINE

## 2021-04-06 PROCEDURE — 76060000033 HC ANESTHESIA 1 TO 1.5 HR: Performed by: INTERNAL MEDICINE

## 2021-04-06 PROCEDURE — 82962 GLUCOSE BLOOD TEST: CPT

## 2021-04-06 PROCEDURE — 74011000250 HC RX REV CODE- 250: Performed by: NURSE ANESTHETIST, CERTIFIED REGISTERED

## 2021-04-06 PROCEDURE — 77030003657 HC NDL SCLER BSC -B: Performed by: INTERNAL MEDICINE

## 2021-04-06 PROCEDURE — 2709999900 HC NON-CHARGEABLE SUPPLY: Performed by: INTERNAL MEDICINE

## 2021-04-06 PROCEDURE — 74011250636 HC RX REV CODE- 250/636: Performed by: NURSE ANESTHETIST, CERTIFIED REGISTERED

## 2021-04-06 PROCEDURE — 74011250636 HC RX REV CODE- 250/636: Performed by: INTERNAL MEDICINE

## 2021-04-06 PROCEDURE — 88305 TISSUE EXAM BY PATHOLOGIST: CPT

## 2021-04-06 PROCEDURE — 76040000008: Performed by: INTERNAL MEDICINE

## 2021-04-06 DEVICE — CLIP INT L235CM WRK CHAN DIA2.8MM OPN 11MM LCK MECHANISM MR: Type: IMPLANTABLE DEVICE | Site: RECTUM | Status: FUNCTIONAL

## 2021-04-06 RX ORDER — GLYCOPYRROLATE 0.2 MG/ML
INJECTION INTRAMUSCULAR; INTRAVENOUS AS NEEDED
Status: DISCONTINUED | OUTPATIENT
Start: 2021-04-06 | End: 2021-04-06 | Stop reason: HOSPADM

## 2021-04-06 RX ORDER — EPINEPHRINE 0.1 MG/ML
1 INJECTION INTRACARDIAC; INTRAVENOUS
Status: COMPLETED | OUTPATIENT
Start: 2021-04-06 | End: 2021-04-06

## 2021-04-06 RX ORDER — SODIUM CHLORIDE 0.9 % (FLUSH) 0.9 %
5-40 SYRINGE (ML) INJECTION EVERY 8 HOURS
Status: DISCONTINUED | OUTPATIENT
Start: 2021-04-06 | End: 2021-04-06 | Stop reason: HOSPADM

## 2021-04-06 RX ORDER — MIDAZOLAM HYDROCHLORIDE 1 MG/ML
.25-5 INJECTION, SOLUTION INTRAMUSCULAR; INTRAVENOUS
Status: DISCONTINUED | OUTPATIENT
Start: 2021-04-06 | End: 2021-04-06 | Stop reason: HOSPADM

## 2021-04-06 RX ORDER — LIDOCAINE HYDROCHLORIDE 20 MG/ML
INJECTION, SOLUTION EPIDURAL; INFILTRATION; INTRACAUDAL; PERINEURAL AS NEEDED
Status: DISCONTINUED | OUTPATIENT
Start: 2021-04-06 | End: 2021-04-06 | Stop reason: HOSPADM

## 2021-04-06 RX ORDER — FLUMAZENIL 0.1 MG/ML
0.2 INJECTION INTRAVENOUS
Status: DISCONTINUED | OUTPATIENT
Start: 2021-04-06 | End: 2021-04-06 | Stop reason: HOSPADM

## 2021-04-06 RX ORDER — SODIUM CHLORIDE 9 MG/ML
75 INJECTION, SOLUTION INTRAVENOUS CONTINUOUS
Status: DISCONTINUED | OUTPATIENT
Start: 2021-04-06 | End: 2021-04-06 | Stop reason: HOSPADM

## 2021-04-06 RX ORDER — FENTANYL CITRATE 50 UG/ML
25 INJECTION, SOLUTION INTRAMUSCULAR; INTRAVENOUS
Status: DISCONTINUED | OUTPATIENT
Start: 2021-04-06 | End: 2021-04-06 | Stop reason: HOSPADM

## 2021-04-06 RX ORDER — PROPOFOL 10 MG/ML
INJECTION, EMULSION INTRAVENOUS AS NEEDED
Status: DISCONTINUED | OUTPATIENT
Start: 2021-04-06 | End: 2021-04-06 | Stop reason: HOSPADM

## 2021-04-06 RX ORDER — DEXMEDETOMIDINE HYDROCHLORIDE 100 UG/ML
INJECTION, SOLUTION INTRAVENOUS AS NEEDED
Status: DISCONTINUED | OUTPATIENT
Start: 2021-04-06 | End: 2021-04-06 | Stop reason: HOSPADM

## 2021-04-06 RX ORDER — SODIUM CHLORIDE 0.9 % (FLUSH) 0.9 %
5-40 SYRINGE (ML) INJECTION AS NEEDED
Status: DISCONTINUED | OUTPATIENT
Start: 2021-04-06 | End: 2021-04-06 | Stop reason: HOSPADM

## 2021-04-06 RX ORDER — NALOXONE HYDROCHLORIDE 0.4 MG/ML
0.4 INJECTION, SOLUTION INTRAMUSCULAR; INTRAVENOUS; SUBCUTANEOUS
Status: DISCONTINUED | OUTPATIENT
Start: 2021-04-06 | End: 2021-04-06 | Stop reason: HOSPADM

## 2021-04-06 RX ORDER — DEXAMETHASONE SODIUM PHOSPHATE 4 MG/ML
INJECTION, SOLUTION INTRA-ARTICULAR; INTRALESIONAL; INTRAMUSCULAR; INTRAVENOUS; SOFT TISSUE AS NEEDED
Status: DISCONTINUED | OUTPATIENT
Start: 2021-04-06 | End: 2021-04-06 | Stop reason: HOSPADM

## 2021-04-06 RX ORDER — ATROPINE SULFATE 0.1 MG/ML
0.5 INJECTION INTRAVENOUS
Status: DISCONTINUED | OUTPATIENT
Start: 2021-04-06 | End: 2021-04-06 | Stop reason: HOSPADM

## 2021-04-06 RX ORDER — DEXTROMETHORPHAN/PSEUDOEPHED 2.5-7.5/.8
1.2 DROPS ORAL
Status: DISCONTINUED | OUTPATIENT
Start: 2021-04-06 | End: 2021-04-06 | Stop reason: HOSPADM

## 2021-04-06 RX ADMIN — PROPOFOL 50 MG: 10 INJECTION, EMULSION INTRAVENOUS at 08:57

## 2021-04-06 RX ADMIN — LIDOCAINE HYDROCHLORIDE 40 MG: 20 INJECTION, SOLUTION EPIDURAL; INFILTRATION; INTRACAUDAL; PERINEURAL at 08:54

## 2021-04-06 RX ADMIN — PROPOFOL 50 MG: 10 INJECTION, EMULSION INTRAVENOUS at 09:01

## 2021-04-06 RX ADMIN — PROPOFOL 50 MG: 10 INJECTION, EMULSION INTRAVENOUS at 09:33

## 2021-04-06 RX ADMIN — EPINEPHRINE 0.5 MG: 0.1 INJECTION INTRACARDIAC; INTRAVENOUS at 09:25

## 2021-04-06 RX ADMIN — GLYCOPYRROLATE 0.1 MG: 0.2 INJECTION, SOLUTION INTRAMUSCULAR; INTRAVENOUS at 09:12

## 2021-04-06 RX ADMIN — DEXAMETHASONE SODIUM PHOSPHATE 4 MG: 4 INJECTION, SOLUTION INTRA-ARTICULAR; INTRALESIONAL; INTRAMUSCULAR; INTRAVENOUS; SOFT TISSUE at 09:25

## 2021-04-06 RX ADMIN — PROPOFOL 50 MG: 10 INJECTION, EMULSION INTRAVENOUS at 09:28

## 2021-04-06 RX ADMIN — LIDOCAINE HYDROCHLORIDE 40 MG: 20 INJECTION, SOLUTION EPIDURAL; INFILTRATION; INTRACAUDAL; PERINEURAL at 08:59

## 2021-04-06 RX ADMIN — PROPOFOL 50 MG: 10 INJECTION, EMULSION INTRAVENOUS at 09:24

## 2021-04-06 RX ADMIN — PROPOFOL 50 MG: 10 INJECTION, EMULSION INTRAVENOUS at 09:05

## 2021-04-06 RX ADMIN — DEXMEDETOMIDINE HYDROCHLORIDE 10 MCG: 100 INJECTION, SOLUTION, CONCENTRATE INTRAVENOUS at 08:53

## 2021-04-06 RX ADMIN — SODIUM CHLORIDE 80 MCG: 900 INJECTION, SOLUTION INTRAVENOUS at 09:44

## 2021-04-06 RX ADMIN — SODIUM CHLORIDE 80 MCG: 900 INJECTION, SOLUTION INTRAVENOUS at 09:12

## 2021-04-06 RX ADMIN — PROPOFOL 50 MG: 10 INJECTION, EMULSION INTRAVENOUS at 09:42

## 2021-04-06 RX ADMIN — SODIUM CHLORIDE 75 ML/HR: 9 INJECTION, SOLUTION INTRAVENOUS at 08:33

## 2021-04-06 RX ADMIN — PROPOFOL 50 MG: 10 INJECTION, EMULSION INTRAVENOUS at 09:20

## 2021-04-06 RX ADMIN — PROPOFOL 50 MG: 10 INJECTION, EMULSION INTRAVENOUS at 09:09

## 2021-04-06 RX ADMIN — DEXMEDETOMIDINE HYDROCHLORIDE 10 MCG: 100 INJECTION, SOLUTION, CONCENTRATE INTRAVENOUS at 08:59

## 2021-04-06 NOTE — PROCEDURES
NAME:  Rolo Jackman   :   1960   MRN:   [id number]     Date/Time:  2021 9:57 AM    Colonoscopy Operative Report    Procedure Type:   Colonoscopy with polypectomy (cold snare), polypectomy (snare cautery), Epi injection, hemoclip placement     Indications:   Personal history of colon polyp with large sigmoid polyp left  Pre-operative Diagnosis: see indication above  Post-operative Diagnosis:  See findings below  :  Linh Miranda MD    Referring Provider:   Arielle Ku MD     Exam:  Airway: clear, no airway problems anticipated  Heart: RRR, without gallops or rubs  Lungs: clear bilaterally without wheezes, crackles, or rhonchi  Abdomen: soft, nontender, nondistended, bowel sounds present  Mental Status: awake, alert and oriented to person, place and time     Sedation:  MAC anesthesia Precedex 20mcg IV and Propofol 450mg IV  Procedure Details:  After informed consent was obtained with all risks and benefits of procedure explained and preoperative exam completed, the patient was taken to the endoscopy suite and placed in the left lateral decubitus position. Upon sequential sedation as per above, a digital rectal exam was performed demonstrating no hemorrhoids. The Olympus videocolonoscope  was inserted in the rectum and carefully advanced to the cecum, which was identified by the ileocecal valve and appendiceal orifice. The quality of preparation was adequate . The colonoscope was slowly withdrawn with careful evaluation between folds.  Retroflexion in the rectum was deferred as only stool could be identified in the rectum.     Findings:     -The quality of preparation was adequate  -A single 5mm sessile polyp was identified in the ascending colon and removed with hot snare cautery  -A single 2mm sessile polyp was identified in the descending colon and removed with cold snare  -A single 3mm sessile polyp was identified in the sigmoid colon at 40cm and removed with hot snare cautery  -The large 40mm polyp in the sigmoid colon at 30cm (previously felt to be 50cmwas identified and noted to be immediately distal to the previously placed [address] tattoo on the opposite wall. The polyp is based on a short stalk. The base of the stalk is injected with 5cc [id number] epinephrine mix. A large 33mm snare is used with hot cautery to remove the polyp which is retrieved later with a Boles net. The polypectomy site is approximated with four hemoclips to decrease bleeding risk.    -Three sessile and pedeunculated polyps ranging in size from 3-6mm were identified in the rectum and removed with hot snare cautery; two hemoclips were placed across the pedunculated polyp resection site to decrease bleeding risk  -Significant sigmoid diverticulosis     Specimen Removed:  #1 asc colon (1) #2 desc colon (1); #3 sigmoid at 40cm (1); #4 sigmoid colon at 30cm (1); #5 rectal polyps (3)   Complications: None. EBL:  None.     Impression:    -The quality of preparation was adequate  -A single 5mm sessile polyp was identified in the ascending colon and removed with hot snare cautery  -A single 2mm sessile polyp was identified in the descending colon and removed with cold snare  -A single 3mm sessile polyp was identified in the sigmoid colon at 40cm and removed with hot snare cautery  -The large 40mm polyp in the sigmoid colon at 30cm was identified and noted to be immediately distal to the previously placed [address] tattoo on the opposite wall. The polyp is based on a short stalk. The base of the stalk is injected with 5cc [id number] epinephrine mix. A large 33mm snare is used with hot cautery to remove the polyp which is retrieved later with a Boles net.   The polypectomy site is approximated with four hemoclips to decrease bleeding risk.    -Three sessile and pedeunculated polyps ranging in size from 3-6mm were identified in the rectum and removed with hot snare cautery; two hemoclips were placed across the pedunculated polyp resection site to decrease bleeding risk  -Significant sigmoid diverticulosis     Recommendations: --Await pathology. , -Repeat colonoscopy in 1 year with similar 4d colonic preparation that my office will coordinate. High fiber diet. Resume normal medication(s). NO ASA/NSAIDs for 10days. You will receive a letter about the biopsy results in about 10 days. You may be asked to call your doctor's office for the results. Discharge Disposition:  Home in the company of a  when able to ambulate.     Russell Nicholas MD

## 2021-04-06 NOTE — DISCHARGE INSTRUCTIONS
Viviana Quezada  [id number]  1960    COLON DISCHARGE INSTRUCTIONS  Discomfort:  Redness at IV site- apply warm compress to area; if redness or soreness persist- contact your physician  There may be a slight amount of blood passed from the rectum  Gaseous discomfort- walking, belching will help relieve any discomfort  You may not operate a vehicle for 12 hours  You may not engage in an occupation involving machinery or appliances for rest of today  You may not drink alcoholic beverages for at least 12 hours  Avoid making any critical decisions for at least 24 hour  DIET:   High fiber diet. - however -  remember your colon is empty and a heavy meal will produce gas. Avoid these foods:  vegetables, fried / greasy foods, carbonated drinks for today  MEDICATION:         ACTIVITY:  You may not resume your normal daily activities until tomorrow AM; it is recommended that you spend the remainder of the day resting -  avoid any strenuous activity. CALL M.D. ANY SIGN OF:   Increasing pain, nausea, vomiting  Abdominal distension (swelling)  New increased bleeding (oral or rectal)  Fever (chills)  Pain in chest area  Bloody discharge from nose or mouth  Shortness of breath    You may not  take any Advil, Aspirin, Ibuprofen, Motrin, Aleve, or Goodys for 10 days, ONLY  Tylenol as needed for pain. IMPRESSION:  -The quality of preparation was adequate  -A single 5mm sessile polyp was identified in the ascending colon and removed with hot snare cautery  -A single 2mm sessile polyp was identified in the descending colon and removed with cold snare  -A single 3mm sessile polyp was identified in the sigmoid colon at 40cm and removed with hot snare cautery  -The large 40mm polyp in the sigmoid colon at 30cm was identified and noted to be immediately distal to the previously placed [address] tattoo on the opposite wall. The polyp is based on a short stalk.   The base of the stalk is injected with 5cc 1:[id number] epinephrine mix.  A large 33mm snare is used with hot cautery to remove the polyp which is retrieved later with a Boles net.   The polypectomy site is approximated with four hemoclips to decrease bleeding risk.    -Three sessile and pedeunculated polyps ranging in size from 3-6mm were identified in the rectum and removed with hot snare cautery; two hemoclips were placed across the pedunculated polyp resection site to decrease bleeding risk  -Significant sigmoid diverticulosis    Follow-up Instructions:   Call Dr. Chu Dunbar for the results of procedure / biopsy in 7-10 days  Telephone # [phone]  Repeat colonoscopy in 1 year with similar improved prep    Mamie Rose MD

## 2021-04-06 NOTE — TELEPHONE ENCOUNTER
Last visit:11/4/20(virtual visit)  Next visit:not scheduled  Previous refill 2/17/20(30.6+2R)    Requested Prescriptions     Pending Prescriptions Disp Refills    budesonide-formoteroL (Symbicort) 160-4.5 mcg/actuation HFAA 30.6 Inhaler 2     Sig: INHALE 2 PUFFS BY MOUTH TWICE DAILY FOR COPD ASSOCIATED WITH CHRONIC BRONCHITIS, EXTRINSIC ASTHMA.

## 2021-04-06 NOTE — H&P
Gastroenterology Outpatient History and Physical    Patient: Fern Ruffin    Physician: Carlyle James MD    Chief Complaint: hx multiple colon polyps  History of Present Illness: hx multiple colon polyps with poor bowel prep and inability to characterize polyps fully    History:  Past Medical History:   Diagnosis Date    Adverse effect of anesthesia 2020    dr. Hendrickson Body, colonoscopy difficulty breathing under propafol    Arthritis     Asthma     Asthma     COPD     Depression     Diabetes ([facility] [address]     DM2, insulin and oral meds    Diabetes mellitus     Encounter for review of form with patient 2017    Essential hypertension     GERD (gastroesophageal reflux disease)     Headache(784.0)     HX OTHER MEDICAL     acoustic neuroma    Hypercholesterolemia     Hypertension     Ill-defined condition     R ACOUSTIC NEUROMA    Insomnia disorder 2017    Major depression, chronic 2017    Mood disorder ([facility] [address] 2020    Psychiatric problem     anxiety depression    Psychotic disorder ([facility] [address] [facility]     Psychotic disorder ([facility] [address] 2019    Screening for cervical cancer 2018    Tobacco use disorder 10/19/2017    Unspecified sleep apnea     denies, sleep study negative      Past Surgical History:   Procedure Laterality Date    COLONOSCOPY N/A 2020    COLONOSCOPY performed by Alireza Ayala MD at [address] [place] [address]  2020         COLONOSCPY,FLEX,W/ [address] INJECT  2020         HX GASTRECTOMY  14    lap sleeve gastrectomy by Dr Jael Melton  2014    sleeve    HX GYN      2 c-sections    HX HEENT      sinus    HX HEENT      tracheal resection    HX HEENT      tracheal alleratation x 2    HX HEENT      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    HX HEENT      LASER SX-PENG    [place]  DELIVERY ONLY      UPPER GI ENDOSCOPY,BIOPSY  2020           Social History     Socioeconomic History    Marital status:      Spouse name: Not on file    Number of children: Not on file    Years of education: Not on file    Highest education level: Not on file   Tobacco Use    Smoking status: Former Smoker     Packs/day: 1.00     Years: 12.00     Pack years: 12.00     Quit date: 2018     Years since quittin.6    Smokeless tobacco: Former User   Substance and Sexual Activity    Alcohol use: Not Currently     Frequency: Never    Drug use: Never    Sexual activity: Never      Family History   Problem Relation Age of Onset    Diabetes Father     Heart Failure Father     Heart Disease Father     Hypertension Father     High Cholesterol Mother     Stroke Mother     Suicide Brother     Stroke Maternal Grandmother     Cancer Paternal Grandfather     Anesth Problems Neg Hx       Patient Active Problem List   Diagnosis Code    Asthma with exacerbation J45. 0    Type II or unspecified type diabetes mellitus without mention of complication, uncontrolled [id number]    HTN (hypertension) I10    Upper respiratory infection J06.9    Gallstone K80.20    Rib pain R07.81    Chronic hoarseness R49.0    Asthma J45.909    GERD (gastroesophageal reflux disease) K21.9    Hypercholesteremia E78.00    Tobacco use disorder F17.200    Mitral valve prolapse I34.1    Insomnia disorder G47.00    Major depression, chronic F32.9    Encounter for review of form with patient Z02.89    Type 2 diabetes mellitus with nephropathy ([facility]) E11.21    Recurrent depression ([facility]) F33.9    Asthmatic bronchitis , chronic ([facility]) J44.9    Psychotic disorder ([facility]) F29    Mood disorder ([facility]) F39       Allergies:    Allergies   Allergen Reactions    Latex Hives    Other Food Anaphylaxis and Hives     PEPPERONI, sloppy joes, frank cheese doritos ,peach jolly ranchers & green tea    Demerol [Meperidine] Anaphylaxis     Difficulty breathing, p[t states that she have tylenol#3, tramadol and lortab with percocet had no problem with any of them    Iodine Anaphylaxis    Morphine Other (comments)     voilent outbreaks (restraints needed), Difficulty breathing, p[t states that she have tylenol#3, tramadol and lortab with percocet had no problem with any of them    Nsaids (Non-Steroidal Anti-Inflammatory Drug) Hives     Medications:   Prior to Admission medications    Medication Sig Start Date End Date Taking? Authorizing Provider   traZODone (DESYREL) 100 mg tablet Take 1 Tab by mouth nightly. Indications: insomnia associated with depression 3/26/21  Yes Romayne Minors, MD   metoprolol succinate (TOPROL-XL) 100 mg tablet TAKE 1 TABLET BY MOUTH EVERY DAY 3/18/21  Yes Romayne Minors, MD   insulin syringe-needle U-100 (BD Veo Insulin Syringe UF) 1/2 mL 31 gauge x 15/64\" syrg For injecting insulin twice daily 3/12/21  Yes Brianda Troncoso MD   montelukast (SINGULAIR) 10 mg tablet TAKE 1 TABLET BY MOUTH EVERY DAY IN THE EVENING 3/4/21  Yes Romayne Minors, MD   melatonin 10 mg tab Take 1 Tab by mouth At bedtime. Yes Provider, Historical   Linzess 290 mcg cap capsule TAKE 1 (ONE) CAPSULE DAILY, TAKEN ON AN EMPTY STOMACH 1/25/21  Yes Provider, Historical   Lactobacillus acidophilus (PROBIOTIC PO) Take  by mouth. Yes Provider, Historical   Daliresp 500 mcg tab tablet TAKE 1 TABLET BY MOUTH EVERY DAY 1/13/21  Yes Romayne Minors, MD   lancets (Accu-Chek Softclix Lancets) misc CHECK GLUCOSE 4 TIMES DAILY, DIAGNOSIS E11.65 12/10/20  Yes Brianda Troncoso MD   insulin NPH/insulin regular (NovoLIN 70/30 U-100 Insulin) 100 unit/mL (70-30) injection Inject up to 15 units with breakfast and 7 units with lunch as needed  Patient taking differently: 6 Units Daily (before breakfast).  Inject 6 units with breakfast, 7 units with dinner 11/3/20  Yes Brianda Troncoso MD   metFORMIN ER (GLUCOPHAGE XR) 500 mg tablet TAKE 2 TABS BY MOUTH BEFORE BREAKFAST AND DINNER. 10/3/20  Yes Brianda Troncoso MD   cyanocobalamin, vitamin B-12, (VITAMIN B-12 PO) Take 2,500 mcg by mouth daily. Yes Provider, Historical   glucose blood VI test strips (Accu-Chek Renata Plus test strp) strip CHECK GLUCOSE 4 TIMES DAILY, DIAGNOSIS E11.65 8/31/20  Yes Yinka Winchester MD   glimepiride (AMARYL) 4 mg tablet TAKE 1 TABLET BY MOUTH EVERY MORNING. 7/28/20  Yes Yinka Winchester MD   rosuvastatin (CRESTOR) 40 mg tablet TAKE 1 TABLET BY MOUTH ONCE A DAY AT BEDTIME. 7/27/20  Yes Yinka Winchester MD   buPROPion XL (WELLBUTRIN XL) 300 mg XL tablet Take 1 Tab by mouth every morning. 7/20/20  Yes Arielle Ku MD   tiotropium (Spiriva with HandiHaler) 18 mcg inhalation capsule INHALE THE CONTENTS OF 1 CAPSULE VIA HANDIHALER ONCE DAILY. RINSE MOUTH AFTER USE 7/20/20  Yes Arielle Ku MD   Ventolin HFA 90 mcg/actuation inhaler Take 2 Puffs by inhalation every four (4) hours as needed for Wheezing. 7/20/20  Yes Arielle Ku MD   lutein 40 mg cap Take  by mouth daily. Yes Provider, Historical   pantoprazole (PROTONIX) 40 mg tablet TAKE 1 TABLET BY MOUTH ONCE DAILY 5/4/20  Yes Arielle Ku MD   budesonide-formoteroL (SYMBICORT) 160-4.5 mcg/actuation HFAA INHALE 2 PUFFS BY MOUTH TWICE DAILY FOR COPD ASSOCIATED WITH CHRONIC BRONCHITIS, EXTRINSIC ASTHMA. 2/17/20  Yes Arielle Ku MD   Blood-Glucose Meter monitoring kit 1 preferred brand glucometer for checking home glucose, E11.65 9/4/19  Yes Yinka Winchester MD   Insulin Needles, Disposable, (ULTICARE PEN NEEDLE) 32 gauge x 5/32\" ndle USE 2 TIMES DAILY TO INJECT INSULIN/VICTOZA. 5/20/19  Yes Yinka Winchester MD   multivitamin (ONE A DAY) tablet Take 1 Tab by mouth daily. Yes Provider, Historical   EPINEPHrine (EPIPEN) 0.3 mg/0.3 mL injection 0.3 mg by IntraMUSCular route once as needed. Provider, Historical     Physical Exam:   Vital Signs: Blood pressure 136/77, pulse (!) 101, temperature 97.7 °F (36.5 °C), resp.  rate 17, height 5' 8.5\" (1.74 m), weight 57.6 kg (127 lb), last menstrual period 01/17/2010, SpO2 100 %.  General: well developed, well nourished   HEENT: unremarkable   Heart: regular rhythm no mumur    Lungs: clear   Abdominal:  benign   Neurological: unremarkable   Extremities: no edema     Findings/Diagnosis: hx multiple colon polyps  Plan of Care/Planned Procedure: Colomnoscopy with conscious/deep sedation    Signed:  Cinda Aguayo MD 4/6/2021

## 2021-04-06 NOTE — ROUTINE PROCESS
Patient has had some bouts of Nausea that clears with belching and spitting. States feels well and does not need any nausea medicine. She will eat when she gets home.

## 2021-04-06 NOTE — ANESTHESIA POSTPROCEDURE EVALUATION
Procedure(s):  COLONOSCOPY  ENDOSCOPIC POLYPECTOMY  RESOLUTION CLIP X6  INJECTION. MAC, total IV anesthesia    Anesthesia Post Evaluation        Patient location during evaluation: PACU  Note status: Adequate. Level of consciousness: responsive to verbal stimuli and sleepy but conscious  Pain management: satisfactory to patient  Airway patency: patent  Anesthetic complications: no  Cardiovascular status: acceptable  Respiratory status: acceptable  Hydration status: acceptable  Comments: +Post-Anesthesia Evaluation and Assessment    Patient: Apryl Mejia MRN: [id number]  SSN: [id number]   YOB: 1960  Age: 62 y.o. Sex: female      Cardiovascular Function/Vital Signs    BP (!) 143/82   Pulse (!) 103   Temp 36.4 °C (97.5 °F)   Resp 13   Ht 5' 8.5\" (1.74 m)   Wt 57.6 kg (127 lb)   SpO2 99%   BMI 19.03 kg/m²     Patient is status post Procedure(s):  COLONOSCOPY  ENDOSCOPIC POLYPECTOMY  RESOLUTION CLIP X6  INJECTION. Nausea/Vomiting: Controlled. Postoperative hydration reviewed and adequate. Pain:  Pain Scale 1: Numeric (0 - 10) (04/06/21 1027)  Pain Intensity 1: 0 (04/06/21 1027)   Managed. Neurological Status: At baseline. Mental Status and Level of Consciousness: Arousable. Pulmonary Status:   O2 Device: Room air (04/06/21 1030)   Adequate oxygenation and airway patent. Complications related to anesthesia: None    Post-anesthesia assessment completed. No concerns. Signed By: Indira Flores DO    4/6/2021  Post anesthesia nausea and vomiting:  controlled      INITIAL Post-op Vital signs:   Vitals Value Taken Time   /80 04/06/21 1036   Temp 36.4 °C (97.5 °F) 04/06/21 1013   Pulse 106 04/06/21 1037   Resp 13 04/06/21 1036   SpO2 97 % 04/06/21 1035   Vitals shown include unvalidated device data.

## 2021-04-06 NOTE — ROUTINE PROCESS
Zoraida Jessica  1960  [id number]    Situation:  Verbal report received from: Maya Olson RN  Procedure: Procedure(s):  COLONOSCOPY  ENDOSCOPIC POLYPECTOMY  RESOLUTION CLIP X6  INJECTION    Background:    Preoperative diagnosis: HX OF POLYPS  Postoperative diagnosis: diverticulosis, polyps    :  Dr. Mavis Terrazas  Assistant(s): Endoscopy Technician-1: Viviana Garcia  Endoscopy RN-1: Darlene Meza    Specimens:   ID Type Source Tests Collected by Time Destination   1 : ascending colon polyp Preservative Colon, Ascending  Shannon Vazquez MD 4/6/2021 0901 Pathology   2 : descending colon polyp Preservative Colon, Descending  Shannon Vazquez MD 4/6/2021 0915 Pathology   3 : sigmoid polyp @40cm Preservative Sigmoid  Shannon Vazquez MD 4/6/2021 1759 Pathology   4 : sigmoid polyp @ 30cm Preservative Sigmoid  Shannon Vazquez MD 4/6/2021 5136 Pathology   5 : rectal polyps Preservative Rectum  Shannon Vazquez MD 4/6/2021 1010 Pathology     H. Pylori  no    Assessment:  Intra-procedure medications   Anesthesia gave intra-procedure sedation and medications, see anesthesia flow sheet yes    Intravenous fluids: NS@ KVO     Vital signs stable     Abdominal assessment: round and soft     Recommendation:  Discharge patient per MD order.     Family or Friend   Permission to share finding with family or friend yes

## 2021-04-07 DIAGNOSIS — F33.9 RECURRENT DEPRESSION (HCC): ICD-10-CM

## 2021-04-07 DIAGNOSIS — J44.9 ASTHMATIC BRONCHITIS , CHRONIC (HCC): ICD-10-CM

## 2021-04-07 RX ORDER — BUPROPION HYDROCHLORIDE 300 MG/1
TABLET ORAL
Qty: 90 TAB | Refills: 2 | Status: SHIPPED | OUTPATIENT
Start: 2021-04-07 | End: 2021-10-08

## 2021-04-07 RX ORDER — BUDESONIDE AND FORMOTEROL FUMARATE DIHYDRATE 160; 4.5 UG/1; UG/1
AEROSOL RESPIRATORY (INHALATION)
Qty: 30.6 INHALER | Refills: 2 | Status: SHIPPED | OUTPATIENT
Start: 2021-04-07 | End: 2022-05-25 | Stop reason: SDUPTHER

## 2021-04-27 RX ORDER — PANTOPRAZOLE SODIUM 40 MG/1
TABLET, DELAYED RELEASE ORAL
Qty: 90 TAB | Refills: 3 | Status: SHIPPED | OUTPATIENT
Start: 2021-04-27 | End: 2022-03-11

## 2021-04-29 ENCOUNTER — TELEPHONE (OUTPATIENT)
Dept: ENDOCRINOLOGY | Age: 61
End: 2021-04-29

## 2021-04-29 NOTE — TELEPHONE ENCOUNTER
----- Message from Sulema Espinoza sent at 4/29/2021  3:26 PM EDT -----  Regarding: Dr Alireza Ospina  Pt is calling because she has a appt in June and the doctor is leaving in May, trying to see if she can move her appt up to see the doctor before he leaves, please call pt at [phone]

## 2021-05-05 ENCOUNTER — VIRTUAL VISIT (OUTPATIENT)
Dept: ENDOCRINOLOGY | Age: 61
End: 2021-05-05
Payer: MEDICARE

## 2021-05-05 DIAGNOSIS — E11.49 TYPE 2 DIABETES MELLITUS WITH OTHER NEUROLOGIC COMPLICATION, WITH LONG-TERM CURRENT USE OF INSULIN (HCC): Primary | ICD-10-CM

## 2021-05-05 DIAGNOSIS — I10 ESSENTIAL HYPERTENSION: ICD-10-CM

## 2021-05-05 DIAGNOSIS — Z79.4 TYPE 2 DIABETES MELLITUS WITH OTHER NEUROLOGIC COMPLICATION, WITH LONG-TERM CURRENT USE OF INSULIN (HCC): Primary | ICD-10-CM

## 2021-05-05 DIAGNOSIS — E78.5 HYPERLIPIDEMIA, UNSPECIFIED HYPERLIPIDEMIA TYPE: ICD-10-CM

## 2021-05-05 PROCEDURE — G8756 NO BP MEASURE DOC: HCPCS | Performed by: INTERNAL MEDICINE

## 2021-05-05 PROCEDURE — G8427 DOCREV CUR MEDS BY ELIG CLIN: HCPCS | Performed by: INTERNAL MEDICINE

## 2021-05-05 PROCEDURE — G9717 DOC PT DX DEP/BP F/U NT REQ: HCPCS | Performed by: INTERNAL MEDICINE

## 2021-05-05 PROCEDURE — 3017F COLORECTAL CA SCREEN DOC REV: CPT | Performed by: INTERNAL MEDICINE

## 2021-05-05 PROCEDURE — G9899 SCRN MAM PERF RSLTS DOC: HCPCS | Performed by: INTERNAL MEDICINE

## 2021-05-05 PROCEDURE — 2022F DILAT RTA XM EVC RTNOPTHY: CPT | Performed by: INTERNAL MEDICINE

## 2021-05-05 PROCEDURE — 99214 OFFICE O/P EST MOD 30 MIN: CPT | Performed by: INTERNAL MEDICINE

## 2021-05-05 PROCEDURE — 3044F HG A1C LEVEL LT 7.0%: CPT | Performed by: INTERNAL MEDICINE

## 2021-05-05 RX ORDER — CAPSAICIN 0.1 %
CREAM (GRAM) TOPICAL 3 TIMES DAILY
Qty: 1 TUBE | Refills: 3 | Status: SHIPPED | OUTPATIENT
Start: 2021-05-05 | End: 2021-06-07

## 2021-05-05 NOTE — PROGRESS NOTES
**DUE TO PANDEMIC AND HEALTH CONCERNS IN THE COMMUNITY, THIS PATIENT WAS EITHER ILL OR FOUND TO BE HIGH RISK FOR IN-PERSON EVALUATION WITHIN THE CLINIC. THE FOLLOWING IS A VIRTUAL TELEMEDICINE VIDEO ENCOUNTER VIA [employer], TO WHICH THE PATIENT AGREED. THE PURPOSE IS TO LIMIT INTERRUPTIONS IN HEALTHCARE AND TO PROVIDE FOR ONGOING URGENT NEEDS UNDER THE CURRENT CONDITIONS. CHIEF COMPLAINT: f/u evaluation for uncontrolled type 2 diabetes    HISTORY OF PRESENT ILLNESS:   Piotr Patel is a 62 y.o. female with a PMHx as noted below who presents to the endocrinology clinic for f/u evaluation of uncontrolled type 2 diabetes. A1c 5.8% previously. We stopped victoza due to GI symptoms and she had some noticeable improvement. Insulin was adjusted to control sugars between visits. Over time however her sugars started rising high. She had a repeat colonoscopy and had a total of 22 polyps removed.      Currently taking the following meds:  Novolin 70/30 insulin: 6 units with breakfast     7 units with dinner  Glimepiride 4mg each morning before breakfast  Metformin ER, 1000 mg twice per day  OFF Victoza due to GI issues    Review of home blood glucose:  Yesterday at bedtime 101  Most of her sugars are reportedly over 300,  No lows  AM: all over 200  Dinner: all over 300  Bedtime: 200-300    Review of most recent diabetes-related labs:  Lab Results   Component Value Date    HBA1C 5.8 (H) 02/09/2021    HBA1C 7.9 (H) 05/01/2020    HBA1C 6.0 (H) 08/14/2019    CHOL 111 02/09/2021    LDLC 38 02/09/2021    GFRAA 108 02/09/2021    GFRNA 94 02/09/2021    MCACR 101 (H) 02/09/2021    TSH 0.953 02/09/2021    VITD3 41.5 05/28/2015    QIB1EINH 7.5% 04/26/2017       PAST MEDICAL/SURGICAL HISTORY:   Past Medical History:   Diagnosis Date    Adverse effect of anesthesia 12/2020    dr. Violeta Harris, colonoscopy difficulty breathing under propafol    Arthritis     Asthma     Asthma     COPD     Depression     Diabetes ([facility] [address]     DM2, insulin and oral meds    Diabetes mellitus     Encounter for review of form with patient 2017    Essential hypertension     GERD (gastroesophageal reflux disease)     Headache(784.0)     HX OTHER MEDICAL     acoustic neuroma    Hypercholesterolemia     Hypertension     Ill-defined condition     R ACOUSTIC NEUROMA    Insomnia disorder 2017    Major depression, chronic 2017    Mood disorder ([facility] [address] 2020    Psychiatric problem     anxiety depression    Psychotic disorder ([facility] [address]     Psychotic disorder ([facility] [address] 2019    Screening for cervical cancer 2018    Tobacco use disorder 10/19/2017    Unspecified sleep apnea     denies, sleep study negative     Past Surgical History:   Procedure Laterality Date    COLONOSCOPY N/A 2020    [facility]    COLONOSCOPY N/A 2021    COLONOSCOPY performed by Kp Heard MD at [address] [place] [address]  2020         Melissa Harmeet  2021         COLONOSCPY,FLEX,W/ [address] INJECT  2020         COLONOSCPY,FLEX,W/ [address] INJECT  2021         HX GASTRECTOMY  14    lap sleeve gastrectomy by Dr Sofi Jay  2014    sleeve    HX GYN      2 c-sections    HX HEENT      sinus    HX HEENT      tracheal resection    HX HEENT      tracheal alleratation x 2    HX HEENT      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    HX HEENT      LASER SX-PENG    [place]  DELIVERY ONLY      UPPER GI ENDOSCOPY,BIOPSY  2020            ALLERGIES:   Allergies   Allergen Reactions    Latex Hives    Other Food Anaphylaxis and Hives     PEPPERONI, sloppy joes, frank cheese doritos ,peach jolly ranchers & green tea    Demerol [Meperidine] Anaphylaxis     Difficulty breathing, p[t states that she have tylenol#3, tramadol and lortab with percocet had no problem with any of them    Iodine Anaphylaxis    Morphine Other (comments)     voilent outbreaks (restraints needed), Difficulty breathing, p[t states that she have tylenol#3, tramadol and lortab with percocet had no problem with any of them    Nsaids (Non-Steroidal Anti-Inflammatory Drug) Hives       MEDICATIONS ON ADMISSION:     Current Outpatient Medications:     pantoprazole (PROTONIX) 40 mg tablet, TAKE 1 TABLET BY MOUTH EVERY DAY, Disp: 90 Tab, Rfl: 3    budesonide-formoteroL (Symbicort) 160-4.5 mcg/actuation HFAA, INHALE 2 PUFFS BY MOUTH TWICE DAILY FOR COPD ASSOCIATED WITH CHRONIC BRONCHITIS, EXTRINSIC ASTHMA., Disp: 30.6 Inhaler, Rfl: 2    buPROPion XL (WELLBUTRIN XL) 300 mg XL tablet, TAKE 1 TABLET BY MOUTH EVERY DAY IN THE MORNING, Disp: 90 Tab, Rfl: 2    tiotropium (Spiriva with HandiHaler) 18 mcg inhalation capsule, INHALE THE CONTENTS OF 1 CAPSULE VIA HANDIHALER ONCE DAILY. RINSE MOUTH AFTER USE, Disp: 30 Cap, Rfl: 2    traZODone (DESYREL) 100 mg tablet, Take 1 Tab by mouth nightly. Indications: insomnia associated with depression, Disp: 90 Tab, Rfl: 1    metoprolol succinate (TOPROL-XL) 100 mg tablet, TAKE 1 TABLET BY MOUTH EVERY DAY, Disp: 90 Tab, Rfl: 1    montelukast (SINGULAIR) 10 mg tablet, TAKE 1 TABLET BY MOUTH EVERY DAY IN THE EVENING, Disp: 90 Tab, Rfl: 3    melatonin 10 mg tab, Take 1 Tab by mouth At bedtime. , Disp: , Rfl:     Lactobacillus acidophilus (PROBIOTIC PO), Take  by mouth., Disp: , Rfl:     Daliresp 500 mcg tab tablet, TAKE 1 TABLET BY MOUTH EVERY DAY, Disp: 90 Tab, Rfl: 1    insulin NPH/insulin regular (NovoLIN 70/30 U-100 Insulin) 100 unit/mL (70-30) injection, Inject up to 15 units with breakfast and 7 units with lunch as needed (Patient taking differently: 6 Units Daily (before breakfast).  Inject 6 units with breakfast, 7 units with dinner), Disp: 3 Vial, Rfl: 3    metFORMIN ER (GLUCOPHAGE XR) 500 mg tablet, TAKE 2 TABS BY MOUTH BEFORE BREAKFAST AND DINNER., Disp: 360 Tab, Rfl: 3    glimepiride (AMARYL) 4 mg tablet, TAKE 1 TABLET BY MOUTH EVERY MORNING., Disp: 90 Tab, Rfl: 3    rosuvastatin (CRESTOR) 40 mg tablet, TAKE 1 TABLET BY MOUTH ONCE A DAY AT BEDTIME., Disp: 90 Tab, Rfl: 3    Ventolin HFA 90 mcg/actuation inhaler, Take 2 Puffs by inhalation every four (4) hours as needed for Wheezing., Disp: 3 Inhaler, Rfl: 3    lutein 40 mg cap, Take  by mouth daily. , Disp: , Rfl:     multivitamin (ONE A DAY) tablet, Take 1 Tab by mouth daily. , Disp: , Rfl:     insulin syringe-needle U-100 (BD Veo Insulin Syringe UF) 1/2 mL 31 gauge x 15/64\" syrg, For injecting insulin twice daily, Disp: 200 Pen Needle, Rfl: 5    Linzess 290 mcg cap capsule, TAKE 1 (ONE) CAPSULE DAILY, TAKEN ON AN EMPTY STOMACH, Disp: , Rfl:     lancets (Accu-Chek Softclix Lancets) misc, CHECK GLUCOSE 4 TIMES DAILY, DIAGNOSIS E11.65, Disp: 400 Each, Rfl: 3    cyanocobalamin, vitamin B-12, (VITAMIN B-12 PO), Take 2,500 mcg by mouth daily. , Disp: , Rfl:     glucose blood VI test strips (Accu-Chek Renata Plus test strp) strip, CHECK GLUCOSE 4 TIMES DAILY, DIAGNOSIS E11.65, Disp: 400 Strip, Rfl: 3    Blood-Glucose Meter monitoring kit, 1 preferred brand glucometer for checking home glucose, E11.65, Disp: 1 Kit, Rfl: 0    Insulin Needles, Disposable, (ULTICARE PEN NEEDLE) 32 gauge x 5/32\" ndle, USE 2 TIMES DAILY TO INJECT INSULIN/VICTOZA., Disp: 200 Pen Needle, Rfl: 3    EPINEPHrine (EPIPEN) 0.3 mg/0.3 mL injection, 0.3 mg by IntraMUSCular route once as needed. , Disp: , Rfl:     SOCIAL HISTORY:   Social History     Socioeconomic History    Marital status:      Spouse name: Not on file    Number of children: Not on file    Years of education: Not on file    Highest education level: Not on file   Occupational History    Not on file   Social Needs    Financial resource strain: Not on file    Food insecurity     Worry: Not on file     Inability: Not on file    Transportation needs     Medical: Not on file     Non-medical: Not on file   Tobacco Use    Smoking status: Former Smoker     Packs/day: 1.00     Years: 12.00     Pack years: 12.00     Quit date: 2018     Years since quittin.7    Smokeless tobacco: Former User   Substance and Sexual Activity    Alcohol use: Not Currently     Frequency: Never    Drug use: Never    Sexual activity: Never   Lifestyle    Physical activity     Days per week: Not on file     Minutes per session: Not on file    Stress: Not on file   Relationships    Social connections     Talks on phone: Not on file     Gets together: Not on file     Attends Baptism service: Not on file     Active member of club or organization: Not on file     Attends meetings of clubs or organizations: Not on file     Relationship status: Not on file    Intimate partner violence     Fear of current or ex partner: Not on file     Emotionally abused: Not on file     Physically abused: Not on file     Forced sexual activity: Not on file   Other Topics Concern    Not on file   Social History Narrative    Not on file       FAMILY HISTORY:  Family History   Problem Relation Age of Onset    Diabetes Father     Heart Failure Father     Heart Disease Father     Hypertension Father     High Cholesterol Mother     Stroke Mother     Suicide Brother     Stroke Maternal Grandmother     Cancer Paternal Grandfather     Anesth Problems Neg Hx        REVIEW OF SYSTEMS: Complete ROS assessed and noted for that which is described above, all else are negative.   Eyes: normal  ENT: normal  CVS: normal  Resp: normal  GI: normal  : normal  GYN: normal  Endocrine: normal  Integument: normal  Musculoskeletal: normal  Neuro: normal  Psych: normal    PHYSICAL EXAMINATION:  Telemedicine Visit    GENERAL: NCAT, Appears well nourished  EYES: EOMI, non-icteric, no proptosis  Ear/Nose/Throat: NCAT, no visible inflammation or masses  CARDIOVASCULAR: no cyanosis, no visible JVD  RESPIRATORY: comfortable respirations observed, no cyanosis  MUSCULOSKELETAL: Normal ROM of upper extremities observed  SKIN: No edema, rash, or other significant changes observed  NEUROLOGIC:  AAOx3  PSYCHIATRIC: Normal affect, Normal insight and judgement    REVIEW OF LABORATORY AND RADIOLOGY DATA:   Labs and documentation have been reviewed as described above. ASSESSMENT AND PLAN:   Harika Man is a 62 y.o. female with a PMHx as noted above who presents to the endocrinology clinic for evaluation of uncontrolled type 2 diabetes. DM2  HTN  HLD    Initially after stopping the victoza her sugars had been stable however as we have noted today, gradually her sugars started rising high. For this reason we will go ahead and increase her insulin doses while also including a lunch dose as her sugars by dinner time continue to trend upward. She is having some neuropathy symptoms in her feet while her sugars are high and this may resolve after her sugars improve if it has not been there too long, but I have offered her some capsaicin cream to help with this temporary discomfort. DM2:  Novolin 70/30 insulin: Increase to 12 units with each meal 3 times daily     Start correction of 1:25>150  Glimepiride 4mg each morning before breakfast  Metformin ER, 1000 mg twice per day  OFF Victoza due to GI issues    BP: telemedicine visit today, BP elevated on 4/6 per records but this was during her colonoscopy and per her it is usually normal.     HLD: lipids reviewed, stable on statin,    Cesia Bradley.  [address] [facility]

## 2021-05-28 ENCOUNTER — OFFICE VISIT (OUTPATIENT)
Dept: FAMILY MEDICINE CLINIC | Age: 61
End: 2021-05-28
Payer: MEDICARE

## 2021-05-28 VITALS
WEIGHT: 148.6 LBS | TEMPERATURE: 98.1 F | HEIGHT: 69 IN | RESPIRATION RATE: 16 BRPM | OXYGEN SATURATION: 99 % | SYSTOLIC BLOOD PRESSURE: 131 MMHG | HEART RATE: 93 BPM | BODY MASS INDEX: 22.01 KG/M2 | DIASTOLIC BLOOD PRESSURE: 79 MMHG

## 2021-05-28 DIAGNOSIS — Z79.4 TYPE 2 DIABETES MELLITUS WITHOUT COMPLICATION, WITH LONG-TERM CURRENT USE OF INSULIN (HCC): ICD-10-CM

## 2021-05-28 DIAGNOSIS — D64.9 ANEMIA, UNSPECIFIED TYPE: ICD-10-CM

## 2021-05-28 DIAGNOSIS — E55.9 VITAMIN D DEFICIENCY: ICD-10-CM

## 2021-05-28 DIAGNOSIS — E11.9 TYPE 2 DIABETES MELLITUS WITHOUT COMPLICATION, WITH LONG-TERM CURRENT USE OF INSULIN (HCC): ICD-10-CM

## 2021-05-28 DIAGNOSIS — K14.0 GLOSSITIS: Primary | ICD-10-CM

## 2021-05-28 DIAGNOSIS — B37.0 THRUSH, ORAL: ICD-10-CM

## 2021-05-28 LAB
25(OH)D3 SERPL-MCNC: 51.9 NG/ML (ref 30–100)
IRON SATN MFR SERPL: 3 % (ref 20–50)
IRON SERPL-MCNC: 14 UG/DL (ref 35–150)
TIBC SERPL-MCNC: 527 UG/DL (ref 250–450)
VIT B12 SERPL-MCNC: 816 PG/ML (ref 193–986)

## 2021-05-28 PROCEDURE — 99214 OFFICE O/P EST MOD 30 MIN: CPT | Performed by: STUDENT IN AN ORGANIZED HEALTH CARE EDUCATION/TRAINING PROGRAM

## 2021-05-28 PROCEDURE — G9899 SCRN MAM PERF RSLTS DOC: HCPCS | Performed by: STUDENT IN AN ORGANIZED HEALTH CARE EDUCATION/TRAINING PROGRAM

## 2021-05-28 PROCEDURE — G8427 DOCREV CUR MEDS BY ELIG CLIN: HCPCS | Performed by: STUDENT IN AN ORGANIZED HEALTH CARE EDUCATION/TRAINING PROGRAM

## 2021-05-28 PROCEDURE — G8752 SYS BP LESS 140: HCPCS | Performed by: STUDENT IN AN ORGANIZED HEALTH CARE EDUCATION/TRAINING PROGRAM

## 2021-05-28 PROCEDURE — G8420 CALC BMI NORM PARAMETERS: HCPCS | Performed by: STUDENT IN AN ORGANIZED HEALTH CARE EDUCATION/TRAINING PROGRAM

## 2021-05-28 PROCEDURE — 3044F HG A1C LEVEL LT 7.0%: CPT | Performed by: STUDENT IN AN ORGANIZED HEALTH CARE EDUCATION/TRAINING PROGRAM

## 2021-05-28 PROCEDURE — 2022F DILAT RTA XM EVC RTNOPTHY: CPT | Performed by: STUDENT IN AN ORGANIZED HEALTH CARE EDUCATION/TRAINING PROGRAM

## 2021-05-28 PROCEDURE — G8754 DIAS BP LESS 90: HCPCS | Performed by: STUDENT IN AN ORGANIZED HEALTH CARE EDUCATION/TRAINING PROGRAM

## 2021-05-28 PROCEDURE — G9717 DOC PT DX DEP/BP F/U NT REQ: HCPCS | Performed by: STUDENT IN AN ORGANIZED HEALTH CARE EDUCATION/TRAINING PROGRAM

## 2021-05-28 PROCEDURE — 3017F COLORECTAL CA SCREEN DOC REV: CPT | Performed by: STUDENT IN AN ORGANIZED HEALTH CARE EDUCATION/TRAINING PROGRAM

## 2021-05-28 RX ORDER — NYSTATIN 100000 [USP'U]/ML
1 SUSPENSION ORAL
Qty: 473 ML | Refills: 2 | Status: SHIPPED | OUTPATIENT
Start: 2021-05-28

## 2021-05-28 NOTE — PROGRESS NOTES
Name and  Verified. Patient of Dr. Nikky Roth    Chief Complaint   Patient presents with    Tongue Swelling     Sore off & on for 3 years     Red, very sore, hurts to eat pop lianna. has rinsed mouth with mouth wash, warm salt water with no success. Patient stated that she rinses her mouth out really well after using inhalers. 1. Have you been to the ER, urgent care clinic since your last visit? Hospitalized since your last visit? No    2. Have you seen or consulted any other health care providers outside of the [address] [place] [address] since your last visit? Include any pap smears or colon screening.  No

## 2021-05-28 NOTE — PROGRESS NOTES
[address] [facility]  [address] [facility], [address]  [phone]    Chief Complaint: \"thrush\"    Subjective  Piotr Patel is a 62 y.o. female , established patient, here for evaluation of Oral Thrush. Symptoms have been ongoing for more than 3 years. Patient has been evaluated in the past by ENT. She has previously follows with ENT due to her concurrent medical problems. States that her tongue would get sometimes white, sometimes red and for unclear reasons and she has tried nystatin in the past.  She uses inhalers for her asthma but she reports cleaning her mouth well after each use. Now reports some soreness and she has been unable to eat some hot sauces and several other foods as a result of the symptoms. Other remedies tried include; Baking soda rinse, hot salt water, yoghurt and nystatin mouth rinse without any help. No recent illness, fever, chills, n/v, headache. Has history of chronic hoarseness. Pt has also want labs for other chronic problems. Allergies - reviewed: Allergies   Allergen Reactions    Latex Hives    Other Food Anaphylaxis and Hives     PEPPERONI, sloppy joes, frank cheese doritos ,peach jolly ranchers & green tea    Demerol [Meperidine] Anaphylaxis     Difficulty breathing, p[t states that she have tylenol#3, tramadol and lortab with percocet had no problem with any of them    Iodine Anaphylaxis    Morphine Other (comments)     voilent outbreaks (restraints needed), Difficulty breathing, p[t states that she have tylenol#3, tramadol and lortab with percocet had no problem with any of them    Nsaids (Non-Steroidal Anti-Inflammatory Drug) Hives       Medications - reviewed:   Current Outpatient Medications   Medication Sig    nystatin (MYCOSTATIN) 100,000 unit/mL suspension Take 5 mL by mouth four (4) times daily as needed (thrush).  swish and spit    insulin NPH/insulin regular (NovoLIN 70/30 U-100 Insulin) 100 unit/mL (70-30) injection Inject 12 units with each meal 3 times daily + correction insulin, up to 60 units per day max    capsaicin (CAPZASIN-HP) 0.1 % topical cream Apply  to affected area three (3) times daily.  pantoprazole (PROTONIX) 40 mg tablet TAKE 1 TABLET BY MOUTH EVERY DAY    budesonide-formoteroL (Symbicort) 160-4.5 mcg/actuation HFAA INHALE 2 PUFFS BY MOUTH TWICE DAILY FOR COPD ASSOCIATED WITH CHRONIC BRONCHITIS, EXTRINSIC ASTHMA.  buPROPion XL (WELLBUTRIN XL) 300 mg XL tablet TAKE 1 TABLET BY MOUTH EVERY DAY IN THE MORNING    tiotropium (Spiriva with HandiHaler) 18 mcg inhalation capsule INHALE THE CONTENTS OF 1 CAPSULE VIA HANDIHALER ONCE DAILY. RINSE MOUTH AFTER USE    traZODone (DESYREL) 100 mg tablet Take 1 Tab by mouth nightly. Indications: insomnia associated with depression    metoprolol succinate (TOPROL-XL) 100 mg tablet TAKE 1 TABLET BY MOUTH EVERY DAY    insulin syringe-needle U-100 (BD Veo Insulin Syringe UF) 1/2 mL 31 gauge x 15/64\" syrg For injecting insulin twice daily    montelukast (SINGULAIR) 10 mg tablet TAKE 1 TABLET BY MOUTH EVERY DAY IN THE EVENING    melatonin 10 mg tab Take 1 Tab by mouth At bedtime.  Lactobacillus acidophilus (PROBIOTIC PO) Take  by mouth.  Daliresp 500 mcg tab tablet TAKE 1 TABLET BY MOUTH EVERY DAY    lancets (Accu-Chek Softclix Lancets) misc CHECK GLUCOSE 4 TIMES DAILY, DIAGNOSIS E11.65    metFORMIN ER (GLUCOPHAGE XR) 500 mg tablet TAKE 2 TABS BY MOUTH BEFORE BREAKFAST AND DINNER.  glucose blood VI test strips (Accu-Chek Renata Plus test strp) strip CHECK GLUCOSE 4 TIMES DAILY, DIAGNOSIS E11.65    glimepiride (AMARYL) 4 mg tablet TAKE 1 TABLET BY MOUTH EVERY MORNING.  rosuvastatin (CRESTOR) 40 mg tablet TAKE 1 TABLET BY MOUTH ONCE A DAY AT BEDTIME.  Ventolin HFA 90 mcg/actuation inhaler Take 2 Puffs by inhalation every four (4) hours as needed for Wheezing.  lutein 40 mg cap Take  by mouth daily.     Blood-Glucose Meter monitoring kit 1 preferred brand glucometer for checking home glucose, E11.65    multivitamin (ONE A DAY) tablet Take 1 Tab by mouth daily.  EPINEPHrine (EPIPEN) 0.3 mg/0.3 mL injection 0.3 mg by IntraMUSCular route once as needed.  Linzess 290 mcg cap capsule TAKE 1 (ONE) CAPSULE DAILY, TAKEN ON AN EMPTY STOMACH    cyanocobalamin, vitamin B-12, (VITAMIN B-12 PO) Take 2,500 mcg by mouth daily.  Insulin Needles, Disposable, (ULTICARE PEN NEEDLE) 32 gauge x 5/32\" ndle USE 2 TIMES DAILY TO INJECT INSULIN/VICTOZA. No current facility-administered medications for this visit.        Family History - reviewed:  Family History   Problem Relation Age of Onset    Diabetes Father     Heart Failure Father     Heart Disease Father     Hypertension Father     High Cholesterol Mother     Stroke Mother     Suicide Brother     Stroke Maternal Grandmother     Cancer Paternal Grandfather     Anesth Problems Neg Hx        Past Medical History - reviewed:  Past Medical History:   Diagnosis Date    Adverse effect of anesthesia 12/2020    dr. Violeta Harris, colonoscopy difficulty breathing under propafol    Arthritis     Asthma     Asthma     COPD     Depression     Diabetes ([facility] [address]     DM2, insulin and oral meds    Diabetes mellitus     Encounter for review of form with patient 12/4/2017    Essential hypertension     GERD (gastroesophageal reflux disease)     Headache(784.0)     HX OTHER MEDICAL     acoustic neuroma    Hypercholesterolemia     Hypertension     Ill-defined condition     R ACOUSTIC NEUROMA    Insomnia disorder 12/4/2017    Major depression, chronic 12/4/2017    Mood disorder ([address] 7/20/2020    Psychiatric problem     anxiety depression    Psychotic disorder ([address]     Psychotic disorder ([facility] 8/14/2019    Screening for cervical cancer 5/22/2018    Tobacco use disorder 10/19/2017    Unspecified sleep apnea     denies, sleep study negative       Review of systems:  Items bolded if positive. Constitutional: Fever, chills, night sweats, weight loss, lymphadenopathy, fatigue  HEENT:positive for red tongue and thrush  Cardiovascular: Chest pain, palpitations, syncope, lower extremity edema, orthopnea, paroxysmal nocturnal dyspnea  Pulmonary: Shortness of breath, dyspnea on exertion, cough, hemoptysis, wheezing    Visit Vitals  /79 (BP 1 Location: Right arm, BP Patient Position: Sitting, BP Cuff Size: Adult)   Pulse 93   Temp 98.1 °F (36.7 °C) (Oral)   Resp 16   Ht 5' 8.5\" (1.74 m)   Wt 148 lb 9.6 oz (67.4 kg)   LMP 01/17/2010   SpO2 99%   BMI 22.27 kg/m²       General: Alert and oriented, in no acute distress. Well nourished. LUNGS: Respirations unlabored; clear to auscultation bilaterally. CARDIOVASCULAR: Regular, rate, and rhythm without murmurs, gallops or rubs. OROPHARYNX: No suspicious lesions, normal dentition, pharynx, and tonsils normal.  Tongue appears somewhat red             Assessment/Plan    ICD-10-CM ICD-9-CM    1. Glossitis  K14.0 529.0 IRON PROFILE      VITAMIN D, 25 HYDROXY      VITAMIN B12      VITAMIN B12      VITAMIN D, 25 HYDROXY      IRON PROFILE   2. Thrush, oral  B37.0 112.0 nystatin (MYCOSTATIN) 100,000 unit/mL suspension   3. Vitamin D deficiency  E55.9 268.9 VITAMIN D, 25 HYDROXY      VITAMIN D, 25 HYDROXY   4. Anemia, unspecified type  D64.9 285.9 IRON PROFILE      VITAMIN B12      VITAMIN B12      IRON PROFILE   5. Type 2 diabetes mellitus without complication, with long-term current use of insulin ([facility])  E11.9 250.00 REFERRAL TO ENDOCRINOLOGY    Z79.4 V58.67          1. Glossitis  No notable thrush on exam as seen above. Tongue appears red although pt states that it is more red than her usual tongue color.  - IRON PROFILE; Future  - VITAMIN D, 25 HYDROXY; Future  - VITAMIN B12; Future  - asked that she follows with her ENT     2. Thrush, oral  No notable thrush on exam as seen above.  Tongue appears red although pt states that it is more red than her usual tongue color. - nystatin (MYCOSTATIN) 100,000 unit/mL suspension; Take 5 mL by mouth four (4) times daily as needed (thrush). swish and spit    - Follow with ENT. 3. Vitamin D deficiency  - VITAMIN D, 25 HYDROXY; Future    4. Anemia, unspecified type  - IRON PROFILE; Future  - VITAMIN B12; Future  - VITAMIN B12    5. Type 2 diabetes mellitus without complication, with long-term current use of insulin ([facility] [address]  Would like referral to a different endocrinologist as her current provider is retiring. Continue Metformin, Glimepiride and insulin.  - REFERRAL TO ENDOCRINOLOGY    Follow up: 2 weeks [facility] Dr. Neelam Wylie    On this date 05/28/21 I have spent 35 minutes reviewing previous notes, test results and face to face with the patient discussing the diagnosis and importance of compliance with the treatment plan as well as documenting on the day of the visit. We discussed the expected course, resolution and complications of the diagnosis(es) in detail. Medication risks, benefits, costs, interactions, and alternatives were discussed as indicated. I advised him to contact the office if his condition worsens, changes or fails to improve as anticipated. He expressed understanding with the diagnosis(es) and plan. Patient understands that this encounter was a temporary measure, and the importance of further follow up and examination was emphasized. Patient verbalized understanding.       Signed By: Marya Churchill MD     May 28, 2021

## 2021-06-03 DIAGNOSIS — D50.9 IRON DEFICIENCY ANEMIA, UNSPECIFIED IRON DEFICIENCY ANEMIA TYPE: Primary | ICD-10-CM

## 2021-06-03 RX ORDER — FERROUS SULFATE 325(65) MG
325 TABLET, DELAYED RELEASE (ENTERIC COATED) ORAL 2 TIMES DAILY WITH MEALS
Qty: 180 TABLET | Refills: 3 | Status: SHIPPED | OUTPATIENT
Start: 2021-06-03 | End: 2021-12-14 | Stop reason: ALTCHOICE

## 2021-06-04 NOTE — PROGRESS NOTES
Reviewed. Results consistent with Iron deficiency anemia.   [facility]

## 2021-06-07 ENCOUNTER — OFFICE VISIT (OUTPATIENT)
Dept: ENDOCRINOLOGY | Age: 61
End: 2021-06-07
Payer: MEDICARE

## 2021-06-07 VITALS
DIASTOLIC BLOOD PRESSURE: 69 MMHG | HEART RATE: 91 BPM | WEIGHT: 146 LBS | HEIGHT: 69 IN | SYSTOLIC BLOOD PRESSURE: 126 MMHG | BODY MASS INDEX: 21.62 KG/M2

## 2021-06-07 DIAGNOSIS — I10 ESSENTIAL HYPERTENSION: ICD-10-CM

## 2021-06-07 DIAGNOSIS — E11.49 TYPE 2 DIABETES MELLITUS WITH OTHER NEUROLOGIC COMPLICATION, WITH LONG-TERM CURRENT USE OF INSULIN (HCC): Primary | ICD-10-CM

## 2021-06-07 DIAGNOSIS — Z79.4 TYPE 2 DIABETES MELLITUS WITH OTHER NEUROLOGIC COMPLICATION, WITH LONG-TERM CURRENT USE OF INSULIN (HCC): Primary | ICD-10-CM

## 2021-06-07 DIAGNOSIS — E78.5 HYPERLIPIDEMIA, UNSPECIFIED HYPERLIPIDEMIA TYPE: ICD-10-CM

## 2021-06-07 PROCEDURE — G8754 DIAS BP LESS 90: HCPCS | Performed by: INTERNAL MEDICINE

## 2021-06-07 PROCEDURE — 3017F COLORECTAL CA SCREEN DOC REV: CPT | Performed by: INTERNAL MEDICINE

## 2021-06-07 PROCEDURE — G9717 DOC PT DX DEP/BP F/U NT REQ: HCPCS | Performed by: INTERNAL MEDICINE

## 2021-06-07 PROCEDURE — G8420 CALC BMI NORM PARAMETERS: HCPCS | Performed by: INTERNAL MEDICINE

## 2021-06-07 PROCEDURE — G8427 DOCREV CUR MEDS BY ELIG CLIN: HCPCS | Performed by: INTERNAL MEDICINE

## 2021-06-07 PROCEDURE — G9899 SCRN MAM PERF RSLTS DOC: HCPCS | Performed by: INTERNAL MEDICINE

## 2021-06-07 PROCEDURE — 2022F DILAT RTA XM EVC RTNOPTHY: CPT | Performed by: INTERNAL MEDICINE

## 2021-06-07 PROCEDURE — 3044F HG A1C LEVEL LT 7.0%: CPT | Performed by: INTERNAL MEDICINE

## 2021-06-07 PROCEDURE — 99214 OFFICE O/P EST MOD 30 MIN: CPT | Performed by: INTERNAL MEDICINE

## 2021-06-07 PROCEDURE — G8752 SYS BP LESS 140: HCPCS | Performed by: INTERNAL MEDICINE

## 2021-06-07 RX ORDER — SYRINGE AND NEEDLE,INSULIN,1ML 31GX15/64"
SYRINGE, EMPTY DISPOSABLE MISCELLANEOUS
Qty: 300 PEN NEEDLE | Refills: 5 | Status: SHIPPED | OUTPATIENT
Start: 2021-06-07 | End: 2022-02-23 | Stop reason: SDUPTHER

## 2021-06-07 NOTE — PROGRESS NOTES
Chief Complaint   Patient presents with    Thyroid Problem     pcp and pharmacy confirmed     History of Present Illness: Lennox Ospina is a 62 y.o. female here for follow up of diabetes. Was last seen by Dr. Ralph Zarate on 5/5/21. Just saw her PCP on 5/28 and he referred her to a different endocrinologist and somehow ended up on my schedule today even though I was not accepting any of Dr. Ralph Zarate' patients but I told her I will continue to follow her for now. Was also found to have iron deficiency anemia as part of his evaluation for her red tongue and started on iron supplements twice daily and just picked this up on the evening of 6/4/21 and thought that the visit with me was supposed to be to address this issue but I told her that iron deficiency is not in my specialty. Has been taking nystatin swish and spit and color is starting to return to more of a normal pinkish color. At her visit with Dr. Ralph Zarate, he recommend going up to 12 units of 70/30 with each meal plus the scale and she has subsequently increased to 17 units with meals as of 5/18/21 and for the most part this has kept her sugars in the  range but does have some readings in th 60-70s when she cuts back on her carb intake. Did have some readings in the 300-400s after a steroid injection on 6/1/21. Has had diabetes since the mid-1990s and is currently on amaryl 4 mg daily which I told her is not likely adding much so will stop this. Also on metformin  mg 2 tabs bid. Takes crestor for cholesterol and toprol XL for BP. Current Outpatient Medications   Medication Sig    OTHER,NON-FORMULARY, Vicks Vapor Rub    ferrous sulfate (IRON) 325 mg (65 mg iron) EC tablet Take 1 Tablet by mouth two (2) times daily (with meals).  nystatin (MYCOSTATIN) 100,000 unit/mL suspension Take 5 mL by mouth four (4) times daily as needed (thrush).  swish and spit    insulin NPH/insulin regular (NovoLIN 70/30 U-100 Insulin) 100 unit/mL (70-30) injection Inject 12 units with each meal 3 times daily + correction insulin, up to 60 units per day max (Patient taking differently: Inject 17 units with each meal 3 times daily + correction insulin, up to 60 units per day max)    pantoprazole (PROTONIX) 40 mg tablet TAKE 1 TABLET BY MOUTH EVERY DAY    budesonide-formoteroL (Symbicort) 160-4.5 mcg/actuation HFAA INHALE 2 PUFFS BY MOUTH TWICE DAILY FOR COPD ASSOCIATED WITH CHRONIC BRONCHITIS, EXTRINSIC ASTHMA.  buPROPion XL (WELLBUTRIN XL) 300 mg XL tablet TAKE 1 TABLET BY MOUTH EVERY DAY IN THE MORNING    tiotropium (Spiriva with HandiHaler) 18 mcg inhalation capsule INHALE THE CONTENTS OF 1 CAPSULE VIA HANDIHALER ONCE DAILY. RINSE MOUTH AFTER USE    traZODone (DESYREL) 100 mg tablet Take 1 Tab by mouth nightly. Indications: insomnia associated with depression    metoprolol succinate (TOPROL-XL) 100 mg tablet TAKE 1 TABLET BY MOUTH EVERY DAY    insulin syringe-needle U-100 (BD Veo Insulin Syringe UF) 1/2 mL 31 gauge x 15/64\" syrg For injecting insulin twice daily    montelukast (SINGULAIR) 10 mg tablet TAKE 1 TABLET BY MOUTH EVERY DAY IN THE EVENING    melatonin 10 mg tab Take 1 Tab by mouth At bedtime.  Lactobacillus acidophilus (PROBIOTIC PO) Take  by mouth.  Daliresp 500 mcg tab tablet TAKE 1 TABLET BY MOUTH EVERY DAY    lancets (Accu-Chek Softclix Lancets) misc CHECK GLUCOSE 4 TIMES DAILY, DIAGNOSIS E11.65    metFORMIN ER (GLUCOPHAGE XR) 500 mg tablet TAKE 2 TABS BY MOUTH BEFORE BREAKFAST AND DINNER.  glucose blood VI test strips (Accu-Chek Renata Plus test strp) strip CHECK GLUCOSE 4 TIMES DAILY, DIAGNOSIS E11.65    glimepiride (AMARYL) 4 mg tablet TAKE 1 TABLET BY MOUTH EVERY MORNING.  rosuvastatin (CRESTOR) 40 mg tablet TAKE 1 TABLET BY MOUTH ONCE A DAY AT BEDTIME.  Ventolin HFA 90 mcg/actuation inhaler Take 2 Puffs by inhalation every four (4) hours as needed for Wheezing.  lutein 40 mg cap Take  by mouth daily.     Blood-Glucose Meter monitoring kit 1 preferred brand glucometer for checking home glucose, E11.65    multivitamin (ONE A DAY) tablet Take 1 Tab by mouth daily.  EPINEPHrine (EPIPEN) 0.3 mg/0.3 mL injection 0.3 mg by IntraMUSCular route once as needed. No current facility-administered medications for this visit.      Allergies   Allergen Reactions    Latex Hives    Other Food Anaphylaxis and Hives     PEPPERONI, sloppy joes, frank cheese doritos ,peach jolly ranchers & green tea    Demerol [Meperidine] Anaphylaxis     Difficulty breathing, p[t states that she have tylenol#3, tramadol and lortab with percocet had no problem with any of them    Iodine Anaphylaxis    Morphine Other (comments)     voilent outbreaks (restraints needed), Difficulty breathing, p[t states that she have tylenol#3, tramadol and lortab with percocet had no problem with any of them    Nsaids (Non-Steroidal Anti-Inflammatory Drug) Hives     Review of Systems: PER HPI    Physical Examination:  Blood pressure 126/69, pulse 91, height 5' 8.5\" (1.74 m), weight 146 lb (66.2 kg), last menstrual period 01/17/2010.  - General: pleasant, no distress, good eye contact   - Neck: no carotid bruits  - Cardiovascular: regular, normal rate, nl s1 and s2, no m/r/g,   - Respiratory: clear bilaterally  - Integumentary: no edema,   - Psychiatric: normal mood and affect    Data Reviewed:   Component      Latest Ref Rng & Units 2/9/2021   Glucose      65 - 99 mg/dL 58 (L)   BUN      6 - 24 mg/dL 21   Creatinine      0.57 - 1.00 mg/dL 0.71   GFR est non-AA      >59 mL/min/1.73 94   GFR est AA      >59 mL/min/1.73 108   BUN/Creatinine ratio      9 - 23 30 (H)   Sodium      134 - 144 mmol/L 140   Potassium      3.5 - 5.2 mmol/L 5.2   Chloride      96 - 106 mmol/L 103   CO2      20 - 29 mmol/L 21   Calcium      8.7 - 10.2 mg/dL 10.3 (H)   Protein, total      6.0 - 8.5 g/dL 5.7 (L)   Albumin      3.8 - 4.9 g/dL 3.7 (L)   GLOBULIN, TOTAL      1.5 - 4.5 g/dL 2.0   A-G Ratio      1.2 - 2.2 1.9   Bilirubin, total      0.0 - 1.2 mg/dL 0.2   Alk. phosphatase      39 - 117 IU/L 77   AST      0 - 40 IU/L 34   ALT      0 - 32 IU/L 56 (H)   Cholesterol, total      100 - 199 mg/dL 111   Triglyceride      0 - 149 mg/dL 209 (H)   HDL Cholesterol      >39 mg/dL 40   VLDL, calculated      5 - 40 mg/dL 33   LDL, calculated      0 - 99 mg/dL 38   Creatinine, urine      Not Estab. mg/dL 181.9   Microalbumin, urine      Not Estab. ug/mL 184.1   Microalbumin/Creat. Ratio      0 - 29 mg/g creat 101 (H)   Hemoglobin A1c, (calculated)      4.8 - 5.6 % 5.8 (H)   Estimated average glucose      mg/dL 120   TSH      0.450 - 4.500 uIU/mL 0.953       Assessment/Plan:   1. Type 2 diabetes mellitus with other neurologic complication, with long-term current use of insulin ([facility]): her most recent Hgb A1c was 5.8% in 2/21. She had been on victoza until 2/21 but this was stopped due to GI side effects and is just on insulin, metformin and amaryl at this time. Will stop the amaryl as this is not likely adding anything given her duration of DM since 1995. Gave her some new parameters of how to adjust her insulin as below:  - cont 70/30 insulin 17 units before meals + 1 units for every 50 mg/dl above 150 mg/dl (take 15 unit if bs < 90 pre-meal or if seating lower carb meals)  - cont metformin  mg 2 tabs bid  - stop amaryl 4 mg daily  - check bs 4 times daily due to fluctuating sugars  - eye exam 3/19  - will need foot exam at next visit  - microalbumin 101 in 2/21  - check Hgb A1c, cmp, and microalbumin prior to next visit       2. Essential hypertension: her BP was at goal < 140/90. Don't see that she is on an ACE/ARB and does have elevated microalbumin levels so likely will switch at next visit  - cont Toprol  mg daily         3.  Hyperlipidemia, unspecified hyperlipidemia type: LDL 38 and  in 2/21 on crestor 40 mg daily  - cont crestor 40 mg daily  - check lipids prior to next visit          Patient Instructions   1) Stop the glimepiride (amaryl) as I don't think this is adding anything on top of the insulin. Keep taking metformin 2 tabs twice daily. 2) It appears that the 17 units before meals is working well but there are times where this dose is too much and causes a low. If you are eating less carbs than normal, try taking 15 units instead of 17 units to see if this helps with low sugars. 3) If you have a reading under 90 going into a meal, eat first and then just take 15 units to try and avoid a low. 4) If your sugars are staying between , it's fine to just take 17 units but if your sugar is over 150, you can take an extra 1 unit for every 50 points over 150.      5) Feel free to send me a message through [phone] with your blood sugar readings if you have any concerns            Follow-up and Dispositions    · Return in about 6 months (around 12/7/2021).                Copy sent to:  Gen Granado MD

## 2021-06-07 NOTE — PATIENT INSTRUCTIONS
1) Stop the glimepiride (amaryl) as I don't think this is adding anything on top of the insulin. Keep taking metformin 2 tabs twice daily. 2) It appears that the 17 units before meals is working well but there are times where this dose is too much and causes a low. If you are eating less carbs than normal, try taking 15 units instead of 17 units to see if this helps with low sugars. 3) If you have a reading under 90 going into a meal, eat first and then just take 15 units to try and avoid a low.     4) If your sugars are staying between , it's fine to just take 17 units but if your sugar is over 150, you can take an extra 1 unit for every 50 points over 150.      5) Feel free to send me a message through [phone] with your blood sugar readings if you have any concerns

## 2021-06-08 ENCOUNTER — OFFICE VISIT (OUTPATIENT)
Dept: FAMILY MEDICINE CLINIC | Age: 61
End: 2021-06-08
Payer: MEDICARE

## 2021-06-08 VITALS
HEIGHT: 69 IN | OXYGEN SATURATION: 97 % | WEIGHT: 149.5 LBS | BODY MASS INDEX: 22.14 KG/M2 | DIASTOLIC BLOOD PRESSURE: 70 MMHG | SYSTOLIC BLOOD PRESSURE: 121 MMHG | RESPIRATION RATE: 16 BRPM | HEART RATE: 89 BPM

## 2021-06-08 DIAGNOSIS — Z12.31 ENCOUNTER FOR SCREENING MAMMOGRAM FOR MALIGNANT NEOPLASM OF BREAST: ICD-10-CM

## 2021-06-08 DIAGNOSIS — Z79.4 TYPE 2 DIABETES MELLITUS WITHOUT COMPLICATION, WITH LONG-TERM CURRENT USE OF INSULIN (HCC): ICD-10-CM

## 2021-06-08 DIAGNOSIS — J44.9 ASTHMATIC BRONCHITIS , CHRONIC (HCC): ICD-10-CM

## 2021-06-08 DIAGNOSIS — Z13.39 SCREENING FOR ALCOHOLISM: ICD-10-CM

## 2021-06-08 DIAGNOSIS — E11.9 TYPE 2 DIABETES MELLITUS WITHOUT COMPLICATION, WITH LONG-TERM CURRENT USE OF INSULIN (HCC): ICD-10-CM

## 2021-06-08 DIAGNOSIS — F20.0 PARANOID SCHIZOPHRENIA (HCC): ICD-10-CM

## 2021-06-08 DIAGNOSIS — Z00.00 MEDICARE ANNUAL WELLNESS VISIT, SUBSEQUENT: Primary | ICD-10-CM

## 2021-06-08 DIAGNOSIS — Z00.00 LABORATORY EXAM ORDERED AS PART OF ROUTINE GENERAL MEDICAL EXAMINATION: ICD-10-CM

## 2021-06-08 DIAGNOSIS — Z71.89 ADVANCED DIRECTIVES, COUNSELING/DISCUSSION: ICD-10-CM

## 2021-06-08 LAB
ALBUMIN SERPL-MCNC: 3.5 G/DL (ref 3.5–5)
ALBUMIN/GLOB SERPL: 1.1 {RATIO} (ref 1.1–2.2)
ALP SERPL-CCNC: 87 U/L (ref 45–117)
ALT SERPL-CCNC: 59 U/L (ref 12–78)
ANION GAP SERPL CALC-SCNC: 5 MMOL/L (ref 5–15)
APPEARANCE UR: CLEAR
AST SERPL-CCNC: 23 U/L (ref 15–37)
BACTERIA URNS QL MICRO: NEGATIVE /HPF
BILIRUB SERPL-MCNC: 0.3 MG/DL (ref 0.2–1)
BILIRUB UR QL: NEGATIVE
BUN SERPL-MCNC: 20 MG/DL (ref 6–20)
BUN/CREAT SERPL: 29 (ref 12–20)
CALCIUM SERPL-MCNC: 9.4 MG/DL (ref 8.5–10.1)
CHLORIDE SERPL-SCNC: 107 MMOL/L (ref 97–108)
CHOLEST SERPL-MCNC: 160 MG/DL
CO2 SERPL-SCNC: 26 MMOL/L (ref 21–32)
COLOR UR: ABNORMAL
CREAT SERPL-MCNC: 0.68 MG/DL (ref 0.55–1.02)
CREAT UR-MCNC: 81.2 MG/DL
EPITH CASTS URNS QL MICRO: ABNORMAL /LPF
ERYTHROCYTE [DISTWIDTH] IN BLOOD BY AUTOMATED COUNT: 17.3 % (ref 11.5–14.5)
EST. AVERAGE GLUCOSE BLD GHB EST-MCNC: 194 MG/DL
GLOBULIN SER CALC-MCNC: 3.1 G/DL (ref 2–4)
GLUCOSE SERPL-MCNC: 160 MG/DL (ref 65–100)
GLUCOSE UR STRIP.AUTO-MCNC: 250 MG/DL
HBA1C MFR BLD: 8.4 % (ref 4–5.6)
HCT VFR BLD AUTO: 27.9 % (ref 35–47)
HDLC SERPL-MCNC: 75 MG/DL
HDLC SERPL: 2.1 {RATIO} (ref 0–5)
HGB BLD-MCNC: 7.6 G/DL (ref 11.5–16)
HGB UR QL STRIP: NEGATIVE
KETONES UR QL STRIP.AUTO: NEGATIVE MG/DL
LDLC SERPL CALC-MCNC: 62.2 MG/DL (ref 0–100)
LEUKOCYTE ESTERASE UR QL STRIP.AUTO: NEGATIVE
MCH RBC QN AUTO: 20.3 PG (ref 26–34)
MCHC RBC AUTO-ENTMCNC: 27.2 G/DL (ref 30–36.5)
MCV RBC AUTO: 74.6 FL (ref 80–99)
MICROALBUMIN UR-MCNC: 33.7 MG/DL
MICROALBUMIN/CREAT UR-RTO: 415 MG/G (ref 0–30)
NITRITE UR QL STRIP.AUTO: NEGATIVE
NRBC # BLD: 0 K/UL (ref 0–0.01)
NRBC BLD-RTO: 0 PER 100 WBC
PH UR STRIP: 5 [PH] (ref 5–8)
PLATELET # BLD AUTO: 353 K/UL (ref 150–400)
PMV BLD AUTO: 10.4 FL (ref 8.9–12.9)
POTASSIUM SERPL-SCNC: 4.9 MMOL/L (ref 3.5–5.1)
PROT SERPL-MCNC: 6.6 G/DL (ref 6.4–8.2)
PROT UR STRIP-MCNC: 100 MG/DL
RBC # BLD AUTO: 3.74 M/UL (ref 3.8–5.2)
RBC #/AREA URNS HPF: ABNORMAL /HPF (ref 0–5)
SODIUM SERPL-SCNC: 138 MMOL/L (ref 136–145)
SP GR UR REFRACTOMETRY: 1.02 (ref 1–1.03)
TRIGL SERPL-MCNC: 114 MG/DL (ref ?–150)
TSH SERPL DL<=0.05 MIU/L-ACNC: 0.69 UIU/ML (ref 0.36–3.74)
UROBILINOGEN UR QL STRIP.AUTO: 0.2 EU/DL (ref 0.2–1)
VLDLC SERPL CALC-MCNC: 22.8 MG/DL
WBC # BLD AUTO: 6.5 K/UL (ref 3.6–11)
WBC URNS QL MICRO: ABNORMAL /HPF (ref 0–4)

## 2021-06-08 PROCEDURE — G8420 CALC BMI NORM PARAMETERS: HCPCS | Performed by: FAMILY MEDICINE

## 2021-06-08 PROCEDURE — 3044F HG A1C LEVEL LT 7.0%: CPT | Performed by: FAMILY MEDICINE

## 2021-06-08 PROCEDURE — 2022F DILAT RTA XM EVC RTNOPTHY: CPT | Performed by: FAMILY MEDICINE

## 2021-06-08 PROCEDURE — G0439 PPPS, SUBSEQ VISIT: HCPCS | Performed by: FAMILY MEDICINE

## 2021-06-08 PROCEDURE — 3017F COLORECTAL CA SCREEN DOC REV: CPT | Performed by: FAMILY MEDICINE

## 2021-06-08 PROCEDURE — G8427 DOCREV CUR MEDS BY ELIG CLIN: HCPCS | Performed by: FAMILY MEDICINE

## 2021-06-08 PROCEDURE — G9717 DOC PT DX DEP/BP F/U NT REQ: HCPCS | Performed by: FAMILY MEDICINE

## 2021-06-08 PROCEDURE — G9899 SCRN MAM PERF RSLTS DOC: HCPCS | Performed by: FAMILY MEDICINE

## 2021-06-08 PROCEDURE — G8752 SYS BP LESS 140: HCPCS | Performed by: FAMILY MEDICINE

## 2021-06-08 PROCEDURE — G8754 DIAS BP LESS 90: HCPCS | Performed by: FAMILY MEDICINE

## 2021-06-08 RX ORDER — ZOSTER VACCINE RECOMBINANT, ADJUVANTED 50 MCG/0.5
KIT INTRAMUSCULAR
Qty: 0.5 ML | Refills: 1 | Status: SHIPPED | OUTPATIENT
Start: 2021-06-08

## 2021-06-08 RX ORDER — IPRATROPIUM BROMIDE AND ALBUTEROL SULFATE 2.5; .5 MG/3ML; MG/3ML
3 SOLUTION RESPIRATORY (INHALATION)
Qty: 30 NEBULE | Refills: 11 | Status: SHIPPED | OUTPATIENT
Start: 2021-06-08 | End: 2022-02-23 | Stop reason: SDUPTHER

## 2021-06-08 NOTE — PROGRESS NOTES
This is the Subsequent Medicare Annual Wellness Exam, performed 12 months or more after the Initial AWV or the last Subsequent AWV    I have reviewed the patient's medical history in detail and updated the computerized patient record. Assessment/Plan   Education and counseling provided:  Are appropriate based on today's review and evaluation  End-of-Life planning (with patient's consent)  Pneumococcal Vaccine  Influenza Vaccine  Screening Mammography  Screening Pap and pelvic (covered once every 2 years)  Colorectal cancer screening tests  Medical nutrition therapy for individuals with diabetes or renal disease  Diabetes outpatient self-management training services    1. Medicare annual wellness visit, subsequent  2. Paranoid schizophrenia ([facility] [address]  -     CBC W/O DIFF; Future  -     METABOLIC PANEL, COMPREHENSIVE; Future  -     LIPID PANEL; Future  -     HEMOGLOBIN A1C WITH EAG; Future  -     TSH 3RD GENERATION; Future  -     URINALYSIS W/ RFLX MICROSCOPIC; Future  -     MICROALBUMIN, UR, RAND W/ MICROALB/CREAT RATIO; Future  3. Asthmatic bronchitis , chronic (HCC)  -     CBC W/O DIFF; Future  -     METABOLIC PANEL, COMPREHENSIVE; Future  -     LIPID PANEL; Future  -     HEMOGLOBIN A1C WITH EAG; Future  -     TSH 3RD GENERATION; Future  -     URINALYSIS W/ RFLX MICROSCOPIC; Future  -     MICROALBUMIN, UR, RAND W/ MICROALB/CREAT RATIO; Future  4. Advanced directives, counseling/discussion  -     FULL CODE  5. Screening for alcoholism  -     [facility] ANNUAL ALCOHOL SCREEN 15 MIN  6. Encounter for screening mammogram for malignant neoplasm of breast  -     [place] MAMMO BI SCREENING INCL CAD; Future  7. Type 2 diabetes mellitus without complication, with long-term current use of insulin (HCC)  -     REFERRAL TO OPHTHALMOLOGY  -     REFERRAL TO PODIATRY  -     CBC W/O DIFF; Future  -     METABOLIC PANEL, COMPREHENSIVE; Future  -     LIPID PANEL; Future  -     HEMOGLOBIN A1C WITH EAG;  Future  -     TSH 3RD GENERATION; Future  -     URINALYSIS W/ RFLX MICROSCOPIC; Future  -     MICROALBUMIN, UR, RAND W/ MICROALB/CREAT RATIO; Future  8. Laboratory exam ordered as part of routine general medical examination  -     CBC W/O DIFF; Future  -     METABOLIC PANEL, COMPREHENSIVE; Future  -     LIPID PANEL; Future  -     HEMOGLOBIN A1C WITH EAG; Future  -     TSH 3RD GENERATION; Future  -     URINALYSIS W/ RFLX MICROSCOPIC; Future  -     MICROALBUMIN, UR, RAND W/ MICROALB/CREAT RATIO; Future       Depression Risk Factor Screening     3 most recent PHQ Screens 6/8/2021   Little interest or pleasure in doing things Not at all   Feeling down, depressed, irritable, or hopeless Not at all   Total Score PHQ 2 0   Trouble falling or staying asleep, or sleeping too much -   Feeling tired or having little energy -   Poor appetite, weight loss, or overeating -   Feeling bad about yourself - or that you are a failure or have let yourself or your family down -   Trouble concentrating on things such as school, work, reading, or watching TV -   Moving or speaking so slowly that other people could have noticed; or the opposite being so fidgety that others notice -   Thoughts of being better off dead, or hurting yourself in some way -   PHQ 9 Score -   How difficult have these problems made it for you to do your work, take care of your home and get along with others -       Alcohol Risk Screen    Do you average more than 1 drink per night or more than 7 drinks a week:  No    On any one occasion in the past three months have you have had more than 3 drinks containing alcohol:  No        Functional Ability and Level of Safety    Hearing: Hearing is good. Activities of Daily Living: The home contains: handrails, grab bars and rugs  Patient does total self care      Ambulation: with no difficulty     Fall Risk:  Fall Risk Assessment, last 12 mths 6/8/2021   Able to walk? Yes   Fall in past 12 months? 0   Do you feel unsteady?  0   Are you worried about falling 0      Abuse Screen:  Patient is not abused       Cognitive Screening    Has your family/caregiver stated any concerns about your memory: no     Cognitive Screening: Normal - MMSE (Mini Mental Status Exam)    Health Maintenance Due     Health Maintenance Due   Topic Date Due    COVID-19 Vaccine (1) Never done    PAP AKA CERVICAL CYTOLOGY  Never done    Shingrix Vaccine Age 50> (1 of 2) Never done    Foot Exam Q1  05/03/2020    Eye Exam Retinal or Dilated  03/05/2021       Patient Care Team   Patient Care Team:  Forest Cantu MD as PCP - General (Family Medicine)  Forest Cantu MD as PCP - [facility]  Bob Salamanca MD as Surgeon (General Surgery)  Karena Closs, NP as Nurse Practitioner (General Surgery)  Tabitha Huynh MD as Physician (Sleep Medicine)  Blaise Block MD as Physician (Cardiology)  Annette Lares MD as Consulting Provider (Endocrinology)    History     Patient Active Problem List   Diagnosis Code    Asthma with exacerbation J45. [address]    Type II or unspecified type diabetes mellitus without mention of complication, uncontrolled [id number]    HTN (hypertension) I10    Upper respiratory infection J06.9    Gallstone K80.20    Rib pain R07.81    Chronic hoarseness R49.0    Asthma J45.909    GERD (gastroesophageal reflux disease) K21.9    Hypercholesteremia E78.00    Tobacco use disorder F17.200    Mitral valve prolapse I34.1    Insomnia disorder G47.00    Major depression, chronic F32.9    Encounter for review of form with patient Z02.89    Type 2 diabetes mellitus with nephropathy (HCC) E11.21    Recurrent depression ([address] F33.9    Asthmatic bronchitis , chronic (HCC) J44.9    Psychotic disorder ([address] F29    Mood disorder ([address] F39     Past Medical History:   Diagnosis Date    Adverse effect of anesthesia 12/2020    dr. Roselyn Garcia, colonoscopy difficulty breathing under propafol    Arthritis     Asthma     Asthma     COPD     Depression     Diabetes ([facility]     DM2, insulin and oral meds    Diabetes mellitus     Encounter for review of form with patient 2017    Essential hypertension     GERD (gastroesophageal reflux disease)     Headache(784.0)     HX OTHER MEDICAL     acoustic neuroma    Hypercholesterolemia     Hypertension     Ill-defined condition     R ACOUSTIC NEUROMA    Insomnia disorder 2017    Major depression, chronic 2017    Mood disorder ([facility] [address] 2020    Psychiatric problem     anxiety depression    Psychotic disorder ([facility]     Psychotic disorder ([facility] 2019    Screening for cervical cancer 2018    Tobacco use disorder 10/19/2017    Unspecified sleep apnea     denies, sleep study negative      Past Surgical History:   Procedure Laterality Date    COLONOSCOPY N/A 2020    COLONOS[facility]red[facility] at [facility] ENDOSCOPY    COLONOSCOPY N/A 2021    COLON[facility]  2020         Scharlene Haven  2021         COLONOSCPY,FLEX,W/ [address] INJECT  2020         COLONOSCPY,FLEX,W/DIR SUBMUC INJECT  2021         HX GASTRECTOMY  15    lap sleeve gastrectomy by Dr Db Rojas  2014    sleeve    HX GYN      2 c-sections    HX HEENT      sinus    HX HEENT      tracheal resection    HX HEENT      tracheal alleratation x 2    HX HEENT      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       OTHER,NON-FORMULARY, Vicks Vapor Rub      insulin NPH/insulin regular (NovoLIN 70/30 U-100 Insulin) 100 unit/mL (70-30) injection Inject 17 units with each meal 3 times daily + 1 units for every 50 mg/dl above 150 mg/dl--Dose change 6/7/21--updated med list--did not send prescription to the pharmacy 6 Vial 3    insulin syringe-needle U-100 (BD Veo Insulin Syringe UF) 1/2 mL 31 gauge x 15/64\" syrg For injecting insulin 3 times daily 300 Pen Needle 5    ferrous sulfate (IRON) 325 mg (65 mg iron) EC tablet Take 1 Tablet by mouth two (2) times daily (with meals). 180 Tablet 3    nystatin (MYCOSTATIN) 100,000 unit/mL suspension Take 5 mL by mouth four (4) times daily as needed (thrush). swish and spit 473 mL 2    pantoprazole (PROTONIX) 40 mg tablet TAKE 1 TABLET BY MOUTH EVERY DAY 90 Tab 3    budesonide-formoteroL (Symbicort) 160-4.5 mcg/actuation HFAA INHALE 2 PUFFS BY MOUTH TWICE DAILY FOR COPD ASSOCIATED WITH CHRONIC BRONCHITIS, EXTRINSIC ASTHMA. 30.6 Inhaler 2    buPROPion XL (WELLBUTRIN XL) 300 mg XL tablet TAKE 1 TABLET BY MOUTH EVERY DAY IN THE MORNING 90 Tab 2    tiotropium (Spiriva with HandiHaler) 18 mcg inhalation capsule INHALE THE CONTENTS OF 1 CAPSULE VIA HANDIHALER ONCE DAILY. RINSE MOUTH AFTER USE 30 Cap 2    traZODone (DESYREL) 100 mg tablet Take 1 Tab by mouth nightly. Indications: insomnia associated with depression 90 Tab 1    metoprolol succinate (TOPROL-XL) 100 mg tablet TAKE 1 TABLET BY MOUTH EVERY DAY 90 Tab 1    montelukast (SINGULAIR) 10 mg tablet TAKE 1 TABLET BY MOUTH EVERY DAY IN THE EVENING 90 Tab 3    melatonin 10 mg tab Take 1 Tab by mouth At bedtime.  Lactobacillus acidophilus (PROBIOTIC PO) Take  by mouth.       Daliresp 500 mcg tab tablet TAKE 1 TABLET BY MOUTH EVERY DAY 90 Tab 1    lancets (Accu-Chek Softclix Lancets) misc CHECK GLUCOSE 4 TIMES DAILY, DIAGNOSIS E11.65 400 Each 3    metFORMIN ER (GLUCOPHAGE XR) 500 mg tablet TAKE 2 TABS BY MOUTH BEFORE BREAKFAST AND DINNER. 360 Tab 3    glucose blood VI test strips (Accu-Chek Renata Plus test strp) strip CHECK GLUCOSE 4 TIMES DAILY, DIAGNOSIS E11.65 400 Strip 3    rosuvastatin (CRESTOR) 40 mg tablet TAKE 1 TABLET BY MOUTH ONCE A DAY AT BEDTIME. 90 Tab 3    Ventolin HFA 90 mcg/actuation inhaler Take 2 Puffs by inhalation every four (4) hours as needed for Wheezing. 3 Inhaler 3    lutein 40 mg cap Take  by mouth daily.  Blood-Glucose Meter monitoring kit 1 preferred brand glucometer for checking home glucose, E11.65 1 Kit 0    multivitamin (ONE A DAY) tablet Take 1 Tab by mouth daily.  EPINEPHrine (EPIPEN) 0.3 mg/0.3 mL injection 0.3 mg by IntraMUSCular route once as needed.       varicella-zoster recombinant, PF, (Shingrix, PF,) 50 mcg/0.5 mL susr injection 0.5mL by IntraMUSCular route once now and then repeat in 2-6 months 0.5 mL 1     Allergies   Allergen Reactions    Latex Hives    Other Food Anaphylaxis and Hives     PEPPERONI, sloppy joes, frank cheese doritos ,peach jolly ranchers & green tea    Demerol [Meperidine] Anaphylaxis     Difficulty breathing, p[t states that she have tylenol#3, tramadol and lortab with percocet had no problem with any of them    Iodine Anaphylaxis    Morphine Other (comments)     voilent outbreaks (restraints needed), Difficulty breathing, p[t states that she have tylenol#3, tramadol and lortab with percocet had no problem with any of them    Nsaids (Non-Steroidal Anti-Inflammatory Drug) Hives       Family History   Problem Relation Age of Onset    Diabetes Father     Heart Failure Father     Heart Disease Father     Hypertension Father     High Cholesterol Mother     Stroke Mother     Suicide Brother     Stroke Maternal Grandmother     Cancer Paternal Grandfather     Anesth Problems Neg Hx      Social History     Tobacco Use    Smoking status: Former Smoker     Packs/day: 1.00     Years: 12.00     Pack years: 12.00     Quit date: 2018     Years since quittin.8    Smokeless tobacco: Former User   Substance Use Topics    Alcohol use: Not Currently         Bryan Ruiz MD

## 2021-06-08 NOTE — PROGRESS NOTES
Chief Complaint   Patient presents with   [facility] [employer] Annual Wellness Visit     1. Have you been to the ER, urgent care clinic since your last visit? Hospitalized since your last visit? No    2. Have you seen or consulted any other health care providers outside of the [address] [place] [address] since your last visit? Include any pap smears or colon screening. Yes When: 6/7/21 Where: endocrinology Reason for visit: diabetes follow up    Verified patient with two type of identifiers.   Blood sugar this morning 95 before breakfast     Colonoscopy completed in April 2021-Dr Dang Carvajal

## 2021-06-08 NOTE — PATIENT INSTRUCTIONS
Medicare Wellness Visit, Female     The best way to live healthy is to have a lifestyle where you eat a well-balanced diet, exercise regularly, limit alcohol use, and quit all forms of tobacco/nicotine, if applicable. Regular preventive services are another way to keep healthy. Preventive services (vaccines, screening tests, monitoring & exams) can help personalize your care plan, which helps you manage your own care. Screening tests can find health problems at the earliest stages, when they are easiest to treat. [facility] follows the current, evidence-based guidelines published by the [facility] ([facility]) when recommending preventive services for our patients. Because we follow these guidelines, sometimes recommendations change over time as research supports it. (For example, mammograms used to be recommended annually. Even though Medicare will still pay for an annual mammogram, the newer guidelines recommend a mammogram every two years for women of average risk). Of course, you and your doctor may decide to screen more often for some diseases, based on your risk and your co-morbidities (chronic disease you are already diagnosed with). Preventive services for you include:  - Medicare offers their members a free annual wellness visit, which is time for you and your primary care provider to discuss and plan for your preventive service needs. Take advantage of this benefit every year!  -All adults over the age of 72 should receive the recommended pneumonia vaccines. Current USPSTF guidelines recommend a series of two vaccines for the best pneumonia protection.   -All adults should have a flu vaccine yearly and a tetanus vaccine every 10 years.   -All adults age 48 and older should receive the shingles vaccines (series of two vaccines).       -All adults age 38-68 who are overweight should have a diabetes screening test once every three years.   -All adults born between [birth year] and 1964 should be screened once for Hepatitis C.  -Other screening tests and preventive services for persons with diabetes include: an eye exam to screen for diabetic retinopathy, a kidney function test, a foot exam, and stricter control over your cholesterol.   -Cardiovascular screening for adults with routine risk involves an electrocardiogram (ECG) at intervals determined by your doctor.   -Colorectal cancer screenings should be done for adults age 54-65 with no increased risk factors for colorectal cancer. There are a number of acceptable methods of screening for this type of cancer. Each test has its own benefits and drawbacks. Discuss with your doctor what is most appropriate for you during your annual wellness visit. The different tests include: colonoscopy (considered the best screening method), a fecal occult blood test, a fecal DNA test, and sigmoidoscopy.    -A bone mass density test is recommended when a woman turns 65 to screen for osteoporosis. This test is only recommended one time, as a screening. Some providers will use this same test as a disease monitoring tool if you already have osteoporosis. -Breast cancer screenings are recommended every other year for women of normal risk, age 54-69.  -Cervical cancer screenings for women over age 72 are only recommended with certain risk factors. Here is a list of your current Health Maintenance items (your personalized list of preventive services) with a due date:  Health Maintenance Due   Topic Date Due    COVID-19 Vaccine (1) Never done    Pap Test  Never done    Shingles Vaccine (1 of 2) Never done    Diabetic Foot Care  05/03/2020    Eye Exam  03/05/2021              Learning About Breast Cancer Screening  What is breast cancer screening? Breast cancer occurs when cells that are not normal grow in one or both of your breasts. Screening tests can help find breast cancer early.  Cancer is easier to treat when it's found early. Having concerns about breast cancer is common. That's why it's important to talk with your doctor about when to start and how often to get screened for breast cancer. How is breast cancer screening done? Several screening tests can be used to check for breast cancer. · Mammograms check for signs of cancer using X-rays. They can show tumors that are too small for you or your doctor to feel. During a mammogram, a machine squeezes your breasts to make them flatter and easier to X-ray. At least two pictures are taken of each breast. One is taken from the top and one from the side. · 3-D mammograms are also called digital breast tomosynthesis. Your breast is positioned on a flat plate. A top plate is pressed against your breast to keep it in position. The X-ray arm then moves in an arc above the breast and takes many pictures. A computer uses these X-rays to create a three-dimensional image. · Clinical breast exams are a doctor's exam. Your doctor carefully feels your breasts and under your arms to check for lumps or other changes. After the screening, your doctor will tell you the results. You will also be told if you need any follow-up tests. When should you get screened? Talk with your doctor about when you should start being tested for breast cancer. How often you get tested and the kind of tests you get will depend on your age and your risk. The guidelines that follow are for women who have an average risk for breast cancer. If you have a higher risk for breast cancer, such as having a family history of breast cancer in multiple relatives or at a young age, your doctor may recommend different screening for you. · Ages 21 to 44: Some experts recommend that women have a clinical breast exam every 3 years, starting at age 21.  Ask your doctor how often you should have this test. If you have a high risk for breast cancer, talk with your doctor about when to start yearly mammograms and other screening tests. · Ages 36 and older: Talk with your doctor about how often you should have mammograms and clinical breast exams. What is your risk for breast cancer? If you don't already know your risk of breast cancer, you can ask your doctor about it. You can also look it up at www.cancer.gov/bcrisktool/. If your doctor says that you have a high or very high risk, ask about ways to reduce your risk. These could include getting extra screening, taking medicine, or having surgery. If you have a strong family history of breast cancer, ask your doctor about genetic testing. What steps can you take to stay healthy? Some things that increase your risk of breast cancer, such as your age and being female, cannot be controlled. But you can do some things to stay as healthy as you can. · Learn what your breasts normally look and feel like. If you notice any changes, tell your doctor. · Drink alcohol wisely. Your risk goes up the more you drink. For the best health, women should have no more than 1 drink a day or 7 drinks a week. · If you smoke, quit. When you quit smoking, you lower your chances of getting many types of cancer. You can also do your best to eat well, be active, and stay at a healthy weight. Eating healthy foods and being active every day, as well as staying at a healthy weight, may help prevent cancer. Where can you learn more? Go to http://www.gray.com/. Enter B258 in the search box to learn more about \"Learning About Breast Cancer Screening. \"  Current as of: March 28, 2018  Content Version: 11.8  © 0956-9705 [employer]. Care instructions adapted under license by [employer] (which disclaims liability or warranty for this information). If you have questions about a medical condition or this instruction, always ask your healthcare professional. [address] any warranty or liability for your use of this information. Preventing Falls: Care Instructions  Your Care Instructions    Getting around your home safely can be a challenge if you have injuries or health problems that make it easy for you to fall. Loose rugs and furniture in walkways are among the dangers for many older people who have problems walking or who have poor eyesight. People who have conditions such as arthritis, osteoporosis, or dementia also have to be careful not to fall. You can make your home safer with a few simple measures. Follow-up care is a key part of your treatment and safety. Be sure to make and go to all appointments, and call your doctor if you are having problems. It's also a good idea to know your test results and keep a list of the medicines you take. How can you care for yourself at home? Taking care of yourself  · You may get dizzy if you do not drink enough water. To prevent dehydration, drink plenty of fluids, enough so that your urine is light yellow or clear like water. Choose water and other caffeine-free clear liquids. If you have kidney, heart, or liver disease and have to limit fluids, talk with your doctor before you increase the amount of fluids you drink. · Exercise regularly to improve your strength, muscle tone, and balance. Walk if you can. Swimming may be a good choice if you cannot walk easily. · Have your vision and hearing checked each year or any time you notice a change. If you have trouble seeing and hearing, you might not be able to avoid objects and could lose your balance. · Know the side effects of the medicines you take. Ask your doctor or pharmacist whether the medicines you take can affect your balance. Sleeping pills or sedatives can affect your balance. · Limit the amount of alcohol you drink. Alcohol can impair your balance and other senses. · Ask your doctor whether calluses or corns on your feet need to be removed.  If you wear loose-fitting shoes because of calluses or corns, you can lose your balance and fall. · Talk to your doctor if you have numbness in your feet. Preventing falls at home  · Remove raised doorway thresholds, throw rugs, and clutter. Repair loose carpet or raised areas in the floor. · Move furniture and electrical cords to keep them out of walking paths. · Use nonskid floor wax, and wipe up spills right away, especially on ceramic tile floors. · If you use a walker or cane, put rubber tips on it. If you use crutches, clean the bottoms of them regularly with an abrasive pad, such as steel wool. · Keep your house well lit, especially [place], and outside walkways. Use night-lights in areas such as hallways and bathrooms. Add extra light switches or use remote switches (such as switches that go on or off when you clap your hands) to make it easier to turn lights on if you have to get up during the night. · Install sturdy handrails on stairways. · Move items in your cabinets so that the things you use a lot are on the lower shelves (about waist level). · Keep a cordless phone and a flashlight with new batteries by your bed. If possible, put a phone in each of the main rooms of your house, or carry a cell phone in case you fall and cannot reach a phone. Or, you can wear a device around your neck or wrist. You push a button that sends a signal for help. · Wear low-heeled shoes that fit well and give your feet good support. Use footwear with nonskid soles. Check the heels and soles of your shoes for wear. Repair or replace worn heels or soles. · Do not wear socks without shoes on wood floors. · Walk on the grass when the sidewalks are slippery. If you live in an area that gets snow and ice in the winter, sprinkle salt on slippery steps and sidewalks. Preventing falls in the bath  · Install grab bars and nonskid mats inside and outside your shower or tub and near the toilet and sinks. · Use shower chairs and bath benches.   · Use a hand-held shower head that will allow you to sit while showering. · Get into a tub or shower by putting the weaker leg in first. Get out of a tub or shower with your strong side first.  · Repair loose toilet seats and consider installing a raised toilet seat to make getting on and off the toilet easier. · Keep your bathroom door unlocked while you are in the shower. Where can you learn more? Go to http://www.ziegler.com/. Enter 0476 [id number] in the search box to learn more about \"Preventing Falls: Care Instructions. \"  Current as of: March 16, 2018  Content Version: 11.8  © 1023-9928 [employer]. Care instructions adapted under license by [employer] (which disclaims liability or warranty for this information). If you have questions about a medical condition or this instruction, always ask your healthcare professional. [facility] any warranty or liability for your use of this information.

## 2021-06-09 DIAGNOSIS — J44.9 ASTHMATIC BRONCHITIS , CHRONIC (HCC): ICD-10-CM

## 2021-06-09 DIAGNOSIS — F51.01 PRIMARY INSOMNIA: ICD-10-CM

## 2021-06-09 DIAGNOSIS — F33.9 RECURRENT DEPRESSION (HCC): ICD-10-CM

## 2021-06-09 RX ORDER — TRAZODONE HYDROCHLORIDE 100 MG/1
100 TABLET ORAL
Qty: 90 TABLET | Refills: 1 | Status: SHIPPED | OUTPATIENT
Start: 2021-06-09 | End: 2021-11-26

## 2021-06-09 RX ORDER — BUDESONIDE AND FORMOTEROL FUMARATE DIHYDRATE 160; 4.5 UG/1; UG/1
AEROSOL RESPIRATORY (INHALATION)
Qty: 3 INHALER | Refills: 1 | Status: SHIPPED | OUTPATIENT
Start: 2021-06-09 | End: 2021-06-22 | Stop reason: SDUPTHER

## 2021-06-09 RX ORDER — ALBUTEROL SULFATE 90 UG/1
2 AEROSOL, METERED RESPIRATORY (INHALATION)
Qty: 3 INHALER | Refills: 3 | Status: SHIPPED | OUTPATIENT
Start: 2021-06-09 | End: 2022-01-30

## 2021-06-10 RX ORDER — LANCETS
EACH MISCELLANEOUS
Qty: 400 EACH | Refills: 3 | Status: SHIPPED | OUTPATIENT
Start: 2021-06-10 | End: 2022-04-08

## 2021-06-10 RX ORDER — METFORMIN HYDROCHLORIDE 500 MG/1
1000 TABLET, EXTENDED RELEASE ORAL
Qty: 360 TABLET | Refills: 3 | Status: SHIPPED | OUTPATIENT
Start: 2021-06-10 | End: 2022-04-20 | Stop reason: SDUPTHER

## 2021-06-15 DIAGNOSIS — I10 ESSENTIAL HYPERTENSION: ICD-10-CM

## 2021-06-15 DIAGNOSIS — R00.0 TACHYCARDIA: ICD-10-CM

## 2021-06-15 RX ORDER — BLOOD SUGAR DIAGNOSTIC
STRIP MISCELLANEOUS
Qty: 400 STRIP | Refills: 3 | Status: SHIPPED | OUTPATIENT
Start: 2021-06-15 | End: 2021-12-07 | Stop reason: ALTCHOICE

## 2021-06-15 RX ORDER — ROSUVASTATIN CALCIUM 40 MG/1
40 TABLET, COATED ORAL DAILY
Qty: 90 TABLET | Refills: 3 | Status: SHIPPED | OUTPATIENT
Start: 2021-06-15 | End: 2022-06-02

## 2021-06-22 ENCOUNTER — OFFICE VISIT (OUTPATIENT)
Dept: FAMILY MEDICINE CLINIC | Age: 61
End: 2021-06-22
Payer: MEDICARE

## 2021-06-22 VITALS
SYSTOLIC BLOOD PRESSURE: 97 MMHG | DIASTOLIC BLOOD PRESSURE: 60 MMHG | OXYGEN SATURATION: 99 % | HEART RATE: 80 BPM | BODY MASS INDEX: 22.43 KG/M2 | TEMPERATURE: 95.9 F | RESPIRATION RATE: 16 BRPM | WEIGHT: 149.7 LBS

## 2021-06-22 DIAGNOSIS — K14.0 GLOSSITIS: ICD-10-CM

## 2021-06-22 DIAGNOSIS — Z79.4 TYPE 2 DIABETES MELLITUS WITHOUT COMPLICATION, WITH LONG-TERM CURRENT USE OF INSULIN (HCC): Primary | ICD-10-CM

## 2021-06-22 DIAGNOSIS — D50.8 OTHER IRON DEFICIENCY ANEMIA: ICD-10-CM

## 2021-06-22 DIAGNOSIS — Z02.89 ENCOUNTER FOR REVIEW OF FORM WITH PATIENT: ICD-10-CM

## 2021-06-22 DIAGNOSIS — B37.0 THRUSH, ORAL: ICD-10-CM

## 2021-06-22 DIAGNOSIS — C44.319 BASAL CELL CARCINOMA (BCC) OF CHIN: ICD-10-CM

## 2021-06-22 DIAGNOSIS — E11.9 TYPE 2 DIABETES MELLITUS WITHOUT COMPLICATION, WITH LONG-TERM CURRENT USE OF INSULIN (HCC): Primary | ICD-10-CM

## 2021-06-22 PROBLEM — F39 MOOD DISORDER (HCC): Status: RESOLVED | Noted: 2020-07-20 | Resolved: 2021-06-22

## 2021-06-22 PROBLEM — F29 PSYCHOTIC DISORDER (HCC): Status: RESOLVED | Noted: 2019-08-14 | Resolved: 2021-06-22

## 2021-06-22 PROCEDURE — 2022F DILAT RTA XM EVC RTNOPTHY: CPT | Performed by: FAMILY MEDICINE

## 2021-06-22 PROCEDURE — G9717 DOC PT DX DEP/BP F/U NT REQ: HCPCS | Performed by: FAMILY MEDICINE

## 2021-06-22 PROCEDURE — G8427 DOCREV CUR MEDS BY ELIG CLIN: HCPCS | Performed by: FAMILY MEDICINE

## 2021-06-22 PROCEDURE — G8754 DIAS BP LESS 90: HCPCS | Performed by: FAMILY MEDICINE

## 2021-06-22 PROCEDURE — 3052F HG A1C>EQUAL 8.0%<EQUAL 9.0%: CPT | Performed by: FAMILY MEDICINE

## 2021-06-22 PROCEDURE — G9899 SCRN MAM PERF RSLTS DOC: HCPCS | Performed by: FAMILY MEDICINE

## 2021-06-22 PROCEDURE — G8752 SYS BP LESS 140: HCPCS | Performed by: FAMILY MEDICINE

## 2021-06-22 PROCEDURE — 99214 OFFICE O/P EST MOD 30 MIN: CPT | Performed by: FAMILY MEDICINE

## 2021-06-22 PROCEDURE — G8420 CALC BMI NORM PARAMETERS: HCPCS | Performed by: FAMILY MEDICINE

## 2021-06-22 PROCEDURE — 3017F COLORECTAL CA SCREEN DOC REV: CPT | Performed by: FAMILY MEDICINE

## 2021-06-22 RX ORDER — FLUCONAZOLE 150 MG/1
150 TABLET ORAL DAILY
Qty: 4 TABLET | Refills: 0 | Status: SHIPPED | OUTPATIENT
Start: 2021-06-22 | End: 2021-06-26

## 2021-06-22 RX ORDER — HUMAN INSULIN 100 [IU]/ML
INJECTION, SOLUTION SUBCUTANEOUS
Qty: 1 SYRINGE | Refills: 3 | Status: SHIPPED | OUTPATIENT
Start: 2021-06-22 | End: 2022-06-02

## 2021-06-22 NOTE — PROGRESS NOTES
1. Have you been to the ER, urgent care clinic since your last visit? Hospitalized since your last visit? No    2. Have you seen or consulted any other health care providers outside of the [address] [place] [address] since your last visit? Include any pap smears or colon screening. Yes Endocrinologist (would like to change to us), Neurosurgeon (cervical spinal stenosis) , Pain management in [address] [place] [address]. 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 Chief Complaint   Patient presents with    Follow-up     Anemia    Mass     Patient c/o spot on her chest x 6 months that will not go away. Denies any pain.

## 2021-06-22 NOTE — PROGRESS NOTES
HISTORY OF PRESENT ILLNESS  Wesley Curiel is a 62 y.o. female. today patient present for f/u regarding the diabetic state, who has been compliancy w/ meds, who is also trying to have a less of starchy diabetic diet, and eat  diet, since last visit, patient has been more active , patient has a home monitoring glucose device  usually averaging around fasting 116, B4 lunch is 250-300,  +++ Rf needed for today. patient  Currently denies tingling sensation, has no polyurea and polydipsia, last a1c was not at target of 8.4% %, Last urine microalbumin 2021 and was normal, patient currently on ACE inhibitor. Protein 100 Negative mg/dL Final Glucose 250     BUN/Creatinine ratio 29   HGB 7.6 11.5 - 16.0 g/dL Final HCT 27.9 35.0 - 47.0 % Final MCV 74.6    Rash of the chest  Started few weeks ago not better tried alcohol washing and OTC antibiotic ointments, does not tingles and not pain full, states that is expanding red, and   swelled up,  with itchiness    Current Outpatient Medications   Medication Sig Dispense Refill    insulin NPH/insulin regular (NovoLIN 70/30 U-100 Insulin) 100 unit/mL (70-30) injection Inject 17 units with each meal 3 times daily + 1 units for every 50 mg/dl above 150 mg/dl 6 Vial 3    insulin regular human (NovoLIN R Flexpen) 100 unit/mL (3 mL) inpn Please obtain sugar level Before Meals, and If Blood Glucose is: Less than 150 mg/dL, No extra insulin needed to be given, if the glucose 151 - 200, give 2 units, if reading is 201 - 250, gives 4 units, if 251 - 300m, give 6 units, if reading is 301 - 350, give 8 units, if 351 - 400, give Subcutaneously 10 units, and call MD 1 Syringe 3    fluconazole (DIFLUCAN) 150 mg tablet Take 1 Tablet by mouth daily for 4 days. FDA advises cautious prescribing of oral fluconazole in pregnancy. 4 Tablet 0    rosuvastatin (CRESTOR) 40 mg tablet Take 1 Tablet by mouth daily.  90 Tablet 3    Accu-Chek Renata Plus test strp strip CHECK GLUCOSE 4 TIMES DAILY, DX Code: E11.49 400 Strip 3    metFORMIN ER (GLUCOPHAGE XR) 500 mg tablet Take 2 Tablets by mouth Before breakfast and dinner. 360 Tablet 3    Accu-Chek Softclix Lancets misc CHECK GLUCOSE 4 TIMES DAILY, DIAGNOSIS E11.65 400 Each 3    traZODone (DESYREL) 100 mg tablet Take 1 Tablet by mouth nightly. Indications: insomnia associated with depression 90 Tablet 1    Ventolin HFA 90 mcg/actuation inhaler Take 2 Puffs by inhalation every four (4) hours as needed for Wheezing. 3 Inhaler 3    linaCLOtide (Linzess) 290 mcg cap capsule Take 290 mcg by mouth Daily (before breakfast).  albuterol-ipratropium (DUO-NEB) 2.5 mg-0.5 mg/3 ml nebu 3 mL by Nebulization route every four (4) hours as needed for Wheezing. 30 Nebule 11    OTHER,NON-FORMULARY, Vicks Vapor Rub      insulin syringe-needle U-100 (BD Veo Insulin Syringe UF) 1/2 mL 31 gauge x 15/64\" syrg For injecting insulin 3 times daily 300 Pen Needle 5    ferrous sulfate (IRON) 325 mg (65 mg iron) EC tablet Take 1 Tablet by mouth two (2) times daily (with meals). 180 Tablet 3    nystatin (MYCOSTATIN) 100,000 unit/mL suspension Take 5 mL by mouth four (4) times daily as needed (thrush). swish and spit 473 mL 2    pantoprazole (PROTONIX) 40 mg tablet TAKE 1 TABLET BY MOUTH EVERY DAY 90 Tab 3    budesonide-formoteroL (Symbicort) 160-4.5 mcg/actuation HFAA INHALE 2 PUFFS BY MOUTH TWICE DAILY FOR COPD ASSOCIATED WITH CHRONIC BRONCHITIS, EXTRINSIC ASTHMA. 30.6 Inhaler 2    buPROPion XL (WELLBUTRIN XL) 300 mg XL tablet TAKE 1 TABLET BY MOUTH EVERY DAY IN THE MORNING 90 Tab 2    tiotropium (Spiriva with HandiHaler) 18 mcg inhalation capsule INHALE THE CONTENTS OF 1 CAPSULE VIA HANDIHALER ONCE DAILY.  RINSE MOUTH AFTER USE 30 Cap 2    metoprolol succinate (TOPROL-XL) 100 mg tablet TAKE 1 TABLET BY MOUTH EVERY DAY 90 Tab 1    montelukast (SINGULAIR) 10 mg tablet TAKE 1 TABLET BY MOUTH EVERY DAY IN THE EVENING 90 Tab 3    melatonin 10 mg tab Take 1 Tab by mouth At bedtime.  Lactobacillus acidophilus (PROBIOTIC PO) Take  by mouth.  Daliresp 500 mcg tab tablet TAKE 1 TABLET BY MOUTH EVERY DAY 90 Tab 1    lutein 40 mg cap Take  by mouth daily.  Blood-Glucose Meter monitoring kit 1 preferred brand glucometer for checking home glucose, E11.65 1 Kit 0    multivitamin (ONE A DAY) tablet Take 1 Tab by mouth daily.  EPINEPHrine (EPIPEN) 0.3 mg/0.3 mL injection 0.3 mg by IntraMUSCular route once as needed.       varicella-zoster recombinant, PF, (Shingrix, PF,) 50 mcg/0.5 mL susr injection 0.5mL by IntraMUSCular route once now and then repeat in 2-6 months (Patient not taking: Reported on 6/22/2021) 0.5 mL 1     Allergies   Allergen Reactions    Latex Hives    Other Food Anaphylaxis and Hives     PEPPERONI, sloppy joes, frank cheese doritos ,peach jolly ranchers & green tea    Demerol [Meperidine] Anaphylaxis     Difficulty breathing, p[t states that she have tylenol#3, tramadol and lortab with percocet had no problem with any of them    Iodine Anaphylaxis    Morphine Other (comments)     voilent outbreaks (restraints needed), Difficulty breathing, p[t states that she have tylenol#3, tramadol and lortab with percocet had no problem with any of them    Nsaids (Non-Steroidal Anti-Inflammatory Drug) Hives     Past Medical History:   Diagnosis Date    Adverse effect of anesthesia 12/2020    dr. Luis Leon, colonoscopy difficulty breathing under propafol    Arthritis     Asthma     Asthma     COPD     Depression     Diabetes ([facility] [address]     DM2, insulin and oral meds    Diabetes mellitus     Encounter for review of form with patient 12/4/2017    Essential hypertension     GERD (gastroesophageal reflux disease)     Headache(784.0)     HX OTHER MEDICAL     acoustic neuroma    Hypercholesterolemia     Hypertension     Ill-defined condition     R ACOUSTIC NEUROMA    Insomnia disorder 12/4/2017    Major depression, chronic 12/4/2017    Mood disorder ([facility] [address] 2020    Psychiatric problem     anxiety depression    Psychotic disorder [facility] [address]     Psychotic disorder ([facility] [address] 2019    Screening for cervical cancer 2018    Tobacco use disorder 10/19/2017    Unspecified sleep apnea     denies, sleep study negative     Past Surgical History:   Procedure Laterality Date    COLONOSCOPY N/A 2020    COL[facility] at [facility] ENDOSCOPY    COLONOSCOPY N/A 2021    COLONOSCOPY performed by Darcy Gutierrez MD at [address] [place] [address]  2020         Shirley Appl  2021         COLONOSCPY,FLEX,W/ [address] INJECT  2020         COLONOSCPY,FLEX,W/ [address] INJECT  2021         HX GASTRECTOMY  15    lap sleeve gastrectomy by Dr Gerhardt Simmers  2014    sleeve    HX GYN      2 c-sections    HX HEENT      sinus    HX HEENT      tracheal resection    HX HEENT      tracheal alleratation x 2    HX HEENT      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    HX HEENT      LASER SX-PENG    MI  DELIVERY ONLY      UPPER GI ENDOSCOPY,BIOPSY  2020          Family History   Problem Relation Age of Onset    Diabetes Father     Heart Failure Father     Heart Disease Father     Hypertension Father     High Cholesterol Mother     Stroke Mother     Suicide Brother     Stroke Maternal Grandmother     Cancer Paternal Grandfather     Anesth Problems Neg Hx      Social History     Tobacco Use    Smoking status: Former Smoker         Packs/day: 1.00     Years: 12.00     Pack years: 12.00     Quit date: 2018     Years since quittin.8    Smokeless tobacco: Former User   Substance Use Topics    Alcohol use: Not Currently      Lab Results   Component Value Date/Time    WBC 6.5 2021 01:45 PM    HGB 7.6 (L) 2021 01:45 PM    HCT 27.9 (L) 2021 01:45 PM    PLATELET 484  01:45 PM    MCV 74.6 (L) 2021 01:45 PM     Lab Results   Component Value Date/Time    GFR est non-AA >60 06/08/2021 01:45 PM    GFR est AA >60 06/08/2021 01:45 PM    Creatinine 0.68 06/08/2021 01:45 PM    BUN 20 06/08/2021 01:45 PM    Sodium 138 06/08/2021 01:45 PM    Potassium 4.9 06/08/2021 01:45 PM    Chloride 107 06/08/2021 01:45 PM    CO2 26 06/08/2021 01:45 PM    Magnesium 1.6 09/05/2014 09:23 AM    Phosphorus 4.0 10/19/2010 04:30 AM        Review of Systems   Constitutional: Negative for chills and fever. HENT: Negative for congestion and nosebleeds. Eyes: Negative for blurred vision and pain. Respiratory: Negative for cough, shortness of breath and wheezing. Cardiovascular: Negative for chest pain and leg swelling. Gastrointestinal: Negative for constipation, diarrhea, nausea and vomiting. Genitourinary: Negative for dysuria and frequency. Musculoskeletal: Negative for joint pain and myalgias. Skin: Positive for itching and rash. Neurological: Negative for dizziness, loss of consciousness and headaches. Psychiatric/Behavioral: Negative for depression. The patient is not nervous/anxious and does not have insomnia. Physical Exam  Vitals and nursing note reviewed. Constitutional:       Appearance: She is well-developed. HENT:      Head: Normocephalic and atraumatic. Eyes:      Conjunctiva/sclera: Conjunctivae normal.      Pupils: Pupils are equal, round, and reactive to light. Neck:      Thyroid: No thyromegaly. Vascular: No JVD. Cardiovascular:      Rate and Rhythm: Normal rate and regular rhythm. Heart sounds: Normal heart sounds. No murmur heard. No friction rub. No gallop. Pulmonary:      Effort: Pulmonary effort is normal. No respiratory distress. Breath sounds: Normal breath sounds. No stridor. No wheezing or rales. Abdominal:      General: Bowel sounds are normal. There is no distension. Palpations: Abdomen is soft. There is no mass. Tenderness: There is no abdominal tenderness. Musculoskeletal:         General: No tenderness. Normal range of motion. Lymphadenopathy:      Cervical: No cervical adenopathy. Skin:     Findings: No erythema or rash. Neurological:      Mental Status: She is alert and oriented to person, place, and time. Cranial Nerves: No cranial nerve deficit. Deep Tendon Reflexes: Reflexes are normal and symmetric. Psychiatric:         Behavior: Behavior normal.         ASSESSMENT and PLAN  Diagnoses and all orders for this visit:    1. Type 2 diabetes mellitus without complication, with long-term current use of insulin (HCC)  -     insulin NPH/insulin regular (NovoLIN 70/30 U-100 Insulin) 100 unit/mL (70-30) injection; Inject 17 units with each meal 3 times daily + 1 units for every 50 mg/dl above 150 mg/dl  -     insulin regular human (NovoLIN R Flexpen) 100 unit/mL (3 mL) inpn; Please obtain sugar level Before Meals, and If Blood Glucose is: Less than 150 mg/dL, No extra insulin needed to be given, if the glucose 151 - 200, give 2 units, if reading is 201 - 250, gives 4 units, if 251 - 300m, give 6 units, if reading is 301 - 350, give 8 units, if 351 - 400, give Subcutaneously 10 units, and call MD    2. Other iron deficiency anemia  -     REFERRAL TO HEMATOLOGY ONCOLOGY    3. Glossitis    4. Thrush, oral  -     fluconazole (DIFLUCAN) 150 mg tablet; Take 1 Tablet by mouth daily for 4 days. FDA advises cautious prescribing of oral fluconazole in pregnancy. 5. Basal cell carcinoma (BCC) of chin  -     REFERRAL TO DERMATOLOGY    6.  Encounter for review of form with patient

## 2021-06-22 NOTE — PATIENT INSTRUCTIONS
Learning About Meal Planning for Diabetes  Why plan your meals? Meal planning can be a key part of managing diabetes. Planning meals and snacks with the right balance of carbohydrate, protein, and fat can help you keep your blood sugar at the target level you set with your doctor. You don't have to eat special foods. You can eat what your family eats, including sweets once in a while. But you do have to pay attention to how often you eat and how much you eat of certain foods. You may want to work with a dietitian or a certified diabetes educator. He or she can give you tips and meal ideas and can answer your questions about meal planning. This health professional can also help you reach a healthy weight if that is one of your goals. What plan is right for you? Your dietitian or diabetes educator may suggest that you start with the plate format or carbohydrate counting. The plate format  The plate format is a simple way to help you manage how you eat. You plan meals by learning how much space each food should take on a plate. Using the plate format helps you spread carbohydrate throughout the day. It can make it easier to keep your blood sugar level within your target range. It also helps you see if you're eating healthy portion sizes. To use the plate format, you put non-starchy vegetables on half your plate. Add meat or meat substitutes on one-quarter of the plate. Put a grain or starchy vegetable (such as brown rice or a potato) on the final quarter of the plate. You can add a small piece of fruit and some low-fat or fat-free milk or yogurt, depending on your carbohydrate goal for each meal.  Here are some tips for using the plate format:  · Make sure that you are not using an oversized plate. A 9-inch plate is best. Many restaurants use larger plates. · Get used to using the plate format at home. Then you can use it when you eat out. · Write down your questions about using the plate format.  Talk to your doctor, a dietitian, or a diabetes educator about your concerns. Carbohydrate counting  With carbohydrate counting, you plan meals based on the amount of carbohydrate in each food. Carbohydrate raises blood sugar higher and more quickly than any other nutrient. It is found in desserts, breads and cereals, and fruit. It's also found in starchy vegetables such as potatoes and corn, grains such as rice and pasta, and milk and yogurt. Spreading carbohydrate throughout the day helps keep your blood sugar levels within your target range. Your daily amount depends on several things, including your weight, how active you are, which diabetes medicines you take, and what your goals are for your blood sugar levels. A registered dietitian or diabetes educator can help you plan how much carbohydrate to include in each meal and snack. A guideline for your daily amount of carbohydrate is:  · 45 to 60 grams at each meal. That's about the same as 3 to 4 carbohydrate servings. · 15 to 20 grams at each snack. That's about the same as 1 carbohydrate serving. The Nutrition Facts label on packaged foods tells you how much carbohydrate is in a serving of the food. First, look at the serving size on the food label. Is that the amount you eat in a serving? All of the nutrition information on a food label is based on that serving size. So if you eat more or less than that, you'll need to adjust the other numbers. Total carbohydrate is the next thing you need to look for on the label. If you count carbohydrate servings, one serving of carbohydrate is 15 grams. For foods that don't come with labels, such as fresh fruits and vegetables, you'll need a guide that lists carbohydrate in these foods. Ask your doctor, dietitian, or diabetes educator about books or other nutrition guides you can use.   If you take insulin, you need to know how many grams of carbohydrate are in a meal. This lets you know how much rapid-acting insulin to take before you eat. If you use an insulin pump, you get a constant rate of insulin during the day. So the pump must be programmed at meals to give you extra insulin to cover the rise in blood sugar after meals. When you know how much carbohydrate you will eat, you can take the right amount of insulin. Or, if you always use the same amount of insulin, you need to make sure that you eat the same amount of carbohydrate at meals. If you need more help to understand carbohydrate counting and food labels, ask your doctor, dietitian, or diabetes educator. How can you plan healthy meals? Here are some tips to get started:  · Plan your meals a week at a time. Don't forget to include snacks too. · Use cookbooks or online recipes to plan several main meals. Plan some quick meals for busy nights. You also can double some recipes that freeze well. Then you can save half for other busy nights when you don't have time to cook. · Make sure you have the ingredients you need for your recipes. If you're running low on basic items, put these items on your shopping list too. · List foods that you use to make breakfasts, lunches, and snacks. List plenty of fruits and vegetables. · Post this list on the refrigerator. Add to it as you think of more things you need. · Take the list to the store to do your weekly shopping. Follow-up care is a key part of your treatment and safety. Be sure to make and go to all appointments, and call your doctor if you are having problems. It's also a good idea to know your test results and keep a list of the medicines you take. Where can you learn more? Go to http://www.gray.com/  Enter Z359 in the search box to learn more about \"Learning About Meal Planning for Diabetes. \"  Current as of: August 31, 2020               Content Version: 12.8  © 3809-4702 [employer].    Care instructions adapted under license by [employer] (which disclaims liability or warranty for this information). If you have questions about a medical condition or this instruction, always ask your healthcare professional. [address] any warranty or liability for your use of this information. Excision of Nonmelanoma Skin Cancer: Care Instructions  Overview     Excision of nonmelanoma skin cancer is a treatment to remove, or excise, basal cell and squamous cell cancers (carcinomas) from your skin. Skin cancer is the abnormal growth of cells in the skin. Most cases of these types of cancer can be cured if they are found and removed early. If the cancer is not completely removed, it may come back. The doctor uses medicine to numb the area around the cancer. Then the doctor cuts out the cancer along with small amount of healthy skin around it. The wound is most often closed with stitches. The procedure takes about 30 minutes. You will probably go home soon after. You may have a scar. The scar should fade with time. The tissue that was removed will be sent to a lab to be looked at under a microscope. This is done to confirm if the tissue is skin cancer and if all of it was removed. When you find out that you have cancer, you may feel many emotions and may need some help coping. Seek out family, friends, and counselors for support. You also can do things at home to make yourself feel better while you go through treatment. Call the [employer] (8-641.198.9617) or visit its website at [address] [employer] for more information. Follow-up care is a key part of your treatment and safety. Be sure to make and go to all appointments, and call your doctor if you are having problems. It's also a good idea to know your test results and keep a list of the medicines you take. How can you care for yourself at home? · If your doctor told you how to care for your wound, follow your doctor's instructions.  If you did not get instructions, follow this general advice:  ? Wash around the wound with clean water 2 times a day. Don't use hydrogen peroxide or alcohol, which can slow healing. ? You may cover the wound with a thin layer of petroleum jelly, such as Vaseline, and a nonstick bandage. ? Apply more petroleum jelly and replace the bandage as needed. · If you had stitches, your doctor will tell you when to come back to have them removed. · Be safe with medicines. Read and follow all instructions on the label. ? If the doctor gave you a prescription medicine for pain, take it as prescribed. ? If you are not taking a prescription pain medicine, ask your doctor if you can take an over-the-counter medicine. When should you call for help? Call your doctor now or seek immediate medical care if:    · You have signs of infection, such as:  ? Increased pain, swelling, warmth, or redness near the area. ? Red streaks leading from the wound. ? Pus draining from the wound. ? A fever. Watch closely for changes in your health, and be sure to contact your doctor if:    · You see a change in your skin, such as a growth or mole that:  ? Grows bigger. This may happen slowly. ? Changes color. ? Changes shape. ? Starts to bleed easily.     · You do not get better as expected. Where can you learn more? Go to http://www.gray.com/  Enter A891 in the search box to learn more about \"Excision of Nonmelanoma Skin Cancer: Care Instructions. \"  Current as of: December 17, 2020               Content Version: 12.8  © 5961-1494 [employer]. Care instructions adapted under license by [employer] (which disclaims liability or warranty for this information). If you have questions about a medical condition or this instruction, always ask your healthcare professional. [address] any warranty or liability for your use of this information.

## 2021-07-04 DIAGNOSIS — J44.9 ASTHMATIC BRONCHITIS , CHRONIC (HCC): ICD-10-CM

## 2021-07-07 ENCOUNTER — OFFICE VISIT (OUTPATIENT)
Dept: ONCOLOGY | Age: 61
End: 2021-07-07
Payer: MEDICARE

## 2021-07-07 VITALS
TEMPERATURE: 98.2 F | HEIGHT: 69 IN | SYSTOLIC BLOOD PRESSURE: 102 MMHG | OXYGEN SATURATION: 99 % | HEART RATE: 83 BPM | DIASTOLIC BLOOD PRESSURE: 66 MMHG | WEIGHT: 150 LBS | RESPIRATION RATE: 16 BRPM | BODY MASS INDEX: 22.22 KG/M2

## 2021-07-07 DIAGNOSIS — R68.89 OTHER GENERAL SYMPTOMS AND SIGNS: ICD-10-CM

## 2021-07-07 DIAGNOSIS — D64.9 FATIGUE ASSOCIATED WITH ANEMIA: Primary | ICD-10-CM

## 2021-07-07 DIAGNOSIS — D64.9 SEVERE ANEMIA: ICD-10-CM

## 2021-07-07 PROBLEM — D50.9 IRON DEFICIENCY ANEMIA: Status: ACTIVE | Noted: 2021-07-07

## 2021-07-07 PROCEDURE — 3017F COLORECTAL CA SCREEN DOC REV: CPT | Performed by: STUDENT IN AN ORGANIZED HEALTH CARE EDUCATION/TRAINING PROGRAM

## 2021-07-07 PROCEDURE — G8420 CALC BMI NORM PARAMETERS: HCPCS | Performed by: STUDENT IN AN ORGANIZED HEALTH CARE EDUCATION/TRAINING PROGRAM

## 2021-07-07 PROCEDURE — G8752 SYS BP LESS 140: HCPCS | Performed by: STUDENT IN AN ORGANIZED HEALTH CARE EDUCATION/TRAINING PROGRAM

## 2021-07-07 PROCEDURE — G8427 DOCREV CUR MEDS BY ELIG CLIN: HCPCS | Performed by: STUDENT IN AN ORGANIZED HEALTH CARE EDUCATION/TRAINING PROGRAM

## 2021-07-07 PROCEDURE — 99204 OFFICE O/P NEW MOD 45 MIN: CPT | Performed by: STUDENT IN AN ORGANIZED HEALTH CARE EDUCATION/TRAINING PROGRAM

## 2021-07-07 PROCEDURE — G9717 DOC PT DX DEP/BP F/U NT REQ: HCPCS | Performed by: STUDENT IN AN ORGANIZED HEALTH CARE EDUCATION/TRAINING PROGRAM

## 2021-07-07 PROCEDURE — G8754 DIAS BP LESS 90: HCPCS | Performed by: STUDENT IN AN ORGANIZED HEALTH CARE EDUCATION/TRAINING PROGRAM

## 2021-07-07 PROCEDURE — G9899 SCRN MAM PERF RSLTS DOC: HCPCS | Performed by: STUDENT IN AN ORGANIZED HEALTH CARE EDUCATION/TRAINING PROGRAM

## 2021-07-07 NOTE — PROGRESS NOTES
Matilde Bowman is a 62 y.o. female new pt referred by pt's PCP Dr. Marilin Sr to provider for anemia. Pt taking oral iron BID. Colonoscopy 4/6/21 by Dr. Daniel Ellis which showed multiple polyps; diverticulosis. Pt denies active bleeding.      Chief Complaint   Patient presents with    New Patient    Anemia

## 2021-07-07 NOTE — PROGRESS NOTES
[address] [facility] at [address] [facility], [address]  W: [phone] F: [phone]      Reason for Visit:   David Fitzpatrick is a 62 y.o. female who is seen in consultation at the request of Dr. Maribel Weiss for evaluation of iron deficiency anemia. Hematology / Oncology Treatment History:     Hematological/Oncological Diagnosis: Iron deficiency anemia, severe anemia    Date of Diagnosis: 11/2/2020    Treatment course: s/p colonoscopy - showed diverticulosis and multiple polyps now resected 4/2021      History of Present Illness:   Very pleasant 45-year-old female with a relevant medical history significant for diabetes mellitus type 2, hypertension, GERD, IBS, asthma, hyperlipidemia, depression, presents for evaluation and treatment of severe microcytic anemia first noted in November 2020 without clear cause. The patient has been worked up by GI without clear source of bleeding. She did have multiple polyps that were resected as well as evidence of colonic diverticulosis but no active bleeding on scope. Most recent hemoglobin checked on June 8, 2021 was down to 7.6 g/dL with hematocrit of 27.9%. MCV was 74. No other clear abnormalities in cell lines, white blood cell count of 6.5 and differential does not appear to be grossly abnormal.  Platelet count is 675. Review of available CMP shows no significant kidney disease with normal LFTs and normal TSH. The patient endorses symptoms of severe fatigue but denies any other constitutional symptoms of concern for malignancy. No reported recent heavy weight loss, or other sources of bleeding. Most recent iron profile shows iron saturation of only 3% with a total iron binding capacity that is elevated. The patient has been taking oral iron twice a day for greater than 1 month. The patient continues to have severe fatigue despite oral iron supplementation.       Family history reviewed, noncontributory, social history reviewed, also noncontributory    Positive ROS findings include: Severe fatigue, exertional dyspnea  Review of Systems: A complete review of systems was obtained, negative except as described above.     Past Medical History:   Diagnosis Date    Adverse effect of anesthesia 12/2020    dr. Elaine Prince, colonoscopy difficulty breathing under propafol    Arthritis     Asthma     Asthma     COPD     Depression     Diabetes ([facility] [address]     DM2, insulin and oral meds    Diabetes mellitus     Encounter for review of form with patient 12/4/2017    Essential hypertension     GERD (gastroesophageal reflux disease)     Headache(784.0)     HX OTHER MEDICAL     acoustic neuroma    Hypercholesterolemia     Hypertension     Ill-defined condition     R ACOUSTIC NEUROMA    Insomnia disorder 12/4/2017    Major depression, chronic 12/4/2017    Mood disorder ([facility] [address] 7/20/2020    Psychiatric problem     anxiety depression    Psychotic disorder ([facility] [address]     Psychotic disorder ([facility] [address] 8/14/2019    Screening for cervical cancer 5/22/2018    Tobacco use disorder 10/19/2017    Unspecified sleep apnea     denies, sleep study negative      Past Surgical History:   Procedure Laterality Date    COLONOSCOPY N/A 12/17/2020    COLONOSCOPY performed [facility] at [facility] ENDOSCOPY    COLONOSCOPY N/A 4/6/2021    COLONOSCOPY [facility] at [facility]  12/17/2020         [facility]  4/6/2021         COLONOSCPY,FLEX,W/ [address] INJECT  12/17/2020         COLONOSCPY,FLEX,W/DIR SUBMUC INJECT  4/6/2021         HX GASTRECTOMY  9/25/14    lap sleeve gastrectomy by Dr Bisi Hodges  2014    sleeve    HX GYN      2 c-sections    HX HEENT      sinus    HX HEENT      tracheal resection    HX HEENT      tracheal alleratation x 2    HX HEENT      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    HX HEENT      LASER SX-PENG    [place]  DELIVERY ONLY      UPPER GI ENDOSCOPY,BIOPSY  2020           Social History     Tobacco Use    Smoking status: Former Smoker     Packs/day: 1.00     Years: 12.00     Pack years: 12.00     Quit date: 2018     Years since quittin.9    Smokeless tobacco: Former User   Substance Use Topics    Alcohol use: Not Currently      Family History   Problem Relation Age of Onset    Diabetes Father     Heart Failure Father     Heart Disease Father     Hypertension Father     High Cholesterol Mother     Stroke Mother     Suicide Brother     Stroke Maternal Grandmother     Cancer Paternal Grandfather     Anesth Problems Neg Hx      Current Outpatient Medications   Medication Sig    Insulin Needles, Disposable, 31 gauge x 5/16\" ndle Use to inject insulin as needed per sliding scale    insulin NPH/insulin regular (NovoLIN 70/30 U-100 Insulin) 100 unit/mL (70-30) injection Inject 17 units with each meal 3 times daily + 1 units for every 50 mg/dl above 150 mg/dl    insulin regular human (NovoLIN R Flexpen) 100 unit/mL (3 mL) inpn Please obtain sugar level Before Meals, and If Blood Glucose is: Less than 150 mg/dL, No extra insulin needed to be given, if the glucose 151 - 200, give 2 units, if reading is 201 - 250, gives 4 units, if 251 - 300m, give 6 units, if reading is 301 - 350, give 8 units, if 351 - 400, give Subcutaneously 10 units, and call MD    rosuvastatin (CRESTOR) 40 mg tablet Take 1 Tablet by mouth daily.  Accu-Chek Renata Plus test strp strip CHECK GLUCOSE 4 TIMES DAILY, DX Code: E11.49    metFORMIN ER (GLUCOPHAGE XR) 500 mg tablet Take 2 Tablets by mouth Before breakfast and dinner.  Accu-Chek Softclix Lancets misc CHECK GLUCOSE 4 TIMES DAILY, DIAGNOSIS E11.65    traZODone (DESYREL) 100 mg tablet Take 1 Tablet by mouth nightly.  Indications: insomnia associated with depression    Ventolin HFA 90 mcg/actuation inhaler Take 2 Puffs by inhalation every four (4) hours as needed for Wheezing.  linaCLOtide (Linzess) 290 mcg cap capsule Take 290 mcg by mouth Daily (before breakfast).  albuterol-ipratropium (DUO-NEB) 2.5 mg-0.5 mg/3 ml nebu 3 mL by Nebulization route every four (4) hours as needed for Wheezing.  varicella-zoster recombinant, PF, (Shingrix, PF,) 50 mcg/0.5 mL susr injection 0.5mL by IntraMUSCular route once now and then repeat in 2-6 months    OTHER,NON-FORMULARY, Vicks Vapor Rub    insulin syringe-needle U-100 (BD Veo Insulin Syringe UF) 1/2 mL 31 gauge x 15/64\" syrg For injecting insulin 3 times daily    ferrous sulfate (IRON) 325 mg (65 mg iron) EC tablet Take 1 Tablet by mouth two (2) times daily (with meals).  nystatin (MYCOSTATIN) 100,000 unit/mL suspension Take 5 mL by mouth four (4) times daily as needed (thrush). swish and spit    pantoprazole (PROTONIX) 40 mg tablet TAKE 1 TABLET BY MOUTH EVERY DAY    budesonide-formoteroL (Symbicort) 160-4.5 mcg/actuation HFAA INHALE 2 PUFFS BY MOUTH TWICE DAILY FOR COPD ASSOCIATED WITH CHRONIC BRONCHITIS, EXTRINSIC ASTHMA.  buPROPion XL (WELLBUTRIN XL) 300 mg XL tablet TAKE 1 TABLET BY MOUTH EVERY DAY IN THE MORNING    tiotropium (Spiriva with HandiHaler) 18 mcg inhalation capsule INHALE THE CONTENTS OF 1 CAPSULE VIA HANDIHALER ONCE DAILY. RINSE MOUTH AFTER USE    metoprolol succinate (TOPROL-XL) 100 mg tablet TAKE 1 TABLET BY MOUTH EVERY DAY    montelukast (SINGULAIR) 10 mg tablet TAKE 1 TABLET BY MOUTH EVERY DAY IN THE EVENING    melatonin 10 mg tab Take 1 Tab by mouth At bedtime.  Lactobacillus acidophilus (PROBIOTIC PO) Take  by mouth.  Daliresp 500 mcg tab tablet TAKE 1 TABLET BY MOUTH EVERY DAY    lutein 40 mg cap Take  by mouth daily.  Blood-Glucose Meter monitoring kit 1 preferred brand glucometer for checking home glucose, E11.65    multivitamin (ONE A DAY) tablet Take 1 Tab by mouth daily.     EPINEPHrine (EPIPEN) 0.3 mg/0.3 mL injection 0.3 mg by IntraMUSCular route once as needed. No current facility-administered medications for this visit. Allergies   Allergen Reactions    Latex Hives    Other Food Anaphylaxis and Hives     PEPPERONI, sloppy joes, frank cheese doritos ,peach jolly ranchers & green tea    Demerol [Meperidine] Anaphylaxis     Difficulty breathing, p[t states that she have tylenol#3, tramadol and lortab with percocet had no problem with any of them    Iodine Anaphylaxis    Morphine Other (comments)     voilent outbreaks (restraints needed), Difficulty breathing, p[t states that she have tylenol#3, tramadol and lortab with percocet had no problem with any of them    Nsaids (Non-Steroidal Anti-Inflammatory Drug) Hives            Physical Exam:     Visit Vitals  /66 (BP 1 Location: Left upper arm)   Pulse 83   Temp 98.2 °F (36.8 °C) (Oral)   Resp 16   Ht 5' 8.5\" (1.74 m)   Wt 150 lb (68 kg)   LMP 01/17/2010   SpO2 99%   BMI 22.48 kg/m²     ECOG PS: 0  General: No distress  Eyes: PERRLA, anicteric sclerae  HENT: Atraumatic with normal appearance of ears and nose; OP clear  Neck: Supple; no visualized JVD  Lymphatic: No cervical, or supraclavicular lymphadenopathy. Respiratory: CTAB, normal respiratory effort  CV: Normal rate, regular rhythm, no murmurs, no peripheral edema  GI: Soft, nontender, nondistended, no palpable masses, no hepatomegaly, no splenomegaly  MS: Normal gait and station. Digits without clubbing or cyanosis. Skin: No rashes, ecchymoses, or petechiae. Normal temperature, turgor, and texture. Neuro/Psych: Alert, oriented, appropriate affect, normal judgment/insight      Results:     Lab Results   Component Value Date/Time    WBC 6.5 06/08/2021 01:45 PM    HGB 7.6 (L) 06/08/2021 01:45 PM    HCT 27.9 (L) 06/08/2021 01:45 PM    PLATELET 275 64/57/5825 01:45 PM    MCV 74.6 (L) 06/08/2021 01:45 PM    ABS.  NEUTROPHILS 4.3 11/02/2020 11:49 AM     Lab Results   Component Value Date/Time    Sodium 138 06/08/2021 01:45 PM    Potassium 4.9 06/08/2021 01:45 PM    Chloride 107 06/08/2021 01:45 PM    CO2 26 06/08/2021 01:45 PM    Glucose 160 (H) 06/08/2021 01:45 PM    BUN 20 06/08/2021 01:45 PM    Creatinine 0.68 06/08/2021 01:45 PM    GFR est AA >60 06/08/2021 01:45 PM    GFR est non-AA >60 06/08/2021 01:45 PM    Calcium 9.4 06/08/2021 01:45 PM    Glucose (POC) 192 (H) 04/06/2021 08:27 AM     Lab Results   Component Value Date/Time    Bilirubin, total 0.3 06/08/2021 01:45 PM    ALT (SGPT) 59 06/08/2021 01:45 PM    Alk. phosphatase 87 06/08/2021 01:45 PM    Protein, total 6.6 06/08/2021 01:45 PM    Albumin 3.5 06/08/2021 01:45 PM    Globulin 3.1 06/08/2021 01:45 PM       Relevant imaging reviewed, see HPI  Assessment and Recommendations:     # Microcytic anemia concerning for iron deficiency anemia with unclear cause  -The patient does take Protonix and has a history of irritable bowel syndrome which may make absorption of oral iron difficult. The patient reports that she has been trying to take oral iron twice a day for the last greater than 1 month. Unfortunately, she continues to have severe symptoms and anemia. -Recent colonoscopy showed no evidence of bleeding. She is due for another one in about 1 year.  -The differential diagnosis for microcytic anemia is quite broad though given the patient's evidence of iron deficiency, it is likely that she has some component of iron deficiency anemia as well. We will check the following labs in order to rule out causes of microcytic anemia.     Orders Placed This Encounter    IRON PROFILE    FERRITIN    VITAMIN B12    FOLATE    PERIPHERAL SMEAR    CBC WITH AUTOMATED DIFF    LD    HAPTOGLOBIN    ERYTHROPOIETIN    COPPER    LANEY, DIRECT, W/REFLEX    METABOLIC PANEL, COMPREHENSIVE    GAMMOPATHY EVAL, SPEP/ROGELIO, IG QT/FLC       -We will plan for IV iron infusion with Injectafer 750 mg weekly x2 with follow-up visit in about 6 weeks after last Injectafer with repeat iron studies.     Patient to return to clinic in about 2 months          Signed By: Liliana Evans MD      Attending Medical Oncologist   [facility]

## 2021-07-08 DIAGNOSIS — R06.09 DYSPNEA ON EFFORT: ICD-10-CM

## 2021-07-08 DIAGNOSIS — J44.1 COPD WITH ACUTE EXACERBATION (HCC): ICD-10-CM

## 2021-07-08 RX ORDER — ROFLUMILAST 500 UG/1
TABLET ORAL
Qty: 90 TABLET | Refills: 1 | Status: SHIPPED | OUTPATIENT
Start: 2021-07-08 | End: 2021-10-18

## 2021-07-16 ENCOUNTER — HOSPITAL ENCOUNTER (OUTPATIENT)
Dept: INFUSION THERAPY | Age: 61
Discharge: HOME | End: 2021-07-16
Payer: MEDICARE

## 2021-07-16 VITALS
RESPIRATION RATE: 18 BRPM | TEMPERATURE: 98.1 F | SYSTOLIC BLOOD PRESSURE: 142 MMHG | HEIGHT: 68 IN | BODY MASS INDEX: 23.49 KG/M2 | WEIGHT: 155 LBS | OXYGEN SATURATION: 99 % | DIASTOLIC BLOOD PRESSURE: 74 MMHG | HEART RATE: 82 BPM

## 2021-07-16 DIAGNOSIS — D50.9 IRON DEFICIENCY ANEMIA, UNSPECIFIED IRON DEFICIENCY ANEMIA TYPE: Primary | ICD-10-CM

## 2021-07-16 PROCEDURE — 96365 THER/PROPH/DIAG IV INF INIT: CPT

## 2021-07-16 PROCEDURE — 74011250636 HC RX REV CODE- 250/636: Performed by: STUDENT IN AN ORGANIZED HEALTH CARE EDUCATION/TRAINING PROGRAM

## 2021-07-16 RX ORDER — SODIUM CHLORIDE 9 MG/ML
25 INJECTION, SOLUTION INTRAVENOUS CONTINUOUS
Status: DISCONTINUED | OUTPATIENT
Start: 2021-07-16 | End: 2021-07-17 | Stop reason: HOSPADM

## 2021-07-16 RX ORDER — HEPARIN 100 UNIT/ML
300-500 SYRINGE INTRAVENOUS AS NEEDED
Status: DISCONTINUED | OUTPATIENT
Start: 2021-07-16 | End: 2021-07-17 | Stop reason: HOSPADM

## 2021-07-16 RX ORDER — SODIUM CHLORIDE 9 MG/ML
10 INJECTION INTRAMUSCULAR; INTRAVENOUS; SUBCUTANEOUS AS NEEDED
Status: DISCONTINUED | OUTPATIENT
Start: 2021-07-16 | End: 2021-07-17 | Stop reason: HOSPADM

## 2021-07-16 RX ORDER — SODIUM CHLORIDE 0.9 % (FLUSH) 0.9 %
10 SYRINGE (ML) INJECTION AS NEEDED
Status: DISCONTINUED | OUTPATIENT
Start: 2021-07-16 | End: 2021-07-17 | Stop reason: HOSPADM

## 2021-07-16 RX ADMIN — SODIUM CHLORIDE 30 ML: 9 INJECTION INTRAMUSCULAR; INTRAVENOUS; SUBCUTANEOUS at 15:50

## 2021-07-16 RX ADMIN — FERRIC CARBOXYMALTOSE INJECTION 750 MG: 50 INJECTION, SOLUTION INTRAVENOUS at 15:02

## 2021-07-16 NOTE — PROGRESS NOTES
OUTPATIENT INFUSION CENTER    DISCHARGE INSTRUCTIONS FOR:    IRON INFUSIONS - INCLUDING VENOFER, FERRLECIT, AND INFED    You should continue to take your usual home medications unless otherwise instructed by your physician. Drink plenty of fluids and eat your usual diet. All medications have the potential to cause side effects. Your physician can instruct you regarding any necessary treatment for side effects. Some possible side effects of iron infusions may include the following:     - Urinary changes;   - Mild muscle cramping;   - Mild nausea, stomach pain, diarrhea or constipation;   - Mild skin itching, mild pain at IV site. Signs/Symptoms of an allergic reaction may require immediate medical attention. These may include one or more of the following:       Skin redness, itching, swelling, blistering, weeping, crusting, rash or hives. Wheezing, chest tightness, cough, or shortness of breath;   Swelling of the face, eyelids, lips, tongue, or throat;  Severe headache, seizures or tremor;  Stuffy nose, runny nose, sneezing;   Red (bloodshot), itchy, swollen, or watery eyes;  Stomach  pain, nausea, vomiting, diarrhea or bloody diarrhea; Chest pain or tightness, increased heart rate, palpitations, changes in blood pressure which can cause dizziness, unusual feelings of weakness or fatigue;  Back ache or pain around waist;  Painful urination, increase or decrease in amount of urine, blood in urine. Contact your physicians office with any questions or concerns regarding your treatment.     Brionna Navas, Signature: _____________________________________ 7/16/2021  Bethany Che

## 2021-07-16 NOTE — PROGRESS NOTES
Outpatient Infusion Center Progress Note    5235 - Pt admit to [facility] for Injectafer dose 1/2 in stable condition. Assessment completed. Patient denied having any symptoms of COVID-19, i.e. SOB, coughing, fever, or generally not feeling well. Also denies having been exposed to COVID-19 recently or having had any recent contact with family/friend that has a pending COVID test.     Verified pre-iron labs completed 7/7/21. Pt reports exertional SOB, fatigue, itching from anemia. #24 PIV established  with positive blood return. Line flushed, clamped, Curos Cap applied to end clave. Patient Vitals for the past 12 hrs:   Temp Pulse Resp BP SpO2   07/16/21 1552 -- 82 18 (!) 142/74 99 %   07/16/21 1418 98.1 °F (36.7 °C) 81 18 128/73 98 %       Medications:  Medications Administered     ferric carboxymaltose (INJECTAFER) 750 mg in 0.9% sodium chloride 250 mL, overfill volume 25 mL IVPB     Admin Date  07/16/2021 Action  New Bag Dose  750 mg Rate  825 mL/hr Route  IntraVENous Administered By  Elizabeth Gill               Pt tolerated treatment well.     1600 - IV flushed with positive blood return at conclusion, and DCd. D/c home in no distress after 30 min obs period. Pt aware of next [facility] appointment scheduled for: 7/23/21 at 1500.      Future Appointments   Date Time Provider Cheryl Arrieta   7/20/2021  9:20 AM Dian Falcon MD [facility] BS AMB   7/23/2021  3:00 PM [place] [facility] NURSE [facility]   8/5/2021  1:15 PM Erickson Weathers MD [facility] BS AMB   8/13/2021  9:20 AM [facility] [place] [address] [facility] IM   9/8/2021  9:00 AM Chanel Ryan MD [facility] BS AMB   12/13/2021  2:50 PM Mars Ambrocio MD [facility] BS AMB

## 2021-07-20 ENCOUNTER — OFFICE VISIT (OUTPATIENT)
Dept: FAMILY MEDICINE CLINIC | Age: 61
End: 2021-07-20
Payer: MEDICARE

## 2021-07-20 VITALS
RESPIRATION RATE: 18 BRPM | TEMPERATURE: 98 F | BODY MASS INDEX: 23.58 KG/M2 | HEIGHT: 68 IN | OXYGEN SATURATION: 99 % | WEIGHT: 155.6 LBS | HEART RATE: 83 BPM | DIASTOLIC BLOOD PRESSURE: 64 MMHG | SYSTOLIC BLOOD PRESSURE: 120 MMHG

## 2021-07-20 DIAGNOSIS — E11.21 TYPE 2 DIABETES MELLITUS WITH NEPHROPATHY (HCC): Primary | ICD-10-CM

## 2021-07-20 DIAGNOSIS — J44.9 ASTHMATIC BRONCHITIS , CHRONIC (HCC): ICD-10-CM

## 2021-07-20 DIAGNOSIS — Z71.89 ADVICE GIVEN ABOUT COVID-19 VIRUS INFECTION: ICD-10-CM

## 2021-07-20 PROCEDURE — 99213 OFFICE O/P EST LOW 20 MIN: CPT | Performed by: FAMILY MEDICINE

## 2021-07-20 PROCEDURE — G8420 CALC BMI NORM PARAMETERS: HCPCS | Performed by: FAMILY MEDICINE

## 2021-07-20 PROCEDURE — 3017F COLORECTAL CA SCREEN DOC REV: CPT | Performed by: FAMILY MEDICINE

## 2021-07-20 PROCEDURE — 3052F HG A1C>EQUAL 8.0%<EQUAL 9.0%: CPT | Performed by: FAMILY MEDICINE

## 2021-07-20 PROCEDURE — G8752 SYS BP LESS 140: HCPCS | Performed by: FAMILY MEDICINE

## 2021-07-20 PROCEDURE — 2022F DILAT RTA XM EVC RTNOPTHY: CPT | Performed by: FAMILY MEDICINE

## 2021-07-20 PROCEDURE — G8427 DOCREV CUR MEDS BY ELIG CLIN: HCPCS | Performed by: FAMILY MEDICINE

## 2021-07-20 PROCEDURE — G9899 SCRN MAM PERF RSLTS DOC: HCPCS | Performed by: FAMILY MEDICINE

## 2021-07-20 PROCEDURE — G9717 DOC PT DX DEP/BP F/U NT REQ: HCPCS | Performed by: FAMILY MEDICINE

## 2021-07-20 PROCEDURE — G8754 DIAS BP LESS 90: HCPCS | Performed by: FAMILY MEDICINE

## 2021-07-20 RX ORDER — FLASH GLUCOSE SCANNING READER
EACH MISCELLANEOUS
Qty: 1 EACH | Refills: 2 | Status: SHIPPED | OUTPATIENT
Start: 2021-07-20 | End: 2021-09-28 | Stop reason: SDUPTHER

## 2021-07-20 RX ORDER — FLASH GLUCOSE SENSOR
KIT MISCELLANEOUS
Qty: 1 KIT | Refills: 3 | Status: SHIPPED | OUTPATIENT
Start: 2021-07-20 | End: 2021-09-28 | Stop reason: SDUPTHER

## 2021-07-20 NOTE — PATIENT INSTRUCTIONS

## 2021-07-20 NOTE — PROGRESS NOTES
1. Have you been to the ER, urgent care clinic since your last visit? Hospitalized since your last visit? No    2. Have you seen or consulted any other health care providers outside of the [address] [place] [address] since your last visit? Include any pap smears or colon screening. Yes Neurosurgeon in [place] for cervical spinal stenosis. Pain management in [place]. Orthopedics at [facility] for carpal tunnel and ulnar cubital syndrome. Chief Complaint   Patient presents with    Follow-up     Diabetic follow up.  Mass     Pt had iron infusion on Friday, now she has a lump on her arm that is tender to the touch.  Form Completion     Pt states she needs a letter of disability. Patient identity verified with two types of identifiers.

## 2021-07-20 NOTE — TELEPHONE ENCOUNTER
Samina sent a 80 day fax request for patient's spiriva w/ handihelder        Last visit:7/20/21  Next visit: not scheduled  Previous refill 7/08/21(30 cap+5R) Requesting 90 day supply    Requested Prescriptions     Pending Prescriptions Disp Refills    tiotropium (Spiriva with HandiHaler) 18 mcg inhalation capsule 90 Capsule 1     Sig: INHALE THE CONTENTS OF 1 CAPSULE VIA HANDIHALER ONCE DAILY.  RINSE MOUTH AFTER USE

## 2021-07-20 NOTE — PROGRESS NOTES
HISTORY OF PRESENT ILLNESS  Brionna Navas is a 62 y.o. female. today patient present for f/u regarding the diabetic state, who has been compliancy w/ meds, who isnot trying to have a less of starchy diabetic diet, and eat  diet, since last visit, patient has not been more active , patient has a home monitoring glucose device  usually averaging around 250 , +++ Rf needed for today. patient  Currently denies tingling sensation, has no polyurea and polydipsia, last a1c was not at target of 8.4% last a1c wa 5.8 %, Hemoglobin A1c 7.9   Last urine microalbumin 2021 and was normal, patient currently on ACE inhibitor. Got iron infusion now with inflitrated blood vessel, but getting better    Wrist splint does not know how to use it needs direction      Current Outpatient Medications   Medication Sig Dispense Refill    flash glucose scanning reader (FreeStyle Yeny 2 [place]) misc Tid prn 1 Each 2    flash glucose sensor (FreeStyle Yeny 2 Sensor) kit Tid prn 1 Kit 3    tiotropium (Spiriva with HandiHaler) 18 mcg inhalation capsule INHALE THE CONTENTS OF 1 CAPSULE VIA HANDIHALER ONCE DAILY.  RINSE MOUTH AFTER USE 30 Capsule 5    Daliresp 500 mcg tab tablet TAKE 1 TABLET BY MOUTH EVERY DAY 90 Tablet 1    Insulin Needles, Disposable, 31 gauge x 5/16\" ndle Use to inject insulin as needed per sliding scale 1 Package 11    insulin NPH/insulin regular (NovoLIN 70/30 U-100 Insulin) 100 unit/mL (70-30) injection Inject 17 units with each meal 3 times daily + 1 units for every 50 mg/dl above 150 mg/dl 6 Vial 3    insulin regular human (NovoLIN R Flexpen) 100 unit/mL (3 mL) inpn Please obtain sugar level Before Meals, and If Blood Glucose is: Less than 150 mg/dL, No extra insulin needed to be given, if the glucose 151 - 200, give 2 units, if reading is 201 - 250, gives 4 units, if 251 - 300m, give 6 units, if reading is 301 - 350, give 8 units, if 351 - 400, give Subcutaneously 10 units, and call MD 1 Syringe 3    rosuvastatin (CRESTOR) 40 mg tablet Take 1 Tablet by mouth daily. 90 Tablet 3    Accu-Chek Renata Plus test strp strip CHECK GLUCOSE 4 TIMES DAILY, DX Code: E11.49 400 Strip 3    metFORMIN ER (GLUCOPHAGE XR) 500 mg tablet Take 2 Tablets by mouth Before breakfast and dinner. 360 Tablet 3    Accu-Chek Softclix Lancets misc CHECK GLUCOSE 4 TIMES DAILY, DIAGNOSIS E11.65 400 Each 3    traZODone (DESYREL) 100 mg tablet Take 1 Tablet by mouth nightly. Indications: insomnia associated with depression 90 Tablet 1    Ventolin HFA 90 mcg/actuation inhaler Take 2 Puffs by inhalation every four (4) hours as needed for Wheezing. 3 Inhaler 3    linaCLOtide (Linzess) 290 mcg cap capsule Take 290 mcg by mouth Daily (before breakfast).  albuterol-ipratropium (DUO-NEB) 2.5 mg-0.5 mg/3 ml nebu 3 mL by Nebulization route every four (4) hours as needed for Wheezing. 30 Nebule 11    varicella-zoster recombinant, PF, (Shingrix, PF,) 50 mcg/0.5 mL susr injection 0.5mL by IntraMUSCular route once now and then repeat in 2-6 months 0.5 mL 1    OTHER,NON-FORMULARY, Vicks Vapor Rub      insulin syringe-needle U-100 (BD Veo Insulin Syringe UF) 1/2 mL 31 gauge x 15/64\" syrg For injecting insulin 3 times daily 300 Pen Needle 5    ferrous sulfate (IRON) 325 mg (65 mg iron) EC tablet Take 1 Tablet by mouth two (2) times daily (with meals). 180 Tablet 3    nystatin (MYCOSTATIN) 100,000 unit/mL suspension Take 5 mL by mouth four (4) times daily as needed (thrush).  swish and spit 473 mL 2    pantoprazole (PROTONIX) 40 mg tablet TAKE 1 TABLET BY MOUTH EVERY DAY 90 Tab 3    budesonide-formoteroL (Symbicort) 160-4.5 mcg/actuation HFAA INHALE 2 PUFFS BY MOUTH TWICE DAILY FOR COPD ASSOCIATED WITH CHRONIC BRONCHITIS, EXTRINSIC ASTHMA. 30.6 Inhaler 2    buPROPion XL (WELLBUTRIN XL) 300 mg XL tablet TAKE 1 TABLET BY MOUTH EVERY DAY IN THE MORNING 90 Tab 2    metoprolol succinate (TOPROL-XL) 100 mg tablet TAKE 1 TABLET BY MOUTH EVERY DAY 90 Tab 1    montelukast (SINGULAIR) 10 mg tablet TAKE 1 TABLET BY MOUTH EVERY DAY IN THE EVENING 90 Tab 3    melatonin 10 mg tab Take 1 Tab by mouth At bedtime.  Lactobacillus acidophilus (PROBIOTIC PO) Take  by mouth.  lutein 40 mg cap Take  by mouth daily.  Blood-Glucose Meter monitoring kit 1 preferred brand glucometer for checking home glucose, E11.65 1 Kit 0    multivitamin (ONE A DAY) tablet Take 1 Tab by mouth daily.  EPINEPHrine (EPIPEN) 0.3 mg/0.3 mL injection 0.3 mg by IntraMUSCular route once as needed.        Allergies   Allergen Reactions    Latex Hives    Other Food Anaphylaxis and Hives     PEPPERONI, sloppy joes, frank cheese doritos ,peach jolly ranchers & green tea    Demerol [Meperidine] Anaphylaxis     Difficulty breathing, p[t states that she have tylenol#3, tramadol and lortab with percocet had no problem with any of them    Iodine Anaphylaxis    Morphine Other (comments)     voilent outbreaks (restraints needed), Difficulty breathing, p[t states that she have tylenol#3, tramadol and lortab with percocet had no problem with any of them    Nsaids (Non-Steroidal Anti-Inflammatory Drug) Hives     Past Medical History:   Diagnosis Date    Adverse effect of anesthesia 12/2020    dr. Maria Eugenia Echeverria, colonoscopy difficulty breathing under propafol    Arthritis     Asthma     Asthma     COPD     Depression     Diabetes ([facility]     DM2, insulin and oral meds    Diabetes mellitus     Encounter for review of form with patient 12/4/2017    Essential hypertension     GERD (gastroesophageal reflux disease)     Headache(784.0)     HX OTHER MEDICAL     acoustic neuroma    Hypercholesterolemia     Hypertension     Ill-defined condition     R ACOUSTIC NEUROMA    Insomnia disorder 12/4/2017    Major depression, chronic 12/4/2017    Mood disorder ([address] 7/20/2020    Psychiatric problem     anxiety depression    Psychotic disorder ([address]     Psychotic disorder ([facility] 8/14/2019    Screening for cervical cancer 2018    Tobacco use disorder 10/19/2017    Unspecified sleep apnea     denies, sleep study negative     Past Surgical History:   Procedure Laterality Date    COLONOSCOPY N/A 2020    COLONOS[facility] performed by D[facility]ne Fabry[facility] at [facility] ENDOSCOPY    COLONOSCOPY N/A 2021    COLONOS[facility] at [facility]  2020         Genevia Staggers  2021         COLONOSCPY,FLEX,W/ [address] INJECT  2020         COLONOSCPY,FLEX,W/ [address] INJECT  2021         HX GASTRECTOMY  15    lap sleeve gastrectomy by Dr Rajesh Perez  2014    sleeve    HX GYN      2 c-sections    HX HEENT      sinus    HX HEENT      tracheal resection    HX HEENT      tracheal alleratation x 2    HX HEENT      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    HX HEENT      LASER SX-PENG    [place]  DELIVERY ONLY      UPPER GI ENDOSCOPY,BIOPSY  2020          Family History   Problem Relation Age of Onset    Diabetes Father     Heart Failure Father     Heart Disease Father     Hypertension Father     High Cholesterol Mother     Stroke Mother     Suicide Brother     Stroke Maternal Grandmother     Cancer Paternal Grandfather     Anesth Problems Neg Hx      Social History     Tobacco Use    Smoking status: Former Smoker     Packs/day: 1.00     Years: 12.00     Pack years: 12.00     Quit date: 2018     Years since quittin.9    Smokeless tobacco: Former User   Substance Use Topics    Alcohol use: Not Currently      Lab Results   Component Value Date/Time    Hemoglobin A1c 8.4 (H) 2021 01:45 PM    Hemoglobin A1c 5.8 (H) 2021 04:25 PM    Hemoglobin A1c 7.9 (H) 2020 11:06 AM    Hemoglobin A1c, External 7.5% 2017 12:00 AM    Glucose 200 (H) 2021 09:25 AM    Glucose (POC) 192 (H) 2021 08:27 AM    Microalbumin/Creat ratio (mg/g creat) 415 (H) 06/08/2021 01:45 PM    Microalbumin,urine random 33.70 06/08/2021 01:45 PM    LDL, calculated 62.2 06/08/2021 01:45 PM    Creatinine 0.78 07/07/2021 09:25 AM      Lab Results   Component Value Date/Time    Cholesterol, total 160 06/08/2021 01:45 PM    HDL Cholesterol 75 06/08/2021 01:45 PM    LDL, calculated 62.2 06/08/2021 01:45 PM    LDL-C, External 152 04/26/2017 12:00 AM    Triglyceride 114 06/08/2021 01:45 PM    CHOL/HDL Ratio 2.1 06/08/2021 01:45 PM        Review of Systems   Constitutional: Negative for chills and fever. HENT: Negative for congestion and nosebleeds. Eyes: Negative for blurred vision and pain. Respiratory: Negative for cough, shortness of breath and wheezing. Cardiovascular: Negative for chest pain and leg swelling. Gastrointestinal: Negative for constipation, diarrhea, nausea and vomiting. Genitourinary: Negative for dysuria and frequency. Musculoskeletal: Negative for joint pain and myalgias. Skin: Negative for itching and rash. Neurological: Negative for dizziness, loss of consciousness and headaches. Psychiatric/Behavioral: Negative for depression. The patient is not nervous/anxious and does not have insomnia. Physical Exam  Vitals and nursing note reviewed. Constitutional:       Appearance: She is well-developed. HENT:      Head: Normocephalic and atraumatic. Eyes:      Conjunctiva/sclera: Conjunctivae normal.   Neck:      Vascular: No JVD. Cardiovascular:      Rate and Rhythm: Normal rate and regular rhythm. Heart sounds: Normal heart sounds. No murmur heard. Pulmonary:      Effort: Pulmonary effort is normal.      Breath sounds: Normal breath sounds. No stridor. Abdominal:      General: Bowel sounds are normal. There is no distension. Palpations: Abdomen is soft. There is no mass. Tenderness: There is no abdominal tenderness. Musculoskeletal:         General: Normal range of motion.       Cervical back: Normal range of motion and neck supple. Comments: Nl pulses, nl visual inspection nl monoF, +dystrophy and elongated Nails   Lymphadenopathy:      Cervical: No cervical adenopathy. Skin:     Findings: No erythema. Neurological:      Mental Status: She is alert and oriented to person, place, and time. Psychiatric:         Behavior: Behavior normal.         ASSESSMENT and PLAN  Diagnoses and all orders for this visit:    1. Type 2 diabetes mellitus with nephropathy (HCC)  -     flash glucose scanning reader (FreeStyle Yeny 2 [place]) misc; Tid prn  -     flash glucose sensor (FreeStyle Yeny 2 Sensor) kit; Tid prn    2.  Advice given about COVID-19 virus infection          No change of meds excpet of the diet, cont monitoring, repeat I 3 months was told to watch for low reading   Concern abdout COVID-19 addressed and detailed, pt was told that the best way to prevent illness is by protection, to Wear a facemask , having social distance, and to get tested if possible,   Pt was also told if develop dyspnea needs to call 911 or go to er, call for amanda advise, pt agreed with todays recommendations,

## 2021-07-23 ENCOUNTER — HOSPITAL ENCOUNTER (OUTPATIENT)
Dept: INFUSION THERAPY | Age: 61
Discharge: HOME | End: 2021-07-23
Payer: MEDICARE

## 2021-07-23 VITALS
HEART RATE: 81 BPM | RESPIRATION RATE: 18 BRPM | OXYGEN SATURATION: 98 % | SYSTOLIC BLOOD PRESSURE: 120 MMHG | TEMPERATURE: 98 F | DIASTOLIC BLOOD PRESSURE: 66 MMHG

## 2021-07-23 DIAGNOSIS — D50.9 IRON DEFICIENCY ANEMIA, UNSPECIFIED IRON DEFICIENCY ANEMIA TYPE: Primary | ICD-10-CM

## 2021-07-23 PROCEDURE — 96365 THER/PROPH/DIAG IV INF INIT: CPT

## 2021-07-23 PROCEDURE — 74011250636 HC RX REV CODE- 250/636: Performed by: STUDENT IN AN ORGANIZED HEALTH CARE EDUCATION/TRAINING PROGRAM

## 2021-07-23 RX ORDER — SODIUM CHLORIDE 0.9 % (FLUSH) 0.9 %
10 SYRINGE (ML) INJECTION AS NEEDED
Status: DISCONTINUED | OUTPATIENT
Start: 2021-07-23 | End: 2021-07-24 | Stop reason: HOSPADM

## 2021-07-23 RX ORDER — SODIUM CHLORIDE 9 MG/ML
25 INJECTION, SOLUTION INTRAVENOUS CONTINUOUS
Status: DISCONTINUED | OUTPATIENT
Start: 2021-07-23 | End: 2021-07-24 | Stop reason: HOSPADM

## 2021-07-23 RX ADMIN — FERRIC CARBOXYMALTOSE INJECTION 750 MG: 50 INJECTION, SOLUTION INTRAVENOUS at 15:23

## 2021-07-23 NOTE — PROGRESS NOTES
Pt arrived to [facility] ambulatory in no acute distress at 1500 for Injectafer 2/2.  Assessment unremarkable except bruising at last week's IV site. IV established in L wrist without issue and positive blood return noted. Patient denied having any symptoms of COVID-19, i.e. SOB, coughing, fever, or generally not feeling well. Also denies having been exposed to COVID-19 recently or having had any recent contact with family/friend that has a pending COVID test.    Visit Vitals  /69 (BP 1 Location: Left upper arm, BP Patient Position: Sitting)   Pulse 87   Temp 98 °F (36.7 °C)   Resp 18   LMP 01/17/2010   SpO2 98%       The following medications administered:  Medications Administered     ferric carboxymaltose (INJECTAFER) 750 mg in 0.9% sodium chloride 250 mL, overfill volume 25 mL IVPB     Admin Date  07/23/2021 Action  New Bag Dose  750 mg Rate  825 mL/hr Route  IntraVENous Administered By  Yisel Caal RN              Visit Vitals  /66   Pulse 81   Temp 98 °F (36.7 °C)   Resp 18   LMP 01/17/2010   SpO2 98%       Pt tolerated treatment well. Pt observed for 30 minutes post infusion. Discharge instructions reviewed. IV flushed per policy and removed, 2x2 and coban placed.  Pt discharged ambulatory in no acute distress at 1615, accompanied by self. No further appointments.

## 2021-08-13 ENCOUNTER — TELEPHONE (OUTPATIENT)
Dept: ENDOCRINOLOGY | Age: 61
End: 2021-08-13

## 2021-08-31 DIAGNOSIS — D50.9 IRON DEFICIENCY ANEMIA, UNSPECIFIED IRON DEFICIENCY ANEMIA TYPE: Primary | ICD-10-CM

## 2021-09-02 ENCOUNTER — TELEPHONE (OUTPATIENT)
Dept: ONCOLOGY | Age: 61
End: 2021-09-02

## 2021-09-02 NOTE — TELEPHONE ENCOUNTER
Patient called in anticipation of upcoming appointment with Dr. Filipe Arriaga. HIPAA verified by two patient identifiers. Requested that patient get labs done prior to appointment. Patient verbalized understanding and will  lab slip on 9/3/21 to have drawn.

## 2021-09-07 NOTE — PROGRESS NOTES
Trip Guillory is a 62 y.o. female here for 2 month follow up for anemia. Lab work done 9/3/21. 1. Have you been to the ER, urgent care clinic since your last visit? Hospitalized since your last visit? no    2. Have you seen or consulted any other health care providers outside of the [address] [place] [address] since your last visit? Include any pap smears or colon screening. no    Pt states her fatigue started again yesterday. She thought she would not be able to stand long enough to do the dishes. Her mouth is getting dry again.

## 2021-09-08 ENCOUNTER — OFFICE VISIT (OUTPATIENT)
Dept: ONCOLOGY | Age: 61
End: 2021-09-08
Payer: MEDICARE

## 2021-09-08 VITALS
HEIGHT: 68 IN | OXYGEN SATURATION: 97 % | WEIGHT: 166 LBS | DIASTOLIC BLOOD PRESSURE: 68 MMHG | SYSTOLIC BLOOD PRESSURE: 119 MMHG | TEMPERATURE: 98.1 F | BODY MASS INDEX: 25.16 KG/M2 | HEART RATE: 80 BPM

## 2021-09-08 DIAGNOSIS — R00.0 TACHYCARDIA: ICD-10-CM

## 2021-09-08 DIAGNOSIS — M25.50 ARTHRALGIA, UNSPECIFIED JOINT: Primary | ICD-10-CM

## 2021-09-08 DIAGNOSIS — F51.01 PRIMARY INSOMNIA: ICD-10-CM

## 2021-09-08 DIAGNOSIS — I10 ESSENTIAL HYPERTENSION: ICD-10-CM

## 2021-09-08 DIAGNOSIS — R53.83 FATIGUE, UNSPECIFIED TYPE: ICD-10-CM

## 2021-09-08 DIAGNOSIS — D50.9 IRON DEFICIENCY ANEMIA, UNSPECIFIED IRON DEFICIENCY ANEMIA TYPE: ICD-10-CM

## 2021-09-08 PROCEDURE — G8754 DIAS BP LESS 90: HCPCS | Performed by: STUDENT IN AN ORGANIZED HEALTH CARE EDUCATION/TRAINING PROGRAM

## 2021-09-08 PROCEDURE — G9717 DOC PT DX DEP/BP F/U NT REQ: HCPCS | Performed by: STUDENT IN AN ORGANIZED HEALTH CARE EDUCATION/TRAINING PROGRAM

## 2021-09-08 PROCEDURE — 3017F COLORECTAL CA SCREEN DOC REV: CPT | Performed by: STUDENT IN AN ORGANIZED HEALTH CARE EDUCATION/TRAINING PROGRAM

## 2021-09-08 PROCEDURE — G8427 DOCREV CUR MEDS BY ELIG CLIN: HCPCS | Performed by: STUDENT IN AN ORGANIZED HEALTH CARE EDUCATION/TRAINING PROGRAM

## 2021-09-08 PROCEDURE — G9899 SCRN MAM PERF RSLTS DOC: HCPCS | Performed by: STUDENT IN AN ORGANIZED HEALTH CARE EDUCATION/TRAINING PROGRAM

## 2021-09-08 PROCEDURE — G8419 CALC BMI OUT NRM PARAM NOF/U: HCPCS | Performed by: STUDENT IN AN ORGANIZED HEALTH CARE EDUCATION/TRAINING PROGRAM

## 2021-09-08 PROCEDURE — 99215 OFFICE O/P EST HI 40 MIN: CPT | Performed by: STUDENT IN AN ORGANIZED HEALTH CARE EDUCATION/TRAINING PROGRAM

## 2021-09-08 PROCEDURE — G8752 SYS BP LESS 140: HCPCS | Performed by: STUDENT IN AN ORGANIZED HEALTH CARE EDUCATION/TRAINING PROGRAM

## 2021-09-08 NOTE — PROGRESS NOTES
[address] [facility] at [address] [facility], [address]  W: [phone] F: [phone]      Reason for Visit:   Jonathan Riddle is a 62 y.o. female who is seen in consultation at the request of Dr. Lauren Hanks for evaluation of iron deficiency anemia. Hematology / Oncology Treatment History:     Hematological/Oncological Diagnosis: Iron deficiency anemia, severe anemia, persistent fatigue    Date of Diagnosis: 11/2/2020    Treatment course: s/p colonoscopy - showed diverticulosis and multiple polyps now resected 4/2021      History of Present Illness:   Very pleasant 21-year-old female with a relevant medical history significant for diabetes mellitus type 2, hypertension, GERD, IBS, asthma, hyperlipidemia, depression, presents for evaluation and treatment of severe microcytic anemia first noted in November 2020 without clear cause. The patient has been worked up by GI without clear source of bleeding. She did have multiple polyps that were resected as well as evidence of colonic diverticulosis but no active bleeding on scope. Most recent hemoglobin checked on June 8, 2021 was down to 7.6 g/dL with hematocrit of 27.9%. MCV was 74. No other clear abnormalities in cell lines, white blood cell count of 6.5 and differential does not appear to be grossly abnormal.  Platelet count is 066. Review of available CMP shows no significant kidney disease with normal LFTs and normal TSH. The patient endorses symptoms of severe fatigue but denies any other constitutional symptoms of concern for malignancy. No reported recent heavy weight loss, or other sources of bleeding. Most recent iron profile shows iron saturation of only 3% with a total iron binding capacity that is elevated. The patient has been taking oral iron twice a day for greater than 1 month.   The patient continues to have severe fatigue despite oral iron supplementation. Family history reviewed, noncontributory, social history reviewed, also noncontributory    Positive ROS findings include: Severe fatigue, exertional dyspnea  Review of Systems: A complete review of systems was obtained, negative except as described above. Interval History:     September 8, 2021:    Since last encounter, the patient had IV iron x2 on July 16 and July 23. She tolerated Injectafer very nicely without any intolerance. Repeat CBC and iron studies from last week show hemoglobin of improved to 11.5 g/dL, iron saturation 28% with a ferritin of 220. Further review of labs done after last visit included negative LANEY, normal creatinine, normal copper/B12/folic acid, EPO normal, haptoglobin normal, LDH normal.  Unfortunately, the patient reports that while she had marked improvement after IV iron supplementation, over the last 3 days, she feels severe fatigue, weakness,, lower extremity weakness, pain in her thighs, and nonspecific chest discomfort that came on like a flare of inflammatory syndrome. She denies any recent infections or sick contacts. No cough, fever reported.     Past Medical History:   Diagnosis Date    Adverse effect of anesthesia 12/2020    dr. Tiffany Vasquez, colonoscopy difficulty breathing under propafol    Arthritis     Asthma     Asthma     COPD     Depression     Diabetes ([facility] [address]     DM2, insulin and oral meds    Diabetes mellitus     Encounter for review of form with patient 12/4/2017    Essential hypertension     GERD (gastroesophageal reflux disease)     Headache(784.0)     HX OTHER MEDICAL     acoustic neuroma    Hypercholesterolemia     Hypertension     Ill-defined condition     R ACOUSTIC NEUROMA    Insomnia disorder 12/4/2017    Major depression, chronic 12/4/2017    Mood disorder ([facility] [place] [address] 7/20/2020    Psychiatric problem     anxiety depression    Psychotic disorder ([facility] [address]     Psychotic disorder ([facility] [address] 8/14/2019    Screening for cervical cancer 2018    Tobacco use disorder 10/19/2017    Unspecified sleep apnea     denies, sleep study negative      Past Surgical History:   Procedure Laterality Date    COLONOSCOPY N/A 2020    COLONOSCOPY [facility] at [facility] ENDOSCOPY    COLONOSCOPY N/A 2021    COLONOS[facility]field [facility] at [facility]  2020         Karla Carls  2021         COLONOSCPY,FLEX,W/ [address] INJECT  2020         COLONOSCPY,FLEX,W/ [address] INJECT  2021         HX GASTRECTOMY  15    lap sleeve gastrectomy by Dr Hilary Garces  2014    sleeve    HX GYN      2 c-sections    HX HEENT      sinus    HX HEENT      tracheal resection    HX HEENT      tracheal alleratation x 2    HX HEENT      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    HX HEENT      LASER SX-PENG    SD  DELIVERY ONLY      UPPER GI ENDOSCOPY,BIOPSY  2020           Social History     Tobacco Use    Smoking status: Former Smoker     Packs/day: 1.00     Years: 12.00     Pack years: 12.00     Quit date: 2018     Years since quitting: 3.1    Smokeless tobacco: Former User   Substance Use Topics    Alcohol use: Not Currently      Family History   Problem Relation Age of Onset    Diabetes Father     Heart Failure Father     Heart Disease Father     Hypertension Father     High Cholesterol Mother     Stroke Mother     Suicide Brother     Stroke Maternal Grandmother     Cancer Paternal Grandfather     Anesth Problems Neg Hx      Current Outpatient Medications   Medication Sig    tiotropium (Spiriva with HandiHaler) 18 mcg inhalation capsule INHALE THE CONTENTS OF 1 CAPSULE VIA HANDIHALER ONCE DAILY.  RINSE MOUTH AFTER USE    flash glucose scanning reader (FreeStyle Yeny 2 [place]) misc Tid prn    flash glucose sensor (FreeStyle Yeny 2 Sensor) kit Tid prn    Daliresp 500 mcg tab tablet TAKE 1 TABLET BY MOUTH EVERY DAY    Insulin Needles, Disposable, 31 gauge x 5/16\" ndle Use to inject insulin as needed per sliding scale    insulin NPH/insulin regular (NovoLIN 70/30 U-100 Insulin) 100 unit/mL (70-30) injection Inject 17 units with each meal 3 times daily + 1 units for every 50 mg/dl above 150 mg/dl    insulin regular human (NovoLIN R Flexpen) 100 unit/mL (3 mL) inpn Please obtain sugar level Before Meals, and If Blood Glucose is: Less than 150 mg/dL, No extra insulin needed to be given, if the glucose 151 - 200, give 2 units, if reading is 201 - 250, gives 4 units, if 251 - 300m, give 6 units, if reading is 301 - 350, give 8 units, if 351 - 400, give Subcutaneously 10 units, and call MD    rosuvastatin (CRESTOR) 40 mg tablet Take 1 Tablet by mouth daily.  Accu-Chek Renata Plus test strp strip CHECK GLUCOSE 4 TIMES DAILY, DX Code: E11.49    metFORMIN ER (GLUCOPHAGE XR) 500 mg tablet Take 2 Tablets by mouth Before breakfast and dinner.  Accu-Chek Softclix Lancets misc CHECK GLUCOSE 4 TIMES DAILY, DIAGNOSIS E11.65    traZODone (DESYREL) 100 mg tablet Take 1 Tablet by mouth nightly. Indications: insomnia associated with depression    Ventolin HFA 90 mcg/actuation inhaler Take 2 Puffs by inhalation every four (4) hours as needed for Wheezing.  linaCLOtide (Linzess) 290 mcg cap capsule Take 290 mcg by mouth Daily (before breakfast).  albuterol-ipratropium (DUO-NEB) 2.5 mg-0.5 mg/3 ml nebu 3 mL by Nebulization route every four (4) hours as needed for Wheezing.  varicella-zoster recombinant, PF, (Shingrix, PF,) 50 mcg/0.5 mL susr injection 0.5mL by IntraMUSCular route once now and then repeat in 2-6 months    OTHER,NON-FORMULARY, Vicks Vapor Rub    insulin syringe-needle U-100 (BD Veo Insulin Syringe UF) 1/2 mL 31 gauge x 15/64\" syrg For injecting insulin 3 times daily    ferrous sulfate (IRON) 325 mg (65 mg iron) EC tablet Take 1 Tablet by mouth two (2) times daily (with meals).     nystatin (MYCOSTATIN) 100,000 unit/mL suspension Take 5 mL by mouth four (4) times daily as needed (thrush). swish and spit    pantoprazole (PROTONIX) 40 mg tablet TAKE 1 TABLET BY MOUTH EVERY DAY    budesonide-formoteroL (Symbicort) 160-4.5 mcg/actuation HFAA INHALE 2 PUFFS BY MOUTH TWICE DAILY FOR COPD ASSOCIATED WITH CHRONIC BRONCHITIS, EXTRINSIC ASTHMA.  buPROPion XL (WELLBUTRIN XL) 300 mg XL tablet TAKE 1 TABLET BY MOUTH EVERY DAY IN THE MORNING    metoprolol succinate (TOPROL-XL) 100 mg tablet TAKE 1 TABLET BY MOUTH EVERY DAY    montelukast (SINGULAIR) 10 mg tablet TAKE 1 TABLET BY MOUTH EVERY DAY IN THE EVENING    melatonin 10 mg tab Take 1 Tab by mouth At bedtime.  Lactobacillus acidophilus (PROBIOTIC PO) Take  by mouth.  lutein 40 mg cap Take  by mouth daily.  Blood-Glucose Meter monitoring kit 1 preferred brand glucometer for checking home glucose, E11.65    multivitamin (ONE A DAY) tablet Take 1 Tab by mouth daily.  EPINEPHrine (EPIPEN) 0.3 mg/0.3 mL injection 0.3 mg by IntraMUSCular route once as needed. No current facility-administered medications for this visit.       Allergies   Allergen Reactions    Latex Hives    Other Food Anaphylaxis and Hives     PEPPERONI, sloppy joes, frank cheese doritos ,peach jolly ranchers & green tea    Demerol [Meperidine] Anaphylaxis     Difficulty breathing, p[t states that she have tylenol#3, tramadol and lortab with percocet had no problem with any of them    Iodine Anaphylaxis    Morphine Other (comments)     voilent outbreaks (restraints needed), Difficulty breathing, p[t states that she have tylenol#3, tramadol and lortab with percocet had no problem with any of them    Nsaids (Non-Steroidal Anti-Inflammatory Drug) Hives            Physical Exam:     Visit Vitals  /68 (BP 1 Location: Left upper arm, BP Patient Position: Sitting)   Pulse 80   Temp 98.1 °F (36.7 °C) (Temporal)   Ht 5' 8\" (1.727 m)   Wt 166 lb (75.3 kg)   LMP 01/17/2010 SpO2 97%   BMI 25.24 kg/m²     ECOG PS: 2  General: No distress  Eyes: PERRLA, anicteric sclerae  HENT: Atraumatic with normal appearance of ears and nose; OP clear  Neck: Supple; no visualized JVD  Lymphatic: No cervical, or supraclavicular lymphadenopathy. Respiratory: CTAB, normal respiratory effort  CV: Normal rate, regular rhythm, no murmurs, no peripheral edema  GI: Soft, nontender, nondistended, no palpable masses, no hepatomegaly, no splenomegaly  MS: Decreased strength in bilateral lower extremities with some muscle atrophy thighs bilaterally. Digits without clubbing or cyanosis. Skin: No rashes, ecchymoses, or petechiae. Normal temperature, turgor, and texture. Neuro/Psych: Alert, oriented, appropriate affect, normal judgment/insight      Results:     Lab Results   Component Value Date/Time    WBC 4.9 09/03/2021 08:55 AM    HGB 11.5 09/03/2021 08:55 AM    HCT 37.2 09/03/2021 08:55 AM    PLATELET 861 18/56/8128 08:55 AM    MCV 89.9 09/03/2021 08:55 AM    ABS. NEUTROPHILS 2.7 09/03/2021 08:55 AM     Lab Results   Component Value Date/Time    Sodium 138 07/07/2021 09:25 AM    Potassium 5.1 07/07/2021 09:25 AM    Chloride 106 07/07/2021 09:25 AM    CO2 28 07/07/2021 09:25 AM    Glucose 200 (H) 07/07/2021 09:25 AM    BUN 26 (H) 07/07/2021 09:25 AM    Creatinine 0.78 07/07/2021 09:25 AM    GFR est AA >60 07/07/2021 09:25 AM    GFR est non-AA >60 07/07/2021 09:25 AM    Calcium 9.6 07/07/2021 09:25 AM    Glucose (POC) 192 (H) 04/06/2021 08:27 AM     Lab Results   Component Value Date/Time    Bilirubin, total 0.3 07/07/2021 09:25 AM    ALT (SGPT) 50 07/07/2021 09:25 AM    Alk.  phosphatase 82 07/07/2021 09:25 AM    Protein, total 6.5 07/07/2021 09:25 AM    Protein, total 6.1 07/07/2021 09:25 AM    Albumin 3.6 07/07/2021 09:25 AM    Globulin 2.9 07/07/2021 09:25 AM       Relevant imaging reviewed, see HPI  Assessment and Recommendations:     # Microcytic anemia concerning for iron deficiency anemia with unclear cause  -The patient does take Protonix and has a history of irritable bowel syndrome which may make absorption of oral iron difficult. -Iron studies show repletion of iron stores after 2 doses of IV iron on 7/16 and 7/23  -Plan for follow-up in 6 months with repeat iron studies   -GI work-up was recently completed showing no clear cause for iron deficiency    # Severe fatigue, arthralgias, lower extremity weakness  -Does not appear to be directly related to hematological abnormality or iron deficiency.  -Suspect inflammatory phenomenon, LANEY is negative.  -We will check ESR, CRP, rheumatoid arthritis panel and repeat CBC today.  -Patient advised to go to the emergency department should her symptoms worsen      -Return to clinic in 6 months for repeat iron studies, consideration of repeat IV iron.   Patient will be contacted for further directions should her rheumatoid arthritis panel returned positive      Signed By: Yolie Barker MD      Attending [address] [place] [address]

## 2021-09-09 RX ORDER — METOPROLOL SUCCINATE 100 MG/1
TABLET, EXTENDED RELEASE ORAL
Qty: 90 TABLET | Refills: 1 | Status: SHIPPED | OUTPATIENT
Start: 2021-09-09 | End: 2021-11-26

## 2021-09-28 ENCOUNTER — PATIENT MESSAGE (OUTPATIENT)
Dept: FAMILY MEDICINE CLINIC | Age: 61
End: 2021-09-28

## 2021-09-28 DIAGNOSIS — E11.21 TYPE 2 DIABETES MELLITUS WITH NEPHROPATHY (HCC): ICD-10-CM

## 2021-09-28 RX ORDER — INSULIN ASPART 100 [IU]/ML
INJECTION, SUSPENSION SUBCUTANEOUS
Qty: 45 ML | Refills: 2 | Status: SHIPPED | OUTPATIENT
Start: 2021-09-28 | End: 2022-02-27

## 2021-09-28 RX ORDER — FLASH GLUCOSE SCANNING READER
EACH MISCELLANEOUS
Qty: 1 EACH | Refills: 2 | Status: SHIPPED | OUTPATIENT
Start: 2021-09-28

## 2021-09-28 RX ORDER — PEN NEEDLE, DIABETIC 30 GX3/16"
NEEDLE, DISPOSABLE MISCELLANEOUS
Qty: 100 EACH | Refills: 11 | Status: SHIPPED | OUTPATIENT
Start: 2021-09-28 | End: 2022-07-26

## 2021-09-28 RX ORDER — FLASH GLUCOSE SENSOR
KIT MISCELLANEOUS
Qty: 1 KIT | Refills: 3 | Status: SHIPPED | OUTPATIENT
Start: 2021-09-28

## 2021-10-07 DIAGNOSIS — F33.9 RECURRENT DEPRESSION (HCC): ICD-10-CM

## 2021-10-08 RX ORDER — BUPROPION HYDROCHLORIDE 300 MG/1
TABLET ORAL
Qty: 90 TABLET | Refills: 2 | Status: SHIPPED | OUTPATIENT
Start: 2021-10-08 | End: 2022-06-20

## 2021-10-18 DIAGNOSIS — J44.1 COPD WITH ACUTE EXACERBATION (HCC): ICD-10-CM

## 2021-10-18 DIAGNOSIS — R06.09 DYSPNEA ON EFFORT: ICD-10-CM

## 2021-10-18 RX ORDER — ROFLUMILAST 500 UG/1
TABLET ORAL
Qty: 90 TABLET | Refills: 1 | Status: SHIPPED | OUTPATIENT
Start: 2021-10-18 | End: 2022-05-18

## 2021-11-15 NOTE — PROGRESS NOTES
Joanna Glendale  1960  63 y.o.  female  [facility]    Chief Complaint   Patient presents with    Lethargy    Depression    Anxiety       [place]:   This is a 63years old divorce  female with past psychiatric history and treatment of manic depression who was seen for the first time on December 28, 2017 referred by her PCP to manage her complicated psychiatric medications.  She decided to gradually taper and discontinue Viibryd and high dose Paxil and try Abilify 1-2 mg daily and take Seroquel  mg at nighttime to help with sleep. She was seen for follow-up on February 1, 2018 and March 5, 2018 when she decided to gradually increase Abilify to 5 mg and Seroquel to 75 mg. Patient presented on time and was observed to be improved but reported considerable daytime tiredness and exhaustion and informed me that she is feeling no different than when she come to see me on first time. On further clarification she was able to appreciate that she is sleeping better, is mentally alert, and not crying all the time though mention that she think about crying all the time. She report compliance with Abilify 5 mg in morning and she is taking Seroquel 75 mg at 9 PM and goes to sleep at 11 PM and wake up a few times a night but is able to go back to sleep and wake up between 430-630 in morning when she takes her Abilify. She informed that for the first 2 weeks when she increase Seroquel she was taking 5-6 hours of daytime naps but now her body is adjusted and she is only taking 1-2 hours of daytime naps. Patient  has reported that when we gradually increase Abilify she had more energy in daytime and actually she clean out her closet and report 35% benefit with her mood but today she denies any improvement indicating that she would need higher dose of Abilify and agreed to try 7.5 mg for 2 weeks and increasing to 10 mg until return to see me in 4 weeks.   She was provided with support and psychoeducation. Carmen James denies any substance abuse at this time. She has appointment with sleep specialist on May 30 and is trying to get an earlier appointment. She denies any change with her psychosocial life and her main concern today were daytime fatigue, poor energy, sleeping and daytime and depression.      SUBSTANCE USE HISTORY:   TOBACCO: Smoked 1 pack per day for past 35 years and expressed desire to get help to quit his smoking but first would like to stabilize on her medications. ALCOHOL: Patient denies abuse. CANNABIS: Tried few times but never abused it. COCAINE, OPIOIDS, and OTHERS: Denies abuse. MEDICAL HISTORY:    has a past medical history of Asthma; Asthma; COPD; Depression; Diabetes ([facility] [address] Diabetes mellitus; Encounter for review of form with patient (12/4/2017); Essential hypertension; GERD (gastroesophageal reflux disease); Headache(784.0); OTHER MEDICAL; Hypercholesterolemia; Hypertension; Ill-defined condition; Insomnia disorder (12/4/2017); Major depression, chronic (12/4/2017); Psychiatric problem; Psychotic disorder; Tobacco use disorder (10/19/2017); and Unspecified sleep apnea. ALLERGIES:   Allergies   Allergen Reactions    Latex Hives    Other Food Anaphylaxis     PEPPERONI, sloppy joes    Demerol [Meperidine] Anaphylaxis     Difficulty breathing    Iodine Anaphylaxis    Morphine Other (comments)     voilent outbreaks (restraints needed)    Nsaids (Non-Steroidal Anti-Inflammatory Drug) Hives       VITAL SIGNS:  /64  Pulse 94  Ht 5' 8\" (1.727 m)  Wt 85.8 kg (189 lb 3.2 oz)  LMP 01/17/2010  BMI 28.77 kg/m2    Current Outpatient Prescriptions   Medication Sig Dispense Refill    ARIPiprazole (ABILIFY) 10 mg tablet Take 1 Tab by mouth daily. Indications: DEPRESSION TREATMENT ADJUNCT 30 Tab 2    tiotropium (SPIRIVA WITH HANDIHALER) 18 mcg inhalation capsule Take 1 Cap by inhalation daily.  30 Cap 0    albuterol (PROVENTIL VENTOLIN) 2.5 mg /3 mL (0.083 %) nebulizer solution 3 mL by Nebulization route every four (4) hours as needed for Wheezing or Shortness of Breath. 24 Each 0    QUEtiapine (SEROQUEL) 50 mg tablet Take 1 Tab by mouth daily. Indications: DEPRESSION TREATMENT ADJUNCT 30 Tab 2    QUEtiapine (SEROQUEL) 25 mg tablet Take 1 Tab by mouth nightly. Indications: DEPRESSION TREATMENT ADJUNCT 30 Tab 2    NOVOLIN R REGULAR U-100 INSULN 100 unit/mL injection       BYSTOLIC 10 mg tablet Take 2 tablets PO every AM 60 Tab 3    budesonide-formoterol (SYMBICORT) 160-4.5 mcg/actuation HFAA Take 2 Puffs by inhalation two (2) times a day. Indications: COPD ASSOCIATED WITH CHRONIC BRONCHITIS, EXTRINSIC ASTHMA 1 Inhaler 6    ipratropium (ATROVENT) 0.02 % soln 2.5 mL by Nebulization route every four (4) hours as needed. 70 Vial 7    pantoprazole (PROTONIX) 40 mg tablet Take (1) tablet by mouth once a day. 30 Tab 1    prednisoLONE acetate (PRED FORTE) 1 % ophthalmic suspension       SURE COMFORT INSULIN SYRINGE 1 mL 30 gauge x 5/16 syrg       fluticasone (FLONASE) 50 mcg/actuation nasal spray 2 sprays daily on each nostril for the next 8 weeks then 2 sprays on each nostril every other day for the next 4 weeks and thereafter 2 sprays on each nostril as needed 1 Bottle 6    ondansetron (ZOFRAN ODT) 4 mg disintegrating tablet Take 1 Tab by mouth every eight (8) hours as needed for Nausea. 10 Tab 0    Liraglutide (VICTOZA 3-ESTEPHANIE) 0.6 mg/0.1 mL (18 mg/3 mL) pnij 1.8 mg by SubCUTAneous route daily. Titrate up to 1.8mg daily as direcected 3 Pen 7    metFORMIN ER (GLUCOPHAGE XR) 500 mg tablet Take 1 Tab by mouth Before breakfast and dinner. 180 Tab 3    Oxygen O2 at 2 Liters NC while asleep      multivitamin (ONE A DAY) tablet Take 1 Tab by mouth daily.  Omega-3-DHA-EPA-Fish Oil 1,000 mg (120 mg-180 mg) cap Take  by mouth. Take 2 po daily      TOUJEO SOLOSTAR 300 unit/mL (1.5 mL) inpn 65 Units by SubCUTAneous route daily.  15 Pen 3    glucose blood VI test strips (EASYMAX N) strip Use to test blood sugar 4 times a day 200 Strip 3    rosuvastatin (CRESTOR) 40 mg tablet Take 1 Tab by mouth nightly. 90 Tab 3    EPINEPHrine (EPIPEN) 0.3 mg/0.3 mL injection 0.3 mg by IntraMUSCular route once as needed.  ergocalciferol (ERGOCALCIFEROL) 50,000 unit capsule Take 1 capsule by mouth every seven (7) days. 12 capsule 3    diphenhydrAMINE (BENADRYL) 25 mg capsule Take 25 mg by mouth every six (6) hours as needed.  cholecalciferol, vitamin d3, (VITAMIN D3) 1,000 unit tablet Take 400 Units by mouth daily. 2 CAPS DAILY      montelukast (SINGULAIR) 10 mg tablet Take 1 Tab by mouth daily. 30 Tab 6         MENTAL STATUS EXAMINATION:   Patient is a 63 y.o. female [facility] who looks older than her stated age. She is tall well-developed dickerson reddish hair nicely trimmed and has hoarse voice. Patient appearance is nicely dressed with good hygiene. Speech is regular rate and rhythm, fluent language, and thought process is linear, logical, and goal directed. Reported mood is depressed and anxious with mood congruent depressed and anxious affect. Patient denies any suicidal ideation, homicidal ideation, and auditory visual hallucination. Not observed to be delusional or paranoid. Insight, judgement, reliability and impulse control is intact. Her cognition is within normal limit and she is observed to be reliable. DIAGNOSIS:  Encounter Diagnoses   Name Primary?  Moderate episode of recurrent major depressive disorder (HCC) Yes    Anxiety     Insomnia, unspecified type        PLAN:  1. MEDICATION: Increase Abilify 7.5 mg for 2 weeks and 10 mg for next 2 weeks. Take Seroquel 75 mg at 7 PM.  Patient was provided with psycho education, discussed risk/benefits, and expectations from medication changes. Patient agrees with plan. 2. PSYCHOTHERAPY: Patient was provided with supportive therapy, strongly encourage to seek psychotherapy.   She has appointment for sleep study on May 30, 2018 and is trying to get an earlier appointment. 3. MEDICAL CARE: Patient was strongly encourage to take their medical medications and follow up with their PCP on regular basis. Labs done on January 23, 2018 were significant for neutrophils 80 high and lymphocyte 11 low and CMP significant for glucose 248 high. 4. SUBSTANCE ABUSE CARE: Patient is smoke 1 pack per day which is no change and she is not interested in decreasing or quitting. She denies any other substance abuse at this time. 5. FOLLOW UP:   Follow-up Disposition:  Return in about 4 weeks (around 5/2/2018) for Medication management. Quality 474: Zoster Vaccination Status: Shingrix Vaccination Administered or Previously Received Quality 110: Preventive Care And Screening: Influenza Immunization: Influenza Immunization Administered during Influenza season Quality 111:Pneumonia Vaccination Status For Older Adults: Pneumococcal Vaccination Previously Received Detail Level: Detailed Quality 130: Documentation Of Current Medications In The Medical Record: Current Medications Documented

## 2021-11-24 DIAGNOSIS — R00.0 TACHYCARDIA: ICD-10-CM

## 2021-11-24 DIAGNOSIS — F51.01 PRIMARY INSOMNIA: ICD-10-CM

## 2021-11-24 DIAGNOSIS — J44.9 ASTHMATIC BRONCHITIS , CHRONIC (HCC): ICD-10-CM

## 2021-11-24 DIAGNOSIS — H60.332 ACUTE SWIMMER'S EAR OF LEFT SIDE: ICD-10-CM

## 2021-11-24 DIAGNOSIS — F33.9 RECURRENT DEPRESSION (HCC): ICD-10-CM

## 2021-11-24 DIAGNOSIS — I10 ESSENTIAL HYPERTENSION: ICD-10-CM

## 2021-11-26 RX ORDER — METOPROLOL SUCCINATE 100 MG/1
TABLET, EXTENDED RELEASE ORAL
Qty: 90 TABLET | Refills: 1 | Status: SHIPPED | OUTPATIENT
Start: 2021-11-26 | End: 2022-06-27

## 2021-11-26 RX ORDER — MONTELUKAST SODIUM 10 MG/1
TABLET ORAL
Qty: 90 TABLET | Refills: 3 | Status: SHIPPED | OUTPATIENT
Start: 2021-11-26 | End: 2022-10-19

## 2021-11-26 RX ORDER — TRAZODONE HYDROCHLORIDE 100 MG/1
100 TABLET ORAL
Qty: 90 TABLET | Refills: 1 | Status: SHIPPED | OUTPATIENT
Start: 2021-11-26 | End: 2022-04-27

## 2021-12-01 DIAGNOSIS — I10 ESSENTIAL HYPERTENSION: ICD-10-CM

## 2021-12-01 DIAGNOSIS — E78.5 HYPERLIPIDEMIA, UNSPECIFIED HYPERLIPIDEMIA TYPE: ICD-10-CM

## 2021-12-01 DIAGNOSIS — Z79.4 TYPE 2 DIABETES MELLITUS WITH OTHER NEUROLOGIC COMPLICATION, WITH LONG-TERM CURRENT USE OF INSULIN (HCC): ICD-10-CM

## 2021-12-01 DIAGNOSIS — E11.49 TYPE 2 DIABETES MELLITUS WITH OTHER NEUROLOGIC COMPLICATION, WITH LONG-TERM CURRENT USE OF INSULIN (HCC): ICD-10-CM

## 2021-12-14 ENCOUNTER — OFFICE VISIT (OUTPATIENT)
Dept: FAMILY MEDICINE CLINIC | Age: 61
End: 2021-12-14
Payer: MEDICARE

## 2021-12-14 VITALS
OXYGEN SATURATION: 96 % | SYSTOLIC BLOOD PRESSURE: 136 MMHG | HEART RATE: 96 BPM | RESPIRATION RATE: 18 BRPM | WEIGHT: 163.7 LBS | HEIGHT: 68 IN | BODY MASS INDEX: 24.81 KG/M2 | DIASTOLIC BLOOD PRESSURE: 83 MMHG

## 2021-12-14 DIAGNOSIS — E11.21 TYPE 2 DIABETES MELLITUS WITH NEPHROPATHY (HCC): Primary | ICD-10-CM

## 2021-12-14 DIAGNOSIS — D50.8 OTHER IRON DEFICIENCY ANEMIA: ICD-10-CM

## 2021-12-14 DIAGNOSIS — Z71.89 ADVICE GIVEN ABOUT COVID-19 VIRUS INFECTION: ICD-10-CM

## 2021-12-14 DIAGNOSIS — J44.1 COPD WITH ACUTE EXACERBATION (HCC): ICD-10-CM

## 2021-12-14 DIAGNOSIS — Z12.31 BREAST CANCER SCREENING BY MAMMOGRAM: ICD-10-CM

## 2021-12-14 LAB
ALBUMIN SERPL-MCNC: 4.2 G/DL (ref 3.5–5)
ALBUMIN/GLOB SERPL: 1.5 {RATIO} (ref 1.1–2.2)
ALP SERPL-CCNC: 111 U/L (ref 45–117)
ALT SERPL-CCNC: 39 U/L (ref 12–78)
ANION GAP SERPL CALC-SCNC: 6 MMOL/L (ref 5–15)
AST SERPL-CCNC: 27 U/L (ref 15–37)
BILIRUB SERPL-MCNC: 0.5 MG/DL (ref 0.2–1)
BUN SERPL-MCNC: 23 MG/DL (ref 6–20)
BUN/CREAT SERPL: 20 (ref 12–20)
CALCIUM SERPL-MCNC: 10.1 MG/DL (ref 8.5–10.1)
CHLORIDE SERPL-SCNC: 103 MMOL/L (ref 97–108)
CO2 SERPL-SCNC: 25 MMOL/L (ref 21–32)
CREAT SERPL-MCNC: 1.16 MG/DL (ref 0.55–1.02)
FERRITIN SERPL-MCNC: 238 NG/ML (ref 26–388)
GLOBULIN SER CALC-MCNC: 2.8 G/DL (ref 2–4)
GLUCOSE SERPL-MCNC: 406 MG/DL (ref 65–100)
IRON SATN MFR SERPL: 28 % (ref 20–50)
IRON SERPL-MCNC: 85 UG/DL (ref 35–150)
POTASSIUM SERPL-SCNC: 5 MMOL/L (ref 3.5–5.1)
PROT SERPL-MCNC: 7 G/DL (ref 6.4–8.2)
SODIUM SERPL-SCNC: 134 MMOL/L (ref 136–145)
TIBC SERPL-MCNC: 308 UG/DL (ref 250–450)

## 2021-12-14 PROCEDURE — G8754 DIAS BP LESS 90: HCPCS | Performed by: FAMILY MEDICINE

## 2021-12-14 PROCEDURE — G8420 CALC BMI NORM PARAMETERS: HCPCS | Performed by: FAMILY MEDICINE

## 2021-12-14 PROCEDURE — 2022F DILAT RTA XM EVC RTNOPTHY: CPT | Performed by: FAMILY MEDICINE

## 2021-12-14 PROCEDURE — 3017F COLORECTAL CA SCREEN DOC REV: CPT | Performed by: FAMILY MEDICINE

## 2021-12-14 PROCEDURE — 3052F HG A1C>EQUAL 8.0%<EQUAL 9.0%: CPT | Performed by: FAMILY MEDICINE

## 2021-12-14 PROCEDURE — G8427 DOCREV CUR MEDS BY ELIG CLIN: HCPCS | Performed by: FAMILY MEDICINE

## 2021-12-14 PROCEDURE — G8752 SYS BP LESS 140: HCPCS | Performed by: FAMILY MEDICINE

## 2021-12-14 PROCEDURE — G9899 SCRN MAM PERF RSLTS DOC: HCPCS | Performed by: FAMILY MEDICINE

## 2021-12-14 PROCEDURE — 99214 OFFICE O/P EST MOD 30 MIN: CPT | Performed by: FAMILY MEDICINE

## 2021-12-14 PROCEDURE — G9717 DOC PT DX DEP/BP F/U NT REQ: HCPCS | Performed by: FAMILY MEDICINE

## 2021-12-14 RX ORDER — FERROUS SULFATE 15 MG/ML
15 DROPS ORAL 3 TIMES DAILY
Qty: 90 ML | Refills: 3 | Status: SHIPPED | OUTPATIENT
Start: 2021-12-14

## 2021-12-14 RX ORDER — ZOSTER VACCINE RECOMBINANT, ADJUVANTED 50 MCG/0.5
0.5 KIT INTRAMUSCULAR ONCE
Qty: 0.5 ML | Refills: 0 | Status: SHIPPED | OUTPATIENT
Start: 2021-12-14 | End: 2021-12-14

## 2021-12-14 NOTE — PROGRESS NOTES
Chief Complaint   Patient presents with    Fatigue     1. Have you been to the ER, urgent care clinic since your last visit? Hospitalized since your last visit? No    2. Have you seen or consulted any other health care providers outside of the [address] [place] [address] since your last visit? Include any pap smears or colon screening. No     Verified patient with two type of identifiers. itching back, arms face  Fatigue, unable to walk around without being tired. X 1 week.

## 2021-12-14 NOTE — PROGRESS NOTES
HISTORY OF PRESENT ILLNESS  Jose L Monae is a 62 y.o. female. Today's Covid vaccinatedX1 Pt main concerns were provided in the clinic,    pt is w/ comorbid history and   Pt has been trying to wear mask most of the times,  pt has no fever no cough     Anemia  Present with history of low blood count and currently stating that is not aware of any personal nor family history of any coagulopathy as far as the pt aware, currently on no iron therapy,  patient has currently no gross bleeding, no blood in stool nor in urine, no tarry stool,  no bleeding gum, not a vegetarian    COPD   Stable, no recent JOSEPH,      today patient w/diabetic state, who has been compliancy w/ meds,  last a1c was not at target of 8.4% %, has been provided care by her Endo,     Current Outpatient Medications   Medication Sig Dispense Refill    glucose blood VI test strips (Accu-Chek Guide test strips) strip Test blood sugar 4 times daily. Dx E11.40 200 Strip 11    Blood-Glucose Meter (Accu-Chek Guide Glucose Meter) misc Test blood sugar four times daily 1 Each 0    traZODone (DESYREL) 100 mg tablet TAKE 1 TABLET BY MOUTH NIGHTLY. INDICATIONS: INSOMNIA ASSOCIATED WITH DEPRESSION 90 Tablet 1    tiotropium (Spiriva with HandiHaler) 18 mcg inhalation capsule INHALE THE CONTENTS OF 1 CAPSULE VIA HANDIHALER ONCE DAILY.  RINSE MOUTH AFTER USE 90 Capsule 1    montelukast (SINGULAIR) 10 mg tablet TAKE 1 TABLET EVERY EVENING 90 Tablet 3    metoprolol succinate (TOPROL-XL) 100 mg tablet TAKE 1 TABLET EVERY DAY 90 Tablet 1    Daliresp 500 mcg tab tablet TAKE 1 TABLET EVERY DAY 90 Tablet 1    buPROPion XL (WELLBUTRIN XL) 300 mg XL tablet TAKE 1 TABLET EVERY MORNING 90 Tablet 2    insulin aspart protamine/insulin aspart (NOVOLOG MIX 70/30) 100 unit/mL (70-30) inpn Inject 17 units with each meal 3 times daily + 1 units for every 50 mg/dl above 150 mg/dl 45 mL 2    insulin NPH/insulin regular (NovoLIN 70/30 U-100 Insulin) 100 unit/mL (70-30) injection Inject 17 units with each meal 3 times daily + 1 units for every 50 mg/dl above 150 mg/dl 6 Vial 3    insulin regular human (NovoLIN R Flexpen) 100 unit/mL (3 mL) inpn Please obtain sugar level Before Meals, and If Blood Glucose is: Less than 150 mg/dL, No extra insulin needed to be given, if the glucose 151 - 200, give 2 units, if reading is 201 - 250, gives 4 units, if 251 - 300m, give 6 units, if reading is 301 - 350, give 8 units, if 351 - 400, give Subcutaneously 10 units, and call MD 1 Syringe 3    rosuvastatin (CRESTOR) 40 mg tablet Take 1 Tablet by mouth daily. 90 Tablet 3    metFORMIN ER (GLUCOPHAGE XR) 500 mg tablet Take 2 Tablets by mouth Before breakfast and dinner. 360 Tablet 3    Accu-Chek Softclix Lancets misc CHECK GLUCOSE 4 TIMES DAILY, DIAGNOSIS E11.65 400 Each 3    Ventolin HFA 90 mcg/actuation inhaler Take 2 Puffs by inhalation every four (4) hours as needed for Wheezing. 3 Inhaler 3    albuterol-ipratropium (DUO-NEB) 2.5 mg-0.5 mg/3 ml nebu 3 mL by Nebulization route every four (4) hours as needed for Wheezing. 30 Nebule 11    ferrous sulfate (IRON) 325 mg (65 mg iron) EC tablet Take 1 Tablet by mouth two (2) times daily (with meals). 180 Tablet 3    pantoprazole (PROTONIX) 40 mg tablet TAKE 1 TABLET BY MOUTH EVERY DAY 90 Tab 3    budesonide-formoteroL (Symbicort) 160-4.5 mcg/actuation HFAA INHALE 2 PUFFS BY MOUTH TWICE DAILY FOR COPD ASSOCIATED WITH CHRONIC BRONCHITIS, EXTRINSIC ASTHMA. 30.6 Inhaler 2    melatonin 10 mg tab Take 1 Tab by mouth At bedtime.  Lactobacillus acidophilus (PROBIOTIC PO) Take  by mouth.  Blood-Glucose Meter monitoring kit 1 preferred brand glucometer for checking home glucose, E11.65 1 Kit 0    multivitamin (ONE A DAY) tablet Take 1 Tab by mouth daily.       flash glucose scanning reader (FreeStyle Yeny 2 [place]) misc Tid prn (Patient not taking: Reported on 12/14/2021) 1 Each 2    flash glucose sensor (FreeStyle Yeny 2 Sensor) kit Tid prn (Patient not taking: Reported on 12/14/2021) 1 Kit 3    Insulin Needles, Disposable, 31 gauge x 5/16\" ndle Use to inject insulin as needed per sliding scale 100 Each 11    linaCLOtide (Linzess) 290 mcg cap capsule Take 290 mcg by mouth Daily (before breakfast). (Patient not taking: Reported on 12/14/2021)      varicella-zoster recombinant, PF, (Shingrix, PF,) 50 mcg/0.5 mL susr injection 0.5mL by IntraMUSCular route once now and then repeat in 2-6 months (Patient not taking: Reported on 12/14/2021) 0.5 mL 1    OTHER,NON-FORMULARY, Vicks Vapor Rub (Patient not taking: Reported on 12/14/2021)      insulin syringe-needle U-100 (BD Veo Insulin Syringe UF) 1/2 mL 31 gauge x 15/64\" syrg For injecting insulin 3 times daily 300 Pen Needle 5    nystatin (MYCOSTATIN) 100,000 unit/mL suspension Take 5 mL by mouth four (4) times daily as needed (thrush). swish and spit (Patient not taking: Reported on 12/14/2021) 473 mL 2    lutein 40 mg cap Take  by mouth daily. (Patient not taking: Reported on 12/14/2021)      EPINEPHrine (EPIPEN) 0.3 mg/0.3 mL injection 0.3 mg by IntraMUSCular route once as needed.        Allergies   Allergen Reactions    Latex Hives    Other Food Anaphylaxis and Hives     PEPPERONI, sloppy joes, frank cheese doritos ,peach jolly ranchers & green tea    Demerol [Meperidine] Anaphylaxis     Difficulty breathing, p[t states that she have tylenol#3, tramadol and lortab with percocet had no problem with any of them    Iodine Anaphylaxis    Morphine Other (comments)     voilent outbreaks (restraints needed), Difficulty breathing, p[t states that she have tylenol#3, tramadol and lortab with percocet had no problem with any of them    Nsaids (Non-Steroidal Anti-Inflammatory Drug) Hives     Past Medical History:   Diagnosis Date    Adverse effect of anesthesia 12/2020    dr. Kirstie Joshi, colonoscopy difficulty breathing under propafol    Arthritis     Asthma     Asthma     COPD     Depression     Diabetes ([facility]     DM2, insulin and oral meds    Diabetes mellitus     Encounter for review of form with patient 2017    Essential hypertension     GERD (gastroesophageal reflux disease)     Headache(784.0)     HX OTHER MEDICAL     acoustic neuroma    Hypercholesterolemia     Hypertension     Ill-defined condition     R ACOUSTIC NEUROMA    Insomnia disorder 2017    Major depression, chronic 2017    Mood disorder ([facility] 2020    Psychiatric problem     anxiety depression    Psychotic disorder ([facility]     [facility]    Screening for cervical cancer 2018    Tobacco use disorder 10/19/2017    Unspecified sleep apnea     denies, sleep study negative     Past Surgical History:   Procedure Laterality Date    COLONOSCOPY N/A 2020    COLONOSCOPY performed by Jacob Frye MD at [facility]"" ENDOSCOPY    COLONOSCOPY N/A 2021    [facility]  2020         COLONOSCOPY,[facility] [place] [facility]  2021         COLONOSCPY,FLEX,W/ [address] INJECT  2020         COLONOSCPY,FLEX,W/ [address] INJECT  2021         HX GASTRECTOMY  15    lap sleeve gastrectomy by Dr Potts Flair  2014    sleeve    HX GYN      2 c-sections    HX HEENT      sinus    HX HEENT      tracheal resection    HX HEENT      tracheal alleratation x 2    HX HEENT      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    HX HEENT      LASER SX-PENG    CA  DELIVERY ONLY      UPPER GI ENDOSCOPY,BIOPSY  2020          Family History   Problem Relation Age of Onset    Diabetes Father     Heart Failure Father     Heart Disease Father     Hypertension Father     High Cholesterol Mother     Stroke Mother     Suicide Brother     Stroke Maternal Grandmother     Cancer Paternal Grandfather     Anesth Problems Neg Hx      Social History     Tobacco Use    Smoking status: Former Smoker     Packs/day: 1.00     Years: 12.00     Pack years: 12.00     Quit date: 08/2018     Years since quitting: 3.3    Smokeless tobacco: Former User   Substance Use Topics    Alcohol use: Not Currently      Lab Results   Component Value Date/Time    WBC 4.8 09/08/2021 10:00 AM    HGB 12.3 09/08/2021 10:00 AM    HCT 39.7 09/08/2021 10:00 AM    PLATELET 318 49/45/2380 10:00 AM    MCV 90.0 09/08/2021 10:00 AM     Lab Results   Component Value Date/Time    ALT (SGPT) 50 07/07/2021 09:25 AM    Alk. phosphatase 82 07/07/2021 09:25 AM    Bilirubin, total 0.3 07/07/2021 09:25 AM    Albumin 3.6 07/07/2021 09:25 AM    Protein, total 6.5 07/07/2021 09:25 AM    Protein, total 6.1 07/07/2021 09:25 AM    PLATELET 846 75/96/4221 10:00 AM        Review of Systems   Constitutional: Negative for chills, fever and malaise/fatigue. HENT: Negative for nosebleeds. Eyes: Negative for pain. Respiratory: Negative for cough and wheezing. Cardiovascular: Negative for chest pain and leg swelling. Gastrointestinal: Negative for constipation, diarrhea and nausea. Genitourinary: Negative for frequency. Musculoskeletal: Negative for joint pain and myalgias. Skin: Negative for rash. Neurological: Negative for loss of consciousness and headaches. Endo/Heme/Allergies: Does not bruise/bleed easily. Psychiatric/Behavioral: Negative for depression. The patient is not nervous/anxious and does not have insomnia. All other systems reviewed and are negative. Physical Exam  Vitals and nursing note reviewed. Constitutional:       Appearance: She is well-developed. HENT:      Head: Normocephalic and atraumatic. Eyes:      Conjunctiva/sclera: Conjunctivae normal.   Cardiovascular:      Rate and Rhythm: Normal rate and regular rhythm. Heart sounds: Normal heart sounds. No murmur heard. Pulmonary:      Effort: Pulmonary effort is normal.      Breath sounds: Normal breath sounds.    Abdominal: General: Bowel sounds are normal. There is no distension. Palpations: Abdomen is soft. Musculoskeletal:         General: Normal range of motion. Cervical back: Normal range of motion and neck supple. Lymphadenopathy:      Cervical: No cervical adenopathy. Skin:     Findings: No erythema. Neurological:      Mental Status: She is alert and oriented to person, place, and time. Psychiatric:         Behavior: Behavior normal.         ASSESSMENT and PLAN  Diagnoses and all orders for this visit:    1. Type 2 diabetes mellitus with nephropathy (HCC)  -     varicella-zoster recombinant, PF, (Shingrix, PF,) 50 mcg/0.5 mL susr injection; 0.5 mL by IntraMUSCular route once for 1 dose. -     IRON PROFILE; Future  -     FERRITIN; Future  -     ferrous sulfate (ELDON-IN-SOL)15 mg iron(75 mg)/ml oral drops; Take 1 mL by mouth three (3) times daily.  -     METABOLIC PANEL, COMPREHENSIVE; Future    2. COPD with acute exacerbation (HCC)  -     varicella-zoster recombinant, PF, (Shingrix, PF,) 50 mcg/0.5 mL susr injection; 0.5 mL by IntraMUSCular route once for 1 dose. -     IRON PROFILE; Future  -     FERRITIN; Future  -     ferrous sulfate (ELDON-IN-SOL)15 mg iron(75 mg)/ml oral drops; Take 1 mL by mouth three (3) times daily.  -     METABOLIC PANEL, COMPREHENSIVE; Future    3. Other iron deficiency anemia  -     varicella-zoster recombinant, PF, (Shingrix, PF,) 50 mcg/0.5 mL susr injection; 0.5 mL by IntraMUSCular route once for 1 dose. -     IRON PROFILE; Future  -     FERRITIN; Future  -     ferrous sulfate (ELDON-IN-SOL)15 mg iron(75 mg)/ml oral drops; Take 1 mL by mouth three (3) times daily.  -     METABOLIC PANEL, COMPREHENSIVE; Future    4. Breast cancer screening by mammogram  -     [facility] MAMMO BI SCREENING INCL CAD; Future    5.  Advice given about COVID-19 virus infection    Other orders  -     VITAMIN B1, WHOLE BLOOD     Concern abdout COVID-19 addressed and detailed, pt was told that the best way to prevent illness is by protection with vaccination, and to Wear a facemask , having social distance, and to get tested if possible,   Pt was also told if develop dyspnea needs to call 911 or go to er, call for amanda advise, pt agreed with todays recommendations,

## 2021-12-17 LAB — VIT B1 BLD-SCNC: 132 NMOL/L (ref 66.5–200)

## 2022-01-26 DIAGNOSIS — J44.9 ASTHMATIC BRONCHITIS , CHRONIC (HCC): ICD-10-CM

## 2022-01-30 RX ORDER — ALBUTEROL SULFATE 90 UG/1
AEROSOL, METERED RESPIRATORY (INHALATION)
Qty: 54 G | Refills: 0 | Status: SHIPPED | OUTPATIENT
Start: 2022-01-30 | End: 2022-03-13 | Stop reason: SDUPTHER

## 2022-02-23 RX ORDER — IPRATROPIUM BROMIDE AND ALBUTEROL SULFATE 2.5; .5 MG/3ML; MG/3ML
3 SOLUTION RESPIRATORY (INHALATION)
Qty: 30 NEBULE | Refills: 11 | Status: SHIPPED | OUTPATIENT
Start: 2022-02-23

## 2022-02-23 RX ORDER — SYRINGE AND NEEDLE,INSULIN,1ML 31GX15/64"
SYRINGE, EMPTY DISPOSABLE MISCELLANEOUS
Qty: 300 PEN NEEDLE | Refills: 5 | Status: SHIPPED | OUTPATIENT
Start: 2022-02-23

## 2022-02-23 NOTE — TELEPHONE ENCOUNTER
Last visit:12/14/21  Next visit: not scheduled  Previous refill 6/7/21(300+5R) Patient wants medications sent to [address] [place] [address]  Requested Prescriptions     Pending Prescriptions Disp Refills    albuterol-ipratropium (DUO-NEB) 2.5 mg-0.5 mg/3 ml nebu 30 Nebule 11     Sig: 3 mL by Nebulization route every four (4) hours as needed for Wheezing.     insulin syringe-needle U-100 (BD Veo Insulin Syringe UF) 1/2 mL 31 gauge x 15/64\" syrg 300 Pen Needle 5     Sig: For injecting insulin 3 times daily

## 2022-02-27 RX ORDER — INSULIN ASPART 100 [IU]/ML
INJECTION, SUSPENSION SUBCUTANEOUS
Qty: 60 ML | Refills: 3 | Status: SHIPPED | OUTPATIENT
Start: 2022-02-27

## 2022-03-09 ENCOUNTER — OFFICE VISIT (OUTPATIENT)
Dept: FAMILY MEDICINE CLINIC | Age: 62
End: 2022-03-09
Payer: MEDICARE

## 2022-03-09 VITALS
WEIGHT: 161 LBS | BODY MASS INDEX: 24.4 KG/M2 | HEIGHT: 68 IN | HEART RATE: 88 BPM | SYSTOLIC BLOOD PRESSURE: 117 MMHG | DIASTOLIC BLOOD PRESSURE: 66 MMHG | TEMPERATURE: 97.9 F | OXYGEN SATURATION: 95 % | RESPIRATION RATE: 16 BRPM

## 2022-03-09 DIAGNOSIS — J41.0 SIMPLE CHRONIC BRONCHITIS (HCC): ICD-10-CM

## 2022-03-09 DIAGNOSIS — J44.9 ASTHMATIC BRONCHITIS , CHRONIC (HCC): ICD-10-CM

## 2022-03-09 DIAGNOSIS — M25.522 LEFT ELBOW PAIN: ICD-10-CM

## 2022-03-09 DIAGNOSIS — E11.21 TYPE 2 DIABETES MELLITUS WITH NEPHROPATHY (HCC): ICD-10-CM

## 2022-03-09 DIAGNOSIS — F20.0 PARANOID SCHIZOPHRENIA (HCC): ICD-10-CM

## 2022-03-09 DIAGNOSIS — M79.642 PAIN OF LEFT HAND: Primary | ICD-10-CM

## 2022-03-09 PROCEDURE — 99213 OFFICE O/P EST LOW 20 MIN: CPT | Performed by: FAMILY MEDICINE

## 2022-03-09 PROCEDURE — 2022F DILAT RTA XM EVC RTNOPTHY: CPT | Performed by: FAMILY MEDICINE

## 2022-03-09 PROCEDURE — G9899 SCRN MAM PERF RSLTS DOC: HCPCS | Performed by: FAMILY MEDICINE

## 2022-03-09 PROCEDURE — G9717 DOC PT DX DEP/BP F/U NT REQ: HCPCS | Performed by: FAMILY MEDICINE

## 2022-03-09 PROCEDURE — 3046F HEMOGLOBIN A1C LEVEL >9.0%: CPT | Performed by: FAMILY MEDICINE

## 2022-03-09 PROCEDURE — G8752 SYS BP LESS 140: HCPCS | Performed by: FAMILY MEDICINE

## 2022-03-09 PROCEDURE — G8420 CALC BMI NORM PARAMETERS: HCPCS | Performed by: FAMILY MEDICINE

## 2022-03-09 PROCEDURE — G8427 DOCREV CUR MEDS BY ELIG CLIN: HCPCS | Performed by: FAMILY MEDICINE

## 2022-03-09 PROCEDURE — G8754 DIAS BP LESS 90: HCPCS | Performed by: FAMILY MEDICINE

## 2022-03-09 PROCEDURE — 3017F COLORECTAL CA SCREEN DOC REV: CPT | Performed by: FAMILY MEDICINE

## 2022-03-09 RX ORDER — NORTRIPTYLINE HYDROCHLORIDE 25 MG/1
25 CAPSULE ORAL
Qty: 30 CAPSULE | Refills: 0 | Status: SHIPPED | OUTPATIENT
Start: 2022-03-09

## 2022-03-09 RX ORDER — DICLOFENAC SODIUM 10 MG/G
4 GEL TOPICAL 4 TIMES DAILY
Qty: 150 G | Refills: 1 | Status: SHIPPED | OUTPATIENT
Start: 2022-03-09

## 2022-03-09 NOTE — PROGRESS NOTES
HISTORY OF PRESENT ILLNESS  Hodan Walsh is a 62 y.o. female, A Covid vaccinated x3,and masked pt w/ comorbid history such as diabetic state for which has been provided care by [facility],  Denies flu like sxs, her chronic bronchitis stabel, no cough no wheezing, compliant with her psych meds, no s h ideation  Today patient complains of paresthesia, patient has the sensation of the pins and needles, in addition,  the patient also states that the ext/limb falls asleep and sometimes patient has the sensation of bugs crawling underneath the skin patient denies any herpetic lesion in the past, and has not done any nerve study, gabapentin has been helping the patient greatly with the pain and tingling sensation unfortunately recently patient ran out of the medication and since then the pain and tingling sensation is worsening without the current medication is 7 out of 10 radiating down to bilateral toes and hands and the condition not getting better without the medication        hydrocondon oxycodone,   Gabapentin lyrica gave pt suicidal thoughts  None helping    Current Outpatient Medications   Medication Sig Dispense Refill    insulin aspart protamine/insulin aspart (NovoLOG Mix 70-30FlexPen U-100) 100 unit/mL (70-30) inpn INJECT 17 UNITS SUBCUTANEOUSLY WITH EACH MEAL THREE TIMES DAILY PLUS 1 UNIT FOR EVERY 50MG/DL ABOVE 150MG/DL 60 mL 3    albuterol-ipratropium (DUO-NEB) 2.5 mg-0.5 mg/3 ml nebu 3 mL by Nebulization route every four (4) hours as needed for Wheezing. 30 Nebule 11    insulin syringe-needle U-100 (BD Veo Insulin Syringe UF) 1/2 mL 31 gauge x 15/64\" syrg For injecting insulin 3 times daily 300 Pen Needle 5    Ventolin HFA 90 mcg/actuation inhaler INHALE 2 PUFFS EVERY 4 HOURS AS NEEDED FOR WHEEZING 54 g 0    ferrous sulfate (ELDON-IN-SOL)15 mg iron(75 mg)/ml oral drops Take 1 mL by mouth three (3) times daily.  90 mL 3    Blood-Glucose Meter (Accu-Chek Guide Glucose Meter) misc Test blood sugar four times daily 1 Each 0    tiotropium (Spiriva with HandiHaler) 18 mcg inhalation capsule INHALE THE CONTENTS OF 1 CAPSULE VIA HANDIHALER ONCE DAILY. RINSE MOUTH AFTER USE 90 Capsule 1    montelukast (SINGULAIR) 10 mg tablet TAKE 1 TABLET EVERY EVENING 90 Tablet 3    metoprolol succinate (TOPROL-XL) 100 mg tablet TAKE 1 TABLET EVERY DAY 90 Tablet 1    Daliresp 500 mcg tab tablet TAKE 1 TABLET EVERY DAY 90 Tablet 1    buPROPion XL (WELLBUTRIN XL) 300 mg XL tablet TAKE 1 TABLET EVERY MORNING 90 Tablet 2    Insulin Needles, Disposable, 31 gauge x 5/16\" ndle Use to inject insulin as needed per sliding scale 100 Each 11    insulin NPH/insulin regular (NovoLIN 70/30 U-100 Insulin) 100 unit/mL (70-30) injection Inject 17 units with each meal 3 times daily + 1 units for every 50 mg/dl above 150 mg/dl 6 Vial 3    insulin regular human (NovoLIN R Flexpen) 100 unit/mL (3 mL) inpn Please obtain sugar level Before Meals, and If Blood Glucose is: Less than 150 mg/dL, No extra insulin needed to be given, if the glucose 151 - 200, give 2 units, if reading is 201 - 250, gives 4 units, if 251 - 300m, give 6 units, if reading is 301 - 350, give 8 units, if 351 - 400, give Subcutaneously 10 units, and call MD 1 Syringe 3    rosuvastatin (CRESTOR) 40 mg tablet Take 1 Tablet by mouth daily. 90 Tablet 3    metFORMIN ER (GLUCOPHAGE XR) 500 mg tablet Take 2 Tablets by mouth Before breakfast and dinner. 360 Tablet 3    Accu-Chek Softclix Lancets misc CHECK GLUCOSE 4 TIMES DAILY, DIAGNOSIS E11.65 400 Each 3    pantoprazole (PROTONIX) 40 mg tablet TAKE 1 TABLET BY MOUTH EVERY DAY 90 Tab 3    budesonide-formoteroL (Symbicort) 160-4.5 mcg/actuation HFAA INHALE 2 PUFFS BY MOUTH TWICE DAILY FOR COPD ASSOCIATED WITH CHRONIC BRONCHITIS, EXTRINSIC ASTHMA. 30.6 Inhaler 2    melatonin 10 mg tab Take 1 Tab by mouth At bedtime.  lutein 40 mg cap Take  by mouth daily.       Blood-Glucose Meter monitoring kit 1 preferred brand glucometer for checking home glucose, E11.65 1 Kit 0    multivitamin (ONE A DAY) tablet Take 1 Tab by mouth daily.  EPINEPHrine (EPIPEN) 0.3 mg/0.3 mL injection 0.3 mg by IntraMUSCular route once as needed.  glucose blood VI test strips (Accu-Chek Guide test strips) strip Test blood sugar 4 times daily. Dx E11.40 (Patient not taking: Reported on 3/9/2022) 200 Strip 11    traZODone (DESYREL) 100 mg tablet TAKE 1 TABLET BY MOUTH NIGHTLY. INDICATIONS: INSOMNIA ASSOCIATED WITH DEPRESSION 90 Tablet 1    flash glucose scanning reader (FreeStyle Yeny 2 [place]) misc Tid prn (Patient not taking: Reported on 12/14/2021) 1 Each 2    flash glucose sensor (FreeStyle Yeny 2 Sensor) kit Tid prn (Patient not taking: Reported on 12/14/2021) 1 Kit 3    linaCLOtide (Linzess) 290 mcg cap capsule Take 290 mcg by mouth Daily (before breakfast). (Patient not taking: Reported on 12/14/2021)      varicella-zoster recombinant, PF, (Shingrix, PF,) 50 mcg/0.5 mL susr injection 0.5mL by IntraMUSCular route once now and then repeat in 2-6 months (Patient not taking: Reported on 12/14/2021) 0.5 mL 1    OTHER,NON-FORMULARY, Vicks Vapor Rub (Patient not taking: Reported on 12/14/2021)      nystatin (MYCOSTATIN) 100,000 unit/mL suspension Take 5 mL by mouth four (4) times daily as needed (thrush). swish and spit (Patient not taking: Reported on 12/14/2021) 473 mL 2    Lactobacillus acidophilus (PROBIOTIC PO) Take  by mouth.  (Patient not taking: Reported on 3/9/2022)       Allergies   Allergen Reactions    Latex Hives    Other Food Anaphylaxis and Hives     PEPPERONI, sloppy joes, frank cheese doritos ,peach jolly ranchers & green tea    Demerol [Meperidine] Anaphylaxis     Difficulty breathing, p[t states that she have tylenol#3, tramadol and lortab with percocet had no problem with any of them    Iodine Anaphylaxis    Morphine Other (comments)     voilent outbreaks (restraints needed), Difficulty breathing, p[t states that she have tylenol#3, tramadol and lortab with percocet had no problem with any of them    Nsaids (Non-Steroidal Anti-Inflammatory Drug) Hives     Past Medical History:   Diagnosis Date    Adverse effect of anesthesia 2020    dr. Josef James, colonoscopy difficulty breathing under propafol    Arthritis     Asthma     Asthma     COPD     Depression     Diabetes ([facility]     DM2, insulin and oral meds    Diabetes mellitus     Encounter for review of form with patient 2017    Essential hypertension     GERD (gastroesophageal reflux disease)     Headache(784.0)     HX OTHER MEDICAL     acoustic neuroma    Hypercholesterolemia     Hypertension     Ill-defined condition     R ACOUSTIC NEUROMA    Insomnia disorder 2017    Major depression, chronic 2017    Mood disorder ([facility] 2020    Psychiatric problem     anxiety depression    Psychotic disorder ([facility]     Psychotic disorder ([facility] 2019    Screening for cervical cancer 2018    Tobacco use disorder 10/19/2017    Unspecified sleep apnea     denies, sleep study negative     Past Surgical History:   Procedure Laterality Date    COLONOSCOPY N/A 2020    COLONOS[facility] Slade [facility] at [facility] ENDOSCOPY    COLONOSCOPY N/A 2021    COLONOS[facility]  2020         Aby Homa  2021         COLONOSCPY,FLEX,W/ [address] INJECT  2020         COLONOSCPY,FLEX,W/DIR SUBMUC INJECT  2021         HX GASTRECTOMY  15    lap sleeve gastrectomy by Dr Davison Deep  2014    sleeve    HX GYN      2 c-sections    HX HEENT      sinus    HX HEENT      tracheal resection    HX HEENT      tracheal alleratation x 2    HX HEENT      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    HX HEENT      LASER SX-PENG    HX ORTHOPAEDIC      PA  DELIVERY ONLY      UPPER GI ENDOSCOPY,BIOPSY  2020 Family History   Problem Relation Age of Onset    Diabetes Father     Heart Failure Father     Heart Disease Father     Hypertension Father     High Cholesterol Mother     Stroke Mother     Suicide Brother     Stroke Maternal Grandmother     Cancer Paternal Grandfather     Anesth Problems Neg Hx      Social History     Tobacco Use    Smoking status: Former Smoker     Packs/day: 1.00     Years: 12.00     Pack years: 12.00     Quit date: 08/2018     Years since quitting: 3.6    Smokeless tobacco: Former User   Substance Use Topics    Alcohol use: Not Currently      Lab Results   Component Value Date/Time    Cholesterol, total 160 06/08/2021 01:45 PM    HDL Cholesterol 75 06/08/2021 01:45 PM    LDL, calculated 62.2 06/08/2021 01:45 PM    LDL-C, External 152 04/26/2017 12:00 AM    Triglyceride 114 06/08/2021 01:45 PM    CHOL/HDL Ratio 2.1 06/08/2021 01:45 PM     Lab Results   Component Value Date/Time    ALT (SGPT) 39 12/14/2021 09:03 AM    Alk. phosphatase 111 12/14/2021 09:03 AM    Bilirubin, total 0.5 12/14/2021 09:03 AM    Albumin 4.2 12/14/2021 09:03 AM    Protein, total 7.0 12/14/2021 09:03 AM    PLATELET 283 24/33/9410 10:00 AM        Review of Systems   Constitutional: Negative for chills and fever. HENT: Negative for congestion and nosebleeds. Eyes: Negative for blurred vision and pain. Respiratory: Negative for cough, shortness of breath and wheezing. Cardiovascular: Negative for chest pain and leg swelling. Gastrointestinal: Negative for constipation, diarrhea, nausea and vomiting. Genitourinary: Negative for dysuria and frequency. Musculoskeletal: Positive for myalgias. Negative for joint pain. Skin: Negative for itching and rash. Neurological: Negative for dizziness, loss of consciousness and headaches. Psychiatric/Behavioral: Negative for depression. The patient is not nervous/anxious and does not have insomnia.         Physical Exam  Vitals and nursing note reviewed. Constitutional:       Appearance: She is well-developed. HENT:      Head: Normocephalic and atraumatic. Eyes:      Conjunctiva/sclera: Conjunctivae normal.      Pupils: Pupils are equal, round, and reactive to light. Neck:      Thyroid: No thyromegaly. Vascular: No JVD. Cardiovascular:      Rate and Rhythm: Normal rate and regular rhythm. Heart sounds: Normal heart sounds. No murmur heard. No friction rub. No gallop. Pulmonary:      Effort: Pulmonary effort is normal. No respiratory distress. Breath sounds: Normal breath sounds. No stridor. No wheezing or rales. Abdominal:      General: Bowel sounds are normal. There is no distension. Palpations: Abdomen is soft. There is no mass. Tenderness: There is no abdominal tenderness. Musculoskeletal:         General: Tenderness and signs of injury present. Left upper arm: Tenderness and bony tenderness present. Left elbow: Decreased range of motion. Tenderness present. Left hand: Tenderness present. Decreased range of motion. Lymphadenopathy:      Cervical: No cervical adenopathy. Skin:     Findings: No erythema or rash. Neurological:      Mental Status: She is alert and oriented to person, place, and time. Cranial Nerves: No cranial nerve deficit. Deep Tendon Reflexes: Reflexes are normal and symmetric. Psychiatric:         Behavior: Behavior normal.         ASSESSMENT and PLAN  Diagnoses and all orders for this visit:    1. Pain of left hand    2. Left elbow pain    3. Type 2 diabetes mellitus with nephropathy (HCC)  -     nortriptyline (PAMELOR) 25 mg capsule; Take 1 Capsule by mouth nightly. -     diclofenac (VOLTAREN) 1 % gel; Apply 4 g to affected area four (4) times daily. 4. Simple chronic bronchitis (HCC)  -     Ventolin HFA 90 mcg/actuation inhaler; INHALE 2 PUFFS EVERY 4 HOURS AS NEEDED FOR WHEEZING    5. Paranoid schizophrenia ([address]    6.  [facility] , [facility]  -     Ventolin HFA 90 mcg/actuation inhaler; INHALE 2 PUFFS EVERY 4 HOURS AS NEEDED FOR WHEEZING        f/u with the sx     protection with vaccination, and to Wear a facemask , having social distance, and to get tested if possible,

## 2022-03-09 NOTE — PROGRESS NOTES
Christine Bailey is a 62 y.o. female  Chief Complaint   Patient presents with    Hand Pain     Pain in left hand. Carpal Tunnel  left     1. Have you been to the ER, urgent care clinic since your last visit?no  Hospitalized since your last visit?no    2. Have you seen or consulted any other health care providers outside of the [address] [place] [address] since your last visit? yes  Include any pap smears or colon screening.    Dr. Dean Valiente

## 2022-03-11 RX ORDER — PANTOPRAZOLE SODIUM 40 MG/1
TABLET, DELAYED RELEASE ORAL
Qty: 90 TABLET | Refills: 3 | Status: SHIPPED | OUTPATIENT
Start: 2022-03-11

## 2022-03-13 RX ORDER — ALBUTEROL SULFATE 90 UG/1
AEROSOL, METERED RESPIRATORY (INHALATION)
Qty: 54 G | Refills: 0
Start: 2022-03-13 | End: 2022-03-18

## 2022-03-17 DIAGNOSIS — J44.9 ASTHMATIC BRONCHITIS , CHRONIC (HCC): ICD-10-CM

## 2022-03-17 DIAGNOSIS — J41.0 SIMPLE CHRONIC BRONCHITIS (HCC): ICD-10-CM

## 2022-03-18 PROBLEM — E11.21 TYPE 2 DIABETES MELLITUS WITH NEPHROPATHY (HCC): Status: ACTIVE | Noted: 2017-12-28

## 2022-03-18 PROBLEM — F32.9 MAJOR DEPRESSION, CHRONIC: Status: ACTIVE | Noted: 2017-12-04

## 2022-03-18 RX ORDER — ALBUTEROL SULFATE 90 UG/1
AEROSOL, METERED RESPIRATORY (INHALATION)
Qty: 54 G | Refills: 0 | Status: SHIPPED | OUTPATIENT
Start: 2022-03-18 | End: 2022-05-02

## 2022-03-19 PROBLEM — J44.9 ASTHMATIC BRONCHITIS , CHRONIC (HCC): Status: ACTIVE | Noted: 2019-08-14

## 2022-03-19 PROBLEM — F17.200 TOBACCO USE DISORDER: Status: ACTIVE | Noted: 2017-10-19

## 2022-03-19 PROBLEM — D50.9 IRON DEFICIENCY ANEMIA: Status: ACTIVE | Noted: 2021-07-07

## 2022-03-19 PROBLEM — G47.00 INSOMNIA DISORDER: Status: ACTIVE | Noted: 2017-12-04

## 2022-03-19 PROBLEM — F33.9 RECURRENT DEPRESSION (HCC): Status: ACTIVE | Noted: 2017-12-28

## 2022-03-19 PROBLEM — I34.1 MITRAL VALVE PROLAPSE: Status: ACTIVE | Noted: 2017-11-09

## 2022-03-19 PROBLEM — J44.89 ASTHMATIC BRONCHITIS , CHRONIC: Status: ACTIVE | Noted: 2019-08-14

## 2022-03-20 PROBLEM — Z02.89 ENCOUNTER FOR REVIEW OF FORM WITH PATIENT: Status: ACTIVE | Noted: 2017-12-04

## 2022-03-22 ENCOUNTER — TELEPHONE (OUTPATIENT)
Dept: ONCOLOGY | Age: 62
End: 2022-03-22

## 2022-03-22 NOTE — TELEPHONE ENCOUNTER
Patient called to reschedule upcoming OV appt (3/24/22) due to surgery on left hand, patient states she can not drive and will need time (about 1 month) to heal.  Patient has been rescheduled for 5/3/22 @ 11am.  Patient has confirmed date/time/location and will get labs completed around end of April.

## 2022-04-08 RX ORDER — LANCETS
EACH MISCELLANEOUS
Qty: 400 EACH | Refills: 3 | Status: SHIPPED | OUTPATIENT
Start: 2022-04-08

## 2022-04-20 RX ORDER — METFORMIN HYDROCHLORIDE 500 MG/1
1000 TABLET, EXTENDED RELEASE ORAL
Qty: 360 TABLET | Refills: 3 | Status: SHIPPED | OUTPATIENT
Start: 2022-04-20

## 2022-04-27 DIAGNOSIS — F33.9 RECURRENT DEPRESSION (HCC): ICD-10-CM

## 2022-04-27 DIAGNOSIS — F51.01 PRIMARY INSOMNIA: ICD-10-CM

## 2022-04-27 RX ORDER — TRAZODONE HYDROCHLORIDE 100 MG/1
TABLET ORAL
Qty: 90 TABLET | Refills: 1 | Status: SHIPPED | OUTPATIENT
Start: 2022-04-27

## 2022-04-30 LAB
BASOPHILS # BLD AUTO: 0 X10E3/UL (ref 0–0.2)
BASOPHILS NFR BLD AUTO: 1 %
EOSINOPHIL # BLD AUTO: 0.2 X10E3/UL (ref 0–0.4)
EOSINOPHIL NFR BLD AUTO: 4 %
ERYTHROCYTE [DISTWIDTH] IN BLOOD BY AUTOMATED COUNT: 12.6 % (ref 11.7–15.4)
FERRITIN SERPL-MCNC: 257 NG/ML (ref 15–150)
HCT VFR BLD AUTO: 36.3 % (ref 34–46.6)
HGB BLD-MCNC: 11.4 G/DL (ref 11.1–15.9)
IMM GRANULOCYTES # BLD AUTO: 0 X10E3/UL (ref 0–0.1)
IMM GRANULOCYTES NFR BLD AUTO: 0 %
IRON SATN MFR SERPL: 20 % (ref 15–55)
IRON SERPL-MCNC: 48 UG/DL (ref 27–139)
LYMPHOCYTES # BLD AUTO: 1.4 X10E3/UL (ref 0.7–3.1)
LYMPHOCYTES NFR BLD AUTO: 23 %
MCH RBC QN AUTO: 29.6 PG (ref 26.6–33)
MCHC RBC AUTO-ENTMCNC: 31.4 G/DL (ref 31.5–35.7)
MCV RBC AUTO: 94 FL (ref 79–97)
MONOCYTES # BLD AUTO: 0.5 X10E3/UL (ref 0.1–0.9)
MONOCYTES NFR BLD AUTO: 8 %
NEUTROPHILS # BLD AUTO: 4 X10E3/UL (ref 1.4–7)
NEUTROPHILS NFR BLD AUTO: 64 %
PLATELET # BLD AUTO: 233 X10E3/UL (ref 150–450)
RBC # BLD AUTO: 3.85 X10E6/UL (ref 3.77–5.28)
TIBC SERPL-MCNC: 245 UG/DL (ref 250–450)
UIBC SERPL-MCNC: 197 UG/DL (ref 118–369)
WBC # BLD AUTO: 6.2 X10E3/UL (ref 3.4–10.8)

## 2022-05-02 DIAGNOSIS — J41.0 SIMPLE CHRONIC BRONCHITIS (HCC): ICD-10-CM

## 2022-05-02 DIAGNOSIS — J44.9 ASTHMATIC BRONCHITIS , CHRONIC (HCC): ICD-10-CM

## 2022-05-02 RX ORDER — ALBUTEROL SULFATE 90 UG/1
AEROSOL, METERED RESPIRATORY (INHALATION)
Qty: 54 G | Refills: 0 | Status: SHIPPED | OUTPATIENT
Start: 2022-05-02 | End: 2022-08-02

## 2022-05-02 NOTE — PROGRESS NOTES
Zaina Velez is a 65 y.o. female here for 6 month follow up for anemia. Labs done 4/29/22. Pt states she has bouts of fatigue. She has a new strange bruise on her right wrist.    1. Have you been to the ER, urgent care clinic since your last visit? Hospitalized since your last visit? See below    2. Have you seen or consulted any other health care providers outside of the [address] [place] [address] since your last visit? Include any pap smears or colon screening.  Surgery at [facility] for left arm (3 surgeries)

## 2022-05-03 ENCOUNTER — OFFICE VISIT (OUTPATIENT)
Dept: ONCOLOGY | Age: 62
End: 2022-05-03
Payer: MEDICARE

## 2022-05-03 VITALS
WEIGHT: 170.6 LBS | HEIGHT: 68 IN | SYSTOLIC BLOOD PRESSURE: 109 MMHG | OXYGEN SATURATION: 95 % | BODY MASS INDEX: 25.85 KG/M2 | DIASTOLIC BLOOD PRESSURE: 68 MMHG | TEMPERATURE: 97.5 F | HEART RATE: 85 BPM

## 2022-05-03 DIAGNOSIS — D50.9 IRON DEFICIENCY ANEMIA, UNSPECIFIED IRON DEFICIENCY ANEMIA TYPE: Primary | ICD-10-CM

## 2022-05-03 PROCEDURE — G8752 SYS BP LESS 140: HCPCS | Performed by: STUDENT IN AN ORGANIZED HEALTH CARE EDUCATION/TRAINING PROGRAM

## 2022-05-03 PROCEDURE — G9899 SCRN MAM PERF RSLTS DOC: HCPCS | Performed by: STUDENT IN AN ORGANIZED HEALTH CARE EDUCATION/TRAINING PROGRAM

## 2022-05-03 PROCEDURE — G8419 CALC BMI OUT NRM PARAM NOF/U: HCPCS | Performed by: STUDENT IN AN ORGANIZED HEALTH CARE EDUCATION/TRAINING PROGRAM

## 2022-05-03 PROCEDURE — G8754 DIAS BP LESS 90: HCPCS | Performed by: STUDENT IN AN ORGANIZED HEALTH CARE EDUCATION/TRAINING PROGRAM

## 2022-05-03 PROCEDURE — G8427 DOCREV CUR MEDS BY ELIG CLIN: HCPCS | Performed by: STUDENT IN AN ORGANIZED HEALTH CARE EDUCATION/TRAINING PROGRAM

## 2022-05-03 PROCEDURE — 99213 OFFICE O/P EST LOW 20 MIN: CPT | Performed by: STUDENT IN AN ORGANIZED HEALTH CARE EDUCATION/TRAINING PROGRAM

## 2022-05-03 PROCEDURE — G9717 DOC PT DX DEP/BP F/U NT REQ: HCPCS | Performed by: STUDENT IN AN ORGANIZED HEALTH CARE EDUCATION/TRAINING PROGRAM

## 2022-05-03 PROCEDURE — 3017F COLORECTAL CA SCREEN DOC REV: CPT | Performed by: STUDENT IN AN ORGANIZED HEALTH CARE EDUCATION/TRAINING PROGRAM

## 2022-05-03 RX ORDER — ASCORBIC ACID 500 MG
TABLET ORAL
COMMUNITY

## 2022-05-03 RX ORDER — DULOXETIN HYDROCHLORIDE 30 MG/1
30 CAPSULE, DELAYED RELEASE ORAL DAILY
COMMUNITY
Start: 2022-04-13

## 2022-05-03 NOTE — PROGRESS NOTES
[facility] [place] at [address] [facility], [address], [address]  W: [phone] F: [phone]      Reason for Visit:   Lisa Pop is a 65 y.o. female who is seen in consultation at the request of Dr. Estrella Blalard for evaluation of iron deficiency anemia. Hematology / Oncology Treatment History:     Hematological/Oncological Diagnosis: Iron deficiency anemia, severe anemia, persistent fatigue    Date of Diagnosis: 11/2/2020    Treatment course: s/p colonoscopy - showed diverticulosis and multiple polyps now resected 4/2021      History of Present Illness:   Very pleasant 81-year-old female with a relevant medical history significant for diabetes mellitus type 2, hypertension, GERD, IBS, asthma, hyperlipidemia, depression, presents for evaluation and treatment of severe microcytic anemia first noted in November 2020 without clear cause. The patient has been worked up by GI without clear source of bleeding. She did have multiple polyps that were resected as well as evidence of colonic diverticulosis but no active bleeding on scope. Most recent hemoglobin checked on June 8, 2021 was down to 7.6 g/dL with hematocrit of 27.9%. MCV was 74. No other clear abnormalities in cell lines, white blood cell count of 6.5 and differential does not appear to be grossly abnormal.  Platelet count is 948. Review of available CMP shows no significant kidney disease with normal LFTs and normal TSH. The patient endorses symptoms of severe fatigue but denies any other constitutional symptoms of concern for malignancy. No reported recent heavy weight loss, or other sources of bleeding. Most recent iron profile shows iron saturation of only 3% with a total iron binding capacity that is elevated. The patient has been taking oral iron twice a day for greater than 1 month.   The patient continues to have severe fatigue despite oral iron supplementation. Family history reviewed, noncontributory, social history reviewed, also noncontributory    Positive ROS findings include: Severe fatigue, exertional dyspnea  Review of Systems: A complete review of systems was obtained, negative except as described above. September 8, 2021:    Since last encounter, the patient had IV iron x2 on July 16 and July 23. She tolerated Injectafer very nicely without any intolerance. Repeat CBC and iron studies from last week show hemoglobin of improved to 11.5 g/dL, iron saturation 28% with a ferritin of 220. Further review of labs done after last visit included negative LANEY, normal creatinine, normal copper/B12/folic acid, EPO normal, haptoglobin normal, LDH normal.  Unfortunately, the patient reports that while she had marked improvement after IV iron supplementation, over the last 3 days, she feels severe fatigue, weakness,, lower extremity weakness, pain in her thighs, and nonspecific chest discomfort that came on like a flare of inflammatory syndrome. She denies any recent infections or sick contacts. No cough, fever reported. Interval History:     5/3/22:  Iron studies rechecked. No evidence of severe iron deficiency. She has had surgical procure on her elbow since last visit. Patient continues to have mild fatigue, though much improved from prior iron deficient state. She is taking oral iron pills BID with better toleration. Overall feeling near baseline.        Past Medical History:   Diagnosis Date    Adverse effect of anesthesia 12/2020    dr. Estefania Ann, colonoscopy difficulty breathing under propafol    Arthritis     Asthma     Asthma     COPD     Depression     Diabetes ([facility] [address]     DM2, insulin and oral meds    Diabetes mellitus     Encounter for review of form with patient 12/4/2017    Essential hypertension     GERD (gastroesophageal reflux disease)     Headache(784.0)     HX OTHER MEDICAL     acoustic neuroma    Hypercholesterolemia     Hypertension     Ill-defined condition     R ACOUSTIC NEUROMA    Insomnia disorder 2017    Major depression, chronic 2017    Mood disorder ([facility] [address] 2020    Psychiatric problem     anxiety depression    Psychotic disorder ([facility] [address]     Psychotic disorder ([facility] [address] 2019    Screening for cervical cancer 2018    Tobacco use disorder 10/19/2017    Unspecified sleep apnea     denies, sleep study negative      Past Surgical History:   Procedure Laterality Date    COLONOSCOPY N/A 2020    COLONOS[facility]yes [facility]ara [facility] ENDOSCOPY    COLONOSCOPY N/A 2021    COL[facility]  2020         Selestino Self  2021         COLONOSCPY,FLEX,W/ [address] INJECT  2020         COLONOSCPY,FLEX,W/ [address] INJECT  2021         HX CARPAL TUNNEL RELEASE Left 2022    left revision cubital tunnel release and ulnar nerve decompression- [facility]    HX GASTRECTOMY  14    lap sleeve gastrectomy by Dr Dafne Gilliam  2014    sleeve    HX GYN      2 c-sections    HX HEENT      sinus    HX HEENT      tracheal resection    HX HEENT      tracheal alleratation x 2    HX HEENT      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    HX HEENT      LASER SX-PENG    HX ORTHOPAEDIC      OK  DELIVERY ONLY      UPPER GI ENDOSCOPY,BIOPSY  2020           Social History     Tobacco Use    Smoking status: Former Smoker     Packs/day: 1.00     Years: 12.00     Pack years: 12.00     Quit date: 2018     Years since quitting: 3.7    Smokeless tobacco: Former User   Substance Use Topics    Alcohol use: Not Currently      Family History   Problem Relation Age of Onset    Diabetes Father     Heart Failure Father     Heart Disease Father     Hypertension Father     High Cholesterol Mother     Stroke Mother     Suicide Brother     Stroke Maternal Grandmother     Cancer Paternal Grandfather     Anesth Problems Neg Hx      Current Outpatient Medications   Medication Sig    DULoxetine (CYMBALTA) 30 mg capsule Take 30 mg by mouth daily.  Ventolin HFA 90 mcg/actuation inhaler INHALE 2 PUFFS EVERY 4 HOURS AS NEEDED FOR WHEEZING    traZODone (DESYREL) 100 mg tablet TAKE 1 TABLET EVERY NIGHT FOR INSOMNIA ASSOCIATED WITH DEPRESSION    metFORMIN ER (GLUCOPHAGE XR) 500 mg tablet TAKE 2 TABLETS BY MOUTH BEFORE BREAKFAST AND DINNER.  Accu-Chek Softclix Lancets misc TEST BLOOD SUGAR FOUR TIMES DAILY    pantoprazole (PROTONIX) 40 mg tablet TAKE 1 TABLET EVERY DAY    nortriptyline (PAMELOR) 25 mg capsule Take 1 Capsule by mouth nightly.  diclofenac (VOLTAREN) 1 % gel Apply 4 g to affected area four (4) times daily.  insulin aspart protamine/insulin aspart (NovoLOG Mix 70-30FlexPen U-100) 100 unit/mL (70-30) inpn INJECT 17 UNITS SUBCUTANEOUSLY WITH EACH MEAL THREE TIMES DAILY PLUS 1 UNIT FOR EVERY 50MG/DL ABOVE 150MG/DL    albuterol-ipratropium (DUO-NEB) 2.5 mg-0.5 mg/3 ml nebu 3 mL by Nebulization route every four (4) hours as needed for Wheezing.  insulin syringe-needle U-100 (BD Veo Insulin Syringe UF) 1/2 mL 31 gauge x 15/64\" syrg For injecting insulin 3 times daily    ferrous sulfate (ELDON-IN-SOL)15 mg iron(75 mg)/ml oral drops Take 1 mL by mouth three (3) times daily.  glucose blood VI test strips (Accu-Chek Guide test strips) strip Test blood sugar 4 times daily. Dx E11.40 (Patient not taking: Reported on 3/9/2022)    Blood-Glucose Meter (Accu-Chek Guide Glucose Meter) misc Test blood sugar four times daily    tiotropium (Spiriva with HandiHaler) 18 mcg inhalation capsule INHALE THE CONTENTS OF 1 CAPSULE VIA HANDIHALER ONCE DAILY.  RINSE MOUTH AFTER USE    montelukast (SINGULAIR) 10 mg tablet TAKE 1 TABLET EVERY EVENING    metoprolol succinate (TOPROL-XL) 100 mg tablet TAKE 1 TABLET EVERY DAY    Daliresp 500 mcg tab tablet TAKE 1 TABLET EVERY DAY    buPROPion XL (WELLBUTRIN XL) 300 mg XL tablet TAKE 1 TABLET EVERY MORNING    flash glucose scanning reader (FreeStyle Yen[place] 2 [place]) misc Tid prn (Patient not taking: Reported on 12/14/2021)    flash glucose sensor (FreeStyle Yeny 2 Sensor) kit Tid prn (Patient not taking: Reported on 12/14/2021)    Insulin Needles, Disposable, 31 gauge x 5/16\" ndle Use to inject insulin as needed per sliding scale    insulin NPH/insulin regular (NovoLIN 70/30 U-100 Insulin) 100 unit/mL (70-30) injection Inject 17 units with each meal 3 times daily + 1 units for every 50 mg/dl above 150 mg/dl    insulin regular human (NovoLIN R Flexpen) 100 unit/mL (3 mL) inpn Please obtain sugar level Before Meals, and If Blood Glucose is: Less than 150 mg/dL, No extra insulin needed to be given, if the glucose 151 - 200, give 2 units, if reading is 201 - 250, gives 4 units, if 251 - 300m, give 6 units, if reading is 301 - 350, give 8 units, if 351 - 400, give Subcutaneously 10 units, and call MD    rosuvastatin (CRESTOR) 40 mg tablet Take 1 Tablet by mouth daily.  linaCLOtide (Linzess) 290 mcg cap capsule Take 290 mcg by mouth Daily (before breakfast). (Patient not taking: Reported on 12/14/2021)    varicella-zoster recombinant, PF, (Shingrix, PF,) 50 mcg/0.5 mL susr injection 0.5mL by IntraMUSCular route once now and then repeat in 2-6 months (Patient not taking: Reported on 12/14/2021)    OTHER,NON-FORMULARY, Vicks Vapor Rub (Patient not taking: Reported on 12/14/2021)    nystatin (MYCOSTATIN) 100,000 unit/mL suspension Take 5 mL by mouth four (4) times daily as needed (thrush). swish and spit (Patient not taking: Reported on 12/14/2021)    budesonide-formoteroL (Symbicort) 160-4.5 mcg/actuation HFAA INHALE 2 PUFFS BY MOUTH TWICE DAILY FOR COPD ASSOCIATED WITH CHRONIC BRONCHITIS, EXTRINSIC ASTHMA.  melatonin 10 mg tab Take 1 Tab by mouth At bedtime.     Lactobacillus acidophilus (PROBIOTIC PO) Take  by mouth. (Patient not taking: Reported on 3/9/2022)    lutein 40 mg cap Take  by mouth daily.  Blood-Glucose Meter monitoring kit 1 preferred brand glucometer for checking home glucose, E11.65    multivitamin (ONE A DAY) tablet Take 1 Tab by mouth daily.  EPINEPHrine (EPIPEN) 0.3 mg/0.3 mL injection 0.3 mg by IntraMUSCular route once as needed. No current facility-administered medications for this visit. Allergies   Allergen Reactions    Latex Hives    Other Food Anaphylaxis and Hives     PEPPERONI, sloppy joes, frank cheese doritos ,peach jolly ranchers & green tea    Demerol [Meperidine] Anaphylaxis     Difficulty breathing, p[t states that she have tylenol#3, tramadol and lortab with percocet had no problem with any of them    Iodine Anaphylaxis    Morphine Other (comments)     voilent outbreaks (restraints needed), Difficulty breathing, p[t states that she have tylenol#3, tramadol and lortab with percocet had no problem with any of them    Nsaids (Non-Steroidal Anti-Inflammatory Drug) Hives            Physical Exam:     Visit Vitals  Ht 5' 8\" (1.727 m)   Wt 170 lb 9.6 oz (77.4 kg)   LMP 01/17/2010   BMI 25.94 kg/m²     ECOG PS: 1-2  General: alert, cooperative, no distress   Mental  status: normal mood, behavior, speech, dress, motor activity, and thought processes, able to follow commands   HENT: NCAT   Neck: no visualized mass   Resp: no respiratory distress   Neuro: no gross deficits   Skin: no discoloration or lesions of concern on visible areas   Psychiatric: normal affect, consistent with stated mood, no evidence of hallucinations           Results:     Lab Results   Component Value Date/Time    WBC 6.2 04/29/2022 10:57 AM    HGB 11.4 04/29/2022 10:57 AM    HCT 36.3 04/29/2022 10:57 AM    PLATELET 180 61/49/8166 10:57 AM    MCV 94 04/29/2022 10:57 AM    ABS.  NEUTROPHILS 4.0 04/29/2022 10:57 AM     Lab Results   Component Value Date/Time    Sodium 134 (L) 12/14/2021 09:03 AM    Potassium 5.0 12/14/2021 09:03 AM    Chloride 103 12/14/2021 09:03 AM    CO2 25 12/14/2021 09:03 AM    Glucose 406 (H) 12/14/2021 09:03 AM    BUN 23 (H) 12/14/2021 09:03 AM    Creatinine 1.16 (H) 12/14/2021 09:03 AM    GFR est AA 58 (L) 12/14/2021 09:03 AM    GFR est non-AA 48 (L) 12/14/2021 09:03 AM    Calcium 10.1 12/14/2021 09:03 AM    Glucose (POC) 192 (H) 04/06/2021 08:27 AM     Lab Results   Component Value Date/Time    Bilirubin, total 0.5 12/14/2021 09:03 AM    ALT (SGPT) 39 12/14/2021 09:03 AM    Alk. phosphatase 111 12/14/2021 09:03 AM    Protein, total 7.0 12/14/2021 09:03 AM    Albumin 4.2 12/14/2021 09:03 AM    Globulin 2.8 12/14/2021 09:03 AM       Relevant imaging reviewed, see HPI    Assessment and Recommendations:     # Microcytic anemia concerning for iron deficiency anemia with unclear cause  -The patient does take Protonix and has a history of irritable bowel syndrome which may make absorption of oral iron difficult. -Iron studies show repletion of iron stores after 2 doses of IV iron on 7/16 and 7/23  -Plan for follow-up in 6 months with repeat iron studies   -GI work-up was recently completed showing no clear cause for iron deficiency    - CBC from 4/29 shows resolution of anemia. Hg of 11.4, MCV of 94, Iron studies are stable with iron saturation of 20% with ferritin of 257. Recommendations:     >> Recommend CBC with differential, Iron profile, Ferritin every 6-12 months for tracking of iron deficiency with history of anemia. This can be done by patient's PCP on routine follow ups. Patient should follow up on an as needed basis. She was asked to call the clinic with any new or worsening symptoms.      Signed By: Chet Mishra MD      Attending Medical Oncologist   [facility]

## 2022-05-15 DIAGNOSIS — J44.9 ASTHMATIC BRONCHITIS , CHRONIC (HCC): ICD-10-CM

## 2022-05-15 DIAGNOSIS — J44.1 COPD WITH ACUTE EXACERBATION (HCC): ICD-10-CM

## 2022-05-15 DIAGNOSIS — R06.09 DYSPNEA ON EFFORT: ICD-10-CM

## 2022-05-18 RX ORDER — ROFLUMILAST 500 UG/1
TABLET ORAL
Qty: 90 TABLET | Refills: 1 | Status: SHIPPED | OUTPATIENT
Start: 2022-05-18

## 2022-05-25 DIAGNOSIS — J45.21 MILD INTERMITTENT ASTHMA WITH ACUTE EXACERBATION: ICD-10-CM

## 2022-05-25 DIAGNOSIS — E11.21 TYPE 2 DIABETES MELLITUS WITH NEPHROPATHY (HCC): ICD-10-CM

## 2022-05-25 DIAGNOSIS — J44.9 ASTHMATIC BRONCHITIS , CHRONIC (HCC): ICD-10-CM

## 2022-05-25 NOTE — TELEPHONE ENCOUNTER
Last visit:4/14/22  Next visit:not scheduled  Previous refill 6/09/21(3+1inhaler)    Requested Prescriptions     Pending Prescriptions Disp Refills    budesonide-formoteroL (Symbicort) 160-4.5 mcg/actuation HFAA 3 Each 1     Sig: INHALE 2 PUFFS BY MOUTH TWICE DAILY FOR COPD ASSOCIATED WITH CHRONIC BRONCHITIS, EXTRINSIC ASTHMA.

## 2022-05-27 RX ORDER — BUDESONIDE AND FORMOTEROL FUMARATE DIHYDRATE 160; 4.5 UG/1; UG/1
AEROSOL RESPIRATORY (INHALATION)
Qty: 3 EACH | Refills: 1 | Status: SHIPPED | OUTPATIENT
Start: 2022-05-27 | End: 2022-10-19

## 2022-06-01 DIAGNOSIS — E11.9 TYPE 2 DIABETES MELLITUS WITHOUT COMPLICATION, WITH LONG-TERM CURRENT USE OF INSULIN (HCC): ICD-10-CM

## 2022-06-01 DIAGNOSIS — Z79.4 TYPE 2 DIABETES MELLITUS WITHOUT COMPLICATION, WITH LONG-TERM CURRENT USE OF INSULIN (HCC): ICD-10-CM

## 2022-06-01 NOTE — TELEPHONE ENCOUNTER
Samina sent fax requesting a new script for patient's accu chek test strips with diagnosis code. Last visit:3/09/22  Next visit: not scheduled  Previous refill 4/8/22  (400+3R)  Requested Prescriptions     Pending Prescriptions Disp Refills    glucose blood VI test strips (Accu-Chek Guide test strips) strip 360 Strip 3     Sig: Test blood sugar 4 times daily.   Dx E11.40

## 2022-06-02 RX ORDER — ROSUVASTATIN CALCIUM 40 MG/1
TABLET, COATED ORAL
Qty: 90 TABLET | Refills: 3 | Status: SHIPPED | OUTPATIENT
Start: 2022-06-02

## 2022-06-02 RX ORDER — BLOOD SUGAR DIAGNOSTIC
STRIP MISCELLANEOUS
Qty: 360 STRIP | Refills: 3 | Status: SHIPPED | OUTPATIENT
Start: 2022-06-02

## 2022-06-02 RX ORDER — HUMAN INSULIN 100 [IU]/ML
INJECTION, SOLUTION SUBCUTANEOUS
Qty: 30 ML | Refills: 3 | Status: SHIPPED | OUTPATIENT
Start: 2022-06-02

## 2022-06-18 DIAGNOSIS — F33.9 RECURRENT DEPRESSION (HCC): ICD-10-CM

## 2022-06-20 RX ORDER — BUPROPION HYDROCHLORIDE 300 MG/1
TABLET ORAL
Qty: 90 TABLET | Refills: 2 | Status: SHIPPED | OUTPATIENT
Start: 2022-06-20

## 2022-06-25 DIAGNOSIS — R00.0 TACHYCARDIA: ICD-10-CM

## 2022-06-25 DIAGNOSIS — F51.01 PRIMARY INSOMNIA: ICD-10-CM

## 2022-06-25 DIAGNOSIS — I10 ESSENTIAL HYPERTENSION: ICD-10-CM

## 2022-06-27 RX ORDER — METOPROLOL SUCCINATE 100 MG/1
TABLET, EXTENDED RELEASE ORAL
Qty: 90 TABLET | Refills: 1 | Status: SHIPPED | OUTPATIENT
Start: 2022-06-27

## 2022-07-23 DIAGNOSIS — E11.21 TYPE 2 DIABETES MELLITUS WITH NEPHROPATHY (HCC): ICD-10-CM

## 2022-07-26 RX ORDER — PEN NEEDLE, DIABETIC 30 GX3/16"
NEEDLE, DISPOSABLE MISCELLANEOUS
Qty: 100 EACH | Refills: 11 | Status: SHIPPED | OUTPATIENT
Start: 2022-07-26

## 2022-08-01 DIAGNOSIS — J41.0 SIMPLE CHRONIC BRONCHITIS (HCC): ICD-10-CM

## 2022-08-01 DIAGNOSIS — J44.9 ASTHMATIC BRONCHITIS , CHRONIC (HCC): ICD-10-CM

## 2022-08-02 RX ORDER — ALBUTEROL SULFATE 90 UG/1
AEROSOL, METERED RESPIRATORY (INHALATION)
Qty: 54 G | Refills: 0 | Status: SHIPPED | OUTPATIENT
Start: 2022-08-02 | End: 2022-10-31

## 2022-10-19 DIAGNOSIS — E11.21 TYPE 2 DIABETES MELLITUS WITH NEPHROPATHY (HCC): ICD-10-CM

## 2022-10-19 DIAGNOSIS — H60.332 ACUTE SWIMMER'S EAR OF LEFT SIDE: ICD-10-CM

## 2022-10-19 DIAGNOSIS — J45.21 MILD INTERMITTENT ASTHMA WITH ACUTE EXACERBATION: ICD-10-CM

## 2022-10-19 DIAGNOSIS — J44.9 ASTHMATIC BRONCHITIS , CHRONIC (HCC): ICD-10-CM

## 2022-10-19 RX ORDER — MONTELUKAST SODIUM 10 MG/1
TABLET ORAL
Qty: 90 TABLET | Refills: 3 | Status: SHIPPED | OUTPATIENT
Start: 2022-10-19

## 2022-10-19 RX ORDER — BUDESONIDE AND FORMOTEROL FUMARATE DIHYDRATE 160; 4.5 UG/1; UG/1
AEROSOL RESPIRATORY (INHALATION)
Qty: 3 EACH | Refills: 1 | Status: SHIPPED | OUTPATIENT
Start: 2022-10-19

## 2022-10-31 DIAGNOSIS — J44.9 ASTHMATIC BRONCHITIS , CHRONIC (HCC): ICD-10-CM

## 2022-10-31 DIAGNOSIS — J41.0 SIMPLE CHRONIC BRONCHITIS (HCC): ICD-10-CM

## 2022-10-31 RX ORDER — ALBUTEROL SULFATE 90 UG/1
AEROSOL, METERED RESPIRATORY (INHALATION)
Qty: 54 G | Refills: 0 | Status: SHIPPED | OUTPATIENT
Start: 2022-10-31

## 2022-11-28 ENCOUNTER — TRANSCRIBE ORDER (OUTPATIENT)
Dept: SCHEDULING | Age: 62
End: 2022-11-28

## 2022-11-28 DIAGNOSIS — Z12.31 VISIT FOR SCREENING MAMMOGRAM: Primary | ICD-10-CM

## 2022-11-30 RX ORDER — PHENOL/SODIUM PHENOLATE
20 AEROSOL, SPRAY (ML) MUCOUS MEMBRANE DAILY
Qty: 30 TABLET | Refills: 1 | Status: SHIPPED | OUTPATIENT
Start: 2022-11-30

## 2022-12-05 DIAGNOSIS — F51.01 PRIMARY INSOMNIA: ICD-10-CM

## 2022-12-05 DIAGNOSIS — F33.9 RECURRENT DEPRESSION (HCC): ICD-10-CM

## 2022-12-06 ENCOUNTER — OFFICE VISIT (OUTPATIENT)
Dept: FAMILY MEDICINE CLINIC | Age: 62
End: 2022-12-06
Payer: MEDICARE

## 2022-12-06 VITALS
DIASTOLIC BLOOD PRESSURE: 68 MMHG | HEART RATE: 86 BPM | HEIGHT: 68 IN | BODY MASS INDEX: 25.91 KG/M2 | TEMPERATURE: 98.6 F | SYSTOLIC BLOOD PRESSURE: 110 MMHG | WEIGHT: 171 LBS | OXYGEN SATURATION: 97 % | RESPIRATION RATE: 16 BRPM

## 2022-12-06 DIAGNOSIS — E11.21 TYPE 2 DIABETES MELLITUS WITH NEPHROPATHY (HCC): ICD-10-CM

## 2022-12-06 DIAGNOSIS — J44.1 COPD WITH ACUTE EXACERBATION (HCC): ICD-10-CM

## 2022-12-06 DIAGNOSIS — R05.1 ACUTE COUGH: ICD-10-CM

## 2022-12-06 DIAGNOSIS — F33.9 RECURRENT DEPRESSION (HCC): Primary | ICD-10-CM

## 2022-12-06 DIAGNOSIS — J04.0 LARYNGITIS ACUTE, SPASMODIC: ICD-10-CM

## 2022-12-06 DIAGNOSIS — I10 PRIMARY HYPERTENSION: ICD-10-CM

## 2022-12-06 PROBLEM — F17.200 TOBACCO USE DISORDER: Status: RESOLVED | Noted: 2017-10-19 | Resolved: 2022-12-06

## 2022-12-06 PROCEDURE — G9899 SCRN MAM PERF RSLTS DOC: HCPCS | Performed by: FAMILY MEDICINE

## 2022-12-06 PROCEDURE — 2022F DILAT RTA XM EVC RTNOPTHY: CPT | Performed by: FAMILY MEDICINE

## 2022-12-06 PROCEDURE — 3074F SYST BP LT 130 MM HG: CPT | Performed by: FAMILY MEDICINE

## 2022-12-06 PROCEDURE — G8419 CALC BMI OUT NRM PARAM NOF/U: HCPCS | Performed by: FAMILY MEDICINE

## 2022-12-06 PROCEDURE — 3078F DIAST BP <80 MM HG: CPT | Performed by: FAMILY MEDICINE

## 2022-12-06 PROCEDURE — 3017F COLORECTAL CA SCREEN DOC REV: CPT | Performed by: FAMILY MEDICINE

## 2022-12-06 PROCEDURE — 3052F HG A1C>EQUAL 8.0%<EQUAL 9.0%: CPT | Performed by: FAMILY MEDICINE

## 2022-12-06 PROCEDURE — G8427 DOCREV CUR MEDS BY ELIG CLIN: HCPCS | Performed by: FAMILY MEDICINE

## 2022-12-06 PROCEDURE — G9717 DOC PT DX DEP/BP F/U NT REQ: HCPCS | Performed by: FAMILY MEDICINE

## 2022-12-06 PROCEDURE — G0439 PPPS, SUBSEQ VISIT: HCPCS | Performed by: FAMILY MEDICINE

## 2022-12-06 RX ORDER — IPRATROPIUM BROMIDE 42 UG/1
2 SPRAY, METERED NASAL 4 TIMES DAILY
Qty: 15 ML | Refills: 3 | Status: SHIPPED | OUTPATIENT
Start: 2022-12-06

## 2022-12-06 RX ORDER — OMEPRAZOLE 20 MG/1
20 TABLET, DELAYED RELEASE ORAL DAILY
COMMUNITY

## 2022-12-06 NOTE — PROGRESS NOTES
Chief Complaint   Patient presents with    GERD     Follow up    Medication Evaluation       1. \"Have you been to the ER, urgent care clinic since your last visit? Hospitalized since your last visit? \" No    2. \"Have you seen or consulted any other health care providers outside of the [address] [place] [address] since your last visit? \" Yes When: Left Carpel tunnel wrist and elbow surgery in April 2022 with [facility]      3. For patients aged 39-70: Has the patient had a colonoscopy / FIT/ Cologuard? Yes - no Care Gap present      If the patient is female:    4. For patients aged 41-77: Has the patient had a mammogram within the past 2 years? No scheduled January      5. For patients aged 21-65: Has the patient had a pap smear?  Yes - no Care Gap present    Visit Vitals  /68 (BP 1 Location: Left upper arm, BP Patient Position: Sitting)   Pulse 86   Temp 98.6 °F (37 °C) (Oral)   Resp 16   Ht 5' 8\" (1.727 m)   Wt 171 lb (77.6 kg)   LMP 01/17/2010   SpO2 97%   BMI 26.00 kg/m²

## 2022-12-06 NOTE — PROGRESS NOTES
HISTORY OF PRESENT ILLNESS  Scott Alexander is a 65 y.o. female,  Acid Reflux    Patient states that pt has been dealing with this discomfort for almost <2 years, has not been taking the PPI  pt states that is getting some heart burn since if the patient does not take any over-the-counter anti-gas medications,  in addition, states that the discomfort worsen by fatty/spicy  Foods, and if the patient eats late or if the patient overeats and denies hematchezia or hematemesis, patient never had a previous colonoscopy,      Patient Active Problem List    Diagnosis Date Noted    Iron deficiency anemia 07/07/2021    Asthmatic bronchitis , chronic ([facility] 08/14/2019    Type 2 diabetes mellitus with nephropathy ([facility] 12/28/2017    Recurrent depression ([facility] 12/28/2017    Insomnia disorder 12/04/2017    Major depression, chronic 12/04/2017    Encounter for review of form with patient 12/04/2017    Mitral valve prolapse 11/09/2017    Chronic hoarseness 08/07/2012    Asthma 08/07/2012    GERD (gastroesophageal reflux disease) 08/07/2012    Hypercholesteremia 08/07/2012    Gallstone 10/04/2011    Rib pain 10/04/2011    Asthma with exacerbation 10/18/2010    Type II or unspecified type diabetes mellitus without mention of complication, uncontrolled 10/18/2010    HTN (hypertension) 10/18/2010     Current Outpatient Medications   Medication Sig Dispense Refill    omeprazole (PriLOSEC OTC) 20 mg tablet Take 20 mg by mouth daily. ipratropium (ATROVENT) 42 mcg (0.06 %) nasal spray 2 Sprays by Both Nostrils route four (4) times daily. 15 mL 3    Omeprazole delayed release (PRILOSEC D/R) 20 mg tablet Take 1 Tablet by mouth daily.  30 Tablet 1    Ventolin HFA 90 mcg/actuation inhaler INHALE 2 PUFFS EVERY 4 HOURS AS NEEDED FOR WHEEZING 54 g 0    budesonide-formoteroL (Symbicort) 160-4.5 mcg/actuation HFAA INHALE 2 PUFFS BY MOUTH TWICE DAILY FOR COPD ASSOCIATED WITH CHRONIC BRONCHITIS, EXTRINSIC ASTHMA. 3 Each 1    montelukast (SINGULAIR) 10 mg tablet TAKE 1 TABLET EVERY EVENING 90 Tablet 3    Insulin Needles, Disposable, (Droplet Pen Needle) 31 gauge x 5/16\" ndle USE TO INJECT INSULIN SUBCUTANEOUSLY AS NEEDED PER SLIDING SCALE 100 Each 11    metoprolol succinate (TOPROL-XL) 100 mg tablet TAKE 1 TABLET EVERY DAY 90 Tablet 1    buPROPion XL (WELLBUTRIN XL) 300 mg XL tablet TAKE 1 TABLET EVERY MORNING 90 Tablet 2    insulin regular human (NovoLIN R Flexpen) 100 unit/mL (3 mL) inpn CHECK BLOOD SUGAR BEFORE MEALS AND IF GLUCOSE LESS 150=NONE; 151-200=2U; 201-250=4U; 251-300=6U; 301-350=8U; 351-400=10/CALL 30 mL 3    rosuvastatin (CRESTOR) 40 mg tablet TAKE 1 TABLET EVERY DAY 90 Tablet 3    glucose blood VI test strips (Accu-Chek Guide test strips) strip Test blood sugar 4 times daily. Dx E11.40 360 Strip 3    DULoxetine (CYMBALTA) 30 mg capsule Take 30 mg by mouth daily. ascorbic acid, vitamin C, (VITAMIN C) 500 mg tablet Take  by mouth.      metFORMIN ER (GLUCOPHAGE XR) 500 mg tablet TAKE 2 TABLETS BY MOUTH BEFORE BREAKFAST AND DINNER. 360 Tablet 3    Accu-Chek Softclix Lancets misc TEST BLOOD SUGAR FOUR TIMES DAILY 400 Each 3    insulin aspart protamine/insulin aspart (NovoLOG Mix 70-30FlexPen U-100) 100 unit/mL (70-30) inpn INJECT 17 UNITS SUBCUTANEOUSLY WITH EACH MEAL THREE TIMES DAILY PLUS 1 UNIT FOR EVERY 50MG/DL ABOVE 150MG/DL 60 mL 3    albuterol-ipratropium (DUO-NEB) 2.5 mg-0.5 mg/3 ml nebu 3 mL by Nebulization route every four (4) hours as needed for Wheezing. 30 Nebule 11    insulin syringe-needle U-100 (BD Veo Insulin Syringe UF) 1/2 mL 31 gauge x 15/64\" syrg For injecting insulin 3 times daily 300 Pen Needle 5    ferrous sulfate (ELDON-IN-SOL)15 mg iron(75 mg)/ml oral drops Take 1 mL by mouth three (3) times daily.  90 mL 3    Blood-Glucose Meter (Accu-Chek Guide Glucose Meter) misc Test blood sugar four times daily 1 Each 0    insulin NPH/insulin regular (NovoLIN 70/30 U-100 Insulin) 100 unit/mL (70-30) injection Inject 17 units with each meal 3 times daily + 1 units for every 50 mg/dl above 150 mg/dl 6 Vial 3    melatonin 10 mg tab Take 1 Tab by mouth At bedtime. lutein 40 mg cap Take  by mouth daily. Blood-Glucose Meter monitoring kit 1 preferred brand glucometer for checking home glucose, E11.65 1 Kit 0    multivitamin (ONE A DAY) tablet Take 1 Tab by mouth daily. EPINEPHrine (EPIPEN) 0.3 mg/0.3 mL injection 0.3 mg by IntraMUSCular route once as needed. tiotropium (Spiriva with HandiHaler) 18 mcg inhalation capsule INHALE THE CONTENTS OF 1 CAPSULE VIA HANDIHALER ONCE DAILY.  RINSE MOUTH AFTER USE 90 Capsule 1    roflumilast (DALIRESP) 500 mcg tab tablet TAKE 1 TABLET EVERY DAY 90 Tablet 1    traZODone (DESYREL) 100 mg tablet TAKE 1 TABLET EVERY NIGHT FOR INSOMNIA ASSOCIATED WITH DEPRESSION 90 Tablet 1    varicella-zoster recombinant, PF, (Shingrix, PF,) 50 mcg/0.5 mL susr injection 0.5mL by IntraMUSCular route once now and then repeat in 2-6 months (Patient not taking: No sig reported) 0.5 mL 1     Allergies   Allergen Reactions    Latex Hives    Other Food Anaphylaxis and Hives     PEPPERONI, sloppy joes, frank cheese doritos ,peach jolly ranchers & green tea    Demerol [Meperidine] Anaphylaxis     Difficulty breathing, p[t states that she have tylenol#3, tramadol and lortab with percocet had no problem with any of them    Iodine Anaphylaxis    Morphine Other (comments)     voilent outbreaks (restraints needed), Difficulty breathing, p[t states that she have tylenol#3, tramadol and lortab with percocet had no problem with any of them    Nsaids (Non-Steroidal Anti-Inflammatory Drug) Hives     Past Medical History:   Diagnosis Date    Adverse effect of anesthesia 12/2020    dr. Anthony Figueroa, colonoscopy difficulty breathing under propafol    Arthritis     Asthma     Asthma     COPD     Depression     Diabetes ([facility]     DM2, insulin and oral meds    Diabetes mellitus     Encounter for review of form with patient 12/4/2017 Essential hypertension     GERD (gastroesophageal reflux disease)     Headache(784.0)     HX OTHER MEDICAL     acoustic neuroma    Hypercholesterolemia     Hypertension     Ill-defined condition     R ACOUSTIC NEUROMA    Insomnia disorder 2017    Major depression, chronic 2017    Mood disorder ([facility] 2020    Psychiatric problem     anxiety depression    Psychotic disorder ([facility]     Psychotic disorder ([facility] 2019    Screening for cervical cancer 2018    Tobacco use disorder 10/19/2017    Unspecified sleep apnea     denies, sleep study negative     Past Surgical History:   Procedure Laterality Date    COLONOSCOPY N/A 2020    COLONOSCOPY performed by Mabel Li MD at [facility]OSCOPY    COLONOSCOPY N/A 2021    COLONOSCOPY performed by Mabel Li MD at [address] [place] [address],[address]  2020         COLONOSCOPY,REMV Yetta Few  2021         COLONOSCPY,FLEX,W/ [address] INJECT  2020         COLONOSCPY,FLEX,W/ [address] INJECT  2021         HX CARPAL TUNNEL RELEASE Left 2022    left revision cubital tunnel release and ulnar nerve decompression- [facility]    HX GASTRECTOMY  14    lap sleeve gastrectomy by Dr Nii Salcedo  2014    sleeve    HX GYN      2 c-sections    HX HEENT      sinus    HX HEENT      tracheal resection    HX HEENT      tracheal alleratation x 2    HX HEENT      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    HX HEENT      LASER SX-PENG    H[place]THOPAEDIC      [place]  DEL[place]      UPPER GI ENDOSCOPY,BIOPSY  2020          Family History   Problem Relation Age of Onset    Diabetes Father     Heart Failure Father     Heart Disease Father     Hypertension Father     High Cholesterol Mother     Stroke Mother     Suicide Brother     Stroke Maternal Grandmother     Cancer Paternal Grandfather     Anesth Problems Neg Hx      Social History     Tobacco Use    Smoking status: Former     Packs/day: 1.00 Years: 12.00     Pack years: 12.00     Types: Cigarettes     Quit date: 2018     Years since quittin.4    Smokeless tobacco: Former   Substance Use Topics    Alcohol use: Not Currently      Lab Results   Component Value Date/Time    WBC 5.8 2022 12:59 PM    HGB 11.7 2022 12:59 PM    HCT 37.0 2022 12:59 PM    PLATELET 731  12:59 PM    MCV 91.8 2022 12:59 PM     Lab Results   Component Value Date/Time    Hemoglobin A1c 8.8 (H) 2022 12:59 PM    Hemoglobin A1c 8.4 (H) 2021 01:45 PM    Hemoglobin A1c 5.8 (H) 2021 04:25 PM    Hemoglobin A1c, External 7.5% 2017 12:00 AM    Glucose 211 (H) 2022 12:59 PM    Glucose (POC) 192 (H) 2021 08:27 AM    Microalbumin/Creat ratio (mg/g creat) 415 (H) 2021 01:45 PM    Microalbumin,urine random 33.70 2021 01:45 PM    LDL, calculated 35.2 2022 12:59 PM    Creatinine 0.86 2022 12:59 PM         Review of Systems   Constitutional:  Negative for chills and fever. HENT:  Negative for congestion and nosebleeds. Eyes:  Negative for blurred vision and pain. Respiratory:  Negative for cough, shortness of breath and wheezing. Cardiovascular:  Negative for chest pain and leg swelling. Gastrointestinal:  Negative for constipation, diarrhea, nausea and vomiting. Genitourinary:  Negative for dysuria and frequency. Musculoskeletal:  Negative for joint pain and myalgias. Skin:  Negative for itching and rash. Neurological:  Negative for dizziness, loss of consciousness and headaches. Psychiatric/Behavioral:  Negative for depression. The patient is not nervous/anxious and does not have insomnia. Physical Exam  Vitals and nursing note reviewed. Constitutional:       Appearance: She is well-developed. HENT:      Head: Normocephalic and atraumatic. Eyes:      Conjunctiva/sclera: Conjunctivae normal.      Pupils: Pupils are equal, round, and reactive to light.    Neck: Thyroid: No thyromegaly. Vascular: No JVD. Cardiovascular:      Rate and Rhythm: Normal rate and regular rhythm. Heart sounds: Normal heart sounds. No murmur heard. No friction rub. No gallop. Pulmonary:      Effort: Pulmonary effort is normal. No respiratory distress. Breath sounds: Normal breath sounds. No stridor. No wheezing or rales. Abdominal:      General: Bowel sounds are normal. There is no distension. Palpations: Abdomen is soft. There is no mass. Tenderness: There is no abdominal tenderness. Musculoskeletal:         General: No tenderness. Normal range of motion. Lymphadenopathy:      Cervical: No cervical adenopathy. Skin:     Findings: No erythema or rash. Neurological:      Mental Status: She is alert and oriented to person, place, and time. Cranial Nerves: No cranial nerve deficit. Deep Tendon Reflexes: Reflexes are normal and symmetric. Psychiatric:         Behavior: Behavior normal.       ASSESSMENT and PLAN    ICD-10-CM ICD-9-CM    1. Recurrent depression ([facility])  F33.9 296.30 CBC W/O DIFF      LIPID PANEL      HEMOGLOBIN A1C WITH EAG      METABOLIC PANEL, COMPREHENSIVE      FERRITIN      TSH 3RD GENERATION      T4, FREE      XR CHEST PA LAT      ipratropium (ATROVENT) 42 mcg (0.06 %) nasal spray      T4, FREE      TSH 3RD GENERATION      FERRITIN      METABOLIC PANEL, COMPREHENSIVE      HEMOGLOBIN A1C WITH EAG      LIPID PANEL      CBC W/O DIFF      2. Type 2 diabetes mellitus with nephropathy ([facility])  E11.21 250.40 CBC W/O DIFF     583.81 LIPID PANEL      HEMOGLOBIN A1C WITH EAG      METABOLIC PANEL, COMPREHENSIVE      FERRITIN      TSH 3RD GENERATION      T4, FREE      XR CHEST PA LAT      ipratropium (ATROVENT) 42 mcg (0.06 %) nasal spray      T4, FREE      TSH 3RD GENERATION      FERRITIN      METABOLIC PANEL, COMPREHENSIVE      HEMOGLOBIN A1C WITH EAG      LIPID PANEL      CBC W/O DIFF      3.  COPD with acute exacerbation ([facility]  J44.1 491.21 XR CHEST PA LAT      ipratropium (ATROVENT) 42 mcg (0.06 %) nasal spray      4. Primary hypertension  I10 401.9 CBC W/O DIFF      LIPID PANEL      HEMOGLOBIN A1C WITH EAG      METABOLIC PANEL, COMPREHENSIVE      FERRITIN      TSH 3RD GENERATION      T4, FREE      XR CHEST PA LAT      ipratropium (ATROVENT) 42 mcg (0.06 %) nasal spray      T4, FREE      TSH 3RD GENERATION      FERRITIN      METABOLIC PANEL, COMPREHENSIVE      HEMOGLOBIN A1C WITH EAG      LIPID PANEL      CBC W/O DIFF      5. Acute cough  R05.1 786.2 XR CHEST PA LAT      ipratropium (ATROVENT) 42 mcg (0.06 %) nasal spray      6. Laryngitis acute, spasmodic  J04.0 464.00 XR CHEST PA LAT      ipratropium (ATROVENT) 42 mcg (0.06 %) nasal spray          reviewed diet, exercise and weight controlThis is the Subsequent Medicare Annual Wellness Exam, performed 12 months or more after the Initial AWV or the last Subsequent AWV    I have reviewed the patient's medical history in detail and updated the computerized patient record. Assessment/Plan   Education and counseling provided:  Are appropriate based on today's review and evaluation  End-of-Life planning (with patient's consent)  Pneumococcal Vaccine  Influenza Vaccine  Screening Mammography  Screening Pap and pelvic (covered once every 2 years)  Colorectal cancer screening tests    1. Recurrent depression ([facility]  -     CBC W/O DIFF; Future  -     LIPID PANEL; Future  -     HEMOGLOBIN A1C WITH EAG; Future  -     METABOLIC PANEL, COMPREHENSIVE; Future  -     FERRITIN; Future  -     TSH 3RD GENERATION; Future  -     T4, FREE; Future  -     XR CHEST PA LAT; Future  -     ipratropium (ATROVENT) 42 mcg (0.06 %) nasal spray; 2 Sprays by Both Nostrils route four (4) times daily. , Normal, Disp-15 mL, R-3  2. Type 2 diabetes mellitus with nephropathy (HCC)  -     CBC W/O DIFF; Future  -     LIPID PANEL; Future  -     HEMOGLOBIN A1C WITH EAG; Future  -     METABOLIC PANEL, COMPREHENSIVE;  Future  -     FERRITIN; Future  -     TSH 3RD GENERATION; Future  -     T4, FREE; Future  -     XR CHEST PA LAT; Future  -     ipratropium (ATROVENT) 42 mcg (0.06 %) nasal spray; 2 Sprays by Both Nostrils route four (4) times daily. , Normal, Disp-15 mL, R-3  3. COPD with acute exacerbation (HCC)  -     XR CHEST PA LAT; Future  -     ipratropium (ATROVENT) 42 mcg (0.06 %) nasal spray; 2 Sprays by Both Nostrils route four (4) times daily. , Normal, Disp-15 mL, R-3  4. Primary hypertension  -     CBC W/O DIFF; Future  -     LIPID PANEL; Future  -     HEMOGLOBIN A1C WITH EAG; Future  -     METABOLIC PANEL, COMPREHENSIVE; Future  -     FERRITIN; Future  -     TSH 3RD GENERATION; Future  -     T4, FREE; Future  -     XR CHEST PA LAT; Future  -     ipratropium (ATROVENT) 42 mcg (0.06 %) nasal spray; 2 Sprays by Both Nostrils route four (4) times daily. , Normal, Disp-15 mL, R-3  5. Acute cough  -     XR CHEST PA LAT; Future  -     ipratropium (ATROVENT) 42 mcg (0.06 %) nasal spray; 2 Sprays by Both Nostrils route four (4) times daily. , Normal, Disp-15 mL, R-3  6. Laryngitis acute, spasmodic  -     XR CHEST PA LAT; Future  -     ipratropium (ATROVENT) 42 mcg (0.06 %) nasal spray; 2 Sprays by Both Nostrils route four (4) times daily. , Normal, Disp-15 mL, R-3       Depression Risk Factor Screening     3 most recent PHQ Screens 12/6/2022   Little interest or pleasure in doing things Not at all   Feeling down, depressed, irritable, or hopeless Not at all   Total Score PHQ 2 0   Trouble falling or staying asleep, or sleeping too much -   Feeling tired or having little energy -   Poor appetite, weight loss, or overeating -   Feeling bad about yourself - or that you are a failure or have let yourself or your family down -   Trouble concentrating on things such as school, work, reading, or watching TV -   Moving or speaking so slowly that other people could have noticed; or the opposite being so fidgety that others notice -   Thoughts of being better off dead, or hurting yourself in some way -   PHQ 9 Score -   How difficult have these problems made it for you to do your work, take care of your home and get along with others -       Alcohol & Drug Abuse Risk Screen    Do you average more than 1 drink per night or more than 7 drinks a week:  No    On any one occasion in the past three months have you have had more than 3 drinks containing alcohol:  No          Functional Ability and Level of Safety    Hearing: Hearing is good. Activities of Daily Living: The home contains: handrails, grab bars, and rugs  Patient does total self care      Ambulation: with no difficulty     Fall Risk:  Fall Risk Assessment, last 12 mths 6/22/2021   Able to walk? Yes   Fall in past 12 months? 0   Do you feel unsteady?  0   Are you worried about falling 0      Abuse Screen:  Patient is not abused       Cognitive Screening    Has your family/caregiver stated any concerns about your memory: no     Cognitive Screening: Normal - MMSE (Mini Mental Status Exam)    Health Maintenance Due     Health Maintenance Due   Topic Date Due    COVID-19 Vaccine (1) Never done    Cervical cancer screen  Never done    Foot Exam Q1  05/03/2020    Breast Cancer Screen Mammogram  08/11/2021    Diabetic Alb to Cr ratio (uACR) test  06/08/2022    Flu Vaccine (1) 08/01/2022       Patient Care Team   Patient Care Team:  Sridhar Cunningham MD as PCP - General (Family Medicine)  Sridhar Cunningham MD as PCP - [facility] Empaneled Provider  Tina Paula MD as Surgeon (General Surgery)  Gloriann Gottron, NP as Nurse Practitioner ([facility])  Sally Barlow MD as Physician (Sleep Medicine Physician)  Kailee Poole MD as Physician (Cardiovascular Disease Physician)  Bob Mckeon MD as Consulting Provider (Endocrinology Physician)    History     Patient Active Problem List   Diagnosis Code    Asthma with exacerbation J45.901    Type II or unspecified type diabetes mellitus without mention of complication, uncontrolled [id number]    HTN (hypertension) I10    Gallstone K80.20    Rib pain R07.81    Chronic hoarseness R49.0    Asthma J45.909    GERD (gastroesophageal reflux disease) K21.9    Hypercholesteremia E78.00    Mitral valve prolapse I34.1    Insomnia disorder G47.00    Major depression, chronic F32.9    Encounter for review of form with patient Z02.89    Type 2 diabetes mellitus with nephropathy ([address] E11.21    Recurrent depression ([address] F33.9    Asthmatic bronchitis , chronic ([address] J44.9    Iron deficiency anemia D50.9     Past Medical History:   Diagnosis Date    Adverse effect of anesthesia 12/2020    dr. Josef James, colonoscopy difficulty breathing under propafol    Arthritis     Asthma     Asthma     COPD     Depression     Diabetes ([address]     DM2, insulin and oral meds    Diabetes mellitus     Encounter for review of form with patient 12/4/2017    Essential hypertension     GERD (gastroesophageal reflux disease)     Headache(784.0)     HX OTHER MEDICAL     acoustic neuroma    Hypercholesterolemia     Hypertension     Ill-defined condition     R ACOUSTIC NEUROMA    Insomnia disorder 12/4/2017    Major depression, chronic 12/4/2017    Mood disorder ([address] 7/20/2020    Psychiatric problem     anxiety depression    Psychotic disorder ([address]     Psychotic disorder ([address] 8/14/2019    Screening for cervical cancer 5/22/2018    Tobacco use disorder 10/19/2017    Unspecified sleep apnea     denies, sleep study negative      Past Surgical History:   Procedure Laterality Date    COLONOSCOPY N/A 12/17/2020    COLONOSCOPY performed by Kishor Slade MD at [facility] ENDOSCOPY    COLONOSCOPY N/A 4/6/2021    COLONOSCOPY performed by Kishor Slade MD at [address] [place] [address],[address]  12/17/2020         COLONOSCOPY,REMV LESN,SNARE  4/6/2021         COLONOSCPY,FLEX,W/ [address] INJECT  12/17/2020         COLONOSCPY,FLEX,W/ [address] INJECT  4/6/2021         HX CARPAL TUNNEL RELEASE Left 04/19/2022    left revision cubital tunnel release and ulnar nerve decompression- [facility]    HX GASTRECTOMY  14    lap sleeve gastrectomy by Dr Aguila Tatum  2014    sleeve    HX GYN      2 c-sections    HX HEENT      sinus    HX HEENT      tracheal resection    HX HEENT      tracheal alleratation x 2    HX HEENT      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 ipratropium (ATROVENT) 42 mcg (0.06 %) nasal spray 2 Sprays by Both Nostrils route four (4) times daily. 15 mL 3    Omeprazole delayed release (PRILOSEC D/R) 20 mg tablet Take 1 Tablet by mouth daily. 30 Tablet 1    Ventolin HFA 90 mcg/actuation inhaler INHALE 2 PUFFS EVERY 4 HOURS AS NEEDED FOR WHEEZING 54 g 0    budesonide-formoteroL (Symbicort) 160-4.5 mcg/actuation HFAA INHALE 2 PUFFS BY MOUTH TWICE DAILY FOR COPD ASSOCIATED WITH CHRONIC BRONCHITIS, EXTRINSIC ASTHMA. 3 Each 1    montelukast (SINGULAIR) 10 mg tablet TAKE 1 TABLET EVERY EVENING 90 Tablet 3    Insulin Needles, Disposable, (Droplet Pen Needle) 31 gauge x 5/16\" ndle USE TO INJECT INSULIN SUBCUTANEOUSLY AS NEEDED PER SLIDING SCALE 100 Each 11    metoprolol succinate (TOPROL-XL) 100 mg tablet TAKE 1 TABLET EVERY DAY 90 Tablet 1    buPROPion XL (WELLBUTRIN XL) 300 mg XL tablet TAKE 1 TABLET EVERY MORNING 90 Tablet 2    insulin regular human (NovoLIN R Flexpen) 100 unit/mL (3 mL) inpn CHECK BLOOD SUGAR BEFORE MEALS AND IF GLUCOSE LESS 150=NONE; 151-200=2U; 201-250=4U; 251-300=6U; 301-350=8U; 351-400=10/CALL 30 mL 3    rosuvastatin (CRESTOR) 40 mg tablet TAKE 1 TABLET EVERY DAY 90 Tablet 3    glucose blood VI test strips (Accu-Chek Guide test strips) strip Test blood sugar 4 times daily.   Dx E11.40 360 Strip 3    DULoxetine (CYMBALTA) 30 mg capsule Take 30 mg by mouth daily. ascorbic acid, vitamin C, (VITAMIN C) 500 mg tablet Take  by mouth.      metFORMIN ER (GLUCOPHAGE XR) 500 mg tablet TAKE 2 TABLETS BY MOUTH BEFORE BREAKFAST AND DINNER. 360 Tablet 3    Accu-Chek Softclix Lancets misc TEST BLOOD SUGAR FOUR TIMES DAILY 400 Each 3    insulin aspart protamine/insulin aspart (NovoLOG Mix 70-30FlexPen U-100) 100 unit/mL (70-30) inpn INJECT 17 UNITS SUBCUTANEOUSLY WITH EACH MEAL THREE TIMES DAILY PLUS 1 UNIT FOR EVERY 50MG/DL ABOVE 150MG/DL 60 mL 3    albuterol-ipratropium (DUO-NEB) 2.5 mg-0.5 mg/3 ml nebu 3 mL by Nebulization route every four (4) hours as needed for Wheezing. 30 Nebule 11    insulin syringe-needle U-100 (BD Veo Insulin Syringe UF) 1/2 mL 31 gauge x 15/64\" syrg For injecting insulin 3 times daily 300 Pen Needle 5    ferrous sulfate (ELDON-IN-SOL)15 mg iron(75 mg)/ml oral drops Take 1 mL by mouth three (3) times daily. 90 mL 3    Blood-Glucose Meter (Accu-Chek Guide Glucose Meter) misc Test blood sugar four times daily 1 Each 0    insulin NPH/insulin regular (NovoLIN 70/30 U-100 Insulin) 100 unit/mL (70-30) injection Inject 17 units with each meal 3 times daily + 1 units for every 50 mg/dl above 150 mg/dl 6 Vial 3    melatonin 10 mg tab Take 1 Tab by mouth At bedtime. lutein 40 mg cap Take  by mouth daily. Blood-Glucose Meter monitoring kit 1 preferred brand glucometer for checking home glucose, E11.65 1 Kit 0    multivitamin (ONE A DAY) tablet Take 1 Tab by mouth daily. EPINEPHrine (EPIPEN) 0.3 mg/0.3 mL injection 0.3 mg by IntraMUSCular route once as needed. tiotropium (Spiriva with HandiHaler) 18 mcg inhalation capsule INHALE THE CONTENTS OF 1 CAPSULE VIA HANDIHALER ONCE DAILY.  RINSE MOUTH AFTER USE 90 Capsule 1    roflumilast (DALIRESP) 500 mcg tab tablet TAKE 1 TABLET EVERY DAY 90 Tablet 1    traZODone (DESYREL) 100 mg tablet TAKE 1 TABLET EVERY NIGHT FOR INSOMNIA ASSOCIATED WITH DEPRESSION 90 Tablet 1 varicella-zoster recombinant, PF, (Shingrix, PF,) 50 mcg/0.5 mL susr injection 0.5mL by IntraMUSCular route once now and then repeat in 2-6 months (Patient not taking: No sig reported) 0.5 mL 1     Allergies   Allergen Reactions    Latex Hives    Other Food Anaphylaxis and Hives     PEPPERONI, sloppy joes, frank cheese doritos ,peach jolly ranchers & green tea    Demerol [Meperidine] Anaphylaxis     Difficulty breathing, p[t states that she have tylenol#3, tramadol and lortab with percocet had no problem with any of them    Iodine Anaphylaxis    Morphine Other (comments)     voilent outbreaks (restraints needed), Difficulty breathing, p[t states that she have tylenol#3, tramadol and lortab with percocet had no problem with any of them    Nsaids (Non-Steroidal Anti-Inflammatory Drug) Hives       Family History   Problem Relation Age of Onset    Diabetes Father     Heart Failure Father     Heart Disease Father     Hypertension Father     High Cholesterol Mother     Stroke Mother     Suicide Brother     Stroke Maternal Grandmother     Cancer Paternal Grandfather     Anesth Problems Neg Hx      Social History     Tobacco Use    Smoking status: Former     Packs/day: 1.00     Years: 12.00     Pack years: 12.00     Types: Cigarettes     Quit date: 2018     Years since quittin.4    Smokeless tobacco: Former   Substance Use Topics    Alcohol use: Not Currently         Laureano Chen MD

## 2022-12-07 LAB
ALBUMIN SERPL-MCNC: 3.9 G/DL (ref 3.5–5)
ALBUMIN/GLOB SERPL: 1.3 {RATIO} (ref 1.1–2.2)
ALP SERPL-CCNC: 73 U/L (ref 45–117)
ALT SERPL-CCNC: 35 U/L (ref 12–78)
ANION GAP SERPL CALC-SCNC: 3 MMOL/L (ref 5–15)
AST SERPL-CCNC: 18 U/L (ref 15–37)
BILIRUB SERPL-MCNC: 0.5 MG/DL (ref 0.2–1)
BUN SERPL-MCNC: 17 MG/DL (ref 6–20)
BUN/CREAT SERPL: 20 (ref 12–20)
CALCIUM SERPL-MCNC: 10 MG/DL (ref 8.5–10.1)
CHLORIDE SERPL-SCNC: 103 MMOL/L (ref 97–108)
CHOLEST SERPL-MCNC: 125 MG/DL
CO2 SERPL-SCNC: 32 MMOL/L (ref 21–32)
CREAT SERPL-MCNC: 0.86 MG/DL (ref 0.55–1.02)
ERYTHROCYTE [DISTWIDTH] IN BLOOD BY AUTOMATED COUNT: 12.1 % (ref 11.5–14.5)
EST. AVERAGE GLUCOSE BLD GHB EST-MCNC: 206 MG/DL
FERRITIN SERPL-MCNC: 125 NG/ML (ref 8–252)
GLOBULIN SER CALC-MCNC: 2.9 G/DL (ref 2–4)
GLUCOSE SERPL-MCNC: 211 MG/DL (ref 65–100)
HBA1C MFR BLD: 8.8 % (ref 4–5.6)
HCT VFR BLD AUTO: 37 % (ref 35–47)
HDLC SERPL-MCNC: 58 MG/DL
HDLC SERPL: 2.2 {RATIO} (ref 0–5)
HGB BLD-MCNC: 11.7 G/DL (ref 11.5–16)
LDLC SERPL CALC-MCNC: 35.2 MG/DL (ref 0–100)
MCH RBC QN AUTO: 29 PG (ref 26–34)
MCHC RBC AUTO-ENTMCNC: 31.6 G/DL (ref 30–36.5)
MCV RBC AUTO: 91.8 FL (ref 80–99)
NRBC # BLD: 0 K/UL (ref 0–0.01)
NRBC BLD-RTO: 0 PER 100 WBC
PLATELET # BLD AUTO: 222 K/UL (ref 150–400)
PMV BLD AUTO: 10.9 FL (ref 8.9–12.9)
POTASSIUM SERPL-SCNC: 5 MMOL/L (ref 3.5–5.1)
PROT SERPL-MCNC: 6.8 G/DL (ref 6.4–8.2)
RBC # BLD AUTO: 4.03 M/UL (ref 3.8–5.2)
SODIUM SERPL-SCNC: 138 MMOL/L (ref 136–145)
T4 FREE SERPL-MCNC: 0.9 NG/DL (ref 0.8–1.5)
TRIGL SERPL-MCNC: 159 MG/DL (ref ?–150)
TSH SERPL DL<=0.05 MIU/L-ACNC: 0.6 UIU/ML (ref 0.36–3.74)
VLDLC SERPL CALC-MCNC: 31.8 MG/DL
WBC # BLD AUTO: 5.8 K/UL (ref 3.6–11)

## 2022-12-07 RX ORDER — TRAZODONE HYDROCHLORIDE 100 MG/1
TABLET ORAL
Qty: 90 TABLET | Refills: 1 | Status: SHIPPED | OUTPATIENT
Start: 2022-12-07

## 2022-12-24 DIAGNOSIS — R06.09 DYSPNEA ON EFFORT: ICD-10-CM

## 2022-12-24 DIAGNOSIS — J44.9 ASTHMATIC BRONCHITIS , CHRONIC (HCC): ICD-10-CM

## 2022-12-24 DIAGNOSIS — J44.1 COPD WITH ACUTE EXACERBATION (HCC): ICD-10-CM

## 2022-12-27 RX ORDER — ROFLUMILAST 500 UG/1
TABLET ORAL
Qty: 90 TABLET | Refills: 1 | Status: SHIPPED | OUTPATIENT
Start: 2022-12-27

## 2022-12-29 NOTE — PATIENT INSTRUCTIONS
Medicare Wellness Visit, Female     The best way to live healthy is to have a lifestyle where you eat a well-balanced diet, exercise regularly, limit alcohol use, and quit all forms of tobacco/nicotine, if applicable. Regular preventive services are another way to keep healthy. Preventive services (vaccines, screening tests, monitoring & exams) can help personalize your care plan, which helps you manage your own care. Screening tests can find health problems at the earliest stages, when they are easiest to treat. [facility] follows the current, evidence-based guidelines published by the [facility] J[facility] Ru[facility] ([facility]) when recommending preventive services for our patients. Because we follow these guidelines, sometimes recommendations change over time as research supports it. (For example, mammograms used to be recommended annually. Even though Medicare will still pay for an annual mammogram, the newer guidelines recommend a mammogram every two years for women of average risk). Of course, you and your doctor may decide to screen more often for some diseases, based on your risk and your co-morbidities (chronic disease you are already diagnosed with). Preventive services for you include:  - Medicare offers their members a free annual wellness visit, which is time for you and your primary care provider to discuss and plan for your preventive service needs.  Take advantage of this benefit every year!    -Over the age of 72 should receive the recommended pneumonia vaccines.    -All adults should have a flu vaccine yearly.  -All adults should have a tetanus vaccine every 10 years.   -Over the age 48 should receive the shingles vaccines.        -All adults should be screened once for Hepatitis C.  -All adults age 38-68 who are overweight should have a diabetes screening test once every three years.   -Other screening tests and preventive services for persons with diabetes include: an eye exam to screen for diabetic retinopathy, a kidney function test, a foot exam, and stricter control over your cholesterol.   -Cardiovascular screening for adults with routine risk involves an electrocardiogram (ECG) at intervals determined by your doctor.     -Colorectal cancer screenings should be done for adults age 39-70 with no increased risk factors for colorectal cancer. There are a number of acceptable methods of screening for this type of cancer. Each test has its own benefits and drawbacks. Discuss with your doctor what is most appropriate for you during your annual wellness visit. The different tests include: colonoscopy (considered the best screening method), a fecal occult blood test, a fecal DNA test, and sigmoidoscopy.    -Lung cancer screening is recommended annually with a low dose CT scan for adults between age 54 and 68, who have smoked at least 30 pack years (equivalent of 1 pack per day for 30 days), and who is a current smoker or quit less than 15 years ago.    -A bone mass density test is recommended when a woman turns 65 to screen for osteoporosis. This test is only recommended one time, as a screening. Some providers will use this same test as a disease monitoring tool if you already have osteoporosis. -Breast cancer screenings are recommended every other year for women of normal risk, age 54-69.    -Cervical cancer screenings for women over age 72 are only recommended with certain risk factors.      Here is a list of your current Health Maintenance items (your personalized list of preventive services) with a due date:  Health Maintenance Due   Topic Date Due    COVID-19 Vaccine (1) Never done    Cervical cancer screen  Never done    Diabetic Foot Care  05/03/2020    Mammogram  08/11/2021    URINE CHECK FOR KIDNEY PROBLEMS  06/08/2022    Yearly Flu Vaccine (1) 08/01/2022

## 2023-01-04 RX ORDER — OMEPRAZOLE 20 MG/1
20 CAPSULE, DELAYED RELEASE ORAL
Qty: 90 CAPSULE | Refills: 1 | Status: SHIPPED | OUTPATIENT
Start: 2023-01-04

## 2023-01-23 ENCOUNTER — HOSPITAL ENCOUNTER (OUTPATIENT)
Dept: MAMMOGRAPHY | Age: 63
Discharge: HOME | End: 2023-01-23
Attending: FAMILY MEDICINE
Payer: MEDICARE

## 2023-01-23 DIAGNOSIS — Z12.31 VISIT FOR SCREENING MAMMOGRAM: ICD-10-CM

## 2023-01-23 PROCEDURE — 77063 BREAST TOMOSYNTHESIS BI: CPT

## 2023-01-26 DIAGNOSIS — I10 ESSENTIAL HYPERTENSION: ICD-10-CM

## 2023-01-26 DIAGNOSIS — J41.0 SIMPLE CHRONIC BRONCHITIS (HCC): ICD-10-CM

## 2023-01-26 DIAGNOSIS — R00.0 TACHYCARDIA: ICD-10-CM

## 2023-01-26 DIAGNOSIS — J44.9 ASTHMATIC BRONCHITIS , CHRONIC (HCC): ICD-10-CM

## 2023-01-26 DIAGNOSIS — F51.01 PRIMARY INSOMNIA: ICD-10-CM

## 2023-01-30 RX ORDER — ALBUTEROL SULFATE 90 UG/1
AEROSOL, METERED RESPIRATORY (INHALATION)
Qty: 54 G | Refills: 0 | Status: SHIPPED | OUTPATIENT
Start: 2023-01-30

## 2023-01-30 RX ORDER — METFORMIN HYDROCHLORIDE 500 MG/1
1000 TABLET, EXTENDED RELEASE ORAL
Qty: 360 TABLET | Refills: 3 | Status: SHIPPED | OUTPATIENT
Start: 2023-01-30

## 2023-01-30 RX ORDER — METOPROLOL SUCCINATE 100 MG/1
TABLET, EXTENDED RELEASE ORAL
Qty: 90 TABLET | Refills: 1 | Status: SHIPPED | OUTPATIENT
Start: 2023-01-30

## 2023-01-30 RX ORDER — LANCETS
EACH MISCELLANEOUS
Qty: 400 EACH | Refills: 3 | Status: SHIPPED | OUTPATIENT
Start: 2023-01-30

## 2023-03-06 RX ORDER — INSULIN ASPART 100 [IU]/ML
INJECTION, SUSPENSION SUBCUTANEOUS
Qty: 60 ML | Refills: 3 | Status: SHIPPED | OUTPATIENT
Start: 2023-03-06

## 2023-03-13 ENCOUNTER — PATIENT MESSAGE (OUTPATIENT)
Dept: FAMILY MEDICINE CLINIC | Age: 63
End: 2023-03-13

## 2023-03-13 DIAGNOSIS — E11.21 TYPE 2 DIABETES MELLITUS WITH NEPHROPATHY (HCC): Primary | ICD-10-CM

## 2023-03-14 RX ORDER — LANCETS
EACH MISCELLANEOUS
Qty: 400 EACH | Refills: 3 | Status: SHIPPED | OUTPATIENT
Start: 2023-03-14

## 2023-03-17 DIAGNOSIS — E11.21 TYPE 2 DIABETES MELLITUS WITH NEPHROPATHY (HCC): ICD-10-CM

## 2023-03-17 RX ORDER — PEN NEEDLE, DIABETIC 30 GX3/16"
NEEDLE, DISPOSABLE MISCELLANEOUS
Qty: 120 EACH | Refills: 11 | Status: SHIPPED | OUTPATIENT
Start: 2023-03-17

## 2023-03-17 NOTE — TELEPHONE ENCOUNTER
Order placed for disposable pen needles , per Verbal Order from Dr. Dominic Solomon on 3/17/2023 due to need to inject insulin.

## 2023-04-18 ENCOUNTER — OFFICE VISIT (OUTPATIENT)
Dept: FAMILY MEDICINE CLINIC | Age: 63
End: 2023-04-18
Payer: MEDICARE

## 2023-04-18 VITALS
HEIGHT: 68 IN | TEMPERATURE: 97.8 F | HEART RATE: 82 BPM | SYSTOLIC BLOOD PRESSURE: 120 MMHG | RESPIRATION RATE: 18 BRPM | BODY MASS INDEX: 26.37 KG/M2 | WEIGHT: 174 LBS | OXYGEN SATURATION: 98 % | DIASTOLIC BLOOD PRESSURE: 71 MMHG

## 2023-04-18 DIAGNOSIS — F20.0 PARANOID SCHIZOPHRENIA (HCC): Primary | ICD-10-CM

## 2023-04-18 DIAGNOSIS — E11.21 TYPE 2 DIABETES MELLITUS WITH NEPHROPATHY (HCC): ICD-10-CM

## 2023-04-18 DIAGNOSIS — J44.1 COPD WITH ACUTE EXACERBATION (HCC): ICD-10-CM

## 2023-04-18 DIAGNOSIS — Z01.818 PREOPERATIVE CLEARANCE: ICD-10-CM

## 2023-04-18 LAB — HBA1C MFR BLD HPLC: 7 %

## 2023-04-18 PROCEDURE — G9899 SCRN MAM PERF RSLTS DOC: HCPCS | Performed by: FAMILY MEDICINE

## 2023-04-18 PROCEDURE — 3017F COLORECTAL CA SCREEN DOC REV: CPT | Performed by: FAMILY MEDICINE

## 2023-04-18 PROCEDURE — G8419 CALC BMI OUT NRM PARAM NOF/U: HCPCS | Performed by: FAMILY MEDICINE

## 2023-04-18 PROCEDURE — G8427 DOCREV CUR MEDS BY ELIG CLIN: HCPCS | Performed by: FAMILY MEDICINE

## 2023-04-18 PROCEDURE — 83036 HEMOGLOBIN GLYCOSYLATED A1C: CPT | Performed by: FAMILY MEDICINE

## 2023-04-18 PROCEDURE — 3051F HG A1C>EQUAL 7.0%<8.0%: CPT | Performed by: FAMILY MEDICINE

## 2023-04-18 PROCEDURE — 99213 OFFICE O/P EST LOW 20 MIN: CPT | Performed by: FAMILY MEDICINE

## 2023-04-18 PROCEDURE — 2022F DILAT RTA XM EVC RTNOPTHY: CPT | Performed by: FAMILY MEDICINE

## 2023-04-18 PROCEDURE — 3078F DIAST BP <80 MM HG: CPT | Performed by: FAMILY MEDICINE

## 2023-04-18 PROCEDURE — 3074F SYST BP LT 130 MM HG: CPT | Performed by: FAMILY MEDICINE

## 2023-04-18 PROCEDURE — G9717 DOC PT DX DEP/BP F/U NT REQ: HCPCS | Performed by: FAMILY MEDICINE

## 2023-04-18 NOTE — PROGRESS NOTES
Preoperative Evaluation    Date of Exam: 2023    Santiago Norman is a 59 y.o. female (:1960) who presents for preoperative evaluation.    Procedure/Surgery: cataract replacement  Date of Procedure/Surgery: 2023  Surgeon: Dr Sinha Howard:  EXNPQME  Primary Physician: Kary Maldonado MD  Latex Allergy: yes    Problem List:     Patient Active Problem List    Diagnosis Date Noted    Paranoid schizophrenia ([address] 2023    Iron deficiency anemia 2021    Asthmatic bronchitis , chronic ([address] 2019    Type 2 diabetes mellitus with nephropathy ([address] 2017    Recurrent depression ([address] 2017    Insomnia disorder 2017    Major depression, chronic 2017    Encounter for review of form with patient 2017    Mitral valve prolapse 2017    Chronic hoarseness 2012    Asthma 2012    GERD (gastroesophageal reflux disease) 2012    Hypercholesteremia 2012    Gallstone 10/04/2011    Rib pain 10/04/2011    Asthma with exacerbation 10/18/2010    Type II or unspecified type diabetes mellitus without mention of complication, uncontrolled 10/18/2010    HTN (hypertension) 10/18/2010     Medical History:     Past Medical History:   Diagnosis Date    Adverse effect of anesthesia 2020    dr. Griselda Fung, colonoscopy difficulty breathing under propafol    Arthritis     Asthma     Asthma     COPD     Depression     Diabetes ([address]     DM2, insulin and oral meds    Diabetes mellitus     Encounter for review of form with patient 2017    Essential hypertension     GERD (gastroesophageal reflux disease)     Headache(784.0)     HX OTHER MEDICAL     acoustic neuroma    Hypercholesterolemia     Hypertension     Ill-defined condition     R ACOUSTIC NEUROMA    Insomnia disorder 2017    Major depression, chronic 2017    Mood disorder ([address] 2020    Psychiatric problem     anxiety depression    Psychotic disorder ([address]     Psychotic disorder ([facility] 8/14/2019    Screening for cervical cancer 5/22/2018    Tobacco use disorder 10/19/2017    Unspecified sleep apnea     denies, sleep study negative     Allergies: Allergies   Allergen Reactions    Latex Hives    Other Food Anaphylaxis and Hives     PEPPERONI, sloppy joes, frank cheese doritos ,peach jolly ranchers & green tea    Demerol [Meperidine] Anaphylaxis     Difficulty breathing, p[t states that she have tylenol#3, tramadol and lortab with percocet had no problem with any of them    Iodine Anaphylaxis    Morphine Other (comments)     voilent outbreaks (restraints needed), Difficulty breathing, p[t states that she have tylenol#3, tramadol and lortab with percocet had no problem with any of them    Nsaids (Non-Steroidal Anti-Inflammatory Drug) Hives      Medications:     Current Outpatient Medications   Medication Sig    buPROPion XL (WELLBUTRIN XL) 300 mg XL tablet TAKE 1 TABLET EVERY MORNING    Insulin Needles, Disposable, (Droplet Pen Needle) 31 gauge x 5/16\" ndle USE TO INJECT INSULIN SUBCUTANEOUSLY AS NEEDED PER SLIDING SCALE    Accu-Chek Softclix Lancets misc TEST BLOOD SUGAR FOUR TIMES DAILY. insulin aspart protamine/insulin aspart (NovoLOG Mix 70-30FlexPen U-100) 100 unit/mL (70-30) inpn INJECT 17 UNITS UNDER THE SKIN THREE TIMES DAILY WITH EACH MEAL PLUS 1 UNIT FOR EVERY 50MG/DL ABOVE 150MG/DL    Ventolin HFA 90 mcg/actuation inhaler INHALE 2 PUFFS EVERY 4 HOURS AS NEEDED FOR WHEEZING    metoprolol succinate (TOPROL-XL) 100 mg tablet TAKE 1 TABLET EVERY DAY    metFORMIN ER (GLUCOPHAGE XR) 500 mg tablet TAKE 2 TABLETS BY MOUTH BEFORE BREAKFAST AND DINNER. omeprazole (PRILOSEC) 20 mg capsule Take 1 Capsule by mouth daily as needed for PRN Reason (Other) (gerd). tiotropium (Spiriva with HandiHaler) 18 mcg inhalation capsule INHALE THE CONTENTS OF 1 CAPSULE VIA HANDIHALER ONCE DAILY.  RINSE MOUTH AFTER USE    roflumilast (DALIRESP) 500 mcg tab tablet TAKE 1 TABLET EVERY DAY traZODone (DESYREL) 100 mg tablet TAKE 1 TABLET EVERY NIGHT FOR INSOMNIA ASSOCIATED WITH DEPRESSION    ipratropium (ATROVENT) 42 mcg (0.06 %) nasal spray 2 Sprays by Both Nostrils route four (4) times daily. budesonide-formoteroL (Symbicort) 160-4.5 mcg/actuation HFAA INHALE 2 PUFFS BY MOUTH TWICE DAILY FOR COPD ASSOCIATED WITH CHRONIC BRONCHITIS, EXTRINSIC ASTHMA. montelukast (SINGULAIR) 10 mg tablet TAKE 1 TABLET EVERY EVENING    rosuvastatin (CRESTOR) 40 mg tablet TAKE 1 TABLET EVERY DAY    glucose blood VI test strips (Accu-Chek Guide test strips) strip Test blood sugar 4 times daily. Dx E11.40    ascorbic acid, vitamin C, (VITAMIN C) 500 mg tablet Take  by mouth. albuterol-ipratropium (DUO-NEB) 2.5 mg-0.5 mg/3 ml nebu 3 mL by Nebulization route every four (4) hours as needed for Wheezing. insulin syringe-needle U-100 (BD Veo Insulin Syringe UF) 1/2 mL 31 gauge x 15/64\" syrg For injecting insulin 3 times daily    ferrous sulfate (ELDON-IN-SOL)15 mg iron(75 mg)/ml oral drops Take 1 mL by mouth three (3) times daily. Blood-Glucose Meter (Accu-Chek Guide Glucose Meter) misc Test blood sugar four times daily    insulin NPH/insulin regular (NovoLIN 70/30 U-100 Insulin) 100 unit/mL (70-30) injection Inject 17 units with each meal 3 times daily + 1 units for every 50 mg/dl above 150 mg/dl    melatonin 10 mg tab Take 1 Tab by mouth At bedtime. Blood-Glucose Meter monitoring kit 1 preferred brand glucometer for checking home glucose, E11.65    multivitamin (ONE A DAY) tablet Take 1 Tablet by mouth daily. EPINEPHrine (EPIPEN) 0.3 mg/0.3 mL injection 0.3 mL by IntraMUSCular route once as needed. omeprazole (PRILOSEC OTC) 20 mg tablet Take 1 Tablet by mouth daily.     insulin regular human (NovoLIN R Flexpen) 100 unit/mL (3 mL) inpn CHECK BLOOD SUGAR BEFORE MEALS AND IF GLUCOSE LESS 150=NONE; 151-200=2U; 201-250=4U; 251-300=6U; 301-350=8U; 351-400=10/CALL    varicella-zoster recombinant, PF, (Shingrix, PF,) 50 mcg/0.5 mL susr injection 0.5mL by IntraMUSCular route once now and then repeat in 2-6 months (Patient not taking: Reported on 2021)    lutein 40 mg cap Take  by mouth daily. (Patient not taking: Reported on 2023)     No current facility-administered medications for this visit.      Surgical History:     Past Surgical History:   Procedure Laterality Date    COLONOSCOPY N/A 2020    COLONOSCOPY performed by Sofya Moore MD at [facility] ENDOSCOPY    COLONOSCOPY N/A 2021    COLONOSCOPY performed by Sofya Moore MD at [address] [place] [address],[address]  2020         COLONOSCOPY,REMV Joana Jeremy  2021         COLONOSCPY,FLEX,W/ [address] INJECT  2020         COLONOSCPY,FLEX,W/ [address] INJECT  2021         HX CARPAL TUNNEL RELEASE Left 2022    left revision cubital tunnel release and ulnar nerve decompression- [facility]    HX GASTRECTOMY  14    lap sleeve gastrectomy by Dr Link Schmid      sleeve    HX GYN      2 c-sections    HX HEENT      sinus    HX HEENT      tracheal resection    HX HEENT      tracheal alleratation x 2    HX HEENT      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    HX HEENT      LASER SX-PENG    HX ORTHOPAEDIC      NE  DELIVERY ONLY      UPPER GI ENDOSCOPY,BIOPSY  2020          Social History:     Social History     Socioeconomic History    Marital status:    Tobacco Use    Smoking status: Former     Packs/day: 1.00     Years: 12.00     Pack years: 12.00     Types: Cigarettes     Quit date: 2018     Years since quittin.7    Smokeless tobacco: Former   Vaping Use    Vaping Use: Former    Start date: 2018    Quit date: 2018   Substance and Sexual Activity    Alcohol use: Not Currently    Drug use: Never    Sexual activity: Never       Recent use of: No recent use of aspirin (ASA), NSAIDS or steroids    Tetanus up to date: last tetanus booster within 10 years      Anesthesia Complications: None  History of abnormal bleeding : None  History of Blood Transfusions: no  Health Care Directive or Living Will: no    REVIEW OF SYSTEMS:      Constitutional: no chills and fever,  , nad     HENT: no ear pain or nosebleeds. No blurred vision  Respiratory: no shortness of breath, wheezing cough   Cardiovascular: Has no chest pain, ,and racing heart . Gastrointestinal: No constipation, diarrhea, nausea and vomiting. Genitourinary: No frequency. Musculoskeletal: Negative for joint pain. Skin: no itching, no rash. Neurological: Negative for dizziness, no tremors  Psychiatric/Behavioral: Negative for depression   is not nervous/anxious. and not depressed the patient is not nervous/anxious. EXAM:   Visit Vitals  /71   Pulse 82   Temp 97.8 °F (36.6 °C) (Oral)   Resp 18   Ht 5' 8\" (1.727 m)   Wt 174 lb (78.9 kg)   LMP 01/17/2010   SpO2 98%   BMI 26.46 kg/m²     General:  alert cooperative appears stated for the age  [de-identified]; normocephalic and atraumatic PERRLA extraocular motor intact normal tympanic membrane normal external ear bilaterally, mucosal normal no drainage  Neck: supple no adenopathy no JVD no bruit  Lungs: Clear to auscultation bilaterally no rales rhonchi or wheezes  Heart: Normal regular S1-S2 ,  no murmur  Breast exam deferred  Abdomen: Soft nontender normal bowel sounds   Extremities: Normal range of motion no point tenderness normal pulses no edema  Pelvic/: No lesion, no lymphadenopathy  Skin: Normal no lesion no rash        DIAGNOSTICS:   1. EKG: EKG FINDINGS - not indicated  2. CXR: not indicated  3.  Labs:   Lab Results   Component Value Date/Time    Hemoglobin A1c 8.8 (H) 12/06/2022 12:59 PM    Hemoglobin A1c 8.4 (H) 06/08/2021 01:45 PM    Hemoglobin A1c 5.8 (H) 02/09/2021 04:25 PM    Hemoglobin A1c, External 7.5% 04/26/2017 12:00 AM    Glucose 211 (H) 12/06/2022 12:59 PM    Glucose (POC) 192 (H) 04/06/2021 08:27 AM    Microalbumin/Creat ratio (mg/g creat) 415 (H) 06/08/2021 01:45 PM    Microalbumin,urine random 33.70 06/08/2021 01:45 PM    LDL, calculated 35.2 12/06/2022 12:59 PM    Creatinine 0.86 12/06/2022 12:59 PM        IMPRESSION:   Diabetes mellitus, stable at 7  No contraindications to planned surgery    Anastasiya Rizzo MD   4/18/2023

## 2023-04-18 NOTE — PROGRESS NOTES
Identified pt with two pt identifiers(name and ). Reviewed record in preparation for visit and have obtained necessary documentation. Chief Complaint   Patient presents with    Pre-op Exam     Left eye cataract surgery        Vitals:    23 0831   BP: 120/71   Pulse: 82   Resp: 18   Temp: 97.8 °F (36.6 °C)   TempSrc: Oral   SpO2: 98%   Weight: 174 lb (78.9 kg)   Height: 5' 8\" (1.727 m)   PainSc:   0 - No pain   LMP: 2010       Health Maintenance Due   Topic    Cervical cancer screen     Foot Exam Q1     Diabetic Alb to Cr ratio (uACR) test        Coordination of Care Questionnaire:  :   1) Have you been to an emergency room, urgent care, or hospitalized since your last visit? If yes, where when, and reason for visit? no       2. Have seen or consulted any other health care provider since your last visit? If yes, where when, and reason for visit? NO      Patient is accompanied by self I have received verbal consent from Duncan Brooke to discuss any/all medical information while they are present in the room.

## 2023-05-09 RX ORDER — BLOOD SUGAR DIAGNOSTIC
STRIP MISCELLANEOUS
Qty: 400 STRIP | Refills: 3 | Status: SHIPPED | OUTPATIENT
Start: 2023-05-09

## 2023-05-09 RX ORDER — TRAZODONE HYDROCHLORIDE 100 MG/1
TABLET ORAL
Qty: 90 TABLET | Refills: 1 | Status: SHIPPED | OUTPATIENT
Start: 2023-05-09

## 2023-05-09 NOTE — TELEPHONE ENCOUNTER
Last appointment: 4/18/23 with MD Keri Hicks  Next appointment: none  Previous refill encounter(s): 6/2/22 strips #360 with 3 refills, 12/7/22 Desyrel #90 with 1 refill    Requested Prescriptions     Pending Prescriptions Disp Refills    traZODone (DESYREL) 100 MG tablet [Pharmacy Med Name: TRAZODONE HYDROCHLORIDE 100 MG Tablet] 90 tablet 1     Sig: TAKE 1 TABLET EVERY NIGHT FOR INSOMNIA ASSOCIATED WITH DEPRESSION    blood glucose test strips (ACCU-CHEK GUIDE) strip [Pharmacy Med Name: Rafael Head   Strip] 400 strip 3     Sig: Test blood sugar 4 times daily Dx E11.40

## 2023-05-19 ENCOUNTER — OFFICE VISIT (OUTPATIENT)
Age: 63
End: 2023-05-19

## 2023-05-19 VITALS
DIASTOLIC BLOOD PRESSURE: 80 MMHG | TEMPERATURE: 98.7 F | SYSTOLIC BLOOD PRESSURE: 139 MMHG | HEART RATE: 88 BPM | WEIGHT: 176 LBS | BODY MASS INDEX: 26.76 KG/M2 | OXYGEN SATURATION: 98 % | RESPIRATION RATE: 17 BRPM

## 2023-05-19 DIAGNOSIS — J01.00 ACUTE MAXILLARY SINUSITIS, RECURRENCE NOT SPECIFIED: Primary | ICD-10-CM

## 2023-05-19 RX ORDER — METHYLPREDNISOLONE 4 MG/1
TABLET ORAL
Qty: 1 KIT | Refills: 0 | Status: SHIPPED | OUTPATIENT
Start: 2023-05-19 | End: 2023-05-25

## 2023-05-19 RX ORDER — DEXTROMETHORPHAN HYDROBROMIDE AND PROMETHAZINE HYDROCHLORIDE 15; 6.25 MG/5ML; MG/5ML
5 SYRUP ORAL 4 TIMES DAILY PRN
Qty: 120 ML | Refills: 0 | Status: SHIPPED | OUTPATIENT
Start: 2023-05-19 | End: 2023-05-26

## 2023-05-19 RX ORDER — OFLOXACIN 3 MG/ML
SOLUTION/ DROPS OPHTHALMIC
COMMUNITY
Start: 2023-04-25

## 2023-05-19 RX ORDER — AMOXICILLIN AND CLAVULANATE POTASSIUM 875; 125 MG/1; MG/1
1 TABLET, FILM COATED ORAL 2 TIMES DAILY
Qty: 20 TABLET | Refills: 0 | Status: SHIPPED | OUTPATIENT
Start: 2023-05-19 | End: 2023-05-29

## 2023-05-19 ASSESSMENT — ENCOUNTER SYMPTOMS
SINUS COMPLAINT: 1
COUGH: 1
SINUS PRESSURE: 1
EYES NEGATIVE: 1
HOARSE VOICE: 1

## 2023-05-19 NOTE — PROGRESS NOTES
Hypercholesterolemia     Hypertension     Ill-defined condition     R ACOUSTIC NEUROMA    Insomnia disorder 2017    Major depression, chronic 2017    Mood disorder ([facility] 2020    Psychiatric problem     anxiety depression    Psychotic disorder ([facility]     Psychotic disorder ([facility] 2019    Screening for cervical cancer 2018    Tobacco use disorder 10/19/2017    Unspecified sleep apnea     denies, sleep study negative       Past Surgical History:   Procedure Laterality Date    CARPAL TUNNEL RELEASE Left 2022    left revision cubital tunnel release and ulnar nerve decompression- [facility]     DELIVERY ONLY      COLONOSCOPY N/A 2020    COLONOSCOPY performed by Anu Borrego MD at [facility] ENDOSCOPY    COLONOSCOPY N/A 2021    COLONOSCOPY performed by Anu Borrego MD at [address]  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 Objective:     Physical Exam  Vitals and nursing note reviewed. Constitutional:       Appearance: Normal appearance. HENT:      Head: Normocephalic and atraumatic. Right Ear: Tympanic membrane normal.      Left Ear: Tympanic membrane normal.      Nose: Congestion and rhinorrhea present. Mouth/Throat:      Mouth: Mucous membranes are moist.   Eyes:      Extraocular Movements: Extraocular movements intact.       Conjunctiva/sclera: Conjunctivae normal.

## 2023-05-25 RX ORDER — PEN NEEDLE, DIABETIC 31 GX5/16"
NEEDLE, DISPOSABLE MISCELLANEOUS
Qty: 100 EACH | Refills: 0 | Status: SHIPPED | OUTPATIENT
Start: 2023-05-25

## 2023-06-19 NOTE — ADDENDUM NOTE
-- DO NOT REPLY / DO NOT REPLY ALL --  -- Message is from Engagement Center Operations (ECO) --    Request Result  Is the patient currently having Emergent symptoms?: No    Which result are you requesting?: X-Ray/ Back    What is the full name of the provider that ordered the lab or test?: Clair Hall APNP      Caller Information       Type Contact Phone/Fax    06/19/2023 08:26 AM CDT Phone (Incoming) OLGA DUNHAM (Mother) [phone]          Alternative phone number:     Clinic site name / Account # for ordering provider: [facility] - [address]     Can a detailed message be left?: Yes    Message Turnaround: [place]:    Refer to site's KB page for routing instructions    Please give this turnaround time to the caller:   \"You can expect to receive a response 2-3 business days after your provider's clinical team reviews the message\"    Inform patients: \"Please be aware the return phone call may come from an unidentified or out of state phone number.\"   Addended by: Corey Hennessy on: 10/10/2018 12:25 PM     Modules accepted: Orders

## 2023-06-21 RX ORDER — PEN NEEDLE, DIABETIC 31 GX5/16"
NEEDLE, DISPOSABLE MISCELLANEOUS
Qty: 300 EACH | Refills: 3 | Status: SHIPPED | OUTPATIENT
Start: 2023-06-21

## 2023-06-21 NOTE — TELEPHONE ENCOUNTER
Last appointment: 4/18/23  Next appointment: none  Previous refill encounter(s): 5/25/23 #100    Requested Prescriptions     Pending Prescriptions Disp Refills    Insulin Pen Needle (DROPLET PEN NEEDLES) 31G X 8 MM MISC [Pharmacy Med Name: DROPLET PEN NEEDLES 31YG2OC 31G X 8 MM] 300 each 3     Sig: Use to inject insulin 3 times per day as needed per sliding scale         For Pharmacy Admin Tracking Only    Program: Medication Refill  CPA in place:    Recommendation Provided To:    Intervention Detail: New Rx: 1, reason: Patient Preference  Intervention Accepted By:   Em Conner Closed?:    Time Spent (min): 5

## 2023-08-04 RX ORDER — TIOTROPIUM BROMIDE 18 UG/1
CAPSULE ORAL; RESPIRATORY (INHALATION)
Qty: 90 CAPSULE | Refills: 3 | Status: SHIPPED | OUTPATIENT
Start: 2023-08-04

## 2023-08-04 RX ORDER — ROFLUMILAST 500 UG/1
TABLET ORAL
Qty: 90 TABLET | Refills: 3 | Status: SHIPPED | OUTPATIENT
Start: 2023-08-04

## 2023-08-22 RX ORDER — ROSUVASTATIN CALCIUM 40 MG/1
TABLET, COATED ORAL
Qty: 90 TABLET | Refills: 1 | Status: SHIPPED | OUTPATIENT
Start: 2023-08-22

## 2023-09-06 RX ORDER — METOPROLOL SUCCINATE 100 MG/1
TABLET, EXTENDED RELEASE ORAL
Qty: 90 TABLET | Refills: 1 | Status: SHIPPED | OUTPATIENT
Start: 2023-09-06

## 2023-09-06 NOTE — TELEPHONE ENCOUNTER
Last appointment: 4/18/23  Next appointment: none  Previous refill encounter(s): 1/30/23 #90 with 1 refill    Requested Prescriptions     Pending Prescriptions Disp Refills    metoprolol succinate (TOPROL XL) 100 MG extended release tablet [Pharmacy Med Name: METOPROLOL SUCCINATE  MG Tablet Extended Release 24 Hour] 90 tablet 1     Sig: TAKE 1 TABLET EVERY DAY         For Pharmacy Admin Tracking Only    Program: Medication Refill  CPA in place:    Recommendation Provided To:    Intervention Detail: New Rx: 1, reason: Patient Preference  Intervention Accepted By:   Kyler Marin Closed?:    Time Spent (min): 5

## 2023-09-15 RX ORDER — OMEPRAZOLE 20 MG/1
CAPSULE, DELAYED RELEASE ORAL
Qty: 90 CAPSULE | Refills: 3 | Status: SHIPPED | OUTPATIENT
Start: 2023-09-15

## 2023-10-04 NOTE — TELEPHONE ENCOUNTER
Last appointment: 4/18/23  Next appointment: none  Previous refill encounter(s): 10/19/22 #3 with 1 refill    Requested Prescriptions     Pending Prescriptions Disp Refills    SYMBICORT 160-4.5 MCG/ACT AERO [Pharmacy Med Name: Alisha Clarity 160-4.5 MCG/ACT Aerosol] 3 each 0     Sig: INHALE 2 PUFFS  TWICE DAILY FOR COPD ASSOCIATED WITH CHRONIC BRONCHITIS, EXTRINSIC ASTHMA. For Pharmacy Admin Tracking Only    Program: Medication Refill  CPA in place:    Recommendation Provided To:    Intervention Detail: New Rx: 1, reason: Patient Preference  Intervention Accepted By:   Geovanni Higgins Closed?:    Time Spent (min): 5

## 2023-10-06 RX ORDER — BUDESONIDE AND FORMOTEROL FUMARATE DIHYDRATE 160; 4.5 UG/1; UG/1
AEROSOL RESPIRATORY (INHALATION)
Qty: 3 EACH | Refills: 0 | Status: SHIPPED | OUTPATIENT
Start: 2023-10-06

## 2023-10-23 RX ORDER — ALBUTEROL SULFATE 90 UG/1
AEROSOL, METERED RESPIRATORY (INHALATION)
Qty: 54 G | Refills: 10 | Status: SHIPPED | OUTPATIENT
Start: 2023-10-23

## 2023-11-04 NOTE — ACP (ADVANCE CARE PLANNING)
Advance Care Planning           Advance Care Planning (ACP) Physician       Date of ACP Conversation: 4/24/2020    Conversation Conducted with:   Patient with Decision Making Capacity     Other Legally Authorized Decision Maker would be Daughter Juan Antonio Romero as SDM     For My patients who has currently great Decision Making Capacity:     Patient is on presence of no family member,, stated that the pt wants to be not DNR at this time,  The pt likes to be a full code individual,  The patient states that there is a lot of will to live,  Pt was given the form,   will sign and bring us a copy on the later date.       Conversation Outcomes / Follow-Up Plan:   Completed new Advance Directive      Length of ACP Conversation in minutes:  16 minutes
96

## 2023-11-15 NOTE — TELEPHONE ENCOUNTER
Last appointment: 4/18/23  Next appointment: none  Previous refill encounter(s): 10/19/22    Requested Prescriptions     Pending Prescriptions Disp Refills    montelukast (SINGULAIR) 10 MG tablet [Pharmacy Med Name: MONTELUKAST SODIUM 10 MG Tablet] 90 tablet 1     Sig: TAKE 1 TABLET EVERY [address] Only    Program: Medication Refill  CPA in place:    Recommendation Provided To:    Intervention Detail: New Rx: 1, reason: Patient Preference  Intervention Accepted By:   Amber Sender Closed?:    Time Spent (min): 5

## 2023-11-17 LAB
ESTIMATED AVERAGE GLUCOSE: NORMAL
HBA1C MFR BLD: 6.3 %

## 2023-11-17 RX ORDER — MONTELUKAST SODIUM 10 MG/1
TABLET ORAL
Qty: 90 TABLET | Refills: 1 | Status: SHIPPED | OUTPATIENT
Start: 2023-11-17

## 2024-03-29 RX ORDER — ROSUVASTATIN CALCIUM 40 MG/1
40 TABLET, COATED ORAL DAILY
Qty: 90 TABLET | Refills: 0 | Status: SHIPPED | OUTPATIENT
Start: 2024-03-29

## 2024-03-29 NOTE — TELEPHONE ENCOUNTER
Last appointment: 4/18/23  Next appointment: none  Previous refill encounter(s): 8/22/23 #90 with 1 refill    Requested Prescriptions     Pending Prescriptions Disp Refills    rosuvastatin (CRESTOR) 40 MG tablet [Pharmacy Med Name: ROSUVASTATIN CALCIUM 40 MG Tablet] 90 tablet 0     Sig: Take 1 tablet by mouth daily Patient needs an appointment for further refills         For Pharmacy Admin Tracking Only    Program: Medication Refill  CPA in place:    Recommendation Provided To:   Intervention Detail: New Rx: 1, reason: Patient Preference  Intervention Accepted By:   Gap Closed?:    Time Spent (min): 5

## 2024-04-11 RX ORDER — MONTELUKAST SODIUM 10 MG/1
10 TABLET ORAL EVERY EVENING
Qty: 90 TABLET | Refills: 0 | Status: SHIPPED | OUTPATIENT
Start: 2024-04-11

## 2024-04-11 RX ORDER — METOPROLOL SUCCINATE 100 MG/1
100 TABLET, EXTENDED RELEASE ORAL DAILY
Qty: 90 TABLET | Refills: 0 | Status: SHIPPED | OUTPATIENT
Start: 2024-04-11

## 2024-04-11 NOTE — TELEPHONE ENCOUNTER
Last appointment: 4/18/23  Next appointment: none  Previous refill encounter(s): 9/6/23 Toprol #90 with 1 refill, 11/17/23 Singulair #90 with 1 refill    Requested Prescriptions     Pending Prescriptions Disp Refills    metoprolol succinate (TOPROL XL) 100 MG extended release tablet [Pharmacy Med Name: METOPROLOL SUCCINATE  MG Tablet Extended Release 24 Hour] 90 tablet 0     Sig: Take 1 tablet by mouth daily Patient needs an appointment for further refills    montelukast (SINGULAIR) 10 MG tablet [Pharmacy Med Name: MONTELUKAST SODIUM 10 MG Tablet] 90 tablet 0     Sig: Take 1 tablet by mouth every evening Patient needs an appointment for further refills         For Pharmacy Admin Tracking Only    Program: Medication Refill  CPA in place:    Recommendation Provided To:   Intervention Detail: New Rx: 2, reason: Patient Preference  Intervention Accepted By:   Gap Closed?:    Time Spent (min): 5

## 2024-06-13 NOTE — TELEPHONE ENCOUNTER
Last appointment: 4/18/23  Next appointment: none  Previous refill encounter(s): 3/29/24 Crestor #90, 5/9/23 strips #400 with 3 refills    Requested Prescriptions     Pending Prescriptions Disp Refills    blood glucose test strips (ACCU-CHEK GUIDE) strip [Pharmacy Med Name: ACCU-CHEK GUIDE   Strip] 400 strip 0     Sig: TEST BLOOD SUGAR FOUR TIMES DAILY    rosuvastatin (CRESTOR) 40 MG tablet [Pharmacy Med Name: ROSUVASTATIN CALCIUM 40 MG Tablet] 30 tablet 0     Sig: Take 1 tablet by mouth daily Patient needs an appointment for further refills         For Pharmacy Admin Tracking Only    Program: Medication Refill  CPA in place:    Recommendation Provided To:   Intervention Detail: New Rx: 2, reason: Patient Preference  Intervention Accepted By:   Gap Closed?:    Time Spent (min): 5

## 2024-06-14 RX ORDER — ROSUVASTATIN CALCIUM 40 MG/1
40 TABLET, COATED ORAL DAILY
Qty: 30 TABLET | Refills: 0 | Status: SHIPPED | OUTPATIENT
Start: 2024-06-14

## 2024-06-14 RX ORDER — BLOOD SUGAR DIAGNOSTIC
STRIP MISCELLANEOUS
Qty: 400 STRIP | Refills: 0 | Status: SHIPPED | OUTPATIENT
Start: 2024-06-14

## 2024-06-24 NOTE — TELEPHONE ENCOUNTER
Last appointment: 4/18/23  Next appointment: none  Previous refill encounter(s): 4/11/24 90 d/s    Requested Prescriptions     Pending Prescriptions Disp Refills    metoprolol succinate (TOPROL XL) 100 MG extended release tablet [Pharmacy Med Name: METOPROLOL SUCCINATE  MG Tablet Extended Release 24 Hour] 30 tablet 0     Sig: Take 1 tablet by mouth daily Patient needs an appointment for further refills    montelukast (SINGULAIR) 10 MG tablet [Pharmacy Med Name: MONTELUKAST SODIUM 10 MG Tablet] 30 tablet 0     Sig: Take 1 tablet by mouth every evening Patient needs an appointment for further refills         For Pharmacy Admin Tracking Only    Program: Medication Refill  CPA in place:    Recommendation Provided To:   Intervention Detail: New Rx: 2, reason: Patient Preference and Scheduled Appointment  Intervention Accepted By:   Gap Closed?:    Time Spent (min): 5

## 2024-06-26 RX ORDER — MONTELUKAST SODIUM 10 MG/1
10 TABLET ORAL EVERY EVENING
Qty: 30 TABLET | Refills: 0 | Status: SHIPPED | OUTPATIENT
Start: 2024-06-26

## 2024-06-26 RX ORDER — METOPROLOL SUCCINATE 100 MG/1
100 TABLET, EXTENDED RELEASE ORAL DAILY
Qty: 30 TABLET | Refills: 0 | Status: SHIPPED | OUTPATIENT
Start: 2024-06-26

## 2024-07-03 NOTE — TELEPHONE ENCOUNTER
Last appointment: 4/18/23  Next appointment: 7/26/24  Previous refill encounter(s): 6/21/23    Requested Prescriptions     Pending Prescriptions Disp Refills    Insulin Pen Needle (DROPLET PEN NEEDLES) 31G X 8 MM MISC [Pharmacy Med Name: DROPLET PEN NEEDLES [id number] 31G X 8 MM] 300 each 3     Sig: USE TO INJECT INSULIN 3 TIMES DAILY AS NEEDED PER SLIDING SCALE         For Pharmacy Admin Tracking Only    Program: Medication Refill  CPA in place:    Recommendation Provided To:   Intervention Detail: New Rx: 1, reason: Patient Preference  Intervention Accepted By:   Gap Closed?:    Time Spent (min): 5

## 2024-07-05 RX ORDER — METFORMIN HYDROCHLORIDE 500 MG/1
TABLET, EXTENDED RELEASE ORAL
Qty: 360 TABLET | Refills: 3 | Status: SHIPPED | OUTPATIENT
Start: 2024-07-05

## 2024-07-05 RX ORDER — BUPROPION HYDROCHLORIDE 300 MG/1
TABLET ORAL
Qty: 90 TABLET | Refills: 3 | Status: SHIPPED | OUTPATIENT
Start: 2024-07-05

## 2024-07-05 RX ORDER — TRAZODONE HYDROCHLORIDE 100 MG/1
TABLET ORAL
Qty: 90 TABLET | Refills: 3 | Status: SHIPPED | OUTPATIENT
Start: 2024-07-05

## 2024-07-05 RX ORDER — INSULIN ASPART 100 [IU]/ML
INJECTION, SUSPENSION SUBCUTANEOUS
Qty: 60 ML | Refills: 3 | Status: SHIPPED | OUTPATIENT
Start: 2024-07-05

## 2024-07-05 RX ORDER — PEN NEEDLE, DIABETIC 31 GX5/16"
NEEDLE, DISPOSABLE MISCELLANEOUS
Qty: 300 EACH | Refills: 3 | Status: SHIPPED | OUTPATIENT
Start: 2024-07-05

## 2024-07-08 RX ORDER — ROSUVASTATIN CALCIUM 40 MG/1
TABLET, COATED ORAL
Qty: 90 TABLET | Refills: 2 | Status: SHIPPED | OUTPATIENT
Start: 2024-07-08

## 2024-07-22 NOTE — TELEPHONE ENCOUNTER
Last appointment: 4/18/23  Next appointment: 7/26/24  Previous refill encounter(s): 6/26/24 30 d/s    Requested Prescriptions     Pending Prescriptions Disp Refills    metoprolol succinate (TOPROL XL) 100 MG extended release tablet [Pharmacy Med Name: METOPROLOL SUCCINATE  MG Tablet Extended Release 24 Hour] 30 tablet 0     Sig: TAKE 1 TABLET EVERY DAY (NEED MD APPOINTMENT)    montelukast (SINGULAIR) 10 MG tablet [Pharmacy Med Name: MONTELUKAST SODIUM 10 MG Tablet] 30 tablet 0     Sig: TAKE 1 TABLET EVERY EVENING. (NEEDS APPT FOR FURTHER REFILLS)         For Pharmacy Admin Tracking Only    Program: Medication Refill  CPA in place:    Recommendation Provided To:   Intervention Detail: New Rx: 2, reason: Patient Preference  Intervention Accepted By:   Gap Closed?:    Time Spent (min): 5

## 2024-07-24 RX ORDER — MONTELUKAST SODIUM 10 MG/1
TABLET ORAL
Qty: 30 TABLET | Refills: 0 | Status: SHIPPED | OUTPATIENT
Start: 2024-07-24

## 2024-07-24 RX ORDER — METOPROLOL SUCCINATE 100 MG/1
TABLET, EXTENDED RELEASE ORAL
Qty: 30 TABLET | Refills: 0 | Status: SHIPPED | OUTPATIENT
Start: 2024-07-24

## 2024-07-26 ENCOUNTER — OFFICE VISIT (OUTPATIENT)
Age: 64
End: 2024-07-26
Payer: MEDICARE

## 2024-07-26 VITALS
DIASTOLIC BLOOD PRESSURE: 76 MMHG | OXYGEN SATURATION: 97 % | WEIGHT: 154 LBS | HEIGHT: 68 IN | HEART RATE: 88 BPM | BODY MASS INDEX: 23.34 KG/M2 | SYSTOLIC BLOOD PRESSURE: 127 MMHG | RESPIRATION RATE: 18 BRPM | TEMPERATURE: 97.3 F

## 2024-07-26 DIAGNOSIS — I10 PRIMARY HYPERTENSION: ICD-10-CM

## 2024-07-26 DIAGNOSIS — Z12.5 ENCOUNTER FOR SCREENING FOR MALIGNANT NEOPLASM OF PROSTATE: Primary | ICD-10-CM

## 2024-07-26 DIAGNOSIS — Z71.89 ACP (ADVANCE CARE PLANNING): ICD-10-CM

## 2024-07-26 DIAGNOSIS — E11.21 TYPE 2 DIABETES MELLITUS WITH DIABETIC NEPHROPATHY, UNSPECIFIED WHETHER LONG TERM INSULIN USE (HCC): ICD-10-CM

## 2024-07-26 DIAGNOSIS — F33.1 MODERATE EPISODE OF RECURRENT MAJOR DEPRESSIVE DISORDER (HCC): ICD-10-CM

## 2024-07-26 DIAGNOSIS — J44.89 ASTHMATIC BRONCHITIS , CHRONIC (HCC): ICD-10-CM

## 2024-07-26 DIAGNOSIS — E55.9 VITAMIN D DEFICIENCY, UNSPECIFIED: ICD-10-CM

## 2024-07-26 DIAGNOSIS — Z12.31 ENCOUNTER FOR SCREENING MAMMOGRAM FOR MALIGNANT NEOPLASM OF BREAST: ICD-10-CM

## 2024-07-26 DIAGNOSIS — Z00.00 MEDICARE ANNUAL WELLNESS VISIT, SUBSEQUENT: ICD-10-CM

## 2024-07-26 LAB
25(OH)D3 SERPL-MCNC: 66.3 NG/ML (ref 30–100)
ALBUMIN SERPL-MCNC: 3.6 G/DL (ref 3.5–5)
ALBUMIN/GLOB SERPL: 1.2 (ref 1.1–2.2)
ALP SERPL-CCNC: 82 U/L (ref 45–117)
ALT SERPL-CCNC: 32 U/L (ref 12–78)
ANION GAP SERPL CALC-SCNC: 5 MMOL/L (ref 5–15)
APPEARANCE UR: CLEAR
AST SERPL-CCNC: 17 U/L (ref 15–37)
BILIRUB SERPL-MCNC: 0.4 MG/DL (ref 0.2–1)
BILIRUB UR QL: NEGATIVE
BUN SERPL-MCNC: 19 MG/DL (ref 6–20)
BUN/CREAT SERPL: 28 (ref 12–20)
CALCIUM SERPL-MCNC: 9.7 MG/DL (ref 8.5–10.1)
CHLORIDE SERPL-SCNC: 107 MMOL/L (ref 97–108)
CHOLEST SERPL-MCNC: 134 MG/DL
CO2 SERPL-SCNC: 28 MMOL/L (ref 21–32)
COLOR UR: ABNORMAL
CREAT SERPL-MCNC: 0.69 MG/DL (ref 0.55–1.02)
CREAT UR-MCNC: 55.2 MG/DL
ERYTHROCYTE [DISTWIDTH] IN BLOOD BY AUTOMATED COUNT: 13.2 % (ref 11.5–14.5)
EST. AVERAGE GLUCOSE BLD GHB EST-MCNC: 166 MG/DL
FERRITIN SERPL-MCNC: 34 NG/ML (ref 26–388)
GLOBULIN SER CALC-MCNC: 3 G/DL (ref 2–4)
GLUCOSE SERPL-MCNC: 140 MG/DL (ref 65–100)
GLUCOSE UR STRIP.AUTO-MCNC: NEGATIVE MG/DL
HBA1C MFR BLD: 7.4 % (ref 4–5.6)
HCT VFR BLD AUTO: 35.6 % (ref 35–47)
HDLC SERPL-MCNC: 63 MG/DL
HDLC SERPL: 2.1 (ref 0–5)
HGB BLD-MCNC: 11.9 G/DL (ref 11.5–16)
HGB UR QL STRIP: NEGATIVE
KETONES UR QL STRIP.AUTO: NEGATIVE MG/DL
LDLC SERPL CALC-MCNC: 52.2 MG/DL (ref 0–100)
LEUKOCYTE ESTERASE UR QL STRIP.AUTO: NEGATIVE
MCH RBC QN AUTO: 28.4 PG (ref 26–34)
MCHC RBC AUTO-ENTMCNC: 33.4 G/DL (ref 30–36.5)
MCV RBC AUTO: 85 FL (ref 80–99)
MICROALBUMIN UR-MCNC: 11.3 MG/DL
MICROALBUMIN/CREAT UR-RTO: 205 MG/G (ref 0–30)
NITRITE UR QL STRIP.AUTO: NEGATIVE
NRBC # BLD: 0 K/UL (ref 0–0.01)
NRBC BLD-RTO: 0 PER 100 WBC
PH UR STRIP: 5 (ref 5–8)
PLATELET # BLD AUTO: 211 K/UL (ref 150–400)
PMV BLD AUTO: 10.7 FL (ref 8.9–12.9)
POTASSIUM SERPL-SCNC: 4.5 MMOL/L (ref 3.5–5.1)
PROT SERPL-MCNC: 6.6 G/DL (ref 6.4–8.2)
PROT UR STRIP-MCNC: ABNORMAL MG/DL
RBC # BLD AUTO: 4.19 M/UL (ref 3.8–5.2)
SODIUM SERPL-SCNC: 140 MMOL/L (ref 136–145)
SP GR UR REFRACTOMETRY: 1.02 (ref 1–1.03)
T4 FREE SERPL-MCNC: 0.9 NG/DL (ref 0.8–1.5)
TRIGL SERPL-MCNC: 94 MG/DL
TSH SERPL DL<=0.05 MIU/L-ACNC: 0.53 UIU/ML (ref 0.36–3.74)
UROBILINOGEN UR QL STRIP.AUTO: 0.2 EU/DL (ref 0.2–1)
VLDLC SERPL CALC-MCNC: 18.8 MG/DL
WBC # BLD AUTO: 5.7 K/UL (ref 3.6–11)

## 2024-07-26 PROCEDURE — 99497 ADVNCD CARE PLAN 30 MIN: CPT | Performed by: FAMILY MEDICINE

## 2024-07-26 RX ORDER — PEN NEEDLE, DIABETIC 31 GX5/16"
NEEDLE, DISPOSABLE MISCELLANEOUS
Qty: 900 EACH | Refills: 11 | Status: SHIPPED | OUTPATIENT
Start: 2024-07-26

## 2024-07-26 NOTE — PROGRESS NOTES
Chief Complaint   Patient presents with    Medicare AWV       \"Have you been to the ER, urgent care clinic since your last visit?  Hospitalized since your last visit?\"    NO    “Have you seen or consulted any other health care providers outside of [facility] since your last visit?”    NO       Have you had a mammogram?”   NO    Date of last Mammogram: 2023      “Have you had a pap smear?”    NO    No cervical cancer screening on file          Vitals:    24 0908   BP: 127/76   Pulse: 88   Resp: 18   Temp: 97.3 °F (36.3 °C)   TempSrc: Infrared   SpO2: 97%   Weight: 69.9 kg (154 lb)   Height: 1.727 m (5' 8\")        Health Maintenance Due   Topic Date Due    Cervical cancer screen  Never done    Diabetic foot exam  2020    Diabetic Alb to Cr ratio (uACR) test  2022    Lipids  2023    GFR test (Diabetes, CKD 3-4, OR last GFR 15-59)  2023    Annual Wellness Visit (Medicare Advantage)  Never done    Breast cancer screen  2024        The patient, Lalitha Sahu, identity was verified by name and    
Reconciliation, Ar   EPINEPHrine (EPIPEN) 0.3 MG/0.3ML SOAJ injection Inject 0.3 mLs into the muscle once as needed Yes Automatic Reconciliation, Ar   insulin 70-30 (HUMULIN;NOVOLIN) (70-30) 100 UNIT per ML injection vial Inject 17 units with each meal 3 times daily + 1 units for every 50 mg/dl above 150 mg/dl Yes Automatic Reconciliation, Ar   Insulin Regular Human (NOVOLIN R FLEXPEN) 100 UNIT/ML SOPN CHECK BLOOD SUGAR BEFORE MEALS AND IF GLUCOSE LESS 150=NONE; 151-200=2U; 201-250=4U; 251-300=6U; 301-350=8U; 351-400=10/CALL Yes Automatic Reconciliation, Ar   ipratropium-albuterol (DUONEB) 0.5-2.5 (3) MG/3ML SOLN nebulizer solution Inhale 3 mLs into the lungs every 4 hours as needed Yes Automatic Reconciliation, Ar   Melatonin 10 MG TABS Take 1 tablet by mouth nightly Yes Automatic Reconciliation, Ar   Accu-Chek Softclix Lancets MISC TEST BLOOD SUGAR FOUR TIMES DAILY.  Automatic Reconciliation, Ar   zoster recombinant adjuvanted vaccine (SHINGRIX) 50 MCG/0.5ML SUSR injection 0.5mL by IntraMUSCular route once now and then repeat in 2-6 months  Patient not taking: Reported on 7/26/2024  Automatic Reconciliation, Ar       CareTeam (Including outside providers/suppliers regularly involved in providing care):   Patient Care Team:  Richie Norris MD as PCP - General  Richie Norris MD as PCP - Empaneled Provider  Grant Baugh MD as Surgeon  Marisa Mejia, APRN - CNP as Nurse Practitioner  Jovita Sullivan MD as Physician  Akbar Parker MD as Physician  Steven Askew MD as Consulting Physician      Reviewed and updated this visit:  Tobacco  Allergies  Meds  Problems  Med Hx  Surg Hx  Soc Hx  Fam Hx

## 2024-08-07 NOTE — TELEPHONE ENCOUNTER
Last appointment: 7/26/24  Next appointment: Advised to follow-up 1/26/25  Previous refill encounter(s): 8/4/23    Requested Prescriptions     Pending Prescriptions Disp Refills    Roflumilast (DALIRESP) 500 MCG tablet [Pharmacy Med Name: ROFLUMILAST 500 MCG Tablet] 90 tablet 3     Sig: TAKE 1 TABLET EVERY DAY         For Pharmacy Admin Tracking Only    Program: Medication Refill  CPA in place:    Recommendation Provided To:   Intervention Detail: New Rx: 1, reason: Patient Preference  Intervention Accepted By:   Gap Closed?:    Time Spent (min): 5

## 2024-08-09 RX ORDER — ROFLUMILAST 500 UG/1
TABLET ORAL
Qty: 90 TABLET | Refills: 3 | Status: SHIPPED | OUTPATIENT
Start: 2024-08-09

## 2024-08-14 NOTE — TELEPHONE ENCOUNTER
Last appointment: 7/26/24  Next appointment: Advised to follow-up 1/26/25  Previous refill encounter(s): 7/24/24 30 d/s    Requested Prescriptions     Pending Prescriptions Disp Refills    metoprolol succinate (TOPROL XL) 100 MG extended release tablet [Pharmacy Med Name: METOPROLOL SUCCINATE  MG Tablet Extended Release 24 Hour] 90 tablet 1     Sig: Take 1 tablet by mouth daily    montelukast (SINGULAIR) 10 MG tablet [Pharmacy Med Name: MONTELUKAST SODIUM 10 MG Tablet] 90 tablet 1     Sig: Take 1 tablet by mouth every evening         For Pharmacy Admin Tracking Only    Program: Medication Refill  CPA in place:    Recommendation Provided To:   Intervention Detail: New Rx: 2, reason: Patient Preference  Intervention Accepted By:   Gap Closed?:    Time Spent (min): 5

## 2024-08-15 RX ORDER — MONTELUKAST SODIUM 10 MG/1
10 TABLET ORAL EVERY EVENING
Qty: 90 TABLET | Refills: 1 | Status: SHIPPED | OUTPATIENT
Start: 2024-08-15

## 2024-08-15 RX ORDER — METOPROLOL SUCCINATE 100 MG/1
100 TABLET, EXTENDED RELEASE ORAL DAILY
Qty: 90 TABLET | Refills: 1 | Status: SHIPPED | OUTPATIENT
Start: 2024-08-15

## 2024-08-29 NOTE — TELEPHONE ENCOUNTER
Last appointment: 7/26/24  Next appointment: Advised to follow-up 1/26/25  Previous refill encounter(s): 6/14/24 strips #400, 8/4/23 Spiriva    Requested Prescriptions     Pending Prescriptions Disp Refills    blood glucose test strips (ACCU-CHEK GUIDE) strip [Pharmacy Med Name: ACCU-CHEK GUIDE   Strip] 400 strip 3     Sig: TEST BLOOD SUGAR FOUR TIMES DAILY    SPIRIVA HANDIHALER 18 MCG inhalation capsule [Pharmacy Med Name: SPIRIVA HANDIHALER 18 MCG Capsule] 90 capsule 3     Sig: INHALE THE CONTENTS OF 1 CAPSULE VIA HANDIHALER ONE TIME DAILY - RINSE MOUTH AFTER USE         For Pharmacy Admin Tracking Only    Program: Medication Refill  CPA in place:    Recommendation Provided To:   Intervention Detail: New Rx: 2, reason: Patient Preference  Intervention Accepted By:   Gap Closed?:    Time Spent (min): 5

## 2024-08-30 RX ORDER — BLOOD SUGAR DIAGNOSTIC
STRIP MISCELLANEOUS
Qty: 400 STRIP | Refills: 3 | Status: SHIPPED | OUTPATIENT
Start: 2024-08-30

## 2024-08-30 RX ORDER — TIOTROPIUM BROMIDE 18 UG/1
CAPSULE ORAL; RESPIRATORY (INHALATION)
Qty: 90 CAPSULE | Refills: 3 | Status: SHIPPED | OUTPATIENT
Start: 2024-08-30

## 2024-09-10 ENCOUNTER — TELEPHONE (OUTPATIENT)
Age: 64
End: 2024-09-10

## 2024-09-10 RX ORDER — INSULIN ASPART 100 [IU]/ML
19 INJECTION, SUSPENSION SUBCUTANEOUS 2 TIMES DAILY WITH MEALS
Qty: 3 ML | Refills: 5 | Status: SHIPPED | OUTPATIENT
Start: 2024-09-10

## 2024-09-11 RX ORDER — PEN NEEDLE, DIABETIC 31 GX5/16"
NEEDLE, DISPOSABLE MISCELLANEOUS
Qty: 900 EACH | Refills: 11 | Status: SHIPPED | OUTPATIENT
Start: 2024-09-11

## 2024-09-17 RX ORDER — BUDESONIDE AND FORMOTEROL FUMARATE DIHYDRATE 160; 4.5 UG/1; UG/1
AEROSOL RESPIRATORY (INHALATION)
Qty: 3 EACH | Refills: 1 | Status: SHIPPED | OUTPATIENT
Start: 2024-09-17

## 2024-10-07 ENCOUNTER — TELEPHONE (OUTPATIENT)
Age: 64
End: 2024-10-07

## 2024-10-07 NOTE — TELEPHONE ENCOUNTER
----- Message from Caredita RIVERO sent at 10/7/2024 10:51 AM EDT -----  Regarding: ECC Escalation To Practice  ECC Escalation To Practice      Type of Escalation: Red Flag Symptom  --------------------------------------------------------------------------------------------------------------------------    Information for Provider:  Patient is looking for appointment for: Symptom: chest pain , lump in the shoulder and severe pain in the right side of the shoulder  Reasons for Message: Unable to reach practice     Additional Information : The patient wanted to make an appointment because she's experiencing a red flag that is severe pain in the right side of the shoulder, lump in the shoulder and chest pain. Te patient refuse the emergency department . Please call the patient as soon as possible.  --------------------------------------------------------------------------------------------------------------------------    Relationship to Patient: Self     Call Back Info: OK to leave message on voicemail  Preferred Call Back Number: Phone [phone] (home)

## 2024-10-07 NOTE — TELEPHONE ENCOUNTER
C/o pain to right arm and chest x 4 weeks. Swelling  and pain at kevin after she hit her freestyle kevin , appt scheduled for 10/9/24

## 2024-10-09 ENCOUNTER — OFFICE VISIT (OUTPATIENT)
Age: 64
End: 2024-10-09
Payer: MEDICARE

## 2024-10-09 VITALS
WEIGHT: 158 LBS | BODY MASS INDEX: 24.02 KG/M2 | HEART RATE: 87 BPM | TEMPERATURE: 97.9 F | RESPIRATION RATE: 18 BRPM | SYSTOLIC BLOOD PRESSURE: 126 MMHG | OXYGEN SATURATION: 98 % | DIASTOLIC BLOOD PRESSURE: 80 MMHG

## 2024-10-09 DIAGNOSIS — S39.92XD INJURY OF LOW BACK, SUBSEQUENT ENCOUNTER: ICD-10-CM

## 2024-10-09 DIAGNOSIS — F33.9 RECURRENT DEPRESSION (HCC): ICD-10-CM

## 2024-10-09 DIAGNOSIS — R07.81 RIB PAIN: Primary | ICD-10-CM

## 2024-10-09 DIAGNOSIS — M54.2 NECK PAIN ON RIGHT SIDE: ICD-10-CM

## 2024-10-09 DIAGNOSIS — M25.511 ACUTE PAIN OF RIGHT SHOULDER: ICD-10-CM

## 2024-10-09 PROCEDURE — 3074F SYST BP LT 130 MM HG: CPT | Performed by: FAMILY MEDICINE

## 2024-10-09 PROCEDURE — 99213 OFFICE O/P EST LOW 20 MIN: CPT | Performed by: FAMILY MEDICINE

## 2024-10-09 PROCEDURE — G8427 DOCREV CUR MEDS BY ELIG CLIN: HCPCS | Performed by: FAMILY MEDICINE

## 2024-10-09 PROCEDURE — G8484 FLU IMMUNIZE NO ADMIN: HCPCS | Performed by: FAMILY MEDICINE

## 2024-10-09 PROCEDURE — 3017F COLORECTAL CA SCREEN DOC REV: CPT | Performed by: FAMILY MEDICINE

## 2024-10-09 PROCEDURE — G8420 CALC BMI NORM PARAMETERS: HCPCS | Performed by: FAMILY MEDICINE

## 2024-10-09 PROCEDURE — 3079F DIAST BP 80-89 MM HG: CPT | Performed by: FAMILY MEDICINE

## 2024-10-09 PROCEDURE — 1036F TOBACCO NON-USER: CPT | Performed by: FAMILY MEDICINE

## 2024-10-09 RX ORDER — CYCLOBENZAPRINE HCL 10 MG
10 TABLET ORAL NIGHTLY PRN
Qty: 20 TABLET | Refills: 0 | Status: SHIPPED | OUTPATIENT
Start: 2024-10-09 | End: 2024-10-29

## 2024-10-09 ASSESSMENT — PATIENT HEALTH QUESTIONNAIRE - PHQ9
9. THOUGHTS THAT YOU WOULD BE BETTER OFF DEAD, OR OF HURTING YOURSELF: NOT AT ALL
8. MOVING OR SPEAKING SO SLOWLY THAT OTHER PEOPLE COULD HAVE NOTICED. OR THE OPPOSITE, BEING SO FIGETY OR RESTLESS THAT YOU HAVE BEEN MOVING AROUND A LOT MORE THAN USUAL: NOT AT ALL
SUM OF ALL RESPONSES TO PHQ QUESTIONS 1-9: 1
SUM OF ALL RESPONSES TO PHQ9 QUESTIONS 1 & 2: 0
SUM OF ALL RESPONSES TO PHQ QUESTIONS 1-9: 1
7. TROUBLE CONCENTRATING ON THINGS, SUCH AS READING THE NEWSPAPER OR WATCHING TELEVISION: NOT AT ALL
SUM OF ALL RESPONSES TO PHQ QUESTIONS 1-9: 1
5. POOR APPETITE OR OVEREATING: NOT AT ALL
10. IF YOU CHECKED OFF ANY PROBLEMS, HOW DIFFICULT HAVE THESE PROBLEMS MADE IT FOR YOU TO DO YOUR WORK, TAKE CARE OF THINGS AT HOME, OR GET ALONG WITH OTHER PEOPLE: SOMEWHAT DIFFICULT
SUM OF ALL RESPONSES TO PHQ QUESTIONS 1-9: 1
2. FEELING DOWN, DEPRESSED OR HOPELESS: NOT AT ALL
4. FEELING TIRED OR HAVING LITTLE ENERGY: NOT AT ALL
3. TROUBLE FALLING OR STAYING ASLEEP: SEVERAL DAYS
1. LITTLE INTEREST OR PLEASURE IN DOING THINGS: NOT AT ALL
6. FEELING BAD ABOUT YOURSELF - OR THAT YOU ARE A FAILURE OR HAVE LET YOURSELF OR YOUR FAMILY DOWN: NOT AT ALL

## 2024-10-09 NOTE — ASSESSMENT & PLAN NOTE
Orders:    US HEAD NECK SOFT TISSUE THYROID; Future    cyclobenzaprine (FLEXERIL) 10 MG tablet; Take 1 tablet by mouth nightly as needed for Muscle spasms    XR SHOULDER RIGHT (MIN 2 VIEWS); Future

## 2024-10-09 NOTE — PROGRESS NOTES
Lalitha Sahu (:  1960) is a 64 y.o. female,Established patient, here for evaluation of the following chief complaint(s):  Shoulder Pain (Right shoulder) and Arm Pain (Right arm)      Her presentation is a chronic problem with history of depression no suicidal homicidal ideation has been very stable on current medication. Episode onset: few yrs ago, not obese, with history of Cts b/ rt hand neck cervical stenosis rt arm and rt shoulder pain, also has a lump in the right scapula worse in the activity with fhx of fibromyalgia, patient has a lot of difficulty with overhead activity, raising arm is painful and the pain  awakens the patient at night,  The problem occurs constantly. The problem has not changed since onset. The pain is present in the lt shoulder. The quality of the pain is described as dull. The pain is at a severity of 6/10. Associated symptoms include limited range of motion. Pertinent negatives include no numbness, ++stiffness, no tingling, no itching, no back pain and + neck pain. The symptoms are aggravated by movement and palpation. There has been no history of extremity trauma.          Constitutional: no chills and fever,nad     HENT: no ear pain and nosebleeds. No blurred vision,   Respiratory: no shortness of breath, wheezing cough nor sore throat.    Cardiovascular: Has no chest pain, leg swelling ,and racing heart .   Gastrointestinal: No constipation, diarrhea, nausea and vomiting.   Genitourinary: No frequency.   Musculoskeletal: +++for multiple joint pain.   Skin: no itching, pimples   Neurological: Negative for dizziness, no tremors  Psychiatric/Behavioral: Negative for depression,  not nervous/anxious.        General: Patient alert cooperative   HEENT; normocephalic and atraumatic     Neck: supple no adenopathy no JVD no bruit  Lungs: Clear to auscultation bilaterally no rales rhonchi or wheezes  Heart: Normal regular S1-S2 s no murmur  Breast exam deferred  Abdomen: Soft

## 2024-10-09 NOTE — PROGRESS NOTES
Chief Complaint   Patient presents with    Shoulder Pain     Right shoulder    Arm Pain     Right arm       \"Have you been to the ER, urgent care clinic since your last visit?  Hospitalized since your last visit?\"    NO    “Have you seen or consulted any other health care providers outside of [facility] since your last visit?”    NO    Have you had a mammogram?”   YES    Date of last Mammogram: 2023      “Have you had a pap smear?”    NO    No cervical cancer screening on file             Click Here for Release of Records Request     No results found for this visit on 10/09/24.   Vitals:    10/09/24 1551   BP: 126/80   Pulse: 87   Resp: 18   Temp: 97.9 °F (36.6 °C)   TempSrc: Infrared   SpO2: 98%   Weight: 71.7 kg (158 lb)      Health Maintenance Due   Topic Date Due    Cervical cancer screen  Never done    Breast cancer screen  2024    Diabetic retinal exam  2024        The patient, Lalitha Sahu, identity was verified by name and .

## 2024-10-11 ENCOUNTER — ANCILLARY PROCEDURE (OUTPATIENT)
Age: 64
End: 2024-10-11
Payer: MEDICARE

## 2024-10-11 DIAGNOSIS — R07.81 RIB PAIN: ICD-10-CM

## 2024-10-11 DIAGNOSIS — F33.9 RECURRENT DEPRESSION (HCC): ICD-10-CM

## 2024-10-11 DIAGNOSIS — M25.511 ACUTE PAIN OF RIGHT SHOULDER: ICD-10-CM

## 2024-10-11 DIAGNOSIS — M54.2 NECK PAIN ON RIGHT SIDE: ICD-10-CM

## 2024-10-11 DIAGNOSIS — S39.92XD INJURY OF LOW BACK, SUBSEQUENT ENCOUNTER: ICD-10-CM

## 2024-10-11 PROCEDURE — 73030 X-RAY EXAM OF SHOULDER: CPT

## 2024-10-12 ENCOUNTER — HOSPITAL ENCOUNTER (OUTPATIENT)
Facility: HOSPITAL | Age: 64
Discharge: HOME | End: 2024-10-15
Payer: MEDICARE

## 2024-10-12 DIAGNOSIS — S39.92XD INJURY OF LOW BACK, SUBSEQUENT ENCOUNTER: ICD-10-CM

## 2024-10-12 DIAGNOSIS — F33.9 RECURRENT DEPRESSION (HCC): ICD-10-CM

## 2024-10-12 DIAGNOSIS — M54.2 NECK PAIN ON RIGHT SIDE: ICD-10-CM

## 2024-10-12 DIAGNOSIS — R07.81 RIB PAIN: ICD-10-CM

## 2024-10-12 DIAGNOSIS — M25.511 ACUTE PAIN OF RIGHT SHOULDER: ICD-10-CM

## 2024-10-12 PROCEDURE — 76536 US EXAM OF HEAD AND NECK: CPT

## 2024-10-15 NOTE — TELEPHONE ENCOUNTER
Last appointment: 10/9/24  Next appointment: Advised to follow-up 1/9/25  Previous refill encounter(s): 9/10/24 #3ml with 5 refills    Requested Prescriptions     Pending Prescriptions Disp Refills    insulin aspart prot & aspart (NOVOLOG MIX 70/30 FLEXPEN) injection pen [Pharmacy Med Name: NovoLOG Mix 70/30 FlexPen Subcutaneous Suspension Pen-injector (70-30) 100 UNIT/ML] 30 mL 2     Sig: Inject 19 Units into the skin 2 times daily (with meals)         For Pharmacy Admin Tracking Only    Program: Medication Refill  CPA in place:    Recommendation Provided To:   Intervention Detail: New Rx: 1, reason: Patient Preference  Intervention Accepted By:   Gap Closed?:    Time Spent (min): 5

## 2024-10-18 RX ORDER — INSULIN ASPART 100 [IU]/ML
19 INJECTION, SUSPENSION SUBCUTANEOUS 2 TIMES DAILY WITH MEALS
Qty: 30 ML | Refills: 2 | Status: SHIPPED | OUTPATIENT
Start: 2024-10-18

## 2024-10-25 NOTE — TELEPHONE ENCOUNTER
Last appointment: 10/9/24  Next appointment: Advised to follow-up 1/9/25  Previous refill encounter(s): 9/10/24    Requested Prescriptions     Pending Prescriptions Disp Refills    insulin regular (NOVOLIN R FLEXPEN) 100 UNIT/ML SOPN pen [Pharmacy Med Name: NovoLIN R FlexPen Injection Solution Pen-injector 100 UNIT/ML] 15 mL 5     Sig: CHECK BLOOD SUGAR BEFORE MEALS AND IF GLUCOSE LESS 150=NONE; 151-200=2U; 201-250=4U; 251-300=6U; 301-350=8U; 351-400=10/CALL         For Pharmacy Admin Tracking Only    Program: Medication Refill  CPA in place:    Recommendation Provided To:   Intervention Detail: New Rx: 1, reason: Patient Preference  Intervention Accepted By:   Gap Closed?:    Time Spent (min): 5

## 2024-10-30 NOTE — TELEPHONE ENCOUNTER
Last appointment: 10/9/24, 7/26/24  Next appointment: Advised to follow-up 1/26/25  Previous refill encounter(s): 10/23/23    Requested Prescriptions     Pending Prescriptions Disp Refills    VENTOLIN  (90 Base) MCG/ACT inhaler [Pharmacy Med Name: Ventolin HFA Inhalation Aerosol Solution 108 (90 Base) MCG/ACT] 3 each 3     Sig: INHALE 2 PUFFS EVERY 4 HOURS AS NEEDED FOR WHEEZING         For Pharmacy Admin Tracking Only    Program: Medication Refill  CPA in place:    Recommendation Provided To:   Intervention Detail: New Rx: 1, reason: Patient Preference  Intervention Accepted By:   Gap Closed?:    Time Spent (min): 5

## 2024-10-31 RX ORDER — ALBUTEROL SULFATE 90 UG/1
AEROSOL, METERED RESPIRATORY (INHALATION)
Qty: 3 EACH | Refills: 3 | Status: SHIPPED | OUTPATIENT
Start: 2024-10-31

## 2024-12-03 RX ORDER — PEN NEEDLE, DIABETIC 31 GX5/16"
NEEDLE, DISPOSABLE MISCELLANEOUS
Refills: 0 | OUTPATIENT
Start: 2024-12-03

## 2024-12-03 NOTE — TELEPHONE ENCOUNTER
Pen needles were sent on 9/11/24 for #900 with 11 refills    For Pharmacy Admin Tracking Only    Program: Medication Refill  CPA in place:    Recommendation Provided To:   Intervention Detail: Discontinued Rx: 1, reason: Duplicate Therapy  Intervention Accepted By:   Gap Closed?:    Time Spent (min): 5

## 2024-12-10 NOTE — TELEPHONE ENCOUNTER
Last appointment: 10/9/24  Next appointment: Advised to follow-up 1/9/25  Previous refill encounter(s): 8/15/24 #90 with 1 refill    Requested Prescriptions     Pending Prescriptions Disp Refills    montelukast (SINGULAIR) 10 MG tablet [Pharmacy Med Name: Montelukast Sodium Oral Tablet 10 MG] 90 tablet 3     Sig: TAKE 1 TABLET EVERY EVENING         For Pharmacy Admin Tracking Only    Program: Medication Refill  CPA in place:    Recommendation Provided To:   Intervention Detail: New Rx: 1, reason: Patient Preference  Intervention Accepted By:   Gap Closed?:    Time Spent (min): 5

## 2024-12-12 RX ORDER — MONTELUKAST SODIUM 10 MG/1
10 TABLET ORAL EVERY EVENING
Qty: 90 TABLET | Refills: 3 | Status: SHIPPED | OUTPATIENT
Start: 2024-12-12

## 2024-12-20 ENCOUNTER — HOSPITAL ENCOUNTER (OUTPATIENT)
Facility: HOSPITAL | Age: 64
Discharge: HOME | End: 2024-12-23
Attending: FAMILY MEDICINE
Payer: MEDICARE

## 2024-12-20 VITALS — WEIGHT: 158 LBS | HEIGHT: 68 IN | BODY MASS INDEX: 23.95 KG/M2

## 2024-12-20 DIAGNOSIS — Z12.31 ENCOUNTER FOR SCREENING MAMMOGRAM FOR MALIGNANT NEOPLASM OF BREAST: ICD-10-CM

## 2024-12-20 PROCEDURE — 77063 BREAST TOMOSYNTHESIS BI: CPT

## 2024-12-30 RX ORDER — ROSUVASTATIN CALCIUM 40 MG/1
TABLET, COATED ORAL
Qty: 90 TABLET | Refills: 3 | Status: SHIPPED | OUTPATIENT
Start: 2024-12-30

## 2025-02-13 ENCOUNTER — HOSPITAL ENCOUNTER (OUTPATIENT)
Facility: HOSPITAL | Age: 65
Discharge: HOME | End: 2025-02-16
Attending: FAMILY MEDICINE
Payer: MEDICARE

## 2025-02-13 DIAGNOSIS — R92.8 ABNORMAL MAMMOGRAM OF RIGHT BREAST: ICD-10-CM

## 2025-02-13 PROCEDURE — 77065 DX MAMMO INCL CAD UNI: CPT

## 2025-02-14 ENCOUNTER — OFFICE VISIT (OUTPATIENT)
Age: 65
End: 2025-02-14
Payer: MEDICARE

## 2025-02-14 VITALS
WEIGHT: 165.2 LBS | SYSTOLIC BLOOD PRESSURE: 143 MMHG | HEART RATE: 85 BPM | BODY MASS INDEX: 25.04 KG/M2 | DIASTOLIC BLOOD PRESSURE: 81 MMHG | HEIGHT: 68 IN

## 2025-02-14 DIAGNOSIS — I10 ESSENTIAL HYPERTENSION: ICD-10-CM

## 2025-02-14 DIAGNOSIS — H35.00 RETINOPATHY: ICD-10-CM

## 2025-02-14 DIAGNOSIS — E11.21 MICROALBUMINURIC DIABETIC NEPHROPATHY (HCC): ICD-10-CM

## 2025-02-14 DIAGNOSIS — E78.2 MIXED HYPERLIPIDEMIA: ICD-10-CM

## 2025-02-14 DIAGNOSIS — Z79.4 TYPE 2 DIABETES MELLITUS WITH HYPERGLYCEMIA, WITH LONG-TERM CURRENT USE OF INSULIN (HCC): Primary | ICD-10-CM

## 2025-02-14 DIAGNOSIS — E11.65 TYPE 2 DIABETES MELLITUS WITH HYPERGLYCEMIA, WITH LONG-TERM CURRENT USE OF INSULIN (HCC): Primary | ICD-10-CM

## 2025-02-14 PROCEDURE — 3077F SYST BP >= 140 MM HG: CPT | Performed by: GENERAL ACUTE CARE HOSPITAL

## 2025-02-14 PROCEDURE — 3046F HEMOGLOBIN A1C LEVEL >9.0%: CPT | Performed by: GENERAL ACUTE CARE HOSPITAL

## 2025-02-14 PROCEDURE — G8427 DOCREV CUR MEDS BY ELIG CLIN: HCPCS | Performed by: GENERAL ACUTE CARE HOSPITAL

## 2025-02-14 PROCEDURE — 3017F COLORECTAL CA SCREEN DOC REV: CPT | Performed by: GENERAL ACUTE CARE HOSPITAL

## 2025-02-14 PROCEDURE — 2022F DILAT RTA XM EVC RTNOPTHY: CPT | Performed by: GENERAL ACUTE CARE HOSPITAL

## 2025-02-14 PROCEDURE — 99204 OFFICE O/P NEW MOD 45 MIN: CPT | Performed by: GENERAL ACUTE CARE HOSPITAL

## 2025-02-14 PROCEDURE — G8419 CALC BMI OUT NRM PARAM NOF/U: HCPCS | Performed by: GENERAL ACUTE CARE HOSPITAL

## 2025-02-14 PROCEDURE — 3079F DIAST BP 80-89 MM HG: CPT | Performed by: GENERAL ACUTE CARE HOSPITAL

## 2025-02-14 PROCEDURE — 1036F TOBACCO NON-USER: CPT | Performed by: GENERAL ACUTE CARE HOSPITAL

## 2025-02-14 RX ORDER — INSULIN GLARGINE 100 [IU]/ML
INJECTION, SOLUTION SUBCUTANEOUS
Qty: 15 ML | Refills: 5 | Status: SHIPPED | OUTPATIENT
Start: 2025-02-14

## 2025-02-14 RX ORDER — PEN NEEDLE, DIABETIC 30 GX3/16"
NEEDLE, DISPOSABLE MISCELLANEOUS
Qty: 200 EACH | Refills: 11 | Status: SHIPPED | OUTPATIENT
Start: 2025-02-14

## 2025-02-14 RX ORDER — LISINOPRIL 5 MG/1
5 TABLET ORAL DAILY
Qty: 90 TABLET | Refills: 1 | Status: SHIPPED | OUTPATIENT
Start: 2025-02-14

## 2025-02-14 RX ORDER — INSULIN ASPART 100 [IU]/ML
INJECTION, SOLUTION INTRAVENOUS; SUBCUTANEOUS
Qty: 15 ML | Refills: 5 | Status: SHIPPED | OUTPATIENT
Start: 2025-02-14

## 2025-02-14 NOTE — PROGRESS NOTES
[facility]  DR AMELIA LARSON         The patient (or guardian, if applicable) and other individuals in attendance with the patient were advised that Artificial Intelligence will be utilized during this visit to record, process the conversation to generate a clinical note, and support improvement of the AI technology. The patient (or guardian, if applicable) and other individuals in attendance at the appointment consented to the use of AI, including the recording.      Lalitha Sahu is a 64 y.o. female  has a past medical history of Adverse effect of anesthesia, Arthritis, Asthma, Asthma, Depression, Diabetes (HCC), Diabetes mellitus (HCC), Encounter for review of form with patient, Essential hypertension, GERD (gastroesophageal reflux disease), Headache(784.0), Hypercholesterolemia, Hypertension, Ill-defined condition, Insomnia disorder, Major depression, chronic, Mood disorder (HCC), Psychiatric problem, Psychotic disorder (HCC), Psychotic disorder (HCC), Screening for cervical cancer, Tobacco use disorder, and Unspecified sleep apnea.     Assessment & Plan    Type 2 diabetes Mellitus,   Complications: Microalbuminuria, Retinopathy  Discussed the details of diabetes mellitus including pathophysiology and diabetes care and importance of achieving target blood sugar numbers for a goal a1c of less than 7%    - A1c increased from 7.4 (July 2024) to 7.8  - Managing diabetes with up to 7 insulin injections daily (excessive)  - Blood glucose levels inconsistent, average 8-10 units additional insulin per day  - Adverse reactions to Victoza and Jardiance  - Not used Trulicity or Ozempic  - Diligent in monitoring blood glucose and maintaining regular diet  - Limited physical activity due to shin splints  - History of retinal edema, injections in each eye every 9 weeks  - Cataract surgery with lens replacement  - Partial lower denture, full upper denture, burning tongue syndrome due to

## 2025-02-14 NOTE — PATIENT INSTRUCTIONS
PLAN FOR TODAY    We will plan to make the following changes to your diabetes medications:    Stop mixed insulin 70/30, and regular insulin  Start Lantus 32 units take at bedtime [at the same time every night]  Start NovoLog [mealtime insulin] take 10 minutes before each meal  Breakfast: 10 units + correction scale  Lunch: 8 units + correction scale  Dinner: 10 units + correction scale  Correction Scale:   1 unit for every 40 above 150    IF GLUCOSE IS:                 THEN TAKE:      0   Extra Unit  151-190   1   Extra Unit  191-230   2   Extra Units  231-270   3   Extra Units  271-310   4   Extra Units  311-350   5   Extra Units  >350    6   Extra Units    Example:   My planned insulin dose:    ____ Units    +    ____ Extra Correction Units  =  ____ total units to take together as one injection.      Start Lisinopril 5 mg daily    It will be important to continue checking your glucose just as you did previously.   I would like you at the very least to check you glucose during:    AM fasting before breakfast  Before Lunch  Before Dinner  Any other time that you are not feeling well.     Always provide a glucose log that is completed at every visit so that we can review the results of your home glucose together. Without this, it is not possible to make accurate changes to your diabetes regimen.    Gt Finney MD  [facility]     Diabetes and Meal Planning    Meal planning can be a key part of managing diabetes. Planning meals and snacks with the right balance of carbohydrate, protein, and fat can help you keep your blood sugar at the target level.  You don't have to eat special foods. You can eat what your family eats, including sweets once in a while. But you do have to pay attention to how often you eat and how much you eat of certain foods.    Your plate  The plate format is a simple way to help you manage how you eat. You plan meals by learning how much space each food should take on

## 2025-02-24 NOTE — TELEPHONE ENCOUNTER
Last appointment: 10/9/24  Next appointment: Advised to follow-up 1/9/25  Previous refill encounter(s): 9/17/24 #3 with 1 refill    Requested Prescriptions     Pending Prescriptions Disp Refills    budesonide-formoterol (SYMBICORT) 160-4.5 MCG/ACT AERO [Pharmacy Med Name: Symbicort Inhalation Aerosol 160-4.5 MCG/ACT] 3 each 0     Sig: INHALE 2 PUFFS TWICE DAILY FOR COPD ASSOCIATED WITH CHRONIC BRONCHITIS, EXTRINSIC ASTHMA.         For Pharmacy Admin Tracking Only    Program: Medication Refill  CPA in place:    Recommendation Provided To:   Intervention Detail: New Rx: 1, reason: Patient Preference  Intervention Accepted By:   Gap Closed?:    Time Spent (min): 5

## 2025-02-26 RX ORDER — BUDESONIDE AND FORMOTEROL FUMARATE DIHYDRATE 160; 4.5 UG/1; UG/1
AEROSOL RESPIRATORY (INHALATION)
Qty: 3 EACH | Refills: 0 | Status: SHIPPED | OUTPATIENT
Start: 2025-02-26

## 2025-03-10 ENCOUNTER — TELEPHONE (OUTPATIENT)
Age: 65
End: 2025-03-10

## 2025-03-10 NOTE — TELEPHONE ENCOUNTER
Returned call to pt.  C/o flu like symptoms, exposed to flu this weekend, informed that Dr Norris is out of the office until Tuesday.  advised to go to urgent care

## 2025-03-10 NOTE — TELEPHONE ENCOUNTER
Patient call requesting a virtual visit  for congestion and fatigue, possible exposure to Flu. No appointments available. Patient can be reached at [phone].

## 2025-03-11 ENCOUNTER — OFFICE VISIT (OUTPATIENT)
Age: 65
End: 2025-03-11

## 2025-03-11 VITALS
HEART RATE: 93 BPM | WEIGHT: 165 LBS | SYSTOLIC BLOOD PRESSURE: 151 MMHG | BODY MASS INDEX: 25.09 KG/M2 | RESPIRATION RATE: 16 BRPM | DIASTOLIC BLOOD PRESSURE: 89 MMHG | OXYGEN SATURATION: 96 % | TEMPERATURE: 98.6 F

## 2025-03-11 DIAGNOSIS — R53.83 OTHER FATIGUE: ICD-10-CM

## 2025-03-11 DIAGNOSIS — R05.8 POST-VIRAL COUGH SYNDROME: Primary | ICD-10-CM

## 2025-03-11 RX ORDER — CHOLECALCIFEROL (VITAMIN D3) 25 MCG
1000 TABLET ORAL DAILY
Qty: 30 TABLET | Refills: 0 | Status: SHIPPED | OUTPATIENT
Start: 2025-03-11 | End: 2025-04-10

## 2025-03-11 NOTE — PROGRESS NOTES
3/11/2025   Lalitha Sahu (: 1960) is a 64 y.o. female, New patient, here for evaluation of the following chief complaint(s):  Cough (C/o cough and fatigue. Sx 2 weeks.. )     ASSESSMENT/PLAN:  Below is the assessment and plan developed based on review of pertinent history, physical exam, labs, studies, and medications.  Assessment & Plan  Post-viral cough syndrome          Exam and history consistent with post-viral cough syndrome and persistent fatigue.  -History concerning for potential vitamin d deficiency vs anemia  -Recommend starting Vitamin D3 OTC supplementation and follow up with PCP for testing   -Please drink at least 64 ounces of fluid a day  -1 teaspoon of honey or mixed with hot tea can help with post-viral cough  -Tylenol 650 mg every 6 hours as needed for pain control   -Mucinex Fastmax, Tylenol Severe Cold and Flu, or Dayquil for symptomatic relief and decongestion  -Please follow up with your PCP in the next 2-3 weeks    If symptoms persist or worsen, please contact your PCP and/or return to Urgent Care.   Other fatigue       Orders:    vitamin D3 (CHOLECALCIFEROL) 25 MCG (1000 UT) TABS tablet; Take 1 tablet by mouth daily      Handout given with care instructions  2. OTC for symptom management. Increase fluid intake, ensure adequate nutritional intake.  3. Follow up with PCP as needed.  4. Go to ED with development of any acute symptoms.     Follow up:  No follow-ups on file.  Follow up immediately for any new, worsening or changes or if symptoms are not improving over the next 5-7 days.     SUBJECTIVE/OBJECTIVE:  HPI   Ms Sahu presents with concern for cough and fatigue for the past 2 weeks. She states her family members were sick with the flu around that time and that she got sick afterwards. She has recovered from most of her illness symptoms but has still had a cough and fatigue. She is not being woken up out of sleep with cough. She states the fatigue is so bad that she can't do

## 2025-03-11 NOTE — PATIENT INSTRUCTIONS
Exam and history consistent with post-viral cough syndrome and persistent fatigue.  -History concerning for potential vitamin d deficiency vs anemia  -Recommend starting Vitamin D3 OTC supplementation and follow up with PCP for testing   -Please drink at least 64 ounces of fluid a day  -1 teaspoon of honey or mixed with hot tea can help with post-viral cough  -Tylenol 650 mg every 6 hours as needed for pain control   -Mucinex Fastmax, Tylenol Severe Cold and Flu, or Dayquil for symptomatic relief and decongestion  -Please follow up with your PCP in the next 2-3 weeks    If symptoms persist or worsen, please contact your PCP and/or return to Urgent Care.

## 2025-03-16 SDOH — HEALTH STABILITY: PHYSICAL HEALTH: ON AVERAGE, HOW MANY DAYS PER WEEK DO YOU ENGAGE IN MODERATE TO STRENUOUS EXERCISE (LIKE A BRISK WALK)?: 5 DAYS

## 2025-03-16 SDOH — HEALTH STABILITY: PHYSICAL HEALTH: ON AVERAGE, HOW MANY MINUTES DO YOU ENGAGE IN EXERCISE AT THIS LEVEL?: 20 MIN

## 2025-03-16 ASSESSMENT — LIFESTYLE VARIABLES
HOW OFTEN DO YOU HAVE SIX OR MORE DRINKS ON ONE OCCASION: 1
HOW OFTEN DO YOU HAVE A DRINK CONTAINING ALCOHOL: 1
HOW MANY STANDARD DRINKS CONTAINING ALCOHOL DO YOU HAVE ON A TYPICAL DAY: 0
HOW MANY STANDARD DRINKS CONTAINING ALCOHOL DO YOU HAVE ON A TYPICAL DAY: PATIENT DOES NOT DRINK
HOW OFTEN DO YOU HAVE A DRINK CONTAINING ALCOHOL: NEVER

## 2025-03-16 ASSESSMENT — PATIENT HEALTH QUESTIONNAIRE - PHQ9
9. THOUGHTS THAT YOU WOULD BE BETTER OFF DEAD, OR OF HURTING YOURSELF: NOT AT ALL
SUM OF ALL RESPONSES TO PHQ QUESTIONS 1-9: 0
SUM OF ALL RESPONSES TO PHQ QUESTIONS 1-9: 0
4. FEELING TIRED OR HAVING LITTLE ENERGY: NOT AT ALL
3. TROUBLE FALLING OR STAYING ASLEEP: NOT AT ALL
2. FEELING DOWN, DEPRESSED OR HOPELESS: NOT AT ALL
8. MOVING OR SPEAKING SO SLOWLY THAT OTHER PEOPLE COULD HAVE NOTICED. OR THE OPPOSITE, BEING SO FIGETY OR RESTLESS THAT YOU HAVE BEEN MOVING AROUND A LOT MORE THAN USUAL: NOT AT ALL
10. IF YOU CHECKED OFF ANY PROBLEMS, HOW DIFFICULT HAVE THESE PROBLEMS MADE IT FOR YOU TO DO YOUR WORK, TAKE CARE OF THINGS AT HOME, OR GET ALONG WITH OTHER PEOPLE: NOT DIFFICULT AT ALL
SUM OF ALL RESPONSES TO PHQ QUESTIONS 1-9: 0
SUM OF ALL RESPONSES TO PHQ QUESTIONS 1-9: 0
5. POOR APPETITE OR OVEREATING: NOT AT ALL
7. TROUBLE CONCENTRATING ON THINGS, SUCH AS READING THE NEWSPAPER OR WATCHING TELEVISION: NOT AT ALL
6. FEELING BAD ABOUT YOURSELF - OR THAT YOU ARE A FAILURE OR HAVE LET YOURSELF OR YOUR FAMILY DOWN: NOT AT ALL
1. LITTLE INTEREST OR PLEASURE IN DOING THINGS: NOT AT ALL

## 2025-03-19 ENCOUNTER — OFFICE VISIT (OUTPATIENT)
Age: 65
End: 2025-03-19

## 2025-03-19 ENCOUNTER — TELEPHONE (OUTPATIENT)
Age: 65
End: 2025-03-19

## 2025-03-19 VITALS
WEIGHT: 164.4 LBS | BODY MASS INDEX: 24.92 KG/M2 | DIASTOLIC BLOOD PRESSURE: 82 MMHG | SYSTOLIC BLOOD PRESSURE: 129 MMHG | RESPIRATION RATE: 17 BRPM | TEMPERATURE: 97.9 F | OXYGEN SATURATION: 99 % | HEIGHT: 68 IN | HEART RATE: 80 BPM

## 2025-03-19 DIAGNOSIS — R79.9 ABNORMAL FINDING OF BLOOD CHEMISTRY, UNSPECIFIED: ICD-10-CM

## 2025-03-19 DIAGNOSIS — K42.9 UMBILICAL HERNIA WITHOUT OBSTRUCTION AND WITHOUT GANGRENE: ICD-10-CM

## 2025-03-19 DIAGNOSIS — K44.9 HIATAL HERNIA WITH GERD AND ESOPHAGITIS: ICD-10-CM

## 2025-03-19 DIAGNOSIS — K90.9 IDIOPATHIC STEATORRHEA: Primary | ICD-10-CM

## 2025-03-19 DIAGNOSIS — K21.00 HIATAL HERNIA WITH GERD AND ESOPHAGITIS: ICD-10-CM

## 2025-03-19 DIAGNOSIS — F33.1 MODERATE EPISODE OF RECURRENT MAJOR DEPRESSIVE DISORDER (HCC): ICD-10-CM

## 2025-03-19 DIAGNOSIS — J44.89 ASTHMATIC BRONCHITIS , CHRONIC (HCC): ICD-10-CM

## 2025-03-19 DIAGNOSIS — Z00.00 MEDICARE ANNUAL WELLNESS VISIT, SUBSEQUENT: ICD-10-CM

## 2025-03-19 LAB
T4 FREE SERPL-MCNC: 1 NG/DL (ref 0.8–1.5)
TSH SERPL DL<=0.05 MIU/L-ACNC: 0.88 UIU/ML (ref 0.36–3.74)

## 2025-03-19 SDOH — ECONOMIC STABILITY: FOOD INSECURITY: WITHIN THE PAST 12 MONTHS, THE FOOD YOU BOUGHT JUST DIDN'T LAST AND YOU DIDN'T HAVE MONEY TO GET MORE.: NEVER TRUE

## 2025-03-19 SDOH — ECONOMIC STABILITY: FOOD INSECURITY: WITHIN THE PAST 12 MONTHS, YOU WORRIED THAT YOUR FOOD WOULD RUN OUT BEFORE YOU GOT MONEY TO BUY MORE.: NEVER TRUE

## 2025-03-19 NOTE — PROGRESS NOTES
Chief Complaint   Patient presents with    Medicare AWV    follow up urgent care [facility] 3/11/25     For cough    Pain     Right arm and leg pain       \"Have you been to the ER, urgent care clinic since your last visit?  Hospitalized since your last visit?\"    YES    “Have you seen or consulted any other health care providers outside of [facility] since your last visit?”    NO     “Have you had a pap smear?”    NO    No cervical cancer screening on file       Click Here for Release of Records Request     No results found for this visit on 25.   Vitals:    25 1101   BP: 129/82   BP Site: Left Upper Arm   Patient Position: Sitting   BP Cuff Size: Large Adult   Pulse: 80   Resp: 17   Temp: 97.9 °F (36.6 °C)   TempSrc: Infrared   SpO2: 99%   Weight: 74.6 kg (164 lb 6.4 oz)   Height: 1.727 m (5' 8\")        The patient, Lalitha Sahu, identity was verified by name and .

## 2025-03-19 NOTE — TELEPHONE ENCOUNTER
Attempted to contact patient regarding referral for umbilical hernia. Patient did not answer, LVM requesting a return call if interested in scheduling. Will follow up.

## 2025-03-20 LAB
25(OH)D3 SERPL-MCNC: 72.4 NG/ML (ref 30–100)
ALBUMIN SERPL-MCNC: 3.6 G/DL (ref 3.5–5)
ALBUMIN/GLOB SERPL: 1.2 (ref 1.1–2.2)
ALP SERPL-CCNC: 70 U/L (ref 45–117)
ALT SERPL-CCNC: 57 U/L (ref 12–78)
ANION GAP SERPL CALC-SCNC: 6 MMOL/L (ref 2–12)
AST SERPL-CCNC: 30 U/L (ref 15–37)
BASOPHILS # BLD: 0.04 K/UL (ref 0–0.1)
BASOPHILS NFR BLD: 0.7 % (ref 0–1)
BILIRUB SERPL-MCNC: 0.5 MG/DL (ref 0.2–1)
BUN SERPL-MCNC: 20 MG/DL (ref 6–20)
BUN/CREAT SERPL: 28 (ref 12–20)
CALCIUM SERPL-MCNC: 9.7 MG/DL (ref 8.5–10.1)
CHLORIDE SERPL-SCNC: 106 MMOL/L (ref 97–108)
CHOLEST SERPL-MCNC: 129 MG/DL
CO2 SERPL-SCNC: 28 MMOL/L (ref 21–32)
CREAT SERPL-MCNC: 0.71 MG/DL (ref 0.55–1.02)
DIFFERENTIAL METHOD BLD: NORMAL
EOSINOPHIL # BLD: 0.16 K/UL (ref 0–0.4)
EOSINOPHIL NFR BLD: 3 % (ref 0–7)
ERYTHROCYTE [DISTWIDTH] IN BLOOD BY AUTOMATED COUNT: 13.6 % (ref 11.5–14.5)
EST. AVERAGE GLUCOSE BLD GHB EST-MCNC: 166 MG/DL
FERRITIN SERPL-MCNC: 23 NG/ML (ref 8–252)
FOLATE SERPL-MCNC: 51.8 NG/ML (ref 5–21)
GLOBULIN SER CALC-MCNC: 3.1 G/DL (ref 2–4)
GLUCOSE SERPL-MCNC: 158 MG/DL (ref 65–100)
HBA1C MFR BLD: 7.4 % (ref 4–5.6)
HCT VFR BLD AUTO: 38.1 % (ref 35–47)
HDLC SERPL-MCNC: 61 MG/DL
HDLC SERPL: 2.1 (ref 0–5)
HGB BLD-MCNC: 11.7 G/DL (ref 11.5–16)
IMM GRANULOCYTES # BLD AUTO: 0.01 K/UL (ref 0–0.04)
IMM GRANULOCYTES NFR BLD AUTO: 0.2 % (ref 0–0.5)
IRON SATN MFR SERPL: 18 % (ref 20–50)
IRON SERPL-MCNC: 67 UG/DL (ref 35–150)
LDLC SERPL CALC-MCNC: 44.8 MG/DL (ref 0–100)
LIPASE SERPL-CCNC: 29 U/L (ref 13–75)
LYMPHOCYTES # BLD: 1.74 K/UL (ref 0.8–3.5)
LYMPHOCYTES NFR BLD: 32.5 % (ref 12–49)
MCH RBC QN AUTO: 28.3 PG (ref 26–34)
MCHC RBC AUTO-ENTMCNC: 30.7 G/DL (ref 30–36.5)
MCV RBC AUTO: 92 FL (ref 80–99)
MONOCYTES # BLD: 0.39 K/UL (ref 0–1)
MONOCYTES NFR BLD: 7.3 % (ref 5–13)
NEUTS SEG # BLD: 3.02 K/UL (ref 1.8–8)
NEUTS SEG NFR BLD: 56.3 % (ref 32–75)
NRBC # BLD: 0 K/UL (ref 0–0.01)
NRBC BLD-RTO: 0 PER 100 WBC
PLATELET # BLD AUTO: 211 K/UL (ref 150–400)
PMV BLD AUTO: 11.3 FL (ref 8.9–12.9)
POTASSIUM SERPL-SCNC: 4.6 MMOL/L (ref 3.5–5.1)
PROT SERPL-MCNC: 6.7 G/DL (ref 6.4–8.2)
RBC # BLD AUTO: 4.14 M/UL (ref 3.8–5.2)
SODIUM SERPL-SCNC: 140 MMOL/L (ref 136–145)
TIBC SERPL-MCNC: 377 UG/DL (ref 250–450)
TRIGL SERPL-MCNC: 116 MG/DL
VIT B12 SERPL-MCNC: >2000 PG/ML (ref 193–986)
VLDLC SERPL CALC-MCNC: 23.2 MG/DL
WBC # BLD AUTO: 5.4 K/UL (ref 3.6–11)

## 2025-03-20 NOTE — TELEPHONE ENCOUNTER
Second attempt to contact patient regarding referral for umbilical hernia. Patient did not answer, LVM requesting a return call if interested in scheduling. Will follow up.

## 2025-03-21 ENCOUNTER — RESULTS FOLLOW-UP (OUTPATIENT)
Age: 65
End: 2025-03-21

## 2025-03-21 ENCOUNTER — RESULTS FOLLOW-UP (OUTPATIENT)
Facility: HOSPITAL | Age: 65
End: 2025-03-21

## 2025-03-21 DIAGNOSIS — D50.8 OTHER IRON DEFICIENCY ANEMIA: Primary | ICD-10-CM

## 2025-03-21 RX ORDER — FERRIC MALTOL 30 MG/1
1 CAPSULE ORAL 2 TIMES DAILY
Qty: 60 CAPSULE | Refills: 3 | Status: SHIPPED | OUTPATIENT
Start: 2025-03-21

## 2025-03-21 NOTE — TELEPHONE ENCOUNTER
Patient returned call and stated she wanted to be seen at [facility]. Patient was provided the number to the office.

## 2025-03-21 NOTE — PATIENT INSTRUCTIONS
having a problem from this test. If dilating drops are used for a vision test, they may make the eyes sting and cause a medicine taste in the mouth.  Follow-up care is a key part of your treatment and safety. Be sure to make and go to all appointments, and call your doctor if you are having problems. It's also a good idea to know your test results and keep a list of the medicines you take.  Where can you learn more?  Go to https://www.[employer]bs[employer].net/patientEd and enter G551 to learn more about \"Learning About Vision Tests.\"  Current as of: July 31, 2024  Content Version: 14.4  © 1090-8388 [employer]   Care instructions adapted under license by [employer]. If you have questions about a medical condition or this instruction, always ask your healthcare professional. [employer] disclaims any warranty or liability for your use of this information.         Eating Healthy Foods: Care Instructions  With every meal, you can make healthy food choices. Try to eat a variety of fruits, vegetables, whole grains, lean proteins, and low-fat dairy products. This can help you get the right balance of nutrients, including vitamins and minerals. Small changes add up over time. You can start by adding one healthy food to your meals each day.    Try to make half your plate fruits and vegetables, one-fourth whole grains, and one-fourth lean proteins. Try including dairy with your meals.   Eat more fruits and vegetables. Try to have them with most meals and snacks.   Foods for healthy eating        Fruits   These can be fresh, frozen, canned, or dried.  Try to choose whole fruit rather than fruit juice.  Eat a variety of colors.        Vegetables   These can be fresh, frozen, canned, or dried.  Beans, peas, and lentils count too.        Whole grains   Choose whole-grain breads, cereals, and noodles.  Try brown rice.        Lean proteins   These can include lean meat, poultry, fish, and eggs.  You can also have

## 2025-03-21 NOTE — PROGRESS NOTES
Medicare Annual Wellness Visit    Lalitha Sahu is here for Medicare AWV, follow up urgent care [place] 3/11/25 (For cough), and Pain (Right arm and leg pain)    Assessment & Plan   Idiopathic steatorrhea  -     Hemoglobin A1C; Future  -     Ferritin; Future  -     Iron and TIBC; Future  -     Vitamin D 25 Hydroxy; Future  -     Vitamin B12 & Folate; Future  Moderate episode of recurrent major depressive disorder (HCC)  -     Hemoglobin A1C; Future  -     Ferritin; Future  -     Iron and TIBC; Future  -     Vitamin D 25 Hydroxy; Future  -     Vitamin B12 & Folate; Future  Asthmatic bronchitis , chronic (HCC)  -     Hemoglobin A1C; Future  -     Ferritin; Future  -     Iron and TIBC; Future  -     Vitamin D 25 Hydroxy; Future  -     Vitamin B12 & Folate; Future  Abnormal finding of blood chemistry, unspecified  Umbilical hernia without obstruction and without gangrene  -     [facility], [facility] (Fernando Aj)  Hiatal hernia with GERD and esophagitis  -     [facility] MD, [facility] (Fernando Aj)  Medicare annual wellness visit, subsequent       Return in 1 year (on 3/19/2026) for Medicare AWV.     Subjective       Patient's complete Health Risk Assessment and screening values have been reviewed and are found in Flowsheets. The following problems were reviewed today and where indicated follow up appointments were made and/or referrals ordered.    Positive Risk Factor Screenings with Interventions:               Poor Eating Habits/Diet:  Do you eat balanced/healthy meals regularly?: (!) (Patient-Rptd) No  Interventions:  Patient advised to follow-up in this office for further evaluation and treatment     Dentist Screen:  Have you seen the dentist within the past year?: (!) (Patient-Rptd) No    Intervention:  Advised to schedule with their dentist     Vision Screen:  Do you have difficulty driving, watching TV, or doing any of your daily activities because of your

## 2025-03-27 LAB
ALLERGEN EGG WHITE IGE: 0.49 KU/L
ALLERGEN MILK IGE: <0.1 KU/L
CLAM IGE QN: <0.1 KU/L
CODFISH IGE QN: <0.1 KU/L
COMMENT:: NORMAL
CORN IGE QN: 0.54 KU/L
Lab: <0.1 KU/L
Lab: <0.1 KU/L
Lab: ABNORMAL
PEANUT (RARA H) 1 IGE QN: <0.1 KU/L
PEANUT (RARA H) 2 IGE QN: <0.1 KU/L
PEANUT (RARA H) 3 IGE QN: <0.1 KU/L
PEANUT (RARA H) 8 IGE QN: <0.1 KU/L
PEANUT (RARA H) 9 IGE QN: <0.1 KU/L
PEANUT COMPONENT ARA H 6: <0.1 KU/L
PEANUT IGE QN: 1.34 KU/L
SCALLOP IGE QN: <0.1 KU/L
SESAME SEED IGE QN: 0.8 KU/L
SHRIMP IGE QN: <0.1 KU/L
SOYBEAN IGE QN: 0.22 KU/L
WALNUT IGE QN: 0.16 KU/L
WHEAT IGE QN: 0.34 KU/L

## 2025-03-31 LAB
CF INTERPRETATION: NORMAL
CFTR MUT ANL BLD/T: NORMAL
CITATION REF LAB TEST: NORMAL
CLINICAL INFO: NORMAL
COMMENT:: NORMAL
ETHNIC BACKGROUND STATED: NORMAL
INDICATION: NORMAL
INFORMATION TABLE: NORMAL
LAB DIRECTOR NAME PROVIDER: NORMAL
Lab: NORMAL
REF LAB TEST METHOD: NORMAL
SERVICE CMNT-IMP: NORMAL
SPECIMEN SOURCE: NORMAL

## 2025-03-31 RX ORDER — METOPROLOL SUCCINATE 100 MG/1
100 TABLET, EXTENDED RELEASE ORAL DAILY
Qty: 90 TABLET | Refills: 3 | Status: SHIPPED | OUTPATIENT
Start: 2025-03-31

## 2025-03-31 NOTE — TELEPHONE ENCOUNTER
Last appointment: 3/19/25  Next appointment: 4/25/25  Previous refill encounter(s): 8/15/24 #90 with 1 refill    Requested Prescriptions     Pending Prescriptions Disp Refills    metoprolol succinate (TOPROL XL) 100 MG extended release tablet [Pharmacy Med Name: Metoprolol Succinate ER Oral Tablet Extended Release 24 Hour 100 MG] 90 tablet 3     Sig: TAKE 1 TABLET EVERY DAY         For Pharmacy Admin Tracking Only    Program: Medication Refill  CPA in place:    Recommendation Provided To:   Intervention Detail: New Rx: 1, reason: Patient Preference  Intervention Accepted By:   Gap Closed?:    Time Spent (min): 5

## 2025-04-17 ENCOUNTER — OFFICE VISIT (OUTPATIENT)
Age: 65
End: 2025-04-17
Payer: MEDICARE

## 2025-04-17 VITALS
DIASTOLIC BLOOD PRESSURE: 76 MMHG | HEART RATE: 84 BPM | OXYGEN SATURATION: 98 % | HEIGHT: 68 IN | WEIGHT: 163.6 LBS | TEMPERATURE: 98.2 F | SYSTOLIC BLOOD PRESSURE: 118 MMHG | BODY MASS INDEX: 24.8 KG/M2 | RESPIRATION RATE: 18 BRPM

## 2025-04-17 DIAGNOSIS — K21.00 GASTROESOPHAGEAL REFLUX DISEASE WITH ESOPHAGITIS WITHOUT HEMORRHAGE: Primary | ICD-10-CM

## 2025-04-17 PROCEDURE — G8420 CALC BMI NORM PARAMETERS: HCPCS | Performed by: SURGERY

## 2025-04-17 PROCEDURE — 3074F SYST BP LT 130 MM HG: CPT | Performed by: SURGERY

## 2025-04-17 PROCEDURE — 99203 OFFICE O/P NEW LOW 30 MIN: CPT | Performed by: SURGERY

## 2025-04-17 PROCEDURE — 1036F TOBACCO NON-USER: CPT | Performed by: SURGERY

## 2025-04-17 PROCEDURE — 3017F COLORECTAL CA SCREEN DOC REV: CPT | Performed by: SURGERY

## 2025-04-17 PROCEDURE — 3078F DIAST BP <80 MM HG: CPT | Performed by: SURGERY

## 2025-04-17 PROCEDURE — G8427 DOCREV CUR MEDS BY ELIG CLIN: HCPCS | Performed by: SURGERY

## 2025-04-17 ASSESSMENT — PATIENT HEALTH QUESTIONNAIRE - PHQ9
SUM OF ALL RESPONSES TO PHQ QUESTIONS 1-9: 0
SUM OF ALL RESPONSES TO PHQ QUESTIONS 1-9: 0
1. LITTLE INTEREST OR PLEASURE IN DOING THINGS: NOT AT ALL
SUM OF ALL RESPONSES TO PHQ QUESTIONS 1-9: 0
SUM OF ALL RESPONSES TO PHQ QUESTIONS 1-9: 0
2. FEELING DOWN, DEPRESSED OR HOPELESS: NOT AT ALL

## 2025-04-17 NOTE — PROGRESS NOTES
Subjective:       Lalitha Sahu is a 65 y.o. female who presents for evaluation of recurrent hiatal hernia and GERD. She had really bad indigestion and cough in 2023. She went to urgent care and PCP. She was diagnosed with GERD. She went to [facility] - hx of gastric sleeve - sent to bariatric surgery. They did a bravo, CT, UGI. She saw Dr. Pacheco - he recommended gastric sleeve to bypass. She saw a dietician and completed her work up. She went back to him and he couldn't promise it work.     She had a traumatic intubation hurting her vocal cord. She has cholecystectomy last year and has a umbilical hernia. She reports an intermittent lump and burning sensation. She followed up again with Dr. Pacheco and was dismissed.     She reports PPI made her symptoms worse. She has regurgitation and vomiting. She also reports left sided pain. She reports dysphagia. She sleeps propped up. She reports coughing all night. Last A1c was 7.4. History of COPD, asthma - quit smoking 2018.     12/7/23: UGI: 1.  Status post sleeve gastrectomy, which appears grossly unremarkable.   2.  Mild to moderate sized sliding hiatal hernia.   3.  Significant esophageal dysmotility.   4.  Severe gastroesophageal reflux.     Past Medical History:   Diagnosis Date    Adverse effect of anesthesia 12/2020    dr. Heredia, colonoscopy difficulty breathing under propafol    Arthritis     Asthma     Asthma     Depression     Diabetes (HCC)     DM2, insulin and oral meds    Diabetes mellitus (HCC)     Encounter for review of form with patient 12/4/2017    Essential hypertension     GERD (gastroesophageal reflux disease)     Headache(784.0)     Hypercholesterolemia     Hypertension     Ill-defined condition     R ACOUSTIC NEUROMA    Insomnia disorder 12/4/2017    Major depression, chronic 12/4/2017    Mood disorder 7/20/2020    Psychiatric problem     anxiety depression    Psychotic disorder (HCC)     Psychotic disorder (HCC) 8/14/2019    Screening for cervical

## 2025-04-17 NOTE — PROGRESS NOTES
Identified pt with two pt identifiers (name and ). Reviewed chart in preparation for visit and have obtained necessary documentation.    Lalitha Sahu is a 65 y.o. female  Chief Complaint   Patient presents with    New Patient     Seen at the request of Dr. Richie Norris, eval umbilical and hiatal hernia     /76 (BP Site: Left Upper Arm, Patient Position: Sitting, BP Cuff Size: Small Adult)   Pulse 84   Temp 98.2 °F (36.8 °C) (Oral)   Resp 18   Ht 1.727 m (5' 8\")   Wt 74.2 kg (163 lb 9.6 oz)   SpO2 98%   BMI 24.88 kg/m²     1. Have you been to the ER, urgent care clinic since your last visit?  Hospitalized since your last visit?no    2. Have you seen or consulted any other health care providers outside of the [facility] since your last visit?  Include any pap smears or colon screening. no

## 2025-04-25 ENCOUNTER — OFFICE VISIT (OUTPATIENT)
Age: 65
End: 2025-04-25
Payer: MEDICARE

## 2025-04-25 ENCOUNTER — CLINICAL DOCUMENTATION (OUTPATIENT)
Age: 65
End: 2025-04-25

## 2025-04-25 VITALS
WEIGHT: 162 LBS | TEMPERATURE: 97.5 F | OXYGEN SATURATION: 98 % | RESPIRATION RATE: 17 BRPM | SYSTOLIC BLOOD PRESSURE: 138 MMHG | BODY MASS INDEX: 24.63 KG/M2 | HEART RATE: 81 BPM | DIASTOLIC BLOOD PRESSURE: 85 MMHG

## 2025-04-25 DIAGNOSIS — F33.9 RECURRENT DEPRESSION: ICD-10-CM

## 2025-04-25 DIAGNOSIS — D50.8 OTHER IRON DEFICIENCY ANEMIA: Primary | ICD-10-CM

## 2025-04-25 PROCEDURE — G8427 DOCREV CUR MEDS BY ELIG CLIN: HCPCS | Performed by: FAMILY MEDICINE

## 2025-04-25 PROCEDURE — 99213 OFFICE O/P EST LOW 20 MIN: CPT | Performed by: FAMILY MEDICINE

## 2025-04-25 PROCEDURE — 3017F COLORECTAL CA SCREEN DOC REV: CPT | Performed by: FAMILY MEDICINE

## 2025-04-25 PROCEDURE — 3075F SYST BP GE 130 - 139MM HG: CPT | Performed by: FAMILY MEDICINE

## 2025-04-25 PROCEDURE — 3079F DIAST BP 80-89 MM HG: CPT | Performed by: FAMILY MEDICINE

## 2025-04-25 PROCEDURE — 1036F TOBACCO NON-USER: CPT | Performed by: FAMILY MEDICINE

## 2025-04-25 PROCEDURE — G8420 CALC BMI NORM PARAMETERS: HCPCS | Performed by: FAMILY MEDICINE

## 2025-04-25 RX ORDER — FERRIC MALTOL 30 MG/1
1 CAPSULE ORAL 2 TIMES DAILY
Qty: 60 CAPSULE | Refills: 3
Start: 2025-04-25

## 2025-04-25 ASSESSMENT — PATIENT HEALTH QUESTIONNAIRE - PHQ9
SUM OF ALL RESPONSES TO PHQ QUESTIONS 1-9: 1
SUM OF ALL RESPONSES TO PHQ QUESTIONS 1-9: 1
8. MOVING OR SPEAKING SO SLOWLY THAT OTHER PEOPLE COULD HAVE NOTICED. OR THE OPPOSITE, BEING SO FIGETY OR RESTLESS THAT YOU HAVE BEEN MOVING AROUND A LOT MORE THAN USUAL: NOT AT ALL
SUM OF ALL RESPONSES TO PHQ QUESTIONS 1-9: 1
9. THOUGHTS THAT YOU WOULD BE BETTER OFF DEAD, OR OF HURTING YOURSELF: NOT AT ALL
10. IF YOU CHECKED OFF ANY PROBLEMS, HOW DIFFICULT HAVE THESE PROBLEMS MADE IT FOR YOU TO DO YOUR WORK, TAKE CARE OF THINGS AT HOME, OR GET ALONG WITH OTHER PEOPLE: NOT DIFFICULT AT ALL
5. POOR APPETITE OR OVEREATING: NOT AT ALL
6. FEELING BAD ABOUT YOURSELF - OR THAT YOU ARE A FAILURE OR HAVE LET YOURSELF OR YOUR FAMILY DOWN: NOT AT ALL
3. TROUBLE FALLING OR STAYING ASLEEP: SEVERAL DAYS
2. FEELING DOWN, DEPRESSED OR HOPELESS: NOT AT ALL
SUM OF ALL RESPONSES TO PHQ QUESTIONS 1-9: 1
1. LITTLE INTEREST OR PLEASURE IN DOING THINGS: NOT AT ALL
7. TROUBLE CONCENTRATING ON THINGS, SUCH AS READING THE NEWSPAPER OR WATCHING TELEVISION: NOT AT ALL
4. FEELING TIRED OR HAVING LITTLE ENERGY: NOT AT ALL

## 2025-04-25 NOTE — ASSESSMENT & PLAN NOTE
Chronic, not at goal (unstable), continue current treatment plan, medication adherence emphasized, and lifestyle modifications recommended    Orders:    Ferric Maltol (ACCRUFER) 30 MG CAPS; Take 1 capsule by mouth 2 times daily    brexpiprazole (REXULTI) 0.5 MG TABS tablet; Take 1 tablet by mouth daily

## 2025-04-25 NOTE — PROGRESS NOTES
Chief Complaint   Patient presents with    Discuss Labs       \"Have you been to the ER, urgent care clinic since your last visit?  Hospitalized since your last visit?\"    NO    “Have you seen or consulted any other health care providers outside of [facility] since your last visit?”    NO     “Have you had a pap smear?”    NO    No cervical cancer screening on file         Click Here for Release of Records Request     No results found for this visit on 25.   Vitals:    25 0759   BP: 138/85   Pulse: 81   Resp: 17   Temp: 97.5 °F (36.4 °C)   TempSrc: Temporal   SpO2: 98%   Weight: 73.5 kg (162 lb)          The patient, Lalitha Sahu, identity was verified by name and .

## 2025-04-25 NOTE — PROGRESS NOTES
PA initiated for Rexulti 0.5mg tablet. Denied. Records show that you have not tried a generic oral atypical antipsychotic

## 2025-04-25 NOTE — PROGRESS NOTES
Lalitha Sahu (:  1960) is a 65 y.o. female,Established patient, here for evaluation of the following chief complaint(s):  Discuss Labs    Anemia  Present with history of low blood count and currently stating that is not aware of any personal nor family history of any coagulopathy as far as the pt aware, the pt is on no anticoagulative meds, currently on no iron therapy,  patient has currently no gross bleeding, no blood in stool nor in urine, no tarry stool, no hx of SS or Thalassemia,  has no skin discoloration and there is no constipation,  no bleeding gum, not a vegetarian    ROS:    Constitutional: no fever,   - exercise habits,  nad     HENT: no ear pain or nosebleeds. No blurred vision  Respiratory: no shortness of breath, wheezing cough   Cardiovascular: Has no chest pain, ,and racing heart .   Gastrointestinal: No constipation, diarrhea, nausea and vomiting.   Genitourinary: No frequency.   Musculoskeletal: Negative for joint pain.   Skin: no itching, no rash.   Neurological: Negative for dizziness, no tremors  Psychiatric/Behavioral: no for depression  no nervous/anxious, nl stress levels, and overall emotional well-being .      PE:    Constitutional: Well developed, well nourished.  non-toxic in appearance, not diaphoretic.   HEENT: PERRL. EOMI. The left TM is unremarkable. The right TM is unremarkable. No nasal  erythema noted.  THROAT: Posterior pharynx has no erythema, no exudates.    Neck:  no cervical lymphadenopathy. Neck is supple   Cardiovascular: Regular rate and rhythm, no murmurs, rubs, or gallops.   Pulmonary: Clear to auscultation bilaterally. Has no wheezing, rales or rhonchi.,  speaking in full sentences, has no accessory muscle used.  Abdomen: Bowel sounds are normal. Having no distension, no palpable mass. Soft,  No tenderness, rebound or guarding.   Musculoskeletal: No peripheral edema, having normal ROM  Skin:   warm and dry. No diaphoresis, rashes, or lesions.

## 2025-05-01 DIAGNOSIS — Z79.4 TYPE 2 DIABETES MELLITUS WITH HYPERGLYCEMIA, WITH LONG-TERM CURRENT USE OF INSULIN (HCC): ICD-10-CM

## 2025-05-01 DIAGNOSIS — E11.65 TYPE 2 DIABETES MELLITUS WITH HYPERGLYCEMIA, WITH LONG-TERM CURRENT USE OF INSULIN (HCC): ICD-10-CM

## 2025-05-02 LAB — HBA1C MFR BLD: 7.7 % (ref 4.8–5.6)

## 2025-05-04 LAB
ALBUMIN/CREAT UR: 123 MG/G CREAT (ref 0–29)
CREAT UR-MCNC: 83 MG/DL
MICROALBUMIN UR-MCNC: 101.7 UG/ML

## 2025-05-14 NOTE — TELEPHONE ENCOUNTER
Last appointment: 4/25/25  Next appointment: 7/25/25  Previous refill encounter(s): 2/26/25 #3    Requested Prescriptions     Pending Prescriptions Disp Refills    SYMBICORT 160-4.5 MCG/ACT AERO [Pharmacy Med Name: Symbicort Inhalation Aerosol 160-4.5 MCG/ACT] 3 each 3     Sig: INHALE 2 PUFFS TWICE DAILY FOR COPD ASSOCIATED WITH CHRONIC BRONCHITIS, EXTRINSIC ASTHMA.         For Pharmacy Admin Tracking Only    Program: Medication Refill  CPA in place:    Recommendation Provided To:   Intervention Detail: New Rx: 1, reason: Patient Preference  Intervention Accepted By:   Gap Closed?:    Time Spent (min): 5

## 2025-05-16 RX ORDER — BUDESONIDE AND FORMOTEROL FUMARATE DIHYDRATE 160; 4.5 UG/1; UG/1
AEROSOL RESPIRATORY (INHALATION)
Qty: 3 EACH | Refills: 3 | Status: SHIPPED | OUTPATIENT
Start: 2025-05-16

## 2025-05-19 NOTE — TELEPHONE ENCOUNTER
Last appointment: 4/25/25  Next appointment: 7/25/25  Previous refill encounter(s): 7/5/24    Requested Prescriptions     Pending Prescriptions Disp Refills    metFORMIN (GLUCOPHAGE-XR) 500 MG extended release tablet [Pharmacy Med Name: metFORMIN HCl ER Oral Tablet Extended Release 24 Hour 500 MG] 360 tablet 3     Sig: TAKE 2 TABLETS BEFORE BREAKFAST AND DINNER.    traZODone (DESYREL) 100 MG tablet [Pharmacy Med Name: traZODone HCl Oral Tablet 100 MG] 90 tablet 3     Sig: TAKE 1 TABLET EVERY NIGHT FOR INSOMNIA ASSOCIATED WITH DEPRESSION    buPROPion (WELLBUTRIN XL) 300 MG extended release tablet [Pharmacy Med Name: buPROPion HCl ER (XL) Oral Tablet Extended Release 24 Hour 300 MG] 90 tablet 3     Sig: TAKE 1 TABLET EVERY MORNING         For Pharmacy Admin Tracking Only    Program: Medication Refill  CPA in place:    Recommendation Provided To:   Intervention Detail: New Rx: 3, reason: Patient Preference  Intervention Accepted By:   Gap Closed?:    Time Spent (min): 5

## 2025-05-20 ENCOUNTER — OFFICE VISIT (OUTPATIENT)
Age: 65
End: 2025-05-20
Payer: MEDICARE

## 2025-05-20 VITALS
RESPIRATION RATE: 20 BRPM | HEIGHT: 68 IN | OXYGEN SATURATION: 98 % | TEMPERATURE: 98 F | BODY MASS INDEX: 24.86 KG/M2 | WEIGHT: 164 LBS | DIASTOLIC BLOOD PRESSURE: 78 MMHG | SYSTOLIC BLOOD PRESSURE: 134 MMHG | HEART RATE: 81 BPM

## 2025-05-20 DIAGNOSIS — R13.19 ESOPHAGEAL DYSPHAGIA: ICD-10-CM

## 2025-05-20 DIAGNOSIS — Z79.4 TYPE 2 DIABETES MELLITUS WITH HYPERGLYCEMIA, WITH LONG-TERM CURRENT USE OF INSULIN (HCC): ICD-10-CM

## 2025-05-20 DIAGNOSIS — E11.21 MICROALBUMINURIC DIABETIC NEPHROPATHY (HCC): ICD-10-CM

## 2025-05-20 DIAGNOSIS — E78.00 HYPERCHOLESTEREMIA: ICD-10-CM

## 2025-05-20 DIAGNOSIS — I10 ESSENTIAL HYPERTENSION: ICD-10-CM

## 2025-05-20 DIAGNOSIS — R49.0 CHRONIC HOARSENESS: ICD-10-CM

## 2025-05-20 DIAGNOSIS — J45.40 MODERATE PERSISTENT ASTHMA, UNSPECIFIED WHETHER COMPLICATED: ICD-10-CM

## 2025-05-20 DIAGNOSIS — K21.9 GASTROESOPHAGEAL REFLUX DISEASE WITHOUT ESOPHAGITIS: ICD-10-CM

## 2025-05-20 DIAGNOSIS — K44.9 HIATAL HERNIA: Primary | ICD-10-CM

## 2025-05-20 DIAGNOSIS — E11.65 TYPE 2 DIABETES MELLITUS WITH HYPERGLYCEMIA, WITH LONG-TERM CURRENT USE OF INSULIN (HCC): ICD-10-CM

## 2025-05-20 PROCEDURE — 3017F COLORECTAL CA SCREEN DOC REV: CPT | Performed by: SURGERY

## 2025-05-20 PROCEDURE — G8420 CALC BMI NORM PARAMETERS: HCPCS | Performed by: SURGERY

## 2025-05-20 PROCEDURE — G8427 DOCREV CUR MEDS BY ELIG CLIN: HCPCS | Performed by: SURGERY

## 2025-05-20 PROCEDURE — 3075F SYST BP GE 130 - 139MM HG: CPT | Performed by: SURGERY

## 2025-05-20 PROCEDURE — 99214 OFFICE O/P EST MOD 30 MIN: CPT | Performed by: SURGERY

## 2025-05-20 PROCEDURE — 3078F DIAST BP <80 MM HG: CPT | Performed by: SURGERY

## 2025-05-20 PROCEDURE — 3051F HG A1C>EQUAL 7.0%<8.0%: CPT | Performed by: SURGERY

## 2025-05-20 PROCEDURE — 2022F DILAT RTA XM EVC RTNOPTHY: CPT | Performed by: SURGERY

## 2025-05-20 PROCEDURE — 1036F TOBACCO NON-USER: CPT | Performed by: SURGERY

## 2025-05-20 RX ORDER — BUPROPION HYDROCHLORIDE 300 MG/1
300 TABLET ORAL EVERY MORNING
Qty: 90 TABLET | Refills: 3 | Status: SHIPPED | OUTPATIENT
Start: 2025-05-20

## 2025-05-20 RX ORDER — TRAZODONE HYDROCHLORIDE 100 MG/1
TABLET ORAL
Qty: 90 TABLET | Refills: 3 | Status: SHIPPED | OUTPATIENT
Start: 2025-05-20

## 2025-05-20 RX ORDER — METFORMIN HYDROCHLORIDE 500 MG/1
TABLET, EXTENDED RELEASE ORAL
Qty: 360 TABLET | Refills: 3 | Status: SHIPPED | OUTPATIENT
Start: 2025-05-20

## 2025-05-20 ASSESSMENT — PATIENT HEALTH QUESTIONNAIRE - PHQ9
2. FEELING DOWN, DEPRESSED OR HOPELESS: NOT AT ALL
SUM OF ALL RESPONSES TO PHQ QUESTIONS 1-9: 0
1. LITTLE INTEREST OR PLEASURE IN DOING THINGS: NOT AT ALL

## 2025-05-20 NOTE — PROGRESS NOTES
Identified patient with two patient identifiers (name and ). Reviewed chart in preparation for visit and have obtained necessary documentation.    Lalitha Sahu is a 65 y.o. female  Chief Complaint   Patient presents with    New Patient    Gastroesophageal Reflux     /78 (BP Site: Left Upper Arm, Patient Position: Sitting, BP Cuff Size: Large Adult)   Pulse 81   Temp 98 °F (36.7 °C)   Resp 20   Ht 1.727 m (5' 8\")   Wt 74.4 kg (164 lb)   SpO2 98%   BMI 24.94 kg/m²     1. Have you been to the ER, urgent care clinic since your last visit?  Hospitalized since your last visit?neIdentified patient with two patient identifiers (name and ). Reviewed chart in preparation for visit and have obtained necessary documentation.    Lalitha Sahu is a 65 y.o. female  Chief Complaint   Patient presents with    New Patient    Gastroesophageal Reflux     /78 (BP Site: Left Upper Arm, Patient Position: Sitting, BP Cuff Size: Large Adult)   Pulse 81   Temp 98 °F (36.7 °C)   Resp 20   Ht 1.727 m (5' 8\")   Wt 74.4 kg (164 lb)   SpO2 98%   BMI 24.94 kg/m²     1. Have you been to the ER, urgent care clinic since your last visit?  Hospitalized since your last visit?no    2. Have you seen or consulted any other health care providers outside of the [facility] since your last visit?  Include any pap smears or colon screening. noIdentified patient with two patient identifiers (name and ). Reviewed chart in preparation for visit and have obtained necessary documentation.    
symptoms  Dermatological ROS: negative      Objective:   /78 (BP Site: Left Upper Arm, Patient Position: Sitting, BP Cuff Size: Large Adult)   Pulse 81   Temp 98 °F (36.7 °C)   Resp 20   Ht 1.727 m (5' 8\")   Wt 74.4 kg (164 lb)   SpO2 98%   BMI 24.94 kg/m²     Physical Exam:  Physical Examination: General appearance - alert, well appearing, and in no distress  Mental status - alert, oriented to person, place, and time  Eyes - pupils equal and reactive, extraocular eye movements intact  Mouth - mucous membranes moist, pharynx normal without lesions  Neck - supple, no significant adenopathy, well-healed scar  Lymphatics - no palpable lymphadenopathy  Chest - clear to auscultation, no wheezes, rales or rhonchi, symmetric air entry  Heart - normal rate, regular rhythm, normal S1, S2, no murmur  Abdomen -NABS, nondistended, soft.  Periumbilical pain with palpation without obvious umbilical hernia  Neurological - alert, oriented, normal speech, no focal findings or movement disorder noted  Extremities - no pedal edema, no clubbing or cyanosis  Skin - normal coloration and turgor, no rashes, no suspicious skin lesions noted    Data Review: UGI Series: Significant esophageal dysmotility (stasis of contrast in esophagus).  Gastric sleeve anatomy with moderate hiatal hernia, spontaneous reflux up to proximal esophagus with a B-ring seen at proximal end. Upper endoscopy: Sleeve gastrectomy with erythematous mucosa throughout sleeve; no esophagitis.  BRAVO results: Composite DeMeester score = 28.8.  Esophageal manometry: No failed swallows; high end of normal for LES residual pressure.    Assessment:     Hiatal hernia with severe reflux symptoms, dysphagia, following sleeve gastrectomy; DeMeester score is elevated at 28.8; approximately 3 cm of her proximal sleeve have herniated into the mediastinum.  She cannot tolerate proton pump inhibitors, H2 antagonist, and antacids are only helpful for short periods of time

## 2025-05-21 ENCOUNTER — CLINICAL DOCUMENTATION (OUTPATIENT)
Age: 65
End: 2025-05-21

## 2025-05-21 NOTE — PROGRESS NOTES
Submitted Authorization [place] [facility] via ONLINE for ROBOTIC ASSISTED LAPAROSCOPIC HIATAL HERNIA REPAIR WITH GASTRIC DIVERSION, EULALIO-EN-Y RECONSTRUCTION (CPT 27689, 70905) request submitted for OUTPATIENT OBS with Dr. OROZCO     CPT CODE 98735 DOESN'T REQUIRED PRIOR AUTHORIZATION

## 2025-05-22 ENCOUNTER — OFFICE VISIT (OUTPATIENT)
Age: 65
End: 2025-05-22
Payer: MEDICARE

## 2025-05-22 VITALS
BODY MASS INDEX: 24.84 KG/M2 | TEMPERATURE: 97.3 F | HEART RATE: 74 BPM | HEIGHT: 68 IN | SYSTOLIC BLOOD PRESSURE: 125 MMHG | DIASTOLIC BLOOD PRESSURE: 67 MMHG | RESPIRATION RATE: 18 BRPM | OXYGEN SATURATION: 98 % | WEIGHT: 163.9 LBS

## 2025-05-22 DIAGNOSIS — E11.65 TYPE 2 DIABETES MELLITUS WITH HYPERGLYCEMIA, WITH LONG-TERM CURRENT USE OF INSULIN (HCC): Primary | ICD-10-CM

## 2025-05-22 DIAGNOSIS — E11.21 MICROALBUMINURIC DIABETIC NEPHROPATHY (HCC): ICD-10-CM

## 2025-05-22 DIAGNOSIS — I10 ESSENTIAL HYPERTENSION: ICD-10-CM

## 2025-05-22 DIAGNOSIS — E78.2 MIXED HYPERLIPIDEMIA: ICD-10-CM

## 2025-05-22 DIAGNOSIS — Z79.4 TYPE 2 DIABETES MELLITUS WITH HYPERGLYCEMIA, WITH LONG-TERM CURRENT USE OF INSULIN (HCC): Primary | ICD-10-CM

## 2025-05-22 DIAGNOSIS — H35.00 RETINOPATHY: ICD-10-CM

## 2025-05-22 PROCEDURE — G8420 CALC BMI NORM PARAMETERS: HCPCS | Performed by: GENERAL ACUTE CARE HOSPITAL

## 2025-05-22 PROCEDURE — G2211 COMPLEX E/M VISIT ADD ON: HCPCS | Performed by: GENERAL ACUTE CARE HOSPITAL

## 2025-05-22 PROCEDURE — G8427 DOCREV CUR MEDS BY ELIG CLIN: HCPCS | Performed by: GENERAL ACUTE CARE HOSPITAL

## 2025-05-22 PROCEDURE — 3074F SYST BP LT 130 MM HG: CPT | Performed by: GENERAL ACUTE CARE HOSPITAL

## 2025-05-22 PROCEDURE — 3078F DIAST BP <80 MM HG: CPT | Performed by: GENERAL ACUTE CARE HOSPITAL

## 2025-05-22 PROCEDURE — 3017F COLORECTAL CA SCREEN DOC REV: CPT | Performed by: GENERAL ACUTE CARE HOSPITAL

## 2025-05-22 PROCEDURE — 3051F HG A1C>EQUAL 7.0%<8.0%: CPT | Performed by: GENERAL ACUTE CARE HOSPITAL

## 2025-05-22 PROCEDURE — 99214 OFFICE O/P EST MOD 30 MIN: CPT | Performed by: GENERAL ACUTE CARE HOSPITAL

## 2025-05-22 PROCEDURE — 1036F TOBACCO NON-USER: CPT | Performed by: GENERAL ACUTE CARE HOSPITAL

## 2025-05-22 PROCEDURE — 2022F DILAT RTA XM EVC RTNOPTHY: CPT | Performed by: GENERAL ACUTE CARE HOSPITAL

## 2025-05-22 RX ORDER — INSULIN GLARGINE 100 [IU]/ML
INJECTION, SOLUTION SUBCUTANEOUS
Qty: 15 ML | Refills: 11 | Status: SHIPPED | OUTPATIENT
Start: 2025-05-22

## 2025-05-22 RX ORDER — PEN NEEDLE, DIABETIC 30 GX3/16"
NEEDLE, DISPOSABLE MISCELLANEOUS
Qty: 200 EACH | Refills: 11 | Status: SHIPPED | OUTPATIENT
Start: 2025-05-22

## 2025-05-22 RX ORDER — ACYCLOVIR 400 MG/1
TABLET ORAL
Qty: 1 EACH | Refills: 0 | Status: SHIPPED | OUTPATIENT
Start: 2025-05-22

## 2025-05-22 RX ORDER — ACYCLOVIR 400 MG/1
TABLET ORAL
Qty: 9 EACH | Refills: 3 | Status: SHIPPED | OUTPATIENT
Start: 2025-05-22

## 2025-05-22 RX ORDER — INSULIN ASPART 100 [IU]/ML
INJECTION, SOLUTION INTRAVENOUS; SUBCUTANEOUS
Qty: 15 ML | Refills: 11 | Status: SHIPPED | OUTPATIENT
Start: 2025-05-22

## 2025-05-22 NOTE — PROGRESS NOTES
No chief complaint on file.    \"Have you been to the ER, urgent care clinic since your last visit?  Hospitalized since your last visit?\"    NO    “Have you seen or consulted any other health care providers outside of [facility] since your last visit?”    NO            
before meals, max dosage 40 units/day  - Sliding scale to be adjusted accordingly  - Advised to keep a food diary for next 2-3 weeks  - Prescription for Dexcom G7 sensor and  provided  - Lantus dosage to remain at 32 units with pen needles, 11 refills four times a day      Medications:    Metformin ER 500mg take 2 tabs twice daily   Lantus 32 units take at bedtime [at the same time every night]  NovoLog [mealtime insulin] take 10 minutes before each meal  For every 3 g of carbs take 1 units of insulin + correction scale     Correction Scale:   1 unit for every 30 above 150    Check blood sugar 3 times per day, declined continuous glucose monitor at this time  Discussed hypoglycemia management  Annual Ophthalm, Regular foot self checks and regular dental care  Discussed important lifestyle aspects and importance of staying active    Retinopathy  - History of retinal edema, injections in each eye every 9 weeks  - Cataract surgery with lens replacement  - Continue current treatment regimen  - Follow up with ophthalmologist as scheduled      Hypertension  Started on to help microalbuminuria  Microalbumin:   Lab Results   Component Value Date    LABCREAU 83.0 05/02/2025    LABCREAU 55.20 07/26/2024    ALBUMINUR 101.7 05/02/2025    ALBUMINUR 11.30 07/26/2024    ALBCREAT 123 (H) 05/02/2025    ALBCREAT 205 (H) 07/26/2024      Blood pressure is not well controlled today, cont current meds  Early signs of kidney damage, microalbuminuria  - Urine protein levels decreased from 205 to 102 since starting lisinopril  - Kidney function satisfactory, blood pressure well-controlled  - Advised to continue lisinopril at half tablet dosage  - Advised to stay well-hydrated and avoid Advil, Aleve, and naproxen  - No additional medications or therapies prescribed for this condition    Mixed Hyperlipidemia  Currently taking rosuvastatin - 40 MG  monitor annually  Lab Results   Component Value Date/Time    CHOL 129 03/19/2025 11:50

## 2025-05-22 NOTE — PATIENT INSTRUCTIONS
PLAN FOR TODAY    We will plan to make the following changes to your diabetes medications:    Metformin ER 500mg take 2 tabs twice daily   Lantus 32 units take at bedtime [at the same time every night]  NovoLog [mealtime insulin] take 10 minutes before each meal  For every 3 g of carbs take 1 units of insulin + correction scale     Correction Scale:   1 unit for every 30 above 150    IF GLUCOSE IS:                 THEN TAKE:      0   Extra Unit  151-180   1   Extra Unit  181-210   2   Extra Units  211-240   3   Extra Units  241-270   4   Extra Units  271-300   5   Extra Units  >300    6   Extra Units    Example:   My planned insulin dose:    ____ Units    +   ____ Extra Correction Units  =  ____ total units to take together as one injection.      Example:   My planned insulin dose:    ____ Units    +    ____ Extra Correction Units  =  ____ total units to take together as one injection.      Lisinopril 5 mg daily    It will be important to continue checking your glucose just as you did previously.   I would like you at the very least to check you glucose during:    AM fasting before breakfast  Before Lunch  Before Dinner  Any other time that you are not feeling well.     Always provide a glucose log that is completed at every visit so that we can review the results of your home glucose together. Without this, it is not possible to make accurate changes to your diabetes regimen.    Gt Finney MD  [facility]     Diabetes and Meal Planning    Meal planning can be a key part of managing diabetes. Planning meals and snacks with the right balance of carbohydrate, protein, and fat can help you keep your blood sugar at the target level.  You don't have to eat special foods. You can eat what your family eats, including sweets once in a while. But you do have to pay attention to how often you eat and how much you eat of certain foods.    Your plate  The plate format is a simple way to help you

## 2025-05-24 RX ORDER — LISINOPRIL 2.5 MG/1
2.5 TABLET ORAL DAILY
COMMUNITY

## 2025-05-30 ENCOUNTER — OFFICE VISIT (OUTPATIENT)
Age: 65
End: 2025-05-30

## 2025-05-30 DIAGNOSIS — E66.01 MORBID OBESITY (HCC): Primary | ICD-10-CM

## 2025-05-30 NOTE — PROGRESS NOTES
Pre-operative Bariatric Nutrition Evaluation - Phone Consult     Date: 2025   Physician/Surgeon:Grant Baugh M.D.   Name: Lalitha Sahu  :  1960  Age:  65  Gender: Female   Type of Surgery: [x]           Gastric Bypass   []           Sleeve Gastrectomy      Lalitha Sahu was evaluated through a synchronous (real-time) audio encounter. Patient identification was verified at the start of the visit.   The patient was located at Home: [address] [place] [id number].  The provider was located at Home (Appt Dept State): [place].  Confirm you are appropriately licensed, registered, or certified to deliver care in the state where the patient is located as indicated above. If you are not or unsure, please re-schedule the visit: Yes, I confirm.       ASSESSMENT:    Medications/Supplements:   Prior to Admission medications    Medication Sig Start Date End Date Taking? Authorizing Provider   lisinopril (PRINIVIL;ZESTRIL) 2.5 MG tablet Take 1 tablet by mouth daily    Provider, MD Meño   Continuous Glucose  (DEXCOM G7 ) DESTINY For checking blood sugar 4 to 5 times per day E11.25   Gt Finney MD   Continuous Glucose Sensor (DEXCOM G7 SENSOR) MISC Used to measure blood sugar 4 to 5 times per day, .25   Gt Finney MD   Insulin Pen Needle (PEN NEEDLES) 32G X 4 MM MISC Use with insulin pens 4 times per day .25   Gt Finney MD   LANTUS SOLOSTAR 100 UNIT/ML injection pen Inject up to 32 units daily as directed by MD 25   Gt Finney MD   NOVOLOG FLEXPEN 100 UNIT/ML injection pen Take 1 units for every 3 g of carbs three times daily before meals + correction scale Correction Scale:   1 unit for every 30 above 150, for max dose of 40 units per day 25   Gt Finney MD   metFORMIN (GLUCOPHAGE-XR) 500 MG extended release tablet TAKE 2 TABLETS BEFORE BREAKFAST AND DINNER. 25   Richie Norris MD   traZODone (DESYREL) 100 MG tablet

## 2025-06-11 ENCOUNTER — TELEPHONE (OUTPATIENT)
Age: 65
End: 2025-06-11

## 2025-06-11 ENCOUNTER — PATIENT MESSAGE (OUTPATIENT)
Age: 65
End: 2025-06-11

## 2025-06-13 ENCOUNTER — OFFICE VISIT (OUTPATIENT)
Age: 65
End: 2025-06-13

## 2025-06-13 DIAGNOSIS — E66.01 MORBID OBESITY (HCC): Primary | ICD-10-CM

## 2025-06-13 NOTE — PROGRESS NOTES
after surgery was provided. We discussed food sources of protein, protein supplements and multiple reasons as to why protein is important after bariatric surgery.  Pts were instructed to focus on including protein at every meal and practice eating protein first at the meal. Pts were encouraged to sample a protein shake for tolerance. Patients were also instructed to use the balanced plate method for help with portion control and general healthy eating prior to surgery. We discussed measuring meals to 1/2 cup total per meal after surgery. Drinking only calorie-free, sugar-free and non-carbonated beverages. We discussed the importance of drinking 64 ounces of fluid per day to prevent dehydration post-operatively.                       Patient's current diet habits include: Pt is eating 3 meals per day. Tried [employer] and likes/tolerates well. Pt is eating refined carbohydrate foods (bread, pasta, rice, potatoes) in moderation. Pt is eating sweets/desserts in moderation. Pt is using healthy cooking methods. Pt is eating meals prepared outside of the home 1-3 times per week. Pt is drinking 30 oz water bottle (1.5 - 2 per day) and occasional sips of Sprite Zero (d/t indigestion). Is aware of needing to avoid/eliminate carbonated beverages after surgery. Pt reports no to emotional eating.         Physical Activity/Exercise  We talked about the importance of increasing daily physical activity and beginning to develop an exercise regimen/routine. We talked about exercise as being an important part of long term weight loss after surgery.     Comments:  During class, I discussed with patient the importance of getting into an exercise routine.  Pt is currently not exercising d/t fatigue for activity.  Pt has been encouraged to resume walking or pedal bike as tolerated/able.     Behavior Modification       We talked about how to eat more mindfully. Tips and recommendations for how to make these changes were

## 2025-06-20 ENCOUNTER — OFFICE VISIT (OUTPATIENT)
Age: 65
End: 2025-06-20

## 2025-06-20 DIAGNOSIS — Z71.3 NUTRITIONAL COUNSELING: Primary | ICD-10-CM

## 2025-06-20 NOTE — PROGRESS NOTES
[facility]  Supervised Weight Loss     Date:   2025    Patient's Name: Lalitha Sahu  : 1960    Lalitha Sahu was evaluated through a synchronous (real-time) audio encounter. Patient identification was verified at the start of the visit.   The patient was located at Home: [address] [place] [id number].  The provider was located at Home (Appt Dept State): [place].  Confirm you are appropriately licensed, registered, or certified to deliver care in the state where the patient is located as indicated above. If you are not or unsure, please re-schedule the visit: Yes, I confirm.       Session: 3 of  3  Surgery: Gastric Bypass                    Surgeon:  Grant Baugh M.D.      Height: 68\"                 Reported Weight:    157      Lbs.                              BMI: 24             Pounds Lost since last appt: 0              Pounds Gained since last appt: 0     Starting Weight: 163#                       Previous Month’s Weight: 157#  Overall Pounds Lost: 6#                  Overall Pounds Gained: 0     Other Pertinent Information: Today's appointment was completed over the phone. Pt with previous sleeve gastrectomy seeking conversion to gastric bypass due to reflux.      Smoking Status:  none  Alcohol Intake: none     I have reviewed with pt the guidelines of the supervised wt loss program.  Pt understands the expectations of some wt loss during the program and that wt gain could delay the process. I have also explained that classes need to be consecutive.  Missing a class may result in starting over. Pt has received this information in writing.        Changes that patient has made since last  appt include: following drinking rule, having consistent meals.    Eating Habits and Behaviors  General healthy eating guidelines were discussed. A nutrition lesson specific to the importance of protein intake after surgery was provided. We

## 2025-06-25 ENCOUNTER — TELEPHONE (OUTPATIENT)
Age: 65
End: 2025-06-25

## 2025-06-25 NOTE — TELEPHONE ENCOUNTER
LM for patient to call me to schedule bariatric surgery with Dr Baugh. I left my direct phone number for her to call me back

## 2025-06-26 ENCOUNTER — TELEPHONE (OUTPATIENT)
Age: 65
End: 2025-06-26

## 2025-06-26 NOTE — TELEPHONE ENCOUNTER
2x LM for patient to call me to schedule bariatric surgery  with Dr Baugh. I left my direct phone number for patient to call me back.

## 2025-07-01 ENCOUNTER — TELEPHONE (OUTPATIENT)
Age: 65
End: 2025-07-01

## 2025-07-01 ENCOUNTER — PREP FOR PROCEDURE (OUTPATIENT)
Age: 65
End: 2025-07-01

## 2025-07-01 DIAGNOSIS — E78.00 PURE HYPERCHOLESTEROLEMIA: ICD-10-CM

## 2025-07-01 DIAGNOSIS — E11.21 DIABETIC GLOMERULOPATHY (HCC): ICD-10-CM

## 2025-07-01 DIAGNOSIS — R13.19 CONSTANT LOW-GRADE DYSPHAGIA: ICD-10-CM

## 2025-07-01 DIAGNOSIS — R49.0 FLACCID DYSPHONIA: ICD-10-CM

## 2025-07-01 DIAGNOSIS — Z79.4 ENCOUNTER FOR LONG-TERM (CURRENT) USE OF INSULIN (HCC): ICD-10-CM

## 2025-07-01 DIAGNOSIS — I10 ESSENTIAL HYPERTENSION, MALIGNANT: ICD-10-CM

## 2025-07-01 DIAGNOSIS — E11.65 INADEQUATELY CONTROLLED DIABETES MELLITUS (HCC): ICD-10-CM

## 2025-07-01 PROBLEM — K21.9 ESOPHAGEAL REFLUX: Status: ACTIVE | Noted: 2025-07-01

## 2025-07-01 PROBLEM — J45.40 MODERATE PERSISTENT ASTHMA: Status: ACTIVE | Noted: 2025-07-01

## 2025-07-01 NOTE — TELEPHONE ENCOUNTER
Spoke to patient to let her know her surgery letter has posted to her my chart and will go out in the mail. I reviewed the virtual pre op class that that won't show up in her my chart.  I informed her to be on the look out for an e-mail from [facility] in your junk/spam folder.  I explained what will be in the e-mail and to please reply to it so we know you received it.   I asked her to review her appointments and reach out to me as soon as she can if there is any scheduling conflicts, that way we can try to reschedule the appointment with out having to move her surgery. She acknowledged ok and thank you

## 2025-07-01 NOTE — TELEPHONE ENCOUNTER
Spoke to patient, I offered 8/13 for surgery and she declined  requesting a date after 8/20 so I offered 8/27 and she accepted.  I let her know that I will be scheduling her pre-op appointments which are: virtual pre-op class, PAT, H&P and to meet with the doctor to sign consent.  That is any of these appointments are no showed or rescheduled it can push out her surgery date.  she understood.  I also let them know that I will be scheduling their follow up appointments after surgery.   That once everything is scheduled I will put all the information in a letter with dates, times, who they are going to see and if it is virtual or in person.  This letter will post to your my chart and patient said she was good with going on her [employer] to get her letter.  I will also call you once it is completed.  You will hear from me by end of day today.  She acknowledged ok and thank you    Ask patient if she received blue book at her last appointment with Dr Baugh: she said she did receive book

## 2025-07-07 RX ORDER — ALBUTEROL SULFATE 90 UG/1
AEROSOL, METERED RESPIRATORY (INHALATION)
Qty: 54 G | Refills: 3 | Status: SHIPPED | OUTPATIENT
Start: 2025-07-07

## 2025-07-07 NOTE — TELEPHONE ENCOUNTER
Last appointment: 4/25/25  Next appointment: 7/25/25  Previous refill encounter(s): 10/31/24 #3 with 3 refills    Requested Prescriptions     Pending Prescriptions Disp Refills    VENTOLIN  (90 Base) MCG/ACT inhaler [Pharmacy Med Name: Ventolin HFA Inhalation Aerosol Solution 108 (90 Base) MCG/ACT] 54 g 3     Sig: INHALE 2 PUFFS EVERY 4 HOURS AS NEEDED FOR WHEEZING         For Pharmacy Admin Tracking Only    Program: Medication Refill  CPA in place:    Recommendation Provided To:   Intervention Detail: New Rx: 1, reason: Patient Preference  Intervention Accepted By:   Gap Closed?:    Time Spent (min): 5

## 2025-07-17 RX ORDER — TIOTROPIUM BROMIDE 18 UG/1
CAPSULE ORAL; RESPIRATORY (INHALATION)
Qty: 90 CAPSULE | Refills: 3 | Status: SHIPPED | OUTPATIENT
Start: 2025-07-17

## 2025-08-04 RX ORDER — SCOPOLAMINE 1 MG/3D
1 PATCH, EXTENDED RELEASE TRANSDERMAL
Status: CANCELLED | OUTPATIENT
Start: 2025-08-04 | End: 2025-08-07

## 2025-08-04 RX ORDER — BUPIVACAINE HYDROCHLORIDE 5 MG/ML
30 INJECTION, SOLUTION PERINEURAL ONCE
Status: CANCELLED | OUTPATIENT
Start: 2025-08-04 | End: 2025-08-04

## 2025-08-05 ENCOUNTER — HOSPITAL ENCOUNTER (OUTPATIENT)
Facility: HOSPITAL | Age: 65
Discharge: HOME | End: 2025-08-08
Payer: MEDICARE

## 2025-08-05 VITALS
SYSTOLIC BLOOD PRESSURE: 128 MMHG | BODY MASS INDEX: 24.56 KG/M2 | WEIGHT: 162.04 LBS | RESPIRATION RATE: 18 BRPM | HEIGHT: 68 IN | TEMPERATURE: 98.1 F | OXYGEN SATURATION: 98 % | HEART RATE: 87 BPM | DIASTOLIC BLOOD PRESSURE: 79 MMHG

## 2025-08-05 LAB
ALBUMIN SERPL-MCNC: 3.7 G/DL (ref 3.5–5.2)
ALBUMIN/GLOB SERPL: 1.5 (ref 1.1–2.2)
ALP SERPL-CCNC: 78 U/L (ref 35–104)
ALT SERPL-CCNC: 57 U/L (ref 10–35)
ANION GAP SERPL CALC-SCNC: 11 MMOL/L (ref 2–14)
APPEARANCE UR: CLEAR
AST SERPL-CCNC: 29 U/L (ref 10–35)
BACTERIA URNS QL MICRO: ABNORMAL /HPF
BASOPHILS # BLD: 0.04 K/UL (ref 0–0.1)
BASOPHILS NFR BLD: 0.8 % (ref 0–1)
BILIRUB SERPL-MCNC: 0.4 MG/DL (ref 0–1.2)
BILIRUB UR QL: NEGATIVE
BUN SERPL-MCNC: 17 MG/DL (ref 8–23)
BUN/CREAT SERPL: 22 (ref 12–20)
CALCIUM SERPL-MCNC: 10.2 MG/DL (ref 8.8–10.2)
CHLORIDE SERPL-SCNC: 100 MMOL/L (ref 98–107)
CO2 SERPL-SCNC: 27 MMOL/L (ref 20–29)
COLOR UR: ABNORMAL
CREAT SERPL-MCNC: 0.77 MG/DL (ref 0.6–1)
DIFFERENTIAL METHOD BLD: NORMAL
EKG ATRIAL RATE: 72 BPM
EKG DIAGNOSIS: NORMAL
EKG P AXIS: 60 DEGREES
EKG P-R INTERVAL: 170 MS
EKG Q-T INTERVAL: 388 MS
EKG QRS DURATION: 80 MS
EKG QTC CALCULATION (BAZETT): 424 MS
EKG R AXIS: 11 DEGREES
EKG T AXIS: 44 DEGREES
EKG VENTRICULAR RATE: 72 BPM
EOSINOPHIL # BLD: 0.21 K/UL (ref 0–0.4)
EOSINOPHIL NFR BLD: 3.9 % (ref 0–7)
EPITH CASTS URNS QL MICRO: ABNORMAL /LPF
ERYTHROCYTE [DISTWIDTH] IN BLOOD BY AUTOMATED COUNT: 12.4 % (ref 11.5–14.5)
EST. AVERAGE GLUCOSE BLD GHB EST-MCNC: 150 MG/DL
GLOBULIN SER CALC-MCNC: 2.4 G/DL (ref 2–4)
GLUCOSE SERPL-MCNC: 174 MG/DL (ref 65–100)
GLUCOSE UR STRIP.AUTO-MCNC: NEGATIVE MG/DL
HBA1C MFR BLD: 6.9 % (ref 4–5.6)
HCT VFR BLD AUTO: 37.9 % (ref 35–47)
HGB BLD-MCNC: 12.1 G/DL (ref 11.5–16)
HGB UR QL STRIP: NEGATIVE
HYALINE CASTS URNS QL MICRO: ABNORMAL /LPF (ref 0–5)
IMM GRANULOCYTES # BLD AUTO: 0.01 K/UL (ref 0–0.04)
IMM GRANULOCYTES NFR BLD AUTO: 0.2 % (ref 0–0.5)
KETONES UR QL STRIP.AUTO: ABNORMAL MG/DL
LEUKOCYTE ESTERASE UR QL STRIP.AUTO: NEGATIVE
LYMPHOCYTES # BLD: 1.66 K/UL (ref 0.8–3.5)
LYMPHOCYTES NFR BLD: 31.2 % (ref 12–49)
MAGNESIUM SERPL-MCNC: 1.6 MG/DL (ref 1.6–2.4)
MCH RBC QN AUTO: 29.4 PG (ref 26–34)
MCHC RBC AUTO-ENTMCNC: 31.9 G/DL (ref 30–36.5)
MCV RBC AUTO: 92 FL (ref 80–99)
MONOCYTES # BLD: 0.42 K/UL (ref 0–1)
MONOCYTES NFR BLD: 7.9 % (ref 5–13)
NEUTS SEG # BLD: 2.98 K/UL (ref 1.8–8)
NEUTS SEG NFR BLD: 56 % (ref 32–75)
NITRITE UR QL STRIP.AUTO: NEGATIVE
NRBC # BLD: 0 K/UL (ref 0–0.01)
NRBC BLD-RTO: 0 PER 100 WBC
PH UR STRIP: 8 (ref 5–8)
PLATELET # BLD AUTO: 207 K/UL (ref 150–400)
PMV BLD AUTO: 10.5 FL (ref 8.9–12.9)
POTASSIUM SERPL-SCNC: 5.6 MMOL/L (ref 3.5–5.1)
PROT SERPL-MCNC: 6.1 G/DL (ref 6.4–8.3)
PROT UR STRIP-MCNC: NEGATIVE MG/DL
RBC # BLD AUTO: 4.12 M/UL (ref 3.8–5.2)
RBC #/AREA URNS HPF: ABNORMAL /HPF (ref 0–5)
SODIUM SERPL-SCNC: 137 MMOL/L (ref 136–145)
SP GR UR REFRACTOMETRY: 1.02 (ref 1–1.03)
T4 FREE SERPL-MCNC: 1 NG/DL (ref 0.9–1.6)
TSH, 3RD GENERATION: 1.19 UIU/ML (ref 0.27–4.2)
URINE CULTURE IF INDICATED: ABNORMAL
UROBILINOGEN UR QL STRIP.AUTO: 0.2 EU/DL (ref 0.2–1)
WBC # BLD AUTO: 5.3 K/UL (ref 3.6–11)
WBC URNS QL MICRO: ABNORMAL /HPF (ref 0–4)

## 2025-08-05 PROCEDURE — 81001 URINALYSIS AUTO W/SCOPE: CPT

## 2025-08-05 PROCEDURE — 80053 COMPREHEN METABOLIC PANEL: CPT

## 2025-08-05 PROCEDURE — 93005 ELECTROCARDIOGRAM TRACING: CPT | Performed by: SURGERY

## 2025-08-05 PROCEDURE — 85025 COMPLETE CBC W/AUTO DIFF WBC: CPT

## 2025-08-05 PROCEDURE — 84443 ASSAY THYROID STIM HORMONE: CPT

## 2025-08-05 PROCEDURE — 93010 ELECTROCARDIOGRAM REPORT: CPT | Performed by: SPECIALIST

## 2025-08-05 PROCEDURE — 84439 ASSAY OF FREE THYROXINE: CPT

## 2025-08-05 PROCEDURE — 83735 ASSAY OF MAGNESIUM: CPT

## 2025-08-05 PROCEDURE — 83036 HEMOGLOBIN GLYCOSYLATED A1C: CPT

## 2025-08-05 RX ORDER — THIAMINE HCL 100 MG
1 TABLET ORAL DAILY
COMMUNITY

## 2025-08-05 ASSESSMENT — PAIN DESCRIPTION - LOCATION: LOCATION: BACK;HEAD;SHOULDER

## 2025-08-05 ASSESSMENT — PAIN DESCRIPTION - FREQUENCY: FREQUENCY: CONTINUOUS

## 2025-08-05 ASSESSMENT — PAIN DESCRIPTION - DESCRIPTORS: DESCRIPTORS: ACHING

## 2025-08-05 ASSESSMENT — PAIN DESCRIPTION - ORIENTATION: ORIENTATION: RIGHT;LEFT;UPPER

## 2025-08-05 ASSESSMENT — PAIN DESCRIPTION - PAIN TYPE: TYPE: CHRONIC PAIN

## 2025-08-05 ASSESSMENT — PAIN SCALES - GENERAL: PAINLEVEL_OUTOF10: 4

## 2025-08-06 DIAGNOSIS — F33.9 RECURRENT DEPRESSION: ICD-10-CM

## 2025-08-06 DIAGNOSIS — D50.8 OTHER IRON DEFICIENCY ANEMIA: ICD-10-CM

## 2025-08-06 LAB
BACTERIA SPEC CULT: NORMAL
BACTERIA SPEC CULT: NORMAL
SERVICE CMNT-IMP: NORMAL

## 2025-08-07 RX ORDER — FERRIC MALTOL 30 MG/1
1 CAPSULE ORAL 2 TIMES DAILY
Qty: 60 CAPSULE | Refills: 3 | Status: SHIPPED | OUTPATIENT
Start: 2025-08-07

## 2025-08-08 ENCOUNTER — HOSPITAL ENCOUNTER (OUTPATIENT)
Facility: HOSPITAL | Age: 65
Discharge: HOME | End: 2025-08-11
Payer: MEDICARE

## 2025-08-08 PROCEDURE — 71046 X-RAY EXAM CHEST 2 VIEWS: CPT

## 2025-08-13 ENCOUNTER — TELEMEDICINE (OUTPATIENT)
Age: 65
End: 2025-08-13

## 2025-08-13 ENCOUNTER — TELEPHONE (OUTPATIENT)
Age: 65
End: 2025-08-13

## 2025-08-13 VITALS — HEIGHT: 68 IN | BODY MASS INDEX: 24.55 KG/M2 | WEIGHT: 162 LBS

## 2025-08-13 DIAGNOSIS — Z90.3 S/P GASTRIC SLEEVE PROCEDURE: ICD-10-CM

## 2025-08-13 DIAGNOSIS — K44.9 HIATAL HERNIA: ICD-10-CM

## 2025-08-13 PROCEDURE — 99024 POSTOP FOLLOW-UP VISIT: CPT | Performed by: NURSE PRACTITIONER

## 2025-08-13 RX ORDER — ONDANSETRON 4 MG/1
4 TABLET, FILM COATED ORAL EVERY 8 HOURS PRN
Qty: 30 TABLET | Refills: 0 | Status: SHIPPED | OUTPATIENT
Start: 2025-08-13

## 2025-08-13 RX ORDER — POLYETHYLENE GLYCOL 3350 17 G/17G
17 POWDER, FOR SOLUTION ORAL DAILY
Qty: 1530 G | Refills: 0 | Status: SHIPPED | OUTPATIENT
Start: 2025-08-13 | End: 2025-11-11

## 2025-08-13 ASSESSMENT — PATIENT HEALTH QUESTIONNAIRE - PHQ9
SUM OF ALL RESPONSES TO PHQ QUESTIONS 1-9: 0
1. LITTLE INTEREST OR PLEASURE IN DOING THINGS: NOT AT ALL
SUM OF ALL RESPONSES TO PHQ QUESTIONS 1-9: 0
2. FEELING DOWN, DEPRESSED OR HOPELESS: NOT AT ALL

## 2025-08-13 ASSESSMENT — PAIN SCALES - GENERAL: PAINLEVEL_OUTOF10: 0

## 2025-08-18 ENCOUNTER — OFFICE VISIT (OUTPATIENT)
Age: 65
End: 2025-08-18

## 2025-08-18 VITALS
BODY MASS INDEX: 25.01 KG/M2 | TEMPERATURE: 98 F | SYSTOLIC BLOOD PRESSURE: 122 MMHG | WEIGHT: 165 LBS | DIASTOLIC BLOOD PRESSURE: 74 MMHG | HEART RATE: 86 BPM | RESPIRATION RATE: 20 BRPM | OXYGEN SATURATION: 96 % | HEIGHT: 68 IN

## 2025-08-18 DIAGNOSIS — K21.9 GASTROESOPHAGEAL REFLUX DISEASE WITHOUT ESOPHAGITIS: Primary | ICD-10-CM

## 2025-08-18 DIAGNOSIS — K44.9 HIATAL HERNIA: ICD-10-CM

## 2025-08-18 DIAGNOSIS — R13.19 ESOPHAGEAL DYSPHAGIA: ICD-10-CM

## 2025-08-18 ASSESSMENT — PATIENT HEALTH QUESTIONNAIRE - PHQ9
SUM OF ALL RESPONSES TO PHQ QUESTIONS 1-9: 0
1. LITTLE INTEREST OR PLEASURE IN DOING THINGS: NOT AT ALL
2. FEELING DOWN, DEPRESSED OR HOPELESS: NOT AT ALL
SUM OF ALL RESPONSES TO PHQ QUESTIONS 1-9: 0

## 2025-08-26 ENCOUNTER — ANESTHESIA EVENT (OUTPATIENT)
Facility: HOSPITAL | Age: 65
End: 2025-08-26
Payer: MEDICARE

## 2025-08-27 ENCOUNTER — HOSPITAL ENCOUNTER (INPATIENT)
Facility: HOSPITAL | Age: 65
LOS: 2 days | Discharge: HOME | End: 2025-08-29
Attending: SURGERY | Admitting: SURGERY
Payer: MEDICARE

## 2025-08-27 ENCOUNTER — ANESTHESIA (OUTPATIENT)
Facility: HOSPITAL | Age: 65
End: 2025-08-27
Payer: MEDICARE

## 2025-08-27 DIAGNOSIS — K44.9 HIATAL HERNIA: Primary | ICD-10-CM

## 2025-08-27 LAB
GLUCOSE BLD STRIP.AUTO-MCNC: 199 MG/DL (ref 65–117)
GLUCOSE BLD STRIP.AUTO-MCNC: 216 MG/DL (ref 65–117)
GLUCOSE BLD STRIP.AUTO-MCNC: 304 MG/DL (ref 65–117)
GLUCOSE BLD STRIP.AUTO-MCNC: 308 MG/DL (ref 65–117)
GLUCOSE BLD STRIP.AUTO-MCNC: 370 MG/DL (ref 65–117)
GLUCOSE BLD STRIP.AUTO-MCNC: 380 MG/DL (ref 65–117)
SERVICE CMNT-IMP: ABNORMAL

## 2025-08-27 PROCEDURE — 3700000001 HC ADD 15 MINUTES (ANESTHESIA): Performed by: SURGERY

## 2025-08-27 PROCEDURE — 6360000002 HC RX W HCPCS: Performed by: ANESTHESIOLOGY

## 2025-08-27 PROCEDURE — 6360000002 HC RX W HCPCS: Performed by: SURGERY

## 2025-08-27 PROCEDURE — 2500000003 HC RX 250 WO HCPCS: Performed by: SURGERY

## 2025-08-27 PROCEDURE — 82962 GLUCOSE BLOOD TEST: CPT

## 2025-08-27 PROCEDURE — 43860 REVJ GSTR/JJ ANAST W/O VGTMY: CPT | Performed by: SURGERY

## 2025-08-27 PROCEDURE — 3600000009 HC SURGERY ROBOT BASE: Performed by: SURGERY

## 2025-08-27 PROCEDURE — 3700000000 HC ANESTHESIA ATTENDED CARE: Performed by: SURGERY

## 2025-08-27 PROCEDURE — 2709999900 HC NON-CHARGEABLE SUPPLY: Performed by: SURGERY

## 2025-08-27 PROCEDURE — 2500000003 HC RX 250 WO HCPCS: Performed by: NURSE ANESTHETIST, CERTIFIED REGISTERED

## 2025-08-27 PROCEDURE — 3600000019 HC SURGERY ROBOT ADDTL 15MIN: Performed by: SURGERY

## 2025-08-27 PROCEDURE — 1100000000 HC RM PRIVATE

## 2025-08-27 PROCEDURE — C1781 MESH (IMPLANTABLE): HCPCS | Performed by: SURGERY

## 2025-08-27 PROCEDURE — 6360000002 HC RX W HCPCS: Performed by: NURSE ANESTHETIST, CERTIFIED REGISTERED

## 2025-08-27 PROCEDURE — 0DQ50ZZ REPAIR ESOPHAGUS, OPEN APPROACH: ICD-10-PCS | Performed by: SURGERY

## 2025-08-27 PROCEDURE — 0BQT4ZZ REPAIR DIAPHRAGM, PERCUTANEOUS ENDOSCOPIC APPROACH: ICD-10-PCS | Performed by: SURGERY

## 2025-08-27 PROCEDURE — 43659 UNLISTED LAPS PX STOMACH: CPT | Performed by: SURGERY

## 2025-08-27 PROCEDURE — 2580000003 HC RX 258: Performed by: SURGERY

## 2025-08-27 PROCEDURE — 2580000003 HC RX 258: Performed by: NURSE ANESTHETIST, CERTIFIED REGISTERED

## 2025-08-27 PROCEDURE — 6370000000 HC RX 637 (ALT 250 FOR IP): Performed by: ANESTHESIOLOGY

## 2025-08-27 PROCEDURE — 7100000000 HC PACU RECOVERY - FIRST 15 MIN: Performed by: SURGERY

## 2025-08-27 PROCEDURE — 6370000000 HC RX 637 (ALT 250 FOR IP): Performed by: SURGERY

## 2025-08-27 PROCEDURE — 43281 LAP PARAESOPHAG HERN REPAIR: CPT | Performed by: SURGERY

## 2025-08-27 PROCEDURE — 7100000001 HC PACU RECOVERY - ADDTL 15 MIN: Performed by: SURGERY

## 2025-08-27 PROCEDURE — 0D164ZA BYPASS STOMACH TO JEJUNUM, PERCUTANEOUS ENDOSCOPIC APPROACH: ICD-10-PCS | Performed by: SURGERY

## 2025-08-27 PROCEDURE — 2720000010 HC SURG SUPPLY STERILE: Performed by: SURGERY

## 2025-08-27 PROCEDURE — 8E0W4CZ ROBOTIC ASSISTED PROCEDURE OF TRUNK REGION, PERCUTANEOUS ENDOSCOPIC APPROACH: ICD-10-PCS | Performed by: SURGERY

## 2025-08-27 PROCEDURE — S2900 ROBOTIC SURGICAL SYSTEM: HCPCS | Performed by: SURGERY

## 2025-08-27 DEVICE — STAPLE SKIN LN REINF 60 MM ECHELON ENDOPATH: Type: IMPLANTABLE DEVICE | Site: ABDOMEN | Status: FUNCTIONAL

## 2025-08-27 RX ORDER — MIDAZOLAM HYDROCHLORIDE 1 MG/ML
INJECTION, SOLUTION INTRAMUSCULAR; INTRAVENOUS
Status: DISCONTINUED | OUTPATIENT
Start: 2025-08-27 | End: 2025-08-27 | Stop reason: SDUPTHER

## 2025-08-27 RX ORDER — ONDANSETRON 2 MG/ML
4 INJECTION INTRAMUSCULAR; INTRAVENOUS
Status: COMPLETED | OUTPATIENT
Start: 2025-08-27 | End: 2025-08-27

## 2025-08-27 RX ORDER — SODIUM CHLORIDE 0.9 % (FLUSH) 0.9 %
5-40 SYRINGE (ML) INJECTION PRN
Status: DISCONTINUED | OUTPATIENT
Start: 2025-08-27 | End: 2025-08-27 | Stop reason: HOSPADM

## 2025-08-27 RX ORDER — ROCURONIUM BROMIDE 10 MG/ML
INJECTION, SOLUTION INTRAVENOUS
Status: DISCONTINUED | OUTPATIENT
Start: 2025-08-27 | End: 2025-08-27 | Stop reason: SDUPTHER

## 2025-08-27 RX ORDER — INSULIN LISPRO 100 [IU]/ML
10 INJECTION, SOLUTION INTRAVENOUS; SUBCUTANEOUS ONCE
Status: COMPLETED | OUTPATIENT
Start: 2025-08-27 | End: 2025-08-27

## 2025-08-27 RX ORDER — PANTOPRAZOLE SODIUM 40 MG/1
40 TABLET, DELAYED RELEASE ORAL DAILY
Status: DISCONTINUED | OUTPATIENT
Start: 2025-08-28 | End: 2025-08-29 | Stop reason: HOSPADM

## 2025-08-27 RX ORDER — PHENYLEPHRINE HCL IN 0.9% NACL 0.4MG/10ML
SYRINGE (ML) INTRAVENOUS
Status: DISCONTINUED | OUTPATIENT
Start: 2025-08-27 | End: 2025-08-27 | Stop reason: SDUPTHER

## 2025-08-27 RX ORDER — ONDANSETRON 4 MG/1
4 TABLET, ORALLY DISINTEGRATING ORAL EVERY 8 HOURS PRN
Status: DISCONTINUED | OUTPATIENT
Start: 2025-08-27 | End: 2025-08-29 | Stop reason: HOSPADM

## 2025-08-27 RX ORDER — ONDANSETRON 2 MG/ML
4 INJECTION INTRAMUSCULAR; INTRAVENOUS ONCE
Status: DISCONTINUED | OUTPATIENT
Start: 2025-08-27 | End: 2025-08-27 | Stop reason: HOSPADM

## 2025-08-27 RX ORDER — INSULIN LISPRO 100 [IU]/ML
0-16 INJECTION, SOLUTION INTRAVENOUS; SUBCUTANEOUS
Status: DISCONTINUED | OUTPATIENT
Start: 2025-08-27 | End: 2025-08-29 | Stop reason: HOSPADM

## 2025-08-27 RX ORDER — LORAZEPAM 2 MG/ML
1 CONCENTRATE ORAL EVERY 8 HOURS PRN
Status: DISCONTINUED | OUTPATIENT
Start: 2025-08-27 | End: 2025-08-29 | Stop reason: HOSPADM

## 2025-08-27 RX ORDER — LIDOCAINE HYDROCHLORIDE ANHYDROUS AND DEXTROSE MONOHYDRATE 5; 400 G/100ML; MG/100ML
INJECTION, SOLUTION INTRAVENOUS
Status: DISCONTINUED | OUTPATIENT
Start: 2025-08-27 | End: 2025-08-27 | Stop reason: SDUPTHER

## 2025-08-27 RX ORDER — HYDRALAZINE HYDROCHLORIDE 20 MG/ML
10 INJECTION INTRAMUSCULAR; INTRAVENOUS ONCE
Status: COMPLETED | OUTPATIENT
Start: 2025-08-27 | End: 2025-08-27

## 2025-08-27 RX ORDER — PROCHLORPERAZINE EDISYLATE 5 MG/ML
5 INJECTION INTRAMUSCULAR; INTRAVENOUS
Status: DISCONTINUED | OUTPATIENT
Start: 2025-08-27 | End: 2025-08-27 | Stop reason: HOSPADM

## 2025-08-27 RX ORDER — SODIUM CHLORIDE 9 MG/ML
INJECTION, SOLUTION INTRAVENOUS PRN
Status: DISCONTINUED | OUTPATIENT
Start: 2025-08-27 | End: 2025-08-29 | Stop reason: HOSPADM

## 2025-08-27 RX ORDER — SODIUM CHLORIDE, SODIUM LACTATE, POTASSIUM CHLORIDE, CALCIUM CHLORIDE 600; 310; 30; 20 MG/100ML; MG/100ML; MG/100ML; MG/100ML
INJECTION, SOLUTION INTRAVENOUS CONTINUOUS
Status: DISCONTINUED | OUTPATIENT
Start: 2025-08-27 | End: 2025-08-27 | Stop reason: HOSPADM

## 2025-08-27 RX ORDER — SODIUM CHLORIDE 9 MG/ML
INJECTION, SOLUTION INTRAVENOUS PRN
Status: DISCONTINUED | OUTPATIENT
Start: 2025-08-27 | End: 2025-08-27 | Stop reason: HOSPADM

## 2025-08-27 RX ORDER — HYDROMORPHONE HYDROCHLORIDE 1 MG/ML
0.5 INJECTION, SOLUTION INTRAMUSCULAR; INTRAVENOUS; SUBCUTANEOUS EVERY 5 MIN PRN
Status: COMPLETED | OUTPATIENT
Start: 2025-08-27 | End: 2025-08-27

## 2025-08-27 RX ORDER — SODIUM CHLORIDE, SODIUM LACTATE, POTASSIUM CHLORIDE, CALCIUM CHLORIDE 600; 310; 30; 20 MG/100ML; MG/100ML; MG/100ML; MG/100ML
INJECTION, SOLUTION INTRAVENOUS
Status: DISCONTINUED | OUTPATIENT
Start: 2025-08-27 | End: 2025-08-27 | Stop reason: SDUPTHER

## 2025-08-27 RX ORDER — MIDAZOLAM HYDROCHLORIDE 2 MG/2ML
2 INJECTION, SOLUTION INTRAMUSCULAR; INTRAVENOUS PRN
Status: DISCONTINUED | OUTPATIENT
Start: 2025-08-27 | End: 2025-08-27 | Stop reason: HOSPADM

## 2025-08-27 RX ORDER — OXYCODONE HYDROCHLORIDE 5 MG/1
5 TABLET ORAL
Status: DISCONTINUED | OUTPATIENT
Start: 2025-08-27 | End: 2025-08-27 | Stop reason: HOSPADM

## 2025-08-27 RX ORDER — PROPOFOL 10 MG/ML
INJECTION, EMULSION INTRAVENOUS
Status: DISCONTINUED | OUTPATIENT
Start: 2025-08-27 | End: 2025-08-27 | Stop reason: SDUPTHER

## 2025-08-27 RX ORDER — SUCCINYLCHOLINE/SOD CL,ISO/PF 200MG/10ML
SYRINGE (ML) INTRAVENOUS
Status: DISCONTINUED | OUTPATIENT
Start: 2025-08-27 | End: 2025-08-27 | Stop reason: SDUPTHER

## 2025-08-27 RX ORDER — LIDOCAINE HYDROCHLORIDE 20 MG/ML
INJECTION, SOLUTION EPIDURAL; INFILTRATION; INTRACAUDAL; PERINEURAL
Status: DISCONTINUED | OUTPATIENT
Start: 2025-08-27 | End: 2025-08-27 | Stop reason: SDUPTHER

## 2025-08-27 RX ORDER — ACETAMINOPHEN 500 MG
1000 TABLET ORAL ONCE
Status: DISCONTINUED | OUTPATIENT
Start: 2025-08-27 | End: 2025-08-27 | Stop reason: HOSPADM

## 2025-08-27 RX ORDER — ONDANSETRON 2 MG/ML
4 INJECTION INTRAMUSCULAR; INTRAVENOUS EVERY 6 HOURS PRN
Status: DISCONTINUED | OUTPATIENT
Start: 2025-08-27 | End: 2025-08-29 | Stop reason: HOSPADM

## 2025-08-27 RX ORDER — GABAPENTIN 250 MG/5ML
125 SOLUTION ORAL
Status: DISCONTINUED | OUTPATIENT
Start: 2025-08-27 | End: 2025-08-27 | Stop reason: HOSPADM

## 2025-08-27 RX ORDER — BUPIVACAINE HYDROCHLORIDE 5 MG/ML
INJECTION, SOLUTION EPIDURAL; INTRACAUDAL; PERINEURAL PRN
Status: DISCONTINUED | OUTPATIENT
Start: 2025-08-27 | End: 2025-08-27 | Stop reason: HOSPADM

## 2025-08-27 RX ORDER — MAGNESIUM SULFATE HEPTAHYDRATE 40 MG/ML
INJECTION, SOLUTION INTRAVENOUS
Status: DISCONTINUED | OUTPATIENT
Start: 2025-08-27 | End: 2025-08-27 | Stop reason: SDUPTHER

## 2025-08-27 RX ORDER — ACETAMINOPHEN 160 MG/5ML
650 SUSPENSION ORAL
Status: COMPLETED | OUTPATIENT
Start: 2025-08-27 | End: 2025-08-27

## 2025-08-27 RX ORDER — ONDANSETRON 2 MG/ML
INJECTION INTRAMUSCULAR; INTRAVENOUS
Status: DISCONTINUED | OUTPATIENT
Start: 2025-08-27 | End: 2025-08-27 | Stop reason: SDUPTHER

## 2025-08-27 RX ORDER — SODIUM CHLORIDE 0.9 % (FLUSH) 0.9 %
5-40 SYRINGE (ML) INJECTION EVERY 12 HOURS SCHEDULED
Status: DISCONTINUED | OUTPATIENT
Start: 2025-08-27 | End: 2025-08-27 | Stop reason: HOSPADM

## 2025-08-27 RX ORDER — SODIUM CHLORIDE 0.9 % (FLUSH) 0.9 %
5-40 SYRINGE (ML) INJECTION PRN
Status: DISCONTINUED | OUTPATIENT
Start: 2025-08-27 | End: 2025-08-29 | Stop reason: HOSPADM

## 2025-08-27 RX ORDER — SODIUM CHLORIDE 0.9 % (FLUSH) 0.9 %
5-40 SYRINGE (ML) INJECTION EVERY 12 HOURS SCHEDULED
Status: DISCONTINUED | OUTPATIENT
Start: 2025-08-27 | End: 2025-08-29 | Stop reason: HOSPADM

## 2025-08-27 RX ORDER — SODIUM CHLORIDE, SODIUM LACTATE, POTASSIUM CHLORIDE, CALCIUM CHLORIDE 600; 310; 30; 20 MG/100ML; MG/100ML; MG/100ML; MG/100ML
INJECTION, SOLUTION INTRAVENOUS CONTINUOUS
Status: DISCONTINUED | OUTPATIENT
Start: 2025-08-27 | End: 2025-08-29 | Stop reason: HOSPADM

## 2025-08-27 RX ORDER — HYDROMORPHONE HYDROCHLORIDE 1 MG/ML
1 INJECTION, SOLUTION INTRAMUSCULAR; INTRAVENOUS; SUBCUTANEOUS
Status: DISCONTINUED | OUTPATIENT
Start: 2025-08-27 | End: 2025-08-29 | Stop reason: HOSPADM

## 2025-08-27 RX ORDER — FENTANYL CITRATE 50 UG/ML
25 INJECTION, SOLUTION INTRAMUSCULAR; INTRAVENOUS EVERY 5 MIN PRN
Status: DISCONTINUED | OUTPATIENT
Start: 2025-08-27 | End: 2025-08-27 | Stop reason: HOSPADM

## 2025-08-27 RX ORDER — FENTANYL CITRATE 50 UG/ML
INJECTION, SOLUTION INTRAMUSCULAR; INTRAVENOUS
Status: DISCONTINUED | OUTPATIENT
Start: 2025-08-27 | End: 2025-08-27 | Stop reason: SDUPTHER

## 2025-08-27 RX ORDER — PROCHLORPERAZINE EDISYLATE 5 MG/ML
10 INJECTION INTRAMUSCULAR; INTRAVENOUS EVERY 6 HOURS PRN
Status: DISCONTINUED | OUTPATIENT
Start: 2025-08-27 | End: 2025-08-29 | Stop reason: HOSPADM

## 2025-08-27 RX ORDER — DEXAMETHASONE SODIUM PHOSPHATE 4 MG/ML
INJECTION, SOLUTION INTRA-ARTICULAR; INTRALESIONAL; INTRAMUSCULAR; INTRAVENOUS; SOFT TISSUE
Status: DISCONTINUED | OUTPATIENT
Start: 2025-08-27 | End: 2025-08-27 | Stop reason: SDUPTHER

## 2025-08-27 RX ORDER — METRONIDAZOLE 500 MG/100ML
500 INJECTION, SOLUTION INTRAVENOUS
Status: COMPLETED | OUTPATIENT
Start: 2025-08-27 | End: 2025-08-27

## 2025-08-27 RX ORDER — ACETAMINOPHEN 160 MG/5ML
650 SUSPENSION ORAL EVERY 6 HOURS SCHEDULED
Status: DISCONTINUED | OUTPATIENT
Start: 2025-08-27 | End: 2025-08-29 | Stop reason: HOSPADM

## 2025-08-27 RX ORDER — LIDOCAINE HYDROCHLORIDE 10 MG/ML
1 INJECTION, SOLUTION EPIDURAL; INFILTRATION; INTRACAUDAL; PERINEURAL
Status: DISCONTINUED | OUTPATIENT
Start: 2025-08-27 | End: 2025-08-27 | Stop reason: HOSPADM

## 2025-08-27 RX ORDER — FENTANYL CITRATE 50 UG/ML
100 INJECTION, SOLUTION INTRAMUSCULAR; INTRAVENOUS
Status: DISCONTINUED | OUTPATIENT
Start: 2025-08-27 | End: 2025-08-27 | Stop reason: HOSPADM

## 2025-08-27 RX ADMIN — LIDOCAINE HYDROCHLORIDE 60 MG: 20 INJECTION, SOLUTION EPIDURAL; INFILTRATION; INTRACAUDAL; PERINEURAL at 07:35

## 2025-08-27 RX ADMIN — HYDROMORPHONE HYDROCHLORIDE 0.5 MG: 1 INJECTION, SOLUTION INTRAMUSCULAR; INTRAVENOUS; SUBCUTANEOUS at 11:21

## 2025-08-27 RX ADMIN — WATER 2000 MG: 1 INJECTION INTRAMUSCULAR; INTRAVENOUS; SUBCUTANEOUS at 07:45

## 2025-08-27 RX ADMIN — ROCURONIUM BROMIDE 25 MG: 10 INJECTION, SOLUTION INTRAVENOUS at 08:00

## 2025-08-27 RX ADMIN — HYDRALAZINE HYDROCHLORIDE 10 MG: 20 INJECTION, SOLUTION INTRAMUSCULAR; INTRAVENOUS at 13:49

## 2025-08-27 RX ADMIN — SODIUM CHLORIDE, PRESERVATIVE FREE 10 ML: 5 INJECTION INTRAVENOUS at 22:10

## 2025-08-27 RX ADMIN — PHENYLEPHRINE HYDROCHLORIDE 40 MCG/MIN: 10 INJECTION INTRAVENOUS at 08:16

## 2025-08-27 RX ADMIN — Medication 140 MG: at 07:35

## 2025-08-27 RX ADMIN — SODIUM CHLORIDE, SODIUM LACTATE, POTASSIUM CHLORIDE, AND CALCIUM CHLORIDE: .6; .31; .03; .02 INJECTION, SOLUTION INTRAVENOUS at 11:19

## 2025-08-27 RX ADMIN — ROCURONIUM BROMIDE 10 MG: 10 INJECTION, SOLUTION INTRAVENOUS at 10:01

## 2025-08-27 RX ADMIN — MIDAZOLAM 2 MG: 1 INJECTION INTRAMUSCULAR; INTRAVENOUS at 07:29

## 2025-08-27 RX ADMIN — ROCURONIUM BROMIDE 5 MG: 10 INJECTION, SOLUTION INTRAVENOUS at 07:35

## 2025-08-27 RX ADMIN — DEXAMETHASONE SODIUM PHOSPHATE 4 MG: 4 INJECTION INTRA-ARTICULAR; INTRALESIONAL; INTRAMUSCULAR; INTRAVENOUS; SOFT TISSUE at 07:35

## 2025-08-27 RX ADMIN — Medication 200 MCG: at 08:50

## 2025-08-27 RX ADMIN — ROCURONIUM BROMIDE 45 MG: 10 INJECTION, SOLUTION INTRAVENOUS at 07:40

## 2025-08-27 RX ADMIN — FENTANYL CITRATE 100 MCG: 50 INJECTION INTRAMUSCULAR; INTRAVENOUS at 07:35

## 2025-08-27 RX ADMIN — INSULIN LISPRO 10 UNITS: 100 INJECTION, SOLUTION INTRAVENOUS; SUBCUTANEOUS at 11:42

## 2025-08-27 RX ADMIN — FENTANYL CITRATE 25 MCG: 50 INJECTION INTRAMUSCULAR; INTRAVENOUS at 13:35

## 2025-08-27 RX ADMIN — HYDROMORPHONE HYDROCHLORIDE 0.5 MG: 1 INJECTION, SOLUTION INTRAMUSCULAR; INTRAVENOUS; SUBCUTANEOUS at 11:07

## 2025-08-27 RX ADMIN — HYDROMORPHONE HYDROCHLORIDE 1 MG: 1 INJECTION, SOLUTION INTRAMUSCULAR; INTRAVENOUS; SUBCUTANEOUS at 22:11

## 2025-08-27 RX ADMIN — MAGNESIUM SULFATE HEPTAHYDRATE 2000 MG: 40 INJECTION, SOLUTION INTRAVENOUS at 07:40

## 2025-08-27 RX ADMIN — DEXMEDETOMIDINE 0.2 MCG/KG/HR: 100 INJECTION, SOLUTION INTRAVENOUS at 07:40

## 2025-08-27 RX ADMIN — PROPOFOL 150 MG: 10 INJECTION, EMULSION INTRAVENOUS at 07:35

## 2025-08-27 RX ADMIN — SODIUM CHLORIDE, POTASSIUM CHLORIDE, SODIUM LACTATE AND CALCIUM CHLORIDE: 600; 310; 30; 20 INJECTION, SOLUTION INTRAVENOUS at 07:29

## 2025-08-27 RX ADMIN — HYDROMORPHONE HYDROCHLORIDE 1 MG: 1 INJECTION, SOLUTION INTRAMUSCULAR; INTRAVENOUS; SUBCUTANEOUS at 16:02

## 2025-08-27 RX ADMIN — ROCURONIUM BROMIDE 15 MG: 10 INJECTION, SOLUTION INTRAVENOUS at 09:17

## 2025-08-27 RX ADMIN — METRONIDAZOLE 500 MG: 5 INJECTION, SOLUTION INTRAVENOUS at 07:45

## 2025-08-27 RX ADMIN — FENTANYL CITRATE 25 MCG: 50 INJECTION INTRAMUSCULAR; INTRAVENOUS at 13:43

## 2025-08-27 RX ADMIN — ACETAMINOPHEN 650 MG: 650 SUSPENSION ORAL at 17:06

## 2025-08-27 RX ADMIN — Medication 25 MG/HR: at 07:40

## 2025-08-27 RX ADMIN — ONDANSETRON 4 MG: 2 INJECTION, SOLUTION INTRAMUSCULAR; INTRAVENOUS at 21:21

## 2025-08-27 RX ADMIN — LIDOCAINE HYDROCHLORIDE 1 MG/KG/HR: 4 INJECTION, SOLUTION INTRAVENOUS at 07:40

## 2025-08-27 RX ADMIN — INSULIN LISPRO 12 UNITS: 100 INJECTION, SOLUTION INTRAVENOUS; SUBCUTANEOUS at 17:05

## 2025-08-27 RX ADMIN — SODIUM CHLORIDE, SODIUM LACTATE, POTASSIUM CHLORIDE, AND CALCIUM CHLORIDE: .6; .31; .03; .02 INJECTION, SOLUTION INTRAVENOUS at 19:39

## 2025-08-27 RX ADMIN — FENTANYL CITRATE 25 MCG: 50 INJECTION INTRAMUSCULAR; INTRAVENOUS at 14:46

## 2025-08-27 RX ADMIN — ACETAMINOPHEN 650 MG: 650 SUSPENSION ORAL at 23:55

## 2025-08-27 RX ADMIN — ONDANSETRON 4 MG: 2 INJECTION, SOLUTION INTRAMUSCULAR; INTRAVENOUS at 10:19

## 2025-08-27 RX ADMIN — SUGAMMADEX 200 MG: 100 INJECTION, SOLUTION INTRAVENOUS at 10:28

## 2025-08-27 RX ADMIN — ACETAMINOPHEN 650 MG: 650 SUSPENSION ORAL at 06:16

## 2025-08-27 RX ADMIN — Medication 200 MCG: at 08:48

## 2025-08-27 RX ADMIN — ONDANSETRON 4 MG: 2 INJECTION, SOLUTION INTRAMUSCULAR; INTRAVENOUS at 11:04

## 2025-08-27 ASSESSMENT — PAIN SCALES - GENERAL
PAINLEVEL_OUTOF10: 6
PAINLEVEL_OUTOF10: 5
PAINLEVEL_OUTOF10: 7
PAINLEVEL_OUTOF10: 8
PAINLEVEL_OUTOF10: 7
PAINLEVEL_OUTOF10: 7
PAINLEVEL_OUTOF10: 3
PAINLEVEL_OUTOF10: 6
PAINLEVEL_OUTOF10: 4
PAINLEVEL_OUTOF10: 6

## 2025-08-27 ASSESSMENT — PAIN - FUNCTIONAL ASSESSMENT
PAIN_FUNCTIONAL_ASSESSMENT: 0-10

## 2025-08-27 ASSESSMENT — PAIN DESCRIPTION - ORIENTATION
ORIENTATION: RIGHT;LEFT
ORIENTATION: LEFT;MID
ORIENTATION: RIGHT;LEFT
ORIENTATION: LEFT;MID
ORIENTATION: MID;LEFT
ORIENTATION: RIGHT;LEFT
ORIENTATION: LEFT
ORIENTATION: LEFT;RIGHT
ORIENTATION: MID;LEFT

## 2025-08-27 ASSESSMENT — PAIN DESCRIPTION - LOCATION
LOCATION: ABDOMEN

## 2025-08-27 ASSESSMENT — PAIN DESCRIPTION - DESCRIPTORS
DESCRIPTORS: ACHING
DESCRIPTORS: ACHING
DESCRIPTORS: TENDER;PRESSURE
DESCRIPTORS: DISCOMFORT
DESCRIPTORS: ACHING;CRAMPING
DESCRIPTORS: ACHING;DISCOMFORT
DESCRIPTORS: ACHING;DISCOMFORT
DESCRIPTORS: PRESSURE
DESCRIPTORS: DISCOMFORT

## 2025-08-28 LAB
ANION GAP SERPL CALC-SCNC: 9 MMOL/L (ref 2–14)
BASOPHILS # BLD: 0.01 K/UL (ref 0–0.1)
BASOPHILS NFR BLD: 0.1 % (ref 0–1)
BUN SERPL-MCNC: 17 MG/DL (ref 8–23)
BUN/CREAT SERPL: 26 (ref 12–20)
CALCIUM SERPL-MCNC: 8.7 MG/DL (ref 8.8–10.2)
CHLORIDE SERPL-SCNC: 104 MMOL/L (ref 98–107)
CO2 SERPL-SCNC: 26 MMOL/L (ref 20–29)
CREAT SERPL-MCNC: 0.66 MG/DL (ref 0.6–1)
DIFFERENTIAL METHOD BLD: ABNORMAL
EOSINOPHIL # BLD: 0 K/UL (ref 0–0.4)
EOSINOPHIL NFR BLD: 0 % (ref 0–7)
ERYTHROCYTE [DISTWIDTH] IN BLOOD BY AUTOMATED COUNT: 12.6 % (ref 11.5–14.5)
GLUCOSE BLD STRIP.AUTO-MCNC: 197 MG/DL (ref 65–117)
GLUCOSE BLD STRIP.AUTO-MCNC: 220 MG/DL (ref 65–117)
GLUCOSE BLD STRIP.AUTO-MCNC: 232 MG/DL (ref 65–117)
GLUCOSE BLD STRIP.AUTO-MCNC: 232 MG/DL (ref 65–117)
GLUCOSE BLD STRIP.AUTO-MCNC: 233 MG/DL (ref 65–117)
GLUCOSE SERPL-MCNC: 218 MG/DL (ref 65–100)
HCT VFR BLD AUTO: 32.4 % (ref 35–47)
HGB BLD-MCNC: 10.6 G/DL (ref 11.5–16)
IMM GRANULOCYTES # BLD AUTO: 0.03 K/UL (ref 0–0.04)
IMM GRANULOCYTES NFR BLD AUTO: 0.3 % (ref 0–0.5)
LYMPHOCYTES # BLD: 1.16 K/UL (ref 0.8–3.5)
LYMPHOCYTES NFR BLD: 13.4 % (ref 12–49)
MCH RBC QN AUTO: 29 PG (ref 26–34)
MCHC RBC AUTO-ENTMCNC: 32.7 G/DL (ref 30–36.5)
MCV RBC AUTO: 88.8 FL (ref 80–99)
MONOCYTES # BLD: 0.7 K/UL (ref 0–1)
MONOCYTES NFR BLD: 8.1 % (ref 5–13)
NEUTS SEG # BLD: 6.75 K/UL (ref 1.8–8)
NEUTS SEG NFR BLD: 78.1 % (ref 32–75)
NRBC # BLD: 0 K/UL (ref 0–0.01)
NRBC BLD-RTO: 0 PER 100 WBC
PLATELET # BLD AUTO: 173 K/UL (ref 150–400)
PMV BLD AUTO: 10.5 FL (ref 8.9–12.9)
POTASSIUM SERPL-SCNC: 4.9 MMOL/L (ref 3.5–5.1)
RBC # BLD AUTO: 3.65 M/UL (ref 3.8–5.2)
SERVICE CMNT-IMP: ABNORMAL
SODIUM SERPL-SCNC: 138 MMOL/L (ref 136–145)
WBC # BLD AUTO: 8.7 K/UL (ref 3.6–11)

## 2025-08-28 PROCEDURE — 80048 BASIC METABOLIC PNL TOTAL CA: CPT

## 2025-08-28 PROCEDURE — 82962 GLUCOSE BLOOD TEST: CPT

## 2025-08-28 PROCEDURE — 6370000000 HC RX 637 (ALT 250 FOR IP): Performed by: SURGERY

## 2025-08-28 PROCEDURE — 6360000002 HC RX W HCPCS: Performed by: SURGERY

## 2025-08-28 PROCEDURE — 1100000000 HC RM PRIVATE

## 2025-08-28 PROCEDURE — 94640 AIRWAY INHALATION TREATMENT: CPT

## 2025-08-28 PROCEDURE — 94760 N-INVAS EAR/PLS OXIMETRY 1: CPT

## 2025-08-28 PROCEDURE — 2500000003 HC RX 250 WO HCPCS: Performed by: SURGERY

## 2025-08-28 PROCEDURE — 85025 COMPLETE CBC W/AUTO DIFF WBC: CPT

## 2025-08-28 RX ORDER — MONTELUKAST SODIUM 10 MG/1
10 TABLET ORAL EVERY EVENING
Status: DISCONTINUED | OUTPATIENT
Start: 2025-08-28 | End: 2025-08-29 | Stop reason: HOSPADM

## 2025-08-28 RX ORDER — ROFLUMILAST 500 UG/1
500 TABLET ORAL DAILY
Status: DISCONTINUED | OUTPATIENT
Start: 2025-08-28 | End: 2025-08-29 | Stop reason: CLARIF

## 2025-08-28 RX ORDER — ROSUVASTATIN CALCIUM 40 MG/1
40 TABLET, COATED ORAL DAILY
Status: DISCONTINUED | OUTPATIENT
Start: 2025-08-28 | End: 2025-08-29 | Stop reason: HOSPADM

## 2025-08-28 RX ORDER — TRAZODONE HYDROCHLORIDE 100 MG/1
100 TABLET ORAL NIGHTLY
Status: DISCONTINUED | OUTPATIENT
Start: 2025-08-28 | End: 2025-08-29 | Stop reason: HOSPADM

## 2025-08-28 RX ORDER — BUPROPION HYDROCHLORIDE 150 MG/1
300 TABLET ORAL EVERY MORNING
Status: DISCONTINUED | OUTPATIENT
Start: 2025-08-28 | End: 2025-08-29 | Stop reason: HOSPADM

## 2025-08-28 RX ORDER — METOPROLOL SUCCINATE 50 MG/1
100 TABLET, EXTENDED RELEASE ORAL DAILY
Status: DISCONTINUED | OUTPATIENT
Start: 2025-08-28 | End: 2025-08-29 | Stop reason: HOSPADM

## 2025-08-28 RX ORDER — ACETAMINOPHEN 325 MG/1
650 TABLET ORAL EVERY 4 HOURS PRN
Status: DISCONTINUED | OUTPATIENT
Start: 2025-08-28 | End: 2025-08-29 | Stop reason: HOSPADM

## 2025-08-28 RX ADMIN — ACETAMINOPHEN 650 MG: 650 SUSPENSION ORAL at 12:16

## 2025-08-28 RX ADMIN — ACETAMINOPHEN 650 MG: 650 SUSPENSION ORAL at 23:00

## 2025-08-28 RX ADMIN — MONTELUKAST 10 MG: 10 TABLET, FILM COATED ORAL at 17:37

## 2025-08-28 RX ADMIN — SODIUM CHLORIDE, PRESERVATIVE FREE 10 ML: 5 INJECTION INTRAVENOUS at 20:58

## 2025-08-28 RX ADMIN — ROSUVASTATIN 40 MG: 40 TABLET, FILM COATED ORAL at 20:55

## 2025-08-28 RX ADMIN — INSULIN LISPRO 4 UNITS: 100 INJECTION, SOLUTION INTRAVENOUS; SUBCUTANEOUS at 12:18

## 2025-08-28 RX ADMIN — IPRATROPIUM BROMIDE 0.5 MG: 0.5 SOLUTION RESPIRATORY (INHALATION) at 12:01

## 2025-08-28 RX ADMIN — ACETAMINOPHEN 650 MG: 650 SUSPENSION ORAL at 05:45

## 2025-08-28 RX ADMIN — INSULIN LISPRO 4 UNITS: 100 INJECTION, SOLUTION INTRAVENOUS; SUBCUTANEOUS at 22:14

## 2025-08-28 RX ADMIN — HYDROMORPHONE HYDROCHLORIDE 1 MG: 1 INJECTION, SOLUTION INTRAMUSCULAR; INTRAVENOUS; SUBCUTANEOUS at 04:04

## 2025-08-28 RX ADMIN — INSULIN LISPRO 4 UNITS: 100 INJECTION, SOLUTION INTRAVENOUS; SUBCUTANEOUS at 17:36

## 2025-08-28 RX ADMIN — INSULIN LISPRO 4 UNITS: 100 INJECTION, SOLUTION INTRAVENOUS; SUBCUTANEOUS at 05:51

## 2025-08-28 RX ADMIN — ACETAMINOPHEN 650 MG: 650 SUSPENSION ORAL at 17:35

## 2025-08-28 RX ADMIN — TRAZODONE HYDROCHLORIDE 100 MG: 100 TABLET ORAL at 20:55

## 2025-08-28 RX ADMIN — IPRATROPIUM BROMIDE 0.5 MG: 0.5 SOLUTION RESPIRATORY (INHALATION) at 07:52

## 2025-08-28 RX ADMIN — IPRATROPIUM BROMIDE 0.5 MG: 0.5 SOLUTION RESPIRATORY (INHALATION) at 19:23

## 2025-08-28 RX ADMIN — BUPROPION HYDROCHLORIDE 300 MG: 150 TABLET, EXTENDED RELEASE ORAL at 09:30

## 2025-08-28 RX ADMIN — IPRATROPIUM BROMIDE 0.5 MG: 0.5 SOLUTION RESPIRATORY (INHALATION) at 15:37

## 2025-08-28 RX ADMIN — METOPROLOL SUCCINATE 100 MG: 50 TABLET, EXTENDED RELEASE ORAL at 09:30

## 2025-08-28 RX ADMIN — HYDROMORPHONE HYDROCHLORIDE 1 MG: 1 INJECTION, SOLUTION INTRAMUSCULAR; INTRAVENOUS; SUBCUTANEOUS at 10:45

## 2025-08-28 ASSESSMENT — PAIN DESCRIPTION - LOCATION
LOCATION: ABDOMEN
LOCATION: ABDOMEN;SHOULDER
LOCATION: ABDOMEN;SHOULDER;NECK

## 2025-08-28 ASSESSMENT — PAIN DESCRIPTION - ORIENTATION
ORIENTATION: RIGHT;LEFT
ORIENTATION: ANTERIOR;LEFT
ORIENTATION: LEFT
ORIENTATION: RIGHT;LEFT

## 2025-08-28 ASSESSMENT — PAIN - FUNCTIONAL ASSESSMENT
PAIN_FUNCTIONAL_ASSESSMENT: 0-10

## 2025-08-28 ASSESSMENT — PAIN DESCRIPTION - DESCRIPTORS
DESCRIPTORS: ACHING
DESCRIPTORS: ACHING
DESCRIPTORS: ACHING;DISCOMFORT
DESCRIPTORS: ACHING;DISCOMFORT
DESCRIPTORS: ACHING

## 2025-08-28 ASSESSMENT — PAIN SCALES - GENERAL
PAINLEVEL_OUTOF10: 4
PAINLEVEL_OUTOF10: 0
PAINLEVEL_OUTOF10: 7
PAINLEVEL_OUTOF10: 10
PAINLEVEL_OUTOF10: 3
PAINLEVEL_OUTOF10: 2

## 2025-08-29 VITALS
HEART RATE: 107 BPM | DIASTOLIC BLOOD PRESSURE: 75 MMHG | RESPIRATION RATE: 16 BRPM | OXYGEN SATURATION: 98 % | TEMPERATURE: 97.9 F | SYSTOLIC BLOOD PRESSURE: 137 MMHG

## 2025-08-29 LAB
ANION GAP SERPL CALC-SCNC: 8 MMOL/L (ref 2–14)
BASOPHILS # BLD: 0.03 K/UL (ref 0–0.1)
BASOPHILS NFR BLD: 0.4 % (ref 0–1)
BUN SERPL-MCNC: 8 MG/DL (ref 8–23)
BUN/CREAT SERPL: 16 (ref 12–20)
CALCIUM SERPL-MCNC: 8.3 MG/DL (ref 8.8–10.2)
CHLORIDE SERPL-SCNC: 105 MMOL/L (ref 98–107)
CO2 SERPL-SCNC: 25 MMOL/L (ref 20–29)
CREAT SERPL-MCNC: 0.5 MG/DL (ref 0.6–1)
DIFFERENTIAL METHOD BLD: ABNORMAL
EOSINOPHIL # BLD: 0.13 K/UL (ref 0–0.4)
EOSINOPHIL NFR BLD: 1.8 % (ref 0–7)
ERYTHROCYTE [DISTWIDTH] IN BLOOD BY AUTOMATED COUNT: 12.5 % (ref 11.5–14.5)
GLUCOSE BLD STRIP.AUTO-MCNC: 239 MG/DL (ref 65–117)
GLUCOSE BLD STRIP.AUTO-MCNC: 253 MG/DL (ref 65–117)
GLUCOSE SERPL-MCNC: 183 MG/DL (ref 65–100)
HCT VFR BLD AUTO: 32.1 % (ref 35–47)
HGB BLD-MCNC: 10.6 G/DL (ref 11.5–16)
IMM GRANULOCYTES # BLD AUTO: 0.03 K/UL (ref 0–0.04)
IMM GRANULOCYTES NFR BLD AUTO: 0.4 % (ref 0–0.5)
LYMPHOCYTES # BLD: 1.36 K/UL (ref 0.8–3.5)
LYMPHOCYTES NFR BLD: 18.9 % (ref 12–49)
MCH RBC QN AUTO: 29.3 PG (ref 26–34)
MCHC RBC AUTO-ENTMCNC: 33 G/DL (ref 30–36.5)
MCV RBC AUTO: 88.7 FL (ref 80–99)
MONOCYTES # BLD: 0.66 K/UL (ref 0–1)
MONOCYTES NFR BLD: 9.2 % (ref 5–13)
NEUTS SEG # BLD: 5 K/UL (ref 1.8–8)
NEUTS SEG NFR BLD: 69.3 % (ref 32–75)
NRBC # BLD: 0 K/UL (ref 0–0.01)
NRBC BLD-RTO: 0 PER 100 WBC
PLATELET # BLD AUTO: 161 K/UL (ref 150–400)
PMV BLD AUTO: 9.9 FL (ref 8.9–12.9)
POTASSIUM SERPL-SCNC: 4.3 MMOL/L (ref 3.5–5.1)
RBC # BLD AUTO: 3.62 M/UL (ref 3.8–5.2)
SERVICE CMNT-IMP: ABNORMAL
SERVICE CMNT-IMP: ABNORMAL
SODIUM SERPL-SCNC: 138 MMOL/L (ref 136–145)
WBC # BLD AUTO: 7.2 K/UL (ref 3.6–11)

## 2025-08-29 PROCEDURE — 80048 BASIC METABOLIC PNL TOTAL CA: CPT

## 2025-08-29 PROCEDURE — 94760 N-INVAS EAR/PLS OXIMETRY 1: CPT

## 2025-08-29 PROCEDURE — 6360000002 HC RX W HCPCS: Performed by: SURGERY

## 2025-08-29 PROCEDURE — 82962 GLUCOSE BLOOD TEST: CPT

## 2025-08-29 PROCEDURE — 2500000003 HC RX 250 WO HCPCS: Performed by: SURGERY

## 2025-08-29 PROCEDURE — 94640 AIRWAY INHALATION TREATMENT: CPT

## 2025-08-29 PROCEDURE — 85025 COMPLETE CBC W/AUTO DIFF WBC: CPT

## 2025-08-29 PROCEDURE — 6370000000 HC RX 637 (ALT 250 FOR IP): Performed by: SURGERY

## 2025-08-29 RX ORDER — HYDROMORPHONE HYDROCHLORIDE 2 MG/1
2 TABLET ORAL EVERY 6 HOURS PRN
Qty: 12 TABLET | Refills: 0 | Status: SHIPPED | OUTPATIENT
Start: 2025-08-29 | End: 2025-09-01

## 2025-08-29 RX ADMIN — IPRATROPIUM BROMIDE 0.5 MG: 0.5 SOLUTION RESPIRATORY (INHALATION) at 08:06

## 2025-08-29 RX ADMIN — INSULIN LISPRO 8 UNITS: 100 INJECTION, SOLUTION INTRAVENOUS; SUBCUTANEOUS at 06:37

## 2025-08-29 RX ADMIN — ACETAMINOPHEN 650 MG: 650 SUSPENSION ORAL at 06:38

## 2025-08-29 RX ADMIN — HYDROMORPHONE HYDROCHLORIDE 1 MG: 1 INJECTION, SOLUTION INTRAMUSCULAR; INTRAVENOUS; SUBCUTANEOUS at 04:42

## 2025-08-29 RX ADMIN — HYDROMORPHONE HYDROCHLORIDE 1 MG: 1 INJECTION, SOLUTION INTRAMUSCULAR; INTRAVENOUS; SUBCUTANEOUS at 00:08

## 2025-08-29 ASSESSMENT — PAIN SCALES - GENERAL
PAINLEVEL_OUTOF10: 5
PAINLEVEL_OUTOF10: 3
PAINLEVEL_OUTOF10: 6
PAINLEVEL_OUTOF10: 2

## 2025-08-29 ASSESSMENT — PAIN DESCRIPTION - LOCATION
LOCATION: RIB CAGE;ABDOMEN
LOCATION: ABDOMEN;RIB CAGE
LOCATION: NECK

## 2025-08-29 ASSESSMENT — PAIN DESCRIPTION - DESCRIPTORS
DESCRIPTORS: ACHING

## 2025-08-29 ASSESSMENT — PAIN DESCRIPTION - ORIENTATION
ORIENTATION: LEFT
ORIENTATION: LEFT
ORIENTATION: POSTERIOR

## 2025-08-29 ASSESSMENT — PAIN - FUNCTIONAL ASSESSMENT
PAIN_FUNCTIONAL_ASSESSMENT: 0-10
PAIN_FUNCTIONAL_ASSESSMENT: 0-10

## 2025-08-29 ASSESSMENT — PAIN DESCRIPTION - PAIN TYPE: TYPE: SURGICAL PAIN

## 2025-09-01 RX ORDER — HYOSCYAMINE SULFATE 100 MG/1
1 TABLET ORAL EVERY 6 HOURS PRN
Qty: 40 EACH | Refills: 1 | Status: ON HOLD | OUTPATIENT
Start: 2025-09-01

## 2025-09-02 ENCOUNTER — TELEPHONE (OUTPATIENT)
Age: 65
End: 2025-09-02

## 2025-09-07 ENCOUNTER — ANESTHESIA (OUTPATIENT)
Facility: HOSPITAL | Age: 65
End: 2025-09-07
Payer: MEDICARE

## 2025-09-07 ENCOUNTER — ANESTHESIA EVENT (OUTPATIENT)
Facility: HOSPITAL | Age: 65
End: 2025-09-07
Payer: MEDICARE

## 2025-09-07 PROBLEM — K56.609 BOWEL OBSTRUCTION (HCC): Status: ACTIVE | Noted: 2025-09-07

## 2025-09-07 PROCEDURE — 2580000003 HC RX 258: Performed by: NURSE ANESTHETIST, CERTIFIED REGISTERED

## 2025-09-07 PROCEDURE — 2500000003 HC RX 250 WO HCPCS: Performed by: NURSE ANESTHETIST, CERTIFIED REGISTERED

## 2025-09-07 PROCEDURE — 6360000002 HC RX W HCPCS: Performed by: NURSE ANESTHETIST, CERTIFIED REGISTERED

## 2025-09-07 RX ORDER — PROPOFOL 10 MG/ML
INJECTION, EMULSION INTRAVENOUS
Status: DISCONTINUED | OUTPATIENT
Start: 2025-09-07 | End: 2025-09-07 | Stop reason: SDUPTHER

## 2025-09-07 RX ORDER — LIDOCAINE HYDROCHLORIDE 20 MG/ML
INJECTION, SOLUTION EPIDURAL; INFILTRATION; INTRACAUDAL; PERINEURAL
Status: DISCONTINUED | OUTPATIENT
Start: 2025-09-07 | End: 2025-09-07 | Stop reason: SDUPTHER

## 2025-09-07 RX ORDER — DEXMEDETOMIDINE HYDROCHLORIDE 100 UG/ML
INJECTION, SOLUTION INTRAVENOUS
Status: DISCONTINUED | OUTPATIENT
Start: 2025-09-07 | End: 2025-09-07 | Stop reason: SDUPTHER

## 2025-09-07 RX ORDER — MIDAZOLAM HYDROCHLORIDE 1 MG/ML
INJECTION, SOLUTION INTRAMUSCULAR; INTRAVENOUS
Status: DISCONTINUED | OUTPATIENT
Start: 2025-09-07 | End: 2025-09-07 | Stop reason: SDUPTHER

## 2025-09-07 RX ORDER — DEXAMETHASONE SODIUM PHOSPHATE 4 MG/ML
INJECTION, SOLUTION INTRA-ARTICULAR; INTRALESIONAL; INTRAMUSCULAR; INTRAVENOUS; SOFT TISSUE
Status: DISCONTINUED | OUTPATIENT
Start: 2025-09-07 | End: 2025-09-07 | Stop reason: SDUPTHER

## 2025-09-07 RX ORDER — FENTANYL CITRATE 50 UG/ML
INJECTION, SOLUTION INTRAMUSCULAR; INTRAVENOUS
Status: DISCONTINUED | OUTPATIENT
Start: 2025-09-07 | End: 2025-09-07 | Stop reason: SDUPTHER

## 2025-09-07 RX ORDER — ROCURONIUM BROMIDE 10 MG/ML
INJECTION, SOLUTION INTRAVENOUS
Status: DISCONTINUED | OUTPATIENT
Start: 2025-09-07 | End: 2025-09-07 | Stop reason: SDUPTHER

## 2025-09-07 RX ORDER — SUCCINYLCHOLINE/SOD CL,ISO/PF 200MG/10ML
SYRINGE (ML) INTRAVENOUS
Status: DISCONTINUED | OUTPATIENT
Start: 2025-09-07 | End: 2025-09-07 | Stop reason: SDUPTHER

## 2025-09-07 RX ORDER — SODIUM CHLORIDE, SODIUM LACTATE, POTASSIUM CHLORIDE, CALCIUM CHLORIDE 600; 310; 30; 20 MG/100ML; MG/100ML; MG/100ML; MG/100ML
INJECTION, SOLUTION INTRAVENOUS
Status: DISCONTINUED | OUTPATIENT
Start: 2025-09-07 | End: 2025-09-07 | Stop reason: SDUPTHER

## 2025-09-07 RX ORDER — PHENYLEPHRINE HCL IN 0.9% NACL 0.4MG/10ML
SYRINGE (ML) INTRAVENOUS
Status: DISCONTINUED | OUTPATIENT
Start: 2025-09-07 | End: 2025-09-07 | Stop reason: SDUPTHER

## 2025-09-07 RX ORDER — ONDANSETRON 2 MG/ML
INJECTION INTRAMUSCULAR; INTRAVENOUS
Status: DISCONTINUED | OUTPATIENT
Start: 2025-09-07 | End: 2025-09-07 | Stop reason: SDUPTHER

## 2025-09-07 RX ORDER — CEFAZOLIN SODIUM 1 G/3ML
INJECTION, POWDER, FOR SOLUTION INTRAMUSCULAR; INTRAVENOUS
Status: DISCONTINUED | OUTPATIENT
Start: 2025-09-07 | End: 2025-09-07 | Stop reason: SDUPTHER

## 2025-09-07 RX ADMIN — ROCURONIUM BROMIDE 5 MG: 10 INJECTION, SOLUTION INTRAVENOUS at 10:35

## 2025-09-07 RX ADMIN — SODIUM CHLORIDE, POTASSIUM CHLORIDE, SODIUM LACTATE AND CALCIUM CHLORIDE: 600; 310; 30; 20 INJECTION, SOLUTION INTRAVENOUS at 10:20

## 2025-09-07 RX ADMIN — DEXMEDETOMIDINE 20 MCG: 100 INJECTION, SOLUTION, CONCENTRATE INTRAVENOUS at 10:27

## 2025-09-07 RX ADMIN — SUGAMMADEX 200 MG: 100 INJECTION, SOLUTION INTRAVENOUS at 12:17

## 2025-09-07 RX ADMIN — Medication 80 MCG: at 10:35

## 2025-09-07 RX ADMIN — FENTANYL CITRATE 50 MCG: 50 INJECTION INTRAMUSCULAR; INTRAVENOUS at 12:13

## 2025-09-07 RX ADMIN — Medication 80 MG: at 10:35

## 2025-09-07 RX ADMIN — Medication 50 MG: at 11:45

## 2025-09-07 RX ADMIN — LIDOCAINE HYDROCHLORIDE 50 MG: 20 INJECTION, SOLUTION EPIDURAL; INFILTRATION; INTRACAUDAL; PERINEURAL at 10:35

## 2025-09-07 RX ADMIN — PHENYLEPHRINE HYDROCHLORIDE 50 MCG/MIN: 10 INJECTION INTRAVENOUS at 10:34

## 2025-09-07 RX ADMIN — SODIUM CHLORIDE, POTASSIUM CHLORIDE, SODIUM LACTATE AND CALCIUM CHLORIDE: 600; 310; 30; 20 INJECTION, SOLUTION INTRAVENOUS at 11:54

## 2025-09-07 RX ADMIN — ROCURONIUM BROMIDE 45 MG: 10 INJECTION, SOLUTION INTRAVENOUS at 10:41

## 2025-09-07 RX ADMIN — PROPOFOL 100 MG: 10 INJECTION, EMULSION INTRAVENOUS at 10:35

## 2025-09-07 RX ADMIN — FENTANYL CITRATE 50 MCG: 50 INJECTION INTRAMUSCULAR; INTRAVENOUS at 10:35

## 2025-09-07 RX ADMIN — ONDANSETRON 4 MG: 2 INJECTION, SOLUTION INTRAMUSCULAR; INTRAVENOUS at 10:42

## 2025-09-07 RX ADMIN — HYDROMORPHONE HYDROCHLORIDE 1 MG: 1 INJECTION, SOLUTION INTRAMUSCULAR; INTRAVENOUS; SUBCUTANEOUS at 11:03

## 2025-09-07 RX ADMIN — DEXAMETHASONE SODIUM PHOSPHATE 4 MG: 4 INJECTION INTRA-ARTICULAR; INTRALESIONAL; INTRAMUSCULAR; INTRAVENOUS; SOFT TISSUE at 10:42

## 2025-09-07 RX ADMIN — CEFAZOLIN 2 G: 330 INJECTION, POWDER, FOR SOLUTION INTRAMUSCULAR; INTRAVENOUS at 10:45

## 2025-09-07 RX ADMIN — MIDAZOLAM 2 MG: 1 INJECTION INTRAMUSCULAR; INTRAVENOUS at 10:27

## (undated) DEVICE — SEALER VES DIA8MM EXT ENDOWRIST

## (undated) DEVICE — SUTURE MONOCRYL + SZ 4-0 L27IN ABSRB UD L19MM PS-2 3/8 CIR MCP426H

## (undated) DEVICE — STAIN INDIA INK IN NACL 10ML --

## (undated) DEVICE — STRAINER URIN CALC RNL MSH -- CONVERT TO ITEM 357634

## (undated) DEVICE — TUBE GASTROSTMY 40FR L107CM CLS ROUNDED TIP SM SIDE H

## (undated) DEVICE — CATH IV AUTOGRD BC PNK 20GA 25 -- INSYTE

## (undated) DEVICE — 1200 GUARD II KIT W/5MM TUBE W/O VAC TUBE: Brand: GUARDIAN

## (undated) DEVICE — SYR 10ML LUER LOK 1/5ML GRAD --

## (undated) DEVICE — Device

## (undated) DEVICE — NON-REM POLYHESIVE PATIENT RETURN ELECTRODE: Brand: VALLEYLAB

## (undated) DEVICE — BLOCK BITE ENDOSCP AD 21 MM W/ DIL BLU LF DISP

## (undated) DEVICE — NEEDLE HYPO 18GA L1.5IN PNK S STL HUB POLYPR SHLD REG BVL

## (undated) DEVICE — DRAPE ARM W21XH19XL10.5IN DA VINCI XI INTUITIVE SURGICAL

## (undated) DEVICE — SET ADMIN 16ML TBNG L100IN 2 Y INJ SITE IV PIGGY BK DISP

## (undated) DEVICE — SOLIDIFIER FLD 2OZ 1500CC N DISINF IN BTL DISP SAFESORB

## (undated) DEVICE — COVER TIP DIA8MM DA VINCI SI XI X ENDOWRIST

## (undated) DEVICE — (D)AGENT INJECTN ELEVIEW 10ML -- DISC BY MFG

## (undated) DEVICE — SNARE ENDOSCP M L240CM W27MM SHTH DIA2.4MM CHN 2.8MM OVL

## (undated) DEVICE — DEVICE SUT FOR TRCR SITE INCIS ENDO CLOSE

## (undated) DEVICE — SOLUTION ELECTROCAUTERY 4ML YEL BRN AMPHIPHILIC PHOSLIP

## (undated) DEVICE — SOLUTION ANTIFOG VIS SYS CLEARIFY LAPSCP

## (undated) DEVICE — SHEET REPROC POS 40X80IN AIR BLEND CMFRT GLDE

## (undated) DEVICE — Z DISCONTINUED PER MEDLINE LINE GAS SAMPLING O2/CO2 LNG AD 13 FT NSL W/ TBNG FILTERLINE

## (undated) DEVICE — SUTURE NONABSORBABLE MONOFILAMENT 2-0 V-20 6 IN V-LOC PBT VLOCN0605

## (undated) DEVICE — CONTAINER SPEC 20 ML LID NEUT BUFF FORMALIN 10 % POLYPR STS

## (undated) DEVICE — ELECTRODE,RADIOTRANSLUCENT,FOAM,5PK: Brand: MEDLINE

## (undated) DEVICE — YANKAUER,TAPERED BULBOUS TIP,W/O VENT: Brand: MEDLINE

## (undated) DEVICE — TOWEL 4 PLY TISS 19X30 SUE WHT

## (undated) DEVICE — SURGICAL PROCEDURE KIT ENDOSCP CUST COMPLIANCE

## (undated) DEVICE — REM POLYHESIVE ADULT PATIENT RETURN ELECTRODE: Brand: VALLEYLAB

## (undated) DEVICE — SEAL UNIVERSAL 5-12MM

## (undated) DEVICE — RELOAD STPL 2.5 WHT 6 ROW DISP FOR DA VINCI X AND DA VINCI

## (undated) DEVICE — TROCAR ENDOSCP L100MM DIA5MM BLDELSS STBL SL THRD OPT VW

## (undated) DEVICE — MEDI-VAC YANK SUCT HNDL W/TPRD BULBOUS TIP & NON-CONDUCTIVE: Brand: CARDINAL HEALTH

## (undated) DEVICE — NEEDLE SCLERO 25GA L240CM OD0.51MM ID0.24MM EXTN L4MM SHTH

## (undated) DEVICE — EVACUATOR SURG 100CC SIL BLB SUCT RESVR FOR CLS WND DRNGE

## (undated) DEVICE — TRAP,MUCUS SPECIMEN, 80CC: Brand: MEDLINE

## (undated) DEVICE — BASIN EMSIS 16OZ GRAPHITE PLAS KID SHP MOLD GRAD FOR ORAL

## (undated) DEVICE — ADHESIVE SKIN CLOSURE TOP 0.8 CC PREM PUR LIQUIBAND RAPID LF

## (undated) DEVICE — SUTURE MONOCRYL ABSORBABLE L 6 IN SZ 3-0 POLYGLCOLIC ACD MONOFILAMENT SH

## (undated) DEVICE — NEONATAL-ADULT SPO2 SENSOR: Brand: NELLCOR

## (undated) DEVICE — SYR 3ML LL TIP 1/10ML GRAD --

## (undated) DEVICE — SYSTEM EVAC SMOKE LAPARSCOPIC

## (undated) DEVICE — PACK PROC BASIC V SIRUS

## (undated) DEVICE — GARMENT COMPR M FOR 12-18IN CALF INTMIT SGL BLDR HEMO-FORCE

## (undated) DEVICE — STAPLER SUREFORM 60 DA VINCI XI

## (undated) DEVICE — DRAIN SURG 19FR 100% SIL END PERF RND NO RESVR TB W/ TRCR

## (undated) DEVICE — RELOAD STPL 3.5 BLU 6 ROW DISP FOR DA VINCI X AND DA VINCI

## (undated) DEVICE — SUTURE VICRYL + SZ 0 L27IN ABSRB VLT L26MM UR-6 5/8 CIR VCP603H

## (undated) DEVICE — SNARE ENDOSCP XL W33MMXL240CM SHTH DIA2.4MM COLON STIFF

## (undated) DEVICE — SUTURE ABSORBABLE MONOFILAMENT 3-0 SH 27 IN VIO PDS + PDP316H

## (undated) DEVICE — OBTURATOR ROBOTIC DIA8MM STD OPT BLDELSS

## (undated) DEVICE — SHEET TRNSF DISPOSABLE HOVERMATT 100 PER CA

## (undated) DEVICE — FORCEPS BX L160CM DIA8MM GRSP DISECT CUP TIP NONLOCKING ROT

## (undated) DEVICE — BAG SPEC BIOHZRD 10 X 10 IN --

## (undated) DEVICE — SOLUTION IV 1000 ML 0.9 NACL INJ USP EXCEL PLAS CONTAINER